# Patient Record
Sex: MALE | Race: WHITE | NOT HISPANIC OR LATINO | Employment: UNEMPLOYED | URBAN - METROPOLITAN AREA
[De-identification: names, ages, dates, MRNs, and addresses within clinical notes are randomized per-mention and may not be internally consistent; named-entity substitution may affect disease eponyms.]

---

## 2017-01-13 ENCOUNTER — GENERIC CONVERSION - ENCOUNTER (OUTPATIENT)
Dept: OTHER | Facility: OTHER | Age: 47
End: 2017-01-13

## 2017-01-18 ENCOUNTER — ALLSCRIPTS OFFICE VISIT (OUTPATIENT)
Dept: OTHER | Facility: CLINIC | Age: 47
End: 2017-01-18

## 2017-01-30 ENCOUNTER — GENERIC CONVERSION - ENCOUNTER (OUTPATIENT)
Dept: OTHER | Facility: OTHER | Age: 47
End: 2017-01-30

## 2017-01-30 ENCOUNTER — ALLSCRIPTS OFFICE VISIT (OUTPATIENT)
Dept: OTHER | Facility: CLINIC | Age: 47
End: 2017-01-30

## 2017-03-02 ENCOUNTER — GENERIC CONVERSION - ENCOUNTER (OUTPATIENT)
Dept: OTHER | Facility: OTHER | Age: 47
End: 2017-03-02

## 2017-03-07 ENCOUNTER — HOSPITAL ENCOUNTER (EMERGENCY)
Facility: HOSPITAL | Age: 47
Discharge: HOME | End: 2017-03-07
Attending: EMERGENCY MEDICINE | Admitting: EMERGENCY MEDICINE
Payer: COMMERCIAL

## 2017-03-07 ENCOUNTER — APPOINTMENT (EMERGENCY)
Dept: RADIOLOGY | Facility: HOSPITAL | Age: 47
End: 2017-03-07
Payer: COMMERCIAL

## 2017-03-07 VITALS
WEIGHT: 185 LBS | DIASTOLIC BLOOD PRESSURE: 63 MMHG | BODY MASS INDEX: 28.98 KG/M2 | TEMPERATURE: 98.1 F | HEART RATE: 94 BPM | SYSTOLIC BLOOD PRESSURE: 121 MMHG | RESPIRATION RATE: 20 BRPM | OXYGEN SATURATION: 95 %

## 2017-03-07 DIAGNOSIS — S93.401A RIGHT ANKLE SPRAIN: Primary | ICD-10-CM

## 2017-03-07 PROCEDURE — 73610 X-RAY EXAM OF ANKLE: CPT

## 2017-03-07 PROCEDURE — 73630 X-RAY EXAM OF FOOT: CPT

## 2017-03-07 PROCEDURE — 99283 EMERGENCY DEPT VISIT LOW MDM: CPT

## 2017-03-07 RX ORDER — DICYCLOMINE HCL 20 MG
20 TABLET ORAL EVERY 6 HOURS
COMMUNITY
End: 2018-05-14 | Stop reason: SDUPTHER

## 2017-03-07 RX ORDER — IBUPROFEN 400 MG/1
800 TABLET ORAL ONCE
Status: COMPLETED | OUTPATIENT
Start: 2017-03-07 | End: 2017-03-07

## 2017-03-07 RX ORDER — IBUPROFEN 800 MG/1
800 TABLET ORAL EVERY 6 HOURS PRN
Qty: 20 TABLET | Refills: 0 | Status: SHIPPED | OUTPATIENT
Start: 2017-03-07 | End: 2018-02-23

## 2017-03-07 RX ADMIN — IBUPROFEN 800 MG: 400 TABLET ORAL at 18:52

## 2017-03-08 ENCOUNTER — ANESTHESIA EVENT (OUTPATIENT)
Dept: GASTROENTEROLOGY | Facility: HOSPITAL | Age: 47
End: 2017-03-08
Payer: COMMERCIAL

## 2017-03-09 ENCOUNTER — GENERIC CONVERSION - ENCOUNTER (OUTPATIENT)
Dept: OTHER | Facility: OTHER | Age: 47
End: 2017-03-09

## 2017-03-09 ENCOUNTER — ANESTHESIA (OUTPATIENT)
Dept: GASTROENTEROLOGY | Facility: HOSPITAL | Age: 47
End: 2017-03-09
Payer: COMMERCIAL

## 2017-03-09 ENCOUNTER — HOSPITAL ENCOUNTER (OUTPATIENT)
Facility: HOSPITAL | Age: 47
Setting detail: OUTPATIENT SURGERY
Discharge: HOME | End: 2017-03-09
Attending: INTERNAL MEDICINE | Admitting: INTERNAL MEDICINE
Payer: COMMERCIAL

## 2017-03-09 VITALS
HEIGHT: 67 IN | BODY MASS INDEX: 29.03 KG/M2 | RESPIRATION RATE: 16 BRPM | TEMPERATURE: 97.4 F | OXYGEN SATURATION: 99 % | WEIGHT: 185 LBS | DIASTOLIC BLOOD PRESSURE: 65 MMHG | SYSTOLIC BLOOD PRESSURE: 104 MMHG | HEART RATE: 85 BPM

## 2017-03-09 DIAGNOSIS — R13.10 DYSPHAGIA: ICD-10-CM

## 2017-03-09 PROCEDURE — 88342 IMHCHEM/IMCYTCHM 1ST ANTB: CPT | Performed by: INTERNAL MEDICINE

## 2017-03-09 PROCEDURE — 88313 SPECIAL STAINS GROUP 2: CPT | Performed by: INTERNAL MEDICINE

## 2017-03-09 PROCEDURE — 88305 TISSUE EXAM BY PATHOLOGIST: CPT | Performed by: INTERNAL MEDICINE

## 2017-03-09 RX ORDER — SODIUM CHLORIDE 9 MG/ML
100 INJECTION, SOLUTION INTRAVENOUS CONTINUOUS
Status: DISCONTINUED | OUTPATIENT
Start: 2017-03-09 | End: 2017-03-09 | Stop reason: HOSPADM

## 2017-03-09 RX ORDER — PROPOFOL 10 MG/ML
INJECTION, EMULSION INTRAVENOUS AS NEEDED
Status: DISCONTINUED | OUTPATIENT
Start: 2017-03-09 | End: 2017-03-09 | Stop reason: SURG

## 2017-03-09 RX ADMIN — PROPOFOL 50 MG: 10 INJECTION, EMULSION INTRAVENOUS at 15:29

## 2017-03-09 RX ADMIN — SODIUM CHLORIDE: 0.9 INJECTION, SOLUTION INTRAVENOUS at 15:14

## 2017-03-09 RX ADMIN — PROPOFOL 50 MG: 10 INJECTION, EMULSION INTRAVENOUS at 15:28

## 2017-03-09 RX ADMIN — PROPOFOL 50 MG: 10 INJECTION, EMULSION INTRAVENOUS at 15:31

## 2017-03-09 RX ADMIN — SODIUM CHLORIDE 100 ML/HR: 0.9 INJECTION, SOLUTION INTRAVENOUS at 15:09

## 2017-03-09 RX ADMIN — PROPOFOL 100 MG: 10 INJECTION, EMULSION INTRAVENOUS at 15:27

## 2017-03-09 RX ADMIN — PROPOFOL 50 MG: 10 INJECTION, EMULSION INTRAVENOUS at 15:33

## 2017-03-09 RX ADMIN — LIDOCAINE HYDROCHLORIDE 100 MG: 20 INJECTION, SOLUTION INTRAVENOUS at 15:27

## 2017-03-21 ENCOUNTER — GENERIC CONVERSION - ENCOUNTER (OUTPATIENT)
Dept: OTHER | Facility: OTHER | Age: 47
End: 2017-03-21

## 2017-03-29 ENCOUNTER — GENERIC CONVERSION - ENCOUNTER (OUTPATIENT)
Dept: OTHER | Facility: OTHER | Age: 47
End: 2017-03-29

## 2017-03-29 DIAGNOSIS — K21.00 GASTRO-ESOPHAGEAL REFLUX DISEASE WITH ESOPHAGITIS: ICD-10-CM

## 2017-03-29 DIAGNOSIS — K58.9 IRRITABLE BOWEL SYNDROME WITHOUT DIARRHEA: ICD-10-CM

## 2017-04-03 ENCOUNTER — HOSPITAL ENCOUNTER (EMERGENCY)
Facility: HOSPITAL | Age: 47
Discharge: HOME | End: 2017-04-03
Attending: EMERGENCY MEDICINE | Admitting: EMERGENCY MEDICINE
Payer: COMMERCIAL

## 2017-04-03 ENCOUNTER — APPOINTMENT (EMERGENCY)
Dept: RADIOLOGY | Facility: HOSPITAL | Age: 47
End: 2017-04-03
Payer: COMMERCIAL

## 2017-04-03 VITALS
RESPIRATION RATE: 18 BRPM | OXYGEN SATURATION: 94 % | SYSTOLIC BLOOD PRESSURE: 129 MMHG | WEIGHT: 189.6 LBS | BODY MASS INDEX: 29.69 KG/M2 | DIASTOLIC BLOOD PRESSURE: 71 MMHG | HEART RATE: 109 BPM | TEMPERATURE: 100.5 F

## 2017-04-03 DIAGNOSIS — J18.9 PNEUMONIA: Primary | ICD-10-CM

## 2017-04-03 DIAGNOSIS — R50.9 FEVER: ICD-10-CM

## 2017-04-03 LAB
FLUAV AG SPEC QL IA: NEGATIVE
FLUAV AG SPEC QL: NORMAL
FLUBV AG SPEC QL IA: NEGATIVE
FLUBV AG SPEC QL: NORMAL
RSV B RNA SPEC QL NAA+PROBE: NORMAL

## 2017-04-03 PROCEDURE — 87400 INFLUENZA A/B EACH AG IA: CPT | Performed by: EMERGENCY MEDICINE

## 2017-04-03 PROCEDURE — 71020 HB CHEST X-RAY 2VW FRONTAL&LATL: CPT

## 2017-04-03 PROCEDURE — 99283 EMERGENCY DEPT VISIT LOW MDM: CPT

## 2017-04-03 PROCEDURE — 87798 DETECT AGENT NOS DNA AMP: CPT | Performed by: EMERGENCY MEDICINE

## 2017-04-03 RX ORDER — ACETAMINOPHEN 325 MG/1
650 TABLET ORAL ONCE
Status: COMPLETED | OUTPATIENT
Start: 2017-04-03 | End: 2017-04-03

## 2017-04-03 RX ORDER — AZITHROMYCIN 250 MG/1
500 TABLET, FILM COATED ORAL ONCE
Status: COMPLETED | OUTPATIENT
Start: 2017-04-03 | End: 2017-04-03

## 2017-04-03 RX ORDER — AZITHROMYCIN 250 MG/1
250 TABLET, FILM COATED ORAL DAILY
Qty: 4 TABLET | Refills: 0 | Status: SHIPPED | OUTPATIENT
Start: 2017-04-04 | End: 2017-04-08

## 2017-04-03 RX ADMIN — AZITHROMYCIN 500 MG: 250 TABLET, FILM COATED ORAL at 05:36

## 2017-04-03 RX ADMIN — ACETAMINOPHEN 650 MG: 325 TABLET ORAL at 04:54

## 2017-04-21 ENCOUNTER — GENERIC CONVERSION - ENCOUNTER (OUTPATIENT)
Dept: OTHER | Facility: OTHER | Age: 47
End: 2017-04-21

## 2017-04-24 ENCOUNTER — GENERIC CONVERSION - ENCOUNTER (OUTPATIENT)
Dept: OTHER | Facility: OTHER | Age: 47
End: 2017-04-24

## 2017-05-04 ENCOUNTER — ALLSCRIPTS OFFICE VISIT (OUTPATIENT)
Dept: OTHER | Facility: CLINIC | Age: 47
End: 2017-05-04

## 2017-05-12 ENCOUNTER — ALLSCRIPTS OFFICE VISIT (OUTPATIENT)
Dept: OTHER | Facility: OTHER | Age: 47
End: 2017-05-12

## 2017-05-15 ENCOUNTER — GENERIC CONVERSION - ENCOUNTER (OUTPATIENT)
Dept: OTHER | Facility: OTHER | Age: 47
End: 2017-05-15

## 2017-05-22 ENCOUNTER — ALLSCRIPTS OFFICE VISIT (OUTPATIENT)
Dept: OTHER | Facility: OTHER | Age: 47
End: 2017-05-22

## 2017-05-25 ENCOUNTER — ALLSCRIPTS OFFICE VISIT (OUTPATIENT)
Dept: OTHER | Facility: OTHER | Age: 47
End: 2017-05-25

## 2017-05-31 ENCOUNTER — ALLSCRIPTS OFFICE VISIT (OUTPATIENT)
Dept: OTHER | Facility: OTHER | Age: 47
End: 2017-05-31

## 2017-06-07 ENCOUNTER — TRANSCRIBE ORDERS (OUTPATIENT)
Dept: ADMINISTRATIVE | Facility: HOSPITAL | Age: 47
End: 2017-06-07

## 2017-06-07 DIAGNOSIS — R13.10 DYSPHAGIA, UNSPECIFIED TYPE: Primary | ICD-10-CM

## 2017-06-08 ENCOUNTER — APPOINTMENT (OUTPATIENT)
Dept: LAB | Facility: CLINIC | Age: 47
End: 2017-06-08
Payer: COMMERCIAL

## 2017-06-08 DIAGNOSIS — K21.00 GASTRO-ESOPHAGEAL REFLUX DISEASE WITH ESOPHAGITIS: ICD-10-CM

## 2017-06-08 DIAGNOSIS — B20 HUMAN IMMUNODEFICIENCY VIRUS (HIV) DISEASE (HCC): ICD-10-CM

## 2017-06-08 DIAGNOSIS — K58.9 IRRITABLE BOWEL SYNDROME WITHOUT DIARRHEA: ICD-10-CM

## 2017-06-08 LAB
ALBUMIN SERPL BCP-MCNC: 4 G/DL (ref 3.5–5)
ALP SERPL-CCNC: 117 U/L (ref 46–116)
ALT SERPL W P-5'-P-CCNC: 98 U/L (ref 12–78)
ANION GAP SERPL CALCULATED.3IONS-SCNC: 8 MMOL/L (ref 4–13)
AST SERPL W P-5'-P-CCNC: 50 U/L (ref 5–45)
BASOPHILS # BLD MANUAL: 0 THOUSAND/UL (ref 0–0.1)
BASOPHILS NFR MAR MANUAL: 0 % (ref 0–1)
BILIRUB SERPL-MCNC: 0.32 MG/DL (ref 0.2–1)
BUN SERPL-MCNC: 16 MG/DL (ref 5–25)
CALCIUM SERPL-MCNC: 9.8 MG/DL (ref 8.3–10.1)
CHLORIDE SERPL-SCNC: 102 MMOL/L (ref 100–108)
CO2 SERPL-SCNC: 27 MMOL/L (ref 21–32)
CREAT SERPL-MCNC: 0.89 MG/DL (ref 0.6–1.3)
EOSINOPHIL # BLD MANUAL: 0.07 THOUSAND/UL (ref 0–0.4)
EOSINOPHIL NFR BLD MANUAL: 1 % (ref 0–6)
ERYTHROCYTE [DISTWIDTH] IN BLOOD BY AUTOMATED COUNT: 13.4 % (ref 11.6–15.1)
GFR SERPL CREATININE-BSD FRML MDRD: >60 ML/MIN/1.73SQ M
GLUCOSE SERPL-MCNC: 86 MG/DL (ref 65–140)
HCT VFR BLD AUTO: 44 % (ref 36.5–49.3)
HGB BLD-MCNC: 15.3 G/DL (ref 12–17)
LYMPHOCYTES # BLD AUTO: 3.35 THOUSAND/UL (ref 0.6–4.47)
LYMPHOCYTES # BLD AUTO: 49 % (ref 14–44)
MCH RBC QN AUTO: 31.5 PG (ref 26.8–34.3)
MCHC RBC AUTO-ENTMCNC: 34.8 G/DL (ref 31.4–37.4)
MCV RBC AUTO: 91 FL (ref 82–98)
MONOCYTES # BLD AUTO: 0.68 THOUSAND/UL (ref 0–1.22)
MONOCYTES NFR BLD: 10 % (ref 4–12)
MYELOCYTES NFR BLD MANUAL: 1 % (ref 0–1)
NEUTROPHILS # BLD MANUAL: 2.53 THOUSAND/UL (ref 1.85–7.62)
NEUTS BAND NFR BLD MANUAL: 1 % (ref 0–8)
NEUTS SEG NFR BLD AUTO: 36 % (ref 43–75)
NRBC BLD AUTO-RTO: 0 /100 WBCS
PLATELET # BLD AUTO: 294 THOUSANDS/UL (ref 149–390)
PLATELET BLD QL SMEAR: ADEQUATE
PMV BLD AUTO: 8.7 FL (ref 8.9–12.7)
POTASSIUM SERPL-SCNC: 4.2 MMOL/L (ref 3.5–5.3)
PROT SERPL-MCNC: 8.4 G/DL (ref 6.4–8.2)
RBC # BLD AUTO: 4.86 MILLION/UL (ref 3.88–5.62)
RBC MORPH BLD: NORMAL
SMUDGE CELLS BLD QL SMEAR: PRESENT
SODIUM SERPL-SCNC: 137 MMOL/L (ref 136–145)
VARIANT LYMPHS # BLD AUTO: 2 %
WBC # BLD AUTO: 6.83 THOUSAND/UL (ref 4.31–10.16)

## 2017-06-08 PROCEDURE — 87536 HIV-1 QUANT&REVRSE TRNSCRPJ: CPT

## 2017-06-08 PROCEDURE — 83516 IMMUNOASSAY NONANTIBODY: CPT

## 2017-06-08 PROCEDURE — 85027 COMPLETE CBC AUTOMATED: CPT

## 2017-06-08 PROCEDURE — 86255 FLUORESCENT ANTIBODY SCREEN: CPT

## 2017-06-08 PROCEDURE — 80053 COMPREHEN METABOLIC PANEL: CPT

## 2017-06-08 PROCEDURE — 82784 ASSAY IGA/IGD/IGG/IGM EACH: CPT

## 2017-06-08 PROCEDURE — 85007 BL SMEAR W/DIFF WBC COUNT: CPT

## 2017-06-08 PROCEDURE — 86361 T CELL ABSOLUTE COUNT: CPT

## 2017-06-08 PROCEDURE — 36415 COLL VENOUS BLD VENIPUNCTURE: CPT

## 2017-06-09 ENCOUNTER — HOSPITAL ENCOUNTER (OUTPATIENT)
Dept: RADIOLOGY | Facility: HOSPITAL | Age: 47
Discharge: HOME | End: 2017-06-09
Attending: OTOLARYNGOLOGY
Payer: COMMERCIAL

## 2017-06-09 DIAGNOSIS — R13.10 DYSPHAGIA, UNSPECIFIED TYPE: ICD-10-CM

## 2017-06-09 LAB
BASOPHILS # BLD AUTO: 0 X10E3/UL (ref 0–0.2)
BASOPHILS NFR BLD AUTO: 1 %
CD3+CD4+ CELLS # BLD: 1251 /UL (ref 359–1519)
CD3+CD4+ CELLS NFR BLD: 41.7 % (ref 30.8–58.5)
ENDOMYSIUM IGA SER QL: NEGATIVE
EOSINOPHIL # BLD AUTO: 0.1 X10E3/UL (ref 0–0.4)
EOSINOPHIL NFR BLD AUTO: 2 %
ERYTHROCYTE [DISTWIDTH] IN BLOOD BY AUTOMATED COUNT: 14.5 % (ref 12.3–15.4)
GLIADIN PEPTIDE IGA SER-ACNC: 10 UNITS (ref 0–19)
GLIADIN PEPTIDE IGG SER-ACNC: 2 UNITS (ref 0–19)
HCT VFR BLD AUTO: 45 % (ref 37.5–51)
HGB BLD-MCNC: 15.4 G/DL (ref 12.6–17.7)
IGA SERPL-MCNC: 269 MG/DL (ref 90–386)
IMM GRANULOCYTES # BLD: 0.1 X10E3/UL (ref 0–0.1)
IMM GRANULOCYTES NFR BLD: 1 %
LYMPHOCYTES # BLD AUTO: 3 X10E3/UL (ref 0.7–3.1)
LYMPHOCYTES NFR BLD AUTO: 48 %
MCH RBC QN AUTO: 31.3 PG (ref 26.6–33)
MCHC RBC AUTO-ENTMCNC: 34.2 G/DL (ref 31.5–35.7)
MCV RBC AUTO: 92 FL (ref 79–97)
MONOCYTES # BLD AUTO: 0.8 X10E3/UL (ref 0.1–0.9)
MONOCYTES NFR BLD AUTO: 13 %
NEUTROPHILS # BLD AUTO: 2.2 X10E3/UL (ref 1.4–7)
NEUTROPHILS NFR BLD AUTO: 35 %
PLATELET # BLD AUTO: 274 X10E3/UL (ref 150–379)
RBC # BLD AUTO: 4.92 X10E6/UL (ref 4.14–5.8)
TTG IGA SER-ACNC: <2 U/ML (ref 0–3)
TTG IGG SER-ACNC: <2 U/ML (ref 0–5)
WBC # BLD AUTO: 6.3 X10E3/UL (ref 3.4–10.8)

## 2017-06-09 PROCEDURE — 74220 X-RAY XM ESOPHAGUS 1CNTRST: CPT

## 2017-06-13 ENCOUNTER — GENERIC CONVERSION - ENCOUNTER (OUTPATIENT)
Dept: OTHER | Facility: OTHER | Age: 47
End: 2017-06-13

## 2017-06-13 DIAGNOSIS — B20 HUMAN IMMUNODEFICIENCY VIRUS (HIV) DISEASE (HCC): ICD-10-CM

## 2017-06-14 ENCOUNTER — ALLSCRIPTS OFFICE VISIT (OUTPATIENT)
Dept: OTHER | Facility: OTHER | Age: 47
End: 2017-06-14

## 2017-06-14 LAB
HIV1 RNA # SERPL NAA+PROBE: <20 COPIES/ML
HIV1 RNA SERPL NAA+PROBE-LOG#: NORMAL LOG10COPY/ML

## 2017-06-15 ENCOUNTER — APPOINTMENT (OUTPATIENT)
Dept: AUDIOLOGY | Age: 47
End: 2017-06-15
Payer: COMMERCIAL

## 2017-06-15 PROCEDURE — 92557 COMPREHENSIVE HEARING TEST: CPT | Performed by: AUDIOLOGIST

## 2017-06-15 PROCEDURE — 92567 TYMPANOMETRY: CPT | Performed by: AUDIOLOGIST

## 2017-06-20 ENCOUNTER — ALLSCRIPTS OFFICE VISIT (OUTPATIENT)
Dept: OTHER | Facility: OTHER | Age: 47
End: 2017-06-20

## 2017-06-30 ENCOUNTER — ALLSCRIPTS OFFICE VISIT (OUTPATIENT)
Dept: OTHER | Facility: OTHER | Age: 47
End: 2017-06-30

## 2017-07-03 ENCOUNTER — GENERIC CONVERSION - ENCOUNTER (OUTPATIENT)
Dept: OTHER | Facility: OTHER | Age: 47
End: 2017-07-03

## 2017-07-06 ENCOUNTER — ALLSCRIPTS OFFICE VISIT (OUTPATIENT)
Dept: OTHER | Facility: OTHER | Age: 47
End: 2017-07-06

## 2017-07-13 ENCOUNTER — GENERIC CONVERSION - ENCOUNTER (OUTPATIENT)
Dept: OTHER | Facility: OTHER | Age: 47
End: 2017-07-13

## 2017-07-20 ENCOUNTER — ALLSCRIPTS OFFICE VISIT (OUTPATIENT)
Dept: OTHER | Facility: OTHER | Age: 47
End: 2017-07-20

## 2017-07-25 ENCOUNTER — ALLSCRIPTS OFFICE VISIT (OUTPATIENT)
Dept: OTHER | Facility: OTHER | Age: 47
End: 2017-07-25

## 2017-07-31 ENCOUNTER — ALLSCRIPTS OFFICE VISIT (OUTPATIENT)
Dept: OTHER | Facility: CLINIC | Age: 47
End: 2017-07-31

## 2017-07-31 ENCOUNTER — GENERIC CONVERSION - ENCOUNTER (OUTPATIENT)
Dept: OTHER | Facility: OTHER | Age: 47
End: 2017-07-31

## 2017-08-04 ENCOUNTER — ALLSCRIPTS OFFICE VISIT (OUTPATIENT)
Dept: OTHER | Facility: OTHER | Age: 47
End: 2017-08-04

## 2017-08-07 ENCOUNTER — GENERIC CONVERSION - ENCOUNTER (OUTPATIENT)
Dept: OTHER | Facility: OTHER | Age: 47
End: 2017-08-07

## 2017-08-07 ENCOUNTER — ALLSCRIPTS OFFICE VISIT (OUTPATIENT)
Dept: OTHER | Facility: OTHER | Age: 47
End: 2017-08-07

## 2017-08-31 ENCOUNTER — ALLSCRIPTS OFFICE VISIT (OUTPATIENT)
Dept: OTHER | Facility: OTHER | Age: 47
End: 2017-08-31

## 2017-08-31 DIAGNOSIS — R25.8: ICD-10-CM

## 2017-08-31 DIAGNOSIS — Z79.899 OTHER LONG TERM (CURRENT) DRUG THERAPY: ICD-10-CM

## 2017-08-31 DIAGNOSIS — R74.8 ABNORMAL LEVELS OF OTHER SERUM ENZYMES: ICD-10-CM

## 2017-08-31 DIAGNOSIS — R19.7 DIARRHEA: ICD-10-CM

## 2017-08-31 DIAGNOSIS — R45.1 RESTLESSNESS AND AGITATION: ICD-10-CM

## 2017-08-31 DIAGNOSIS — F41.1 GENERALIZED ANXIETY DISORDER: ICD-10-CM

## 2017-09-01 ENCOUNTER — ALLSCRIPTS OFFICE VISIT (OUTPATIENT)
Dept: OTHER | Facility: OTHER | Age: 47
End: 2017-09-01

## 2017-09-15 ENCOUNTER — ALLSCRIPTS OFFICE VISIT (OUTPATIENT)
Dept: OTHER | Facility: OTHER | Age: 47
End: 2017-09-15

## 2017-09-21 ENCOUNTER — ALLSCRIPTS OFFICE VISIT (OUTPATIENT)
Dept: OTHER | Facility: OTHER | Age: 47
End: 2017-09-21

## 2017-09-22 ENCOUNTER — ALLSCRIPTS OFFICE VISIT (OUTPATIENT)
Dept: OTHER | Facility: OTHER | Age: 47
End: 2017-09-22

## 2017-09-30 DIAGNOSIS — B20 HUMAN IMMUNODEFICIENCY VIRUS (HIV) DISEASE (HCC): ICD-10-CM

## 2017-10-25 ENCOUNTER — ALLSCRIPTS OFFICE VISIT (OUTPATIENT)
Dept: OTHER | Facility: OTHER | Age: 47
End: 2017-10-25

## 2017-11-03 ENCOUNTER — APPOINTMENT (OUTPATIENT)
Dept: LAB | Facility: CLINIC | Age: 47
End: 2017-11-03
Payer: COMMERCIAL

## 2017-11-03 DIAGNOSIS — B20 HUMAN IMMUNODEFICIENCY VIRUS (HIV) DISEASE (HCC): ICD-10-CM

## 2017-11-03 DIAGNOSIS — Z79.899 OTHER LONG TERM (CURRENT) DRUG THERAPY: ICD-10-CM

## 2017-11-03 DIAGNOSIS — R19.7 DIARRHEA: ICD-10-CM

## 2017-11-03 DIAGNOSIS — R25.8: ICD-10-CM

## 2017-11-03 DIAGNOSIS — R45.1 RESTLESSNESS AND AGITATION: ICD-10-CM

## 2017-11-03 DIAGNOSIS — F41.1 GENERALIZED ANXIETY DISORDER: ICD-10-CM

## 2017-11-03 DIAGNOSIS — R74.8 ABNORMAL LEVELS OF OTHER SERUM ENZYMES: ICD-10-CM

## 2017-11-03 LAB
ALBUMIN SERPL BCP-MCNC: 3.7 G/DL (ref 3.5–5)
ALP SERPL-CCNC: 88 U/L (ref 46–116)
ALT SERPL W P-5'-P-CCNC: 24 U/L (ref 12–78)
ANION GAP SERPL CALCULATED.3IONS-SCNC: 5 MMOL/L (ref 4–13)
AST SERPL W P-5'-P-CCNC: 14 U/L (ref 5–45)
BASOPHILS # BLD AUTO: 0.02 THOUSANDS/ΜL (ref 0–0.1)
BASOPHILS NFR BLD AUTO: 0 % (ref 0–1)
BILIRUB SERPL-MCNC: 0.35 MG/DL (ref 0.2–1)
BUN SERPL-MCNC: 14 MG/DL (ref 5–25)
CALCIUM SERPL-MCNC: 9.4 MG/DL (ref 8.3–10.1)
CHLORIDE SERPL-SCNC: 103 MMOL/L (ref 100–108)
CHOLEST SERPL-MCNC: 178 MG/DL (ref 50–200)
CO2 SERPL-SCNC: 29 MMOL/L (ref 21–32)
CREAT SERPL-MCNC: 0.93 MG/DL (ref 0.6–1.3)
EOSINOPHIL # BLD AUTO: 0.12 THOUSAND/ΜL (ref 0–0.61)
EOSINOPHIL NFR BLD AUTO: 2 % (ref 0–6)
ERYTHROCYTE [DISTWIDTH] IN BLOOD BY AUTOMATED COUNT: 13.2 % (ref 11.6–15.1)
GFR SERPL CREATININE-BSD FRML MDRD: 97 ML/MIN/1.73SQ M
GLUCOSE P FAST SERPL-MCNC: 79 MG/DL (ref 65–99)
HCT VFR BLD AUTO: 43.6 % (ref 36.5–49.3)
HDLC SERPL-MCNC: 46 MG/DL (ref 40–60)
HGB BLD-MCNC: 15.1 G/DL (ref 12–17)
LDLC SERPL CALC-MCNC: 90 MG/DL (ref 0–100)
LYMPHOCYTES # BLD AUTO: 2.87 THOUSANDS/ΜL (ref 0.6–4.47)
LYMPHOCYTES NFR BLD AUTO: 52 % (ref 14–44)
MCH RBC QN AUTO: 32 PG (ref 26.8–34.3)
MCHC RBC AUTO-ENTMCNC: 34.6 G/DL (ref 31.4–37.4)
MCV RBC AUTO: 92 FL (ref 82–98)
MONOCYTES # BLD AUTO: 0.54 THOUSAND/ΜL (ref 0.17–1.22)
MONOCYTES NFR BLD AUTO: 10 % (ref 4–12)
NEUTROPHILS # BLD AUTO: 2 THOUSANDS/ΜL (ref 1.85–7.62)
NEUTS SEG NFR BLD AUTO: 36 % (ref 43–75)
NRBC BLD AUTO-RTO: 0 /100 WBCS
PLATELET # BLD AUTO: 284 THOUSANDS/UL (ref 149–390)
PMV BLD AUTO: 8.7 FL (ref 8.9–12.7)
POTASSIUM SERPL-SCNC: 4.1 MMOL/L (ref 3.5–5.3)
PROT SERPL-MCNC: 7.8 G/DL (ref 6.4–8.2)
RBC # BLD AUTO: 4.72 MILLION/UL (ref 3.88–5.62)
SODIUM SERPL-SCNC: 137 MMOL/L (ref 136–145)
TRIGL SERPL-MCNC: 210 MG/DL
TSH SERPL DL<=0.05 MIU/L-ACNC: 2.61 UIU/ML (ref 0.36–3.74)
WBC # BLD AUTO: 5.6 THOUSAND/UL (ref 4.31–10.16)

## 2017-11-03 PROCEDURE — 85025 COMPLETE CBC W/AUTO DIFF WBC: CPT

## 2017-11-03 PROCEDURE — 80053 COMPREHEN METABOLIC PANEL: CPT

## 2017-11-03 PROCEDURE — 87536 HIV-1 QUANT&REVRSE TRNSCRPJ: CPT

## 2017-11-03 PROCEDURE — 36415 COLL VENOUS BLD VENIPUNCTURE: CPT

## 2017-11-03 PROCEDURE — 80061 LIPID PANEL: CPT

## 2017-11-03 PROCEDURE — 86361 T CELL ABSOLUTE COUNT: CPT

## 2017-11-03 PROCEDURE — 84443 ASSAY THYROID STIM HORMONE: CPT

## 2017-11-04 LAB
BASOPHILS # BLD AUTO: 0 X10E3/UL (ref 0–0.2)
BASOPHILS NFR BLD AUTO: 0 %
CD3+CD4+ CELLS # BLD: 967 /UL (ref 359–1519)
CD3+CD4+ CELLS NFR BLD: 35.8 % (ref 30.8–58.5)
EOSINOPHIL # BLD AUTO: 0.1 X10E3/UL (ref 0–0.4)
EOSINOPHIL NFR BLD AUTO: 3 %
ERYTHROCYTE [DISTWIDTH] IN BLOOD BY AUTOMATED COUNT: 14.4 % (ref 12.3–15.4)
HCT VFR BLD AUTO: 43.9 % (ref 37.5–51)
HGB BLD-MCNC: 14.9 G/DL (ref 12.6–17.7)
IMM GRANULOCYTES # BLD: 0 X10E3/UL (ref 0–0.1)
IMM GRANULOCYTES NFR BLD: 1 %
LYMPHOCYTES # BLD AUTO: 2.7 X10E3/UL (ref 0.7–3.1)
LYMPHOCYTES NFR BLD AUTO: 50 %
MCH RBC QN AUTO: 32 PG (ref 26.6–33)
MCHC RBC AUTO-ENTMCNC: 33.9 G/DL (ref 31.5–35.7)
MCV RBC AUTO: 94 FL (ref 79–97)
MONOCYTES # BLD AUTO: 0.5 X10E3/UL (ref 0.1–0.9)
MONOCYTES NFR BLD AUTO: 9 %
NEUTROPHILS # BLD AUTO: 2 X10E3/UL (ref 1.4–7)
NEUTROPHILS NFR BLD AUTO: 37 %
PLATELET # BLD AUTO: 259 X10E3/UL (ref 150–379)
RBC # BLD AUTO: 4.65 X10E6/UL (ref 4.14–5.8)
WBC # BLD AUTO: 5.4 X10E3/UL (ref 3.4–10.8)

## 2017-11-07 LAB
HIV1 RNA # SERPL NAA+PROBE: <20 COPIES/ML
HIV1 RNA SERPL NAA+PROBE-LOG#: NORMAL LOG10COPY/ML

## 2017-11-14 ENCOUNTER — ALLSCRIPTS OFFICE VISIT (OUTPATIENT)
Dept: OTHER | Facility: OTHER | Age: 47
End: 2017-11-14

## 2017-12-01 ENCOUNTER — ALLSCRIPTS OFFICE VISIT (OUTPATIENT)
Dept: OTHER | Facility: OTHER | Age: 47
End: 2017-12-01

## 2017-12-08 ENCOUNTER — ALLSCRIPTS OFFICE VISIT (OUTPATIENT)
Dept: OTHER | Facility: OTHER | Age: 47
End: 2017-12-08

## 2017-12-15 ENCOUNTER — GENERIC CONVERSION - ENCOUNTER (OUTPATIENT)
Dept: OTHER | Facility: OTHER | Age: 47
End: 2017-12-15

## 2017-12-31 DIAGNOSIS — Z13.1 ENCOUNTER FOR SCREENING FOR DIABETES MELLITUS: ICD-10-CM

## 2017-12-31 DIAGNOSIS — B20 HUMAN IMMUNODEFICIENCY VIRUS (HIV) DISEASE (HCC): ICD-10-CM

## 2017-12-31 DIAGNOSIS — Z20.2 CONTACT WITH AND (SUSPECTED) EXPOSURE TO INFECTIONS WITH A PREDOMINANTLY SEXUAL MODE OF TRANSMISSION: ICD-10-CM

## 2017-12-31 DIAGNOSIS — Z13.6 ENCOUNTER FOR SCREENING FOR CARDIOVASCULAR DISORDERS: ICD-10-CM

## 2018-01-08 ENCOUNTER — ALLSCRIPTS OFFICE VISIT (OUTPATIENT)
Dept: OTHER | Facility: CLINIC | Age: 48
End: 2018-01-08

## 2018-01-08 ENCOUNTER — GENERIC CONVERSION - ENCOUNTER (OUTPATIENT)
Dept: OTHER | Facility: OTHER | Age: 48
End: 2018-01-08

## 2018-01-08 ENCOUNTER — ALLSCRIPTS OFFICE VISIT (OUTPATIENT)
Dept: OTHER | Facility: OTHER | Age: 48
End: 2018-01-08

## 2018-01-09 NOTE — RESULT NOTES
Message   EGD did not show precancerous changes or h pylori but did show changes in small intestine which could suggest  celiac- hwoever will need celiac serologies to evaluate this  Will need repeat EGD in 3 years to assess for baker's ( irregular z line)  Follow up in office in 6 weeks  thanks     Verified Results  (1) TISSUE EXAM [id number] 03:29PM Catherine Alicea     Test Name Result Flag Reference   LAB AP CASE REPORT (Report)     Surgical Pathology Report             Case: [id number]                   Authorizing Provider: Renzo Alonso MD    Collected:      03/09/2017 1529        Ordering Location:   [address] [place] [address]   Received:      03/10/2017 [facility]                               Pathologist:      Brody Bacon MD                              Specimens:  A) - Duodenum, Duodenum Biopsy - rule out Celiac                            B) - Stomach, Gastric Body - rule out H Pylori                             C) - Stomach, Antrum Biopsy - gastric erosion                             D) - Esophagogastric junction, GE Junction Biopsy - rule out Baker's                 E) - Esophagus, Mid Esophagus Biopsy - rule out EOE                          F) - Esophagus, Proximal Esophagus Biopsy - rule out EOE   LAB AP FINAL DIAGNOSIS (Report)     A  Duodenum biopsy:  - Mild, nonspecific chronic duodenitis with intraepithelial lymphocytes   reaching    42 cells/100 enterocytes - see Note  - No villous atrophy or crypt hyperplasia seen  - No active duodenitis  - No glandular dysplasia and no evidence of malignancy  B  Gastric body:  - Minimal chronic inactive oxyntic gastritis, negative for curvilinear   Helicobacter pylori, confirmed by immunohistochemical stains, evaluated   with appropriate positive & negative controls  - No atrophy or intestinal metaplasia identified   - No epithelial dysplasia and no evidence of malignancy      C  Gastric antrum biopsy:  - Chronic inactive oxyntoantral gastritis, negative for curvilinear   Helicobacter pylori, confirmed by immunohistochemical stains, evaluated   with appropriate positive & negative controls  - Some features are present to suggestive reactive gastropathy/bile reflux   - correlate clinically  - No atrophy or intestinal metaplasia identified, confirmed by Alcian   blue/PAS cytochemical stains   - No epithelial dysplasia and no evidence of malignancy  D  Gastroesophageal junction biopsy:  - Benign mixed squamous mucosa & cardia-type columnar mucosa with   hyperplastic, acute & chronic inflammatory changes which suggest reflux   esophagitis  - Eosinophils number fewer than 2 cells per high power field in squamous   mucosa  - No goblet cell [intestinal] metaplasia to suggest Hall's esophagus   - No epithelial dysplasia and no evidence of malignancy  E  Mid esophagus:  - Benign squamous mucosa with mild, nonspecific hyperplastic change  - Eosinophils number fewer than 2 cells per high power field in squamous   mucosa  - No goblet cell metaplasia to suggest Hall's esophagus; no columnar   epithelium seen  - No epithelial dysplasia and no evidence of malignancy  F  Proximal esophagus:  - Benign squamous mucosa with mild, nonspecific hyperplastic change  - Eosinophils number fewer than 2 cells per high power field in squamous   mucosa  - No goblet cell metaplasia to suggest Hall's esophagus; no columnar   epithelium seen  - No epithelial dysplasia and no evidence of malignancy  Interpretation performed at [facility], [facility]  Electronically signed by Apple Finney MD on 3/15/2017 at 11:43 AM   LAB AP NOTE      Specimen A: Increased intraepithelial lymphocytes can be seen in   association with chronic NSAID use, H  pylori or viral infections, Celiac   or tropical sprue  Recommend clinical and serologic correlation, as   indicated     LAB AP SURGICAL ADDITIONAL INFORMATION (Report)     These tests were developed and their performance characteristics   determined by [facility] or [facility]  They may not be cleared or approved by the [place]  Food and   Drug Administration  The FDA has determined that such clearance or   approval is not necessary  These tests are used for clinical purposes  They should not be regarded as investigational or for research  This   laboratory has been approved by [place] [id number], designated as a high-complexity   laboratory and is qualified to perform these tests  LAB AP GROSS DESCRIPTION (Report)     A  The specimen is received in formalin, labeled with the patient's name   and hospital number, and is designated duodenum biopsy-rule out celiac,   are multiple irregularly shaped fragments of tan soft tissue measuring 0 6   x 0 5 x 0 1 cm in aggregate  Entirely submitted  One cassette  B  The specimen is received in formalin, labeled with the patient's name   and hospital number, and is designated gastric body-rule out H  pylori,   are two irregularly shaped fragments of tan soft tissue measuring 0 5 and   0 2 cm in greatest dimension  Entirely submitted  One cassette  C  The specimen is received in formalin, labeled with the patient's name   and hospital number, and is designated antrum biopsy-gastric erosion,   are two irregularly shaped fragments of tan soft tissue each measuring 0 3   cm in greatest dimension  Entirely submitted  One cassette  D  The specimen is received in formalin, labeled with the patient's name   and hospital number, and is designated GE junction biopsy-rule out   Hall's, are multiple irregularly shaped fragments of tan soft tissue   measuring 0 5 x 0 5 x 0 1 cm in aggregate  Entirely submitted  One   cassette      E  The specimen is received in formalin, labeled with the patient's name   and hospital number, and is designated mid esophagus biopsy-rule out   eosinophilic esophagitis, are three irregularly shaped fragments of tan   soft tissue each measuring 0 3 cm in greatest dimension  Entirely   submitted  One cassette  F  The specimen is received in formalin, labeled with the patient's name   and hospital number, and is designated proximal esophagus biopsy-rule   out eosinophilic esophagitis, are three irregularly shaped fragments of   tan soft tissue measuring 0 5 x 0 3 x 0 1 cm in aggregate  Entirely   submitted  One cassette  Note: The estimated total formalin fixation time based upon information   provided by the submitting clinician and the standard processing schedule   is 37 0 hours  RLR  Plan  Gastroesophageal reflux disease with esophagitis, Irritable bowel syndrome    · (1) CELIAC DISEASE AB PROFILE; Status:Active;  Requested for:29Mar2017;

## 2018-01-10 NOTE — PROGRESS NOTES
Assessment    1  Human immunodeficiency virus (HIV) disease (042) (B20)   2  Generalized anxiety disorder (300 02) (F41 1)   3  Dysphagia (787 20) (R13 10)    Plan    1  (1) CHLAMYDIA/GC AMPLIFIED DNA, PCR; Source:Urine, Unspecified Source;   Status:Active; Requested for:62Sue4363;     2  (1) LIPID PANEL, FASTING; Status:Active; Requested for:36Afe6962;     3  (1) CBC/PLT/DIFF; Status:Active; Requested for:33Kru3947;    4  (1) COMPREHENSIVE METABOLIC PANEL; Status:Active; Requested for:84Xla7762;    5  (1) HIV-1 RNA QUANTITATIVE; [Do Not Release]; Status:Active; Requested   for:02Swe5391;    6  (1) QUANTIFERON - TB GOLD; Status:Active; Requested for:97Tjt1667;    7  (1) RPR; Status:Active; Requested for:06Wiu1888;    8  (1) T LYMPH SUBSET (CD4); Status:Active; Requested for:72Tcv9024;    9  (1) URINALYSIS (will reflex a microscopy if leukocytes, occult blood, protein or nitrites are   not within normal limits); Status:Active; Requested for:92Uji1062;     10  (1) HEMOGLOBIN A1C; Status:Active; Requested for:07Imh0828;     Discussion/Summary    HIV-doing well on Genvoya with an undetectable viral load and a CD4 count of greater than 1000  Continue ART, recheck labs in 5 months and follow-up in 6 months  Dysphagia-unclear if related to hiatal hernia  Patient is to follow-up with ENT    Anxiety disorder-appears to have some elements of ra  Patient is being seen by behavioral health and is to start on new medications  Will need to follow the patient's mental health medication treatment regimen to make sure no significant drug interactions  Discussed in detail with the primary  Possible side effects of new medications were reviewed with the patient/guardian today  The treatment plan was reviewed with the patient/guardian   The patient/guardian understands and agrees with the treatment plan   The patient was counseled regarding diagnostic results, instructions for management, prognosis, risks and benefits of treatment options, importance of compliance with treatment  Education   general HIV education  adherence  prevention care  History of Present Illness  Routine follow-up for HIV  Patient claims 100% adherence with Genvoya  He denies any notable side effects  He continues to have occasional episodes of dysphagia  He was seen by ENT and underwent an esophagram that revealed only hiatal hernia  He is being followed by podiatry for plantar fasciitis  He is also being [facility] and will be starting treatment for his anxiety disorder and other mental health issues  Pain Assessment   the patient states they have pain  The pain is located in the B/L Feet/Ankle  (on a scale of 0 to 10, the patient rates the pain at 5 )   Abuse And Domestic Violence Screen    Yes, the patient is safe at home  The patient states no one is hurting them  Depression And Suicide Screen  No, the patient has not had thoughts of hurting themself  Yes, the patient has felt depressed in the past 7 days  The patient is being seen for a routine clinic follow-up of HIV infection  no fever no lethargy no depression no night sweats no weight loss no decreased appetite no cough no shortness of breath no thrush no nausea no vomiting no diarrhea      Active Problems    1  Chronic heel pain (729 5,338 29) (M79 673,G89 29)   2  Contact with and (suspected) exposure to infections with a predominantly sexual mode   of transmission (V01 6) (Z20 2)   3  Cough (786 2) (R05)   4  Diarrhea (787 91) (R19 7)   5  Dysphagia (787 20) (R13 10)   6  Encounter for screening examination for sexually transmitted disease (V74 5) (Z11 3)   7  Encounter for screening for cardiovascular disorders (V81 2) (Z13 6)   8  Gastroesophageal reflux disease with esophagitis (530 11) (K21 0)   9  Generalized anxiety disorder (300 02) (F41 1)   10  Human immunodeficiency virus (HIV) disease (042) (B20)   11   Irritable bowel syndrome (564 1) (K58 9)   12  Moderate episode of recurrent major depressive disorder (296 32) (F33 1)   13  Screening for diabetes mellitus (DM) (V77 1) (Z13 1)   14  Seasonal allergic rhinitis due to pollen (477 0) (J30 1)   15  Testicular calcification (608 89) (N50 89)   16  Tinnitus, bilateral (388 30) (H93 13)   17  Varicocele (456 4) (I86 1)   18  Wheezing (786 07) (R06 2)    Past Medical History    1  History of Anxiety (300 00) (F41 9)   2  History of Dysuria (788 1) (R30 0)   3  History of Elbow stiffness, left (719 52) (M25 622)   4  History of Fecal urgency (787 63) (R15 2)   5  History of Hemorrhage of anus and rectum (569 3) (K62 5)   6  History of abdominal pain (V13 89) (Z87 898)   7  History of backache (V13 59) (Z87 39)   8  History of backache (V13 59) (Z87 39)   9  History of constipation (V12 79) (Z87 19)   10  History of herpes zoster (V12 09) (Z86 19)   11  History of low back pain (V13 59) (Z87 39)   12  History of Human immunodeficiency virus (HIV) disease (042) (B20)   13  History of Laceration of finger (883 0) (S61 219A)   14  History of Proctitis, chlamydial (099 52) (A56 3)   15  History of Right elbow tendinitis (727 09) (M77 8)   16  History of SOB (shortness of breath) on exertion (786 05) (R06 02)    Surgical History    1  History of Surgery Spermatic Cord Excision Of Varicocele    Family History  Mother    1  Family history of Type 2 Diabetes Mellitus  Father    2  Family history of Acute Myocardial Infarction (V17 3)    Social History    · Cigarette smoker (305 1) (F17 210)   · Sexually Active Monogamous Relationship    Current Meds   1  Bentyl 10 MG Oral Capsule; Take 1 Tablet PO BID as needed; Therapy: 98Wps9980 to Recorded   2  Genvoya 906-302-754-10 MG Oral Tablet; take 1 tablet by mouth daily; Therapy: 44ZGU3228 to (Evaluate:87Sqw7080)  Requested for: 33OOX5654; Last   Rx:06Inj5776 Ordered   3  Loratadine 10 MG Oral Tablet; take 1 tablet by mouth daily;    Therapy: 35GYY9837 to (Evaluate:09Oft7442)  Requested for: 56DYZ5879; Last   Rx:16Tlk5019 Ordered   4  Mometasone Furoate 50 MCG/ACT Nasal Suspension; USE 1 SPRAY IN EACH   NOSTRIL TWICE DAILY; Therapy: 11RHJ8841 to (Last QJ:80GPR3524)  Requested for: 57YQF7973 Ordered   5  Omeprazole 40 MG Oral Capsule Delayed Release; TAKE  1  CAPSULE Daily; Therapy: 72KSI4664 to (Evaluate:19Ywk5822)  Requested for: 21Jun2017; Last   GF:15XDN5479 Ordered   6  Ventolin  (90 Base) MCG/ACT Inhalation Aerosol Solution; INHALE 1 TO 2 PUFFS   EVERY 4 TO 6 HOURS AS NEEDED; Therapy: 08PHQ2725 to (Last IH:14IMP1077)  Requested for: 92GTN6000 Ordered    Allergies    1  No Known Drug Allergies    2  TOMATO    Vitals  Signs   Recorded: 50Iii0083 10:46AM   Temperature: 98 F  Heart Rate: 91  Systolic: 363  Diastolic: 60  Weight: 132 lb 12 93 oz  BMI Calculated: 27 69  BSA Calculated: 1 92  O2 Saturation: 98  Pain Scale: 5    Physical Exam    Constitutional   General appearance: Abnormal   Appearing restless with significant movements of his extremities  Ears, Nose, Mouth, and Throat   Oropharynx: Normal with no erythema, edema, exudate or lesions  Pulmonary   Respiratory effort: No increased work of breathing or signs of respiratory distress  Auscultation of lungs: Clear to auscultation  Cardiovascular   Auscultation of heart: Normal rate and rhythm, normal S1 and S2, without murmurs  Examination of extremities for edema and/or varicosities: Normal     Abdomen   Abdomen: Non-tender, no masses  Liver and spleen: No hepatomegaly or splenomegaly  Lymphatic   Palpation of lymph nodes in neck: No lymphadenopathy  Additional Exam:  Continuous repetitive movements that suppress with distraction  Health Management  Dysphagia   EGD; every 3 years; Last 79GSX7128; Next Due: 41ERS1979;  Active    Future Appointments    Date/Time Provider Specialty Site   09/01/2017 01:00 PM Sukhwinder Hart LCSW Psychiatry [facility] 09/07/2017 02:00 PM Catracho Powell LCSW Psychiatry [facility]   09/15/2017 08:00 AM Catracho Powell, [facility] Psychiatry [facility]   09/22/2017 09:00 AM Catracho Powell, [facility] Psychiatry [facility]   08/07/2017 08:30 AM Kristi Rodriguez, [address] [facility]     Signatures   Electronically signed by : Jane Edwards MD; Jul 31 2017 11:09AM EST                       (Author)

## 2018-01-10 NOTE — PROGRESS NOTES
Assessment   1  Dysphagia (787 20) (R13 10)   2  Diarrhea (787 91) (R19 7)   3  Gastroesophageal reflux disease with esophagitis (530 11) (K21 0)   4  Irritable bowel syndrome (564 1) (K58 9)    Plan   Irritable bowel syndrome    · Dicyclomine HCl - 10 MG Oral Capsule (Bentyl); TAKE 1 CAPSULE Twice daily PRN   Rx By: Miguelina Dallas; Dispense: 30 Days ; #:60 Capsule; Refill: 3;For: Irritable bowel syndrome; [place] = N; Verified Transmission to [address] [place] [address]; Last Updated By: SystemJ. Hilburn; 1/8/2018 8:56:07 AM    Discussion/Summary   Discussion Summary:    1  Dysphagia: Resolved, there was endoscopic evidence of Hall's esophagus, biopsies were negative and only showed esophagitis, nonetheless will repeat EGD in 3 years  Continue PPI meanwhile  Patient was explained about the lifestyle and dietary modifications  Advised to avoid fatty foods, chocolates, caffeine, alcohol and any other triggering foods  Avoid eating for at least 3 hours before going to bed  Gastric erosion noted on EGD: Avoid NSAIDs and continue the above recommendations  H pylori was negative  Alternating diarrhea with constipation: Celiac serologies were negative despite duodenal biopsies showing increased intraepithelial cells- no villous blunting or crypt hyperplasia and blood work does not show any evidence of malabsorption, therefore Suspect IBS, symptoms controlled with dicyclomine and have been ongoing for many years, apparently patient has had a colonoscopy which showed chlamydia Trichomonas proctitis and was treated for this, he states that he had a repeat flexible sigmoidoscopy however I do not see these results  Will need to obtain records  If this is not that would recommend repeating colonoscopy at next visit  follow up the prescription for dicyclomine meanwhile  on a probiotic  sex counseling            Chief Complaint   Chief Complaint Free Text Note Form: Follow-up      History of Present Illness   HPI: 47-year-old male comes in for follow-up after having an EGD with me at [facility] last year for dysphagia  EGD at that time showed hiatal hernia and erosion in the antrum  Biopsies showed increased intraepithelial lymphocytes reaching 42 cells per 100 iron Codi sites however celiac serologies a ordered after were negative  Gastric body biopsies were negative for H pylori but did show chronic inactive gastritis, there was also evidence of reflux esophagitis but no evidence of Hall's esophagus histologically  Based on the endoscopic evidence of salmon-colored mucosa, repeat EGD was recommended at 3 years  Patient states that he has been taking omeprazole with symptomatic resolution  states that he has longstanding symptoms of alternating diarrhea and constipation, and states that they are well controlled with dicyclomine  He does recall having had a colonoscopy several years ago, was unsure when he is due for repeat  I do not have results from this colonoscopy  Upon reviewing his chart, it appears that he was seen by Dr veda buck in July 2014 and apparently had a nodular ulcerated lesion in the rectum which was consistent with chlamydia Trichomatis proctitis and was treated with stirbid  He was recommended repeat flexible sigmoidoscopy which patient thinks he may have had  He denies rectal bleeding, change in bowel habits or unintentional weight loss  Review of Systems   Complete-Male GI Adult:      Constitutional: No fever or chills, feels well, no tiredness, no recent weight gain or weight loss  Eyes: No complaints of eye pain, no red eyes, no discharge from eyes, no itchy eyes  ENT: no complaints of earache, no hearing loss, no nosebleeds, no nasal discharge, no sore throat, no hoarseness  Cardiovascular: No complaints of slow heart rate, no fast heart rate, no chest pain, no palpitations, no leg claudication, no lower extremity        Respiratory: No complaints of shortness of breath, no wheezing, no cough, no SOB on exertion, no orthopnea or PND  Gastrointestinal: as noted in HPI  Genitourinary: No complaints of dysuria, no incontinence, no hesitancy, no nocturia, no genital lesion, no testicular pain  Musculoskeletal: No complaints of arthralgia, no myalgias, no joint swelling or stiffness, no limb pain or swelling  Integumentary: No complaints of skin rash or skin lesions, no itching, no skin wound, no dry skin  Neurological: No compliants of headache, no confusion, no convulsions, no numbness or tingling, no dizziness or fainting, no limb weakness, no difficulty walking  Psychiatric: Is not suicidal, no sleep disturbances, no anxiety or depression, no change in personality, no emotional problems  Endocrine: No complaints of proptosis, no hot flashes, no muscle weakness, no erectile dysfunction, no deepening of the voice, no feelings of weakness  Hematologic/Lymphatic: No complaints of swollen glands, no swollen glands in the neck, does not bleed easily, no easy bruising  ROS Reviewed:    ROS reviewed  Active Problems   1  Chronic heel pain (729 5,338 29) (M79 673,G89 29)   2  Contact with and (suspected) exposure to infections with a predominantly sexual mode     of transmission (V01 6) (Z20 2)   3  Cough (786 2) (R05)   4  Depression (311) (F32 9)   5  Diarrhea (787 91) (R19 7)   6  Elevated liver enzymes (790 5) (R74 8)   7  Encounter for screening examination for sexually transmitted disease (V74 5) (Z11 3)   8  Encounter for screening for cardiovascular disorders (V81 2) (Z13 6)   9  Gastroesophageal reflux disease with esophagitis (530 11) (K21 0)   10  High risk medication use (V58 69) (Z79 899)   11  Irritable bowel syndrome (564 1) (K58 9)   12  Panic attacks (300 01) (F41 0)   13  Screening for diabetes mellitus (DM) (V77 1) (Z13 1)   14  Seasonal allergic rhinitis due to pollen (477 0) (J30 1)   15   Testicular calcification (608 89) (N50 89)   16  Tinnitus, bilateral (388 30) (H93 13)   17  Varicocele (456 4) (I86 1)   18  Wheezing (786 07) (R06 2)    Past Medical History   1  History of Anxiety (300 00) (F41 9)   2  History of Dysuria (788 1) (R30 0)   3  History of Elbow stiffness, left (719 52) (M25 622)   4  History of Fecal urgency (787 63) (R15 2)   5  History of Hemorrhage of anus and rectum (569 3) (K62 5)   6  History of abdominal pain (V13 89) (Z87 898)   7  History of backache (V13 59) (Z87 39)   8  History of backache (V13 59) (Z87 39)   9  History of constipation (V12 79) (Z87 19)   10  History of herpes zoster (V12 09) (Z86 19)   11  History of low back pain (V13 59) (Z87 39)   12  History of Human immunodeficiency virus (HIV) disease (042) (B20)   13  History of Laceration of finger (883 0) (S61 219A)   14  History of Moderate episode of recurrent major depressive disorder (296 32) (F33 1)   15  History of Proctitis, chlamydial (099 52) (A56 3)   16  History of Right elbow tendinitis (727 09) (M77 8)   17  History of SOB (shortness of breath) on exertion (786 05) (R06 02)  Active Problems And Past Medical History Reviewed: The active problems and past medical history were reviewed and updated today  Surgical History   1  History of Surgery Spermatic Cord Excision Of Varicocele  Surgical History Reviewed: The surgical history was reviewed and updated today  Family History   Mother    1  Family history of Type 2 Diabetes Mellitus  Father    2  Family history of Acute Myocardial Infarction (V17 3)  Cousin    3  Family history of substance abuse (V17 0) (Z81 4)   4  Denied: Family history of suicide  Family History    5  Denied: Family history of Anxiety   6  Denied: Family history of bipolar disorder   7  Denied: Family history of depression   8  Denied: Family history of schizophrenia  Family History Reviewed: The family history was reviewed and updated today         Social History    · Cigarette smoker (305  1) (F17 210)   · Sexually Active Monogamous Relationship  Social History Reviewed: The social history was reviewed and updated today  Current Meds    1  Bentyl 10 MG Oral Capsule; Take 1 Tablet PO BID as needed; Therapy: 21Apr2017 to Recorded   2  FLUoxetine HCl - 40 MG Oral Capsule; 1 CAPSULE DAILY; Therapy: 20Mns9022 to (Last Rx:83Pbo1806)  Requested for: 65BOC0987 Ordered   3  Genvoya [id number] MG Oral Tablet; take 1 tablet by mouth daily; Therapy: 50RMH4102 to (Evaluate:20Jan2018)  Requested for: 21Nov2017; Last     Rx:21Nov2017 Ordered   4  Loratadine 10 MG Oral Tablet; take 1 tablet by mouth daily; Therapy: 55PBH7880 to (Evaluate:13Nov2017)  Requested for: 96Kfd6849; Last     Rx:78Xhz2408; Status: ACTIVE - Renewal Denied Ordered   5  Mometasone Furoate 50 MCG/ACT Nasal Suspension; USE 1 SPRAY IN EACH     NOSTRIL TWICE DAILY; Therapy: 71NID6357 to (Last US:93HAA3520)  Requested for: 24Jjq2376 Ordered   6  Omeprazole 40 MG Oral Capsule Delayed Release; take 1 capsule by mouth daily; Therapy: 46SMT8377 to (Evaluate:13Nov2017)  Requested for: 11Gkc2381; Last     Rx:74Jqd3438; Status: ACTIVE - Renewal Denied Ordered   7  RisperiDONE 0 5 MG Oral Tablet; TAKE 1 TABLET NIGHTLY; Therapy: 96Hbx1565 to (Evaluate:31Mar2018)  Requested for: 26XJJ3286; Last     Rx:93Ofw1538 Ordered   8  Ventolin  (90 Base) MCG/ACT Inhalation Aerosol Solution; INHALE 1 TO 2 PUFFS     EVERY 4 TO 6 HOURS AS NEEDED; Therapy: 98XBB4466 to (Last GQ:46PQL3152)  Requested for: 75Knh8571 Ordered  Medication List Reviewed: The medication list was reviewed and updated today  Allergies   1  No Known Drug Allergies  2   TOMATO    Vitals   Vital Signs    Recorded: 61NKO5516 08:21AM   Temperature 97 6 F   Heart Rate 600   Systolic 651   Diastolic 64   Weight 013 lb    BMI Calculated 28 19   BSA Calculated 1 93   O2 Saturation 97     Physical Exam        Constitutional      General appearance: No acute distress, well appearing and well nourished  Eyes      Conjunctiva and lids: No swelling, erythema, or discharge  Pulmonary      Respiratory effort: No increased work of breathing or signs of respiratory distress  Auscultation of lungs: Clear to auscultation, equal breath sounds bilaterally, no wheezes, no rales, no rhonci  Cardiovascular      Palpation of heart: Normal PMI, no thrills  Auscultation of heart: Normal rate and rhythm, normal S1 and S2, without murmurs  Examination of extremities for edema and/or varicosities: Normal        Abdomen      Abdomen: Non-tender, no masses  Liver and spleen: No hepatomegaly or splenomegaly  Lymphatic      Palpation of lymph nodes in neck: No lymphadenopathy  Skin      Skin and subcutaneous tissue: Normal without rashes or lesions  Psychiatric      Orientation to person, place and time: Normal        Mood and affect: Normal           Health Management   Dysphagia   EGD; every 3 years; Last 53IVG8978; Next Due: 87QXD4576;  Active    Future Appointments      Date/Time Provider Specialty Site   07/16/2018 10:00 AM Delia Wayne MD Infectious Disease [facility]   01/24/2018 01:00 PM Estevan Palomo LCSW Psychiatry [facility]   02/20/2018 10:00 AM Estevan Palomo LCSW Psychiatry [facility]   03/20/2018 10:00 AM Estevan Palomo [facility] Psych[facility] [address]     Signatures    Electronically signed by : ZANE Arndt ; Jan 9 2018  2:29PM EST                       (Author)

## 2018-01-10 NOTE — PSYCH
Message  Message Free Text Note Form: Clinician called Ary Lakhani to check in as he missed his appointment  Stated doing OK - forgot  Active Problems    1  Chronic heel pain (729 5,338 29) (M79 673,G89 29)   2  Cough (786 2) (R05)   3  Diarrhea (787 91) (R19 7)   4  Dysphagia (787 20) (R13 10)   5  Encounter for screening examination for sexually transmitted disease (V74 5) (Z11 3)   6  Encounter for screening for cardiovascular disorders (V81 2) (Z13 6)   7  Gastroesophageal reflux disease with esophagitis (530 11) (K21 0)   8  Generalized anxiety disorder (300 02) (F41 1)   9  Human immunodeficiency virus (HIV) disease (042) (B20)   10  Irritable bowel syndrome (564 1) (K58 9)   11  Moderate episode of recurrent major depressive disorder (296 32) (F33 1)   12  Seasonal allergic rhinitis due to pollen (477 0) (J30 1)   13  Testicular calcification (608 89) (N50 89)   14  Tinnitus, bilateral (388 30) (H93 13)   15  Varicocele (456 4) (I86 1)   16  Wheezing (786 07) (R06 2)    Current Meds   1  Bentyl 10 MG Oral Capsule (Dicyclomine HCl); Take 1 Tablet PO BID as needed; Therapy: 21Apr2017 to Recorded   2  Genvoya [id number] MG Oral Tablet; TAKE 1 TABLET BY MOUTH DAILY (STOP   STRIBILD); Therapy: 44GEN4234 to (Evaluate:19Jun2017)  Requested for: 96DMP6429; Last   Rx:21Mar2017 Ordered   3  Loratadine 10 MG Oral Tablet; take 1 tablet by mouth daily; Therapy: 75CAJ3455 to (Evaluate:03Jun2017)  Requested for: 85MCY8002; Last   LS:32XJW2058 Ordered   4  Mometasone Furoate 50 MCG/ACT Nasal Suspension (Nasonex); USE 1 SPRAY IN   EACH NOSTRIL TWICE DAILY; Therapy: 31WHW4242 to (Last CO:10APB1955)  Requested for: 69JJZ6479 Ordered   5  Omeprazole 40 MG Oral Capsule Delayed Release; take 1 capsule by mouth daily; Therapy: 48JQB1175 to (26 844715)  Requested for: 14DPM0026; Last   Rx:72Ymc8062 Ordered   6   Ventolin  (90 Base) MCG/ACT Inhalation Aerosol Solution; INHALE 1 TO 2 PUFFS   EVERY 4 TO 6 HOURS AS NEEDED; Therapy: 36HTD8109 to (Last EJ:75XGA3758)  Requested for: 21JOF0512 Ordered    Allergies    1  No Known Drug Allergies    2   TOMATO    Signatures   Electronically signed by : Pawan De La Fuente LCSW; May 15 2017 11:26AM EST                       (Author)

## 2018-01-10 NOTE — PSYCH
Progress Note  Psychotherapy Provided [facility]: Individual Psychotherapy 50 minutes provided today  Goals addressed in session:   Addressed goals 1-3 of initial plan    D: Met with Portillo galvan  ROS; "Things are good"  Unruly Leslie expressed feeling more 'in control' and is noticing decrease in uncontrollable movements since Risperidone  Session focused upon a recent wedding where x partner attended with Unruly Nelson, communication via text a few days later and interpretation of the situation  Minimal Denied SI     A: Unruly Nelson presented with baseline behavior presentation today  His mood and affect were appropriate  He is pleased with care of Psychiatry and feels 'listen to' by a Dr Unruly Nelson can identify current psychosocial stressors however implementation to initiate change remains an obstacle  P: Ongoing psychotherapy  Continue discussion of treatment goals  Pain Scale and Suicide Risk [facility]: Current Pain Assessment: no pain   Current suicide risk is low   Behavioral Health Treatment Plan [facility]: Diagnosis and Treatment Plan explained to patient, patient relates understanding diagnosis and is agreeable to Treatment Plan  Assessment    1  Generalized anxiety disorder (300 02) (F41 1)   2   Depression (311) (F32 9)    Signatures   Electronically signed by : Isra Todd LCSW; Sep 22 2017 12:02PM EST                       (Author)

## 2018-01-10 NOTE — PSYCH
Progress Note  Psychotherapy Provided [facility]: Individual Psychotherapy minutes provided today  Goals addressed in session:   Addressed goals 1-3 of initial plan    D: Met with Portillo galvan  ROS; anxiety remains consistent, denied depressive symptoms  Session focused upon current psychosocial stressors regarding healthcare, symptoms of anxiety and challenges of relationship regarding x-partner  Boundaries, communication and being assertive yet respectful discussed  Denied SI     A: Angella Youngblood presented with minor twitches today  He is able to identify how his codependent traits are influencing stress of previous relationship but implementation to cut off all communication remains  Examples where assertive communication with partner observed demonstrating progress  Anxiety remain an obstacle in his daily living  P: Continue individual therapy with further discussion regarding relationships and stressors of father's health  Pain Scale and Suicide Risk [facility]: Current Pain Assessment: no pain   Current suicide risk is low   Behavioral Health Treatment Plan [facility]: Diagnosis and Treatment Plan explained to patient, patient relates understanding diagnosis and is agreeable to Treatment Plan  Results/Data  PHQ-9 Adult Depression Screening [id number] 10:52AM Huyen Castro     Test Name Result Flag Reference   PHQ-9 Adult Depression Score 10     Over the last two weeks, how often have you been bothered by any of the following problems?   Little interest or pleasure in doing things: More than half the days - 2  Feeling down, depressed, or hopeless: Nearly every day - 3  Trouble falling or staying asleep, or sleeping too much: Several days - 1  Feeling tired or having little energy: Several days - 1  Poor appetite or over eating: Not at all - 0  Feeling bad about yourself - or that you are a failure or have let yourself or your family down: Several days - 1  Trouble concentrating on things, such as reading the newspaper or watching television: Several days - 1  Moving or speaking so slowly that other people could have noticed  Or the opposite -  being so fidgety or restless that you have been moving around a lot more than usual: Several days - 1  Thoughts that you would be better off dead, or of hurting yourself in some way: Not at all - 0   PHQ-9 Adult Depression Screening Negative     PHQ-9 Difficulty Level Somewhat difficult     PHQ-9 Severity Moderate Depression         Assessment    1  Moderate episode of recurrent major depressive disorder (296 32) (F33 1)   2   Generalized anxiety disorder (300 02) (F41 1)    Signatures   Electronically signed by : Augustin Gill LCSW; Jul 21 2017  8:31AM EST                       (Author)

## 2018-01-10 NOTE — PROGRESS NOTES
Assessment    1  Human immunodeficiency virus (HIV) disease (042) (B20)   2  Irritable bowel syndrome (564 1) (K58 9)   3  Anxiety (300 00) (F41 9)   4  Varicocele (456 4) (I86 1)   5  History of Surgery Spermatic Cord Excision Of Varicocele   6  Cigarette smoker (305 1) (F17 210)   7  Gastroesophageal reflux disease with esophagitis (530 11) (K21 0)   8  SOB (shortness of breath) on exertion (786 05) (R06 02)    Plan   Gastroesophageal reflux disease with esophagitis    · Famotidine 20 MG Oral Tablet; take 1 tablet by mouth twice a day  Health Maintenance    · Ophthalmology Follow Up Evaluation and Treatment  Follow-up  Status: Hold For -  Scheduling  Requested for: 76XCB4610  Human immunodeficiency virus (HIV) disease    · Stribild [phone] MG Oral Tablet; take 1 tablet by mouth daily   · 1 Akila Thakur MD, Victorina Bernabe  (Infectious Disease) Physician Referral  Consult  Status: Hold For  - Scheduling  Requested for: 52SJW3383  Care Summary provided  : Yes  SOB (shortness of breath) on exertion    · Complete PFT; Status:Hold For - Scheduling; Requested for:05Gzm8647;   Varicocele    · *1 - [address] Physician Referral  Consult  Status: Hold For - Scheduling   Requested for: 49EZO4109  Care Summary provided  : Yes   · *4619 Lorena Greer Physician Referral  Consult  Status: Active  Requested for:  06IBJ8114  Care Summary provided  : Yes    *1 - [address] [facility] 82,[facility] 100 Physician Referral  Consult  Status: Hold For - Scheduling  Requested for: 26AJO2382  Ordered; For: Anxiety;  Ordered By: Kristi Rodriguez  Performed:   Due: 50SDZ5610     Discussion/Summary    1  HIV - Reviewed last lab results with the patient, CD4 is stable  Viral load is undetectable  Patient to continue Stribild, 1 tab PO daily  Discussed patient's concern about mental health med drug interaction with Stribild   Informed patient that if he goes back on Buspirone or tries other mental health medications that pose issues with DDIs he can call us and meet with Dr Richard Abernathy to discuss a HAART change  Patient could be eligible for other regimens that do not have DDIs which will allow him to be on the mental health medications that he needs  Stressed adherence  Patient to continue routine f/u with Dr Richard Abernathy  2  GERD - Reviewed GERD prevention education, patient has good understanding of which foods trigger his symptoms and he was encouraged to do a better job at avoiding them  Patient will stop Omeprazole and change to famotidine which will be dosed BID  Patient verbalized understanding  3  Varicocele - Patient will work with navigator to get re-engaged with interventional radiologist, Dr Suzanne Dimas  Patient navigator will reach out to the vascular center team which works with Dr Suzanne Dimas to find out about his plans for a future procedure for the patient  Referral placed for interventional radiology  Educated patient that Dr Suzanne Dimas may want patient to see his urologist first so CLAYTON placed urology referral just in case  Patient navigator will reach out to patient once a plan of care is determined  Patient verbalized understanding  4  Anxiety - Patient willing to go back to [facility] but is requesting assistance in setting this up  RUTHYNP will speak to [facility] about calling [facility] to see if they will re-accept him and get him a therapy appointment  Out clinic team will express the patient's interest in seeing a different psychiatrist and remind them of the risk of DDI with his HIV pills  Patient can switch HAART as needed to support his mental health medications and we can assist him with communication about DDIs as needed  Patient instructed to seek medical care immediately for SI/HI  Patient verbalized understanding  5  IBS - Patient still worried about celiacs, will complete blood work today  Patient continues with mixed diarrhea and constipation   Encouraged patient to speak further with RD     6  SOB/Smoking - Encouraged patient to consider full cessation for cigarettes, can work with [facility] [employer] [facility] as needed  Patient will complete PFTs before sending to pulmonology  Patient uninterested in rescue inhaler at this time but will continue to assess and monitor for need  -Patient given referral for eye doctor follow up, will work with patient navigator on setting up  Possible side effects of new medications were reviewed with the patient/guardian today  The treatment plan was reviewed with the patient/guardian  The patient/guardian understands and agrees with the treatment plan   The patient was counseled regarding diagnostic results, instructions for management, risk factor reductions, prognosis, patient and family education, impressions, risks and benefits of treatment options, importance of compliance with treatment  total time of encounter was 60 minutes and 45 minutes was spent counseling  Counseling   Patient reports there are no people in their life who should be tested for HIV  Education   general HIV education  adherence  prevention care  Chief Complaint  Patient here for a f/u visit  Pt c/o of right testicle pain   Patient is here today for follow up of chronic conditions described in HPI  History of Present Illness  The patient presents for routine primary care follow up  He reports that since his last visit he has been doing ok  States he saw Dr Rosmery Pryor over the winter and was told he is doing well with his HIV management  Reports that he likes being on the Stribild but is concerned that it has a lot of drug-drug interactions  States that since his last visit he had surgery to repair a blood vessel in his testicles  Reports that he had a complication during the procedure and ended up having to be admitted because they were afraid he was going to have a blood clot  Reports he had to take Heparin but then the lung doctors told him he was safe to go home   He is waiting to be scheduled for the procedure to have his second testicle taken care of  He has been having trouble getting in touch with his surgeon and is requesting help  The patient would like to be linked to an eye doctor for an annual eye exam      IBS - Patient reports that he has a lab slip from last fall for additional labs for his GI work up  Reports that he didn't realize he never completed it and plans to do them today after his visit  Mental health - reports that he was enrolled in care at [facility]  States he really like the staff and his therapist but didn't like the psychiatrist  Reports he tried three different psych meds and finally felt better on Buspirone  States he then found out that Buspirone interacts with his Stribild and he stopped it immediately  Reports he would like to go back to [facility] but wants to be assigned to a different psychiatrist  Is requesting help in setting this up  The patient also reports that for the past several months he's had increased SOB  He admits that he believes this is related to the fact that he smokes, but states he notices it is harder for him to take a deep breath than it used to be  Denies wheezing  Denies dizziness  Denies chest pain and palpitations  Is requesting an appointment with pulmonology  He also reports that on some days his GERD medication doesn't work  He admits that those are days where he eats his trigger foods or eats too late at night  Is hoping that his GERD medication can be ordered for BID so that he has extra pills for when he needs them  -Patient is requesting an eye doctor visit       Pain Assessment   the patient states they have pain  The pain is located in the R testicle  Abuse And Domestic Violence Screen    Yes, the patient is safe at home  The patient states no one is hurting them  Depression And Suicide Screen  No, the patient has not had thoughts of hurting themself  No, the patient has not felt depressed in the past 7 days     The patient is being seen for a routine clinic follow-up of HIV infection  The patient is currently asymptomatic  No associated symptoms are reported  Current treatment includes antiretroviral regimen  By report, there is good compliance with treatment, good tolerance of treatment and good symptom control  (Stribild - no missed or late doses)     CD4: 531  VL: <20  Time spent: 10 minutes  Strength: no side effects  Weakness: mental health and drug-drug interactions  Plan of Action: encouraged patient to re-enroll in care at [facility]  SUBSTANCE ABUSE: ETOH use  amount: 2-3 dirnks 2x a week  not using drugs  SMOKING: He is a current smoker, uses cigarettes, 1/2 pack packs per day and for 20+ years  SEXUALLY ACTIVE: He is sexually active  He sometimes uses condoms  He has had 2 partners in the last 90 days  HOUSING: He has stable housing  There are 3 people living in the household (including children)  The household income is $0    HEALTH MAINTENANCE: His last dental exam was 3/1/2016  His last eye exam was 3/1/2015  Review of Systems    Constitutional: No fever or chills, feels well, no tiredness, no recent weight gain or weight loss  Eyes: as noted in HPI, no eye pain, no dryness of the eyes, eyes not red, no purulent discharge from the eyes and no itching of the eyes    The patient presents with complaints of eyesight problems, described as impaired night vision  ENT: no complaints of earache, no hearing loss, no nosebleeds, no nasal discharge, no sore throat, no hoarseness  Cardiovascular: No complaints of slow heart rate, no fast heart rate, no chest pain, no palpitations, no leg claudication, no lower extremity  Respiratory: shortness of breath during exertion, but as noted in HPI, no shortness of breath, no cough, no orthopnea, no wheezing and no PND  Gastrointestinal: chronic IBS, but as noted in HPI  Genitourinary: varicoceles, but as noted in HPI     Musculoskeletal: No complaints of arthralgia, no myalgias, no joint swelling or stiffness, no limb pain or swelling  Integumentary: No complaints of skin rash or skin lesions, no itching, no skin wound, no dry skin  Neurological: No compliants of headache, no confusion, no convulsions, no numbness or tingling, no dizziness or fainting, no limb weakness, no difficulty walking  Psychiatric: anxiety, but as noted in HPI, not suicidal, no personality change, no sleep disturbances, no depression and no emotional problems  Endocrine: No complaints of proptosis, no hot flashes, no muscle weakness, no erectile dysfunction, no deepening of the voice, no feelings of weakness  Hematologic/Lymphatic: No complaints of swollen glands, no swollen glands in the neck, does not bleed easily, no easy bruising  ROS reviewed  Active Problems    1  Abdominal pain (789 00) (R10 9)   2  Anxiety (300 00) (F41 9)   3  Back pain (724 5) (M54 9)   4  Back pain (724 5) (M54 9)   5  Constipation (564 00) (K59 00)   6  Diarrhea (787 91) (R19 7)   7  Dysuria (788 1) (R30 0)   8  Fecal urgency (787 63) (R15 2)   9  Gastroesophageal reflux disease with esophagitis (530 11) (K21 0)   10  Hemorrhage of anus and rectum (569 3) (K62 5)   11  Human immunodeficiency virus (HIV) disease (042) (B20)   12  Irritable bowel syndrome (564 1) (K58 9)   13  Laceration of finger (883 0) (S61 219A)   14  Low back pain (724 2) (M54 5)   15  Proctitis, chlamydial (099 52) (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 3)   16  Testicular calcification (608 89) (N50 8)   17  Varicocele (456 4) (I86 1)    Past Medical History    1  History of Human immunodeficiency virus (HIV) disease (042) (B20)    The active problems and past medical history were reviewed and updated today  Surgical History    1  History of Surgery Spermatic Cord Excision Of Varicocele    The surgical history was reviewed and updated today  Family History    1  Family history of Type 2 Diabetes Mellitus    2   Family history of Acute Myocardial Infarction (V17 3)    The family history was reviewed and updated today  Social History    · Cigarette smoker (305 1) (F17 210)   · Sexually Active Monogamous Relationship  The social history was reviewed and updated today  The social history was reviewed and is unchanged  Current Meds   1  Stribild [phone] MG Oral Tablet; take 1 tablet by mouth daily; Therapy: 69DBK7428 to (Evaluate:26Jnd2676)  Requested for: 34KHC7460; Last   Rx:02Mar2016 Ordered    The medication list was reviewed and updated today  Allergies    1  No Known Drug Allergies    2  TOMATO    Vitals  Signs [Data Includes: Current Encounter]   Recorded: [id number] 10:53AM   Temperature: 98 F  Heart Rate: 90  Respiration: 18  Systolic: 181  Diastolic: 65  Height: 5 ft 7 in  Weight: 183 lb 13 79 oz  BMI Calculated: 28 8  BSA Calculated: 1 95  O2 Saturation: 98  Pain Scale: 7    Physical Exam    Constitutional   General appearance: No acute distress, well appearing and well nourished  Eyes   Conjunctiva and lids: No swelling, erythema or discharge  Pupils and irises: Equal, round and reactive to light  Ears, Nose, Mouth, and Throat   Oropharynx: Normal with no erythema, edema, exudate or lesions  Pulmonary   Respiratory effort: No increased work of breathing or signs of respiratory distress  Auscultation of lungs: Clear to auscultation  Cardiovascular   Auscultation of heart: Normal rate and rhythm, normal S1 and S2, without murmurs  Lymphatic   Palpation of lymph nodes in neck: No lymphadenopathy  Musculoskeletal   Gait and station: Normal     Psychiatric   Orientation to person, place, and time: Normal     Mood and affect: Abnormal   Mood and Affect: agitated, anxious and excessive laughing        Results/Data  Results   (1) CBC/PLT/DIFF [id number] 10:18AM Stephenie Sandhu     Test Name Result Flag Reference   DIFFERENTIAL METHOD Automated     WBC COUNT 8 29 Thousand/uL  4 31-10 16   RBC COUNT 4 97 Million/uL  3 88-5 62   HEMOGLOBIN 15 7 g/dL  12 0-17 0   HEMATOCRIT 46 2 %  36 5-49 3   MCV 93 fL  82-98   MCH 31 6 pg  26 8-34 3   MCHC 34 0 g/dL  31 4-37 4   RDW 13 5 %  11 6-15 1   MPV 9 2 fL  8 9-12 7   PLATELET COUNT 185 Thousand/uL  149-390   nRBC AUTOMATED 0 /100 WBC     NEUTROPHILS RELATIVE PERCENT 62 %  43-75   LYMPHOCYTES RELATIVE PERCENT 25 %  14-44   MONOCYTES RELATIVE PERCENT 11 %  4-12   EOSINOPHILS RELATIVE PERCENT 2 %  0-6   NEUTROPHILS ABSOLUTE COUNT 5 14 Thousand/uL  1 85-7 62   LYMPHOCYTES ABSOLUTE COUNT 2 07 Thousand/uL  0 60-4 47   MONOCYTES ABSOLUTE COUNT 0 91 Thousand/uL  0 17-1 22   EOSINOPHILS ABSOLUTE COUNT 0 17 Thousand/uL  0 00-0 61   BASOPHILS ABSOLUTE COUNT 0 02 Thousand/uL  0 00-0 10     (1) COMPREHENSIVE METABOLIC PANEL [id number] 13:68YM Dustin Gomez   Has the patient been fasting for 10-12 hours? -     Test Name Result Flag Reference   SODIUM 139 mmol/L  136-145   POTASSIUM 3 7 mmol/L  3 5-5 3   CHLORIDE 106 mmol/L  100-108   CARBON DIOXIDE 24 mmol/L  21-32   ANION GAP (CALC) 9 mmol/L  4-13   BILI, TOTAL 0 37 mg/dL  0 20-1 00   TOTAL PROTEIN 8 4 g/dL H 6 4-8 2   ALK PHOSPHATAS 121 U/L H    ALT (SGPT) 64 U/L  12-78   AST(SGOT) 28 U/L  5-45   GLUCOSE,RANDM 124 mg/dL     If patient is fasting, the ADA then defines impaired fasting glucose as  >100 mg/dl and diabetes as  >or equal to 126 mg/dl  ALBUMIN 3 7 g/dL  3 5-5 0   BLOOD UREA NITROGEN 10 mg/dL  5-25   CALCIUM 9 2 mg/dL  8 3-10 1   CREATININE 0 92 mg/dL  0 60-1 30   Standardized to IDMS reference method   eGFR African American >60 ml/min/1 73sq m     [facility] Disease Education Program recommendations are as  follows:  GFR calculation is accurate only with a steady state creatinine  Chronic Kidney disease less than 60 ml/min/1 73 sq  meters  Kidney failure less than 15 ml/min/1 73 sq  meters     eGFR Non-African American >60 ml/min/1 73sq m       (1) HIV-1 RNA QUANTITATIVE [id number] 10:18AM Elian Hui PERFORMED AT: [address], [place] [id number] DIRECTOR: Luisa Henry MD   PHONE: [phone]     Test Name Result Flag Reference   HIV-1 RNA QUANT <20 0 copies/mL     HIV-1 RNA not detected  The reportable range for this assay is 20 to 10,000,000  copies HIV-1 RNA/mL  HIV-1 RNA LOG10 UPTCAL kpv43rcpf/mL     Unable to calculate result since non-numeric result obtained for  component test      (1) T LYMPH SUBSET (CD4) [id number] 10:18AM Candy Burns   PERFORMED AT: [place], [place] [id number] DIRECTOR: Luisa Henry MD   PHONE: [phone]     Test Name Result Flag Reference   RBC COUNT 5 12 x10E6/uL  4 14-5 80   WBC COUNT 7 3 x10E3/uL  3 4-10 8   HEMOGLOBIN 16 5 g/dL  12 6-17 7   MCV 95 fL  79-97   MCH 32 2 pg  26 6-33 0   MCHC 34 0 g/dL  31 5-35 7   RDW 14 1 %  12 3-15 4   PLATELET COUNT 458 C22V8/MB  150-379   LYMPHOCYTES RELATIVE PERCENT 25 %     MONOCYTES RELATIVE PERCENT 10 %     EOSINOPHILS RELATIVE PERCENT 2 %     BASOPHILS RELATIVE PERCENT 0 %     NEUTROPHILS ABSOLUTE COUNT 4 6 x10E3/uL  1 4-7 0   LYMPHOCYTES ABSOLUTE COUNT 1 8 x10E3/uL  0 7-3 1   MONOCYTES ABSOLUTE COUNT 0 7 x10E3/uL  0 1-0 9   EOSINOPHILS ABSOLUTE COUNT 0 1 x10E3/uL  0 0-0 4   BASOPHILS ABSOLUTE COUNT 0 0 x10E3/uL  0 0-0 2   ABSOLUTE CD4 HELPER 531 /uL  359-1519   % CD4 POSITIVE LYMPHOCYTES 29 5 % L 30 8-58 5   IMMATURE GRANULOCYTES 0 %     ABSOLUTE IMMATURE GRANULOCYTES 0 x10E3/uL  0 0-0 1     Attending Note  Collaborating Physician: I agree with the Advanced Practitioner note        Future Appointments    Date/Time Provider Specialty Site   07/18/2016 10:00 AM Brandt Thakur MD Internal Medicine [facility]   04/18/2016 09:30 AM Candy Burns [address] [place] [address] Medicine [facility]   04/05/2016 08:20 AM Specialty Clinic, Urology  [place] [address]     Signatures   Electronically signed by : Skye López Jonel Gonzalez; Mar 17 2016 11:15AM EST                       (Author)    Electronically signed by : Won Marcus DO; Mar 17 2016  2:35PM EST                       (Review)

## 2018-01-10 NOTE — PSYCH
Date of Initial Treatment Plan: 5/22/17  Date of Current Treatment Plan: 9/15/17  Treatment Plan 2  Strengths/Personal Resources for Self Care: helpful, generous  Diagnosis:   Axis I: SOPHIA; MDD recurrent moderate     Area of Needs: anxiety, depression, communication in relationships  Long Term Goals:   My anxiety and depression has improved   Target Date: 1/10/18      I have obtained proper medical care   Target Date: 1/10/18      I have learned to deal with my x-partner better   Target Date: 1/10/18    Short Term Objectives:   Goal 1:   1  I'm not sleeping to much anymore  Target Date: 1/10/18      Goal 2:   1  Take care of Physiological issues   2  Obtain an appointment with neuro psych  Target Date: 1/10/18      Goal 3:   1  Process specific situations  2  Communication skills with Deon Martinez when needed  Target Date: 1/10/18      GOAL 1: Modality: Individual 2 x per month Target Date: 1/10/18       The person(s) responsible for carrying out the plan is Bacilio Wilder  GOAL 2: Modality: Individual 2 x per month Target Date: 1/10/18       The person(s) responsible for carrying out the plan is Bacilio Wilder  GOAL 3: Modality: Individual 2 x per month Target Date: 1/10/18         The person(s) responsible for carrying out the plan is [employer]  The first scheduled review date is 1/10/18  The expected length of service is 120 Days  Level of functioning at initial assessment: 75  The highest level of functioning in the past year was 75  The current level of functioning is 75           CLIENT COMMENTS / Please share your thoughts, feelings, need and/or experiences regarding your treatment plan: _____________________________________________________________________________________________________________________________________________________________________________________________________________________________________________________________________________________________________________________ Date/Time: ______________     Patient Signature: _________________________________ Date/Time: ______________       Electronically signed by : Faby Vo LCSW; Sep 15 2017  8:47AM EST                       (Author)    Electronically signed by : Faby Vo LCSW; Sep 15 2017  8:47AM EST                       (Author)

## 2018-01-10 NOTE — MISCELLANEOUS
[facility] Communication Note  [facility] Communication:   Description:   PT was in the office yesterday and spoke to [address] about reconnecting to his [address] provider at [facility]  PT reported that he stopped going sometime in December because his psychiatrist put him on medication (BuSpar) that he felt was helpful but discovered interacted with his HIV medication, despite the psychiatrist assuring PT that he had checked for interactions  PT became upset and left the agency  At the request of [address], [facility] called [facility] to see if he was still active  [facility] spoke to PT's therapist, Sushila Prieto, who said PT has not been active with them since July  She went on to say that she remembered PT having a strong disagreement with the psychiatrist over medication and that the psychiatrist had said that PT was refusing to take recommended medication and was demanding other medications  When psychiatrist refused, PT left  [facility] explained that PT was refusing to take the medication because he discovered that there were drug interactions with his HIV medication  However, [facility] is willing and able to change his HIV medication so that he can continue taking BuSpar as directed by [facility] acknowledged that PT most likely did not explain his reasoning for "demanding" a med change to psychiatrist  However, [facility] agreed to have [facility] communicate to PT that he needs to be compliant with MH meds and we will adjust his HIV meds as needed  Therapist agreed to take PT back and said she has openings next week  [facility] agreed to contact PT to let him know that he needs to call [facility] on his own to set up an appointment  [facility] asked therapist if PT could see other psychiatrist when he returns  Therapist said they can try but can't guarantee who he will see or when it will be  [facility] reported this info to CLAYTON ARNOLD then called PT to tell him to call [facility] and explain what will be done about his medications (see CLAYTON's note)  Assessment:   N/A  Plan:   1  CRNP will call PT today to communicate all of this information      Patient Care Team    Care Team Member Role Specialty Office Number   Worthy Judy GARDUNO    Urology [phone]     Signatures   Electronically signed by : Matthew Boss Morris 87; Mar 17 2016 10:42AM EST                       (Author)

## 2018-01-11 NOTE — PSYCH
Behavioral Health Outpatient Intake    Referred By: [facility]  Intake Questions: there are no developmental disabilities  the patient does not have a hearing impairment  the patient does not have an ICM or CTT  patient is not taking injectable psychiatric medications  Employment: The patient is not employed  Emergency Contact Information:   Emergency Contact: William Kumari   Relationship to Patient: MOTHER   Phone Number: [phone]   Previous Psychiatric Treatment: He has previously been seen by a psychiatrist  Lety Braswell  He has previously been seen by a therapist  LONG AGO   History: no  service  He has not had combat service  Insurance Subscriber: ALBARO   Primary Insurance: [email or web address]   ID number: [id number]         Presenting Problem (in patient's words): HIV  Previous Treatment: The patient has not been seen here in the past      Accepted as Patient   ELVIA LORELEI 5/12/17 11AM          Signatures   Electronically signed by : Sushil Hayward, ; Mar 21 2017  1:13PM EST                       (Author)

## 2018-01-11 NOTE — PROGRESS NOTES
History of Present Illness    Assessment:  Met with pt for f/u  Pt states he receives SNAP and forgot about the doublesnap available  Provided pt with [employer] handout, seemed enthusiastic  Pt states he has improved with drinking soda but is still drinking about 3 or 4 cans/day  Recommended drinking seltzer water as an alternative  Pt states his mother is still making his meals  Wt 176#, BMI 27 (overwt)  Nutrition Diagnosis Continue Previous Nutrition Diagnosis   Intervention Continue Current Regimen and Nutrition Education   Monitor And Evaluation Goal #1 - , Goal #1 is extended  Active Problems    1  Chronic heel pain (729 5,338 29) (M79 673,G89 29)   2  Contact with and (suspected) exposure to infections with a predominantly sexual mode of   transmission (V01 6) (Z20 2)   3  Cough (786 2) (R05)   4  Diarrhea (787 91) (R19 7)   5  Dysphagia (787 20) (R13 10)   6  Encounter for screening examination for sexually transmitted disease (V74 5) (Z11 3)   7  Encounter for screening for cardiovascular disorders (V81 2) (Z13 6)   8  Gastroesophageal reflux disease with esophagitis (530 11) (K21 0)   9  Generalized anxiety disorder (300 02) (F41 1)   10  Human immunodeficiency virus (HIV) disease (042) (B20)   11  Irritable bowel syndrome (564 1) (K58 9)   12  Moderate episode of recurrent major depressive disorder (296 32) (F33 1)   13  Screening for diabetes mellitus (DM) (V77 1) (Z13 1)   14  Seasonal allergic rhinitis due to pollen (477 0) (J30 1)   15  Testicular calcification (608 89) (N50 89)   16  Tinnitus, bilateral (388 30) (H93 13)   17  Varicocele (456 4) (I86 1)   18  Wheezing (786 07) (R06 2)    Past Medical History    1  History of Anxiety (300 00) (F41 9)   2  History of Dysuria (788 1) (R30 0)   3  History of Elbow stiffness, left (719 52) (M25 622)   4  History of Fecal urgency (787 63) (R15 2)   5  History of Hemorrhage of anus and rectum (569 3) (K62 5)   6   History of abdominal pain (V13 89) (Z87 898)   7  History of backache (V13 59) (Z87 39)   8  History of backache (V13 59) (Z87 39)   9  History of constipation (V12 79) (Z87 19)   10  History of herpes zoster (V12 09) (Z86 19)   11  History of low back pain (V13 59) (Z87 39)   12  History of Human immunodeficiency virus (HIV) disease (042) (B20)   13  History of Laceration of finger (883 0) (S61 219A)   14  History of Proctitis, chlamydial (099 52) (A56 3)   15  History of Right elbow tendinitis (727 09) (M77 8)   16  History of SOB (shortness of breath) on exertion (786 05) (R06 02)    Surgical History    1  History of Surgery Spermatic Cord Excision Of Varicocele    Family History  Mother    1  Family history of Type 2 Diabetes Mellitus  Father    2  Family history of Acute Myocardial Infarction (V17 3)    Social History    · Cigarette smoker (305 1) (F17 210)   · Sexually Active Monogamous Relationship    Current Meds   1  Bentyl 10 MG Oral Capsule (Dicyclomine HCl); Take 1 Tablet PO BID as needed; Therapy: 93Tup6706 to Recorded   2  Genvoya 149-757-943-10 MG Oral Tablet; take 1 tablet by mouth daily; Therapy: 06YAE3868 to (Evaluate:13Pwc5888)  Requested for: 70OLG2138; Last Rx:84Whe6318   Ordered   3  Loratadine 10 MG Oral Tablet; take 1 tablet by mouth daily; Therapy: 44OAX3340 to (Evaluate:89Amm1498)  Requested for: 72LUY0409; Last Rx:96Uar4145   Ordered   4  Mometasone Furoate 50 MCG/ACT Nasal Suspension (Nasonex); USE 1 SPRAY IN EACH NOSTRIL   TWICE DAILY; Therapy: 86QQU1796 to (Last RU:24AYO7447)  Requested for: 41MWQ8277 Ordered   5  Omeprazole 40 MG Oral Capsule Delayed Release; TAKE  1  CAPSULE Daily; Therapy: 81WZV9992 to (Evaluate:42Rpp7984)  Requested for: 21Jun2017; Last CJ:83SZD6539   Ordered   6  Ventolin  (90 Base) MCG/ACT Inhalation Aerosol Solution; INHALE 1 TO 2 PUFFS EVERY 4   TO 6 HOURS AS NEEDED;    Therapy: 74DSY4754 to (Last DE:00HTV4817)  Requested for: 94EPO7761 Ordered    Allergies    1  No Known Drug Allergies    2   TOMATO    Vitals  Vitals   Recorded: 34GFV1076 84:00BQ   Systolic: 365  Diastolic: 60  Temperature: 98 F  Heart Rate: 91  Weight: 176 lb 12 93 oz  BMI Calculated: 27 69  BSA Calculated: 1 92  Pain Scale: 5  O2 Saturation: 98  Recorded: 66AOU9493 45:36OF   Systolic: 483  Diastolic: 79  Temperature: 97 9 F  Heart Rate: 87  Height: 5 ft 7 in  Weight: 184 lb 11 90 oz  BMI Calculated: 28 94  BSA Calculated: 1 96  O2 Saturation: 97  Recorded: 20SYM5505 29:54TX   Systolic: 657  Diastolic: 67  Temperature: 98 F  Heart Rate: 96  Height: 5 ft 7 in  Weight: 179 lb 14 30 oz  BMI Calculated: 28 18  BSA Calculated: 1 93  O2 Saturation: 93  Recorded: 71LIT9568 28:76NE   Systolic: 360  Diastolic: 64  Temperature: 97 6 F  Heart Rate: 81  Respiration: 18  Height: 5 ft 7 in  Weight: 182 lb 1 58 oz  BMI Calculated: 28 52  BSA Calculated: 1 94  Recorded: 42XSH7950 54:61RZ   Systolic: 639  Diastolic: 74  Temperature: 97 7 F  Heart Rate: 69  Respiration: 18  Height: 5 ft 7 in  Weight: 183 lb   BMI Calculated: 28 66  BSA Calculated: 1 95  O2 Saturation: 96  Recorded: 41YTT7511 17:89QQ   Systolic: 703  Diastolic: 74  Temperature: 97 3 F  Heart Rate: 75  Respiration: 18  Height: 5 ft 7 in  Weight: 184 lb 11 90 oz  BMI Calculated: 28 94  BSA Calculated: 1 96  O2 Saturation: 98  Recorded: 37Fwy8253 10:10AM   Temperature: 98 6 F  Recorded: 33UYV6705 40:41ZM   Systolic: 378  Diastolic: 90  Heart Rate: 104  Weight: 180 lb 5 36 oz  BMI Calculated: 28 24  BSA Calculated: 1 94  O2 Saturation: 98  Recorded: 75KCZ4347 80:89NW   Systolic: 130  Diastolic: 90  Heart Rate: 104  Weight: 180 lb 5 36 oz  BMI Calculated: 28 24  BSA Calculated: 1 94  Pain Scale: 0  O2 Saturation: 98  Recorded: 93QQV6421 06:49ZJ   Systolic: 580  Diastolic: 88  Temperature: 97 7 F  Heart Rate: 66  Height: 5 ft 7 in  Weight: 178 lb 9 14 oz  BMI Calculated: 27 97  BSA Calculated: 1 93  Recorded: 34DUL4383 50:64OA   Systolic: 361  Diastolic: 69  Temperature: 98 2 F  Heart Rate: 82  Respiration: 18  Height: 5 ft 7 in  Weight: 180 lb 5 36 oz  BMI Calculated: 28 24  BSA Calculated: 1 94  Pain Scale: 0  O2 Saturation: 96  Recorded: 77RAY1449 64:40RD   Systolic: 330  Diastolic: 60  Temperature: 98 1 F  Heart Rate: 88  Height: 5 ft 7 in  Weight: 182 lb 15 70 oz  BMI Calculated: 28 66  BSA Calculated: 1 95  Recorded: 57JPI5274 22:80AL   Systolic: 412  Diastolic: 65  Temperature: 98 F  Heart Rate: 90  Respiration: 18  Height: 5 ft 7 in  Weight: 183 lb 13 79 oz  BMI Calculated: 28 8  BSA Calculated: 1 95  Pain Scale: 7  O2 Saturation: 98  Recorded: 23XLF6559 89:81JI   Systolic: 650  Diastolic: 78  Temperature: 97 8 F  Heart Rate: 97  Respiration: 18  Height: 5 ft 7 in  Weight: 214 lb 11 63 oz  BMI Calculated: 33 63  BSA Calculated: 2 08  Pain Scale: 6  O2 Saturation: 98  Recorded: 93VDO1366 38:48EI   Systolic: 319  Diastolic: 76  Temperature: 97 7 F  Heart Rate: 100  Height: 5 ft 7 in  Weight: 176 lb 5 87 oz  BMI Calculated: 27 62  BSA Calculated: 1 92  Recorded: 33DGU4824 30:39AF   Systolic: 009  Diastolic: 78  Temperature: 97 9 F  Heart Rate: 89  Respiration: 18  Height: 5 ft 7 in  Weight: 189 lb 9 50 oz  BMI Calculated: 29 69  BSA Calculated: 1 98  Pain Scale: 5  Recorded: 92QJX4610 45:69CM   Systolic: 668  Diastolic: 83  Temperature: 97 8 F  Heart Rate: 102  Respiration: 20  Height: 5 ft 7 in  Weight: 189 lb   BMI Calculated: 29 6  BSA Calculated: 1 97  Pain Scale: 7  O2 Saturation: 99  Recorded: 45UZQ0969 03:49AP   Systolic: 148  Diastolic: 81  Temperature: 97 9 F  Heart Rate: 88  Height: 5 ft 7 in  Weight: 192 lb 3 84 oz  BMI Calculated: 30 11  BSA Calculated: 1 99  Pain Scale: left index fi  Recorded: 71DXG1993 15:97VN   Systolic: 96  Diastolic: 66  Temperature: 97 4 F  Heart Rate: 78  Height: 5 ft 7 in  Weight: 165 lb 1 97 oz  BMI Calculated: 25 86  BSA Calculated: 1 86  Pain Scale: 0  Recorded: 94VDW5033 36:38NJ   Systolic: 758  Diastolic: 78  Temperature: 98 F  Heart Rate: 96  Height: 5 ft 7 in  Weight: 160 lb 7 90 oz  BMI Calculated: 25 13  BSA Calculated: 1 84    Future Appointments    Date/Time Provider Specialty Site   09/01/2017 01:00 PM Deb Steven, LCSW Psychiatry [facility]   09/07/2017 02:00 PM Deb Steven, LCSW [facility]   09/15/2017 08:00 AM Deb Steven, LCSW [facility]iatry [facility]   09/22/2017 09:00 AM Deb Steven, LCSW Psychiatry [facility]   08/07/2017 08:30 AM Radha Reyes[address] AT [address] [facility]   Electronically signed by : JHON Rivera; Jul 31 2017 11:16AM EST                       (Author)

## 2018-01-11 NOTE — PROGRESS NOTES
Assessment    1  Encounter for preventive health examination (V70 0) (Z00 00)   2  Human immunodeficiency virus (HIV) disease (042) (B20)    Discussion/Summary    Intervention Diet Prescription   Energy 1845 kcal   26kcal /71kg   Protein 75 - 80g   1 1g /71kg   Fluid 1845ml   26ml /71kg   Snack/Supplement Recommendations: cut back on junk food Estimated Intake: 2000kcal 80g Protein   His current intake is meeting estimated nutrition needs  Goal #1 - Improve/Maintain  comprehend education  Goal initiated  Time Frame For Accomplishment: By next follow-up  Intervention Nutrition Education Provided  Person Educated: patient   Topics Discussed: healthy eating, more fruits& veggies   Barriers To Learning: none  Readiness to Learn: Receptive  Teaching Method: verbal   Evaluation Of Learning: verbalized/demonstrated understanding   Gave pt  voucher & reviewed "Double SNAP" benefits  History of Present Illness  Assessment: Clinical Data/Client History HIV - Yes  His socio-economic status includes  [place], cooks, eats out and & his mother , gave pt [employer] voucher"cooks  He lives in [facility] house and with his parents  Living Environment - He has access to refrigerator, stove and microwave  His functional status is  ambulatory, able to food shop, prepares own meals and & his mother cooks  His activity level is  normal    He eats breakfast at  [employer]  He eats lunch at  "[employer]" meal, with soda (pt states he is trying to cut back on soda)  He eats dinner at  [facility] broil, gnocchi, green beans, water or iced tea  He snacks "junk food"!     His appetite is  good   He does not take supplements  Nutrition Diagnosis   Problem related to excess soda & junk food  As evidenced by  excess calorie intake  Signs/Symptoms:  Patient Interview  Bmi 28, pt admits he likes his soda& junk food  Active Problems    1  Abdominal pain (789 00) (R10 9)   2   Constipation (564 00) (K59 00)   3  Diarrhea (787 91) (R19 7)   4  Encounter for screening examination for sexually transmitted disease (V74 5) (Z11 3)   5  Encounter for screening for cardiovascular disorders (V81 2) (Z13 6)   6  Fecal urgency (787 63) (R15 2)   7  Gastroesophageal reflux disease with esophagitis (530 11) (K21 0)   8  Hemorrhage of anus and rectum (569 3) (K62 5)   9  Human immunodeficiency virus (HIV) disease (042) (B20)   10  Irritable bowel syndrome (564 1) (K58 9)   11  Testicular calcification (608 89) (N50 8)   12  Varicocele (456 4) (I86 1)    Past Medical History    1  History of Anxiety (300 00) (F41 9)   2  History of Dysuria (788 1) (R30 0)   3  History of Elbow stiffness, left (719 52) (M25 622)   4  History of backache (V13 59) (Z87 39)   5  History of backache (V13 59) (Z87 39)   6  History of low back pain (V13 59) (Z87 39)   7  History of Human immunodeficiency virus (HIV) disease (042) (B20)   8  History of Laceration of finger (883 0) (S61 219A)   9  History of Proctitis, chlamydial (099 52) (A56 3)   10  History of SOB (shortness of breath) on exertion (786 05) (R06 02)    Surgical History    1  History of Surgery Spermatic Cord Excision Of Varicocele    Family History  Mother    1  Family history of Type 2 Diabetes Mellitus  Father    2  Family history of Acute Myocardial Infarction (V17 3)    Social History    · Cigarette smoker (305 1) (F17 210)   · Sexually Active Monogamous Relationship    Current Meds   1  Bentyl 20 MG Oral Tablet (Dicyclomine HCl); TAKE 1 TABLET TWICE DAILY; Therapy: 24XMT1173 to Recorded   2  Omeprazole 40 MG Oral Capsule Delayed Release; TAKE 1 CAPSULE Daily; Therapy: 00RAE3579 to (Last Rx:69Jnw7282)  Requested for: 53HFG8356; Status: ACTIVE - Transmit to   Pharmacy - Awaiting Verification Ordered   3  Stribild [id number] MG Oral Tablet; take 1 tablet by mouth daily;    Therapy: 48PZB9090 to (Vidhya Whiteside)  Requested for: 61Xxf7672; Last Justynmarti Robles Ordered    Allergies    1  No Known Drug Allergies    2   TOMATO    Vitals  Vitals   Recorded: 90IXM7388 10:10AM   Temperature: 98 6 F  Recorded: 75WKF1969 97:79YZ   Systolic: 640  Diastolic: 90  Heart Rate: 104  Weight: 180 lb 5 36 oz  BMI Calculated: 28 24  BSA Calculated: 1 94  O2 Saturation: 98  Recorded: 45VKM2329 26:51FG   Systolic: 560  Diastolic: 90  Heart Rate: 104  Weight: 180 lb 5 36 oz  BMI Calculated: 28 24  BSA Calculated: 1 94  Pain Scale: 0  O2 Saturation: 98  Recorded: 66ITG3321 82:07AJ   Systolic: 645  Diastolic: 88  Temperature: 97 7 F  Heart Rate: 66  Height: 5 ft 7 in  Weight: 178 lb 9 14 oz  BMI Calculated: 27 97  BSA Calculated: 1 93  Recorded: 50MRY9677 13:20KD   Systolic: 953  Diastolic: 69  Temperature: 98 2 F  Heart Rate: 82  Respiration: 18  Height: 5 ft 7 in  Weight: 180 lb 5 36 oz  BMI Calculated: 28 24  BSA Calculated: 1 94  Pain Scale: 0  O2 Saturation: 96  Recorded: 53MVU0540 51:88GF   Systolic: 512  Diastolic: 60  Temperature: 98 1 F  Heart Rate: 88  Height: 5 ft 7 in  Weight: 182 lb 15 70 oz  BMI Calculated: 28 66  BSA Calculated: 1 95  Recorded: 34VAT1219 19:60OO   Systolic: 977  Diastolic: 65  Temperature: 98 F  Heart Rate: 90  Respiration: 18  Height: 5 ft 7 in  Weight: 183 lb 13 79 oz  BMI Calculated: 28 8  BSA Calculated: 1 95  Pain Scale: 7  O2 Saturation: 98  Recorded: 51FDT8589 90:84LM   Systolic: 812  Diastolic: 78  Temperature: 97 8 F  Heart Rate: 97  Respiration: 18  Height: 5 ft 7 in  Weight: 214 lb 11 63 oz  BMI Calculated: 33 63  BSA Calculated: 2 08  Pain Scale: 6  O2 Saturation: 98  Recorded: 47XBG6836 93:29EN   Systolic: 014  Diastolic: 76  Temperature: 97 7 F  Heart Rate: 100  Height: 5 ft 7 in  Weight: 176 lb 5 87 oz  BMI Calculated: 27 62  BSA Calculated: 1 92  Recorded: 46TQZ2625 12:70ME   Systolic: 661  Diastolic: 78  Temperature: 97 9 F  Heart Rate: 89  Respiration: 18  Height: 5 ft 7 in  Weight: 189 lb 9 50 oz  BMI Calculated: 29 69  BSA Calculated: 1 98  Pain Scale: 5  Recorded: 73FUN1282 77:47JH   Systolic: 560  Diastolic: 83  Temperature: 97 8 F  Heart Rate: 102  Respiration: 20  Height: 5 ft 7 in  Weight: 189 lb   BMI Calculated: 29 6  BSA Calculated: 1 97  Pain Scale: 7  O2 Saturation: 99  Recorded: 55HEU2043 91:56PR   Systolic: 938  Diastolic: 81  Temperature: 97 9 F  Heart Rate: 88  Height: 5 ft 7 in  Weight: 192 lb 3 84 oz  BMI Calculated: 30 11  BSA Calculated: 1 99  Pain Scale: left index fi  Recorded: 65BCO6927 59:15EU   Systolic: 96  Diastolic: 66  Temperature: 97 4 F  Heart Rate: 78  Height: 5 ft 7 in  Weight: 165 lb 1 97 oz  BMI Calculated: 25 86  BSA Calculated: 1 86  Pain Scale: 0  Recorded: 68RIU0949 10:81PV   Systolic: 414  Diastolic: 78  Temperature: 98 F  Heart Rate: 96  Height: 5 ft 7 in  Weight: 160 lb 7 90 oz  BMI Calculated: 25 13  BSA Calculated: 1 84    Future Appointments    Date/Time Provider Specialty Site   01/16/2017 10:00 AM Zoila Cat MD Internal Medicine [facility] AT [place]   01/18/2017 11:30 AM Mireya Hawkins, [address] [facility] AT [address] [facility]   Electronically signed by : Abby Shoulders, KSAUTREFWV,APN,YQE; Jul 18 2016 12:13PM EST                       (Author)

## 2018-01-11 NOTE — PSYCH
Progress Note  Psychotherapy Provided [facility]: Individual Psychotherapy 40 minutes provided today  Goals addressed in session:   Addressed goals 1-3 of initial plan ([place] 10 min late for appointment)    D: Met with Rebecca Frye individually  ROS; 'happy'  Discussion focused upon boundaries with an x-partner who is attempting to regain a relationship  Exploration of triggers being experienced and need for assertiveness  Frustration with health issues discussed  Denied SI    A: Rebecca Frye presented with appropriate mood and affect  He speech remains rapid -thought process intact  P: Continue individual therapy  Further discussion regarding x-partner and assertiveness  Pain Scale and Suicide Risk [facility]: Current Pain Assessment: no pain   Current suicide risk is low   Behavioral Health Treatment Plan [facility]: Diagnosis and Treatment Plan explained to patient, patient relates understanding diagnosis and is agreeable to Treatment Plan  Assessment    1  Moderate episode of recurrent major depressive disorder (296 32) (F33 1)   2   Generalized anxiety disorder (300 02) (F41 1)    Signatures   Electronically signed by : Shawna Vazquez LCSW; Jul 6 2017  2:39PM EST                       (Author)

## 2018-01-11 NOTE — PSYCH
Progress Note  Psychotherapy Provided [facility]:   Goals addressed in session:   Addressed goals 1-3 of initial plan    D: Met with Portillo galvan  ROS; anxiety remains 'intense'  Physical pain and insurance complications are acerbating anxiety  Discussion focused upon current psychosocial stressors primarily previous relationship  Specific examples and identified triggers discussed  Clinician challenged views Heath Haro is seeing as triggers however he counties to engage with specific individuals  Fleeting SI with periodic plan last week  Currently denied SI for last 3 days  A: Heath Haro presented with rapid and tangential speech  Thought of being a manic episode  Clinician attempted to redirect conversation several times  SOme attempts were successful  Insight and judgement into current circumstances and stressors are impaired  P: Continue individual therapy sessions  Discuss psychosocial stressors, challenge illogical thinking, discuss recommendation of neuro psych       Pain Scale and Suicide Risk [facility]: Current Pain Assessment: moderate to severe   On a scale of 0 to 10, the patient rates current pain at 7   Current suicide risk is low   Behavioral Health Treatment Plan [facility]: Diagnosis and Treatment Plan explained to patient, patient relates understanding diagnosis and is agreeable to Treatment Plan  Assessment    1  Generalized anxiety disorder (300 02) (F41 1)   2   Moderate episode of recurrent major depressive disorder (296 32) (F33 1)    Signatures   Electronically signed by : Faby Vo LCSW; Jun 14 2017  1:10PM EST                       (Author)

## 2018-01-11 NOTE — PROGRESS NOTES
Assessment    1  Human immunodeficiency virus (HIV) disease (042) (B20)   2  Dysphagia (787 20) (R13 10)   3  Chronic heel pain (729 5,338 29) (M79 673,G89 29)   4  Tinnitus, bilateral (388 30) (H93 13)   5  Irritable bowel syndrome (564 1) (K58 9)   6  Gastroesophageal reflux disease with esophagitis (530 11) (K21 0)   7  Generalized anxiety disorder (300 02) (F41 1)   8  Cigarette smoker (305 1) (F17 210)    Plan  Chronic heel pain    · *1 - [facility] Physician Referral  Consult Only: the expectation is that the  referring provider will communicate back to the patient on treatment options  Evaluation  and Treatment: the expectation is that the referred to provider will communicate back  to the patient on treatment options  Status: Hold For - Scheduling  Requested  for: 20CGC9454  Care Summary provided  : Yes  Dysphagia    · EGD; Status:Hold For - Scheduling; Requested for:49Gdm5729;    · Esophageal Manometry; Status:Active; Requested [facility]:41IFQ6657;   Gastroesophageal reflux disease with esophagitis    · Omeprazole 40 MG Oral Capsule Delayed Release; TAKE 1 CAPSULE BY  MOUTH DAILY (PLEASE STOP FAMOTIDINE)  Human immunodeficiency virus (HIV) disease    · Genvoya [phone] MG Oral Tablet; Take 1 tablet daily  Tinnitus, bilateral    · *1 - [address] Physician Referral  Consult Only: the expectation is  that the referring provider will communicate back to the patient on treatment options  Evaluation and Treatment: the expectation is that the referred to provider will  communicate back to the patient on treatment options  Status: Hold For - Scheduling   Requested for: 80ECZ5678  Care Summary provided  : Yes  Unlinked    · Bentyl 20 MG TABS (Dicyclomine HCl); TAKE 1 TABLET TWICE DAILY    Discussion/Summary    1  HIV - Reviewed December 2016 blood work with the patient  CD4 is stable and viral load is undetectable  Patient will continue Genvoya, 1 tab PO daily  Stressed adherence  Patient's next follow up with Dr Omid Mak is 01/30/2017     2  GERD - stable, continue Omeprazole  Will have patient complete an EGD and swallow evaluation as he feels that even though the acid pain has improved his swallowing has started to change  He will work with the patient navigator on getting this scheduled  3  IBS (mixed diarrhea/constipation) - stable, continue Bentyl as needed  4  Anxiety/Depressed mood - Patient declines offer to meet with [facility] to discuss his break up as he feels his friends are better support for him right now, as they've known him for a very long time  CLAYTON reminded patient that the [facility] services are available to him at any time in the future as he changes his mind  He continues to decline connection to mental health services because he doesn't want to go to a "clinic" and doesn't feel it is fair that he'd have to go to therapy before the psychiatrist will see him  The patient continues to present highly anxious, CLAYTON also noted patient's reports of being distracted by certain sounds but patient denies hearing stimuli  5  Varicocele - Resolved scrotal pain  Patient will follow up with urology as needed  6  HZV - Resolved  Patient will contact the clinic if any new rash, itching, or pain begins  7  Smoking - patient uninterested in quitting at this time  We will continue to offer smoking cessation assistance at every visit  Encouraged patient to continue contemplating a quit and the use of Chantix  While it is not covered by his insurance we can apply for a formulary exception and attempt to have it covered that way  8  Hearing Loss - Patient to start Nasonex nasal spray, will purchase OTC as it is not on his insurance formulary  Patient will try to find out where he had formal audiology testing in the past so that we can request the records  Will refer patient for additional assessment to ENT      Possible side effects of new medications were reviewed with the patient/guardian today  The treatment plan was reviewed with the patient/guardian  The patient/guardian understands and agrees with the treatment plan   The patient was counseled regarding diagnostic results, instructions for management, risk factor reductions, prognosis, patient and family education, impressions, risks and benefits of treatment options, importance of compliance with treatment  total time of encounter was 45 minutes and 35 minutes was spent counseling  Counseling   Patient reports there are no people in their life who should be tested for HIV  Education   general HIV education  adherence  prevention care  Chief Complaint  Patient here for a f/u visit  Patient is here today for follow up of chronic conditions described in HPI  History of Present Illness  The patient presents for routine PCP follow up with CLAYTON  The patient reports that heÃ¢â¬â¢s been fine since his last visit  He continues on HAART reporting 100% adherence with no missed or late doses  He denies side effects  The patient was assessed twice in fall 2016 for HZV  After completing a second round of Valtrex the patient had symptom relief and has not had any rash, pain, or itching since  He would like to consider a Shingles vaccine and would like to speak to Dr Rosmery Pryor about this more  The patient also reports that his GERD has been better since going back on Omeprazole  He tried a switch to Famotidine but had worsening of his symptoms  He tries to avoid trigger foods and late night eating as well  However, lately he's noticed it feels different when he is swallowing, almost as if things are getting stuck in his esophagus on the way down  He continues to smoke and is not interested in quitting at this time  However, he feels that his smoking is starting to "get old" and he does see himself quitting sometime this year  He's thought about Chantix as a possible option       He is requesting an appointment with a podiatrist  He states his feet have had "issues" since childhood  He describes his issue as "pain" starting from the heels, extending up to the achilles region, as well across the base of the toes  He feels that he would benefit from orthotics so that his feet fit into shoes better  He would like to do this soon because he's start mechanics/technology classes and will be on his feet a lot  He is also requesting an appointment with an ENT  He states he has intermittent tinnitus in both ears and feels his hearing is not as good as it used to be  He reports that he's had formal hearing tests a few times and has never mina diagnosed with anything  He feels the issue is more in his sinuses and tubes because his hearing is more like he's underwater  He also reports that his mind is easily distracted by sounds in the area and often tune out conversations that he is trying to have, or the TV shows if he is trying to watch  He gives the example if there was a fan in the room he'd only hear the fan, everything else would be muffled  He denies hearing voices  He also reports that he's recently broken up with his partner  He had been in that relationship for 16 years  He admits that he's had depressed mood lately but right away states "I am not going to a psychiatrist"  Patient denies SI/HI  He reports that he has many good friends who are supporting him right now  Pain Assessment   the patient states they do not have pain  Abuse And Domestic Violence Screen    Yes, the patient is safe at home  The patient states no one is hurting them  Depression And Suicide Screen  No, the patient has not had thoughts of hurting themself  No, the patient has not felt depressed in the past 7 days  The patient is being seen for a routine clinic follow-up of HIV infection  The patient is currently asymptomatic  No associated symptoms are reported  Current treatment includes antiretroviral regimen   By report, there is good compliance with treatment, good tolerance of treatment and good symptom control  (Genvoya - no missed or late doses)     CD4: 915  VL: <20  Time spent: 5 minutes  Strength: no side effects  Weakness: anxiety/depressed mood  Plan of Action: reconsider options for therapy and/or psychiatric assessment  SUBSTANCE ABUSE: ETOH use  amount: 3-4 drinks 2x a week  not using drugs  SMOKING: He is a current smoker, uses cigarettes, 1 pack daily packs per day and has not thought about quitting  SEXUALLY ACTIVE: He is not sexually active  HOUSING: He has stable housing  There are 3 people living in the household (including children)  The household income is $0    HEALTH MAINTENANCE: His last dental exam was 1/15/2017  His last eye exam was 6/1/2016  Review of Systems    Constitutional: No fever or chills, feels well, no tiredness, no recent weight gain or weight loss  Eyes: No complaints of eye pain, no red eyes, no discharge from eyes, no itchy eyes  ENT: hearing loss and post nasal drip, but as noted in HPI  Cardiovascular: No complaints of slow heart rate, no fast heart rate, no chest pain, no palpitations, no leg claudication, no lower extremity  Respiratory: No complaints of shortness of breath, no wheezing, no cough, no SOB on exertion, no orthopnea or PND  Gastrointestinal: mixed IBS  Genitourinary: No complaints of dysuria, no incontinence, no hesitancy, no nocturia, no genital lesion, no testicular pain  Musculoskeletal: B/L foot pain, but as noted in HPI  Integumentary: No complaints of skin rash or skin lesions, no itching, no skin wound, no dry skin  Neurological: No compliants of headache, no confusion, no convulsions, no numbness or tingling, no dizziness or fainting, no limb weakness, no difficulty walking     Psychiatric: anxiety and depressed mood due to break up with partner, but not suicidal    Endocrine: No complaints of proptosis, no hot flashes, no muscle weakness, no erectile dysfunction, no deepening of the voice, no feelings of weakness  Hematologic/Lymphatic: No complaints of swollen glands, no swollen glands in the neck, does not bleed easily, no easy bruising  ROS reviewed  Active Problems    1  Diarrhea (787 91) (R19 7)   2  Encounter for screening examination for sexually transmitted disease (V74 5) (Z11 3)   3  Encounter for screening for cardiovascular disorders (V81 2) (Z13 6)   4  Gastroesophageal reflux disease with esophagitis (530 11) (K21 0)   5  Human immunodeficiency virus (HIV) disease (042) (B20)   6  Irritable bowel syndrome (564 1) (K58 9)   7  Testicular calcification (608 89) (N50 89)   8  Varicocele (456 4) (I86 1)    Past Medical History    1  History of Anxiety (300 00) (F41 9)   2  History of Dysuria (788 1) (R30 0)   3  History of Elbow stiffness, left (719 52) (M25 622)   4  History of Fecal urgency (787 63) (R15 2)   5  History of Hemorrhage of anus and rectum (569 3) (K62 5)   6  History of abdominal pain (V13 89) (Z87 898)   7  History of backache (V13 59) (Z87 39)   8  History of backache (V13 59) (Z87 39)   9  History of constipation (V12 79) (Z87 19)   10  History of herpes zoster (V12 09) (Z86 19)   11  History of low back pain (V13 59) (Z87 39)   12  History of Human immunodeficiency virus (HIV) disease (042) (B20)   13  History of Laceration of finger (883 0) (S61 219A)   14  History of Proctitis, chlamydial (099 52) (A56 3)   15  History of Right elbow tendinitis (727 09) (M77 8)   16  History of SOB (shortness of breath) on exertion (786 05) (R06 02)    The active problems and past medical history were reviewed and updated today  Surgical History    1  History of Surgery Spermatic Cord Excision Of Varicocele    The surgical history was reviewed and updated today  Family History  Mother    1  Family history of Type 2 Diabetes Mellitus  Father    2   Family history of Acute Myocardial Infarction (V17 3)    The family history was reviewed and updated today  Social History    · Cigarette smoker (305 1) (F17 210)   · Sexually Active Monogamous Relationship  The social history was reviewed and updated today  The social history was reviewed and is unchanged  Current Meds   1  Bentyl 20 MG TABS; TAKE 1 TABLET TWICE DAILY; Therapy: 42KGQ5923 to Recorded   2  Genvoya [phone] MG Oral Tablet; Take 1 tablet daily; Therapy: 45WAB6990 to (Evaluate:30Mar2017)  Requested for: 32Vsz3970; Last   Rx:19Abh8823 Ordered   3  Omeprazole 40 MG Oral Capsule Delayed Release; TAKE 1 CAPSULE BY MOUTH DAILY   (PLEASE STOP FAMOTIDINE); Therapy: 85XFV0096 to (Evaluate:20Mar2017)  Requested for: 11Azv4636; Last   Rx:75Jir8279 Ordered    The medication list was reviewed and updated today  Allergies    1  No Known Drug Allergies    2  TOMATO    Vitals  Signs   Recorded: 91KQY6159 11:48AM   Temperature: 97 6 F  Heart Rate: 81  Respiration: 18  Systolic: 649  Diastolic: 64  Height: 5 ft 7 in  Weight: 182 lb 1 58 oz  BMI Calculated: 28 52  BSA Calculated: 1 94    Physical Exam    Constitutional   General appearance: No acute distress, well appearing and well nourished  Eyes   Conjunctiva and lids: No swelling, erythema or discharge  Pupils and irises: Equal, round and reactive to light  Ears, Nose, Mouth, and Throat   External inspection of ears and nose: Normal     Otoscopic examination: Tympanic membranes translucent with normal light reflex  Canals patent without erythema  Nasal mucosa, septum, and turbinates: Normal without edema or erythema  Oropharynx: Normal with no erythema, edema, exudate or lesions  Pulmonary   Respiratory effort: No increased work of breathing or signs of respiratory distress  Auscultation of lungs: Clear to auscultation  Cardiovascular   Auscultation of heart: Normal rate and rhythm, normal S1 and S2, without murmurs      Examination of extremities for edema and/or varicosities: Normal     Abdomen   Abdomen: Non-tender, no masses  Liver and spleen: No hepatomegaly or splenomegaly  Lymphatic   Palpation of lymph nodes in neck: No lymphadenopathy  Musculoskeletal   Gait and station: Normal     Psychiatric   Orientation to person, place, and time: Normal     Mood and affect: Abnormal   (hyper) Mood and Affect: anxious, expansive and silly  Results/Data  (1) CBC/PLT/DIFF 50VTH8035 09:31AM [place] Order Number: [id number]   Order Number: [id number]   Order Number: [id number]     Test Name Result Flag Reference   WBC COUNT 5 08 Thousand/uL  4 31-10 16   RBC COUNT 4 47 Million/uL  3 88-5 62   HEMOGLOBIN 14 3 g/dL  12 0-17 0   HEMATOCRIT 41 5 %  36 5-49 3   MCV 93 fL  82-98   MCH 32 0 pg  26 8-34 3   MCHC 34 5 g/dL  31 4-37 4   RDW 12 6 %  11 6-15 1   MPV 9 0 fL  8 9-12 7   PLATELET COUNT 735 Thousands/uL  149-390   nRBC AUTOMATED 0 /100 WBCs     NEUTROPHILS RELATIVE PERCENT 36 % L 43-75   LYMPHOCYTES RELATIVE PERCENT 50 % H 14-44   MONOCYTES RELATIVE PERCENT 11 %  4-12   EOSINOPHILS RELATIVE PERCENT 3 %  0-6   BASOPHILS RELATIVE PERCENT 0 %  0-1   NEUTROPHILS ABSOLUTE COUNT 1 80 Thousands/?L L 1 85-7 62   LYMPHOCYTES ABSOLUTE COUNT 2 51 Thousands/?L  0 60-4 47   MONOCYTES ABSOLUTE COUNT 0 58 Thousand/?L  0 17-1 22   EOSINOPHILS ABSOLUTE COUNT 0 16 Thousand/?L  0 00-0 61   BASOPHILS ABSOLUTE COUNT 0 01 Thousands/?L  0 00-0 10   - Patient Instructions: This bloodwork is non-fasting  Please drink two glasses of water morning of bloodwork  - Patient Instructions: This bloodwork is non-fasting  Please drink two glasses of water morning of bloodwork  - Patient Instructions: This bloodwork is non-fasting  Please drink two glasses of water morning of bloodwork  - Patient Instructions: This bloodwork is non-fasting  Please drink two glasses of water morning of bloodwork       (1) COMPREHENSIVE METABOLIC PANEL 17HVH0092 09: 31AM [address] Order Number: [id number]  TW Order Number: [id number]  TW Order Number: [id number]     Test Name Result Flag Reference   GLUCOSE,RANDM 90 mg/dL     If the patient is fasting, the ADA then defines impaired fasting glucose as > 100 mg/dL and diabetes as > or equal to 123 mg/dL  SODIUM 141 mmol/L  136-145   POTASSIUM 3 8 mmol/L  3 5-5 3   CHLORIDE 107 mmol/L  100-108   CARBON DIOXIDE 26 mmol/L  21-32   ANION GAP (CALC) 8 mmol/L  4-13   BLOOD UREA NITROGEN 15 mg/dL  5-25   CREATININE 0 90 mg/dL  0 60-1 30   Standardized to IDMS reference method   CALCIUM 8 4 mg/dL  8 3-10 1   BILI, TOTAL 0 51 mg/dL  0 20-1 00   ALK PHOSPHATAS 93 U/L     ALT (SGPT) 30 U/L  12-78   AST(SGOT) 23 U/L  5-45   ALBUMIN 3 6 g/dL  3 5-5 0   TOTAL PROTEIN 7 1 g/dL  6 4-8 2   eGFR Non-African American      >60 0 ml/min/1 73sq m   - Patient Instructions: This is a fasting blood test  Water,black tea or black  coffee only after 9:00pm the night before test Drink 2 glasses of water the morning of test - Patient Instructions: This is a fasting blood test  Water,black tea or black    coffee only after 9:00pm the night before test Drink 2 glasses of water the morning of test   National Kidney Disease Education Program recommendations are as follows:  GFR calculation is accurate only with a steady state creatinine  Chronic Kidney disease less than 60 ml/min/1 73 sq  meters  Kidney failure less than 15 ml/min/1 73 sq  meters  (1) HIV-1 RNA QUANTITATIVE 29EIH6922 09:31AM [address] Order Number: [id number]  TW Order Number: [id number]  TW Order Number: [id number]     Test Name Result Flag Reference   HIV-1 RNA QUANT <20 copies/mL     HIV-1 RNA not detected  The reportable range for this assay is 20 to 10,000,000  copies HIV-1 RNA/mL     HIV-1 RNA VIRAL LOAD LOG COMMENT dki94ciyb/mL     Unable to calculate result since non-numeric result obtained for  component test     Performed at:  [address] [facility] [address]  [id number]  : Joceline Menjivar MD, Phone:  [phone]     (1) T LYMPH SUBSET (CD4) 68KZV7215 09:31AM [place] Order Number: [id number]  TW Order Number: [id number]  TW Order Number: [id number]     Test Name Result Flag Reference   CD4 T CELL ABSOLUTE 915 /uL  359 - 1519   CD4 % HELPER T CELL 39 8 %  30 8 - 58 5   WBC (CD4/8) 4 7 x10E3/uL  3 4 - 10 8   RBC 4 48 x10E6/uL  4 14 - 5 80   HGB (CD4/8) 14 4 g/dL  12 6 - 17 7   HCT (CD4/8) 42 7 %  37 5 - 51 0   MCV (CD4/8) 95 fL  79 - 97   MCH (CD4/8) 32 1 pg  26 6 - 33 0   MCHC (CD4/8) 33 7 g/dL  31 5 - 35 7   RDW (CD4/8) 13 5 %  12 3 - 15 4   PLT (CD4/8) 255 x10E3/uL  150 - 379   NEUTS (CD4/8) 39 %     LYMPHS (CD4/8) 48 %     MONOS (CD4/8) 9 %     EOS (CD4/8) 4 %     BASOS (CD4/8) 0 %     NEUTS,ABS  (CD4/8) 1 8 x10E3/uL  1 4 - 7 0   LYMP,ABS (CD4/8) 2 3 x10E3/uL  0 7 - 3 1   MONOS,ABS (CD4/8) 0 4 x10E3/uL  0 1 - 0 9   EOS, ABS (CD4/8) 0 2 x10E3/uL  0 0 - 0 4   BASO, ABS (CD4/8) 0 0 x10E3/uL  0 0 - 0 2   IIMM  GRANS,ABS (CD4/8) 0 0 x10E3/uL  0 0 - 0 1   IMM  GRANULOCYTES (CD4/8) 0 %     Performed at:  [facility] [address]  [id number]  : Joceline Menjivar MD, Phone:  [phone]     (1) RPR 64XQF5502 09:31AM [place] Order Number: [id number]  TW Order Number: [id number]  TW Order Number: [id number]     Test Name Result Flag Reference   RPR Non-Reactive  Non-Reactive     Attending Note  Collaborating Physician: I agree with the Advanced Practitioner note        Future Appointments    Date/Time Provider Specialty Site   01/30/2017 10:00 AM Constantino Thakur MD Internal Medicine [facility] AT [address] [place] [address]   Electronically signed by : Hattie Gupta; Jan 19 2017  1:10PM EST                       (Author)    Electronically signed by : Pawan Koch, DO; Jan 19 2017  1:43PM EST                       (Author)

## 2018-01-11 NOTE — PROGRESS NOTES
Assessment    1  Human immunodeficiency virus (HIV) disease (042) (B20)   2  Dysphagia (787 20) (R13 10)    Plan    1  Follow-up visit in 6 months Evaluation and Treatment  Follow-up  Status: Hold For -   Scheduling  Requested for: 81VAC0209    2  (1) CBC/PLT/DIFF; Status:Active; Requested QEA:50QNG6686;    3  (1) COMPREHENSIVE METABOLIC PANEL; Status:Active; Requested XIF:56CWU0464;    4  (1) HIV-1 RNA QUANTITATIVE; [Do Not Release]; Status:Active; Requested   FUW:07FZZ3285;    5  (1) T LYMPH SUBSET (CD4); Status:Active; Requested UAE:15ZWH7431;    6  Pneumo (Pneumovax); INJECT 0 5  ML Intramuscular; To Be Done:   53GUY1717    Discussion/Summary    HIV-doing well on ART with an undetectable viral load in the CD4 count over 900  Continue ART, recheck labs in 5 months, follow-up in 6 months  Transition to [place] as soon as done with current stribild  Dysphagia-unclear etiology  Explained to the patient at the [facility] should not be causing this problem  He is currently being worked up by the primary and is being referred to ENT  Will monitor symptoms for now  Possible side effects of new medications were reviewed with the patient/guardian today  The treatment plan was reviewed with the patient/guardian  The patient/guardian understands and agrees with the treatment plan     Education   general HIV education  adherence  prevention care  Chief Complaint  Patient here for HIV f/u  History of Present Illness  Routine follow-up for HIV  Patient claims 100% adherence with [facility]  He is not a process of transitioning over to [place] but he is not changed to taking the new meds until he runs out of the [address]  He denies any notable side effects  He has been worked up by his primary for dysphagia which has persisted  Pain Assessment   the patient states they have pain   The pain is located in the left heal  (on a scale of 0 to 10, the patient rates the pain at 6 )   Abuse And Domestic Violence Screen    Yes, the patient is safe at home  The patient states no one is hurting them  Depression And Suicide Screen  No, the patient has not had thoughts of hurting themself  No, the patient has not felt depressed in the past 7 days  no fever no depression no night sweats no weight loss no decreased appetite no cough no shortness of breath no thrush no nausea no vomiting no diarrhea      Active Problems    1  Chronic heel pain (729 5,338 29) (M79 673,G89 29)   2  Diarrhea (787 91) (R19 7)   3  Dysphagia (787 20) (R13 10)   4  Encounter for screening examination for sexually transmitted disease (V74 5) (Z11 3)   5  Encounter for screening for cardiovascular disorders (V81 2) (Z13 6)   6  Gastroesophageal reflux disease with esophagitis (530 11) (K21 0)   7  Generalized anxiety disorder (300 02) (F41 1)   8  Human immunodeficiency virus (HIV) disease (042) (B20)   9  Irritable bowel syndrome (564 1) (K58 9)   10  Testicular calcification (608 89) (N50 89)   11  Tinnitus, bilateral (388 30) (H93 13)   12  Varicocele (456 4) (I86 1)    Past Medical History    1  History of Anxiety (300 00) (F41 9)   2  History of Dysuria (788 1) (R30 0)   3  History of Elbow stiffness, left (719 52) (M25 622)   4  History of Fecal urgency (787 63) (R15 2)   5  History of Hemorrhage of anus and rectum (569 3) (K62 5)   6  History of abdominal pain (V13 89) (Z87 898)   7  History of backache (V13 59) (Z87 39)   8  History of backache (V13 59) (Z87 39)   9  History of constipation (V12 79) (Z87 19)   10  History of herpes zoster (V12 09) (Z86 19)   11  History of low back pain (V13 59) (Z87 39)   12  History of Human immunodeficiency virus (HIV) disease (042) (B20)   13  History of Laceration of finger (883 0) (S61 219A)   14  History of Proctitis, chlamydial (099 52) (A56 3)   15  History of Right elbow tendinitis (727 09) (M77 8)   16  History of SOB (shortness of breath) on exertion (786 05) (R06 02)    Surgical History    1  History of Surgery Spermatic Cord Excision Of Varicocele    Family History  Mother    1  Family history of Type 2 Diabetes Mellitus  Father    2  Family history of Acute Myocardial Infarction (V17 3)    Social History    · Cigarette smoker (305 1) (F17 210)   · Sexually Active Monogamous Relationship    Current Meds   1  Bentyl 20 MG TABS; TAKE 1 TABLET TWICE DAILY; Therapy: 78KGH2514 to Recorded   2  Genvoya 041-272-104-10 MG Oral Tablet; Take 1 tablet daily; Therapy: 06MCV6138 to (Evaluate:30Mar2017)  Requested for: 98WGU1727; Last   Rx:11Kcw5909 Ordered   3  Omeprazole 40 MG Oral Capsule Delayed Release; TAKE 1 CAPSULE BY MOUTH DAILY   (PLEASE STOP FAMOTIDINE); Therapy: 40VQG2472 to (Evaluate:20Mar2017)  Requested for: 68CGB0045; Last   Rx:74Cnr7706 Ordered    Allergies    1  No Known Drug Allergies    2  TOMATO    Vitals  Signs   Recorded: 72QHG0910 10:57AM   Temperature: 98 F  Heart Rate: 96  Systolic: 676  Diastolic: 67  Height: 5 ft 7 in  Weight: 179 lb 14 30 oz  BMI Calculated: 28 18  BSA Calculated: 1 93  O2 Saturation: 93    Physical Exam    Constitutional   General appearance: No acute distress, well appearing and well nourished  Ears, Nose, Mouth, and Throat   Oropharynx: Normal with no erythema, edema, exudate or lesions  Pulmonary   Respiratory effort: No increased work of breathing or signs of respiratory distress  Auscultation of lungs: Clear to auscultation  Cardiovascular   Auscultation of heart: Normal rate and rhythm, normal S1 and S2, without murmurs  Examination of extremities for edema and/or varicosities: Normal     Abdomen   Abdomen: Non-tender, no masses  Liver and spleen: No hepatomegaly or splenomegaly  Lymphatic   Palpation of lymph nodes in neck: No lymphadenopathy         Future Appointments    Date/Time Provider Specialty Site   05/25/2017 11:40 AM Specialty Clinic, ENT  [facility]    03/27/2017 12:50 PM Specialty Clinic, Ortho Room 1 [address] [address]     Signatures   Electronically signed by : Jesenia Rodriguez MD; Jan 30 2017 11:20AM EST                       (Author)
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
## 2018-01-12 VITALS
DIASTOLIC BLOOD PRESSURE: 64 MMHG | BODY MASS INDEX: 28.58 KG/M2 | WEIGHT: 182.1 LBS | HEIGHT: 67 IN | TEMPERATURE: 97.6 F | HEART RATE: 81 BPM | RESPIRATION RATE: 18 BRPM | SYSTOLIC BLOOD PRESSURE: 103 MMHG

## 2018-01-12 NOTE — PROGRESS NOTES
Assessment    1  Human immunodeficiency virus (HIV) disease (042) (B20)    Plan    1  (1) CHLAMYDIA/GC AMPLIFIED DNA, PCR; Source:Urine, Unspecified Source;   Status:Active; Requested for:98Bkm5057;     2  (1) LIPID PANEL, FASTING; Status:Active; Requested for:00Khw3556;     3  (1) CBC/PLT/DIFF; Status:Active; Requested for:13Bjx2862;    4  (1) COMPREHENSIVE METABOLIC PANEL; Status:Active; Requested for:02Rtj8622;    5  (1) HEP C ANTIBODY; Status:Active; Requested for:62Mjw2453;    6  (1) HIV-1 RNA QUANTITATIVE; [Do Not Release]; Status:Active; Requested   for:93Tcy1619;    7  (1) QUANTIFERON - TB GOLD; Status:Active; Requested for:51Bqi5393;    8  (1) RPR; Status:Active; Requested for:33Bhg6388;    9  (1) T LYMPH SUBSET (CD4); Status:Active; Requested for:55Zsf2624;    10  (1) URINALYSIS (will reflex a microscopy if leukocytes, occult blood, protein or nitrites are    not within normal limits); Status:Active; Requested for:86Hjf1096;    11  Fluzone Quadrivalent Intramuscular Suspension; INJECT 0 5  ML    Intramuscular; To Be Done: 43DUZ6393   81  Pneumo (Pneumovax); INJECT 0 5  ML Intramuscular; To Be Done:    15EIQ5075    Discussion/Summary    1  HIV - Last CD4 stable at 838, viral load is undetectable  Patient will continue on Stribild, 1 tab PO daily for now and will switch to TAF based Genv[place] once available  Stressed adherence  Patient to continue routine follow up with Dr Fifi Bates  Recheck labs in 5 months and follow-up in 6 months  Possible side effects of new medications were reviewed with the patient/guardian today  The treatment plan was reviewed with the patient/guardian  The patient/guardian understands and agrees with the treatment plan   The patient was counseled regarding diagnostic results, instructions for management, risk factor reductions, prognosis, patient and family education, impressions, risks and benefits of treatment options, importance of compliance with treatment   total time of encounter was 30 minutes and 15 minutes was spent counseling  Counseling   Patient reports there are no people in their life who should be tested for HIV  Education   general HIV education  adherence  prevention care  History of Present Illness  The patient presents for routine HIV follow up with Dr Richard Abernathy  He has been stable on Stribild and denies missed or late doses  He denies side effecst  He has spoken to CLAYTON during primary care visits about switching from TDF based Stribild to TAF based [place] but it is not available on his insurance formulary yet  The patient is being seen for a routine clinic follow-up of HIV infection  The patient is currently asymptomatic  No associated symptoms are reported  Current treatment includes antiretroviral regimen  By report, there is good compliance with treatment, good tolerance of treatment and good symptom control  (No missed or late doses)      Review of Systems    Constitutional: No fever or chills, feels well, no tiredness, no recent weight gain or weight loss  ENT: no complaints of earache, no hearing loss, no nosebleeds, no nasal discharge, no sore throat, no hoarseness  Cardiovascular: No complaints of slow heart rate, no fast heart rate, no chest pain, no palpitations, no leg claudication, no lower extremity  Respiratory: No complaints of shortness of breath, no wheezing, no cough, no SOB on exertion, no orthopnea or PND  Hematologic/Lymphatic: No complaints of swollen glands, no swollen glands in the neck, does not bleed easily, no easy bruising  ROS reviewed  Active Problems    1  Abdominal pain (789 00) (R10 9)   2  Constipation (564 00) (K59 00)   3  Diarrhea (787 91) (R19 7)   4  Encounter for screening examination for sexually transmitted disease (V74 5) (Z11 3)   5  Encounter for screening for cardiovascular disorders (V81 2) (Z13 6)   6  Fecal urgency (787 63) (R15 2)   7   Gastroesophageal reflux disease with esophagitis (530 11) (K21 0)   8  Hemorrhage of anus and rectum (569 3) (K62 5)   9  Human immunodeficiency virus (HIV) disease (042) (B20)   10  Irritable bowel syndrome (564 1) (K58 9)   11  Testicular calcification (608 89) (N50 8)   12  Varicocele (456 4) (I86 1)    Past Medical History    1  History of Anxiety (300 00) (F41 9)   2  History of Dysuria (788 1) (R30 0)   3  History of Elbow stiffness, left (719 52) (M25 622)   4  History of backache (V13 59) (Z87 39)   5  History of backache (V13 59) (Z87 39)   6  History of low back pain (V13 59) (Z87 39)   7  History of Human immunodeficiency virus (HIV) disease (042) (B20)   8  History of Laceration of finger (883 0) (S61 219A)   9  History of Proctitis, chlamydial (099 52) (A56 3)   10  History of SOB (shortness of breath) on exertion (786 05) (R06 02)    The active problems and past medical history were reviewed and updated today  Surgical History    1  History of Surgery Spermatic Cord Excision Of Varicocele    The surgical history was reviewed and updated today  Family History  Mother    1  Family history of Type 2 Diabetes Mellitus  Father    2  Family history of Acute Myocardial Infarction (V17 3)    The family history was reviewed and updated today  Social History    · Cigarette smoker (305 1) (F17 210)   · Sexually Active Monogamous Relationship  The social history was reviewed and updated today  The social history was reviewed and is unchanged  Current Meds   1  Bentyl 20 MG Oral Tablet; TAKE 1 TABLET TWICE DAILY; Therapy: 49ZCE7556 to Recorded   2  Omeprazole 40 MG Oral Capsule Delayed Release; TAKE 1 CAPSULE Daily; Therapy: 02KCA7704 to (Last Rx:64Gbj1116)  Requested for: 86CVB9122; Status: ACTIVE -   Transmit to Pharmacy - Awaiting Verification Ordered   3  Stribild [phone] MG Oral Tablet; take 1 tablet by mouth daily;    Therapy: 39NXK0489 to (Ashleigh Scruggs)  Requested for: 28Jun2016; Last   Rx:28Jun2016 Ordered    The medication list was reviewed and updated today  Allergies    1  No Known Drug Allergies    2  TOMATO    Vitals  Signs   Recorded: 13ULF1510 10:10AM   Temperature: 98 6 F  Recorded: 36WGD1270 78:49WN   Systolic: 807  Diastolic: 90  Heart Rate: 104  O2 Saturation: 98  Weight: 180 lb 5 36 oz  BMI Calculated: 28 24  BSA Calculated: 1 94    Physical Exam    Constitutional   General appearance: No acute distress, well appearing and well nourished  Ears, Nose, Mouth, and Throat   Oropharynx: Normal with no erythema, edema, exudate or lesions  Pulmonary   Respiratory effort: No increased work of breathing or signs of respiratory distress  Auscultation of lungs: Clear to auscultation  Cardiovascular   Auscultation of heart: Normal rate and rhythm, normal S1 and S2, without murmurs  Abdomen   Abdomen: Non-tender, no masses  Liver and spleen: No hepatomegaly or splenomegaly      Psychiatric   Orientation to person, place, and time: Normal     Mood and affect: Normal        Results/Data  (1) CBC/PLT/DIFF 93XDL6481 09:23AM [facility] Order Number: [id number]   Order Number: [id number]     Test Name Result Flag Reference   WBC COUNT 4 47 Thousand/uL  4 31-10 16   RBC COUNT 4 79 Million/uL  3 88-5 62   HEMOGLOBIN 15 0 g/dL  12 0-17 0   HEMATOCRIT 44 4 %  36 5-49 3   MCV 93 fL  82-98   MCH 31 3 pg  26 8-34 3   MCHC 33 8 g/dL  31 4-37 4   RDW 13 1 %  11 6-15 1   MPV 8 9 fL  8 9-12 7   PLATELET COUNT 453 Thousands/uL  149-390   nRBC AUTOMATED 0 /100 WBCs     NEUTROPHILS RELATIVE PERCENT 36 % L 43-75   LYMPHOCYTES RELATIVE PERCENT 51 % H 14-44   MONOCYTES RELATIVE PERCENT 10 %  4-12   EOSINOPHILS RELATIVE PERCENT 3 %  0-6   BASOPHILS RELATIVE PERCENT 0 %  0-1   NEUTROPHILS ABSOLUTE COUNT 1 59 Thousands/?L L 1 85-7 62   LYMPHOCYTES ABSOLUTE COUNT 2 28 Thousands/?L  0 60-4 47   MONOCYTES ABSOLUTE COUNT 0 44 Thousand/?L  0 17-1 22   EOSINOPHILS ABSOLUTE COUNT 0 12 Thousand/?L  0 00-0 61   BASOPHILS ABSOLUTE COUNT 0 02 Thousands/?L  0 00-0 10     (1) COMPREHENSIVE METABOLIC PANEL 87ZYG0980 12:46YN Leslee Michael    Order Number: [id number]  TW Order Number: [id number]     Test Name Result Flag Reference   GLUCOSE,RANDM 79 mg/dL     If the patient is fasting, the ADA then defines impaired fasting glucose as > 100 mg/dL and diabetes as > or equal to 123 mg/dL  SODIUM 138 mmol/L  136-145   POTASSIUM 4 5 mmol/L  3 5-5 3   CHLORIDE 106 mmol/L  100-108   CARBON DIOXIDE 28 mmol/L  21-32   ANION GAP (CALC) 4 mmol/L  4-13   BLOOD UREA NITROGEN 14 mg/dL  5-25   CREATININE 0 94 mg/dL  0 60-1 30   Standardized to IDMS reference method   CALCIUM 9 0 mg/dL  8 3-10 1   BILI, TOTAL 0 37 mg/dL  0 20-1 00   ALK PHOSPHATAS 113 U/L     ALT (SGPT) 29 U/L  12-78   AST(SGOT) 16 U/L  5-45   ALBUMIN 3 6 g/dL  3 5-5 0   TOTAL PROTEIN 7 7 g/dL  6 4-8 2   eGFR Non-African American      >60 0 ml/min/1 73sq [facility] Disease Education Program recommendations are as follows:  GFR calculation is accurate only with a steady state creatinine  Chronic Kidney disease less than 60 ml/min/1 73 sq  meters  Kidney failure less than 15 ml/min/1 73 sq  meters       (1) T LYMPH SUBSET (CD4) [id number] 09:23AM Leslee Michael    Order Number: [id number]     Test Name Result Flag Reference   CD4 T CELL ABSOLUTE 838 /uL  359 - 1519   CD4 % HELPER T CELL 38 1 %  30 8 - 58 5   WBC (CD4/8) 4 4 x10E3/uL  3 4 - 10 8   RBC 4 91 x10E6/uL  4 14 - 5 80   HGB (CD4/8) 15 4 g/dL  12 6 - 17 7   HCT (CD4/8) 47 0 %  37 5 - 51 0   MCV (CD4/8) 96 fL  79 - 97   MCH (CD4/8) 31 4 pg  26 6 - 33 0   MCHC (CD4/8) 32 8 g/dL  31 5 - 35 7   RDW (CD4/8) 13 9 %  12 3 - 15 4   PLT (CD4/8) 257 x10E3/uL  150 - 379   NEUTS (CD4/8) 35 %     LYMPHS (CD4/8) 50 %     MONOS (CD4/8) 11 %     EOS (CD4/8) 3 %     BASOS (CD4/8) 1 %     NEUTS,ABS  (CD4/8) 1 6 x10E3/uL  1 4 - 7 0   LYMP,ABS (CD4/8) 2 2 x10E3/uL  0 7 - 3 1   MONOS,ABS (CD4/8) 0 5 x10E3/uL  0 1 - 0 9 EOS, ABS (CD4/8) 0 1 x10E3/uL  0 0 - 0 4   BASO, ABS (CD4/8) 0 0 x10E3/uL  0 0 - 0 2   IIMM  GRANS,ABS (CD4/8) 0 0 x10E3/uL  0 0 - 0 1   IMM  GRANULOCYTES (CD4/8) 0 %     Performed at:  [address] [place] [address]  [id number]  : Madeleine Castro MD, Phone:  [phone]     (1) HIV-1 RNA QUANTITATIVE 81HTJ4228 09:23AM Alex Galicia    Order Number: [id number]     Test Name Result Flag Reference   HIV-1 RNA QUANT <20 copies/mL     HIV-1 RNA not detectedThe reportable range for this assay is 20 to 10,000,000copies HIV-1 RNA/mL  HIV-1 RNA VIRAL LOAD LOG COMMENT [id number]/mL     Unable to calculate result since non-numeric result obtained forcomponent test     Performed at:  [address] [place] [address]  [id number]  : Madeleine Castro MD, Phone:  [phone]     Attending Note  Collaborating Physician: I interviewed and examined the patient, I discussed the case with the Advanced Practitioner and reviewed the note, I supervised the Advanced Practitioner and I agree with the Advanced Practitioner note        Signatures   Electronically signed by : Reina Burks MD; Jul 18 2016 10:15AM EST                       (Author)

## 2018-01-12 NOTE — PROGRESS NOTES
History of Present Illness  [facility]:   He is being seen in follow-up  The patient is being seen regarding picking up [address] resource list         Physical Exam    Objective: Orientation: oriented to person, oriented to place and oriented to time  Appearance: well developed, well nourished and appearance reflects stated age  Observed mood and affect: appropriate  Harm to self or others: None reported or observed  Substance abuse: None reported or observed  Discussion/Summary  [facility]: Today, patient presents with depression  Patient will likely benefit from using provided resources to link to ongoing [address] services  The stage of change is preparation  Behavioral recommendations: 1  F/U with [facility] as needed  2  Use resources provided by [facility] to link to [address] services   Discussion Summary [facility]:   PT was seen for a brief behavioral health consultation with [facility] had called PT earlier in the day to let him know that his new insurance is showing as active and to provide him with a list of [address] resources for him to contact  PT happened to be in the building so he came in person to  the list  [facility] told PT to call if he has any trouble linking to care  Future Appointments    Date/Time Provider Specialty Site   07/31/2017 10:00 AM Jemma Hook MD Infectious Disease [facility] AT [facility]   03/09/2017 03:00 PM ZANE Restrepo   Gastroenterology Adult [facility] OUTPATIENT   05/25/2017 11:40 AM [facility]   03/27/2017 12:50 PM [facility]     Signatures   Electronically signed by : Matthew Tate 85; Mar  2 2017  2:16PM EST                       (Author)

## 2018-01-12 NOTE — PROGRESS NOTES
Assessment    1  Human immunodeficiency virus (HIV) disease (042) (B20)   2  Shingles (053 9) (B02 9)    Plan     1  Stribild [phone] MG Oral Tablet; take 1 tablet by mouth daily   2  Flulaval Quadrivalent 0 5 ML Intramuscular Suspension    3  ValACYclovir HCl - 1 GM Oral Tablet (Valtrex); TAKE 1 TABLET 3 TIMES DAILY    4  Bentyl 20 MG Oral Tablet (Dicyclomine HCl); TAKE 1 TABLET TWICE DAILY    Omeprazole 40 MG Oral Capsule Delayed Release; TAKE 1 CAPSULE Daily; Therapy: 81MXV5668 to (Last Rx:33Sxn2248)  Requested for: 96YQZ2901; Status: ACTIVE Ordered  Rx By: Stephenie Sandhu; Dispense: 0 Days ; #:30 Capsule Delayed Release; Refill: 2;   For: Gastroesophageal reflux disease with esophagitis; CLIFTON = N; Verified Transmission to [address] [place] [address]; Msg to Pharmacy: please D/C the Famotidine     Discussion/Summary  Discussion Summary:   1  HIV - Last CD4 stable, viral load undetectable  Patient to continue Stribild, 1 tab PO daily  Stressed adherence  Patient to continue routine f/u with Dr Ericka Jorgensen  2  Herpes Zoster Shingles - Patient will start a second round of treatment  1gram Valtrex TID x7 days sent to [facility] for patient to   Patient will seek medical care if the rash becomes worse or if he finds rash on other areas of his body  Instructed patient to try to increase his rest and reduce his stress to allow the virus to calm down  -Right elbow xray results were negative  Counseling Documentation With Imm: The patient was counseled regarding diagnostic results, instructions for management, prognosis, impressions  total time of encounter was 10 minutes and 10 minutes was spent counseling  Medication SE Review and Pt Understands Tx: Possible side effects of new medications were reviewed with the patient/guardian today  The treatment plan was reviewed with the patient/guardian   The patient/guardian understands and agrees with the treatment plan      Chief Complaint  Chief Complaint Free Text Note Form: Patient here for rash on chest f/u  History of Present Illness  HPI: The patient presents for a follow up on a rash on his left anterior chest  He was seen on 9/16/16 for the rash and was diagnosed with shingles  He called on 9/19/16 to report that after 3 days of Valtrex he felt the rash was much better  The patient then called again on 9/23/16 stating the rash was still bothering him and he thinks it needs different treatment    The patient presents today stating the rash is actually much better today than it was on Friday when he called  He reports that the front has dried up and the red streak is gone  He also states that he feels an itchy patch on his back but there is no new rash  Hospital Based Practices Required Assessment:   Pain Assessment   the patient states they have pain  The pain is located in the right eye  (on a scale of 0 to 10, the patient rates the pain at 2 )   Abuse And Domestic Violence Screen    Yes, the patient is safe at home  The patient states no one is hurting them  Depression And Suicide Screen  No, the patient has not had thoughts of hurting themself  No, the patient has not felt depressed in the past 7 days  Review of Systems  Focused-Male:   Constitutional: no fever or chills, feels well, no tiredness, no recent weight loss or weight gain  Integumentary: a rash and itching, but as noted in HPI  ROS Reviewed:   ROS reviewed  Active Problems    1  Abdominal pain (789 00) (R10 9)   2  Constipation (564 00) (K59 00)   3  Diarrhea (787 91) (R19 7)   4  Encounter for screening examination for sexually transmitted disease (V74 5) (Z11 3)   5  Encounter for screening for cardiovascular disorders (V81 2) (Z13 6)   6  Fecal urgency (787 63) (R15 2)   7  Gastroesophageal reflux disease with esophagitis (530 11) (K21 0)   8  Hemorrhage of anus and rectum (569 3) (K62 5)   9  Human immunodeficiency virus (HIV) disease (042) (B20)   10   Irritable bowel syndrome (564 1) (K58 9)   11  Right elbow tendinitis (727 09) (M77 8)   12  Shingles (053 9) (B02 9)   13  Testicular calcification (608 89) (N50 8)   14  Varicocele (456 4) (I86 1)    Past Medical History    1  History of Anxiety (300 00) (F41 9)   2  History of Dysuria (788 1) (R30 0)   3  History of Elbow stiffness, left (719 52) (M25 622)   4  History of backache (V13 59) (Z87 39)   5  History of backache (V13 59) (Z87 39)   6  History of low back pain (V13 59) (Z87 39)   7  History of Human immunodeficiency virus (HIV) disease (042) (B20)   8  History of Laceration of finger (883 0) (S61 219A)   9  History of Proctitis, chlamydial (099 52) (A56 3)   10  History of SOB (shortness of breath) on exertion (786 05) (R06 02)  Active Problems And Past Medical History Reviewed: The active problems and past medical history were reviewed and updated today  Family History  Mother    1  Family history of Type 2 Diabetes Mellitus  Father    2  Family history of Acute Myocardial Infarction (V17 3)  Family History Reviewed: The family history was reviewed and updated today  Social History    · Cigarette smoker (305 1) (F17 210)   · Sexually Active Monogamous Relationship  Social History Reviewed: The social history was reviewed and updated today  The social history was reviewed and is unchanged  Surgical History    1  History of Surgery Spermatic Cord Excision Of Varicocele  Surgical History Reviewed: The surgical history was reviewed and updated today  Current Meds   1  Bentyl 20 MG Oral Tablet; TAKE 1 TABLET TWICE DAILY; Therapy: 14PHI3704 to Recorded   2  Omeprazole 40 MG Oral Capsule Delayed Release; TAKE 1 CAPSULE Daily; Therapy: 56IBI3232 to (Last Rx:99Hwk5073)  Requested for: 74Yny5517 Ordered   3  Stribild [phone] MG Oral Tablet; take 1 tablet by mouth daily; Therapy: 13FJQ9381 to (Evaluate:83Nmi0911)  Requested for: 04Dlf7209; Last   Rx:66Djf4558 Ordered   4   ValACYclovir HCl - 1 GM Oral Tablet; TAKE 1 TABLET 3 TIMES DAILY; Therapy: 54XEF6776 to (Evaluate:32Ecp4605)  Requested for: 02Qib1294; Last   Rx:73Hxf0212 Ordered  Medication List Reviewed: The medication list was reviewed and updated today  Allergies    1  No Known Drug Allergies    2  TOMATO    Vitals  Signs   Recorded: 85QLE2263 99:18XH   Systolic: 435  Diastolic: 74  Heart Rate: 69  Respiration: 18  Temperature: 97 7 F  O2 Saturation: 96  Height: 5 ft 7 in  Weight: 183 lb   BMI Calculated: 28 66  BSA Calculated: 1 95    Physical Exam    Constitutional   General appearance: No acute distress, well appearing and well nourished  Skin   Skin and subcutaneous tissue: Abnormal   (Left anterior chest rash is greatly improved since previous assessment  Rash is almost flat with only a few dried pustles remaining   No erythematous streak remains on the left anterior chest  Patient does have a new erythematous streak on the left back around dermatome T4-T5 with no actual rash on top)      Results/Data  (1) CBC/PLT/DIFF 50JAZ6030 09:23AM Lorena Montanez    Order Number: [id number]   Order Number: [id number]     Test Name Result Flag Reference   WBC COUNT 4 47 Thousand/uL  4 31-10 16   RBC COUNT 4 79 Million/uL  3 88-5 62   HEMOGLOBIN 15 0 g/dL  12 0-17 0   HEMATOCRIT 44 4 %  36 5-49 3   MCV 93 fL  82-98   MCH 31 3 pg  26 8-34 3   MCHC 33 8 g/dL  31 4-37 4   RDW 13 1 %  11 6-15 1   MPV 8 9 fL  8 9-12 7   PLATELET COUNT 362 Thousands/uL  149-390   nRBC AUTOMATED 0 /100 WBCs     NEUTROPHILS RELATIVE PERCENT 36 % L 43-75   LYMPHOCYTES RELATIVE PERCENT 51 % H 14-44   MONOCYTES RELATIVE PERCENT 10 %  4-12   EOSINOPHILS RELATIVE PERCENT 3 %  0-6   BASOPHILS RELATIVE PERCENT 0 %  0-1   NEUTROPHILS ABSOLUTE COUNT 1 59 Thousands/?L L 1 85-7 62   LYMPHOCYTES ABSOLUTE COUNT 2 28 Thousands/?L  0 60-4 47   MONOCYTES ABSOLUTE COUNT 0 44 Thousand/?L  0 17-1 22   EOSINOPHILS ABSOLUTE COUNT 0 12 Thousand/?L  0 00-0 61   BASOPHILS ABSOLUTE COUNT 0 02 Thousands/?L  0 00-0 10     (1) COMPREHENSIVE METABOLIC PANEL 21TCI8580 78:85SI Sherie Corolla   TW Order Number: [id number]  TW Order Number: [id number]     Test Name Result Flag Reference   GLUCOSE,RANDM 79 mg/dL     If the patient is fasting, the ADA then defines impaired fasting glucose as > 100 mg/dL and diabetes as > or equal to 123 mg/dL  SODIUM 138 mmol/L  136-145   POTASSIUM 4 5 mmol/L  3 5-5 3   CHLORIDE 106 mmol/L  100-108   CARBON DIOXIDE 28 mmol/L  21-32   ANION GAP (CALC) 4 mmol/L  4-13   BLOOD UREA NITROGEN 14 mg/dL  5-25   CREATININE 0 94 mg/dL  0 60-1 30   Standardized to IDMS reference method   CALCIUM 9 0 mg/dL  8 3-10 1   BILI, TOTAL 0 37 mg/dL  0 20-1 00   ALK PHOSPHATAS 113 U/L     ALT (SGPT) 29 U/L  12-78   AST(SGOT) 16 U/L  5-45   ALBUMIN 3 6 g/dL  3 5-5 0   TOTAL PROTEIN 7 7 g/dL  6 4-8 2   eGFR Non-African American      >60 0 ml/min/1 73sq [facility] Disease Education Program recommendations are as follows:  GFR calculation is accurate only with a steady state creatinine  Chronic Kidney disease less than 60 ml/min/1 73 sq  meters  Kidney failure less than 15 ml/min/1 73 sq  meters       (1) T LYMPH SUBSET (CD4) [id number] 09:23AM Sherie Corolla   TW Order Number: [id number]     Test Name Result Flag Reference   CD4 T CELL ABSOLUTE 838 /uL  359 - 1519   CD4 % HELPER T CELL 38 1 %  30 8 - 58 5   WBC (CD4/8) 4 4 x10E3/uL  3 4 - 10 8   RBC 4 91 x10E6/uL  4 14 - 5 80   HGB (CD4/8) 15 4 g/dL  12 6 - 17 7   HCT (CD4/8) 47 0 %  37 5 - 51 0   MCV (CD4/8) 96 fL  79 - 97   MCH (CD4/8) 31 4 pg  26 6 - 33 0   MCHC (CD4/8) 32 8 g/dL  31 5 - 35 7   RDW (CD4/8) 13 9 %  12 3 - 15 4   PLT (CD4/8) 257 x10E3/uL  150 - 379   NEUTS (CD4/8) 35 %     LYMPHS (CD4/8) 50 %     MONOS (CD4/8) 11 %     EOS (CD4/8) 3 %     BASOS (CD4/8) 1 %     NEUTS,ABS  (CD4/8) 1 6 x10E3/uL  1 4 - 7 0   LYMP,ABS (CD4/8) 2 2 x10E3/uL  0 7 - 3 1   MONOS,ABS (CD4/8) 0 5 x10E3/uL  0 1 - 0 9 EOS, ABS (CD4/8) 0 1 x10E3/uL  0 0 - 0 4   BASO, ABS (CD4/8) 0 0 x10E3/uL  0 0 - 0 2   IIMM  GRANS,ABS (CD4/8) 0 0 x10E3/uL  0 0 - 0 1   IMM  GRANULOCYTES (CD4/8) 0 %     Performed at:  [address] [place] [address]  [id number]  : Quirino Hess MD, Phone:  [phone]     (1) HIV-1 RNA QUANTITATIVE 10SNF0515 09:23AM Tonye Lav   TW Order Number: [id number]     Test Name Result Flag Reference   HIV-1 RNA QUANT <20 copies/mL     HIV-1 RNA not detectedThe reportable range for this assay is 20 to 10,000,000copies HIV-1 RNA/mL  HIV-1 RNA VIRAL LOAD LOG COMMENT tql58xnqx/mL     Unable to calculate result since non-numeric result obtained forcomponent test     Performed at:  [facility] [employer]  [id number]  : Quirino Hess MD, Phone:  [phone]     Attending Note  Collaborating Physician Note: Collaborating Physician: I discussed the case with the Advanced Practitioner and reviewed the note  Agree with Advanced Practitioner Note Except: discussed second "cycle" of valacyclovir  Has normal enal function but is on Stribild so potentially has increased risk of renal dysfunction  Had new red streaking, see note  Watch closely for any S/S or renal dysfunction and have low threshold for checking BMP  Avoid nephrotoxic agents while on Stribild and valacyclovir        Future Appointments    Date/Time Provider Specialty Site   01/16/2017 10:00 AM Tobias Thakur MD Internal Medicine [facility] AT [place]   01/18/2017 11:30 AM Tonye Lav, [address] [facility] AT [address] [facility]   Electronically signed by : Catracho Saleh; Sep 26 2016  9:56AM EST                       (Author)    Electronically signed by : Huang Aguilera DO; Sep 27 2016  2:42PM EST                       (Author)

## 2018-01-13 ENCOUNTER — HOSPITAL ENCOUNTER (EMERGENCY)
Facility: HOSPITAL | Age: 48
Discharge: HOME | End: 2018-01-13
Attending: EMERGENCY MEDICINE | Admitting: EMERGENCY MEDICINE
Payer: COMMERCIAL

## 2018-01-13 VITALS
HEART RATE: 96 BPM | WEIGHT: 179.9 LBS | TEMPERATURE: 98 F | SYSTOLIC BLOOD PRESSURE: 149 MMHG | OXYGEN SATURATION: 93 % | DIASTOLIC BLOOD PRESSURE: 67 MMHG | HEIGHT: 67 IN | BODY MASS INDEX: 28.24 KG/M2

## 2018-01-13 VITALS
DIASTOLIC BLOOD PRESSURE: 71 MMHG | RESPIRATION RATE: 18 BRPM | WEIGHT: 180 LBS | SYSTOLIC BLOOD PRESSURE: 118 MMHG | HEART RATE: 88 BPM | TEMPERATURE: 97.7 F | HEIGHT: 67 IN | OXYGEN SATURATION: 98 % | BODY MASS INDEX: 28.25 KG/M2

## 2018-01-13 DIAGNOSIS — M54.30 SCIATICA: Primary | ICD-10-CM

## 2018-01-13 PROCEDURE — 99283 EMERGENCY DEPT VISIT LOW MDM: CPT

## 2018-01-13 RX ORDER — PREDNISONE 20 MG/1
60 TABLET ORAL DAILY
Qty: 12 TABLET | Refills: 0 | Status: SHIPPED | OUTPATIENT
Start: 2018-01-13 | End: 2018-01-17

## 2018-01-13 RX ORDER — METHOCARBAMOL 500 MG/1
500 TABLET, FILM COATED ORAL 3 TIMES DAILY
Qty: 20 TABLET | Refills: 0 | Status: SHIPPED | OUTPATIENT
Start: 2018-01-13 | End: 2018-02-23

## 2018-01-13 RX ORDER — METHOCARBAMOL 500 MG/1
500 TABLET, FILM COATED ORAL ONCE
Status: COMPLETED | OUTPATIENT
Start: 2018-01-13 | End: 2018-01-13

## 2018-01-13 RX ORDER — PREDNISONE 20 MG/1
60 TABLET ORAL ONCE
Status: COMPLETED | OUTPATIENT
Start: 2018-01-13 | End: 2018-01-13

## 2018-01-13 RX ADMIN — PREDNISONE 60 MG: 20 TABLET ORAL at 16:30

## 2018-01-13 RX ADMIN — METHOCARBAMOL 500 MG: 500 TABLET ORAL at 16:30

## 2018-01-13 NOTE — PSYCH
Progress Note  Psychotherapy Provided [facility]:   Goals addressed in session:   Addressed goals 1-3 of initial plan    D: Met with Portillo individually  ROS; anxiety continue to increase, denies depressive symptoms  Discussion focused upon Portillo's recent relationship and obstacles to cut ties  Reason from breakup discussed  Further discussion regarding employment and struggle with neurological symptoms  Denied SI    A: Trevin Manriquez presented with appropriate affect  Speech rapid and tangential  Easily distracted by a word that would trigger another conversation  Responded to redirection; this was required multiple times  PHQ 9; decrease from previous 10  GAD7 - 21  P: Continue weekly individual therapy  Further discussion of process of removing possessions from x-partner  Pain Scale and Suicide Risk [facility]: Current Pain Assessment: no pain   Current suicide risk is low   Behavioral Health Treatment Plan [facility]: Diagnosis and Treatment Plan explained to patient, patient relates understanding diagnosis and is agreeable to Treatment Plan  Assessment    1  Moderate episode of recurrent major depressive disorder (296 32) (F33 1)   2   Generalized anxiety disorder (300 02) (F41 1)    Signatures   Electronically signed by : Natalie Johnston LCSW; May 31 2017  3:48PM EST                       (Author)

## 2018-01-13 NOTE — PSYCH
Message  Message Free Text Note Form: Spoke with Inalexsandra Hopsonridge as he did not arrive at his scheduled appointment  Currently in ER with dad  Active Problems    1  Chronic heel pain (729 5,338 29) (M79 673,G89 29)   2  Cough (786 2) (R05)   3  Diarrhea (787 91) (R19 7)   4  Dysphagia (787 20) (R13 10)   5  Encounter for screening examination for sexually transmitted disease (V74 5) (Z11 3)   6  Encounter for screening for cardiovascular disorders (V81 2) (Z13 6)   7  Gastroesophageal reflux disease with esophagitis (530 11) (K21 0)   8  Generalized anxiety disorder (300 02) (F41 1)   9  Human immunodeficiency virus (HIV) disease (042) (B20)   10  Irritable bowel syndrome (564 1) (K58 9)   11  Moderate episode of recurrent major depressive disorder (296 32) (F33 1)   12  Seasonal allergic rhinitis due to pollen (477 0) (J30 1)   13  Testicular calcification (608 89) (N50 89)   14  Tinnitus, bilateral (388 30) (H93 13)   15  Varicocele (456 4) (I86 1)   16  Wheezing (786 07) (R06 2)    Current Meds   1  Bentyl 10 MG Oral Capsule (Dicyclomine HCl); Take 1 Tablet PO BID as needed; Therapy: 21Apr2017 to Recorded   2  Genvoya [phone] MG Oral Tablet; take 1 tablet by mouth daily; Therapy: 72WPB8091 to (Evaluate:08Vhv2835)  Requested for: 21Jun2017; Last   ST:01EYC4290 Ordered   3  Loratadine 10 MG Oral Tablet; take 1 tablet by mouth daily; Therapy: 77VJF9273 to (Evaluate:45Htz4245)  Requested for: 98NWB6491; Last   Rx:47Gsg6755 Ordered   4  Mometasone Furoate 50 MCG/ACT Nasal Suspension (Nasonex); USE 1 SPRAY IN   EACH NOSTRIL TWICE DAILY; Therapy: 46AKS2532 to (Last TE:68CVZ4655)  Requested for: 97LOO0248 Ordered   5  Omeprazole 40 MG Oral Capsule Delayed Release; TAKE  1  CAPSULE Daily; Therapy: 37KKU2632 to (Evaluate:17Cyu3727)  Requested for: 09Jkp5985; Last   FP:52JRB1402 Ordered   6   Ventolin  (90 Base) MCG/ACT Inhalation Aerosol Solution; INHALE 1 TO 2 PUFFS   EVERY 4 TO 6 HOURS AS NEEDED; Therapy: 70MRX5594 to (Last KH:40ZFK5226)  Requested for: 83EDI9382 Ordered    Allergies    1  No Known Drug Allergies    2   TOMATO    Signatures   Electronically signed by : Camilla Matson LCSW; Jul 13 2017 10:21AM EST                       (Author)

## 2018-01-13 NOTE — PSYCH
Treatment Plan Tracking    #1 Treatment Plan not completed within required time limits due to: Client cancelled/ no-showed scheduled appointment            Signatures   Electronically signed by : Sekou Cole LCSW; May 15 2017 11:41AM EST                       (Author)

## 2018-01-13 NOTE — PROGRESS NOTES
Assessment    1  Gastroesophageal reflux disease with esophagitis (530 11) (K21 0)   2  Dysphagia (787 20) (R13 10)   3  Seasonal allergic rhinitis due to pollen (477 0) (J30 1)   4  Cough (786 2) (R05)   5  Wheezing (786 07) (R06 2)   6  Irritable bowel syndrome (564 1) (K58 9)    Plan    1  Bentyl 10 MG Oral Capsule (Dicyclomine HCl); Take 1 Tablet PO BID as needed   2  Omeprazole 40 MG Oral Capsule Delayed Release; take 1 capsule by mouth daily    3  Genvoya [phone] MG Oral Tablet; TAKE 1 TABLET BY MOUTH DAILY (STOP   STRIBILD)    4  Loratadine 10 MG Oral Tablet; take 1 tablet by mouth daily   5  Mometasone Furoate 50 MCG/ACT Nasal Suspension (Nasonex); USE 1 SPRAY IN EACH   NOSTRIL TWICE DAILY   6  Ventolin  (90 Base) MCG/ACT Inhalation Aerosol Solution; INHALE 1 TO 2 PUFFS   EVERY 4 TO 6 HOURS AS NEEDED    Discussion/Summary    Intervention Diet Prescription   Energy 1775 kcal   25kcal /71kg   Protein 75g   1 1g /71kg   Fluid 1775ml   25ml /71kg  Estimated Intake: 1900kcal 75g Protein   His current intake is meeting estimated nutrition needs  Goal #1 - Improve/Maintain  comprehend education  Goal initiated  Time Frame For Accomplishment: By next follow-up  Intervention Nutrition Education Provided  Person Educated: patient   Topics Discussed: cut back on soda intake   Barriers To Learning: none  Readiness to Learn: Denied Need  Teaching Method: verbal   Evaluation Of Learning: verbalized/demonstrated understanding       History of Present Illness  Assessment: Clinical Data/Client History HIV - Yes  His socio-economic status includes  cooks, eats out and & his mother cooks  He lives in [facility] house  Living Environment - He has access to refrigerator, stove and microwave  Psycho-Social factors are  Anxiety  His functional status is  ambulatory, able to food shop, prepares own meals and pt  states his mother does most of the cooiking     His activity level is  normal    He eats breakfast at  Cereal, cup of coffee  He eats lunch at  S/w, chips, soda  He eats dinner at  Full hot meal (Mom cooks), soda  He snacks Pt  states "I am not giving up my soda "  His appetite is  good   He does not take supplements  He complains of  h/o IBS  He has problems with  Reviewed Oral Health ?s w/pt  Pt  states he went to t private dentist w/in the past 12 months, he needs to schedule another dental appt  His CM is Santos Pierre  Nutrition Diagnosis   Problem related to comprehend education  As evidenced by  excess soda intake  Signs/Symptoms:  Patient Interview  Active Problems    1  Chronic heel pain (729 5,338 29) (M79 673,G89 29)   2  Cough (786 2) (R05)   3  Diarrhea (787 91) (R19 7)   4  Dysphagia (787 20) (R13 10)   5  Encounter for screening examination for sexually transmitted disease (V74 5) (Z11 3)   6  Encounter for screening for cardiovascular disorders (V81 2) (Z13 6)   7  Gastroesophageal reflux disease with esophagitis (530 11) (K21 0)   8  Generalized anxiety disorder (300 02) (F41 1)   9  Human immunodeficiency virus (HIV) disease (042) (B20)   10  Irritable bowel syndrome (564 1) (K58 9)   11  Seasonal allergic rhinitis due to pollen (477 0) (J30 1)   12  Testicular calcification (608 89) (N50 89)   13  Tinnitus, bilateral (388 30) (H93 13)   14  Varicocele (456 4) (I86 1)   15  Wheezing (786 07) (R06 2)    Past Medical History    1  History of Anxiety (300 00) (F41 9)   2  History of Dysuria (788 1) (R30 0)   3  History of Elbow stiffness, left (719 52) (M25 622)   4  History of Fecal urgency (787 63) (R15 2)   5  History of Hemorrhage of anus and rectum (569 3) (K62 5)   6  History of abdominal pain (V13 89) (Z87 898)   7  History of backache (V13 59) (Z87 39)   8  History of backache (V13 59) (Z87 39)   9  History of constipation (V12 79) (Z87 19)   10  History of herpes zoster (V12 09) (Z86 19)   11  History of low back pain (V13 59) (Z87 39)   12   History of Human immunodeficiency virus (HIV) disease (042) (B20)   13  History of Laceration of finger (883 0) (S61 219A)   14  History of Proctitis, chlamydial (099 52) (A56 3)   15  History of Right elbow tendinitis (727 09) (M77 8)   16  History of SOB (shortness of breath) on exertion (786 05) (R06 02)    Surgical History    1  History of Surgery Spermatic Cord Excision Of Varicocele    Family History  Mother    1  Family history of Type 2 Diabetes Mellitus  Father    2  Family history of Acute Myocardial Infarction (V17 3)    Social History    · Cigarette smoker (305 1) (F17 210)   · Sexually Active Monogamous Relationship    Current Meds   1  Bentyl 10 MG Oral Capsule (Dicyclomine HCl); Take 1 Tablet PO BID as needed; Therapy: 21Apr2017 to Recorded   2  Genvoya [phone] MG Oral Tablet; TAKE 1 TABLET BY MOUTH DAILY (STOP STRIBILD); Therapy: 40VAI0729 to (Evaluate:19Jun2017)  Requested for: 21Mar2017; Last Rx:21Mar2017   Ordered   3  Omeprazole 40 MG Oral Capsule Delayed Release; take 1 capsule by mouth daily; Therapy: 27JSJ6107 to (Ousmane Brian)  Requested for: 82QTD1615; Last Rx:06Apr2017   Ordered    Allergies    1  No Known Drug Allergies    2   TOMATO    Vitals  Signs   Recorded: 42ECT8583 10:57AM   Temperature: 97 9 F  Heart Rate: 87  Systolic: 443  Diastolic: 79  Height: 5 ft 7 in  Weight: 184 lb 11 90 oz  BMI Calculated: 28 94  BSA Calculated: 1 96  O2 Saturation: 97  Vitals   Recorded: 24DTI5494 65:56ZH   Systolic: 865  Diastolic: 79  Temperature: 97 9 F  Heart Rate: 87  Height: 5 ft 7 in  Weight: 184 lb 11 90 oz  BMI Calculated: 28 94  BSA Calculated: 1 96  O2 Saturation: 97  Recorded: 36FFK4702 53:66IG   Systolic: 550  Diastolic: 67  Temperature: 98 F  Heart Rate: 96  Height: 5 ft 7 in  Weight: 179 lb 14 30 oz  BMI Calculated: 28 18  BSA Calculated: 1 93  O2 Saturation: 93  Recorded: 64KAC9721 25:52WO   Systolic: 044  Diastolic: 64  Temperature: 97 6 F  Heart Rate: 81  Respiration: 18  Height: 5 ft 7 in  Weight: 182 lb 1 58 oz  BMI Calculated: 28 52  BSA Calculated: 1 94  Recorded: 05MDF6755 33:07XX   Systolic: 655  Diastolic: 74  Temperature: 97 7 F  Heart Rate: 69  Respiration: 18  Height: 5 ft 7 in  Weight: 183 lb   BMI Calculated: 28 66  BSA Calculated: 1 95  O2 Saturation: 96  Recorded: 54XXN3160 79:72IL   Systolic: 032  Diastolic: 74  Temperature: 97 3 F  Heart Rate: 75  Respiration: 18  Height: 5 ft 7 in  Weight: 184 lb 11 90 oz  BMI Calculated: 28 94  BSA Calculated: 1 96  O2 Saturation: 98  Recorded: 23Leg5077 10:10AM   Temperature: 98 6 F  Recorded: 97HOJ8320 10:22QT   Systolic: 791  Diastolic: 90  Heart Rate: 104  Weight: 180 lb 5 36 oz  BMI Calculated: 28 24  BSA Calculated: 1 94  O2 Saturation: 98  Recorded: 59CRP5250 94:68JO   Systolic: 822  Diastolic: 90  Heart Rate: 104  Weight: 180 lb 5 36 oz  BMI Calculated: 28 24  BSA Calculated: 1 94  Pain Scale: 0  O2 Saturation: 98  Recorded: 01ZEJ6206 59:98QH   Systolic: 871  Diastolic: 88  Temperature: 97 7 F  Heart Rate: 66  Height: 5 ft 7 in  Weight: 178 lb 9 14 oz  BMI Calculated: 27 97  BSA Calculated: 1 93  Recorded: 04VMJ3105 36:31JF   Systolic: 823  Diastolic: 69  Temperature: 98 2 F  Heart Rate: 82  Respiration: 18  Height: 5 ft 7 in  Weight: 180 lb 5 36 oz  BMI Calculated: 28 24  BSA Calculated: 1 94  Pain Scale: 0  O2 Saturation: 96  Recorded: 96PXE9983 50:83ZH   Systolic: 043  Diastolic: 60  Temperature: 98 1 F  Heart Rate: 88  Height: 5 ft 7 in  Weight: 182 lb 15 70 oz  BMI Calculated: 28 66  BSA Calculated: 1 95  Recorded: 23SDK2693 72:02LJ   Systolic: 095  Diastolic: 65  Temperature: 98 F  Heart Rate: 90  Respiration: 18  Height: 5 ft 7 in  Weight: 183 lb 13 79 oz  BMI Calculated: 28 8  BSA Calculated: 1 95  Pain Scale: 7  O2 Saturation: 98  Recorded: 82RCK3830 57:20SC   Systolic: 465  Diastolic: 78  Temperature: 97 8 F  Heart Rate: 97  Respiration: 18  Height: 5 ft 7 in  Weight: 214 lb 11 63 oz  BMI Calculated: 33 63  BSA Calculated: 2 08  Pain Scale: 6  O2 Saturation: 98  Recorded: 67CRP4336 46:14GU   Systolic: 898  Diastolic: 76  Temperature: 97 7 F  Heart Rate: 100  Height: 5 ft 7 in  Weight: 176 lb 5 87 oz  BMI Calculated: 27 62  BSA Calculated: 1 92  Recorded: 42QRA1379 61:99EG   Systolic: 711  Diastolic: 78  Temperature: 97 9 F  Heart Rate: 89  Respiration: 18  Height: 5 ft 7 in  Weight: 189 lb 9 50 oz  BMI Calculated: 29 69  BSA Calculated: 1 98  Pain Scale: 5  Recorded: 36RUB5258 65:92AH   Systolic: 044  Diastolic: 83  Temperature: 97 8 F  Heart Rate: 102  Respiration: 20  Height: 5 ft 7 in  Weight: 189 lb   BMI Calculated: 29 6  BSA Calculated: 1 97  Pain Scale: 7  O2 Saturation: 99  Recorded: 46OGG8826 91:67IS   Systolic: 857  Diastolic: 81  Temperature: 97 9 F  Heart Rate: 88  Height: 5 ft 7 in  Weight: 192 lb 3 84 oz  BMI Calculated: 30 11  BSA Calculated: 1 99  Pain Scale: left index fi  Recorded: 69MMY6485 84:16CP   Systolic: 96  Diastolic: 66  Temperature: 97 4 F  Heart Rate: 78  Height: 5 ft 7 in  Weight: 165 lb 1 97 oz  BMI Calculated: 25 86  BSA Calculated: 1 86  Pain Scale: 0  Recorded: 41VRZ9033 14:11XA   Systolic: 522  Diastolic: 78  Temperature: 98 F  Heart Rate: 96  Height: 5 ft 7 in  Weight: 160 lb 7 90 oz  BMI Calculated: 25 13  BSA Calculated: 1 84    Future Appointments    Date/Time Provider Specialty Site   07/31/2017 10:00 AM Ravin Owusu MD Infectious Disease [facility] AT [facility]   05/12/2017 11:00 AM Alia Sarah LCSW Psychiatry [facility]   08/07/2017 08:30 AM Naren Raman [facility]   05/24/2017 08:00 AM ZANE Castro   Gastroenterology Adult [facility]   05/25/2017 11:40 AM Specialty Clinic, ENT  [facility]     Signatures   Electronically signed by : SUMMER Isaacs GVU,RNT; May  5 2017 12:58PM EST                       (Author)

## 2018-01-13 NOTE — MISCELLANEOUS
Message  Patient called clinic stating concern related to medication (Bentyl) dosage  Stated was previously taking 10mg rather than prescribed dose of 20mg  Office contacted, as a means to clarify dosage  Per Dr Justyn De León medication dosage is 10mg PO BID PRN  Pharmacy [facility]) contacted with verbal order  Active Problems    1  Chronic heel pain (729 5,338 29) (M79 673,G89 29)   2  Diarrhea (787 91) (R19 7)   3  Dysphagia (787 20) (R13 10)   4  Encounter for screening examination for sexually transmitted disease (V74 5) (Z11 3)   5  Encounter for screening for cardiovascular disorders (V81 2) (Z13 6)   6  Gastroesophageal reflux disease with esophagitis (530 11) (K21 0)   7  Generalized anxiety disorder (300 02) (F41 1)   8  Human immunodeficiency virus (HIV) disease (042) (B20)   9  Irritable bowel syndrome (564 1) (K58 9)   10  Testicular calcification (608 89) (N50 89)   11  Tinnitus, bilateral (388 30) (H93 13)   12  Varicocele (456 4) (I86 1)    Current Meds   1  Bentyl 10 MG Oral Capsule; Take 1 Tablet PO BID as needed; Therapy: 21Apr2017 to Recorded   2  Genvoya [phone] MG Oral Tablet; TAKE 1 TABLET BY MOUTH DAILY (STOP   STRIBILD); Therapy: 56NFL8715 to (Evaluate:19Jun2017)  Requested for: 21Mar2017; Last   Rx:21Mar2017 Ordered   3  Omeprazole 40 MG Oral Capsule Delayed Release; take 1 capsule by mouth daily; Therapy: 14OSP4196 to (Ashlie Galvin)  Requested for: 25HFK0000; Last   Rx:06Apr2017 Ordered    Allergies    1  No Known Drug Allergies    2   TOMATO    Signatures   Electronically signed by : Ana Chung RN; Apr 21 2017  4:31PM EST                       (Author)

## 2018-01-13 NOTE — PROGRESS NOTES
Assessment    1  Human immunodeficiency virus (HIV) disease (042) (B20)   2  Gastroesophageal reflux disease with esophagitis (530 11) (K21 0)   3  Dysphagia (787 20) (R13 10)   4  Seasonal allergic rhinitis due to pollen (477 0) (J30 1)   5  Cough (786 2) (R05)   6  Wheezing (786 07) (R06 2)    Plan    1  Bentyl 10 MG Oral Capsule (Dicyclomine HCl); Take 1 Tablet PO BID as   needed   2  Omeprazole 40 MG Oral Capsule Delayed Release; take 1 capsule by mouth   daily    3  Genvoya [phone] MG Oral Tablet; TAKE 1 TABLET BY MOUTH DAILY   (STOP STRIBILD)    4  Loratadine 10 MG Oral Tablet; take 1 tablet by mouth daily   5  Mometasone Furoate 50 MCG/ACT Nasal Suspension (Nasonex); USE 1 SPRAY   IN EACH NOSTRIL TWICE DAILY   6  Ventolin  (90 Base) MCG/ACT Inhalation Aerosol Solution; INHALE 1 TO 2   PUFFS EVERY 4 TO 6 HOURS AS NEEDED    Discussion/Summary  Discussion Summary:   1  HIV - Last lab work from 12/2016 showed stable CD4 and undetectable viral load  The patient will continue Genvoya, 1 tab PO daily  Stressed continued adherence  The patient is due for follow up lab work in early July 2017 so that the results are received in time for his 7/31/17 specialty care clinic visit  The patient will continue routine f/u with Dr Cody Kwon  2  Seasonal Allergies - CRNP recommended that patient start Loratadine, 1 tab PO daily as well as use a Ventolin HFA MDI as needed  Also recommended that patient start Nasonex nasal spray, 1 spray in each nostril BID  Warned patient that he cannot use Flonase due to [place] drug interaction  Warned patient that Nasonex is now available OTC and he may have a fee for it when picking it up at the pharmacy  Discussed how spray and pill together will work better to combat his symptoms  Reviewed allergy health including keeping windows closed at home, immediately laundering clothing after garden/outdoor work, and staying well hydrated      3  Possible indigestion symptoms - Patient could also be having acid involvement in his throat pain as he is also describing symptoms related to feeling food stuck in his throat  He recently had an EGD and was diagnosed with a hiatal hernia  Discussed this diagnosis with the patient  He is scheduled for a GI appointment later this month  Encouraged him to write down all of his symptoms and his questions so that he can have a productive conversation with the GI MD  He will continue Omeprazole 40mg daily for now  Counseling Documentation With Imm: The patient was counseled regarding instructions for management, risk factor reductions, prognosis, risks and benefits of treatment options  total time of encounter was 40 minutes and 20 minutes was spent counseling  Medication SE Review and Pt Understands Tx: Possible side effects of new medications were reviewed with the patient/guardian today  The treatment plan was reviewed with the patient/guardian  The patient/guardian understands and agrees with the treatment plan      Chief Complaint  Chief Complaint Free Text Note Form: Patient c/o of feeling his throat closing up on him at times  Patient states this has been happening for the past month or two      History of Present Illness  Hospital Based Practices Required Assessment:   Pain Assessment   the patient states they do not have pain  Abuse And Domestic Violence Screen    Yes, the patient is safe at home  The patient states no one is hurting them  Depression And Suicide Screen  No, the patient has not had thoughts of hurting themself  No, the patient has not felt depressed in the past 7 days  Review of Systems  Focused-Male:   Constitutional: no fever or chills, feels well, no tiredness, no recent weight loss or weight gain  ENT: sore throat, but no earache, no hearing loss and no hoarseness    The patient presents with complaints of gradual onset of intermittent episodes of mild bilateral nasal discharge (thin, clear, runny)  Cardiovascular: no complaints of slow or fast heart rate, no chest pain, no palpitations, no leg claudication or lower extremity edema  Respiratory: no shortness of breath    The patient presents with complaints of sudden onset of severe cough, described as productive (feels like sandpaper is tickeling back of throat, irritation, starts coughing and cannot stop, goes into "fits"  phelgm is thin and white)  ROS Reviewed:   ROS reviewed  Active Problems    1  Chronic heel pain (729 5,338 29) (M79 673,G89 29)   2  Diarrhea (787 91) (R19 7)   3  Dysphagia (787 20) (R13 10)   4  Encounter for screening examination for sexually transmitted disease (V74 5) (Z11 3)   5  Encounter for screening for cardiovascular disorders (V81 2) (Z13 6)   6  Gastroesophageal reflux disease with esophagitis (530 11) (K21 0)   7  Generalized anxiety disorder (300 02) (F41 1)   8  Human immunodeficiency virus (HIV) disease (042) (B20)   9  Irritable bowel syndrome (564 1) (K58 9)   10  Testicular calcification (608 89) (N50 89)   11  Tinnitus, bilateral (388 30) (H93 13)   12  Varicocele (456 4) (I86 1)    Past Medical History    1  History of Anxiety (300 00) (F41 9)   2  History of Dysuria (788 1) (R30 0)   3  History of Elbow stiffness, left (719 52) (M25 622)   4  History of Fecal urgency (787 63) (R15 2)   5  History of Hemorrhage of anus and rectum (569 3) (K62 5)   6  History of abdominal pain (V13 89) (Z87 898)   7  History of backache (V13 59) (Z87 39)   8  History of backache (V13 59) (Z87 39)   9  History of constipation (V12 79) (Z87 19)   10  History of herpes zoster (V12 09) (Z86 19)   11  History of low back pain (V13 59) (Z87 39)   12  History of Human immunodeficiency virus (HIV) disease (042) (B20)   13  History of Laceration of finger (883 0) (S61 219A)   14  History of Proctitis, chlamydial (099 52) (A56 3)   15  History of Right elbow tendinitis (727 09) (M77 8)   16   History of SOB (shortness of breath) on exertion (786 05) (R06 02)  Active Problems And Past Medical History Reviewed: The active problems and past medical history were reviewed and updated today  Family History  Mother    1  Family history of Type 2 Diabetes Mellitus  Father    2  Family history of Acute Myocardial Infarction (V17 3)  Family History Reviewed: The family history was reviewed and updated today  Social History    · Cigarette smoker (305 1) (F17 210)   · Sexually Active Monogamous Relationship  Social History Reviewed: The social history was reviewed and updated today  The social history was reviewed and is unchanged  Surgical History    1  History of Surgery Spermatic Cord Excision Of Varicocele  Surgical History Reviewed: The surgical history was reviewed and updated today  Current Meds   1  Bentyl 10 MG Oral Capsule; Take 1 Tablet PO BID as needed; Therapy: 21Apr2017 to Recorded   2  Genvoya [phone] MG Oral Tablet; TAKE 1 TABLET BY MOUTH DAILY (STOP   STRIBILD); Therapy: 11REM8940 to (Evaluate:19Jun2017)  Requested for: 21Mar2017; Last   Rx:21Mar2017 Ordered   3  Omeprazole 40 MG Oral Capsule Delayed Release; take 1 capsule by mouth daily; Therapy: [id number] to (Fernando Rollins)  Requested for: 06Apr2017; Last   Rx:06Apr2017 Ordered  Medication List Reviewed: The medication list was reviewed and updated today  Allergies    1  No Known Drug Allergies    2  TOMATO    Vitals  Signs   Recorded: 64QVY3203 10:57AM   Temperature: 97 9 F  Heart Rate: 87  Systolic: 440  Diastolic: 79  Height: 5 ft 7 in  Weight: 184 lb 11 90 oz  BMI Calculated: 28 94  BSA Calculated: 1 96  O2 Saturation: 97    Physical Exam    Constitutional   General appearance: No acute distress, well appearing and well nourished  Eyes   Conjunctiva and lids: No swelling, erythema or discharge  Pupils and irises: Equal, round and reactive to light      Ears, Nose, Mouth, and Throat   External inspection of ears and nose: Normal     Otoscopic examination: Tympanic membranes translucent with normal light reflex  Canals patent without erythema  Nasal mucosa, septum, and turbinates: Normal without edema or erythema  Oropharynx: Normal with no erythema, edema, exudate or lesions  Pulmonary   Respiratory effort: No increased work of breathing or signs of respiratory distress  Auscultation of lungs: Abnormal   Auscultation of the lungs revealed expiratory wheezing  rhonchi over both apices  Cardiovascular   Auscultation of heart: Normal rate and rhythm, normal S1 and S2, without murmurs  Examination of extremities for edema and/or varicosities: Normal     Lymphatic   Palpation of lymph nodes in neck: No lymphadenopathy  Psychiatric   Orientation to person, place, and time: Normal     Mood and affect: Normal        Results/Data  EGD [id number] 11:47AM Chrystie Samples     Test Name Result Flag Reference   EGD 3/9/17       (1) CBC/PLT/DIFF 44BNO2717 09:31AM [employer] Order Number: [id number]   Order Number: [id number]   Order Number: [id number]     Test Name Result Flag Reference   WBC COUNT 5 08 Thousand/uL  4 31-10 16   RBC COUNT 4 47 Million/uL  3 88-5 62   HEMOGLOBIN 14 3 g/dL  12 0-17 0   HEMATOCRIT 41 5 %  36 5-49 3   MCV 93 fL  82-98   MCH 32 0 pg  26 8-34 3   MCHC 34 5 g/dL  31 4-37 4   RDW 12 6 %  11 6-15 1   MPV 9 0 fL  8 9-12 7   PLATELET COUNT 040 Thousands/uL  149-390   nRBC AUTOMATED 0 /100 WBCs     NEUTROPHILS RELATIVE PERCENT 36 % L 43-75   LYMPHOCYTES RELATIVE PERCENT 50 % H 14-44   MONOCYTES RELATIVE PERCENT 11 %  4-12   EOSINOPHILS RELATIVE PERCENT 3 %  0-6   BASOPHILS RELATIVE PERCENT 0 %  0-1   NEUTROPHILS ABSOLUTE COUNT 1 80 Thousands/?L L 1 85-7 62   LYMPHOCYTES ABSOLUTE COUNT 2 51 Thousands/?L  0 60-4 47   MONOCYTES ABSOLUTE COUNT 0 58 Thousand/?L  0 17-1 22   EOSINOPHILS ABSOLUTE COUNT 0 16 Thousand/?L  0 00-0 61   BASOPHILS ABSOLUTE COUNT 0 01 Thousands/? L 0  00-0 10   - Patient Instructions: This bloodwork is non-fasting  Please drink two glasses of water morning of bloodwork  - Patient Instructions: This bloodwork is non-fasting  Please drink two glasses of water morning of bloodwork  - Patient Instructions: This bloodwork is non-fasting  Please drink two glasses of water morning of bloodwork  - Patient Instructions: This bloodwork is non-fasting  Please drink two glasses of water morning of bloodwork  (1) COMPREHENSIVE METABOLIC PANEL 74MDE7058 41:98VM Livia Posadas Order Number: [id number]   Order Number: [id number]  TW Order Number: [id number]     Test Name Result Flag Reference   GLUCOSE,RANDM 90 mg/dL     If the patient is fasting, the ADA then defines impaired fasting glucose as > 100 mg/dL and diabetes as > or equal to 123 mg/dL  SODIUM 141 mmol/L  136-145   POTASSIUM 3 8 mmol/L  3 5-5 3   CHLORIDE 107 mmol/L  100-108   CARBON DIOXIDE 26 mmol/L  21-32   ANION GAP (CALC) 8 mmol/L  4-13   BLOOD UREA NITROGEN 15 mg/dL  5-25   CREATININE 0 90 mg/dL  0 60-1 30   Standardized to IDMS reference method   CALCIUM 8 4 mg/dL  8 3-10 1   BILI, TOTAL 0 51 mg/dL  0 20-1 00   ALK PHOSPHATAS 93 U/L     ALT (SGPT) 30 U/L  12-78   AST(SGOT) 23 U/L  5-45   ALBUMIN 3 6 g/dL  3 5-5 0   TOTAL PROTEIN 7 1 g/dL  6 4-8 2   eGFR Non-African American      >60 0 ml/min/1 73sq m   - Patient Instructions: This is a fasting blood test  Water,black tea or black  coffee only after 9:00pm the night before test Drink 2 glasses of water the morning of test - Patient Instructions:  This is a fasting blood test  Water,black tea or black    coffee only after 9:00pm the night before test Drink 2 glasses of water the morning of test   National Kidney Disease Education Program recommendations are as follows:  GFR calculation is accurate only with a steady state creatinine  Chronic Kidney disease less than 60 ml/min/1 73 sq  meters  Kidney failure less than 15 ml/min/1 73 sq  meters  (1) HIV-1 RNA QUANTITATIVE 45AUU4603 09:31AM [facility] [place] [place] [employer] Order Number: [id number]   Order Number: [id number]   Order Number: [id number]     Test Name Result Flag Reference   HIV-1 RNA QUANT <20 copies/mL     HIV-1 RNA not detected  The reportable range for this assay is 20 to 10,000,000  copies HIV-1 RNA/mL  HIV-1 RNA VIRAL LOAD LOG COMMENT dkd29clam/mL     Unable to calculate result since non-numeric result obtained for  component test     Performed at:  [address] [place] [address]  [id number]  : Luisa Henry MD, Phone:  [phone]     (1) T LYMPH SUBSET (CD4) 48RBL3852 09:31AM [facility] [place] [place] [employer] Order Number: [id number]   Order Number: [id number]  TW Order Number: [id number]     Test Name Result Flag Reference   CD4 T CELL ABSOLUTE 915 /uL  359 - 1519   CD4 % HELPER T CELL 39 8 %  30 8 - 58 5   WBC (CD4/8) 4 7 x10E3/uL  3 4 - 10 8   RBC 4 48 x10E6/uL  4 14 - 5 80   HGB (CD4/8) 14 4 g/dL  12 6 - 17 7   HCT (CD4/8) 42 7 %  37 5 - 51 0   MCV (CD4/8) 95 fL  79 - 97   MCH (CD4/8) 32 1 pg  26 6 - 33 0   MCHC (CD4/8) 33 7 g/dL  31 5 - 35 7   RDW (CD4/8) 13 5 %  12 3 - 15 4   PLT (CD4/8) 255 x10E3/uL  150 - 379   NEUTS (CD4/8) 39 %     LYMPHS (CD4/8) 48 %     MONOS (CD4/8) 9 %     EOS (CD4/8) 4 %     BASOS (CD4/8) 0 %     NEUTS,ABS  (CD4/8) 1 8 x10E3/uL  1 4 - 7 0   LYMP,ABS (CD4/8) 2 3 x10E3/uL  0 7 - 3 1   MONOS,ABS (CD4/8) 0 4 x10E3/uL  0 1 - 0 9   EOS, ABS (CD4/8) 0 2 x10E3/uL  0 0 - 0 4   BASO, ABS (CD4/8) 0 0 x10E3/uL  0 0 - 0 2   IIMM  GRANS,ABS (CD4/8) 0 0 x10E3/uL  0 0 - 0 1   IMM  GRANULOCYTES (CD4/8) 0 %     Performed at:  [address]  [id number]  : Luisa Henry MD, Phone:  [phone]     Attending Note  Collaborating Physician Note: Agree with Advanced Practitioner Note Except: Not described as anaphylaxis or laryngeal edema  Flonase to be avoided, keep other steroid at minimum as clinically able  Future Appointments    Date/Time Provider Specialty Site   07/31/2017 10:00 AM Dariela Potts MD Infectious Disease [facility] AT [facility]   05/12/2017 11:00 AM Gaurang Morales [facility] Psychiatry [facility]   08/07/2017 08:30 AM Lauren Angeles[address] Medicine [facility]   05/24/2017 08:00 AM ZANE Sandoval   Gastroenterology Adult [facility]   05/25/2017 11:40 AM Specialty Clinic, ENT  [address]     Signatures   Electronically signed by : Haseeb Sesay; May  4 2017 11:47AM EST                       (Author)    Electronically signed by : Gomez Sin DO; May  4 2017  2:36PM EST                       (Author)

## 2018-01-13 NOTE — ED PROVIDER NOTES
History  Chief Complaint   Patient presents with    Back Pain     Pt presents to ED due to c/o "sciatica back (right side)" x2 days  Neg / incontinence, full sensation  Patient restless, manic with speech, rocking body, pleasant  History provided by:  Patient  Back Pain   Location:  Lumbar spine (tenderness on the right SI joint, pain with walking and ambulation)  Quality:  Aching  Radiates to:  Does not radiate  Pain severity:  Moderate  Timing:  Constant  Progression:  Worsening  Chronicity:  New  Relieved by:  Nothing  Worsened by:  Nothing  Ineffective treatments:  None tried  Associated symptoms: no abdominal pain, no abdominal swelling, no bladder incontinence, no bowel incontinence, no chest pain, no dysuria, no fever, no headaches, no numbness, no paresthesias, no perianal numbness, no tingling and no weakness        Prior to Admission Medications   Prescriptions Last Dose Informant Patient Reported? Taking?   dicyclomine (BENTYL) 20 mg tablet   Yes No   Sig: Take 20 mg by mouth every 6 (six) hours   elvitegravir-cobicistat-emtricitabine-tenofovir (STRIBILD) [phone] mg   Yes No   Sig: Take 1 tablet by mouth daily with breakfast    ibuprofen (MOTRIN) 800 mg tablet   No No   Sig: Take 1 tablet by mouth every 6 (six) hours as needed for moderate pain for up to 30 days   omeprazole (PriLOSEC) 20 mg delayed release capsule   Yes No   Sig: Take 20 mg by mouth daily  Facility-Administered Medications: None       Past Medical History:   Diagnosis Date    GERD (gastroesophageal reflux disease)     HIV (human immunodeficiency virus infection) ([facility] [address] )     IBS (irritable bowel syndrome)        Past Surgical History:   Procedure Laterality Date    CYSTOSCOPY  2014    [place] ESOPHAGOGASTRODUODENOSCOPY TRANSORAL DIAGNOSTIC N/A 3/9/2017    Procedure: ESOPHAGOGASTRODUODENOSCOPY (EGD); Surgeon: Sanchez Joel MD;  Location: [facility]; Service: Gastroenterology       History reviewed   No pertinent family history  I have reviewed and agree with the history as documented  Social History   Substance Use Topics    Smoking status: Current Every Day Smoker     Packs/day: 1 00    Smokeless tobacco: Never Used    Alcohol use Yes        Review of Systems   Constitutional: Negative for chills and fever  HENT: Negative for rhinorrhea, sore throat and trouble swallowing  Eyes: Negative for pain  Respiratory: Negative for cough, shortness of breath, wheezing and stridor  Cardiovascular: Negative for chest pain and leg swelling  Gastrointestinal: Negative for abdominal pain, bowel incontinence, diarrhea and nausea  Endocrine: Negative for polyuria  Genitourinary: Negative for bladder incontinence, dysuria, flank pain and urgency  Musculoskeletal: Positive for back pain  Negative for joint swelling, myalgias and neck stiffness  Skin: Negative for rash  Allergic/Immunologic: Negative for immunocompromised state  Neurological: Negative for dizziness, tingling, syncope, weakness, numbness, headaches and paresthesias  Psychiatric/Behavioral: Negative for confusion and suicidal ideas  All other systems reviewed and are negative  Physical Exam  ED Triage Vitals [01/13/18 1605]   Temperature Pulse Respirations Blood Pressure SpO2   97 7 °F (36 5 °C) 88 18 118/71 98 %      Temp Source Heart Rate Source Patient Position - Orthostatic VS BP Location FiO2 (%)   Oral Monitor Sitting Left arm --      Pain Score       Worst Possible Pain           Orthostatic Vital Signs  Vitals:    01/13/18 1605   BP: 118/71   Pulse: 88   Patient Position - Orthostatic VS: Sitting       Physical Exam   Constitutional: He is oriented to person, place, and time  He appears well-developed and well-nourished  HENT:   Head: Normocephalic and atraumatic  Eyes: EOM are normal  Pupils are equal, round, and reactive to light  Neck: Normal range of motion  Neck supple  Cardiovascular: Normal rate and regular rhythm  Exam reveals no friction rub  No murmur heard  Pulmonary/Chest: Breath sounds normal  No respiratory distress  He has no wheezes  He has no rales  Abdominal: Soft  Bowel sounds are normal  He exhibits no distension  There is no tenderness  Musculoskeletal: Normal range of motion  He exhibits tenderness  He exhibits no edema  Tenderness overlying the SI joint pain with range of motion of the right hip  Able to ambulate in the emergency department no difficulty  Pain worse with movement  Neurovascularly intact  Neurological: He is alert and oriented to person, place, and time  Skin: Skin is warm  No rash noted  Psychiatric: He has a normal mood and affect  Nursing note and vitals reviewed  ED Medications  Medications   predniSONE tablet 60 mg (60 mg Oral Given 1/13/18 1630)   methocarbamol (ROBAXIN) tablet 500 mg (500 mg Oral Given 1/13/18 1630)       Diagnostic Studies  Results Reviewed     None                 No orders to display              Procedures  Procedures       Phone Contacts  ED Phone Contact    ED Course  ED Course                                MDM  Number of Diagnoses or Management Options  Sciatica: new and requires workup  Diagnosis management comments: No spinous process tenderness, hypertonicity paraspinal musculature consistent with acute muscle spasm , +2 patella and Achilles reflex bilaterally  Normal sensation normal muscle strength bilateral lower extremities    Denies history of severe or worsening lower extremity weakness/numbness  Denies history of saddle anesthesia/perineal anesthesia  Denies bowel or bladder incontinence/retention  History does not suggest diagnosis of cauda equina syndrome  Patient denies history of IVDA, denies history of fevers, no recent surgeries or any procedures to suggest a transient bacteremia leading to a diagnosis of subdural abscess   Denies history of blood thinner use with recent history of lumbar puncture or any violation of the epidural space to suggest history of epidural hematoma  Therefore these above diagnoses (cauda equina syndrome, epidural abscess, epidural hematoma) were not pursued with diagnostic imaging  Pt re-examined and evaluated after testing and treatment  Spoke with the patient and feeling improved and sxs have resolved  Will discharge home with close f/u with pcp and instructed to return to the ED if sxs worsen or continue  Pt agrees with the plan for discharge and feels comfortable to go home with proper f/u  Advised to return for worsening or additional problems  Diagnostic tests were reviewed and questions answered  Diagnosis, care plan and treatment options were discussed  The patient understand instructions and will follow up as directed  CritCare Time    Disposition  Final diagnoses:   Sciatica     Time reflects when diagnosis was documented in both MDM as applicable and the Disposition within this note     Time User Action Codes Description Comment    1/13/2018  4:25 PM Rufino Santamaria Add [M54 30] Sciatica       ED Disposition     ED Disposition Condition Comment    Discharge  Luna Aniyah discharge to home/self care      Condition at discharge: Stable        Follow-up Information     Follow up With Specialties Details Why [address] [facility], [place] Multidisciplinary Call in 2 days If symptoms worsen [address]  [phone]          Discharge Medication List as of 1/13/2018  4:26 PM      START taking these medications    Details   methocarbamol (ROBAXIN) 500 mg tablet Take 1 tablet by mouth 3 (three) times a day, Starting Sat 1/13/2018, Print      predniSONE 20 mg tablet Take 3 tablets by mouth daily for 4 days, Starting Sat 1/13/2018, Until Wed 1/17/2018, Print         CONTINUE these medications which have NOT CHANGED    Details   dicyclomine (BENTYL) 20 mg tablet Take 20 mg by mouth every 6 (six) hours, Until Discontinued, Historical Med elvitegravir-cobicistat-emtricitabine-tenofovir (STRIBILD) [phone] mg Take 1 tablet by mouth daily with breakfast , Until Discontinued, Historical Med      ibuprofen (MOTRIN) 800 mg tablet Take 1 tablet by mouth every 6 (six) hours as needed for moderate pain for up to 30 days, Starting 3/7/2017, Until u 4/6/17, Print      omeprazole (PriLOSEC) 20 mg delayed release capsule Take 20 mg by mouth daily  , Until Discontinued, Historical Med           No discharge procedures on file      ED Provider  Electronically Signed by           Robin Marcano DO  01/13/18 0307

## 2018-01-13 NOTE — PSYCH
Progress Note  Psychotherapy Provided [facility]: Individual Psychotherapy 50 minutes provided today  Goals addressed in session:   Addressed goals 1-3 of initial plan    D: Met with Portillo galvan  ROS; "I'm tired, but ok"  Keysha Parisi stated he is feeling more 'clear' since beginning Prozac but will speak with   to see if going up may be an option as decreased motivation remains present 2/7 days  Received positive praise from parents for the care he gives and this made him feel much better about living with them and not feelings like 'a failure' for not having a job  Additional discussion regarding communication with Shanique Navas, possible unconscious behaviors of not removing all of this things in [facility] as this does provide a connection to him at times  Keysha 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 as experiencing regrets of specific choices he made due to 'principles'  Denies participation in 'combat'  Denied SI     A: Keysha Parisi presented with baseline behavior presentation today  Movements were present today however this was observed more during topic of Shanique Navas  His mood and affect were appropriate  P: Ongoing psychotherapy  Continue discussion of treatment goals  Pain Scale and Suicide Risk [facility]: Current Pain Assessment: no pain   Current suicide risk is low   Behavioral Health Treatment Plan [address] [place] [address] [address]: Diagnosis and Treatment Plan explained to patient, patient relates understanding diagnosis and is agreeable to Treatment Plan  Assessment    1  Panic attacks (300 01) (F41 0)   2  Generalized anxiety disorder (300 02) (F41 1)   3   Depression (311) (F32 9)    Signatures   Electronically signed by : Sunnie Cogan, LCSW; Oct 26 2017  8:54AM EST                       (Author)

## 2018-01-13 NOTE — RESULT NOTES
Discussion/Summary   blood work negative for celiac  thanks     Verified Results  (1) CELIAC DISEASE AB PROFILE [id number] 11:55AM [facility] Valladares Order Number: [id number]     Test Name Result Flag Reference   tTG IGG <2 U/mL  0 - 5   Negative        0 - 5                                Weak Positive   6 - 9                                Positive           >9   tTG IGA <2 U/mL  0 - 3   Negative        0 -  3                                Weak Positive   4 - 10                                Positive           >10   Tissue Transglutaminase (tTG) has been identified   as the endomysial antigen  Studies have demonstr-   ated that endomysial IgA antibodies have over 99%   specificity for gluten sensitive enteropathy     GLIADA 10 units  0 - 19   Negative                   0 - 19                     Weak Positive             20 - 30                     Moderate to Strong Positive   >30   GLIADG 2 units  0 - 19   Negative                   0 - 19                     Weak Positive             20 - 30                     Moderate to Strong Positive   >30   ENDOMYSIAL AB IGA Negative  Negative   Performed at:  [facility] [address]  [id number]  : Annalee Rosas MD, Phone:  [phone]    mg/dL  90 - 757

## 2018-01-13 NOTE — DISCHARGE INSTRUCTIONS
Sciatica   WHAT YOU NEED TO KNOW:   Sciatica is a condition that causes pain along your sciatic nerve  The sciatic nerve runs from your spine through both sides of your buttocks  It then runs down the back of your thigh, into your lower leg and foot  Your sciatic nerve may be compressed, inflamed, irritated, or stretched  DISCHARGE INSTRUCTIONS:   Medicines:   · NSAIDs:  These medicines decrease swelling and pain  NSAIDs are available without a doctor's order  Ask your healthcare provider which medicine is right for you  Ask how much to take and when to take it  Take as directed  NSAIDs can cause stomach bleeding or kidney problems if not taken correctly  · Acetaminophen: This medicine decreases pain  Acetaminophen is available without a doctor's order  Ask how much to take and when to take it  Follow directions  Acetaminophen can cause liver damage if not taken correctly  · Muscle relaxers  help decrease pain and muscle spasms  · Take your medicine as directed  Contact your healthcare provider if you think your medicine is not helping or if you have side effects  Tell him of her if you are allergic to any medicine  Keep a list of the medicines, vitamins, and herbs you take  Include the amounts, and when and why you take them  Bring the list or the pill bottles to follow-up visits  Carry your medicine list with you in case of an emergency  Follow up with your healthcare provider as directed:  Write down your questions so you remember to ask them during your visits  Manage your symptoms:   · Activity:  Decrease your activity  Do not lift heavy objects or twist your back for at least 6 weeks  Slowly return to your usual activity  · Ice:  Ice helps decrease swelling and pain  Ice may also help prevent tissue damage  Use an ice pack, or put crushed ice in a plastic bag  Cover it with a towel and place it on your low back or leg for 15 to 20 minutes every hour or as directed      · Heat:  Heat helps decrease pain and muscle spasms  Apply heat on the area for 20 to 30 minutes every 2 hours for as many days as directed  · Physical therapy:  You may need to see physical therapist to teach you exercises to help improve movement and strength, and to decrease pain  An occupational therapist teaches you skills to help with your daily activities  · Use assistive devices if directed: You may need to wear back support, such as a back brace  You may need crutches, a cane, or a walker to decrease stress on your lower back and leg muscles  Ask your healthcare provider for more information about assistive devices and how to use them correctly  Self-care:   · Avoid pressure on your back and legs:  Do not  lift heavy objects, or stand or sit for long periods of time  · Lift objects safely:  Keep your back straight and bend your knees when you  an object  Do not bend or twist your back when you lift  · Maintain a healthy weight:  Ask your healthcare provider how much you should weigh  Ask him to help you create a weight loss plan if you are overweight  · Exercise:  Ask your healthcare provider about the best stretching, warmup, and exercise plan for you  Contact your healthcare provider if:   · You have pain in your lower back at night or when resting  · You have pain in your lower back with numbness below the knee  · You have weakness in one leg only  · You have questions or concerns about your condition or care  Return to the emergency department if:   · You have trouble holding back your urine or bowel movements  · You have weakness in both legs  · You have numbness in your groin or buttocks  © 2017 [employer] Information is for End User's use only and may not be sold, redistributed or otherwise used for commercial purposes  All illustrations and images included in CareNotes® are the copyrighted property of [employer]  or [employer]    The above information is an  only  It is not intended as medical advice for individual conditions or treatments  Talk to your doctor, nurse or pharmacist before following any medical regimen to see if it is safe and effective for you  Sciatica   WHAT YOU NEED TO KNOW:   What is sciatica? Sciatica is a condition that causes pain along your sciatic nerve  The sciatic nerve runs from your spine through both sides of your buttocks  It then runs down the back of your thigh, into your lower leg and foot  Any place along your sciatic nerve may be compressed, inflamed, irritated, or stretched and cause symptoms  What causes sciatica? Sciatica may be related to certain activities, poor posture, and physical or psychological stress  Any of the following may cause or increase your risk of sciatica:  · Disc problems:  A slipped disc (soft cushion in between the bones of the spine) is the most common cause of sciatica  The disc may press on the sciatic nerve  One bone in your spine may slip over another, or you may have narrowing of the spinal column  · Muscle injury: This may happen after you twist or lift a heavy object  Swelling from sprained or irritated muscles in the buttocks, thighs, or legs press on the sciatic nerve  · Obesity or pregnancy:  Extra weight increases pressure on your back and legs  · Trauma:  Direct blows on the buttocks, thighs, or legs, car accidents, or falls may injure the sciatic nerve  · Diseases of the spine:  Arthritis, osteoporosis, cancer, or infection of the spine may also affect the sciatic nerve  What are the signs and symptoms of sciatica?   The symptoms of sciatic may be short-term or long-term:  · Pain that goes from the lower back into your buttocks and down the back of your thigh    · Numbness or tingling in your buttocks and legs    · Muscle weakness, difficulty moving or controlling your leg or foot    · Leg pain that increases with standing, sitting, or squatting  How is sciatica diagnosed? Your healthcare provider will ask about other health conditions you may have  He may ask you about your job, history of back pain, diseases, or surgeries you have had  He will examine you and move your legs to see what increases pain  You may also need any of the following:  · X-rays: This is a picture of the bones and tissues in your back, hip, thigh, or leg  This test may show other problems, such as fractures (broken bones)  · CT scan: This test is also called a CAT scan  An x-ray machine uses a computer to take pictures of your hips, thighs, and legs  The pictures may show your sciatic nerve, muscles, and blood vessels  You may be given a dye before the pictures are taken to help healthcare providers see the pictures better  Tell the healthcare provider if you have ever had an allergic reaction to contrast dye  · MRI:  This scan uses powerful magnets and a computer to take pictures of your hips, thighs, and legs  An MRI may show damaged nerves, muscles, bones, and blood vessels  You may be given dye to help the pictures show up better  Tell the healthcare provider if you have ever had an allergic reaction to contrast dye  Do not enter the MRI room with anything metal  Metal can cause serious injury  Tell the healthcare provider if you have any metal in or on your body  · An electromyography (EMG)  test measures the electrical activity of your muscles at rest and with movement  · Nerve conduction tests: These tests check how surface nerves and related muscles respond to stimulation  Electrodes with wires or tiny needles are placed on certain areas, such as the buttocks and legs  How is sciatica treated? · NSAIDs:  These medicines decrease swelling and pain  NSAIDs are available without a doctor's order  Ask your healthcare provider which medicine is right for you  Ask how much to take and when to take it  Take as directed   NSAIDs can cause stomach bleeding or kidney problems if not taken correctly  · Acetaminophen: This medicine decreases pain  Acetaminophen is available without a doctor's order  Ask how much to take and how often to take it  Follow directions  Acetaminophen can cause liver damage if not taken correctly  · Muscle relaxers  help decrease pain and muscle spasms  · Epidural steroid medicine: This may include both an anesthetic (numbing medicine) and a steroid, which may decrease swelling and relieve pain  It is given as a shot close to the spine in the area where you have pain  · Chemonucleolysis: This is an injection given into the damaged disc to soften or shrink the disc  · Surgery: This may be done to correct problems such as a damaged disc, or a tumor in your spine  It may be done to decrease the pressure on the sciatic nerve  Healthcare providers may also release the muscle that may be pressing into your sciatic nerve  How can I help manage sciatica? · Ultrasound therapy: This is a machine that uses sound waves to decrease pain  Topical medicines may be added to help decrease pain and inflammation  · Physical therapy:  A physical therapist teaches you exercises to help improve movement and strength, and to decrease pain  An occupational therapist teaches you skills to help with your daily activities  · Assistive devices: You may need to wear back support, such as a back brace  You may need crutches, a cane, or a walker to decrease stress on your lower back and leg muscles  Ask your healthcare provider for more information about assistive devices and how to use them correctly  How can sciatica be prevented? · Avoid pressure on your back and legs:  Do not  lift heavy objects, or stand or sit for long periods of time  · Lift objects safely:  Keep your back straight and bend your knees when you  an object  Do not bend or twist your back when you lift      · Maintain a healthy weight: Ask your healthcare provider how much you should weigh  Ask him to help you create a weight loss plan if you are overweight  · Exercise:  Ask your healthcare provider about the best stretching, warmup, and exercise plan for you  What are the risks of sciatica? An epidural steroid injection can lead to pain disorders or paralysis if it is placed incorrectly  It may also cause headaches, leg pain, and blockage of blood flow to the spinal cord  Surgery may cause you to bleed or get an infection  If not treated, your muscles and nerves may become damaged permanently  You may have decreased strength  You may not be able to move your leg or control when you urinate or have bowel movements  When should I contact my healthcare provider? · You have pain in your lower back at night or when resting  · You have pain in your lower back with numbness below the knee  · You have weakness in one leg only  · You have questions or concerns about your condition or care  When should I seek immediate care or call 911? · You have trouble holding back your urine or bowel movements  · You have weakness in both legs  · You have numbness in your groin or buttocks  CARE AGREEMENT:   You have the right to help plan your care  Learn about your health condition and how it may be treated  Discuss treatment options with your caregivers to decide what care you want to receive  You always have the right to refuse treatment  The above information is an  only  It is not intended as medical advice for individual conditions or treatments  Talk to your doctor, nurse or pharmacist before following any medical regimen to see if it is safe and effective for you  © 2017 [employer]  Information is for End User's use only and may not be sold, redistributed or otherwise used for commercial purposes   All illustrations and images included in CareNotes® are the copyrighted property of [employer]  or [address]
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
## 2018-01-13 NOTE — PROGRESS NOTES
History of Present Illness  [facility]:   He is being seen in follow-up  The patient is being seen regarding wellness screener, depression   Support/Coping: friends  The patient states the problem is moderate  [facility]:         1  I like who I am  Yes, describes me exactly (12)   2  I am not an easy person to get along with    No, doesn't describe me at all (22)   3  I am basically a healthy person    Yes, describes me exactly (32)   4  I give up to easily    Somewhat describes me (41)   5  I have difficulty concentrating    Yes, describes me exactly (50)   6  I am happy with my family relationships    No, doesn't describe me at all (60)   7  I am comfortable being around people    Yes, describes me exactly (72)     8  Walking up a flight of stairs    Some (81)   9  Running the length of a football field    Some (91)     10  Sleeping    Some (101)   11  Hurting or aching in any part of your body    Nicki Shabnam A Lot (110)   12  Getting tired easily    None (122)   13  Feeling depressed or sad    Nicki Shabnam A Lot (130)   14  Nervousness    Some (141)     15  Socialize with other people (talk or visit with friends or relatives A Lot ()   12  Take part in social, [employer]itan, or recreation activities (meetings, [place], movies, sports, parties)    Some (161)   17   Stay in your home, nursing home, or hospital because of sickness, injury, or other health problem None (172)   [address]     8 = 1   9 = 1   10 = 1   11 = 0   12 = 2   Sum = 5   PHYSICAL HEALTH SCORE 50      1 = 2   4 = 1   5 = 0   13 = 0   14 = 1   Sum = 4   MENTAL HEALTH SCORE 40      2 = 2   6 = 0   7 = 2 15 = 2   16 = 1   Sum = 7   SOCIAL HEALTH SCORE 70  Physical Health score = 50   Mental Health score = 40   Social Health score = 70   Sum = 160   GENERAL HEALTH SCORE 53 333      3 = 2   PERCEIVED HEALTH SCORE 100      1 = 2   2 = 2   4 = 1   6 = 0   7 = 2   Sum = 7   SELF-ESTEEM SCORE 70            2 = 2 and 0   5 = 0 and 2   7 = 2 and 0   10 = 1 and 1   12 = 2 and 0   14 = 1 and 1   Sum = 4   ANXIETY SCORE 33 332      4 = 1 and 1   5 = 0 and 2   10 = 1 and 1   12 = 2 and 0   13 = 0 and 2   Sum = 6   DEPRESSION SCORE 60      4 = 1, 1, 0 and 2   7 = 2 and 0   10 = 1 and 1   12 = 2 and 0   13 = 0 and 2   14 = 1 and 1   Sum = 7   ANXIETY-DEPRESSION ([facility]) SCORE 50 001      11 = 0 and 2   PAIN SCORE 100      17 = 2 and 0   DISABILITY SCORE 0  Physical Exam    Objective: Orientation: oriented to person, oriented to place and oriented to time  Appearance: well developed, well nourished and appearance reflects stated age  Observed mood and affect:  Rapid speech and body movement  Harm to self or others: None reported or observed  Substance abuse: Smokes marihuana "occasionally"  Assessment    1  Human immunodeficiency virus (HIV) disease (042) (B20)   2  Dysphagia (787 20) (R13 10)    Plan    1  Follow-up visit in 6 months Evaluation and Treatment  Follow-up  Status: Hold For -   Scheduling  Requested for: 68ASA4936    2  (1) CBC/PLT/DIFF; Status:Active; Requested MHR:[id number];    3  (1) COMPREHENSIVE METABOLIC PANEL; Status:Active; Requested QXK:91KGG8210;    4  (1) HIV-1 RNA QUANTITATIVE; [Do Not Release]; Status:Active; Requested   ZFF:95WCT5648;    5  (1) T LYMPH SUBSET (CD4); Status:Active; Requested NMW:18BJQ9850;    6  Pneumo (Pneumovax); INJECT 0 5  ML Intramuscular; To Be Done:   [address] [facility]: Today, patient presents with depression  Patient will likely benefit from linking to ongoing [facility] services once new insurance is active  The stage of change is preparation  Behavioral recommendations: 1  F/U with [facility] as needed  2  [facility] will call PT with [address] provider list once notified by CM that new insurance is active  3  PT will go to ER or call 911 if having SI/HI   Discussion Summary [facility]:   PT was seen for his behavioral health consultation  [facility] services were reviewed  PT completed his [facility] screener  PT reports that he has been depressed for the past year but that it has increased since he broke up with his partner of 16 years recently  PT was previously going to [facility] but left after it took too long to be seen by the psychiatrist  PT feels that he should be able to see a doctor right away and that the therapists make him wait several months to get more money  [facility] explained how most agency's operate and that the wait list is due to too many patients and not enough appointment times  PT made conflicting statements about his readiness to receive help for depression but eventually told [facility] that he would go back to therapy and would be willing to wait to see a psychiatrist if he felt like he was really being helped and listened to by the therapist  PT is changing his insurance in March and decided that he would rather wait to be connected to [address] services until he has new insurance so he doesn't have to change therapists  [facility] spoke to PT's CM and asked to be notified when insurance has changed so [facility] can contact PT with list of [address] providers  PT said he could wait until March to be seen and agreed to go to the ER or call 911 if having SI/HI        Future Appointments    Date/Time Provider Specialty Site   07/31/2017 10:00 AM Alessandro Thakur MD Internal Medicine [facility] AT [facility]   05/25/2017 11:40 AM Specialty Clinic, ENT  [facility]   03/27/2017 12:50 PM Specialty Clinic, Ortho Room [facility]     Signatures   Electronically signed by : Matthew Liu [address] 87; Jan 30 2017 12:46PM EST (Author)

## 2018-01-14 VITALS
WEIGHT: 184.75 LBS | TEMPERATURE: 97.9 F | OXYGEN SATURATION: 97 % | HEART RATE: 87 BPM | BODY MASS INDEX: 29 KG/M2 | DIASTOLIC BLOOD PRESSURE: 79 MMHG | HEIGHT: 67 IN | SYSTOLIC BLOOD PRESSURE: 134 MMHG

## 2018-01-14 VITALS
TEMPERATURE: 98 F | OXYGEN SATURATION: 98 % | BODY MASS INDEX: 27.69 KG/M2 | DIASTOLIC BLOOD PRESSURE: 60 MMHG | HEART RATE: 91 BPM | SYSTOLIC BLOOD PRESSURE: 120 MMHG | WEIGHT: 176.81 LBS
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
## 2018-01-14 NOTE — PROGRESS NOTES
History of Present Illness    Assessment:  Pt  reports good po intake @ appetite  His weight is stable, BMI 28  Asked about Oral Health  Pt  laughed, I asked if he is having any Oral Health problems, he did not clearly answer my question  He states he has a private dentist, has been going regularly and has a follow-up appt  tomorrow  His CM is Amadeo Gonzalez  Nutrition Diagnosis Continue Previous Nutrition Diagnosis   Intervention Continue Current Regimen   Monitor And Evaluation Goal #1 - , Goal #1 is extended  Oral Health questions reviewed  Active Problems    1  Chronic heel pain (729 5,338 29) (M79 673,G89 29)   2  Diarrhea (787 91) (R19 7)   3  Dysphagia (787 20) (R13 10)   4  Encounter for screening examination for sexually transmitted disease (V74 5) (Z11 3)   5  Encounter for screening for cardiovascular disorders (V81 2) (Z13 6)   6  Gastroesophageal reflux disease with esophagitis (530 11) (K21 0)   7  Generalized anxiety disorder (300 02) (F41 1)   8  Human immunodeficiency virus (HIV) disease (042) (B20)   9  Irritable bowel syndrome (564 1) (K58 9)   10  Testicular calcification (608 89) (N50 89)   11  Tinnitus, bilateral (388 30) (H93 13)   12  Varicocele (456 4) (I86 1)    Past Medical History    1  History of Anxiety (300 00) (F41 9)   2  History of Dysuria (788 1) (R30 0)   3  History of Elbow stiffness, left (719 52) (M25 622)   4  History of Fecal urgency (787 63) (R15 2)   5  History of Hemorrhage of anus and rectum (569 3) (K62 5)   6  History of abdominal pain (V13 89) (Z87 898)   7  History of backache (V13 59) (Z87 39)   8  History of backache (V13 59) (Z87 39)   9  History of constipation (V12 79) (Z87 19)   10  History of herpes zoster (V12 09) (Z86 19)   11  History of low back pain (V13 59) (Z87 39)   12  History of Human immunodeficiency virus (HIV) disease (042) (B20)   13  History of Laceration of finger (883 0) (S61 219A)   14   History of Proctitis, chlamydial (099 52) (A56 3)   15  History of Right elbow tendinitis (727 09) (M77 8)   16  History of SOB (shortness of breath) on exertion (786 05) (R06 02)    Surgical History    1  History of Surgery Spermatic Cord Excision Of Varicocele    Family History  Mother    1  Family history of Type 2 Diabetes Mellitus  Father    2  Family history of Acute Myocardial Infarction (V17 3)    Social History    · Cigarette smoker (305 1) (F17 210)   · Sexually Active Monogamous Relationship    Current Meds   1  Bentyl 20 MG TABS (Dicyclomine HCl); TAKE 1 TABLET TWICE DAILY; Therapy: 24SFJ8644 to Recorded   2  Genvoya [id number] MG Oral Tablet; Take 1 tablet daily; Therapy: 09HCS3020 to (Evaluate:30Mar2017)  Requested for: 26STJ2977; Last Rx:02Goz6892   Ordered   3  Omeprazole 40 MG Oral Capsule Delayed Release; TAKE 1 CAPSULE BY MOUTH DAILY (PLEASE   STOP FAMOTIDINE); Therapy: 53XZA8141 to (Evaluate:20Mar2017)  Requested for: 01BGW4475; Last Rx:66Bfa2323   Ordered    Allergies    1  No Known Drug Allergies    2   TOMATO    Future Appointments    Date/Time Provider Specialty Site   07/31/2017 10:00 AM Kevan Thakur MD Internal Medicine [facility] AT [facility]   05/25/2017 11:40 AM Specialty Clinic, ENT  [place] [address]   03/27/2017 12:50 PM Specialty Clinic, Ortho Room [place] [address]     Signatures   Electronically signed by : Kade Garg, [facility]; Jan 30 2017  2:49PM EST                       (Author)
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
## 2018-01-15 NOTE — PSYCH
1  Moderate episode of recurrent major depressive disorder (296 32) (F33 1)   2  Generalized anxiety disorder (300 02) (F41 1)      Date of Initial Treatment Plan: 5/22/17  Date of Current Treatment Plan: 5/22/17  Treatment Plan 1  Strengths/Personal Resources for Self Care: helpful,  Diagnosis:   Axis I: SOPHIA; MDD recurrent moderate     Area of Needs: self esteem  anxiety, depression,  Long Term Goals:   My anxiety and depression has improved   Target Date: 9/18/17      I have obtained proper medical care   Target Date: 9/18/17      I have learned to deal with my x-partner better   Target Date: 9/18/17    Short Term Objectives:   Goal 1:   1  Improve my coping skills  2  Process triggers  Target Date: 9/18/17      Goal 2:   1  Identify specific obstacles  2  Insurance difficulties  Target Date: 9/18/17      Goal 3:   1  Process specific situations  2  Communication skills with Anne Rim when needed  Target Date: 9/18/17      GOAL 1: Modality: Individual 4 x per month Target Date: 9/18/17       The person(s) responsible for carrying out the plan is Odette Foreman  GOAL 2: Modality: Individual 4 x per month Target Date: 9/18/17       The person(s) responsible for carrying out the plan is Odette Foreman  GOAL 3: Modality: Individual 4 x per month Target Date: 9/18/17         The person(s) responsible for carrying out the plan is Odette Foreman  The first scheduled review date is 9/18/17  The expected length of service is 120 Days  Level of functioning at initial assessment: 75  The highest level of functioning in the past year was Unknown  The current level of functioning is 75           CLIENT COMMENTS / Please share your thoughts, feelings, need and/or experiences regarding your treatment plan: Reason for Call:  INR    Who is calling?  Home [facility]: [facility]    Phone number:  [phone]    Fax number:      Name of caller: [facility]    INR Value:  2.0    Are there any other concerns:  No    Can we leave a detailed message on this number? YES    Phone number patient can be reached at: Other phone number:  [facility]  # [phone]      Call taken on 4/26/2017 at 11:46 AM by Marli Belle       _____________________________________________________________________________________________________________________________________________________________________________________________________________________________________________________________________________________________________________________ Date/Time: ______________     Patient Signature: _________________________________ Date/Time: ______________       Electronically signed by : Kamila Linton LCSW; May 22 2017  4:50PM EST                       (Author)    Electronically signed by : Kamila Linton LCSW; May 22 2017  4:51PM EST                       (Author)

## 2018-01-15 NOTE — PSYCH
Progress Note  Psychotherapy Provided [facility]: Individual Psychotherapy 50 minutes provided today  Goals addressed in session:   Addressed goals 1-3 of initial plan    D: Met with Portillo LAZARO; periods of anxiety remain and he continues to take his Prozac  Unruly Nelson inquired about referral for neuro psych both Nicki from [facility] [address] and I discussed a ways back  Provided information for Unruly Nelson to schedule his appointment so he got a convenient time for his schedule  Reassessment of treatment plan  Parents are requiring more care and this has inhibited Unruly Nelson from finding employment  Minimal change in goals  Denied SI     A: Unruly Nelson presented with baseline behavior presentation today  His mood and affect were appropriate  Ongoing frustration regarding his medical care  Demonstrates insight into current psychosocial stressors  P: Ongoing psychotherapy  Continue discussion of treatment goals  P:       Pain Scale and Suicide Risk [facility]: Current Pain Assessment: no pain   Current suicide risk is low   Behavioral Health Treatment Plan [facility]: Diagnosis and Treatment Plan explained to patient, patient relates understanding diagnosis and is agreeable to Treatment Plan  Assessment    1  Generalized anxiety disorder (300 02) (F41 1)   2   Moderate episode of recurrent major depressive disorder (296 32) (F33 1)    Signatures   Electronically signed by : Isra Todd LCSW; Sep 15 2017 11:04AM EST                       (Author)

## 2018-01-16 ENCOUNTER — HOSPITAL ENCOUNTER (EMERGENCY)
Facility: HOSPITAL | Age: 48
Discharge: HOME | End: 2018-01-16
Attending: EMERGENCY MEDICINE
Payer: COMMERCIAL

## 2018-01-16 VITALS
BODY MASS INDEX: 28.51 KG/M2 | DIASTOLIC BLOOD PRESSURE: 78 MMHG | TEMPERATURE: 97.3 F | RESPIRATION RATE: 20 BRPM | OXYGEN SATURATION: 98 % | SYSTOLIC BLOOD PRESSURE: 138 MMHG | WEIGHT: 182 LBS | HEART RATE: 58 BPM

## 2018-01-16 DIAGNOSIS — M54.50 ACUTE RIGHT-SIDED LOW BACK PAIN WITHOUT SCIATICA: Primary | ICD-10-CM

## 2018-01-16 PROCEDURE — 99283 EMERGENCY DEPT VISIT LOW MDM: CPT

## 2018-01-16 PROCEDURE — 96372 THER/PROPH/DIAG INJ SC/IM: CPT

## 2018-01-16 RX ORDER — LIDOCAINE 50 MG/G
2 PATCH TOPICAL ONCE
Status: DISCONTINUED | OUTPATIENT
Start: 2018-01-16 | End: 2018-01-16 | Stop reason: HOSPADM

## 2018-01-16 RX ORDER — MORPHINE SULFATE 30 MG/1
30 TABLET ORAL EVERY 4 HOURS PRN
Qty: 10 TABLET | Refills: 0 | Status: SHIPPED | OUTPATIENT
Start: 2018-01-16 | End: 2018-02-23

## 2018-01-16 RX ORDER — MORPHINE SULFATE 10 MG/ML
10 INJECTION, SOLUTION INTRAMUSCULAR; INTRAVENOUS ONCE
Status: COMPLETED | OUTPATIENT
Start: 2018-01-16 | End: 2018-01-16

## 2018-01-16 RX ORDER — CYCLOBENZAPRINE HCL 10 MG
5 TABLET ORAL
Qty: 7 TABLET | Refills: 0 | Status: SHIPPED | OUTPATIENT
Start: 2018-01-16 | End: 2018-02-23

## 2018-01-16 RX ADMIN — LIDOCAINE 2 PATCH: 50 PATCH TOPICAL at 02:30

## 2018-01-16 RX ADMIN — MORPHINE SULFATE 10 MG: 10 INJECTION, SOLUTION INTRAMUSCULAR; INTRAVENOUS at 02:27

## 2018-01-16 NOTE — ED PROVIDER NOTES
History  Chief Complaint   Patient presents with    Back Pain     pt states he was here saturday and told he had an inflammed SI joint  pt's mother states "you will have to knock him out" when asking her why the pt states "I am getting to it"  pt hyperventilating in triage  reports pain shoots down his right leg     47 YR MALE STATES STARTED OVER LAST WEEKDN WITH R LOW BACK/BUTOCK PAIN WITH PAIN SHOTTING DOWN FRONT OF R LEG- WORSE WITH MOVEMENTS- NO INJURY -- NO FEVERS- NO HX OF CA- NO B/B INCONT/ NO SADDLE ANESTEHSIA- NO BLE WEAKN ESS- CAME TO ER DX WITH SI JT -- GIVEN ORAL MEDS- RETURNS WITH PAIN -- NO OTHER COMPS        History provided by:  Patient   used: No    Back Pain       Prior to Admission Medications   Prescriptions Last Dose Informant Patient Reported? Taking?   dicyclomine (BENTYL) 20 mg tablet   Yes No   Sig: Take 20 mg by mouth every 6 (six) hours   elvitegravir-cobicistat-emtricitabine-tenofovir (STRIBILD) 047-789-507-300 mg   Yes No   Sig: Take 1 tablet by mouth daily with breakfast    ibuprofen (MOTRIN) 800 mg tablet   No No   Sig: Take 1 tablet by mouth every 6 (six) hours as needed for moderate pain for up to 30 days   methocarbamol (ROBAXIN) 500 mg tablet   No No   Sig: Take 1 tablet by mouth 3 (three) times a day   omeprazole (PriLOSEC) 20 mg delayed release capsule   Yes No   Sig: Take 20 mg by mouth daily  predniSONE 20 mg tablet   No No   Sig: Take 3 tablets by mouth daily for 4 days      Facility-Administered Medications: None       Past Medical History:   Diagnosis Date    GERD (gastroesophageal reflux disease)     HIV (human immunodeficiency virus infection) ([facility] [address] )     IBS (irritable bowel syndrome)        Past Surgical History:   Procedure Laterality Date    CYSTOSCOPY  2014    MA ESOPHAGOGASTRODUODENOSCOPY TRANSORAL DIAGNOSTIC N/A 3/9/2017    Procedure: ESOPHAGOGASTRODUODENOSCOPY (EGD); Surgeon: Melquiades Antoine MD;  Location: [facility];   Service: Gastroenterology       No family history on file  I have reviewed and agree with the history as documented  Social History   Substance Use Topics    Smoking status: Current Every Day Smoker     Packs/day: 1 00    Smokeless tobacco: Never Used    Alcohol use Yes        Review of Systems   Constitutional: Negative  HENT: Negative  Eyes: Negative  Respiratory: Negative  Cardiovascular: Negative  Gastrointestinal: Negative  Endocrine: Negative  Genitourinary: Negative  Musculoskeletal: Positive for back pain  Negative for arthralgias, gait problem, joint swelling, myalgias, neck pain and neck stiffness  R BUTTOCK PAIN - R ANT LEG PAIN    Skin: Negative  Allergic/Immunologic: Negative  Neurological: Negative  Hematological: Negative  Psychiatric/Behavioral: Negative  Physical Exam  ED Triage Vitals [01/15/18 2218]   Temp Pulse Respirations Blood Pressure SpO2   -- 68 (!) 48 (!) 172/83 99 %      Temp Source Heart Rate Source Patient Position - Orthostatic VS BP Location FiO2 (%)   Oral Monitor Sitting Left arm --      Pain Score       Worst Possible Pain           Orthostatic Vital Signs  Vitals:    01/15/18 2218   BP: (!) 172/83   Pulse: 68   Patient Position - Orthostatic VS: Sitting       Physical Exam   Constitutional: He is oriented to person, place, and time  No distress  SLEEPING- BUT PAIN UPON WAKING PT UP -- MILD BRADYCARDIC- PULSE OX 98 % ON RA- INTEPRETATION IS NORMAL- NO INTERVENTION    HENT:   Head: Normocephalic and atraumatic  Eyes: Conjunctivae and EOM are normal  Pupils are equal, round, and reactive to light  Right eye exhibits no discharge  Left eye exhibits no discharge  No scleral icterus  Neck: Normal range of motion  Neck supple  No JVD present  No tracheal deviation present  No thyromegaly present  Cardiovascular: Regular rhythm, normal heart sounds and intact distal pulses  Exam reveals no gallop and no friction rub      No murmur heard   Pulmonary/Chest: Effort normal and breath sounds normal  No stridor  No respiratory distress  He has no wheezes  He has no rales  He exhibits no tenderness  Abdominal: Soft  Bowel sounds are normal  He exhibits no distension and no mass  There is no tenderness  There is no rebound and no guarding  No hernia  NO CVA TENDERNESS- NO PERITONEAL SIGNS-- NO BILATERAL FEMORAL/INGUINAL HERNIA'S   Genitourinary: Penis normal    Genitourinary Comments: NORMAL TESTICLE/SCROTAL/PERINEAL EXAM   Musculoskeletal: Normal range of motion  He exhibits tenderness  He exhibits no edema or deformity  POS PMT LOWER L SPINE TENDENRESS- POS R MDI BUTTOCK TENDNERESS- NEG R SLRT AND XED SLRT- NORMAL DISTAL PULSES-SENSATION/STRENGHT/REVFLXES IN BLE- EQUAL BIALTERAL RADIAL/DP PULSES- NO BLE EDEMA/ CALF TENDENRESS/ASSYM/ ERYTHEMA   Lymphadenopathy:     He has no cervical adenopathy  Neurological: He is alert and oriented to person, place, and time  No cranial nerve deficit or sensory deficit  He exhibits normal muscle tone  Coordination normal    Skin: Skin is warm  Capillary refill takes less than 2 seconds  No rash noted  He is not diaphoretic  No erythema  No pallor  Psychiatric: He has a normal mood and affect  His behavior is normal  Judgment and thought content normal    Nursing note and vitals reviewed        ED Medications  Medications - No data to display    Diagnostic Studies  Results Reviewed     None                 No orders to display              Procedures  Procedures       Phone Contacts  ED Phone Contact    ED Course  ED Course as of Jan 16 0347   Tue Jan 16, 2018   0346 - ER MD NOTE- PT - RE-EVALUATED FEESL IMPROVED- WILL D/C--  PT GIVEN REAONS WHEN TO RETURN  WITH MOTHER PRESENT-                                 [facility]  CritCare Time    Disposition  Final diagnoses:   None     ED Disposition     None      Follow-up Information    None       Patient's Medications   Discharge Prescriptions    No medications on file     No discharge procedures on file      ED Provider  Electronically Signed by           Figueroa Montalvo MD  01/16/18 8806

## 2018-01-16 NOTE — PROGRESS NOTES
Assessment    1  Elbow stiffness, left (719 52) (M25 622)   2  Human immunodeficiency virus (HIV) disease (042) (B20)   3  Anxiety (300 00) (F41 9)   4  Varicocele (456 4) (I86 1)   5  Abdominal pain (789 00) (R10 9)   6  Gastroesophageal reflux disease with esophagitis (530 11) (K21 0)   7  Irritable bowel syndrome (564 1) (K58 9)    Plan  Elbow stiffness, left    · XR ELBOW 2 VIEW LEFT; Status:Active; Requested for:17Yni8825;    · *1 - [address] Physician Referral  Consult  Status: Hold For - Scheduling   Requested for: 89LOI4611  Care Summary provided  : Yes  Gastroesophageal reflux disease with esophagitis    · Famotidine 20 MG Oral Tablet; take 1 tablet by mouth twice a day  Human immunodeficiency virus (HIV) disease    · Stribild [phone] MG Oral Tablet; take 1 tablet by mouth daily   · 1 - Blaze JAMES, Christine Scales  (Infectious Disease) Physician Referral  Consult  Status: Hold For  - Scheduling  Requested for: [id number]  Care Summary provided  : Yes    Discussion/Summary    1  HIV - Last CD4 stable, viral load undetectable  Patient to continue Stribild, 1 tab PO daily  Stressed adherence  Patient to continue routine f/u with Dr Supriya Vergara  2  GERD - stable, patient to continue Famotidine    3  IBS - Patient having overall good GI health at this time but still experiences times of abdominal cramping and bloating  He previously has had mixed constipation/diarrhea  Patient still needs final work up for celiacs  Patient should begin working with RD and try to avoid all dairy products  4  Varicocele - Patient requesting to speak to Dr Eliza Ferrera as he still feels he is having post-op issues with the right sided surgery  He can work with patient navigator on getting in touch with the vascular office to speak to someone on Dr Saleem To team      5  Colorectal Referral - Instructed patient not to delay connecting to services and to reach out to navigator for help as needed       6  Left elbow pain and stiffness - discussed likelihood of epicondylitis - Patient will have xray so that he can see ortho later as needed  Recommended rest and use of ice on and off for any swelling noted  Encouraged patient to try to switch positions frequently and avoid prolonged use of cell phone and electronics in the left arm  Patient verbalized understanding  Patient is insisting on a cortisone injection, injury likely not that severe at this time, but will give referral to ortho so patient can discuss further  Possible side effects of new medications were reviewed with the patient/guardian today  The treatment plan was reviewed with the patient/guardian  The patient/guardian understands and agrees with the treatment plan   The patient was counseled regarding diagnostic results, instructions for management, risk factor reductions, prognosis, patient and family education, impressions, risks and benefits of treatment options, importance of compliance with treatment  total time of encounter was 40 minutes and 30 minutes was spent counseling  Counseling   The patient was counseled on risk of mother to child HIV transmission  Patient reports there are no people in their life who should be tested for HIV  Education   general HIV education  adherence  prevention care  Chief Complaint  Patient here for a f/u  Patient is here today for follow up of chronic conditions described in HPI  History of Present Illness  The patient arrives for routine follow up with [facility]  He reports that since his last visit he has been doing fine  He reports that he is adherent to his HIV regimen and denies missed or late doses  He reports that since his last visit he completed the other surgical repair of his varicocele  He states that he is still having some pressure and pain in the right testicle but it is slowly getting better   He went for follow up with the urology clinic for his post op check and reported to them that he was having some pain with bowel movements  They referred him back to colorectal clinic  He reports that he did not schedule this because he was unhappy with having to come to clinic and wants to find a private office to go to  He states that he is going to call his insurance and get a list     He also reports pain in his left elbow  He reports that it has been going on for a while  He states the pain is mild but now is happening daily  He notices it most when he is on his cell phone for prolonged periods of time, is wondering if it has to do with positioning  He did not do the blood work ordered at his last visit because of his recent surgery, but he reports that he is going to do it soon so he can continue to work up his stomach issues  Pain Assessment   the patient states they do not have pain  Abuse And Domestic Violence Screen    Yes, the patient is safe at home  The patient states no one is hurting them  Depression And Suicide Screen  No, the patient has not had thoughts of hurting themself  No, the patient has not felt depressed in the past 7 days  The patient is being seen for a routine clinic follow-up of HIV infection  The patient is currently asymptomatic  No associated symptoms are reported  Current treatment includes antiretroviral regimen  By report, there is good compliance with treatment, good tolerance of treatment and good symptom control  (Stribild - no missed or late doses)     CD4: 531  VL: <20  Time spent: 10 minutes  Strength: no side effects  Weakness: mental health  Plan of Action: consider seeking routine mental health services  SUBSTANCE ABUSE: ETOH use  amount: 2-3 x a week  not using drugs  SMOKING: uses cigarettes, 1 pk daily packs per day and has not thought about quitting  [facility]  SEXUALLY ACTIVE: He is sexually active  He sometimes uses condoms  He has had 1 partners in the last 90 days     HOUSING: He has stable housing  There are 3 people living in the household (including children)  The household income is $$200 00 month  HEALTH MAINTENANCE: His last dental exam was 2/1/2015  His last eye exam was 2/1/2007  Review of Systems    Constitutional: No fever or chills, feels well, no tiredness, no recent weight gain or weight loss  Eyes: No complaints of eye pain, no red eyes, no discharge from eyes, no itchy eyes  ENT: no complaints of earache, no hearing loss, no nosebleeds, no nasal discharge, no sore throat, no hoarseness  Cardiovascular: No complaints of slow heart rate, no fast heart rate, no chest pain, no palpitations, no leg claudication, no lower extremity  Respiratory: No complaints of shortness of breath, no wheezing, no cough, no SOB on exertion, no orthopnea or PND  Gastrointestinal: intermittent cramping in lower abdomen which spreads around to patient's back, but as noted in HPI, no abdominal pain, no nausea, no vomiting, no constipation, no diarrhea and no blood in stools  Genitourinary: testicular pain and s/p B/L varicocele repairs, but as noted in HPI  Integumentary: No complaints of skin rash or skin lesions, no itching, no skin wound, no dry skin  Neurological: No compliants of headache, no confusion, no convulsions, no numbness or tingling, no dizziness or fainting, no limb weakness, no difficulty walking  Psychiatric: anxiety, but as noted in HPI, not suicidal, no sleep disturbances and no depression  Endocrine: No complaints of proptosis, no hot flashes, no muscle weakness, no erectile dysfunction, no deepening of the voice, no feelings of weakness  Hematologic/Lymphatic: No complaints of swollen glands, no swollen glands in the neck, does not bleed easily, no easy bruising  ROS reviewed  Active Problems    1  Abdominal pain (789 00) (R10 9)   2  Anxiety (300 00) (F41 9)   3  Constipation (564 00) (K59 00)   4  Diarrhea (787 91) (R19 7)   5   Fecal urgency (787 63) (R15 2)   6  Gastroesophageal reflux disease with esophagitis (530 11) (K21 0)   7  Hemorrhage of anus and rectum (569 3) (K62 5)   8  Human immunodeficiency virus (HIV) disease (042) (B20)   9  Irritable bowel syndrome (564 1) (K58 9)   10  Laceration of finger (883 0) (S61 219A)   11  Low back pain (724 2) (M54 5)   12  Proctitis, chlamydial (099 52) (A56 3)   13  Testicular calcification (608 89) (N50 8)   14  Varicocele (456 4) (I86 1)    Past Medical History    1  History of Dysuria (788 1) (R30 0)   2  History of backache (V13 59) (Z87 39)   3  History of backache (V13 59) (Z87 39)   4  History of Human immunodeficiency virus (HIV) disease (042) (B20)   5  History of SOB (shortness of breath) on exertion (786 05) (R06 02)    The active problems and past medical history were reviewed and updated today  Surgical History    1  History of Surgery Spermatic Cord Excision Of Varicocele    The surgical history was reviewed and updated today  Family History  Mother    1  Family history of Type 2 Diabetes Mellitus  Father    2  Family history of Acute Myocardial Infarction (V17 3)    The family history was reviewed and updated today  Social History    · Cigarette smoker (305 1) (F17 210)   · Sexually Active Monogamous Relationship  The social history was reviewed and updated today  The social history was reviewed and is unchanged  Current Meds   1  Famotidine 20 MG Oral Tablet; take 1 tablet by mouth twice a day; Therapy: 04RBW0917 to (Evaluate:14Jun2016)  Requested for: 03BMA2089; Last   Rx:16Mar2016 Ordered   2  Stribild [id number] MG Oral Tablet; take 1 tablet by mouth daily; Therapy: 56VBR3376 to (Evaluate:07Cpo7933)  Requested for: 60YCR6456; Last   Rx:02Mar2016 Ordered    The medication list was reviewed and updated today  Allergies    1  No Known Drug Allergies    2   TOMATO    Vitals  Signs [Data Includes: Current Encounter]   Recorded: 18Apr2016 09:32AM Temperature: 98 2 F  Heart Rate: 82  Respiration: 18  Systolic: 783  Diastolic: 69  Height: 5 ft 7 in  Weight: 180 lb 5 36 oz  BMI Calculated: 28 24  BSA Calculated: 1 94  O2 Saturation: 96  Pain Scale: 0    Physical Exam    Constitutional   General appearance: No acute distress, well appearing and well nourished  Eyes   Conjunctiva and lids: No swelling, erythema or discharge  Pupils and irises: Equal, round and reactive to light  Ears, Nose, Mouth, and Throat   Nasal mucosa, septum, and turbinates: Normal without edema or erythema  Oropharynx: Normal with no erythema, edema, exudate or lesions  Pulmonary   Respiratory effort: No increased work of breathing or signs of respiratory distress  Auscultation of lungs: Clear to auscultation  Cardiovascular   Auscultation of heart: Normal rate and rhythm, normal S1 and S2, without murmurs  Abdomen   Abdomen: Non-tender, no masses  Liver and spleen: No hepatomegaly or splenomegaly  Lymphatic   Palpation of lymph nodes in neck: No lymphadenopathy  Musculoskeletal   Gait and station: Normal     Psychiatric   Orientation to person, place, and time: Normal     Mood and affect: Abnormal   (talkative ) Mood and Affect: agitated, anxious and frustrated        Results/Data  Results   (1) CBC/PLT/DIFF [id number] 10:18AM Sherie Corolla     Test Name Result Flag Reference   DIFFERENTIAL METHOD Automated     WBC COUNT 8 29 Thousand/uL  4 31-10 16   RBC COUNT 4 97 Million/uL  3 88-5 62   HEMOGLOBIN 15 7 g/dL  12 0-17 0   HEMATOCRIT 46 2 %  36 5-49 3   MCV 93 fL  82-98   MCH 31 6 pg  26 8-34 3   MCHC 34 0 g/dL  31 4-37 4   RDW 13 5 %  11 6-15 1   MPV 9 2 fL  8 9-12 7   PLATELET COUNT 122 Thousand/uL  149-390   nRBC AUTOMATED 0 /100 WBC     NEUTROPHILS RELATIVE PERCENT 62 %  43-75   LYMPHOCYTES RELATIVE PERCENT 25 %  14-44   MONOCYTES RELATIVE PERCENT 11 %  4-12   EOSINOPHILS RELATIVE PERCENT 2 %  0-6   NEUTROPHILS ABSOLUTE COUNT 5 14 Thousand/uL 1  85-7 62   LYMPHOCYTES ABSOLUTE COUNT 2 07 Thousand/uL  0 60-4 47   MONOCYTES ABSOLUTE COUNT 0 91 Thousand/uL  0 17-1 22   EOSINOPHILS ABSOLUTE COUNT 0 17 Thousand/uL  0 00-0 61   BASOPHILS ABSOLUTE COUNT 0 02 Thousand/uL  0 00-0 10     (1) COMPREHENSIVE METABOLIC PANEL [id number] 07:84KG Roycetray Yang   Has the patient been fasting for 10-12 hours? -     Test Name Result Flag Reference   SODIUM 139 mmol/L  136-145   POTASSIUM 3 7 mmol/L  3 5-5 3   CHLORIDE 106 mmol/L  100-108   CARBON DIOXIDE 24 mmol/L  21-32   ANION GAP (CALC) 9 mmol/L  4-13   BILI, TOTAL 0 37 mg/dL  0 20-1 00   TOTAL PROTEIN 8 4 g/dL H 6 4-8 2   ALK PHOSPHATAS 121 U/L H    ALT (SGPT) 64 U/L  12-78   AST(SGOT) 28 U/L  5-45   GLUCOSE,RANDM 124 mg/dL     If patient is fasting, the ADA then defines impaired fasting glucose as  >100 mg/dl and diabetes as  >or equal to 126 mg/dl  ALBUMIN 3 7 g/dL  3 5-5 0   BLOOD UREA NITROGEN 10 mg/dL  5-25   CALCIUM 9 2 mg/dL  8 3-10 1   CREATININE 0 92 mg/dL  0 60-1 30   Standardized to IDMS reference method   eGFR African American >60 ml/min/1 73sq [facility] Disease Education Program recommendations are as  follows:  GFR calculation is accurate only with a steady state creatinine  Chronic Kidney disease less than 60 ml/min/1 73 sq  meters  Kidney failure less than 15 ml/min/1 73 sq  meters  eGFR Non-African American >60 ml/min/1 73sq m       (1) HIV-1 RNA QUANTITATIVE [id number] 10:18AM Royce aYng   PERFORMED AT: [address] [facility] [place] [place] [id number] DIRECTOR: Unruly Greer MD   PHONE: [phone]     Test Name Result Flag Reference   HIV-1 RNA QUANT <20 0 copies/mL     HIV-1 RNA not detected  The reportable range for this assay is 20 to 10,000,000  copies HIV-1 RNA/mL     HIV-1 RNA LOG10 UPTCAL aty20kxfz/mL     Unable to calculate result since non-numeric result obtained for  component test      (1) T LYMPH SUBSET (CD4) 10YOR0981 10:18AM Royce Celia   PERFORMED AT: [place], [place] [id number] DIRECTOR: Unruly Greer MD   PHONE: [phone]     Test Name Result Flag Reference   RBC COUNT 5 12 x10E6/uL  4 14-5 80   WBC COUNT 7 3 x10E3/uL  3 4-10 8   HEMOGLOBIN 16 5 g/dL  12 6-17 7   MCV 95 fL  79-97   MCH 32 2 pg  26 6-33 0   MCHC 34 0 g/dL  31 5-35 7   RDW 14 1 %  12 3-15 4   PLATELET COUNT 829 G74O0/JF  150-379   LYMPHOCYTES RELATIVE PERCENT 25 %     MONOCYTES RELATIVE PERCENT 10 %     EOSINOPHILS RELATIVE PERCENT 2 %     BASOPHILS RELATIVE PERCENT 0 %     NEUTROPHILS ABSOLUTE COUNT 4 6 x10E3/uL  1 4-7 0   LYMPHOCYTES ABSOLUTE COUNT 1 8 x10E3/uL  0 7-3 1   MONOCYTES ABSOLUTE COUNT 0 7 x10E3/uL  0 1-0 9   EOSINOPHILS ABSOLUTE COUNT 0 1 x10E3/uL  0 0-0 4   BASOPHILS ABSOLUTE COUNT 0 0 x10E3/uL  0 0-0 2   ABSOLUTE CD4 HELPER 531 /uL  359-1519   % CD4 POSITIVE LYMPHOCYTES 29 5 % L 30 8-58 5   IMMATURE GRANULOCYTES 0 %     ABSOLUTE IMMATURE GRANULOCYTES 0 x10E3/uL  0 0-0 1     Attending Note  Collaborating Physician: I supervised the Advanced Practitioner and I agree with the Advanced Practitioner note  Future Appointments    Date/Time Provider Specialty Site   07/18/2016 10:00 AM Samir Thakur MD Internal Medicine [facility]   07/05/2016 07:35 AM Specialty Clinic, Urology  [address]     Signatures   Electronically signed by : Ade Mota; Apr 18 2016 11:01AM EST                       (Author)    Electronically signed by : Ray Herrera DO;  Apr 18 2016 11:21AM EST                       (Review)

## 2018-01-16 NOTE — PSYCH
Progress Note  Psychotherapy Provided [facility]: Individual Psychotherapy 50 minutes provided today  Goals addressed in session:   Addressed goals 1-3 of initial plan    D: Met with Portillo individually  ROS; received scripts for Prozac and Risperdal this morning from PCP  Scheduled to see Psychiatrist in August  Session focused upon Portillo's continuous frustration with clinic, yet is resistant towards following through with appointments himself rather than waiting for clinic to schedule for him  Further discussion regarding Portillo's feeling he does not require therapy  Both clinician and [facility] agreed upon reducing visits and returning every 3-4 weeks for a 'check in'  [facility] acknowledged moving forward with closure regarding partner's relationship  Currently moving items out of Alessandro's home  Sofi Esparza continues to call but [facility] remains consistent in telling him they will not get back  Denied SI     A: [facility] presented with appropriate mood and affect during session  He complains frequently about what others don't do 'for him' rather than taking the initiative to complete tasks he is frustrated with  As medication for anxiety has begun, and takes affect, [facility] feels more confident he can maintain employment moving forward  Demonstrating moderate progress  P: Continue individual therapy  Extend sessions to every 3-4 weeks as per [facility]  Discuss employment options and moving item's out of [address] [facility]  Pain Scale and Suicide Risk [facility]: Current Pain Assessment: no pain   Current suicide risk is low   Behavioral Health Treatment Plan [facility]: Diagnosis and Treatment Plan explained to patient, patient relates understanding diagnosis and is agreeable to Treatment Plan            Results/Data  GAD7 - Generalized Anxiety Disorder [id number] 11:41AM Cameron Vazquez     Test Name Result Flag Reference   GAD7 - Anxiety Severity Level Mild Anxiety     GAD7 - Difficulty Level Somewhat difficult     How difficult have those problems made it for you to do your work, take care of things at home, or get along with other people? GAD7 - Score 5     Over the last two weeks, how often have you been bothered by the following problems? Feeling nervous, anxious, or on edge Several days - 1  Not being able to stop or control worrying Not at all - 0  Worrying too much about different things Several days - 1  Trouble relaxing Not at all - 0  Being so restless that it's hard to sit still Not at all - 0  Becoming easily annoyed or irritable Nearly every day - 3  Feeling afraid as if something awful might happen Not at all - 0     PHQ-9 Adult Depression Screening [id number] 11:40AM Nisa Burdick     Test Name Result Flag Reference   PHQ-9 Adult Depression Score 9     Over the last two weeks, how often have you been bothered by any of the following problems? Little interest or pleasure in doing things: Several days - 1  Feeling down, depressed, or hopeless: Several days - 1  Trouble falling or staying asleep, or sleeping too much: Several days - 1  Feeling tired or having little energy: Several days - 1  Poor appetite or over eating: Not at all - 0  Feeling bad about yourself - or that you are a failure or have let yourself or your family down: Not at all - 0  Trouble concentrating on things, such as reading the newspaper or watching television: More than half the days - 2  Moving or speaking so slowly that other people could have noticed  Or the opposite -  being so fidgety or restless that you have been moving around a lot more than usual: Nearly every day - 3  Thoughts that you would be better off dead, or of hurting yourself in some way: Not at all - 0   PHQ-9 Adult Depression Screening Negative     PHQ-9 Difficulty Level Somewhat difficult     PHQ-9 Severity Mild Depression         Assessment    1  Moderate episode of recurrent major depressive disorder (296 32) (F33 1)   2   Generalized anxiety disorder (300 02) (F41 1)    Signatures Electronically signed by : Bonita Mattson LCSW; Aug  7 2017  2:39PM EST                       (Author)
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
## 2018-01-16 NOTE — PROGRESS NOTES
History of Present Illness  [facility]:   He is being seen in follow-up  The patient is being seen regarding wellness screener, anxiety, depression, smoking cessation   Support/Coping: family, going to [facility] for [address] services  [facility]'s:         1  I like who I am  Yes, describes me exactly (12)   2  I am not an easy person to get along with    No, doesn't describe me at all (22)   3  I am basically a healthy person    Somewhat describes me (31)   4  I give up to easily    Somewhat describes me (41)   5  I have difficulty concentrating    Somewhat describes me (51)   6  I am happy with my family relationships    Somewhat describes me (61)   7  I am comfortable being around people    Yes, describes me exactly (72)     8  Walking up a flight of stairs    Some (81)   9  Running the length of a football field    Monia Le A Lot (90)     10  Sleeping    Some (101)   12  Getting tired easily    None (122)   13  Feeling depressed or sad    Some (131)   14  Nervousness    None (142)     15  Socialize with other people (talk or visit with friends or relatives A Lot ()   12  Take part in social, Restorationist, or recreation activities (meetings, Orthodoxy, movies, sports, parties)    Some (161)   17  Stay in your home, nursing home, or hospital because of sickness, injury, or other health problem None (172)   [address]     8 = 1   9 = 0   10 = 1   11 = 0   12 = 2   Sum = 4   PHYSICAL HEALTH SCORE 40      1 = 2   4 = 1   5 = 1   13 = 1   14 = 2   Sum = 7   MENTAL HEALTH SCORE 70      2 = 2   6 = 1   7 = 2   15 = 2   16 = 1   Sum = 8   SOCIAL HEALTH SCORE 80     Physical Health score = 40   Mental Health score = 70   Social Health score = 80   Sum = 190   GENERAL HEALTH SCORE 63 333      3 = 1   PERCEIVED HEALTH SCORE 50      1 = 2   2 = 2   4 = 1   6 = 1   7 = 2   Sum = 8   SELF-ESTEEM SCORE 80            2 = 2 and 0   5 = 1 and 1   7 = 2 and 0   10 = 1 and 1   12 = 2 and 0   14 = 2 and 0   Sum = 2   ANXIETY SCORE 16 666      4 = 1 and 1   5 = 1 and 1   10 = 1 and 1   12 = 2 and 0   13 = 1 and 1   Sum = 4   DEPRESSION SCORE 40      4 = 1, 1, 1 and 1   7 = 2 and 0   10 = 1 and 1   12 = 2 and 0   13 = 1 and 1   14 = 2 and 0   Sum = 4   ANXIETY-DEPRESSION ([facility]-AD) SCORE 28 572      11 = 0 and 2   PAIN SCORE 100      17 = 2 and 0   DISABILITY SCORE 0  Physical Exam    Objective: Orientation: oriented to person, oriented to place and oriented to time  Appearance: well developed, well nourished and appearance reflects stated age  Observed mood and affect: bizarre and expansive  Harm to self or others: None reported or observed  Substance abuse: None reported or observed  Assessment    1  Human immunodeficiency virus (HIV) disease (042) (B20)   2  Generalized anxiety disorder (300 02) (F41 1)   3  Dysphagia (787 20) (R13 10)    Plan    1  (1) CHLAMYDIA/GC AMPLIFIED DNA, PCR; Source:Urine, Unspecified Source;   Status:Active; Requested for:22Jwm5808;     2  (1) LIPID PANEL, FASTING; Status:Active; Requested for:98Vgw6733;     3  (1) CBC/PLT/DIFF; Status:Active; Requested for:33Qwd8756;    4  (1) COMPREHENSIVE METABOLIC PANEL; Status:Active; Requested for:44Dto6349;    5  (1) HIV-1 RNA QUANTITATIVE; [Do Not Release]; Status:Active; Requested   for:79Ohw9888;    6  (1) QUANTIFERON - TB GOLD; Status:Active; Requested for:35Kog2690;    7  (1) RPR; Status:Active; Requested for:41Smn8548;    8  (1) T LYMPH SUBSET (CD4); Status:Active;  Requested for:53Dfh4740;    9  (1) URINALYSIS (will reflex a microscopy if leukocytes, occult blood, protein or nitrites are   not within normal limits); Status:Active; Requested for:38Wjz6740;     10  (1) HEMOGLOBIN A1C; Status:Active; Requested for:99Grj9866;     [address] [facility]: Today, patient presents with anxiety, depression  [facility]  Consider/focus/continue consider working with [facility] on smoking cessation  The stage of change is action  Behavioral recommendations: 1  F/U with [facility] at next visit  2  Continue with MH services at [facility]  3  Attend PC appointment next week with CRNP to discuss medication options for anxiety  4  Consider working with [facility] on smoking cessation   Discussion Summary [facility]:   PT was seen for his behavioral health consultation with [facility] services were reviewed  PT completed his [facility] screener  PT was once again in a manic state as evidenced by pacing the room prior to [facility] entering, constant movement during [facility] visit, and rapid/disorganized speech  PT is active with [facility] services but he insisted that he will be transferred to [facility] in the near future  PT questioned [facility] about starting medication but [facility] reminded him that he was offered an appointment with CRNP to discuss/start psychotropic medications this week that he refused  PT instead elected to postpone that appointment until next week, during which time he will be able to discuss medications with CRNP  [facility] strongly recommended that he attend this appointment and continue to stay active with his [address] services at [facility]  Smoking cessation was discussed  He reports smoking less than 1 pack per day  While he thinks about quitting, he is not yet prepared to do so  However, he said that when he is ready to quit he would like to use Chantix  [facility] explained that Chantix is not used as a first round treatment option  PT then asked about Wellbutrin   [facility] acknowledged that Wellbutrin is used for both depression and and smoking cessation so it might be an option for him, but he was told to discuss this with CRNP at his upcoming visit  When asked about his readiness to quit, PT stated that he knows he should quit but that he doesn't want to right now because he's going through a lot and is already getting ready to make medication changes for anxiety and depression  [facility] agreed to hold off on pursuing smoking cessation for now but told PT that he can call at any time once he feels he is ready        Future Appointments    Date/Time Provider Specialty Site   01/08/2018 10:00 AM Terrence Stokes MD Infectious Disease ACS AT [place]   08/07/2017 11:00 AM Fredy Wells LCSW Psychiatry [facility]   09/01/2017 01:00 PM Fredy Wells [facility] Psychiatry [facility]   09/07/2017 02:00 PM Fredy Wells [facility] Psychiatry [facility]   09/15/2017 08:00 AM Fredy Wells [facility] Psychiatry [facility]   09/22/2017 09:00 AM Fredy Wells [facility] Psychiatry [facility]   08/07/2017 08:30 AM Royce Yang[address] ACS AT [address] [facility]   Electronically signed by : Matthew Duenas [address] 87; Aug  1 2017  3:03PM EST                       (Author)

## 2018-01-17 NOTE — PSYCH
Progress Note  Psychotherapy Provided [facility]: Individual Psychotherapy 50 minutes provided today  Goals addressed in session:   Addressed goals 1-3 of initial plan    D: Met with Portillo galvan  ROS; "My motivation is down"  Session focused upon Carmen feeling he may need an increase in Prozac as he continues to experience days of depression and lack of motivation  Frustrated as he is unable to identify triggers  Feels things are going well in his life  Discussed birthday, plans with family and friends  Further discussion regarding Dem[facility] idea of 'taking 2 forward and 1 back; as he continues to meet with [employer] but says is just for [employer], but it may be for Carmen as well  Denied SI     A: Carmen presented with baseline behavior presentation today  His mood and affect were appropriate  Acknowledged obstacles with [employer] and accepted more responsibility in the obstacles within their friendship demonstrating progress  P: Ongoing psychotherapy  Continue discussion of treatment goals  Pain Scale and Suicide Risk [facility]: Current Pain Assessment: no pain   Current suicide risk is low   Behavioral Health Treatment Plan [facility]: Diagnosis and Treatment Plan explained to patient, patient relates understanding diagnosis and is agreeable to Treatment Plan  Assessment    1  Generalized anxiety disorder (300 02) (F41 1)   2  Depression (311) (F32 9)   3   Panic attacks (300 01) (F41 0)    Signatures   Electronically signed by : Jacqueline Navarrete LCSW; Nov 14 2017  1:01PM EST                       (Author)

## 2018-01-17 NOTE — PROGRESS NOTES
Assessment    1  Human immunodeficiency virus (HIV) disease (042) (B20)   2  Shingles (053 9) (B02 9)   3  Right elbow tendinitis (727 09) (M77 8)    Plan    1  Omeprazole 40 MG Oral Capsule Delayed Release; TAKE 1 CAPSULE Daily    2  Stribild [phone] MG Oral Tablet; take 1 tablet by mouth daily    3  XR ELBOW 2 VIEW RIGHT; Status:Active; Requested for:91Tna9086;     4  ValACYclovir HCl - 1 GM Oral Tablet (Valtrex); TAKE 1 TABLET 3 TIMES DAILY    5  Bentyl 20 MG Oral Tablet (Dicyclomine HCl); TAKE 1 TABLET TWICE DAILY    Discussion/Summary  Discussion Summary:   1  HIV - Last CD4 stable, viral load undetectable  Patient to continue Stribild, 1 tab PO daily  Stressed adherence  Patient to continue routine f/u with Dr Guajardo Delay  2  Herpes Zoster Shingles - Discussed diagnosis with the patient  Patient to start 1gram Valtrex TID x7 days  Patient will seek medical care if the rash becomes worse or if he finds rash on other areas of his body  Instructed patient to try to increase his rest and reduce his stress to allow the virus to calm down  3  Right elbow pain - patient will complete xray  CRNP will follow up with any abnormal results  Patient ok to use low dose NSAIDS PRN  Recommended patient rest the arm and avoid lifting more than 10lbs for the next week  Seek medical care immediately if he loses motion or sensation in the right hand or forearm  Counseling Documentation With Imm: The patient was counseled regarding instructions for management, risk factor reductions, prognosis, patient and family education, impressions, risks and benefits of treatment options, importance of compliance with treatment  total time of encounter was 15 minutes and 10 minutes was spent counseling  Medication SE Review and Pt Understands Tx: Possible side effects of new medications were reviewed with the patient/guardian today  The treatment plan was reviewed with the patient/guardian   The patient/guardian understands and agrees with the treatment plan      Chief Complaint  Chief Complaint Free Text Note Form: Patient here c/o of a rash on chest area that started a couple of days  History of Present Illness  HPI: The patient presents with concerns about a rash on his left chest  He is very worried that it could be shingles because he showed it to his family and they got him very worried  He states the rash started last weekend as a few pimples and now he has a whole cluster of them  He reports they were very itchy at first, now the area is less itchy but tender  He reports they look like they might pop but he hasn't noticed any drainage yet  He reports that he has had chicken pox in the past      He also reports that he's starting to have elbow pain again, but this time it is on the right side  States it is much more bothersome than when it was on the left because he is right handed  States if he uses is right arm for a long time to carry things or lift things or write he starts to feel very sore and has to stop because the elbow becomes weak  He denies numbness and tingling in the right hand and hasn't lost any ROM or  strength  Hospital Based Practices Required Assessment:   Pain Assessment   the patient states they have pain  The pain is located in the right elbow  (on a scale of 0 to 10, the patient rates the pain at 6 )   Abuse And Domestic Violence Screen    Yes, the patient is safe at home  The patient states no one is hurting them  Depression And Suicide Screen  No, the patient has not had thoughts of hurting themself  No, the patient has not felt depressed in the past 7 days  Review of Systems  Focused-Male:   Constitutional: no fever or chills, feels well, no tiredness, no recent weight loss or weight gain  Musculoskeletal: joint stiffness and right elbow, but as noted in HPI     Integumentary: as noted in HPI    The patient presents with complaints of gradual onset of constant episodes of moderate a rash (left chest)  ROS Reviewed:   ROS reviewed  Active Problems    1  Abdominal pain (789 00) (R10 9)   2  Constipation (564 00) (K59 00)   3  Diarrhea (787 91) (R19 7)   4  Encounter for screening examination for sexually transmitted disease (V74 5) (Z11 3)   5  Encounter for screening for cardiovascular disorders (V81 2) (Z13 6)   6  Fecal urgency (787 63) (R15 2)   7  Gastroesophageal reflux disease with esophagitis (530 11) (K21 0)   8  Hemorrhage of anus and rectum (569 3) (K62 5)   9  Human immunodeficiency virus (HIV) disease (042) (B20)   10  Irritable bowel syndrome (564 1) (K58 9)   11  Testicular calcification (608 89) (N50 8)   12  Varicocele (456 4) (I86 1)    Past Medical History    1  History of Anxiety (300 00) (F41 9)   2  History of Dysuria (788 1) (R30 0)   3  History of Elbow stiffness, left (719 52) (M25 622)   4  History of backache (V13 59) (Z87 39)   5  History of backache (V13 59) (Z87 39)   6  History of low back pain (V13 59) (Z87 39)   7  History of Human immunodeficiency virus (HIV) disease (042) (B20)   8  History of Laceration of finger (883 0) (S61 219A)   9  History of Proctitis, chlamydial (099 52) (A56 3)   10  History of SOB (shortness of breath) on exertion (786 05) (R06 02)  Active Problems And Past Medical History Reviewed: The active problems and past medical history were reviewed and updated today  Family History  Mother    1  Family history of Type 2 Diabetes Mellitus  Father    2  Family history of Acute Myocardial Infarction (V17 3)  Family History Reviewed: The family history was reviewed and updated today  Social History    · Cigarette smoker (305 1) (F17 210)   · Sexually Active Monogamous Relationship  Social History Reviewed: The social history was reviewed and updated today  The social history was reviewed and is unchanged  Surgical History    1   History of Surgery Spermatic Cord Excision Of Varicocele  Surgical History Reviewed: The surgical history was reviewed and updated today  Current Meds   1  Bentyl 20 MG Oral Tablet; TAKE 1 TABLET TWICE DAILY; Therapy: 64QOJ7965 to Recorded   2  Omeprazole 40 MG Oral Capsule Delayed Release; TAKE 1 CAPSULE Daily; Therapy: 22DND7791 to (Last Rx:92Aga0635)  Requested for: 11CYL8917 Ordered   3  Stribild [phone] MG Oral Tablet; take 1 tablet by mouth daily; Therapy: 71BXQ9034 to 72 470 15 18)  Requested for: 63Dtu9748; Last   Rx:55Evo4056 Ordered  Medication List Reviewed: The medication list was reviewed and updated today  Allergies    1  No Known Drug Allergies    2  TOMATO    Vitals  Signs   Recorded: 06JKR3755 50:34UT   Systolic: 912  Diastolic: 74  Heart Rate: 75  Respiration: 18  Temperature: 97 3 F  O2 Saturation: 98  Height: 5 ft 7 in  Weight: 184 lb 11 90 oz  BMI Calculated: 28 94  BSA Calculated: 1 96    Physical Exam    Constitutional   General appearance: No acute distress, well appearing and well nourished  Musculoskeletal   Digits and nails: Normal without clubbing or cyanosis  Inspection/palpation of joints, bones, and muscles: Normal     Muscle strength: Normal strength throughout  Skin   Skin and subcutaneous tissue: Abnormal   (erythematous linear streak on the left chest) see drawing for location  Examination of the skin for lesions: Abnormal   Multiple, red papule(s) in a linear pattern  The papules range in size from 2 mm to 3 mm  Described as tender and soft, but not warm  Multiple pustule(s) in a linear pattern  The pustules range in size from 2 mm to 3 mm  Described as tender  see drawing for location  Additional Exam:  Right wrist has +2 palpable radial pulse, RIght hand is warm to the touch with no discoloration and capillary refill of <2 seconds  Patient has equal strong  strength in both hands  Right elbow has no visible trauma, no dislocation or palpable mass/swelling         Results/Data  (1) CBC/PLT/DIFF 52PNG5300 09:23AM Jon Rosario    Order Number: [id number]  TW Order Number: [id number]     Test Name Result Flag Reference   WBC COUNT 4 47 Thousand/uL  4 31-10 16   RBC COUNT 4 79 Million/uL  3 88-5 62   HEMOGLOBIN 15 0 g/dL  12 0-17 0   HEMATOCRIT 44 4 %  36 5-49 3   MCV 93 fL  82-98   MCH 31 3 pg  26 8-34 3   MCHC 33 8 g/dL  31 4-37 4   RDW 13 1 %  11 6-15 1   MPV 8 9 fL  8 9-12 7   PLATELET COUNT 800 Thousands/uL  149-390   nRBC AUTOMATED 0 /100 WBCs     NEUTROPHILS RELATIVE PERCENT 36 % L 43-75   LYMPHOCYTES RELATIVE PERCENT 51 % H 14-44   MONOCYTES RELATIVE PERCENT 10 %  4-12   EOSINOPHILS RELATIVE PERCENT 3 %  0-6   BASOPHILS RELATIVE PERCENT 0 %  0-1   NEUTROPHILS ABSOLUTE COUNT 1 59 Thousands/?L L 1 85-7 62   LYMPHOCYTES ABSOLUTE COUNT 2 28 Thousands/?L  0 60-4 47   MONOCYTES ABSOLUTE COUNT 0 44 Thousand/?L  0 17-1 22   EOSINOPHILS ABSOLUTE COUNT 0 12 Thousand/?L  0 00-0 61   BASOPHILS ABSOLUTE COUNT 0 02 Thousands/?L  0 00-0 10     (1) COMPREHENSIVE METABOLIC PANEL 86IIF9420 02:69SJ Jon Rosario    Order Number: [id number]   Order Number: [id number]     Test Name Result Flag Reference   GLUCOSE,RANDM 79 mg/dL     If the patient is fasting, the ADA then defines impaired fasting glucose as > 100 mg/dL and diabetes as > or equal to 123 mg/dL     SODIUM 138 mmol/L  136-145   POTASSIUM 4 5 mmol/L  3 5-5 3   CHLORIDE 106 mmol/L  100-108   CARBON DIOXIDE 28 mmol/L  21-32   ANION GAP (CALC) 4 mmol/L  4-13   BLOOD UREA NITROGEN 14 mg/dL  5-25   CREATININE 0 94 mg/dL  0 60-1 30   Standardized to IDMS reference method   CALCIUM 9 0 mg/dL  8 3-10 1   BILI, TOTAL 0 37 mg/dL  0 20-1 00   ALK PHOSPHATAS 113 U/L     ALT (SGPT) 29 U/L  12-78   AST(SGOT) 16 U/L  5-45   ALBUMIN 3 6 g/dL  3 5-5 0   TOTAL PROTEIN 7 7 g/dL  6 4-8 2   eGFR Non-African American      >60 0 ml/min/1 73sq m   [employer] Education Program recommendations are as follows:  GFR calculation is accurate only with a steady state creatinine  Chronic Kidney disease less than 60 ml/min/1 73 sq  meters  Kidney failure less than 15 ml/min/1 73 sq  meters  (1) T LYMPH SUBSET (CD4) [id number] 09:23AM Lorena Montanez   TW Order Number: [id number]     Test Name Result Flag Reference   CD4 T CELL ABSOLUTE 838 /uL  359 - 1519   CD4 % HELPER T CELL 38 1 %  30 8 - 58 5   WBC (CD4/8) 4 4 x10E3/uL  3 4 - 10 8   RBC 4 91 x10E6/uL  4 14 - 5 80   HGB (CD4/8) 15 4 g/dL  12 6 - 17 7   HCT (CD4/8) 47 0 %  37 5 - 51 0   MCV (CD4/8) 96 fL  79 - 97   MCH (CD4/8) 31 4 pg  26 6 - 33 0   MCHC (CD4/8) 32 8 g/dL  31 5 - 35 7   RDW (CD4/8) 13 9 %  12 3 - 15 4   PLT (CD4/8) 257 x10E3/uL  150 - 379   NEUTS (CD4/8) 35 %     LYMPHS (CD4/8) 50 %     MONOS (CD4/8) 11 %     EOS (CD4/8) 3 %     BASOS (CD4/8) 1 %     NEUTS,ABS  (CD4/8) 1 6 x10E3/uL  1 4 - 7 0   LYMP,ABS (CD4/8) 2 2 x10E3/uL  0 7 - 3 1   MONOS,ABS (CD4/8) 0 5 x10E3/uL  0 1 - 0 9   EOS, ABS (CD4/8) 0 1 x10E3/uL  0 0 - 0 4   BASO, ABS (CD4/8) 0 0 x10E3/uL  0 0 - 0 2   IIMM  GRANS,ABS (CD4/8) 0 0 x10E3/uL  0 0 - 0 1   IMM  GRANULOCYTES (CD4/8) 0 %     Performed at:  [address]  [id number]  : Sherron Singh MD, Phone:  [phone]     (1) HIV-1 RNA QUANTITATIVE [id number] 09:23AM Lorena Arreolai   TW Order Number: [id number]     Test Name Result Flag Reference   HIV-1 RNA QUANT <20 copies/mL     HIV-1 RNA not detectedThe reportable range for this assay is 20 to 10,000,000copies HIV-1 RNA/mL  HIV-1 RNA VIRAL LOAD LOG COMMENT cmt05olfe/mL     Unable to calculate result since non-numeric result obtained forcomponent test     Performed at:  [employer] [address]  [id number]  : Sherron Singh MD, Phone:  [phone]     Attending Note  Collaborating Physician Note: Collaborating Physician: I agree with the Advanced Practitioner note        Future Appointments    Date/Time Provider Specialty Site 01/16/2017 10:00 AM Peter Henry MD Internal Medicine ACS AT [place]   01/18/2017 11:30 AM Neal Castellanos, [address] [facility] ACS AT [address] [facility]   Electronically signed by : Mary Costa; Sep 16 2016  9:41AM EST                       (Author)    Electronically signed by : Jr Traore DO; Sep 19 2016  1:28PM EST                       (Author)

## 2018-01-18 NOTE — PSYCH
Progress Note  Psychotherapy Provided [facility]: Individual Psychotherapy 50 minutes provided today  Goals addressed in session:   Addressed goals 1-3 of initial plan    D: Met with Portillo galvan  ROS; experiencing ongoing anxiety and feels this is affecting his daily routine, ability to feel confident in a job and/or stop his shaking  More in depth discussion regarding communication and expressing needs to PCP in a clear and respectful manner as he is very frustrated  Brian Chavez stated he feels 'ignored and disregarded'  Further discussion focused upon former relationship with Colletta Able and recent communication between the two  Denied SI    A: Brian Chavez presented as highly agitated and tearful today  He is very frustrated with his current medical team however his rigidity prevents any level of compromise or Brian Chavez taking on some of the responsibility in scheduling services he is requesting  P: Continue individual therapy  Continue discussion regarding communication with Colletta Able  Pain Scale and Suicide Risk [facility]: Current Pain Assessment: no pain   Current suicide risk is low   Behavioral Health Treatment Plan [address] [place] [address] [address]: Diagnosis and Treatment Plan explained to patient, patient relates understanding diagnosis and is agreeable to Treatment Plan  Assessment    1  Generalized anxiety disorder (300 02) (F41 1)   2   Moderate episode of recurrent major depressive disorder (296 32) (F33 1)    Signatures   Electronically signed by : Bonita Mattson LCSW; Jul 25 2017 12:59PM EST                       (Author)

## 2018-01-18 NOTE — PROGRESS NOTES
Assessment    1  Human immunodeficiency virus (HIV) disease (042) (B20)   2  Gastroesophageal reflux disease with esophagitis (530 11) (K21 0)   3  Irritable bowel syndrome (564 1) (K58 9)   4  Diarrhea (787 91) (R19 7)   5  Constipation (564 00) (K59 00)   6  Abdominal pain (789 00) (R10 9)   7  History of Elbow stiffness, left (719 52) (M25 622)   8  History of Anxiety (300 00) (F41 9)    Plan   Gastroesophageal reflux disease with esophagitis    · Omeprazole 40 MG Oral Capsule Delayed Release; TAKE 1 CAPSULE Daily  Health Maintenance    · Follow-up visit in 6 months Evaluation and Treatment  Follow-up  Status: Hold For -  Scheduling  Requested for: 02IYZ4274  Human immunodeficiency virus (HIV) disease    · 1 - Blaze JAMES, Doc Alize  (Infectious Disease) Physician Referral  Consult  Status: Hold For  - Scheduling  Requested for: [address] Summary provided  : Yes    Human immunodeficiency virus (HIV) disease (042) (B20)          Discussion/Summary    1  HIV - Reviewed recent CD4 and VL with the patient, CD4 is stable and viral load is undetectable  Patient will continue with Stribild and will make switch to [place] once available on his insurance formulary  Stressed adherence and safe sex  Condoms and lube distributed today  Patient to continue routine f/u with Dr Chepe Valera, next appointment at [place] [address] today  2  IBS with mixed diarrhea and constipation - Patient seeing colo-rectal on a routine basis, next follow up is next week  Patient continues to use Bentyl which he notes has helped a bit  Reminded patient about fiber in diet and avoiding dairy  3  GERD - stop Famotidine, will switch back to Omeprazole  DIscussed trigger foods and encouraged patient to continue to be aware of them and work on avoiding them or limiting them as much as possible  Patient continues to stay upright after eating and sleeps propped up with pillows at night       4  Left Elbow Pain - resolved, patient to continue to monitor and will complete xray if symptoms return  5  Smoking - patient down to 3-4 cigarettes a day but admits to days when he still binges on cigarettes  Encouraged patient to continue to be mindful about smoking and to work on cutting back as much as possible since he is not interested in quitting at this time  6  Anxiety - Patient mood much more appropriate today and his is calmer and able to having good conversation with the providers  Patient should seek medical care immediately for SI/HI  Patient would still greatly benefit from routine therapy as he deals with large amounts of family and relationship stressors which cause significant anxiety and agitation  Possible side effects of new medications were reviewed with the patient/guardian today  The treatment plan was reviewed with the patient/guardian  The patient/guardian understands and agrees with the treatment plan   The patient was counseled regarding diagnostic results, instructions for management, risk factor reductions, prognosis, patient and family education, impressions, risks and benefits of treatment options, importance of compliance with treatment  total time of encounter was 30 minutes and 20 minutes was spent counseling  Counseling   The patient was not counseled on risk of mother to child HIV transmission  Patient reports there are no people in their life who should be tested for HIV  Education   general HIV education  adherence  prevention care  Chief Complaint  Patient is here for routine follow up  History of Present Illness  The patient presents for routine follow up with CLAYTON  He reports that he is also here to see Dr Veronica Penn for HIV follow up today  He states that he feels he is doing well on HAART, no problems with the Stribild and is wondering when he will be [facility]  He reports that the Famotidine is not working as well as the Omeprazole did  He is hoping to switch back      He also reports that he has completed all of his specialist follow ups since he was last seen  He saw the urologist for final follow up s/p B/L varicoceles and was told he was doing well  He will follow up with them PRN  He saw colo-rectal for ongoing rectal fullness issues and mixed IBS and started Bentyl  He reports that his first week on Bentayl was rough because it gave him a lot of abdominal pain and cramping  States now he feels it is helping but he is not yet having daily bowel movements  He sees colo-rectal again next week to discuss this with them further  He also reports that his left elbow got better so he felt he didn't need the elbow  He reports that he stretched it out and did exercises and the pain went away  The patient is being seen for a routine clinic follow-up of HIV infection  The patient is currently asymptomatic  No associated symptoms are reported  Current treatment includes antiretroviral regimen  By report, there is good compliance with treatment, good tolerance of treatment and good symptom control  (Stribild - no missed or late doses)     CD4: 838  VL: <20  Time spent: 10 minutes  Strength: no side effects  Weakness: possible issues in the future with TDF related kidney and bone issues  Plan of Action: make switch to [place] once its on patient's formulary  Review of Systems    Constitutional: No fever or chills, feels well, no tiredness, no recent weight gain or weight loss  Eyes: No complaints of eye pain, no red eyes, no discharge from eyes, no itchy eyes  ENT: no complaints of earache, no hearing loss, no nosebleeds, no nasal discharge, no sore throat, no hoarseness  Cardiovascular: No complaints of slow heart rate, no fast heart rate, no chest pain, no palpitations, no leg claudication, no lower extremity  Respiratory: No complaints of shortness of breath, no wheezing, no cough, no SOB on exertion, no orthopnea or PND     Gastrointestinal: IBS with mixed diarrhea and constipation, but as noted in HPI  Genitourinary: s/p B/L varicocele, but as noted in HPI and no testicular pain  Musculoskeletal: resolved left elbow pain, but No complaints of arthralgia, no myalgias, no joint swelling or stiffness, no limb pain or swelling  Integumentary: No complaints of skin rash or skin lesions, no itching, no skin wound, no dry skin  Neurological: No compliants of headache, no confusion, no convulsions, no numbness or tingling, no dizziness or fainting, no limb weakness, no difficulty walking  Psychiatric: anxiety, but not suicidal, no personality change, no sleep disturbances, no depression and no emotional problems  Endocrine: No complaints of proptosis, no hot flashes, no muscle weakness, no erectile dysfunction, no deepening of the voice, no feelings of weakness  Hematologic/Lymphatic: No complaints of swollen glands, no swollen glands in the neck, does not bleed easily, no easy bruising  ROS reviewed  Active Problems     1  Abdominal pain (789 00) (R10 9)   2  Constipation (564 00) (K59 00)   3  Diarrhea (787 91) (R19 7)   4  Encounter for screening examination for sexually transmitted disease (V74 5) (Z11 3)   5  Encounter for screening for cardiovascular disorders (V81 2) (Z13 6)   6  Fecal urgency (787 63) (R15 2)   7  Gastroesophageal reflux disease with esophagitis (530 11) (K21 0)   8  Hemorrhage of anus and rectum (569 3) (K62 5)   9  Irritable bowel syndrome (564 1) (K58 9)   10  Testicular calcification (608 89) (N50 8)   11  Varicocele (456 4) (I86 1)    Human immunodeficiency virus (HIV) disease (042) (B20)          Past Medical History    1  History of Anxiety (300 00) (F41 9)   2  History of Dysuria (788 1) (R30 0)   3  History of Elbow stiffness, left (719 52) (M25 622)   4  History of backache (V13 59) (Z87 39)   5  History of backache (V13 59) (Z87 39)   6  History of low back pain (V13 59) (Z87 39)   7   History of Human immunodeficiency virus (HIV) disease (042) (B20)   8  History of Laceration of finger (883 0) (S61 219A)   9  History of Proctitis, chlamydial (099 52) (A56 3)   10  History of SOB (shortness of breath) on exertion (786 05) (R06 02)    The active problems and past medical history were reviewed and updated today  Surgical History    1  History of Surgery Spermatic Cord Excision Of Varicocele    The surgical history was reviewed and updated today  Family History  Mother    1  Family history of Type 2 Diabetes Mellitus  Father    2  Family history of Acute Myocardial Infarction (V17 3)    The family history was reviewed and updated today  Social History    · Cigarette smoker (305 1) (F17 210)   · Sexually Active Monogamous Relationship  The social history was reviewed and updated today  The social history was reviewed and is unchanged  Current Meds   1  Bentyl 20 MG Oral Tablet; TAKE 1 TABLET TWICE DAILY; Therapy: 27CQD9957 to Recorded   2  Stribild [id number] MG Oral Tablet; take 1 tablet by mouth daily; Therapy: [id number] to ([facility])  Requested for: 28Jun2016; Last   Rx:28Jun2016 Ordered    The medication list was reviewed and updated today  Allergies    1  No Known Drug Allergies    2  TOMATO    Vitals  Signs   Recorded: 95KSP9595 96:50ZH   Systolic: 472  Diastolic: 90  Heart Rate: 104  Pain Scale: 0  O2 Saturation: 98  Weight: 180 lb 5 36 oz  BMI Calculated: 28 24  BSA Calculated: 1 94    Physical Exam    Constitutional   General appearance: No acute distress, well appearing and well nourished  Eyes   Conjunctiva and lids: No swelling, erythema or discharge  Pupils and irises: Equal, round and reactive to light  Ears, Nose, Mouth, and Throat   Oropharynx: Normal with no erythema, edema, exudate or lesions  Pulmonary   Respiratory effort: No increased work of breathing or signs of respiratory distress  Auscultation of lungs: Abnormal   rhonchi over the right apex  wheezing over the right apex  Cardiovascular   Auscultation of heart: Normal rate and rhythm, normal S1 and S2, without murmurs  Examination of extremities for edema and/or varicosities: Normal     Abdomen   Abdomen: Non-tender, no masses  Liver and spleen: No hepatomegaly or splenomegaly  Psychiatric   Orientation to person, place, and time: Normal     Mood and affect: Abnormal   Mood and Affect: anxious  Results/Data  (1) CBC/PLT/DIFF [id number] 09:23AM [facility] Order Number: [id number]   Order Number: [id number]     Test Name Result Flag Reference   WBC COUNT 4 47 Thousand/uL  4 31-10 16   RBC COUNT 4 79 Million/uL  3 88-5 62   HEMOGLOBIN 15 0 g/dL  12 0-17 0   HEMATOCRIT 44 4 %  36 5-49 3   MCV 93 fL  82-98   MCH 31 3 pg  26 8-34 3   MCHC 33 8 g/dL  31 4-37 4   RDW 13 1 %  11 6-15 1   MPV 8 9 fL  8 9-12 7   PLATELET COUNT 326 Thousands/uL  149-390   nRBC AUTOMATED 0 /100 WBCs     NEUTROPHILS RELATIVE PERCENT 36 % L 43-75   LYMPHOCYTES RELATIVE PERCENT 51 % H 14-44   MONOCYTES RELATIVE PERCENT 10 %  4-12   EOSINOPHILS RELATIVE PERCENT 3 %  0-6   BASOPHILS RELATIVE PERCENT 0 %  0-1   NEUTROPHILS ABSOLUTE COUNT 1 59 Thousands/?L L 1 85-7 62   LYMPHOCYTES ABSOLUTE COUNT 2 28 Thousands/?L  0 60-4 47   MONOCYTES ABSOLUTE COUNT 0 44 Thousand/?L  0 17-1 22   EOSINOPHILS ABSOLUTE COUNT 0 12 Thousand/?L  0 00-0 61   BASOPHILS ABSOLUTE COUNT 0 02 Thousands/?L  0 00-0 10     (1) COMPREHENSIVE METABOLIC PANEL [id number] 28:45WT [facility] Order Number: [id number]  TW Order Number: [id number]     Test Name Result Flag Reference   GLUCOSE,RANDM 79 mg/dL     If the patient is fasting, the ADA then defines impaired fasting glucose as > 100 mg/dL and diabetes as > or equal to 123 mg/dL     SODIUM 138 mmol/L  136-145   POTASSIUM 4 5 mmol/L  3 5-5 3   CHLORIDE 106 mmol/L  100-108   CARBON DIOXIDE 28 mmol/L  21-32   ANION GAP (CALC) 4 mmol/L  4-13   BLOOD UREA NITROGEN 14 mg/dL  5-25   CREATININE 0 94 mg/dL 0  60-1 30   Standardized to IDMS reference method   CALCIUM 9 0 mg/dL  8 3-10 1   BILI, TOTAL 0 37 mg/dL  0 20-1 00   ALK PHOSPHATAS 113 U/L     ALT (SGPT) 29 U/L  12-78   AST(SGOT) 16 U/L  5-45   ALBUMIN 3 6 g/dL  3 5-5 0   TOTAL PROTEIN 7 7 g/dL  6 4-8 2   eGFR Non-African American      >60 0 ml/min/1 73sq m   [facility] Disease Education Program recommendations are as follows:  GFR calculation is accurate only with a steady state creatinine  Chronic Kidney disease less than 60 ml/min/1 73 sq  meters  Kidney failure less than 15 ml/min/1 73 sq  meters  (1) T LYMPH SUBSET (CD4) [id number] 09:23AM [facility]    Order Number: [id number]     Test Name Result Flag Reference   CD4 T CELL ABSOLUTE 838 /uL  359 - 1519   CD4 % HELPER T CELL 38 1 %  30 8 - 58 5   WBC (CD4/8) 4 4 x10E3/uL  3 4 - 10 8   RBC 4 91 x10E6/uL  4 14 - 5 80   HGB (CD4/8) 15 4 g/dL  12 6 - 17 7   HCT (CD4/8) 47 0 %  37 5 - 51 0   MCV (CD4/8) 96 fL  79 - 97   MCH (CD4/8) 31 4 pg  26 6 - 33 0   MCHC (CD4/8) 32 8 g/dL  31 5 - 35 7   RDW (CD4/8) 13 9 %  12 3 - 15 4   PLT (CD4/8) 257 x10E3/uL  150 - 379   NEUTS (CD4/8) 35 %     LYMPHS (CD4/8) 50 %     MONOS (CD4/8) 11 %     EOS (CD4/8) 3 %     BASOS (CD4/8) 1 %     NEUTS,ABS  (CD4/8) 1 6 x10E3/uL  1 4 - 7 0   LYMP,ABS (CD4/8) 2 2 x10E3/uL  0 7 - 3 1   MONOS,ABS (CD4/8) 0 5 x10E3/uL  0 1 - 0 9   EOS, ABS (CD4/8) 0 1 x10E3/uL  0 0 - 0 4   BASO, ABS (CD4/8) 0 0 x10E3/uL  0 0 - 0 2   IIMM  GRANS,ABS (CD4/8) 0 0 x10E3/uL  0 0 - 0 1   IMM  GRANULOCYTES (CD4/8) 0 %     Performed at:  [employer] [employer] [address]  [id number]  : Anastasia Rosenberg MD, Phone:  [phone]     (1) HIV-1 RNA QUANTITATIVE [id number] 09:23AM [facility]    Order Number: [id number]     Test Name Result Flag Reference   HIV-1 RNA QUANT <20 copies/mL     HIV-1 RNA not detectedThe reportable range for this assay is 20 to 10,000,000copies HIV-1 RNA/mL     HIV-1 RNA VIRAL LOAD LOG COMMENT yqy75ehib/mL     Unable to calculate result since non-numeric result obtained forcomponent test     Performed at:  [facility] [address]  [id number]  : Paola Mann MD, Phone:  [phone]     Attending Note  Collaborating Physician: I agree with the Advanced Practitioner note        Future Appointments    Date/Time Provider Specialty Site   01/16/2017 10:00 AM Pham Thakur MD Internal Medicine [facility] AT [facility]   01/18/2017 11:30 AM Annelise Ibarra, [address] [facility] AT [address] [id number]   Electronically signed by : Lisa Gorman; Jul 18 2016  9:54AM EST                       (Author)    Electronically signed by : Lisa Gorman; Jul 18 2016  9:55AM EST                       (Author)    Electronically signed by : Anival Graves DO; Jul 18 2016 11:50AM EST                       (Author)

## 2018-01-22 VITALS
RESPIRATION RATE: 16 BRPM | HEART RATE: 109 BPM | SYSTOLIC BLOOD PRESSURE: 142 MMHG | DIASTOLIC BLOOD PRESSURE: 92 MMHG | BODY MASS INDEX: 28.29 KG/M2 | WEIGHT: 180.6 LBS

## 2018-01-22 VITALS
HEIGHT: 67 IN | SYSTOLIC BLOOD PRESSURE: 108 MMHG | DIASTOLIC BLOOD PRESSURE: 69 MMHG | HEART RATE: 73 BPM | OXYGEN SATURATION: 99 % | BODY MASS INDEX: 28.24 KG/M2 | WEIGHT: 179.9 LBS | TEMPERATURE: 97.7 F

## 2018-01-23 VITALS
BODY MASS INDEX: 28.19 KG/M2 | WEIGHT: 180 LBS | DIASTOLIC BLOOD PRESSURE: 64 MMHG | TEMPERATURE: 97.6 F | HEART RATE: 104 BPM | OXYGEN SATURATION: 97 % | SYSTOLIC BLOOD PRESSURE: 124 MMHG

## 2018-01-23 VITALS
OXYGEN SATURATION: 98 % | DIASTOLIC BLOOD PRESSURE: 64 MMHG | TEMPERATURE: 97.3 F | HEIGHT: 67 IN | WEIGHT: 179.01 LBS | HEART RATE: 106 BPM | SYSTOLIC BLOOD PRESSURE: 108 MMHG | BODY MASS INDEX: 28.1 KG/M2

## 2018-01-23 VITALS — BODY MASS INDEX: 29.76 KG/M2 | WEIGHT: 190 LBS
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
## 2018-01-23 NOTE — PSYCH
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  Discussion regarding calling when approximately 1-2 weeks is left so there is no lapse  Still taking Risperdal as ordered  Denied SI     A: Gayla Ortiz presented with baseline behavior presentation today  His mood and affect were appropriate  More fidgetiness  Continues to help parents, utilize social supports  P: Ongoing psychotherapy  Continue discussion of treatment goals  Pain Scale and Suicide Risk [facility]: Current Pain Assessment: no pain   Current suicide risk is low   Behavioral Health Treatment Plan [facility]: Diagnosis and Treatment Plan explained to patient, patient relates understanding diagnosis and is agreeable to Treatment Plan  Results/Data  PHQ-9 Adult Depression Screening 36ETQ5905 12:36PM Jennifer Yanes     Test Name Result Flag Reference   PHQ-9 Adult Depression Score 5     Over the last two weeks, how often have you been bothered by any of the following problems?   Little interest or pleasure in doing things: Not at all - 0  Feeling down, depressed, or hopeless: Not at all - 0  Trouble falling or staying asleep, or sleeping too much: More than half the days - 2  Feeling tired or having little energy: Not at all - 0  Poor appetite or over eating: Not at all - 0  Feeling bad about yourself - or that you are a failure or have let yourself or your family down: Not at all - 0  Trouble concentrating on things, such as reading the newspaper or watching television: Several days - 1  Moving or speaking so slowly that other people could have noticed  Or the opposite -  being so fidgety or restless that you have been moving around a lot more than usual: More than half the days - 2  Thoughts that you would be better off dead, or of hurting yourself in some way: Not at all - 0   PHQ-9 Adult Depression Screening Negative     PHQ-9 Difficulty Level Somewhat difficult     PHQ-9 Severity Mild Depression         Assessment    1  Generalized anxiety disorder (300 02) (F41 1)   2  Depression (311) (F32 9)   3   Panic attacks (300 01) (F41 0)    Signatures   Electronically signed by : Augustin Gill LCSW; Dec  8 2017  1:55PM EST                       (Author)

## 2018-01-23 NOTE — PROGRESS NOTES
Assessment    1  Human immunodeficiency virus (HIV) disease (042) (B20)   2  Agitation (307 9) (R45 1)   3  Dysphagia (787 20) (R13 10)   4  Jerky body movements (781 0) (R25 8)   5  Generalized anxiety disorder (300 02) (F41 1)   6  Nicotine dependence (305 1) (F17 200)    Plan    1  (1) CHLAMYDIA/GC AMPLIFIED DNA, PCR; Source:Urine, Unspecified Source;   Status:Active; Requested DOL:29VHE7590;     2  Follow-up visit in 6 months Evaluation and Treatment  Follow-up  Status: Hold For -   Scheduling  Requested for: 17HUJ8963    3  (1) HEP C ANTIBODY; Status:Active; Requested [id number];    4  (1) QUANTIFERON - TB GOLD; Status:Active; Requested QRY:83URX8955;    5  (1) RPR; Status:Active; Requested HOE:07QRY3921;    6  (1) URINALYSIS (will reflex a microscopy if leukocytes, occult blood, protein or nitrites are   not within normal limits); Status:Active; Requested RSV:51XUM6825;    7  Pneumo (Pneumovax); INJECT 0 5  ML Intramuscular; To Be Done:   83JCW1132    Discussion/Summary    HIV-doing well on Genvoya with an undetectable viral load and a CD4 count in the 900s  Continue a RT, recheck labs in 5 months, follow up in 6 months    Generalized anxiety disorder-with the patient's significant purposes jerky body movements, consideration for the possibility of Tourettes syndrome  Patient seems quite agitated and is making commentary about wanting to change primary  Discussed in detail with the primary  Also discussed in detail with the behavioral health who was evaluating the patient  Nicotine dependence-patient states he is interested in stopping smoking  Encouraged him to pursue complete tobacco cessation  Will have her tobacco cessation program engage the patient    Dysphagia-being followed by GI  He apparently was seen earlier this morning by GI  Await their input  Possible side effects of new medications were reviewed with the patient/guardian today     The treatment plan was reviewed with the patient/guardian  The patient/guardian understands and agrees with the treatment plan   The patient was counseled regarding diagnostic results, instructions for management, prognosis, risks and benefits of treatment options, importance of compliance with treatment  Education   general HIV education  adherence  prevention care  Chief Complaint  The patient is here for HIV follow up      History of Present Illness  Routine follow-up for HIV  Patient claims 100% adherence with Genvoya  He denies any notable side effects  He seems quite agitated today and talking by changing his primary care provider  The patient is being seen for a routine clinic follow-up of HIV infection  The patient is currently asymptomatic  Active Problems    1  Agitation (307 9) (R45 1)   2  Chronic heel pain (729 5,338 29) (M79 673,G89 29)   3  Contact with and (suspected) exposure to infections with a predominantly sexual mode   of transmission (V01 6) (Z20 2)   4  Cough (786 2) (R05)   5  Depression (311) (F32 9)   6  Diarrhea (787 91) (R19 7)   7  Dysphagia (787 20) (R13 10)   8  Elevated liver enzymes (790 5) (R74 8)   9  Encounter for screening examination for sexually transmitted disease (V74 5) (Z11 3)   10  Encounter for screening for cardiovascular disorders (V81 2) (Z13 6)   11  Gastroesophageal reflux disease with esophagitis (530 11) (K21 0)   12  Generalized anxiety disorder (300 02) (F41 1)   13  High risk medication use (V58 69) (Z79 899)   14  Human immunodeficiency virus (HIV) disease (042) (B20)   13  Irritable bowel syndrome (564 1) (K58 9)   16  Jerky body movements (781 0) (R25 8)   17  Panic attacks (300 01) (F41 0)   18  Screening for diabetes mellitus (DM) (V77 1) (Z13 1)   19  Seasonal allergic rhinitis due to pollen (477 0) (J30 1)   20  Testicular calcification (608 89) (N50 89)   21  Tinnitus, bilateral (388 30) (H93 13)   22  Varicocele (456 4) (I86 1)   23   Wheezing (786 07) (R06 2)    Past Medical History    1  History of Anxiety (300 00) (F41 9)   2  History of Dysuria (788 1) (R30 0)   3  History of Elbow stiffness, left (719 52) (M25 622)   4  History of Fecal urgency (787 63) (R15 2)   5  History of Hemorrhage of anus and rectum (569 3) (K62 5)   6  History of abdominal pain (V13 89) (Z87 898)   7  History of backache (V13 59) (Z87 39)   8  History of backache (V13 59) (Z87 39)   9  History of constipation (V12 79) (Z87 19)   10  History of herpes zoster (V12 09) (Z86 19)   11  History of low back pain (V13 59) (Z87 39)   12  History of Human immunodeficiency virus (HIV) disease (042) (B20)   13  History of Laceration of finger (883 0) (S61 219A)   14  History of Moderate episode of recurrent major depressive disorder (296 32) (F33 1)   15  History of Proctitis, chlamydial (099 52) (A56 3)   16  History of Right elbow tendinitis (727 09) (M77 8)   17  History of SOB (shortness of breath) on exertion (786 05) (R06 02)    Surgical History    1  History of Surgery Spermatic Cord Excision Of Varicocele    Family History  Mother    1  Family history of Type 2 Diabetes Mellitus  Father    2  Family history of Acute Myocardial Infarction (V17 3)  Cousin    3  Family history of substance abuse (V17 0) (Z81 4)   4  Denied: Family history of suicide  Family History    5  Denied: Family history of Anxiety   6  Denied: Family history of bipolar disorder   7  Denied: Family history of depression   8  Denied: Family history of schizophrenia    Social History    · Cigarette smoker (305 1) (F17 210)   · Sexually Active Monogamous Relationship    Current Meds   1  Bentyl 10 MG Oral Capsule; Take 1 Tablet PO BID as needed; Therapy: 21Apr2017 to Recorded   2  Dicyclomine HCl - 10 MG Oral Capsule; TAKE 1 CAPSULE Twice daily PRN; Therapy: 67HBE0209 to (Era Webber)  Requested for: 72LJC7562; Last   Rx:08Jan2018 Ordered   3  FLUoxetine HCl - 40 MG Oral Capsule; 1 CAPSULE DAILY;    Therapy: 72Nff5117 to (Last Rx:07Zrd8308)  Requested for: 30UST5796 Ordered   4  Genvoya 128-826-203-10 MG Oral Tablet; take 1 tablet by mouth daily; Therapy: 31FRJ7479 to (Evaluate:20Jan2018)  Requested for: 21Nov2017; Last   Rx:21Nov2017 Ordered   5  Loratadine 10 MG Oral Tablet; take 1 tablet by mouth daily; Therapy: 67JXW7795 to (Evaluate:13Nov2017)  Requested for: 74Sri4843; Last   Rx:79Ivh7739; Status: ACTIVE - Renewal Denied Ordered   6  Mometasone Furoate 50 MCG/ACT Nasal Suspension; USE 1 SPRAY IN EACH   NOSTRIL TWICE DAILY; Therapy: 77KEA8195 to (Last EN:04ETG9047)  Requested for: 95Hoa4831 Ordered   7  Omeprazole 40 MG Oral Capsule Delayed Release; take 1 capsule by mouth daily; Therapy: 35UME9212 to (Evaluate:13Nov2017)  Requested for: 81Sjz2846; Last   Rx:38Qvn8163; Status: ACTIVE - Renewal Denied Ordered   8  RisperiDONE 0 5 MG Oral Tablet; TAKE 1 TABLET NIGHTLY; Therapy: 49Wnj1271 to (Evaluate:31Mar2018)  Requested for: 11KNE5673; Last   Rx:19Oif0810 Ordered   9  Ventolin  (90 Base) MCG/ACT Inhalation Aerosol Solution; INHALE 1 TO 2 PUFFS   EVERY 4 TO 6 HOURS AS NEEDED; Therapy: 64GTL3595 to (Last KB:12QJE1764)  Requested for: 70Vpx7793 Ordered    Allergies    1  No Known Drug Allergies    2  TOMATO    Vitals  Signs   Recorded: 61NTX1580 10:51AM   Temperature: 97 3 F  Heart Rate: 547  Systolic: 670  Diastolic: 64  Height: 5 ft 7 in  Weight: 179 lb 0 19 oz  BMI Calculated: 28 04  BSA Calculated: 1 93  O2 Saturation: 98  Recorded: 07CWJ4578 10:14AM   Temperature: 97 3 F  Heart Rate: 576  Systolic: 637  Diastolic: 64  Height: 5 ft 7 in  Weight: 179 lb 0 19 oz  BMI Calculated: 28 04  BSA Calculated: 1 93  O2 Saturation: 98    Physical Exam    Constitutional   General appearance: Abnormal   Agitated, fidgeting, moving extremities without purpose  Ears, Nose, Mouth, and Throat   Oropharynx: Normal with no erythema, edema, exudate or lesions      Pulmonary   Respiratory effort: No increased work of breathing or signs of respiratory distress  Auscultation of lungs: Clear to auscultation  Cardiovascular   Auscultation of heart: Normal rate and rhythm, normal S1 and S2, without murmurs  Examination of extremities for edema and/or varicosities: Normal     Abdomen   Abdomen: Non-tender, no masses  Liver and spleen: No hepatomegaly or splenomegaly  Lymphatic   Palpation of lymph nodes in neck: No lymphadenopathy  Health Management  Dysphagia   EGD; every 3 years; Last 65ODR9287; Next Due: 99NTS5830;  Active    Future Appointments    Date/Time Provider Specialty Site   01/24/2018 01:00 PM Estevan Palomo [facility] Psychiatry [facility]   02/20/2018 10:00 AM Estevan Palomo [facility] Psychiatry [facility]   03/20/2018 10:00 AM Clenile Palomo [facility]     Signatures   Electronically signed by : Marii Colvin MD; Jan 8 2018 10:58AM EST                       (Author)

## 2018-01-23 NOTE — MISCELLANEOUS
Message  Was going to run out of prozac, so sent to pharmacy locally  He also told me he needs dicyclomine  I reiterated that he needs to f/u with prescribing provider and supported him when discussing frustrations        Plan  PMH: Moderate episode of recurrent major depressive disorder    · FLUoxetine HCl - 40 MG Oral Capsule; 1 CAPSULE DAILY    Signatures   Electronically signed by : Myrna Barragan DO; Dec 15 2017  2:30PM EST                       (Author)

## 2018-01-23 NOTE — PROGRESS NOTES
History of Present Illness  [facility]:   He is being seen in follow-up  The patient is being seen regarding anxiety   Support/Coping: going to [facility] for therapy and psychiatry  Physical Exam    Objective: Orientation: oriented to person, oriented to place and oriented to time  Appearance: uncomfortable, but well developed, well nourished and appearance reflects stated age  Observed mood and affect:  manic  Harm to self or others: none reported or observed  Substance abuse: none reported or observed  Assessment    1  Human immunodeficiency virus (HIV) disease (042) (B20)   2  Agitation (307 9) (R45 1)   3  Dysphagia (787 20) (R13 10)   4  Jerky body movements (781 0) (R25 8)   5  Generalized anxiety disorder (300 02) (F41 1)   6  Nicotine dependence (305 1) (F17 200)    Plan    1  (1) CHLAMYDIA/GC AMPLIFIED DNA, PCR; Source:Urine, Unspecified Source;   Status:Active; Requested ERIC:[id number];     2  Follow-up visit in 6 months Evaluation and Treatment  Follow-up  Status: Complete    Done: 31SEP5010    3  (1) HEP C ANTIBODY; Status:Active; Requested CPK:70THR4362;    4  (1) QUANTIFERON - TB GOLD; Status:Active; Requested TPF:34MLS1909;    5  (1) RPR; Status:Active; Requested [id number];    6  (1) URINALYSIS (will reflex a microscopy if leukocytes, occult blood, protein or nitrites are   not within normal limits); Status:Active; Requested GJI:61QUX9613;    7  Pneumo (Pneumovax); INJECT 0 5  ML Intramuscular; To Be Done:   11FIB8619   8  Pneumo (Pneumovax)    Discussion/Summary  [facility]: Today, patient presents with anxiety  Patient will likely benefit from continuing services with [facility]  The stage of change is action  Behavioral recommendations: 1  F/U with [facility] at next visit   2  Keep all [address] appointments with [facility]  3  Take all medication as prescribed   Discussion Summary [facility]:   PT was see for his behavioral health consultation with [facility]  EUSEBIA ZAVALETA [facility] services were reviewed  PT was observed to be very manic throughout today's visit  He was moving around the room and rocking in his chair with very rapid speech and loose thought content  He was very focused on his anger with CRNP and stated that he was anxious and "jumpy" because of this  [address] needed to repeatedly redirect PT as he was unable to stay focused for longer than 30 seconds  It did not appear that PT is actively taking his psychotropic medications, although he said that he takes them daily  He is still attending therapy sessions 1x per month and seeing his psychiatrist 1x every three months  Erick Patricio strongly recommended that he continue to see his [place] [id number] providers and to discuss his reported anxiety with them  Erick Patricio suspects that PT's altered mood and affect seen today are related to anxiety, but rather from missing doses of his psychotropic medications, which should be addressed with his [place] [address] providers        Future Appointments    Date/Time Provider Specialty Site   07/16/2018 10:00 AM Abril Moon MD Infectious Disease [facility]   01/24/2018 01:00 PM Pooja Copeland LCSW Psychiatry [facility]   02/20/2018 10:00 AM Pooja Copeland LCSW Psychiatry [facility]   03/20/2018 10:00 AM Pooja Copeland LCSW Psychiatry [facility]     Signatures   Electronically signed by : Matthew Nascimento [address]; Jan 8 2018  1:28PM EST                       (Author)

## 2018-01-23 NOTE — PROGRESS NOTES
History of Present Illness    Assessment:    High triglycerides/GI upset  Nutrition Diagnosis Continue Previous Nutrition Diagnosis   Intervention Nutrition Education and Med/Diet Compliance   Monitor And Evaluation Goal #1 - Comprehend education, Goal #1 is extended  Met w/ pt for f/u  Pt states he has cut back on soda intake  States he still has 2 - 3 sodas every other day  Pt states drinks water instead  Pt states he has been struggling with diarrhea  Provided pt with Diarrhea Nutrition Therapy handout from [facility]  Educated pt on what he can eat/avoid to help manage diarrhea symptoms  Pt was curious if he had a milk/dairy allergy that may be affecting his diarrhea and was inquiring milk alternatives  Educated pt on almond, soy, rice milks for example  Told pt if he decided to use a milk alternative to get an unsweetened and unflavored variety to limit sugar intake for triglycerides  Pt understood  Will continue to monitor wt and labs  Active Problems    1  Agitation (307 9) (R45 1)   2  Chronic heel pain (729 5,338 29) (M79 673,G89 29)   3  Contact with and (suspected) exposure to infections with a predominantly sexual mode of   transmission (V01 6) (Z20 2)   4  Cough (786 2) (R05)   5  Depression (311) (F32 9)   6  Diarrhea (787 91) (R19 7)   7  Dysphagia (787 20) (R13 10)   8  Elevated liver enzymes (790 5) (R74 8)   9  Encounter for screening examination for sexually transmitted disease (V74 5) (Z11 3)   10  Encounter for screening for cardiovascular disorders (V81 2) (Z13 6)   11  Gastroesophageal reflux disease with esophagitis (530 11) (K21 0)   12  Generalized anxiety disorder (300 02) (F41 1)   13  High risk medication use (V58 69) (Z79 899)   14  Human immunodeficiency virus (HIV) disease (042) (B20)   13  Irritable bowel syndrome (564 1) (K58 9)   16  Jerky body movements (781 0) (R25 8)   17  Nicotine dependence (305 1) (F17 200)   18  Panic attacks (300 01) (F41 0)   19   Screening for diabetes mellitus (DM) (V77 1) (Z13 1)   20  Seasonal allergic rhinitis due to pollen (477 0) (J30 1)   21  Testicular calcification (608 89) (N50 89)   22  Tinnitus, bilateral (388 30) (H93 13)   23  Varicocele (456 4) (I86 1)   24  Wheezing (786 07) (R06 2)    Past Medical History    1  History of Anxiety (300 00) (F41 9)   2  History of Dysuria (788 1) (R30 0)   3  History of Elbow stiffness, left (719 52) (M25 622)   4  History of Fecal urgency (787 63) (R15 2)   5  History of Hemorrhage of anus and rectum (569 3) (K62 5)   6  History of abdominal pain (V13 89) (Z87 898)   7  History of backache (V13 59) (Z87 39)   8  History of backache (V13 59) (Z87 39)   9  History of constipation (V12 79) (Z87 19)   10  History of herpes zoster (V12 09) (Z86 19)   11  History of low back pain (V13 59) (Z87 39)   12  History of Human immunodeficiency virus (HIV) disease (042) (B20)   13  History of Laceration of finger (883 0) (S61 219A)   14  History of Moderate episode of recurrent major depressive disorder (296 32) (F33 1)   15  History of Proctitis, chlamydial (099 52) (A56 3)   16  History of Right elbow tendinitis (727 09) (M77 8)   17  History of SOB (shortness of breath) on exertion (786 05) (R06 02)    Surgical History    1  History of Surgery Spermatic Cord Excision Of Varicocele    Family History  Mother    1  Family history of Type 2 Diabetes Mellitus  Father    2  Family history of Acute Myocardial Infarction (V17 3)  Cousin    3  Family history of substance abuse (V17 0) (Z81 4)   4  Denied: Family history of suicide  Family History    5  Denied: Family history of Anxiety   6  Denied: Family history of bipolar disorder   7  Denied: Family history of depression   8  Denied: Family history of schizophrenia    Social History    · Cigarette smoker (305 1) (F17 210)   · Sexually Active Monogamous Relationship    Current Meds   1  Bentyl 10 MG Oral Capsule (Dicyclomine HCl); Take 1 Tablet PO BID as needed;    Therapy: 85Vsl7277 to Recorded   2  Dicyclomine HCl - 10 MG Oral Capsule (Bentyl); TAKE 1 CAPSULE Twice daily PRN; Therapy: 49QUV8993 to ([facility])  Requested for: 23FIP3748; Last Rx:08Jan2018   Ordered   3  FLUoxetine HCl - 40 MG Oral Capsule; 1 CAPSULE DAILY; Therapy: 25Aia1402 to (Last Rx:80Hue7966)  Requested for: 07TEF0695 Ordered   4  Genvoya [phone] MG Oral Tablet; take 1 tablet by mouth daily; Therapy: 52KAM0693 to (Evaluate:20Jan2018)  Requested for: 21Nov2017; Last Rx:21Nov2017   Ordered   5  Loratadine 10 MG Oral Tablet; take 1 tablet by mouth daily; Therapy: 07CVU8904 to (Evaluate:13Nov2017)  Requested for: 75Ily9547; Last Rx:27Pnu2205;   Status: ACTIVE - Renewal Denied Ordered   6  Mometasone Furoate 50 MCG/ACT Nasal Suspension (Nasonex); USE 1 SPRAY IN EACH NOSTRIL   TWICE DAILY; Therapy: 44EUT7188 to (Last XD:69GZL0372)  Requested for: 52Pst3326 Ordered   7  Omeprazole 40 MG Oral Capsule Delayed Release; take 1 capsule by mouth daily; Therapy: 82VOM2593 to (Evaluate:13Nov2017)  Requested for: 13Dec2017; Last Rx:70Brw2637;   Status: ACTIVE - Renewal Denied Ordered   8  RisperiDONE 0 5 MG Oral Tablet (RisperDAL); TAKE 1 TABLET NIGHTLY; Therapy: 48Wvh2247 to (Evaluate:31Mar2018)  Requested for: 47EEZ4726; Last Rx:86Vtd5084   Ordered   9  Ventolin  (90 Base) MCG/ACT Inhalation Aerosol Solution; INHALE 1 TO 2 PUFFS EVERY 4   TO 6 HOURS AS NEEDED; Therapy: 29ARG2483 to (Last VL:05TYW0136)  Requested for: 18Jny6992 Ordered    Allergies    1  No Known Drug Allergies    2   TOMATO    Vitals  Vitals   Recorded: 30QDD2471 59:70NT   Systolic: 950  Diastolic: 64  Temperature: 97 3 F  Heart Rate: 106  Height: 5 ft 7 in  Weight: 179 lb 0 19 oz  BMI Calculated: 28 04  BSA Calculated: 1 93  O2 Saturation: 98  Recorded: 60BDP5016 65:61GO   Systolic: 680  Diastolic: 64  Temperature: 97 3 F  Heart Rate: 106  Height: 5 ft 7 in  Weight: 179 lb 0 19 oz  BMI Calculated: 28 04  BSA Calculated: 1 93  O2 Saturation: 98  Recorded: 58RRP1522 51:97AU   Systolic: 138  Diastolic: 64  Temperature: 97 6 F  Heart Rate: 104  Weight: 180 lb   BMI Calculated: 28 19  BSA Calculated: 1 93  O2 Saturation: 97  Recorded: 06Bex0509 03:29PM   Weight: 190 lb   BMI Calculated: 29 76  BSA Calculated: 1 98  Recorded: 25ONW5945 01:73NZ   Systolic: 616  Diastolic: 92  Heart Rate: 109  Respiration: 16  Weight: 180 lb 9 6 oz  BMI Calculated: 28 29  BSA Calculated: 1 94  Recorded: 03EGJ0504 15:47YV   Systolic: 613  Diastolic: 69  Temperature: 97 7 F  Heart Rate: 73  Height: 5 ft 7 in  Weight: 179 lb 14 30 oz  BMI Calculated: 28 18  BSA Calculated: 1 93  O2 Saturation: 99  Recorded: 75VIC7983 14:13ZM   Systolic: 160  Diastolic: 60  Temperature: 98 F  Heart Rate: 91  Weight: 176 lb 12 93 oz  BMI Calculated: 27 69  BSA Calculated: 1 92  Pain Scale: 5  O2 Saturation: 98  Recorded: 44HQN0663 85:59AB   Systolic: 989  Diastolic: 79  Temperature: 97 9 F  Heart Rate: 87  Height: 5 ft 7 in  Weight: 184 lb 11 90 oz  BMI Calculated: 28 94  BSA Calculated: 1 96  O2 Saturation: 97  Recorded: 29GWS0500 71:80WQ   Systolic: 565  Diastolic: 67  Temperature: 98 F  Heart Rate: 96  Height: 5 ft 7 in  Weight: 179 lb 14 30 oz  BMI Calculated: 28 18  BSA Calculated: 1 93  O2 Saturation: 93  Recorded: 30VWM0346 23:16RL   Systolic: 643  Diastolic: 64  Temperature: 97 6 F  Heart Rate: 81  Respiration: 18  Height: 5 ft 7 in  Weight: 182 lb 1 58 oz  BMI Calculated: 28 52  BSA Calculated: 1 94  Recorded: 16KFN0037 44:25EL   Systolic: 598  Diastolic: 74  Temperature: 97 7 F  Heart Rate: 69  Respiration: 18  Height: 5 ft 7 in  Weight: 183 lb   BMI Calculated: 28 66  BSA Calculated: 1 95  O2 Saturation: 96  Recorded: 51AUP7986 95:51EP   Systolic: 441  Diastolic: 74  Temperature: 97 3 F  Heart Rate: 75  Respiration: 18  Height: 5 ft 7 in  Weight: 184 lb 11 90 oz  BMI Calculated: 28 94  BSA Calculated: 1 96  O2 Saturation: 98  Recorded: 99Bgd9931 10:10AM   Temperature: 98 6 F  Recorded: 40HVM6528 13:10LJ   Systolic: 008  Diastolic: 90  Heart Rate: 104  Weight: 180 lb 5 36 oz  BMI Calculated: 28 24  BSA Calculated: 1 94  O2 Saturation: 98  Recorded: 20TWZ0785 51:66EQ   Systolic: 785  Diastolic: 90  Heart Rate: 104  Weight: 180 lb 5 36 oz  BMI Calculated: 28 24  BSA Calculated: 1 94  Pain Scale: 0  O2 Saturation: 98  Recorded: 87LKI6466 78:19CD   Systolic: 629  Diastolic: 88  Temperature: 97 7 F  Heart Rate: 66  Height: 5 ft 7 in  Weight: 178 lb 9 14 oz  BMI Calculated: 27 97  BSA Calculated: 1 93  Recorded: 44PPP7326 10:49KU   Systolic: 365  Diastolic: 69  Temperature: 98 2 F  Heart Rate: 82  Respiration: 18  Height: 5 ft 7 in  Weight: 180 lb 5 36 oz  BMI Calculated: 28 24  BSA Calculated: 1 94  Pain Scale: 0  O2 Saturation: 96  Recorded: 08SDY9156 22:15QP   Systolic: 000  Diastolic: 60  Temperature: 98 1 F  Heart Rate: 88  Height: 5 ft 7 in  Weight: 182 lb 15 70 oz  BMI Calculated: 28 66  BSA Calculated: 1 95  Recorded: 67VPQ8513 52:49QK   Systolic: 213  Diastolic: 65  Temperature: 98 F  Heart Rate: 90  Respiration: 18  Height: 5 ft 7 in  Weight: 183 lb 13 79 oz  BMI Calculated: 28 8  BSA Calculated: 1 95  Pain Scale: 7  O2 Saturation: 98  Recorded: 01SYU7353 67:70WG   Systolic: 756  Diastolic: 78  Temperature: 97 8 F  Heart Rate: 97  Respiration: 18  Height: 5 ft 7 in  Weight: 214 lb 11 63 oz  BMI Calculated: 33 63  BSA Calculated: 2 08  Pain Scale: 6  O2 Saturation: 98  Recorded: 64HIQ6250 47:38DY   Systolic: 322  Diastolic: 76  Temperature: 97 7 F  Heart Rate: 100  Height: 5 ft 7 in  Weight: 176 lb 5 87 oz  BMI Calculated: 27 62  BSA Calculated: 1 92  Recorded: 85YFA3385 97:12SQ   Systolic: 309  Diastolic: 78  Temperature: 97 9 F  Heart Rate: 89  Respiration: 18  Height: 5 ft 7 in  Weight: 189 lb 9 50 oz  BMI Calculated: 29 69  BSA Calculated: 1 98  Pain Scale: 5  Recorded: 87SIH1241 49:70ZE   Systolic: 376  Diastolic: 83  Temperature: 97 8 F  Heart Rate: 102  Respiration: 20  Height: 5 ft 7 in  Weight: 189 lb   BMI Calculated: 29 6  BSA Calculated: 1 97  Pain Scale: 7  O2 Saturation: 99  Recorded: 55VZT4183 49:91JF   Systolic: 275  Diastolic: 81  Temperature: 97 9 F  Heart Rate: 88  Height: 5 ft 7 in  Weight: 192 lb 3 84 oz  BMI Calculated: 30 11  BSA Calculated: 1 99  Pain Scale: left index fi  Recorded: 94TSB2977 90:00AH   Systolic: 96  Diastolic: 66  Temperature: 97 4 F  Heart Rate: 78  Height: 5 ft 7 in  Weight: 165 lb 1 97 oz  BMI Calculated: 25 86  BSA Calculated: 1 86  Pain Scale: 0  Recorded: 63MIO1817 88:57NG   Systolic: 623  Diastolic: 78  Temperature: 98 F  Heart Rate: 96  Height: 5 ft 7 in  Weight: 160 lb 7 90 oz  BMI Calculated: 25 13  BSA Calculated: 1 84    Future Appointments    Date/Time Provider Specialty Site   07/16/2018 10:00 AM Dany Owusu MD Infectious Disease [facility]   01/24/2018 01:00 PM Eve Dial LCSW Psychiatry [facility]   02/20/2018 10:00 AM Eve Dial LCSW Psychiatry [facility]   03/20/2018 10:00 AM Darryl Hernández Augustauzair Psychiatry [facility]     Signatures   Electronically signed by : JHON Barbour; Jan 8 2018  2:28PM EST                       (Author)

## 2018-02-05 DIAGNOSIS — K21.9 GASTROESOPHAGEAL REFLUX DISEASE WITHOUT ESOPHAGITIS: Primary | ICD-10-CM

## 2018-02-05 RX ORDER — OMEPRAZOLE 40 MG/1
CAPSULE, DELAYED RELEASE ORAL
Qty: 30 CAPSULE | Refills: 0 | Status: SHIPPED | OUTPATIENT
Start: 2018-02-05 | End: 2018-03-07 | Stop reason: SDUPTHER

## 2018-02-06 DIAGNOSIS — K21.9 GASTROESOPHAGEAL REFLUX DISEASE WITHOUT ESOPHAGITIS: ICD-10-CM

## 2018-02-06 RX ORDER — OMEPRAZOLE 40 MG/1
CAPSULE, DELAYED RELEASE ORAL
Qty: 30 CAPSULE | OUTPATIENT
Start: 2018-02-06

## 2018-02-20 ENCOUNTER — OFFICE VISIT (OUTPATIENT)
Dept: BEHAVIORAL/MENTAL HEALTH CLINIC | Facility: CLINIC | Age: 48
End: 2018-02-20
Payer: COMMERCIAL

## 2018-02-20 DIAGNOSIS — F41.1 GAD (GENERALIZED ANXIETY DISORDER): Primary | ICD-10-CM

## 2018-02-20 DIAGNOSIS — F33.1 MDD (MAJOR DEPRESSIVE DISORDER), RECURRENT EPISODE, MODERATE (HCC): ICD-10-CM

## 2018-02-20 DIAGNOSIS — K21.9 GASTROESOPHAGEAL REFLUX DISEASE WITHOUT ESOPHAGITIS: ICD-10-CM

## 2018-02-20 PROCEDURE — 90834 PSYTX W PT 45 MINUTES: CPT | Performed by: SOCIAL WORKER

## 2018-02-20 NOTE — PSYCH
Date of Initial Treatment Plan: 5/22/17   Date of Current Treatment Plan: 02/20/18      Treatment Plan Number 3    Strengths/Personal Resources for Self Care:  helpful, generous    Diagnosis:   1  SOPHIA (generalized anxiety disorder)     2  MDD (major depressive disorder), recurrent episode, moderate (HCC)       Area of Needs: anxiety, depression, communication in relationships       Long Term Goal 1:   My anxiety and depression has improved   Target Date: 6/15/18     Short Term Objectives for Goal 1:       1  I'm not sleeping to much anymore  Long Term Goal 2:   I have obtained proper medical care      Target Date: 6/15/18    Short Term Objectives for Goal 2:  1  Pain management appointment  2  GI appointment      Long Term Goal # 3:       I have learned to deal with my x-partner better   Target Date: 6/15/18    Short Term Objectives for Goal 3:  1  Process specific situations  2  Communication skills with Wing Blinks when needed  Target Date: 6/15/18    GOAL 1: Modality: Individual 1x per month   Completion Date                                  Medication Management 1x per month  GOAL 2: Modality:  Individual 1x per month   Completion Date                                   Medication Management 1x per month  GOAL 3: Modality: Individual 1x per month   Completion Date                                  Medication Management 1x per month    [address]: Diagnosis and Treatment Plan explained to Reyes Seitz relates understanding diagnosis and is agreeable to Treatment Plan         Client Comments : Please share your thoughts, feelings, need and/or experiences regarding your treatment plan: __________________________________________________________________    __________________________________________________________________    __________________________________________________________________    __________________________________________________________________    _______________________________________                Patient signature, Date Time: __________________________________________             Physician cosigner signature, Date, Time: ________________________________

## 2018-02-20 NOTE — PSYCH
Treatment Plan Tracking     3Treatment Plan not completed within required time limits due to: Appointment gap

## 2018-02-20 NOTE — PSYCH
Psychotherapy Provided: Individual Psychotherapy 50 minutes     Length of time in session: 50 minutes, follow up in 1 month    Goals addressed in session: Goal 1, Goal 2 and Goal 3      Pain:      none and moderate to severe    6    Current suicide risk : Low     D: Met with Portillo LAZARO; "I'm anxious"  Session focused upon medical appointments; GI and pain management obtained  Went to ER due to severe pain in hip  Provided muscle relaxer  Went back again to received Morphone (10 pills) which he only takes in emergency situations  Gayla Ortiz is seeing himself set more boundaries with Geeta Gabriel however anxiety attacks present while with him; suggested avoid these triggers however relationship still remains, states has compromised more  Other triggers include not getting personal time when he needs it due to parents  Feels needs a medication for more episodic situations  Further discussion regarding decline of father regarding cognitive status; required all day care between mom and Gayla Ortiz  Reassessment of treatment plan completed  Denied SI      A: Gayla Ortiz presented with baseline behavior presentation today  More fidgetiness, rapid speech  Continues to help parents, utilize social supports      P: Ongoing psychotherapy  Continue discussion of treatment goals         Behavioral Health Treatment Plan [facility]: Diagnosis and Treatment Plan explained to Conception Holstein relates understanding diagnosis and is agreeable to Treatment Plan   Yes

## 2018-02-21 RX ORDER — OMEPRAZOLE 40 MG/1
CAPSULE, DELAYED RELEASE ORAL
Qty: 30 CAPSULE | OUTPATIENT
Start: 2018-02-21

## 2018-02-23 ENCOUNTER — CONSULT (OUTPATIENT)
Dept: PAIN MEDICINE | Facility: CLINIC | Age: 48
End: 2018-02-23
Payer: COMMERCIAL

## 2018-02-23 VITALS
BODY MASS INDEX: 28.09 KG/M2 | DIASTOLIC BLOOD PRESSURE: 84 MMHG | HEART RATE: 90 BPM | RESPIRATION RATE: 22 BRPM | SYSTOLIC BLOOD PRESSURE: 130 MMHG | WEIGHT: 179 LBS | HEIGHT: 67 IN | TEMPERATURE: 98.4 F

## 2018-02-23 DIAGNOSIS — M54.41 ACUTE RIGHT-SIDED LOW BACK PAIN WITH RIGHT-SIDED SCIATICA: Primary | ICD-10-CM

## 2018-02-23 PROBLEM — R25.8 JERKY BODY MOVEMENTS: Status: ACTIVE | Noted: 2017-08-07

## 2018-02-23 PROCEDURE — 99244 OFF/OP CNSLTJ NEW/EST MOD 40: CPT | Performed by: ANESTHESIOLOGY

## 2018-02-23 RX ORDER — FLUOXETINE HYDROCHLORIDE 40 MG/1
40 CAPSULE ORAL DAILY
COMMUNITY
End: 2018-03-29 | Stop reason: SDUPTHER

## 2018-02-23 RX ORDER — ALBUTEROL SULFATE 90 UG/1
2 AEROSOL, METERED RESPIRATORY (INHALATION)
COMMUNITY
Start: 2017-05-04 | End: 2018-07-25 | Stop reason: SDUPTHER

## 2018-02-23 RX ORDER — GABAPENTIN 300 MG/1
300 CAPSULE ORAL
Qty: 30 CAPSULE | Refills: 0 | Status: SHIPPED | OUTPATIENT
Start: 2018-02-23 | End: 2018-02-23 | Stop reason: SDUPTHER

## 2018-02-23 RX ORDER — GABAPENTIN 300 MG/1
300 CAPSULE ORAL
Qty: 30 CAPSULE | Refills: 0 | Status: SHIPPED | OUTPATIENT
Start: 2018-02-23 | End: 2018-12-26

## 2018-02-23 RX ORDER — RISPERIDONE 0.5 MG/1
0.5 TABLET, FILM COATED ORAL 2 TIMES DAILY
COMMUNITY
End: 2018-04-09

## 2018-02-23 NOTE — PROGRESS NOTES
Assessment:  1  Acute right-sided low back pain with right-sided sciatica        Plan:  The patient's symptoms, history / physical are consistent with acute right-sided low back pain leading to right-sided sciatic symptoms  At this time, I will order x-rays of the lumbar spine to evaluate  For now, I will start him on gabapentin 300 mg at bedtime to help with the radicular symptoms  He was apprised of the most common side effects including sleepiness and dizziness  He will call with an update in 1 week on how the medications are working  My impressions and treatment recommendations were discussed in detail with the patient who verbalized understanding and had no further questions  Discharge instructions were provided  I personally saw and examined the patient and I agree with the above discussed plan of care  Orders Placed This Encounter   Procedures    XR spine lumbar minimum 4 views non injury     Standing Status:   Future     Standing Expiration Date:   2/23/2022     Scheduling Instructions:      Bring along any outside films relating to this procedure  Order Specific Question:   Reason for Exam:     Answer:   lbp     New Medications Ordered This Visit   Medications    albuterol (VENTOLIN HFA) 90 mcg/act inhaler     Sig: Inhale 2 puffs    FLUoxetine (PROzac) 40 MG capsule     Sig: Take 40 mg by mouth daily    risperiDONE (RisperDAL) 0 5 mg tablet     Sig: Take 0 5 mg by mouth 2 (two) times a day    gabapentin (NEURONTIN) 300 mg capsule     Sig: Take 1 capsule (300 mg total) by mouth daily at bedtime for 30 days     Dispense:  30 capsule     Refill:  0       History of Present Illness:    Alfreda Roman is a 50 y o  male with a history of HIV who presents for consultation in regards to debilitating pain in his right leg  Symptoms have been present for 1 5 months without any precipitating injury or trauma    Symptoms are moderate to severe rated 6 to 10/10 on a numeric rating scale and felt constantly  Pain is described to be dull, aching, throbbing, pressure-like, shooting with numbness and paresthesias that radiates down the right leg  Symptoms are aggravated standing, bending, walking, coughing, sneezing  There is no change with relaxation or bowel movements  Treatment history has included heat/ice which provides moderate relief  Chiropractic manipulation provides moderate relief  He takes Tylenol  He has had 2 trips to the emergency room and in the last visit he was given morphine with helped significantly  I have personally reviewed and/or updated the patient's past medical history, past surgical history, family history, social history, current medications, allergies, and vital signs today  Review of Systems:    Review of Systems   Constitutional: Negative for fever and unexpected weight change  HENT: Negative for trouble swallowing  Eyes: Negative for visual disturbance  Respiratory: Positive for shortness of breath  Negative for wheezing  Cardiovascular: Negative for chest pain and palpitations  Gastrointestinal: Negative for constipation, diarrhea, nausea and vomiting  Endocrine: Negative for cold intolerance, heat intolerance and polydipsia  Genitourinary: Negative for difficulty urinating and frequency  Musculoskeletal: Positive for gait problem (difficulty walking/ decreased range of motion) and joint swelling (joint stiffness)  Negative for arthralgias and myalgias  Skin: Negative for rash  Neurological: Positive for weakness (muscle)  Negative for dizziness, seizures, syncope and headaches  Hematological: Does not bruise/bleed easily  Psychiatric/Behavioral: Negative for dysphoric mood  All other systems reviewed and are negative        Patient Active Problem List   Diagnosis    Bilateral varicoceles    Embolism involving vascular device (HCC)    HIV disease (HCC)    GERD (gastroesophageal reflux disease)    IBS (irritable bowel syndrome)    Nicotine abuse    SOPHIA (generalized anxiety disorder)    MDD (major depressive disorder), recurrent episode, moderate (HCC)    Acute right-sided low back pain with right-sided sciatica    Jerky body movements       Past Medical History:   Diagnosis Date    GERD (gastroesophageal reflux disease)     HIV (human immunodeficiency virus infection) ([address] )     IBS (irritable bowel syndrome)        Past Surgical History:   Procedure Laterality Date    CYSTOSCOPY  2014    OK ESOPHAGOGASTRODUODENOSCOPY TRANSORAL DIAGNOSTIC N/A 3/9/2017    Procedure: ESOPHAGOGASTRODUODENOSCOPY (EGD); Surgeon: Larisa Arnold MD;  Location: [facility]; Service: Gastroenterology       History reviewed  No pertinent family history  Social History     Occupational History    Not on file       Social History Main Topics    Smoking status: Current Every Day Smoker     Packs/day: 1 00    Smokeless tobacco: Never Used    Alcohol use Yes    Drug use: No    Sexual activity: Yes       Current Outpatient Prescriptions on File Prior to Visit   Medication Sig    elvitegravir-cobicistat-emtricitabine-tenofovir (STRIBILD) [phone] mg Take 1 tablet by mouth daily with breakfast     omeprazole (PriLOSEC) 40 MG capsule TAKE 1 CAPSULE BY MOUTH DAILY    dicyclomine (BENTYL) 20 mg tablet Take 20 mg by mouth every 6 (six) hours    [DISCONTINUED] cyclobenzaprine (FLEXERIL) 10 mg tablet Take 0 5 tablets by mouth daily at bedtime for 7 doses    [DISCONTINUED] ibuprofen (MOTRIN) 800 mg tablet Take 1 tablet by mouth every 6 (six) hours as needed for moderate pain for up to 30 days    [DISCONTINUED] methocarbamol (ROBAXIN) 500 mg tablet Take 1 tablet by mouth 3 (three) times a day    [DISCONTINUED] morphine (MSIR) 30 MG tablet Take 1 tablet by mouth every 4 (four) hours as needed for severe pain for up to 10 doses Max Daily Amount: 180 mg    [DISCONTINUED] omeprazole (PriLOSEC) 20 mg delayed release capsule Take 20 mg by mouth daily  No current facility-administered medications on file prior to visit  Allergies   Allergen Reactions    Tomato Vomiting       Physical Exam:    /84 (BP Location: Left arm, Patient Position: Sitting, Cuff Size: Standard)   Pulse 90   Temp 98 4 °F (36 9 °C) (Oral)   Resp 22   Ht 5' 7 25" (1 708 m)   Wt 81 2 kg (179 lb)   BMI 27 83 kg/m²     Constitutional: normal, well developed, well nourished, alert, in no distress and non-toxic and no overt pain behavior  Eyes: anicteric  HEENT: grossly intact  Neck: supple, symmetric, trachea midline and no masses   Pulmonary:even and unlabored  Cardiovascular:No edema or pitting edema present  Skin:Normal without rashes or lesions and well hydrated  Psychiatric:Mood and affect appropriate  Neurologic:Cranial Nerves II-XII grossly intact and  jerking movements of both arms and legs  Musculoskeletal:antalgic     Lumbar Spine Exam    Appearance:  Normal lordosis  Palpation/Tenderness:  right lumbar paraspinal tenderness  right piriformis tenderness  Sensory:  no sensory deficits noted  Range of Motion:  Flexion:   Moderately limited  with pain  Extension:  Moderately limited  with pain  Lateral Flexion - Left:  No limitation  without pain  Lateral Flexion - Right:  Minimally limited  with pain  Rotation - Left:  No limitation  without pain  Rotation - Right:  Minimally limited  with pain  Motor Strength:  Left hip flexion:  5/5  Left hip extension:  5/5  Right hip flexion:  5/5  Right hip extension:  5/5  Left knee flexion:  5/5  Left knee extension:  5/5  Right knee flexion:  5/5  Right knee extension:  5/5  Left foot dorsiflexion:  5/5  Left foot plantar flexion:  5/5  Right foot dorsiflexion:  5/5  Right foot plantar flexion:  5/5  Reflexes:  Left Patellar:  2+   Right Patellar:  2+   Left Achilles:  2+   Right Achilles:  2+   Special Tests:  Left Straight Leg Test:  negative  Right Straight Leg Test:  positive  Left Lester's Maneuver: negative  Right Lester's Maneuver:  negative

## 2018-02-23 NOTE — PATIENT INSTRUCTIONS

## 2018-02-28 ENCOUNTER — HOSPITAL ENCOUNTER (OUTPATIENT)
Dept: RADIOLOGY | Facility: HOSPITAL | Age: 48
Discharge: HOME | End: 2018-02-28
Attending: ANESTHESIOLOGY
Payer: COMMERCIAL

## 2018-02-28 DIAGNOSIS — M54.41 ACUTE RIGHT-SIDED LOW BACK PAIN WITH RIGHT-SIDED SCIATICA: ICD-10-CM

## 2018-02-28 PROCEDURE — 72110 X-RAY EXAM L-2 SPINE 4/>VWS: CPT

## 2018-03-05 ENCOUNTER — OFFICE VISIT (OUTPATIENT)
Dept: BEHAVIORAL/MENTAL HEALTH CLINIC | Facility: CLINIC | Age: 48
End: 2018-03-05
Payer: COMMERCIAL

## 2018-03-05 DIAGNOSIS — F33.1 MDD (MAJOR DEPRESSIVE DISORDER), RECURRENT EPISODE, MODERATE (HCC): ICD-10-CM

## 2018-03-05 DIAGNOSIS — F41.1 GAD (GENERALIZED ANXIETY DISORDER): Primary | ICD-10-CM

## 2018-03-05 PROCEDURE — 90834 PSYTX W PT 45 MINUTES: CPT | Performed by: SOCIAL WORKER

## 2018-03-05 NOTE — PSYCH
Psychotherapy Provided: Individual Psychotherapy 50 minutes     Length of time in session: 50 minutes, follow up in 2 week    Goals addressed in session: Goal 1, Goal 2 and Goal 3      Pain:      none    0    Current suicide risk : Low     D: Met with Portillo LAZARO; "I'm anxious"  Session focused upon specific circumstances occurring with Daniel Freeze, feelings of frustration regarding medical appointments and family dynamic's primarily with mom  Denied SI      A: Kristen Stein presented with baseline behavior presentation today  Rapid speech and increased anxiety noticed  Appropriate for topics of discussion      P: Ongoing psychotherapy  Continue discussion of treatment goals  Behavioral Health Treatment Plan [facility]: Diagnosis and Treatment Plan explained to Dot Miller relates understanding diagnosis and is agreeable to Treatment Plan   Yes

## 2018-03-07 DIAGNOSIS — K21.9 GASTROESOPHAGEAL REFLUX DISEASE WITHOUT ESOPHAGITIS: ICD-10-CM

## 2018-03-14 RX ORDER — OMEPRAZOLE 40 MG/1
CAPSULE, DELAYED RELEASE ORAL
Qty: 30 CAPSULE | Refills: 3 | Status: SHIPPED | OUTPATIENT
Start: 2018-03-14 | End: 2018-06-20 | Stop reason: SDUPTHER

## 2018-03-20 ENCOUNTER — OFFICE VISIT (OUTPATIENT)
Dept: BEHAVIORAL/MENTAL HEALTH CLINIC | Facility: CLINIC | Age: 48
End: 2018-03-20
Payer: COMMERCIAL

## 2018-03-20 DIAGNOSIS — F33.1 MDD (MAJOR DEPRESSIVE DISORDER), RECURRENT EPISODE, MODERATE (HCC): ICD-10-CM

## 2018-03-20 DIAGNOSIS — F41.1 GAD (GENERALIZED ANXIETY DISORDER): Primary | ICD-10-CM

## 2018-03-20 PROCEDURE — 90834 PSYTX W PT 45 MINUTES: CPT | Performed by: SOCIAL WORKER

## 2018-03-20 NOTE — PSYCH
Psychotherapy Provided: Individual Psychotherapy 50 minutes     Length of time in session: 50 minutes, follow up in 2 week    Goals addressed in session: Goal 1 and Goal 2     Pain:      none    0    Current suicide risk : Low     D: Met with Portillo LAZARO; "i'm pretty good actually"  Session focused upon pain he is experiencing in his feet causing him to move his body frequently; kept tieing and un tieing shoes throughout session  Acknowledged taking Prozac and Risperdal   Further discussion regarding Braden Quinteros; he is asking Trevin Manriquez for money and keeps coming over to his home  Parents doing well though mom can become overwhelming with her presentation  Denied SI      A: Trevin Manriquez presented with anxious mood constricted affect duing session  Rapid speech and consistent body movements throughout session  Trevin Manriquez seems aware he is contributing towards his ongoing obstacles with Braden Quinteros, though he continues to 'feed' the situation then complains when Braden Quinteros remains in his life       P: Ongoing psychotherapy  Continue discussion of treatment goals  Behavioral Health Treatment Plan [facility]: Diagnosis and Treatment Plan explained to Ramonita Latif relates understanding diagnosis and is agreeable to Treatment Plan   Yes

## 2018-03-28 ENCOUNTER — TELEPHONE (OUTPATIENT)
Dept: BEHAVIORAL/MENTAL HEALTH CLINIC | Facility: CLINIC | Age: 48
End: 2018-03-28

## 2018-03-29 DIAGNOSIS — F41.1 GAD (GENERALIZED ANXIETY DISORDER): ICD-10-CM

## 2018-03-29 DIAGNOSIS — F33.1 MDD (MAJOR DEPRESSIVE DISORDER), RECURRENT EPISODE, MODERATE (HCC): Primary | ICD-10-CM

## 2018-03-29 PROBLEM — F41.0 PANIC ATTACKS: Status: ACTIVE | Noted: 2017-08-31

## 2018-03-29 RX ORDER — FLUOXETINE HYDROCHLORIDE 40 MG/1
40 CAPSULE ORAL DAILY
Qty: 10 CAPSULE | Refills: 0 | Status: SHIPPED | OUTPATIENT
Start: 2018-03-29 | End: 2018-04-09

## 2018-03-29 RX ORDER — FLUOXETINE HYDROCHLORIDE 40 MG/1
40 CAPSULE ORAL DAILY
Qty: 90 CAPSULE | Refills: 0 | Status: SHIPPED | OUTPATIENT
Start: 2018-03-29 | End: 2018-03-29 | Stop reason: SDUPTHER

## 2018-03-29 NOTE — TELEPHONE ENCOUNTER
I spoke with Joe Woodrow  He said he had come for an apt that had been cancelled and then forgot to come the following week when he was rescheduled  He has an apt for May, but is requesting a refill of Prozac, 90 day supply sent to mail order, [facility]  He only has about 6 pills left  Could a small amount be sent to retail (not a full 30 days, as insurance will not cover cost)? For Dr Yris Garcia' review

## 2018-03-29 NOTE — TELEPHONE ENCOUNTER
Sent 90d to mail order, #10 to retail on file  He can always ask for less pills if he does not want all 10      Thanks,  Bartolo Kate

## 2018-04-04 DIAGNOSIS — F33.1 MDD (MAJOR DEPRESSIVE DISORDER), RECURRENT EPISODE, MODERATE (HCC): ICD-10-CM

## 2018-04-04 DIAGNOSIS — F41.1 GAD (GENERALIZED ANXIETY DISORDER): ICD-10-CM

## 2018-04-09 RX ORDER — RISPERIDONE 0.5 MG/1
TABLET, FILM COATED ORAL
Qty: 90 TABLET | Refills: 0 | Status: SHIPPED | OUTPATIENT
Start: 2018-04-09 | End: 2018-05-16 | Stop reason: SDUPTHER

## 2018-04-09 RX ORDER — FLUOXETINE HYDROCHLORIDE 40 MG/1
CAPSULE ORAL
Qty: 90 CAPSULE | Refills: 0 | Status: SHIPPED | OUTPATIENT
Start: 2018-04-09 | End: 2018-05-16 | Stop reason: SDUPTHER

## 2018-05-04 ENCOUNTER — OFFICE VISIT (OUTPATIENT)
Dept: BEHAVIORAL/MENTAL HEALTH CLINIC | Facility: CLINIC | Age: 48
End: 2018-05-04
Payer: COMMERCIAL

## 2018-05-04 DIAGNOSIS — F33.1 MDD (MAJOR DEPRESSIVE DISORDER), RECURRENT EPISODE, MODERATE (HCC): ICD-10-CM

## 2018-05-04 DIAGNOSIS — F41.0 PANIC ATTACKS: ICD-10-CM

## 2018-05-04 DIAGNOSIS — F41.1 GAD (GENERALIZED ANXIETY DISORDER): Primary | ICD-10-CM

## 2018-05-04 PROCEDURE — 90834 PSYTX W PT 45 MINUTES: CPT | Performed by: SOCIAL WORKER

## 2018-05-04 NOTE — PSYCH
Psychotherapy Provided: Individual Psychotherapy 50 minutes     Length of time in session: 50 minutes, follow up in 2 month    Goals addressed in session: Goal 1, Goal 2 and Goal 3      Pain:      moderate to severe    3    Current suicide risk : Low     D: Met with Portillo LAZARO; "i'm pretty good actually"  Session focused upon pain; this has improved - going to Chiropractor  GI symptoms have gotten worse  Feels he wants to speak with Dr James Maldonado regarding increased anxiety  'Pacing a lot'  Feels he may benefit from a PRN especially with situations I e / father's illness, mom can become overwhelming with her presentation  Further discussion regarding Isa Face; he cut off his phone yet continues to try to contact through government provided phone  Denied SI      A: Raiza Jones presented with anxious mood with  rapid speech and consistent body movements throughout session  Raiza Jones demonstrated greater insight into how he is 'feeding' Isa Face and is closer to ending friendship though not there yet  Situational anxiety primarily father's illness is main obstacle at this time  P: Ongoing psychotherapy  Continue discussion of treatment goals  Behavioral Health Treatment Plan [facility] Re[facility] by [facility]: Diagnosis and Treatment Plan explained to Yuni Cheatham relates understanding diagnosis and is agreeable to Treatment Plan   Yes

## 2018-05-14 ENCOUNTER — TELEPHONE (OUTPATIENT)
Dept: GASTROENTEROLOGY | Facility: MEDICAL CENTER | Age: 48
End: 2018-05-14

## 2018-05-14 DIAGNOSIS — K58.9 SPASM OF BOWEL: Primary | ICD-10-CM

## 2018-05-14 RX ORDER — DICYCLOMINE HCL 20 MG
20 TABLET ORAL EVERY 6 HOURS
Qty: 120 TABLET | Refills: 1 | Status: SHIPPED | OUTPATIENT
Start: 2018-05-14 | End: 2018-05-17 | Stop reason: DRUGHIGH

## 2018-05-14 NOTE — TELEPHONE ENCOUNTER
Dr Alyce Cota pt  [facility] called stating pt needs a refill on his dicyclomine 20mg tab

## 2018-05-16 ENCOUNTER — OFFICE VISIT (OUTPATIENT)
Dept: PSYCHIATRY | Facility: CLINIC | Age: 48
End: 2018-05-16
Payer: COMMERCIAL

## 2018-05-16 VITALS
SYSTOLIC BLOOD PRESSURE: 126 MMHG | BODY MASS INDEX: 28.6 KG/M2 | RESPIRATION RATE: 16 BRPM | HEART RATE: 87 BPM | WEIGHT: 184 LBS | DIASTOLIC BLOOD PRESSURE: 81 MMHG

## 2018-05-16 DIAGNOSIS — G25.9 MOVEMENT DISORDER: ICD-10-CM

## 2018-05-16 DIAGNOSIS — F41.1 GAD (GENERALIZED ANXIETY DISORDER): ICD-10-CM

## 2018-05-16 DIAGNOSIS — F33.1 MDD (MAJOR DEPRESSIVE DISORDER), RECURRENT EPISODE, MODERATE (HCC): ICD-10-CM

## 2018-05-16 DIAGNOSIS — F41.8 PERFORMANCE ANXIETY: Primary | ICD-10-CM

## 2018-05-16 DIAGNOSIS — Z79.899 HIGH RISK MEDICATION USE: ICD-10-CM

## 2018-05-16 PROCEDURE — 90833 PSYTX W PT W E/M 30 MIN: CPT | Performed by: PSYCHIATRY & NEUROLOGY

## 2018-05-16 PROCEDURE — 99214 OFFICE O/P EST MOD 30 MIN: CPT | Performed by: PSYCHIATRY & NEUROLOGY

## 2018-05-16 RX ORDER — PROPRANOLOL HYDROCHLORIDE 10 MG/1
TABLET ORAL
Qty: 120 TABLET | Refills: 3 | Status: SHIPPED | OUTPATIENT
Start: 2018-05-16 | End: 2018-08-22 | Stop reason: SDUPTHER

## 2018-05-16 RX ORDER — ELVITEGRAVIR, COBICISTAT, EMTRICITABINE, AND TENOFOVIR ALAFENAMIDE 150; 150; 200; 10 MG/1; MG/1; MG/1; MG/1
TABLET ORAL
COMMUNITY
Start: 2018-04-17 | End: 2018-08-06 | Stop reason: SDUPTHER

## 2018-05-16 RX ORDER — RISPERIDONE 0.5 MG/1
0.5 TABLET, FILM COATED ORAL EVERY EVENING
Qty: 90 TABLET | Refills: 1 | Status: SHIPPED | OUTPATIENT
Start: 2018-05-16 | End: 2018-08-16 | Stop reason: SDUPTHER

## 2018-05-16 RX ORDER — FLUOXETINE HYDROCHLORIDE 20 MG/1
60 CAPSULE ORAL DAILY
Qty: 90 CAPSULE | Refills: 3 | Status: SHIPPED | OUTPATIENT
Start: 2018-05-16 | End: 2018-08-16 | Stop reason: SDUPTHER
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
## 2018-05-16 NOTE — TELEPHONE ENCOUNTER
Dr Gayle Reynolds pt  [facility] called stating they have received the order for the Bentyl 20mg tab but pt is now stating it should be 10mg   Pharmacist can be reached at [phone]

## 2018-05-17 DIAGNOSIS — K58.9 SPASM OF BOWEL: Primary | ICD-10-CM

## 2018-05-17 RX ORDER — DICYCLOMINE HYDROCHLORIDE 10 MG/1
10 CAPSULE ORAL
Qty: 120 CAPSULE | Refills: 2 | Status: SHIPPED | OUTPATIENT
Start: 2018-05-17 | End: 2018-08-22 | Stop reason: SDUPTHER

## 2018-05-22 ENCOUNTER — TELEPHONE (OUTPATIENT)
Dept: BEHAVIORAL/MENTAL HEALTH CLINIC | Facility: CLINIC | Age: 48
End: 2018-05-22

## 2018-05-23 DIAGNOSIS — G24.9 DYSTONIA: Primary | ICD-10-CM

## 2018-06-11 DIAGNOSIS — B20 HUMAN IMMUNODEFICIENCY VIRUS (HIV) DISEASE (HCC): ICD-10-CM

## 2018-06-11 DIAGNOSIS — Z20.2 CONTACT WITH AND (SUSPECTED) EXPOSURE TO INFECTIONS WITH A PREDOMINANTLY SEXUAL MODE OF TRANSMISSION: ICD-10-CM

## 2018-06-11 DIAGNOSIS — Z11.3 ENCOUNTER FOR SCREENING FOR INFECTIONS WITH PREDOMINANTLY SEXUAL MODE OF TRANSMISSION: ICD-10-CM

## 2018-06-13 ENCOUNTER — OFFICE VISIT (OUTPATIENT)
Dept: BEHAVIORAL/MENTAL HEALTH CLINIC | Facility: CLINIC | Age: 48
End: 2018-06-13
Payer: COMMERCIAL

## 2018-06-13 DIAGNOSIS — F41.1 GAD (GENERALIZED ANXIETY DISORDER): Primary | ICD-10-CM

## 2018-06-13 DIAGNOSIS — F41.0 PANIC ATTACKS: ICD-10-CM

## 2018-06-13 DIAGNOSIS — F33.1 MDD (MAJOR DEPRESSIVE DISORDER), RECURRENT EPISODE, MODERATE (HCC): ICD-10-CM

## 2018-06-13 PROCEDURE — 90834 PSYTX W PT 45 MINUTES: CPT | Performed by: SOCIAL WORKER

## 2018-06-13 NOTE — PSYCH
Roberth Singh  1970     Date of Initial Treatment Plan: 5/22/18  Date of Current Treatment Plan: 06/13/18      Treatment Plan Number 4    Strengths/Personal Resources for Self Care: helpful, generous    Diagnosis:   1  SOPHIA (generalized anxiety disorder)     2  MDD (major depressive disorder), recurrent episode, moderate ([facility] )     3  Panic attacks       Area of Needs: anxiety, depression, communication in relationships        Long Term Goal 1:   My anxiety and depression has improved   Target Date: 10/9/18      Short Term Objectives for Goal 1:       1  I'm remaining productive       2  Managing parents health     Long Term Goal 2:   I have obtained proper medical care      Target Date: 10/9/18  Completion Date:  6/13/18    Short Term Objectives for Goal 2:   1  Pain management appointment  2  GI appointment               Long Term Goal # 3:        I have learned to deal with my x-partner better   Target Date: 10/9/18  Completion Date:     Short Term Objectives for Goal 3:   1  Process specific situations  2  Communication skills with Susan Phelps when needed  GOAL 1: Modality: Individual 1x per month  Target Date: 10/9/18                                  Medication Management 1x per month   Persons responsible for goal:  Ajay López    GOAL 2: Modality:  Individual 1x per month  Target Date: 10/9/18                                   Medication Management 1x per month       Persons responsible for goal:  Ajay López    GOAL 3: Modality: Individual 1x per month   Target Date: 10/9/18                                  Medication Management 1x per month  Persons responsible for goal:  Ajay Preciado [address] [place]: Diagnosis and Treatment Plan explained to [place] relates understanding diagnosis and is agreeable to Treatment Plan         Client Comments : Please share your thoughts, feelings, need and/or experiences regarding your treatment plan: __________________________________________________________________    __________________________________________________________________    __________________________________________________________________    __________________________________________________________________    _______________________________________                Patient signature, Date Time: __________________________________________             Physician cosigner signature, Date, Time: ________________________________

## 2018-06-13 NOTE — PSYCH
Psychotherapy Provided: Individual Psychotherapy 50 minutes     Length of time in session: 50 minutes, follow up in 2 week    Goals addressed in session: Goal 1, Goal 2 and Goal 3      Pain:      none    0    Current suicide risk : Low     D: Met with Portillo LAZARO; "I'm anxious"  Session focused upon medical appointments; GI and pain management obtained  Provided Gabapentin for pain but it's also causing excessive sedation  Chiropractor very effective  Continues to struggle with Alessandro's relationship; denied anxiety attacks after interactions  Further discussion regarding decline of father regarding cognitive status; required all day care between mom and Katlin Davis  Reassessment of treatment plan completed  Denied SI      A: Katlin Davis presented with baseline behavior presentation today  More fidgetiness, rapid speech  Continues to help parents, utilize social supports      P: Ongoing psychotherapy  Continue discussion of treatment goals  Behavioral Health Treatment Plan [facility]: Diagnosis and Treatment Plan explained to Robbin Shearer relates understanding diagnosis and is agreeable to Treatment Plan   Yes

## 2018-06-14 ENCOUNTER — APPOINTMENT (OUTPATIENT)
Dept: LAB | Facility: CLINIC | Age: 48
End: 2018-06-14
Payer: COMMERCIAL

## 2018-06-14 DIAGNOSIS — Z13.1 ENCOUNTER FOR SCREENING FOR DIABETES MELLITUS: ICD-10-CM

## 2018-06-14 DIAGNOSIS — Z13.6 ENCOUNTER FOR SCREENING FOR CARDIOVASCULAR DISORDERS: ICD-10-CM

## 2018-06-14 DIAGNOSIS — Z20.2 CONTACT WITH AND (SUSPECTED) EXPOSURE TO INFECTIONS WITH A PREDOMINANTLY SEXUAL MODE OF TRANSMISSION: ICD-10-CM

## 2018-06-14 DIAGNOSIS — Z11.3 ENCOUNTER FOR SCREENING FOR INFECTIONS WITH PREDOMINANTLY SEXUAL MODE OF TRANSMISSION: ICD-10-CM

## 2018-06-14 DIAGNOSIS — B20 HUMAN IMMUNODEFICIENCY VIRUS (HIV) DISEASE (HCC): ICD-10-CM

## 2018-06-14 DIAGNOSIS — Z79.899 HIGH RISK MEDICATION USE: ICD-10-CM

## 2018-06-14 LAB
ALBUMIN SERPL BCP-MCNC: 4 G/DL (ref 3.5–5)
ALP SERPL-CCNC: 93 U/L (ref 46–116)
ALT SERPL W P-5'-P-CCNC: 35 U/L (ref 12–78)
ANION GAP SERPL CALCULATED.3IONS-SCNC: 8 MMOL/L (ref 4–13)
AST SERPL W P-5'-P-CCNC: 21 U/L (ref 5–45)
BACTERIA UR QL AUTO: NORMAL /HPF
BASOPHILS # BLD AUTO: 0.03 THOUSANDS/ΜL (ref 0–0.1)
BASOPHILS NFR BLD AUTO: 0 % (ref 0–1)
BILIRUB SERPL-MCNC: 0.26 MG/DL (ref 0.2–1)
BILIRUB UR QL STRIP: NEGATIVE
BUN SERPL-MCNC: 15 MG/DL (ref 5–25)
CALCIUM SERPL-MCNC: 9.3 MG/DL (ref 8.3–10.1)
CHLORIDE SERPL-SCNC: 101 MMOL/L (ref 100–108)
CHOLEST SERPL-MCNC: 186 MG/DL (ref 50–200)
CLARITY UR: CLEAR
CO2 SERPL-SCNC: 27 MMOL/L (ref 21–32)
COLOR UR: ABNORMAL
CREAT SERPL-MCNC: 1.02 MG/DL (ref 0.6–1.3)
EOSINOPHIL # BLD AUTO: 0.13 THOUSAND/ΜL (ref 0–0.61)
EOSINOPHIL NFR BLD AUTO: 2 % (ref 0–6)
ERYTHROCYTE [DISTWIDTH] IN BLOOD BY AUTOMATED COUNT: 12.9 % (ref 11.6–15.1)
EST. AVERAGE GLUCOSE BLD GHB EST-MCNC: 117 MG/DL
GFR SERPL CREATININE-BSD FRML MDRD: 86 ML/MIN/1.73SQ M
GLUCOSE P FAST SERPL-MCNC: 87 MG/DL (ref 65–99)
GLUCOSE UR STRIP-MCNC: NEGATIVE MG/DL
HBA1C MFR BLD: 5.7 % (ref 4.2–6.3)
HCT VFR BLD AUTO: 46.2 % (ref 36.5–49.3)
HDLC SERPL-MCNC: 48 MG/DL (ref 40–60)
HGB BLD-MCNC: 15.1 G/DL (ref 12–17)
HGB UR QL STRIP.AUTO: NEGATIVE
HYALINE CASTS #/AREA URNS LPF: NORMAL /LPF
IMM GRANULOCYTES # BLD AUTO: 0.04 THOUSAND/UL (ref 0–0.2)
IMM GRANULOCYTES NFR BLD AUTO: 1 % (ref 0–2)
KETONES UR STRIP-MCNC: NEGATIVE MG/DL
LDLC SERPL CALC-MCNC: 92 MG/DL (ref 0–100)
LEUKOCYTE ESTERASE UR QL STRIP: NEGATIVE
LYMPHOCYTES # BLD AUTO: 2.63 THOUSANDS/ΜL (ref 0.6–4.47)
LYMPHOCYTES NFR BLD AUTO: 34 % (ref 14–44)
MCH RBC QN AUTO: 31.4 PG (ref 26.8–34.3)
MCHC RBC AUTO-ENTMCNC: 32.7 G/DL (ref 31.4–37.4)
MCV RBC AUTO: 96 FL (ref 82–98)
MONOCYTES # BLD AUTO: 0.68 THOUSAND/ΜL (ref 0.17–1.22)
MONOCYTES NFR BLD AUTO: 9 % (ref 4–12)
NEUTROPHILS # BLD AUTO: 4.18 THOUSANDS/ΜL (ref 1.85–7.62)
NEUTS SEG NFR BLD AUTO: 54 % (ref 43–75)
NITRITE UR QL STRIP: NEGATIVE
NON-SQ EPI CELLS URNS QL MICRO: NORMAL /HPF
NRBC BLD AUTO-RTO: 0 /100 WBCS
PH UR STRIP.AUTO: 6 [PH] (ref 4.5–8)
PLATELET # BLD AUTO: 339 THOUSANDS/UL (ref 149–390)
PMV BLD AUTO: 8.6 FL (ref 8.9–12.7)
POTASSIUM SERPL-SCNC: 3.9 MMOL/L (ref 3.5–5.3)
PROT SERPL-MCNC: 8.4 G/DL (ref 6.4–8.2)
PROT UR STRIP-MCNC: ABNORMAL MG/DL
RBC # BLD AUTO: 4.81 MILLION/UL (ref 3.88–5.62)
RBC #/AREA URNS AUTO: NORMAL /HPF
SODIUM SERPL-SCNC: 136 MMOL/L (ref 136–145)
SP GR UR STRIP.AUTO: 1.03 (ref 1–1.03)
TRIGL SERPL-MCNC: 228 MG/DL
UROBILINOGEN UR QL STRIP.AUTO: 0.2 E.U./DL
WBC # BLD AUTO: 7.69 THOUSAND/UL (ref 4.31–10.16)
WBC #/AREA URNS AUTO: NORMAL /HPF

## 2018-06-14 PROCEDURE — 87591 N.GONORRHOEAE DNA AMP PROB: CPT

## 2018-06-14 PROCEDURE — 87536 HIV-1 QUANT&REVRSE TRNSCRPJ: CPT

## 2018-06-14 PROCEDURE — 86361 T CELL ABSOLUTE COUNT: CPT

## 2018-06-14 PROCEDURE — 86592 SYPHILIS TEST NON-TREP QUAL: CPT

## 2018-06-14 PROCEDURE — 80053 COMPREHEN METABOLIC PANEL: CPT

## 2018-06-14 PROCEDURE — 80061 LIPID PANEL: CPT

## 2018-06-14 PROCEDURE — 36415 COLL VENOUS BLD VENIPUNCTURE: CPT

## 2018-06-14 PROCEDURE — 81001 URINALYSIS AUTO W/SCOPE: CPT

## 2018-06-14 PROCEDURE — 86803 HEPATITIS C AB TEST: CPT

## 2018-06-14 PROCEDURE — 85025 COMPLETE CBC W/AUTO DIFF WBC: CPT

## 2018-06-14 PROCEDURE — 83036 HEMOGLOBIN GLYCOSYLATED A1C: CPT

## 2018-06-14 PROCEDURE — 87491 CHLMYD TRACH DNA AMP PROBE: CPT

## 2018-06-14 PROCEDURE — 86480 TB TEST CELL IMMUN MEASURE: CPT

## 2018-06-15 LAB
BASOPHILS # BLD AUTO: 0 X10E3/UL (ref 0–0.2)
BASOPHILS NFR BLD AUTO: 0 %
CD3+CD4+ CELLS # BLD: 902 /UL (ref 359–1519)
CD3+CD4+ CELLS NFR BLD: 37.6 % (ref 30.8–58.5)
EOSINOPHIL # BLD AUTO: 0.2 X10E3/UL (ref 0–0.4)
EOSINOPHIL NFR BLD AUTO: 2 %
ERYTHROCYTE [DISTWIDTH] IN BLOOD BY AUTOMATED COUNT: 13.6 % (ref 12.3–15.4)
HCT VFR BLD AUTO: 46.3 % (ref 37.5–51)
HCV AB SER QL: NORMAL
HGB BLD-MCNC: 15.6 G/DL (ref 13–17.7)
IMM GRANULOCYTES # BLD: 0 X10E3/UL (ref 0–0.1)
IMM GRANULOCYTES NFR BLD: 0 %
LYMPHOCYTES # BLD AUTO: 2.4 X10E3/UL (ref 0.7–3.1)
LYMPHOCYTES NFR BLD AUTO: 35 %
MCH RBC QN AUTO: 32.4 PG (ref 26.6–33)
MCHC RBC AUTO-ENTMCNC: 33.7 G/DL (ref 31.5–35.7)
MCV RBC AUTO: 96 FL (ref 79–97)
MONOCYTES # BLD AUTO: 0.6 X10E3/UL (ref 0.1–0.9)
MONOCYTES NFR BLD AUTO: 8 %
NEUTROPHILS # BLD AUTO: 3.7 X10E3/UL (ref 1.4–7)
NEUTROPHILS NFR BLD AUTO: 55 %
PLATELET # BLD AUTO: 301 X10E3/UL (ref 150–379)
RBC # BLD AUTO: 4.82 X10E6/UL (ref 4.14–5.8)
RPR SER QL: NORMAL
WBC # BLD AUTO: 6.9 X10E3/UL (ref 3.4–10.8)

## 2018-06-16 LAB
ANNOTATION COMMENT IMP: NORMAL
GAMMA INTERFERON BACKGROUND BLD IA-ACNC: 0.1 IU/ML
M TB IFN-G BLD-IMP: NEGATIVE
M TB IFN-G CD4+ BCKGRND COR BLD-ACNC: <0.01 IU/ML
M TB IFN-G CD4+ T-CELLS BLD-ACNC: 0.09 IU/ML
MITOGEN IGNF BLD-ACNC: 7.23 IU/ML
QUANTIFERON-TB GOLD IN TUBE: NORMAL
SERVICE CMNT-IMP: NORMAL

## 2018-06-18 LAB
CHLAMYDIA DNA CVX QL NAA+PROBE: NORMAL
HIV1 RNA # SERPL NAA+PROBE: <20 COPIES/ML
HIV1 RNA SERPL NAA+PROBE-LOG#: NORMAL LOG10COPY/ML
N GONORRHOEA DNA GENITAL QL NAA+PROBE: NORMAL

## 2018-06-20 DIAGNOSIS — K21.9 GASTROESOPHAGEAL REFLUX DISEASE WITHOUT ESOPHAGITIS: ICD-10-CM

## 2018-06-22 RX ORDER — OMEPRAZOLE 40 MG/1
CAPSULE, DELAYED RELEASE ORAL
Qty: 30 CAPSULE | Refills: 3 | Status: SHIPPED | OUTPATIENT
Start: 2018-06-22 | End: 2018-07-25 | Stop reason: SDUPTHER

## 2018-06-26 ENCOUNTER — OFFICE VISIT (OUTPATIENT)
Dept: BEHAVIORAL/MENTAL HEALTH CLINIC | Facility: CLINIC | Age: 48
End: 2018-06-26
Payer: COMMERCIAL

## 2018-06-26 DIAGNOSIS — F41.0 PANIC ATTACKS: ICD-10-CM

## 2018-06-26 DIAGNOSIS — F41.1 GAD (GENERALIZED ANXIETY DISORDER): ICD-10-CM

## 2018-06-26 DIAGNOSIS — F33.1 MDD (MAJOR DEPRESSIVE DISORDER), RECURRENT EPISODE, MODERATE (HCC): Primary | ICD-10-CM

## 2018-06-26 PROCEDURE — 90834 PSYTX W PT 45 MINUTES: CPT | Performed by: SOCIAL WORKER
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
## 2018-06-28 ENCOUNTER — DOCUMENTATION (OUTPATIENT)
Dept: BEHAVIORAL/MENTAL HEALTH CLINIC | Facility: CLINIC | Age: 48
End: 2018-06-28

## 2018-07-18 ENCOUNTER — OFFICE VISIT (OUTPATIENT)
Dept: SURGERY | Facility: CLINIC | Age: 48
End: 2018-07-18
Payer: COMMERCIAL

## 2018-07-18 VITALS
SYSTOLIC BLOOD PRESSURE: 109 MMHG | TEMPERATURE: 97.6 F | HEART RATE: 73 BPM | BODY MASS INDEX: 28 KG/M2 | HEIGHT: 67 IN | OXYGEN SATURATION: 98 % | DIASTOLIC BLOOD PRESSURE: 62 MMHG | WEIGHT: 178.4 LBS

## 2018-07-18 DIAGNOSIS — Z72.0 NICOTINE ABUSE: ICD-10-CM

## 2018-07-18 DIAGNOSIS — F41.1 GAD (GENERALIZED ANXIETY DISORDER): ICD-10-CM

## 2018-07-18 DIAGNOSIS — B20 HIV (HUMAN IMMUNODEFICIENCY VIRUS INFECTION) (HCC): Primary | ICD-10-CM

## 2018-07-18 DIAGNOSIS — R25.8 JERKY BODY MOVEMENTS: ICD-10-CM

## 2018-07-18 DIAGNOSIS — B20 HIV DISEASE (HCC): ICD-10-CM

## 2018-07-18 DIAGNOSIS — R13.10 DYSPHAGIA: ICD-10-CM

## 2018-07-18 PROCEDURE — 99214 OFFICE O/P EST MOD 30 MIN: CPT | Performed by: INTERNAL MEDICINE

## 2018-07-18 RX ORDER — ELVITEGRAVIR, COBICISTAT, EMTRICITABINE, AND TENOFOVIR ALAFENAMIDE 150; 150; 200; 10 MG/1; MG/1; MG/1; MG/1
TABLET ORAL
Qty: 30 TABLET | OUTPATIENT
Start: 2018-07-18

## 2018-07-18 NOTE — PROGRESS NOTES
Visit     met with Pt during his regular visit to the clinic  Pt was accompanied by his mother  Pt requested a 1-on-1 appointment for next week to discuss his spiritual distress over trying to discern what "God" means to him and his life   scheduled that appointment      Chaplain Ghanshyam

## 2018-07-18 NOTE — PROGRESS NOTES
Progress Note - Infectious Disease   Geoff Black 50 y o  male MRN: [id number]  Unit/Bed#:  Encounter: [id number]      Impression/Plan:     1  HIV-doing well on Genvoya with an undetectable viral load and a CD4 count stable at 902  Continue a RT, recheck labs in 5 months, follow up in 6 months     2  Generalized anxiety disorder-with the patient's significant purposes jerky body movements, consideration for the possibility of Tourettes syndrome  Patient seems quite agitated and is making commentary about wanting to change primary  Discussed in detail with the primary  Also discussed in detail with the behavioral health who was evaluating the patient  Patient currently receiving Risperdal and fluoxetine and has f/u visit in the near future per patient  3  Nicotine dependence-patient states he is interested in stopping smoking  Encouraged him to pursue complete tobacco cessation  4  Dysphagia-being followed by GI  Was seen 1/8/18  Dysphagia had resolved  But noted 1 episode of solid food dysphagia 1 month ago  Continue to monitor clinically for now  Pt with noted Hall's esophagus with biopsies negative  GI recommending repeat EGD in 3 years and to c/w PPI therapy  Patient was provided medication, adherence and prevention education    Subjective:  Routine follow-up for HIV  Patient claims 100% adherence with Genvoya    Patient denies any notable side effects  Overall the feeling well  The patient denies any fever chills or sweats, denies any nausea vomiting or diarrhea, denies any cough or shortness of breath      ROS: A complete 12 point ROS is negative other than that noted in the HPI    Objective:  Vitals:  Vitals:    07/18/18 1708   BP: 109/62   BP Location: Left arm   Patient Position: Sitting   Cuff Size: Standard   Pulse: 73   Temp: 97 6 °F (36 4 °C)   SpO2: 98%   Weight: 80 9 kg (178 lb 6 4 oz)   Height: 5' 7" (1 702 m)       Physical Exam:   General Appearance:  Alert, interactive, appearing well,  nontoxic, no acute distress  Neck:   Supple without lymphadenopathy, no thyromegaly or masses   Throat: Oropharynx moist without lesions  Lungs:   Clear to auscultation bilaterally; no wheezes, rhonchi or rales; respirations unlabored   Heart:  RRR; no murmur, rub or gallop   Abdomen:   Soft, non-tender, non-distended, positive bowel sounds  Extremities: No clubbing, cyanosis or edema   Skin: No new rashes or lesions  No draining wounds noted         Lab Results   Component Value Date     06/14/2018    K 3 9 06/14/2018     06/14/2018    CO2 27 06/14/2018    ANIONGAP 8 06/14/2018    BUN 15 06/14/2018    CREATININE 1 02 06/14/2018    GLUCOSE 86 06/08/2017    GLUF 87 06/14/2018    CALCIUM 9 3 06/14/2018    AST 21 06/14/2018    ALT 35 06/14/2018    ALKPHOS 93 06/14/2018    PROT 8 4 (H) 06/14/2018    BILITOT 0 26 06/14/2018    EGFR 86 06/14/2018     Lab Results   Component Value Date    WBC 7 69 06/14/2018    HGB 15 1 06/14/2018    HGB 15 6 06/14/2018    HCT 46 2 06/14/2018    HCT 46 3 06/14/2018    MCV 96 06/14/2018    MCV 96 06/14/2018     06/14/2018     06/14/2018     Lab Results   Component Value Date    HEPCAB Non-reactive 06/14/2018     Lab Results   Component Value Date    HEPCAB Non-reactive 06/14/2018     Lab Results   Component Value Date    RPR Non-Reactive 06/14/2018     CD4 T CELL ABSOLUTE   Date/Time Value Ref Range Status   12/30/2015 10:18  359 - 1,519 /uL Final     CD4 T Cell Abs   Date/Time Value Ref Range Status   06/14/2018 08:12  359 - 1519 /uL Final     HIV-1 RNA Viral Load Log   Date/Time Value Ref Range Status   06/14/2018 08:12 AM COMMENT [id number]/mL Final     Comment:     Unable to calculate result since non-numeric result obtained for  component test      No results found for: [address], Imaging, & Other studies:   All pertinent labs and imaging studies were personally reviewed      Current Outpatient Prescriptions:     albuterol (VENTOLIN HFA) 90 mcg/act inhaler, Inhale 2 puffs, Disp: , Rfl:     dicyclomine (BENTYL) 10 mg capsule, Take 1 capsule (10 mg total) by mouth 4 (four) times a day (before meals and at bedtime), Disp: 120 capsule, Rfl: 2    FLUoxetine (PROzac) 20 mg capsule, Take 3 capsules (60 mg total) by mouth daily, Disp: 90 capsule, Rfl: 3    GENVOYA tablet, , Disp: , Rfl:     omeprazole (PriLOSEC) 40 MG capsule, TAKE 1 CAPSULE BY MOUTH DAILY, Disp: 30 capsule, Rfl: 3    propranolol (INDERAL) 10 mg tablet, Take 10-20mg twice daily as needed for anxiety, restlessness  , Disp: 120 tablet, Rfl: 3    risperiDONE (RisperDAL) 0 5 mg tablet, Take 1 tablet (0 5 mg total) by mouth every evening, Disp: 90 tablet, Rfl: 1    gabapentin (NEURONTIN) 300 mg capsule, Take 1 capsule (300 mg total) by mouth daily at bedtime for 30 days, Disp: 30 capsule, Rfl: 0

## 2018-07-19 NOTE — PROGRESS NOTES
[facility] - BEHAVIORAL HEALTH SPECIALIST     Subjective:     Patient ID: Mraibel Raza is a 50 y o  male  HPI    Patient is being seen for his routine wellness check and depression screener  Today patient present with manic behavior and uncooperativeness   Chief Complaint   Patient presents with    Follow-up    HIV Positive       Supports/Coping: mother, going [address] at [facility]      Objective:     Physical Exam     Orientation:  Person: Yes  Place: Yes  Time: Yes      Appearance:  Well Developed: Yes  Well Nourished: Yes  Appearance Reflects Stated Age: Yes  Grooming Unkempt: No  Poor Eye Contact: Yes  Looks Tired: No      Mood and Affect:   Appropriate: No  Patient's mood was observed to be manic    Harm to Self or Others: None reported or observed  Substance Abuse: None reported or observed     Discussion/Summary:     Assessment:  Today, patient presents with manic behavior and uncooperativeness   Patient would likely benefit from continuing with [place] [address] services at [facility]  Stage of change: Action          Discussion:  Patient was seen for his behavioral health consultation with the [facility] Specialist  [facility] Specialist services were reviewed  PT completed his depression screener; score = 7  PT had a very difficult time answering [facility]HS's questions due to his manic behavior  He was observed to have constant movement and very rapid speech  Frequently, PT spoke so fast they his words were unable to be determined  PT did state that he does attend therapy regularly at [facility]  It was unclear if he is being treated for possible Tourettes  PT had told Dr Garth Sung that he would consider smoking cessation but was not cooperative with [facility] on the matter  BHS will reassess at a later appointment  PT was reminded that he can call [facility] with questions or to receive additional support, as needed  He should also continue services at [facility]  PT acknowledged an understanding of this plan          Plan/ Behavioral Recommendations:   1  F/U with BHS at upcoming visit  2  Continue services at [facility]  3  Consider smoking cessation  4  Call [phone] with questions or for additional support, as needed       Diagnoses and all orders for this visit:    HIV (human immunodeficiency virus infection) ([facility] )  -     CBC and differential; Future  -     Comprehensive metabolic panel; Future  -     HIV-1 RNA, quantitative, PCR; Future  -     T-helper cells (CD4) count;  Future    HIV disease ([place] [address] )    Nicotine abuse    SOPHIA (generalized anxiety disorder)    Jerky body movements    Dysphagia          Starr Mustafa MS, [address] Specialist

## 2018-07-25 ENCOUNTER — OFFICE VISIT (OUTPATIENT)
Dept: SURGERY | Facility: CLINIC | Age: 48
End: 2018-07-25
Payer: COMMERCIAL

## 2018-07-25 VITALS
BODY MASS INDEX: 27.94 KG/M2 | OXYGEN SATURATION: 97 % | WEIGHT: 178 LBS | TEMPERATURE: 97.9 F | HEART RATE: 89 BPM | HEIGHT: 67 IN | SYSTOLIC BLOOD PRESSURE: 104 MMHG | DIASTOLIC BLOOD PRESSURE: 74 MMHG

## 2018-07-25 DIAGNOSIS — N52.9 UNABLE TO SUSTAIN ERECTION: ICD-10-CM

## 2018-07-25 DIAGNOSIS — F33.1 MDD (MAJOR DEPRESSIVE DISORDER), RECURRENT EPISODE, MODERATE (HCC): ICD-10-CM

## 2018-07-25 DIAGNOSIS — F41.1 GAD (GENERALIZED ANXIETY DISORDER): ICD-10-CM

## 2018-07-25 DIAGNOSIS — M54.41 CHRONIC BILATERAL LOW BACK PAIN WITH RIGHT-SIDED SCIATICA: Primary | ICD-10-CM

## 2018-07-25 DIAGNOSIS — B20 HIV (HUMAN IMMUNODEFICIENCY VIRUS INFECTION) (HCC): Primary | ICD-10-CM

## 2018-07-25 DIAGNOSIS — K21.9 GASTROESOPHAGEAL REFLUX DISEASE WITHOUT ESOPHAGITIS: ICD-10-CM

## 2018-07-25 DIAGNOSIS — G89.29 CHRONIC BILATERAL LOW BACK PAIN WITH RIGHT-SIDED SCIATICA: Primary | ICD-10-CM

## 2018-07-25 DIAGNOSIS — J45.20 MILD INTERMITTENT ASTHMA, UNSPECIFIED WHETHER COMPLICATED: Primary | ICD-10-CM

## 2018-07-25 PROCEDURE — 99214 OFFICE O/P EST MOD 30 MIN: CPT | Performed by: NURSE PRACTITIONER

## 2018-07-25 NOTE — ASSESSMENT & PLAN NOTE
Stable  Pt is happy with Prozac  Pt does follow with behavioral therapist    Pt does follow with psychiatrist Dr Linda Romero every 3 months  Continue Fluoxetine 60 mg daily  Risperidone 0 5 mg every evening  Pt takes Propranolol 10 mg to treat anxiety

## 2018-07-25 NOTE — ASSESSMENT & PLAN NOTE
Stable  Pt does follow with behavioral therapist   Pt does follow with psychiatrist Dr Sonya Reagan every 3 months  Continue Fluoxetine 60 mg daily  Risperidone 0 5 mg every evening      Abbe Shearing

## 2018-07-25 NOTE — ASSESSMENT & PLAN NOTE
Pt requesting Viagra  Pt not satisfied during sexual relations  Pt was encouraged to follow up with urology during next visit  Pt was not interested in a rectal exam     [address] will review medication drug interactions  Pt counseled with use of propranolol and Type 5 Phosphodiesterase Inhibitors  Sildenafil and Vardenafil are contraindicated with Genvoya

## 2018-07-25 NOTE — PATIENT INSTRUCTIONS
Nicotine Dependency - Primary    Counseled for greater than 15 minutes on the importance of smoking cessation  Education was given regarding the cardiovascular effects of how nicotine affects the heart, lungs, kidneys,and peripheral vascular system  Referred to [facility] for enrollment in smoking cessation program       Cigarette Smoking and Your Health   AMBULATORY CARE:   Risks to your health if you smoke:  Nicotine and other chemicals found in tobacco damage every cell in your body  Even if you are a light smoker, you have an increased risk for cancer, heart disease, and lung disease  If you are pregnant or have diabetes, smoking increases your risk for complications  Benefits to your health if you stop smoking:   · You decrease respiratory symptoms such as coughing, wheezing, and shortness of breath  · You reduce your risk for cancers of the lung, mouth, throat, kidney, bladder, pancreas, stomach, and cervix  If you already have cancer, you increase the benefits of chemotherapy  You also reduce your risk for cancer returning or a second cancer from developing  · You reduce your risk for heart disease, blood clots, heart attack, and stroke  · You reduce your risk for lung infections, and diseases such as pneumonia, asthma, chronic bronchitis, and emphysema  · Your circulation improves  More oxygen can be delivered to your body  If you have diabetes, you lower your risk for complications, such as kidney, artery, and eye diseases  You also lower your risk for nerve damage  Nerve damage can lead to amputations, poor vision, and blindness  · You improve your body's ability to heal and to fight infections  Benefits to the health of others if you stop smoking:  Tobacco is harmful to nonsmokers who breathe in your secondhand smoke   The following are ways the health of others around you may improve when you stop smoking:  · You lower the risks for lung cancer and heart disease in nonsmoking adults  · If you are pregnant, you lower the risk for miscarriage, early delivery, low birth weight, and stillbirth  You also lower your baby's risk for SIDS, obesity, developmental delay, and neurobehavioral problems, such as ADHD  · If you have children, you lower their risk for ear infections, colds, pneumonia, bronchitis, and asthma  For more information and support to stop smoking:   · [employer]  Phone: 1- 542 - [phone]  Web Address: www [employer]  Follow up with your healthcare provider as directed:  Write down your questions so you remember to ask them during your visits  © 2017 [address] [employer] Information is for End User's use only and may not be sold, redistributed or otherwise used for commercial purposes  All illustrations and images included in CareNotes® are the copyrighted property of A D A M , Inc  or Tomer Bonilla  The above information is an  only  It is not intended as medical advice for individual conditions or treatments  Talk to your doctor, nurse or pharmacist before following any medical regimen to see if it is safe and effective for you  Erectile dysfunction (ED):     Erectile dysfunction (ED) is defined as persistent difficulty achieving and maintaining an erection sufficient to have sex      Cause can be medical ,psychological, or organic        Organic is usually caused by underlining medical conditions affecting the blood supply or nerves supplying the penis        Number prescription medications, recreational drugs, alcohol, and smoking can all cause ED      Atherosclerosis is common cause of blood flow problems    Arthrosclerosis causes a narrowing or clogging of arteries in the penis, preventing the necessary blood flow to the penis to produce and erection           Education given regarding some of the health problems that can cause ED: HTN, obesity, DM, high cholesterol, or psychological   Pt's who take a lot of medications can have problems with ED  Review of pt medication that could cause ED was done along with treatment plan  Kandy Payton will look into what ED medication he can take based on current medications

## 2018-07-25 NOTE — ASSESSMENT & PLAN NOTE
Pt reports taking 100% adherence  Pt denies side effect  HIV Counseling:    Viral Load: <20    CD4 Count: 902    ART: Zulma Waddell    Denies side effects  Stressed the importance of adherence  Continue follow up in the ID clinic with Dr Agus Samson  Reviewed the most recent labs, including the Cd4 and viral load  Discussed the risks and benefits of treatment options, instructions for management, importance of treatment adherence, and reduction of risk factors  Educated on possible medication side effects  Counseled on routes of HIV transmission, including the risk of  infection  Emphasized that viral suppression is the best method to prevent HIV transmission  At this time the pt denies the need for HIV testing of anyone in their life  Total encounter time was greater than 35 minutes  Greater than 20 minutes were spent on counseling and patient education  Pt voices understanding and agreement with treatment plan

## 2018-07-25 NOTE — PROGRESS NOTES
07/25/18 2000 [facility] Involvement Patient not active with Anglican;Baptist not active in support   Spiritual Beliefs/Perceptions   Concept of God Other (Comment)  (Pt is unsure of his definition of God at this time)   God's Role in Disease God's will; Test of iván; Other (Comment)  (Pt unsure how he sees God's involvement w/his dx)   Relationship with God Other (Comment)  (Unsure, unsteady)   Support Systems Parent; Family members   Stress Factors   Patient Stress Factors Financial concerns; Health changes;Strained family relationships   Coping Responses   Patient Coping Anxiety;Depression;Open/discussion; Sadness   Patient Spiritual Assessment   Fear of Dying Process Accepting   Fear of Death and Unknown No fear of both   Plan of Care   Care Plan Initiated Yes   Provide Spiritual Support (Visits) Other (Comment)  (Pt will schedule visits as he can around other life things)      07/25/18 1100   Clinical Encounter Type   Visited With Patient   Routine Visit Follow-up   Continue Visiting Yes   Referral From Patient   Baptist Encounters   Baptist Needs (Other; conversation)   Patient Spiritual Encounters   Spiritual Assessment 3   Fear Level 4   Coping 3   Social Interaction Participates in daily activites      Visit     met with Pt in a 1-on-1 appointment at Pt's request     Pt shared life review and talked about his family system and its history and how that affects his life  He also shared about his past with the 2 significant SO relationships in his life  Pt shared how he is troubled by the intersection of the theology he grew up with as a Hinduism and part of the [employer] and how he lives his life and sees God now  Pt said he would like to hold conversations with  to help him sort out and define God in his present context in life       provided reflective listening and clarifying questions as Pt shared the above  Pt said he will schedule visits to continue the conversation but has to arrange transportation and time away from taking care of his mother's home   provided card with contact information and invitation for Pt to call and schedule      Chaplain Ramez

## 2018-07-25 NOTE — ASSESSMENT & PLAN NOTE
Stable  Pt is taking twice a day  Pt does follow with GI  Continue dicyclomine 10 mg 4 times prior to meals and at bedtime  Discuss pt triggers and dietary intake and changes

## 2018-07-25 NOTE — ASSESSMENT & PLAN NOTE
Stable  Continue using Albuterol MDI as needed for chest tightness and difficulty breathing  Pt counseled in smoking cessation  Pt is not interested in quitting

## 2018-07-25 NOTE — ASSESSMENT & PLAN NOTE
Pt reports smoking 1 PPD  CRNP will refer pt to [address]  Pt is not interested in quitting at this time  Nicotine Dependency - Primary    Counseled for greater than 15 minutes on the importance of smoking cessation  Education was given regarding the cardiovascular effects of how nicotine affects the heart, lungs, kidneys,and peripheral vascular system    Referred to [facility] for enrollment in smoking cessation program

## 2018-07-25 NOTE — ASSESSMENT & PLAN NOTE
Stable  Continue omeprazole 40 mg daily  Pt is not aware of what triggers GERD symptoms  Gastroesophageal Reflux Disease:    Patient was educated on lifestyle modifications to improve GERD  Guidance was given to encouraged the patient to avoid acidic foods including citrus, onions, coffee, tomatoes and tomato based sauces, carbonated beverages, mint, chocolate, and fried foods  Educated to avoid eating late at night, to avoid laying down directly after eating a large meal or late at night, to consume smaller and more frequent meals throughout the day  Encouraged to maintain a healthy body weight, to exercise for 30 minutes 5 times a week and to avoid alcoholic beverages, and smoking to decrease GERD symptoms    Encouraged to keep a food journal if symptoms persist

## 2018-07-25 NOTE — ASSESSMENT & PLAN NOTE
Pt reports swelling in testicular area  Pt does follow with urologist   Embolization to scrotal area    Pt will follow up with urologist

## 2018-07-25 NOTE — PROGRESS NOTES
Assessment/Plan:    GERD (gastroesophageal reflux disease)  Stable  Continue omeprazole 40 mg daily  Pt is not aware of what triggers GERD symptoms  Gastroesophageal Reflux Disease:    Patient was educated on lifestyle modifications to improve GERD  Guidance was given to encouraged the patient to avoid acidic foods including citrus, onions, coffee, tomatoes and tomato based sauces, carbonated beverages, mint, chocolate, and fried foods  Educated to avoid eating late at night, to avoid laying down directly after eating a large meal or late at night, to consume smaller and more frequent meals throughout the day  Encouraged to maintain a healthy body weight, to exercise for 30 minutes 5 times a week and to avoid alcoholic beverages, and smoking to decrease GERD symptoms  Encouraged to keep a food journal if symptoms persist     IBS (irritable bowel syndrome)  Stable  Pt is taking twice a day  Pt does follow with GI  Continue dicyclomine 10 mg 4 times prior to meals and at bedtime  Discuss pt triggers and dietary intake and changes  HIV disease  Pt reports taking 100% adherence  Pt denies side effect  HIV Counseling:    Viral Load: <20    CD4 Count: 902    ART: Doreen Pump    Denies side effects  Stressed the importance of adherence  Continue follow up in the ID clinic with Dr Lima Fuller  Reviewed the most recent labs, including the Cd4 and viral load  Discussed the risks and benefits of treatment options, instructions for management, importance of treatment adherence, and reduction of risk factors  Educated on possible medication side effects  Counseled on routes of HIV transmission, including the risk of  infection  Emphasized that viral suppression is the best method to prevent HIV transmission  At this time the pt denies the need for HIV testing of anyone in their life  Total encounter time was greater than 35 minutes    Greater than 20 minutes were spent on counseling and patient education  Pt voices understanding and agreement with treatment plan  Nicotine abuse  Pt reports smoking 1 PPD  CRNP will refer pt to Breana Lored[address] Dr  Pt is not interested in quitting at this time  Nicotine Dependency - Primary    Counseled for greater than 15 minutes on the importance of smoking cessation  Education was given regarding the cardiovascular effects of how nicotine affects the heart, lungs, kidneys,and peripheral vascular system  Referred to [facility] for enrollment in smoking cessation program       Panic attacks      Discuss known triggers and patient management of panic attacks  SOPHIA (generalized anxiety disorder)  Stable  Pt is happy with Prozac  Pt does follow with behavioral therapist    Pt does follow with psychiatrist Dr Cj Waggoner every 3 months  Continue Fluoxetine 60 mg daily  Risperidone 0 5 mg every evening  Pt takes Propranolol 10 mg to treat anxiety  MDD (major depressive disorder), recurrent episode, moderate (HCC)  Stable  Pt does follow with behavioral therapist   Pt does follow with psychiatrist Dr Cj Waggoner every 3 months  Continue Fluoxetine 60 mg daily  Risperidone 0 5 mg every evening   jh    Bilateral varicoceles  Pt reports swelling in testicular area  Pt does follow with urologist   Embolization to scrotal area  Pt will follow up with urologist       Embolism involving vascular device ([address] )    Stable  Coil from testicular region has migrated to right pulmonary  Dysphagia  Stable  Had Barium swallowing evaluation and WNL  Pt is aware of some food triggers  Acute right-sided low back pain with right-sided sciatica  Stable  Continue taking Gabapentin 300 mg TID  Jerky body movements  Unchanged  Continue to gabapentin 300 mg TID  Mild intermittent asthma without complication  Stable  Continue using Albuterol MDI as needed for chest tightness and difficulty breathing  Pt counseled in smoking cessation    Pt is not interested in quitting  Unable to sustain erection  Pt requesting Viagra  Pt not satisfied during sexual relations  Pt was encouraged to follow up with urology during next visit  CRNP will review medication drug interactions  Pt counseled with use of propranolol and Type 5 Phosphodiesterase Inhibitors  Sildenafil and Vardenafil are contraindicated with Genvoya  Patient Active Problem List   Diagnosis    Bilateral varicoceles    Embolism involving vascular device (HCC)    HIV disease (HCC)    GERD (gastroesophageal reflux disease)    IBS (irritable bowel syndrome)    Nicotine abuse    SOPHIA (generalized anxiety disorder)    MDD (major depressive disorder), recurrent episode, moderate ([facility])    Acute right-sided low back pain with right-sided sciatica    Jerky body movements    Panic attacks    Dysphagia    Mild intermittent asthma without complication    Unable to sustain erection       Lab Results   Component Value Date     06/14/2018    K 3 9 06/14/2018     06/14/2018    CO2 27 06/14/2018    ANIONGAP 8 06/14/2018    BUN 15 06/14/2018    CREATININE 1 02 06/14/2018    GLUCOSE 86 06/08/2017    GLUF 87 06/14/2018    CALCIUM 9 3 06/14/2018    AST 21 06/14/2018    ALT 35 06/14/2018    ALKPHOS 93 06/14/2018    PROT 8 4 (H) 06/14/2018    BILITOT 0 26 06/14/2018    EGFR 86 06/14/2018     Lab Results   Component Value Date    WBC 7 69 06/14/2018    HGB 15 1 06/14/2018    HGB 15 6 06/14/2018    HCT 46 2 06/14/2018    HCT 46 3 06/14/2018    MCV 96 06/14/2018    MCV 96 06/14/2018     06/14/2018     06/14/2018        Diagnoses and all orders for this visit:    HIV (human immunodeficiency virus infection) ([facility] [address] )    Unable to sustain erection          Subjective:      Patient ID: Dorina Newman is a 50 y o  male  Pt is being seen in the office for follow up visit with PCP  Pt is feeling "not great" which is normal for me due to my right hip and knee with left foot    HIV wise I am doing well  My nerve is pinched  I did try PT for awhile and did not go well with therapist   Pt does see chiropractor and recommended starting PT again  Pt is willing to participate in PT  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  Pt is not able start and maintain and erection during sexual relations  Pt was strongly encouraged to follow up with a urologist   Pt is requesting Viagra because he has taken this drug in the past   Pt was told that this drug may interact with Devyn Cast  Pt was cautioned in use of Viagra or similar drug and propranolol taking together as they can both lower blood pressure and can cause flushing in the face  Pt verbalized understanding  Pt was going to make an appointment with urology and opthalmology on his own  Pt was requesting a referral to PT  Pt expressed concern over not being given medications he has asked or being told the truth why medications were not being ordered  in the past by previous provider  Pt was focused on this line of conversation  Pt was redirected on current visit and was made aware that not all medication can be prescribed by one provider or a provider who is not comfortable in prescribing a certain medication  Pt verbalized understanding and asked for honesty during visit  Pt's concern was acknowledged  The following portions of the patient's history were reviewed and updated as appropriate: allergies, current medications, past medical history, past social history, past surgical history and problem list     Review of Systems   Constitutional: Negative  HENT: Negative  Respiratory: Positive for chest tightness and shortness of breath  During activity  Gastrointestinal: Negative  Genitourinary: Negative  Musculoskeletal: Positive for back pain and myalgias  Skin: Negative  Neurological: Negative  Psychiatric/Behavioral: Negative            Objective:      /74 (BP Location: Right arm, Patient Position: Sitting, Cuff Size: Standard)   Pulse 89   Temp 97 9 °F (36 6 °C)   Ht 5' 7" (1 702 m)   Wt 80 7 kg (178 lb)   SpO2 97%   BMI 27 88 kg/m²          Physical Exam   Constitutional: He is oriented to person, place, and time  He appears well-developed and well-nourished  HENT:   Head: Normocephalic  Right Ear: External ear normal    Left Ear: External ear normal    Eyes: Pupils are equal, round, and reactive to light  Neck: Normal range of motion  Neck supple  Cardiovascular: Normal rate, regular rhythm, normal heart sounds and intact distal pulses  Pulmonary/Chest: Effort normal and breath sounds normal    Abdominal: Soft  Bowel sounds are normal    Lymphadenopathy:     He has no cervical adenopathy  Neurological: He is oriented to person, place, and time  Skin: Skin is warm and dry  Psychiatric: He has a normal mood and affect   His behavior is normal

## 2018-07-26 DIAGNOSIS — N52.9 ERECTILE DYSFUNCTION, UNSPECIFIED ERECTILE DYSFUNCTION TYPE: Primary | ICD-10-CM

## 2018-07-27 RX ORDER — OMEPRAZOLE 40 MG/1
CAPSULE, DELAYED RELEASE ORAL
Qty: 90 CAPSULE | Refills: 3 | Status: SHIPPED | OUTPATIENT
Start: 2018-07-27 | End: 2019-09-30 | Stop reason: SDUPTHER

## 2018-07-27 RX ORDER — TADALAFIL 5 MG/1
TABLET ORAL
Qty: 3 TABLET | Refills: 0 | Status: SHIPPED | OUTPATIENT
Start: 2018-07-27 | End: 2018-10-23 | Stop reason: ALTCHOICE

## 2018-07-27 RX ORDER — ALBUTEROL SULFATE 90 UG/1
2 AEROSOL, METERED RESPIRATORY (INHALATION) EVERY 4 HOURS PRN
Qty: 1 INHALER | Refills: 3 | Status: SHIPPED | OUTPATIENT
Start: 2018-07-27 | End: 2018-11-07 | Stop reason: SDUPTHER

## 2018-08-02 ENCOUNTER — TELEPHONE (OUTPATIENT)
Dept: SURGERY | Facility: CLINIC | Age: 48
End: 2018-08-02

## 2018-08-03 NOTE — TELEPHONE ENCOUNTER
Please call pt and let him the order for Cialis tadalafil was filled on 7/27  Pt could not receive Viagra due to drug interaction  Same drug class but I only gave him 3 refills to make sure he tolerates it ok  Pt should follow up with urology

## 2018-08-06 DIAGNOSIS — B20 HIV (HUMAN IMMUNODEFICIENCY VIRUS INFECTION) (HCC): Primary | ICD-10-CM

## 2018-08-07 RX ORDER — ELVITEGRAVIR, COBICISTAT, EMTRICITABINE, AND TENOFOVIR ALAFENAMIDE 150; 150; 200; 10 MG/1; MG/1; MG/1; MG/1
TABLET ORAL
Qty: 30 TABLET | Refills: 3 | Status: SHIPPED | OUTPATIENT
Start: 2018-08-07 | End: 2018-11-07 | Stop reason: SDUPTHER

## 2018-08-09 ENCOUNTER — OFFICE VISIT (OUTPATIENT)
Dept: BEHAVIORAL/MENTAL HEALTH CLINIC | Facility: CLINIC | Age: 48
End: 2018-08-09
Payer: COMMERCIAL

## 2018-08-09 DIAGNOSIS — F33.1 MDD (MAJOR DEPRESSIVE DISORDER), RECURRENT EPISODE, MODERATE (HCC): ICD-10-CM

## 2018-08-09 DIAGNOSIS — F41.1 GAD (GENERALIZED ANXIETY DISORDER): Primary | ICD-10-CM

## 2018-08-09 DIAGNOSIS — F41.0 PANIC ATTACKS: ICD-10-CM

## 2018-08-09 PROCEDURE — 90834 PSYTX W PT 45 MINUTES: CPT | Performed by: SOCIAL WORKER

## 2018-08-09 NOTE — PSYCH
Psychotherapy Provided: Individual Psychotherapy 50 minutes     Length of time in session: 50 minutes, follow up in 2 week    Goals addressed in session: Goal 1, Goal 2 and Goal 3      Pain:      moderate to severe    6    Current suicide risk : Low     D: Met with Portillo individually  ROS; "I'm in pain"  Session focused upon ongoing pain, visit to [facility], Chiropractor and agreement to resume PT though 'skeptical' he will get someone 'who is smart enough to actually listen to him'  Hyperfocused on Dr Jennifer Aragon and how most are 'dumb and incompetient' and follow a script rather than listening to him  Discused frequent movements and Portillo's rocking  Stated he used to do this as a child for comfort  Further discussion of decline in parent's health that it's 'noticable'    Denied SI      A: Piper Akins presented with increased dystonia throughout session and rapid speech with statements unable to understand  He stated this is due to his physical pain and inability to get comfortable however I feels this is not the case  Piper Akins feels he would like to get this looked at again as well r/o form of Tourette's       P: Ongoing psychotherapy  Continue discussion of treatment goals  Behavioral Health Treatment Plan [facility]: Diagnosis and Treatment Plan explained to Ade Alexis relates understanding diagnosis and is agreeable to Treatment Plan   Yes
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
## 2018-08-16 ENCOUNTER — OFFICE VISIT (OUTPATIENT)
Dept: PSYCHIATRY | Facility: CLINIC | Age: 48
End: 2018-08-16
Payer: COMMERCIAL

## 2018-08-16 DIAGNOSIS — F33.1 MDD (MAJOR DEPRESSIVE DISORDER), RECURRENT EPISODE, MODERATE (HCC): ICD-10-CM

## 2018-08-16 DIAGNOSIS — F41.1 GAD (GENERALIZED ANXIETY DISORDER): ICD-10-CM

## 2018-08-16 PROCEDURE — 90833 PSYTX W PT W E/M 30 MIN: CPT | Performed by: PSYCHIATRY & NEUROLOGY

## 2018-08-16 PROCEDURE — 99214 OFFICE O/P EST MOD 30 MIN: CPT | Performed by: PSYCHIATRY & NEUROLOGY

## 2018-08-16 RX ORDER — FLUOXETINE HYDROCHLORIDE 20 MG/1
60 CAPSULE ORAL DAILY
Qty: 90 CAPSULE | Refills: 1 | Status: SHIPPED | OUTPATIENT
Start: 2018-08-16 | End: 2018-10-18 | Stop reason: SDUPTHER

## 2018-08-16 RX ORDER — RISPERIDONE 0.5 MG/1
TABLET, FILM COATED ORAL
Qty: 90 TABLET | Refills: 0
Start: 2018-08-16 | End: 2018-10-23 | Stop reason: ALTCHOICE

## 2018-08-22 ENCOUNTER — TELEPHONE (OUTPATIENT)
Dept: SURGERY | Facility: CLINIC | Age: 48
End: 2018-08-22

## 2018-08-22 DIAGNOSIS — F41.8 PERFORMANCE ANXIETY: ICD-10-CM

## 2018-08-22 DIAGNOSIS — R25.8 JERKY BODY MOVEMENTS: Primary | ICD-10-CM

## 2018-08-22 DIAGNOSIS — K58.9 SPASM OF BOWEL: ICD-10-CM

## 2018-08-22 RX ORDER — DICYCLOMINE HYDROCHLORIDE 10 MG/1
CAPSULE ORAL
Qty: 120 CAPSULE | Refills: 0 | Status: SHIPPED | OUTPATIENT
Start: 2018-08-22 | End: 2018-09-17 | Stop reason: SDUPTHER

## 2018-08-24 RX ORDER — PROPRANOLOL HYDROCHLORIDE 10 MG/1
TABLET ORAL
Qty: 120 TABLET | Refills: 1 | Status: SHIPPED | OUTPATIENT
Start: 2018-08-24 | End: 2018-10-18 | Stop reason: SDUPTHER

## 2018-09-11 ENCOUNTER — OFFICE VISIT (OUTPATIENT)
Dept: BEHAVIORAL/MENTAL HEALTH CLINIC | Facility: CLINIC | Age: 48
End: 2018-09-11
Payer: COMMERCIAL

## 2018-09-11 DIAGNOSIS — F41.0 PANIC ATTACKS: ICD-10-CM

## 2018-09-11 DIAGNOSIS — F33.1 MDD (MAJOR DEPRESSIVE DISORDER), RECURRENT EPISODE, MODERATE (HCC): ICD-10-CM

## 2018-09-11 DIAGNOSIS — F41.1 GAD (GENERALIZED ANXIETY DISORDER): Primary | ICD-10-CM

## 2018-09-11 PROCEDURE — 90834 PSYTX W PT 45 MINUTES: CPT | Performed by: SOCIAL WORKER

## 2018-09-11 NOTE — PSYCH
Psychotherapy Provided: Individual Psychotherapy 50 minutes     Length of time in session: 50 minutes, follow up in 2 week    Goals addressed in session: Goal 1, Goal 2 and Goal 3      Pain:      none    0    Current suicide risk : Low     D: Met with Portillo LAZARO; "I'm sleeping a lot'  Unaware of trigger for hyper-sominia  PT has been frustrated for him  Pain has been minimal today which has been a benefit  Father continues to deteriorate  Mom has been playing cards at night which [place] with father; he doesn't mind but it can be overwhelming at times  Denied SI      A: Pedro Delgado presented with baseline mood and affect  His movements were minimal today - could be due to having a good day related to pain  P: Ongoing psychotherapy  Continue discussion of treatment goals  Behavioral Health Treatment Plan [facility]: Diagnosis and Treatment Plan explained to Ana Ng relates understanding diagnosis and is agreeable to Treatment Plan   Yes

## 2018-09-17 DIAGNOSIS — K58.9 SPASM OF BOWEL: ICD-10-CM

## 2018-09-18 RX ORDER — DICYCLOMINE HYDROCHLORIDE 10 MG/1
CAPSULE ORAL
Qty: 120 CAPSULE | Refills: 0 | Status: SHIPPED | OUTPATIENT
Start: 2018-09-18 | End: 2018-10-18 | Stop reason: SDUPTHER

## 2018-09-21 ENCOUNTER — DOCUMENTATION (OUTPATIENT)
Dept: PSYCHIATRY | Facility: CLINIC | Age: 48
End: 2018-09-21

## 2018-09-21 NOTE — PROGRESS NOTES
Appointment 10/17/2018 was cancelled due to Provider being out of the office, Will RS at a later time

## 2018-10-02 ENCOUNTER — PATIENT OUTREACH (OUTPATIENT)
Dept: SURGERY | Facility: CLINIC | Age: 48
End: 2018-10-02

## 2018-10-02 NOTE — PROGRESS NOTES
Letter sent reminder that re-certification is due in November 2018 for case management  [address]  [address]   At [facility]/[place]     [phone]

## 2018-10-18 DIAGNOSIS — F41.8 PERFORMANCE ANXIETY: ICD-10-CM

## 2018-10-18 DIAGNOSIS — K58.9 SPASM OF BOWEL: ICD-10-CM

## 2018-10-18 DIAGNOSIS — F33.1 MDD (MAJOR DEPRESSIVE DISORDER), RECURRENT EPISODE, MODERATE (HCC): ICD-10-CM

## 2018-10-18 DIAGNOSIS — F41.1 GAD (GENERALIZED ANXIETY DISORDER): ICD-10-CM

## 2018-10-18 RX ORDER — FLUOXETINE HYDROCHLORIDE 20 MG/1
CAPSULE ORAL
Qty: 90 CAPSULE | Refills: 2 | Status: SHIPPED | OUTPATIENT
Start: 2018-10-18 | End: 2019-01-10 | Stop reason: SDUPTHER

## 2018-10-18 RX ORDER — PROPRANOLOL HYDROCHLORIDE 10 MG/1
TABLET ORAL
Qty: 120 TABLET | Refills: 2 | Status: SHIPPED | OUTPATIENT
Start: 2018-10-18 | End: 2019-01-10 | Stop reason: SDUPTHER

## 2018-10-22 RX ORDER — DICYCLOMINE HYDROCHLORIDE 10 MG/1
CAPSULE ORAL
Qty: 120 CAPSULE | Refills: 2 | Status: SHIPPED | OUTPATIENT
Start: 2018-10-22 | End: 2019-04-23 | Stop reason: SDUPTHER

## 2018-10-23 ENCOUNTER — OFFICE VISIT (OUTPATIENT)
Dept: SURGERY | Facility: CLINIC | Age: 48
End: 2018-10-23
Payer: COMMERCIAL

## 2018-10-23 VITALS
HEIGHT: 68 IN | HEART RATE: 78 BPM | DIASTOLIC BLOOD PRESSURE: 62 MMHG | WEIGHT: 174 LBS | SYSTOLIC BLOOD PRESSURE: 109 MMHG | BODY MASS INDEX: 26.37 KG/M2 | OXYGEN SATURATION: 98 % | RESPIRATION RATE: 18 BRPM | TEMPERATURE: 98 F

## 2018-10-23 DIAGNOSIS — N52.9 ERECTILE DYSFUNCTION, UNSPECIFIED ERECTILE DYSFUNCTION TYPE: Primary | ICD-10-CM

## 2018-10-23 DIAGNOSIS — M54.41 ACUTE RIGHT-SIDED LOW BACK PAIN WITH RIGHT-SIDED SCIATICA: ICD-10-CM

## 2018-10-23 DIAGNOSIS — N52.9 UNABLE TO SUSTAIN ERECTION: ICD-10-CM

## 2018-10-23 DIAGNOSIS — K59.00 CONSTIPATION, UNSPECIFIED CONSTIPATION TYPE: ICD-10-CM

## 2018-10-23 DIAGNOSIS — F41.1 GAD (GENERALIZED ANXIETY DISORDER): ICD-10-CM

## 2018-10-23 DIAGNOSIS — K21.9 GASTROESOPHAGEAL REFLUX DISEASE, ESOPHAGITIS PRESENCE NOT SPECIFIED: ICD-10-CM

## 2018-10-23 DIAGNOSIS — R13.10 DYSPHAGIA, UNSPECIFIED TYPE: ICD-10-CM

## 2018-10-23 DIAGNOSIS — B20 HIV DISEASE (HCC): ICD-10-CM

## 2018-10-23 DIAGNOSIS — F17.219 CIGARETTE NICOTINE DEPENDENCE WITH NICOTINE-INDUCED DISORDER: ICD-10-CM

## 2018-10-23 DIAGNOSIS — Z23 NEED FOR INFLUENZA VACCINATION: Primary | ICD-10-CM

## 2018-10-23 DIAGNOSIS — R25.8 JERKY BODY MOVEMENTS: ICD-10-CM

## 2018-10-23 DIAGNOSIS — K58.2 IRRITABLE BOWEL SYNDROME WITH BOTH CONSTIPATION AND DIARRHEA: ICD-10-CM

## 2018-10-23 DIAGNOSIS — B20 HIV (HUMAN IMMUNODEFICIENCY VIRUS INFECTION) (HCC): Primary | ICD-10-CM

## 2018-10-23 DIAGNOSIS — F33.1 MDD (MAJOR DEPRESSIVE DISORDER), RECURRENT EPISODE, MODERATE (HCC): ICD-10-CM

## 2018-10-23 PROCEDURE — 99214 OFFICE O/P EST MOD 30 MIN: CPT | Performed by: NURSE PRACTITIONER

## 2018-10-23 PROCEDURE — 90471 IMMUNIZATION ADMIN: CPT

## 2018-10-23 PROCEDURE — 90682 RIV4 VACC RECOMBINANT DNA IM: CPT

## 2018-10-23 RX ORDER — VARDENAFIL HYDROCHLORIDE 2.5 MG/1
2.5 TABLET ORAL DAILY PRN
Qty: 3 TABLET | Refills: 0 | Status: SHIPPED | OUTPATIENT
Start: 2018-10-23 | End: 2019-01-23 | Stop reason: ALTCHOICE

## 2018-10-23 RX ORDER — IBUPROFEN 600 MG/1
TABLET ORAL
Refills: 0 | COMMUNITY
Start: 2018-09-11 | End: 2018-12-26 | Stop reason: ALTCHOICE

## 2018-10-23 RX ORDER — GABAPENTIN 300 MG/1
CAPSULE ORAL
COMMUNITY
Start: 2018-08-08 | End: 2018-12-26 | Stop reason: SDUPTHER

## 2018-10-23 NOTE — ASSESSMENT & PLAN NOTE
Worsening  Pt reports bm are changes and his medication for [facility] [place] medication  CRNP encouraged pt to call Dr Robles Letters to question and reports change in medication  Pt reports increase stress in life, drinking less water, and caring for parents

## 2018-10-23 NOTE — PATIENT INSTRUCTIONS

## 2018-10-23 NOTE — ASSESSMENT & PLAN NOTE
Stable  Continue taking omeprazole 40 mg daily  Gastroesophageal Reflux Disease:    Patient was educated on lifestyle modifications to improve GERD  Guidance was given to encouraged the patient to avoid acidic foods including citrus, onions, coffee, tomatoes and tomato based sauces, carbonated beverages, mint, chocolate, and fried foods  Educated to avoid eating late at night, to avoid laying down directly after eating a large meal or late at night, to consume smaller and more frequent meals throughout the day  Encouraged to maintain a healthy body weight, to exercise for 30 minutes 5 times a week and to avoid alcoholic beverages, and smoking to decrease GERD symptoms    Encouraged to keep a food journal if symptoms persist

## 2018-10-23 NOTE — ASSESSMENT & PLAN NOTE
Pt denies any missed doses  RUTHYNP stressed the importance 100% medication adherence  HIV Counseling:    Viral Load: < 20    CD4 Count: 902    ART: Edie Verdugo    Denies side effects  Stressed the importance of adherence  Continue follow up in the ID clinic with Dr Richard Abernathy  Reviewed the most recent labs, including the Cd4 and viral load  Discussed the risks and benefits of treatment options, instructions for management, importance of treatment adherence, and reduction of risk factors  Educated on possible medication side effects  Counseled on routes of HIV transmission, including the risk of  infection  Emphasized that viral suppression is the best method to prevent HIV transmission  At this time the pt denies the need for HIV testing of anyone in their life  Total encounter time was greater than 35 minutes  Greater than 20 minutes were spent on counseling and patient education  Pt voices understanding and agreement with treatment plan

## 2018-10-23 NOTE — ASSESSMENT & PLAN NOTE
Worse today  Pt had to cancel neurology appointment  Pt has not been sleeping well and little things are bothering him  Pt reports working this am and moving things around  Continue taking gabapentin 300 mg BID but pt should be taking TID  CRNP encourage pt to set alarm on phone for medication reminder

## 2018-10-23 NOTE — ASSESSMENT & PLAN NOTE
Ongoing  Pt has not taken due to pharmacy not shipping  CRNP will discontinue tadalafil due to cost of medication  CRNP will order Vardenafil 2 5 mg 1 hour before sexual activity  Risk and side effects were reviewed with pt  CRNP stressed the importance of being careful and move slowly when getting up as medication can cause bp to drop  Pt verbalized understanding

## 2018-10-23 NOTE — PROGRESS NOTES
Assessment/Plan:    Mild intermittent asthma without complication      Continue using albuterol 90 mcg/ act inhaler 2 puffs every 4 hours as needed for wheezing  HIV disease  Pt denies any missed doses  CRNP stressed the importance 100% medication adherence  HIV Counseling:    Viral Load: < 20    CD4 Count: 902    ART: Julio oRseliaaliza    Denies side effects  Stressed the importance of adherence  Continue follow up in the ID clinic with Dr Rosmery Pryor  Reviewed the most recent labs, including the Cd4 and viral load  Discussed the risks and benefits of treatment options, instructions for management, importance of treatment adherence, and reduction of risk factors  Educated on possible medication side effects  Counseled on routes of HIV transmission, including the risk of  infection  Emphasized that viral suppression is the best method to prevent HIV transmission  At this time the pt denies the need for HIV testing of anyone in their life  Total encounter time was greater than 35 minutes  Greater than 20 minutes were spent on counseling and patient education  Pt voices understanding and agreement with treatment plan  Cigarette nicotine dependence with nicotine-induced disorder  Ongoing  Pt reports smoking he "stopped smoking every day but did not quit "  Pt is still carrying around a packet of cigarettes in his pocket  Pt reports some days smoking 3 at once time and then does not smoke for several days  CRNP offered pt nicotine patch or gum and pt does not want the nicotine  Pt would like to try Chantix  but CRNP expressed the concern about SI and due to pt's history depression and anxiety history  Pt verbalized understanding  Nicotine Dependency - Primary    Counseled for greater than 15 minutes on the importance of smoking cessation  Education was given regarding the cardiovascular effects of how nicotine affects the heart, lungs, kidneys,and peripheral vascular system  Referred to [facility] for enrollment in smoking cessation program       SOPHIA (generalized anxiety disorder)      Continue taking fluoxetine 20 mg daily  Pt does follow with [facility]  Continue taking propranolol 10 mg daily for anxiety  GERD (gastroesophageal reflux disease)  Stable  Continue taking omeprazole 40 mg daily  Gastroesophageal Reflux Disease:    Patient was educated on lifestyle modifications to improve GERD  Guidance was given to encouraged the patient to avoid acidic foods including citrus, onions, coffee, tomatoes and tomato based sauces, carbonated beverages, mint, chocolate, and fried foods  Educated to avoid eating late at night, to avoid laying down directly after eating a large meal or late at night, to consume smaller and more frequent meals throughout the day  Encouraged to maintain a healthy body weight, to exercise for 30 minutes 5 times a week and to avoid alcoholic beverages, and smoking to decrease GERD symptoms  Encouraged to keep a food journal if symptoms persist     Dysphagia  Improved  Known Barrette's Esophagus disease  Pt does follow up with GI  Last visit was May 2018  MDD (major depressive disorder), recurrent episode, moderate (HCC)      Continue taking fluoxetine 20 mg daily  Pt does follow with   Jerky body movements  Worse today  Pt had to cancel neurology appointment  Pt has not been sleeping well and little things are bothering him  Pt reports working this am and moving things around  Continue taking gabapentin 300 mg BID but pt should be taking TID  CRNP encourage pt to set alarm on phone for medication reminder  Unable to sustain erection  Ongoing  Pt has not taken due to pharmacy not shipping  CRNP will discontinue tadalafil due to cost of medication  CRNP will order Vardenafil 2 5 mg 1 hour before sexual activity  Risk and side effects were reviewed with pt     CRNP stressed the importance of being careful and move slowly when getting up as medication can cause bp to drop  Pt verbalized understanding  Acute right-sided low back pain with right-sided sciatica  Comes and goes  Getting better but does last longer  Pt reports going to chiropractor and happy with results  Constipation  Worsening  Pt reports bm are changes and his medication for [facility] medication  CRNP encouraged pt to call Dr Christel Miguel to question and reports change in medication  Pt reports increase stress in life, drinking less water, and caring for parents  IBS (irritable bowel syndrome)  Stable         Diagnoses and all orders for this visit:    HIV (human immunodeficiency virus infection) ([facility] )    Constipation, unspecified constipation type    Unable to sustain erection    Jerky body movements    Acute right-sided low back pain with right-sided sciatica    MDD (major depressive disorder), recurrent episode, moderate ([facility])    SOPHIA (generalized anxiety disorder)    HIV disease ([facility] )    Cigarette nicotine dependence with nicotine-induced disorder    Dysphagia, unspecified type    Irritable bowel syndrome with both constipation and diarrhea    Gastroesophageal reflux disease, esophagitis presence not specified    Other orders  -     gabapentin (NEURONTIN) 300 mg capsule;   -     ibuprofen (MOTRIN) 600 mg tablet; TAKE 1 TABLET EVERY 6 HOURS AS NEEDED PAIN        Patient Active Problem List   Diagnosis    Bilateral varicoceles    Embolism involving vascular device (HCC)    HIV disease (HCC)    GERD (gastroesophageal reflux disease)    IBS (irritable bowel syndrome)    Cigarette nicotine dependence with nicotine-induced disorder    SOPHIA (generalized anxiety disorder)    MDD (major depressive disorder), recurrent episode, moderate (HCC)    Acute right-sided low back pain with right-sided sciatica    Jerky body movements    Panic attacks    Dysphagia    Mild intermittent asthma without complication    Unable to sustain erection    Constipation Lab Results   Component Value Date     2018    K 3 9 2018     2018    CO2 27 2018    ANIONGAP 9 2015    BUN 15 2018    CREATININE 1 02 2018    GLUCOSE 124 2015    GLUF 87 2018    CALCIUM 9 3 2018    AST 21 2018    ALT 35 2018    ALKPHOS 93 2018    PROT 8 4 (H) 2015    BILITOT 0 37 2015    EGFR 86 2018     Lab Results   Component Value Date    WBC 7 69 2018    WBC 6 9 2018    HGB 15 1 2018    HGB 15 6 2018    HCT 46 2 2018    HCT 46 3 2018    MCV 96 2018    MCV 96 2018     2018     2018          Subjective:      Patient ID: Destiny Petty is a 50 y o  male  Pt is being seen in the office today for 3 month follow up visit with PCP  Pt reports "everything is ok with health "  My hand still drops things and my right hip bothers me from time to time and is getting better  Pt reports cat  recently and was with pt for 9 years  Pt reports it being "sad but weird" know that his cat is gone  Pt reports getting divorce from his wife after being together for 17 years  Pt with male partner for 2 years who is passive aggressive in the behavior and the pt has not been visiting his partner due to the behavior  CRNP offered emotional support  Pt reports using a condom during sexual relations but reports "not having sex in a very long time "         The following portions of the patient's history were reviewed and updated as appropriate: allergies, current medications, past medical history, past social history and problem list     Review of Systems   Constitutional: Negative  HENT: Negative  Respiratory: Negative  Cardiovascular: Negative  Skin: Negative  Neurological: Negative  Psychiatric/Behavioral: Negative            Objective:      /62 (BP Location: Left arm, Patient Position: Sitting, Cuff Size: Standard) Pulse 78   Temp 98 °F (36 7 °C)   Resp 18   Ht 5' 8 4" (1 737 m)   Wt 78 9 kg (174 lb)   SpO2 98%   BMI 26 15 kg/m²          Physical Exam   Constitutional: He is oriented to person, place, and time  He appears well-developed and well-nourished  Cardiovascular: Normal rate, regular rhythm, normal heart sounds and intact distal pulses  Pulmonary/Chest: Effort normal and breath sounds normal    Musculoskeletal: Normal range of motion  Jerky movements  Neurological: He is oriented to person, place, and time  Skin: Skin is warm and dry  Psychiatric: He has a normal mood and affect  His behavior is normal    Nursing note and vitals reviewed

## 2018-10-23 NOTE — ASSESSMENT & PLAN NOTE
Continue taking fluoxetine 20 mg daily  Pt does follow with [address] regularly  Continue taking propranolol 10 mg daily for anxiety

## 2018-10-23 NOTE — ASSESSMENT & PLAN NOTE
Ongoing  Pt reports smoking he "stopped smoking every day but did not quit "  Pt is still carrying around a packet of cigarettes in his pocket  Pt reports some days smoking 3 at once time and then does not smoke for several days  CRNP offered pt nicotine patch or gum and pt does not want the nicotine  Pt would like to try Chantix  but CRNP expressed the concern about SI and due to pt's history depression and anxiety history  Pt verbalized understanding  Nicotine Dependency - Primary    Counseled for greater than 15 minutes on the importance of smoking cessation  Education was given regarding the cardiovascular effects of how nicotine affects the heart, lungs, kidneys,and peripheral vascular system    Referred to [facility] [address] [place] [address] for enrollment in smoking cessation program

## 2018-10-25 ENCOUNTER — PATIENT OUTREACH (OUTPATIENT)
Dept: SURGERY | Facility: CLINIC | Age: 48
End: 2018-10-25

## 2018-11-06 ENCOUNTER — PATIENT OUTREACH (OUTPATIENT)
Dept: SURGERY | Facility: CLINIC | Age: 48
End: 2018-11-06

## 2018-11-06 NOTE — PROGRESS NOTES
Mariel dunne mailed to ct  [address]  [address]   At [facility]/[place]     [phone]

## 2018-11-07 DIAGNOSIS — J45.20 MILD INTERMITTENT ASTHMA, UNSPECIFIED WHETHER COMPLICATED: ICD-10-CM

## 2018-11-07 DIAGNOSIS — B20 HIV (HUMAN IMMUNODEFICIENCY VIRUS INFECTION) (HCC): ICD-10-CM

## 2018-11-08 ENCOUNTER — DOCUMENTATION (OUTPATIENT)
Dept: PSYCHIATRY | Facility: CLINIC | Age: 48
End: 2018-11-08

## 2018-11-08 RX ORDER — ELVITEGRAVIR, COBICISTAT, EMTRICITABINE, AND TENOFOVIR ALAFENAMIDE 150; 150; 200; 10 MG/1; MG/1; MG/1; MG/1
TABLET ORAL
Qty: 30 TABLET | Refills: 2 | Status: SHIPPED | OUTPATIENT
Start: 2018-11-08 | End: 2019-03-11 | Stop reason: SDUPTHER

## 2018-11-13 ENCOUNTER — TELEPHONE (OUTPATIENT)
Dept: BEHAVIORAL/MENTAL HEALTH CLINIC | Facility: CLINIC | Age: 48
End: 2018-11-13

## 2018-11-13 ENCOUNTER — OFFICE VISIT (OUTPATIENT)
Dept: BEHAVIORAL/MENTAL HEALTH CLINIC | Facility: CLINIC | Age: 48
End: 2018-11-13
Payer: COMMERCIAL

## 2018-11-13 DIAGNOSIS — F41.1 GAD (GENERALIZED ANXIETY DISORDER): Primary | ICD-10-CM

## 2018-11-13 DIAGNOSIS — F33.1 MDD (MAJOR DEPRESSIVE DISORDER), RECURRENT EPISODE, MODERATE (HCC): ICD-10-CM

## 2018-11-13 DIAGNOSIS — F41.0 PANIC ATTACKS: ICD-10-CM

## 2018-11-13 DIAGNOSIS — R25.8 JERKY BODY MOVEMENTS: Primary | ICD-10-CM

## 2018-11-13 PROCEDURE — 90834 PSYTX W PT 45 MINUTES: CPT | Performed by: SOCIAL WORKER

## 2018-11-13 NOTE — PSYCH
Treatment Plan Tracking    # 5Treatment Plan not completed within required time limits due to: Client cancelled/no-showed scheduled appointment  Carl Hendrickson

## 2018-11-13 NOTE — PSYCH
Psychotherapy Provided: Individual Psychotherapy 50 minutes     Length of time in session: 50 minutes, follow up in 2 week    Goals addressed in session: Goal 1, Goal 2 and Goal 3      Pain:      none    0    Current suicide risk : Low     D: Met with Portillo individually  ROS; "I'm sleeping a lot'  Discussed relationship with mom that is 'ok most of the time'  Janessa Allan explained there are times when 'frustration builds up'  He goes to his room and punches his bed, yells and becomes 'very snappy'  Mom comes to try to 'help' but this acerbates the situation  Further discussion regarding [employer] and recent death of their cat  Discussed Portillo's request to return to every 3 months as a process for ending therapy in the future  Denied SI      A: Janessa Crockerloissimeon presented with baseline mood and affect  He rocked a bit and inhaled loudly with his notable sounds  P: Ongoing psychotherapy  Continue therapy every 3 months     Behavioral Health Treatment Plan [facility]: Diagnosis and Treatment Plan explained to Reyes Seitz relates understanding diagnosis and is agreeable to Treatment Plan   Yes

## 2018-11-13 NOTE — TELEPHONE ENCOUNTER
Marcello Gonzalez need's you to do another referal to nerology the last one  and they need another one

## 2018-11-13 NOTE — PSYCH
Satinder Antonio  1970     Date of Initial Treatment Plan: 18  Date of Current Treatment Plan: 18      Treatment Plan Number 5     Strengths/Personal Resources for Self Care: helpful, generous       Diagnosis:   1  SOPHIA (generalized anxiety disorder)     2  MDD (major depressive disorder), recurrent episode, moderate ([facility] )     3  Panic attacks         Area of Needs: anxiety, depression, communication in relationships        Long Term Goal 1:   My anxiety and depression has improved   Target Date: 3/8/19      Short Term Objectives for Goal 1:       7  I'm remaining productive       2  Managing parents health       3  Recent death of cat         Long Term Goal # 2:       Aleta Salazar learned to deal with my x-partner better   Target Date: 3/8/19  Completion Date:      Short Term Objectives for Goal 2:   1  Process specific situations  2  Communication skills with Ezra Rankin when needed       GOAL 1: Modality: Individual every 3 months  Target Date: 10/9/18                                  Medication Management  Every 3 months                                  Persons responsible for goal:  Dandy Ramírez and Dr Chambers [place] 2: Modality: Individual every 3 months  Target Date: 10/9/18                                  Medication Management  Every 3 months                                  Persons responsible for goal:  Dandy Ramírez and Dr Lorena Hernandez: Diagnosis and Treatment Plan explained to Stewart Villela relates understanding diagnosis and is agreeable to Treatment Plan         Client Comments : Please share your thoughts, feelings, need and/or experiences regarding your treatment plan: __________________________________________________________________    __________________________________________________________________    __________________________________________________________________    __________________________________________________________________    _______________________________________                Patient signature, Date Time: __________________________________________             Physician cosigner signature, Date, Time: ________________________________

## 2018-11-14 ENCOUNTER — TELEPHONE (OUTPATIENT)
Dept: NEUROLOGY | Facility: CLINIC | Age: 48
End: 2018-11-14

## 2018-11-15 ENCOUNTER — PATIENT OUTREACH (OUTPATIENT)
Dept: SURGERY | Facility: CLINIC | Age: 48
End: 2018-11-15

## 2018-11-15 NOTE — PROGRESS NOTES
recertification was scheduled for 11/16/18 at [address] [place] [address] O P E   At [facility]   T  [phone]

## 2018-11-19 ENCOUNTER — APPOINTMENT (OUTPATIENT)
Dept: LAB | Facility: CLINIC | Age: 48
End: 2018-11-19
Payer: COMMERCIAL

## 2018-11-19 DIAGNOSIS — B20 HIV (HUMAN IMMUNODEFICIENCY VIRUS INFECTION) (HCC): ICD-10-CM

## 2018-11-19 LAB
ALBUMIN SERPL BCP-MCNC: 4.1 G/DL (ref 3.5–5)
ALP SERPL-CCNC: 93 U/L (ref 46–116)
ALT SERPL W P-5'-P-CCNC: 30 U/L (ref 12–78)
ANION GAP SERPL CALCULATED.3IONS-SCNC: 6 MMOL/L (ref 4–13)
AST SERPL W P-5'-P-CCNC: 23 U/L (ref 5–45)
BASOPHILS # BLD AUTO: 0.01 THOUSANDS/ΜL (ref 0–0.1)
BASOPHILS NFR BLD AUTO: 0 % (ref 0–1)
BILIRUB SERPL-MCNC: 0.31 MG/DL (ref 0.2–1)
BUN SERPL-MCNC: 12 MG/DL (ref 5–25)
CALCIUM SERPL-MCNC: 9.1 MG/DL (ref 8.3–10.1)
CHLORIDE SERPL-SCNC: 104 MMOL/L (ref 100–108)
CO2 SERPL-SCNC: 28 MMOL/L (ref 21–32)
CREAT SERPL-MCNC: 0.9 MG/DL (ref 0.6–1.3)
EOSINOPHIL # BLD AUTO: 0.11 THOUSAND/ΜL (ref 0–0.61)
EOSINOPHIL NFR BLD AUTO: 1 % (ref 0–6)
ERYTHROCYTE [DISTWIDTH] IN BLOOD BY AUTOMATED COUNT: 13 % (ref 11.6–15.1)
GFR SERPL CREATININE-BSD FRML MDRD: 99 ML/MIN/1.73SQ M
GLUCOSE P FAST SERPL-MCNC: 84 MG/DL (ref 65–99)
HCT VFR BLD AUTO: 42.4 % (ref 36.5–49.3)
HGB BLD-MCNC: 13.9 G/DL (ref 12–17)
IMM GRANULOCYTES # BLD AUTO: 0.01 THOUSAND/UL (ref 0–0.2)
IMM GRANULOCYTES NFR BLD AUTO: 0 % (ref 0–2)
LYMPHOCYTES # BLD AUTO: 3.77 THOUSANDS/ΜL (ref 0.6–4.47)
LYMPHOCYTES NFR BLD AUTO: 48 % (ref 14–44)
MCH RBC QN AUTO: 31.3 PG (ref 26.8–34.3)
MCHC RBC AUTO-ENTMCNC: 32.8 G/DL (ref 31.4–37.4)
MCV RBC AUTO: 96 FL (ref 82–98)
MONOCYTES # BLD AUTO: 0.67 THOUSAND/ΜL (ref 0.17–1.22)
MONOCYTES NFR BLD AUTO: 9 % (ref 4–12)
NEUTROPHILS # BLD AUTO: 3.32 THOUSANDS/ΜL (ref 1.85–7.62)
NEUTS SEG NFR BLD AUTO: 42 % (ref 43–75)
NRBC BLD AUTO-RTO: 0 /100 WBCS
PLATELET # BLD AUTO: 333 THOUSANDS/UL (ref 149–390)
PMV BLD AUTO: 8.9 FL (ref 8.9–12.7)
POTASSIUM SERPL-SCNC: 3.9 MMOL/L (ref 3.5–5.3)
PROT SERPL-MCNC: 7.9 G/DL (ref 6.4–8.2)
RBC # BLD AUTO: 4.44 MILLION/UL (ref 3.88–5.62)
SODIUM SERPL-SCNC: 138 MMOL/L (ref 136–145)
WBC # BLD AUTO: 7.89 THOUSAND/UL (ref 4.31–10.16)

## 2018-11-19 PROCEDURE — 80053 COMPREHEN METABOLIC PANEL: CPT

## 2018-11-19 PROCEDURE — 86361 T CELL ABSOLUTE COUNT: CPT

## 2018-11-19 PROCEDURE — 36415 COLL VENOUS BLD VENIPUNCTURE: CPT

## 2018-11-19 PROCEDURE — 87536 HIV-1 QUANT&REVRSE TRNSCRPJ: CPT

## 2018-11-19 PROCEDURE — 85025 COMPLETE CBC W/AUTO DIFF WBC: CPT

## 2018-11-20 ENCOUNTER — PATIENT OUTREACH (OUTPATIENT)
Dept: SURGERY | Facility: CLINIC | Age: 48
End: 2018-11-20

## 2018-11-20 LAB
BASOPHILS # BLD AUTO: 0 X10E3/UL (ref 0–0.2)
BASOPHILS NFR BLD AUTO: 0 %
CD3+CD4+ CELLS # BLD: 1075 /UL (ref 359–1519)
CD3+CD4+ CELLS NFR BLD: 28.3 % (ref 30.8–58.5)
EOSINOPHIL # BLD AUTO: 0.1 X10E3/UL (ref 0–0.4)
EOSINOPHIL NFR BLD AUTO: 2 %
ERYTHROCYTE [DISTWIDTH] IN BLOOD BY AUTOMATED COUNT: 14.1 % (ref 12.3–15.4)
HCT VFR BLD AUTO: 38 % (ref 37.5–51)
HGB BLD-MCNC: 12.8 G/DL (ref 13–17.7)
IMM GRANULOCYTES # BLD: 0 X10E3/UL (ref 0–0.1)
IMM GRANULOCYTES NFR BLD: 0 %
LYMPHOCYTES # BLD AUTO: 3.8 X10E3/UL (ref 0.7–3.1)
LYMPHOCYTES NFR BLD AUTO: 48 %
MCH RBC QN AUTO: 32.4 PG (ref 26.6–33)
MCHC RBC AUTO-ENTMCNC: 33.7 G/DL (ref 31.5–35.7)
MCV RBC AUTO: 96 FL (ref 79–97)
MONOCYTES # BLD AUTO: 0.6 X10E3/UL (ref 0.1–0.9)
MONOCYTES NFR BLD AUTO: 8 %
NEUTROPHILS # BLD AUTO: 3.3 X10E3/UL (ref 1.4–7)
NEUTROPHILS NFR BLD AUTO: 42 %
PLATELET # BLD AUTO: 332 X10E3/UL (ref 150–379)
RBC # BLD AUTO: 3.95 X10E6/UL (ref 4.14–5.8)
WBC # BLD AUTO: 7.8 X10E3/UL (ref 3.4–10.8)

## 2018-11-20 NOTE — PROGRESS NOTES
Pt came in person to complete re-certification and reassessment  Pt is not sexually active, same sex, has a partner  Reports using condoms when sexually active, taking his medications as prescribed and attending to medical appointments  Cm educated pt about prevention and risk reduction  Pt lives with his mother,has no income, [facility]   Ct sees Dr Thakur for ID care and CLAYTON Hutchins for primary care  Active with  denies any SA  Denies any domestic violence  [address]  [address]   At [facility]  [phone]

## 2018-11-21 NOTE — PROGRESS NOTES
Bus pass given to ct #[id number]  Also provided lub and condoms as requested  [address]  [address]   At [facility]  [phone]

## 2018-11-24 LAB
HIV1 RNA # SERPL NAA+PROBE: <20 COPIES/ML
HIV1 RNA SERPL NAA+PROBE-LOG#: NORMAL LOG10COPY/ML

## 2018-11-27 ENCOUNTER — PATIENT OUTREACH (OUTPATIENT)
Dept: SURGERY | Facility: CLINIC | Age: 48
End: 2018-11-27

## 2018-11-27 NOTE — PROGRESS NOTES
Ct called rescheduled appt with cm for tomorrow 11/28/18 at [address] [facility]  [facility] [address] [facility]   At [facility]/[place]   T  [phone]

## 2018-11-28 ENCOUNTER — PATIENT OUTREACH (OUTPATIENT)
Dept: SURGERY | Facility: CLINIC | Age: 48
End: 2018-11-28

## 2018-11-28 NOTE — PROGRESS NOTES
Cm called ct today to assist with calling social security to schedule appt  Ct  Reported that will be calling back cm  To be rescheduled  [address]  [address]   At [facility]  [phone]

## 2018-11-29 ENCOUNTER — PATIENT OUTREACH (OUTPATIENT)
Dept: SURGERY | Facility: CLINIC | Age: 48
End: 2018-11-29

## 2018-11-29 NOTE — PROGRESS NOTES
Ct was unable to call cm due to being with his mother, ct wants cm to call him next week to schedule appt  [address]  [address]   At [facility]/[place]     [phone]

## 2018-12-10 ENCOUNTER — PATIENT OUTREACH (OUTPATIENT)
Dept: SURGERY | Facility: CLINIC | Age: 48
End: 2018-12-10

## 2018-12-18 PROBLEM — R73.03 PREDIABETES: Status: ACTIVE | Noted: 2018-12-18

## 2018-12-19 ENCOUNTER — OFFICE VISIT (OUTPATIENT)
Dept: SURGERY | Facility: CLINIC | Age: 48
End: 2018-12-19
Payer: COMMERCIAL

## 2018-12-19 VITALS
DIASTOLIC BLOOD PRESSURE: 56 MMHG | WEIGHT: 175 LBS | TEMPERATURE: 97.6 F | HEART RATE: 54 BPM | BODY MASS INDEX: 26.3 KG/M2 | OXYGEN SATURATION: 95 % | SYSTOLIC BLOOD PRESSURE: 120 MMHG

## 2018-12-19 DIAGNOSIS — Z23 NEED FOR MENACTRA VACCINATION: ICD-10-CM

## 2018-12-19 DIAGNOSIS — Z11.3 ENCOUNTER FOR SCREENING FOR INFECTIONS WITH A PREDOMINANTLY SEXUAL MODE OF TRANSMISSION: ICD-10-CM

## 2018-12-19 DIAGNOSIS — Z11.3 ROUTINE SCREENING FOR STI (SEXUALLY TRANSMITTED INFECTION): ICD-10-CM

## 2018-12-19 DIAGNOSIS — Z13.1 SCREENING FOR DIABETES MELLITUS: ICD-10-CM

## 2018-12-19 DIAGNOSIS — F41.1 GAD (GENERALIZED ANXIETY DISORDER): ICD-10-CM

## 2018-12-19 DIAGNOSIS — B20 HIV (HUMAN IMMUNODEFICIENCY VIRUS INFECTION) (HCC): Primary | ICD-10-CM

## 2018-12-19 DIAGNOSIS — Z78.9 HISTORY OF RECENT CHANGE IN LIFESTYLE: ICD-10-CM

## 2018-12-19 DIAGNOSIS — F17.219 CIGARETTE NICOTINE DEPENDENCE WITH NICOTINE-INDUCED DISORDER: ICD-10-CM

## 2018-12-19 PROCEDURE — 90471 IMMUNIZATION ADMIN: CPT

## 2018-12-19 PROCEDURE — 90734 MENACWYD/MENACWYCRM VACC IM: CPT

## 2018-12-19 PROCEDURE — 99214 OFFICE O/P EST MOD 30 MIN: CPT | Performed by: INTERNAL MEDICINE

## 2018-12-20 NOTE — PROGRESS NOTES
Initial Nutritional Assessment     Socio-Economic Status: patient receives SNAP, paticipated in [employer] program this past summer   Living situation:      House with his mother                           & Kitchen Facilities: stove, microwave and fridge all fully functioning   Psychosocial Factors:    anxiety                            Functional Status: both the patient and his mother do food shopping and meal prep                  Physical Activity Level: patient states "I do what I can, not a lot, actually "   GI Intolerance:     constipation                                        Appetite : very good   Oral Health  History/Status:      Patient has his own teeth,is going to the dental clinic for "ongoing work that needs to be done"                                                Last Dental visit: about a month ago                         ?:   [address] Allergies/Intolerances     None reported                                                                             Diet History: Breakfast: high sugar cereal and milk, or eggs & toast             Lunch: leftovers (usually a casserole) or s/w, koolaide or regular soda              Dinner:  Hot meal: tuna helper or hamburger casserole, cooked veggies, koolaide or regular soda                                                                            Snacks:  Weight Status: todays weight:  79 4 kg               IBW:   71kg           %IBW:  112%         UBW:    82 kg        Weight Stable for past 6 months?  yes                                    current BMI:26  Estimated Nutritional Needs: Kcal/k kcal/kg IBW                                                         = 1775 calories                                       1 2         Gm Protein/kg                             =     85 grams protein/ day                               Fluid:        25       ml/kg =    1775 ml Patient meeting nutritional needs:    yes            , Supplements: no  Nutrition Diagnosis: Problem Related to:     Altered nutrition related lab values                                                                                                                          As Evidenced by: Excess energy intake from sugar/ simple carbohydrates   Signs &  Symptoms: patient interview, Triglycerides : 228  Nutrition Education Intervention:   Person Educated:    Patient and his mother                      , Education Provided: ways to decrease Triglycerides, & high fiber (& high water intake) intake to help decrease constipation  Readiness to Learn:  Patient not paying attention, but patient's mother very receptive                                 Expected compliance: fair  Recommendations:  Continue to monitor patient's lipid panel, and weight status                                                                                                                                                                  Orlando Benz, KAMERONN,LDN,CDE

## 2018-12-26 ENCOUNTER — OFFICE VISIT (OUTPATIENT)
Dept: NEUROLOGY | Facility: CLINIC | Age: 48
End: 2018-12-26
Payer: COMMERCIAL

## 2018-12-26 VITALS
BODY MASS INDEX: 26.52 KG/M2 | SYSTOLIC BLOOD PRESSURE: 124 MMHG | WEIGHT: 175 LBS | DIASTOLIC BLOOD PRESSURE: 82 MMHG | HEIGHT: 68 IN

## 2018-12-26 DIAGNOSIS — R26.9 UNSPECIFIED ABNORMALITIES OF GAIT AND MOBILITY: Primary | ICD-10-CM

## 2018-12-26 DIAGNOSIS — R25.8 JERKY BODY MOVEMENTS: ICD-10-CM

## 2018-12-26 PROCEDURE — 99244 OFF/OP CNSLTJ NEW/EST MOD 40: CPT | Performed by: PSYCHIATRY & NEUROLOGY

## 2018-12-26 NOTE — PROGRESS NOTES
Assessment/Plan:    Jerky body movements  The patient is a 61-year-old man with HIV, depression and anxiety who presents for 20+ years of walking troubles and abnormal movements  The description of the patient's symptoms were fairly vague  His neurologic exam today was fairly unremarkable other than some sway on Romberg and decreased proprioception to small movements of the great toes  He did have slightly reduced arm swing on the right and some small nonspecific hyperkinetic movements of the arms while walking (athetoid)  His gait was otherwise fairly unremarkable  It is not entirely clear what accounts for the patient's symptoms  They are very longstanding and so are unlikely due to a particularly malignant etiology  Patient does not have any evidence of parkinsonism  Some of the patient's gait dysfunction may be due to proprioception loss in the lower extremities  This could be related to peripheral neuropathy or spine disease  Some of his trouble appears to be hypervigilance of his MSK issues  - EMG of the upper and lower extremities    The patient's abnormal movements sound most consistent with myoclonus, though I did not see any myoclonus today on exam   This could be related to the patient's HIV or a medication side effect (SSRIs commonly cause myoclonus)  The patient also sounds like he has akathisia which can result in nonspecific hyperkinetic or fidgeting movements, this is likely related to his psychiatric disease (psychomotor agitation)  Patients with HIV can also have various hyperkinetic movement disorders related to the HIV   - MRI brain to rule out structural pathology  - Asked the patient to acquire his old MRI from [facility]  - Consider PM&R evaluation for the patient's gait dysfunction      Diagnoses and all orders for this visit:    Unspecified abnormalities of gait and mobility  -     Cancel: EMG 2 limb lower extremity;  Future  -     EMG 1 Upper/1 Lower Neuropathy; Future    Jerky body movements  -     Ambulatory referral to Neurology  -     MRI brain wo contrast; Future  -     EMG 1 Upper/1 Lower Neuropathy; Future      HIV viral escape     Subjective:     Patient ID: Satinder Antonio is a 46 y o  male  I had the pleasure of seeing your patient, Satinder Antonio in the [facility] Clinic at the [address] [place] [address],[address] for Neuroscience  The patient is a 46y o  year old man with well controlled HIV, depression and anxiety who presents for evaluation of multiple neurologic complaints  He reports that his symptoms started at least 20 years ago  He was evaluated by a neurologist at [facility] 15 years ago (last MRI of his brain)  The patient recalls being told he had something like chorea, though not exactly, and he would need a subspecialty opinion for further workup  He follows here with Dr Justina Huffman in psychiatry who recommended a neurology evaluation  He had tried risperidone for less than a year which did not help, discontinued a few months ago  The patient's symptoms include:   Suddenly dropping things from his hands  Sometimes this is an issue and sometimes it is not  Difficulty sitting still  The patient is described as having small jerky movements when attempting to sit still such as watching TV or when in the car  These movements have gotten worse  The patient was previously not aware of the movements but he is more aware of them now  This can happen at any time of the day, never the patient is trying to sit still  Walking troubles  The patient describes a tendency for his feet either roll inwards or outwards  He becomes very fixated on the sensation and this disrupts his ability to walk  He also describes trouble walking in a straight line with increased abnormal movements  This is also an inconsistent issue sometimes being much worse than others      Other abnormal movements described by the patient's friend include rocking of his whole body back and forth  Rhythmic fidgeting movements of the arms and legs  Patient was seen by an orthopedist in the past who recommended physical therapy  I cant find those notes in our system  All of the symptoms seem worse the patient is worked upper anxious  Regarding motor symptoms:   Tremor: "my leg will do it" "it doesn't last long" several times a day  Come in bouts  Dystonia: has some habits regarding pointing his toes when seated  Falls: few times a week  Had been very inactive because of sciatica last year  Feels like his leg gives out  Trouble with swallowing: yes  Has had swallow evaluation which looked ok  "I have trouble with certain powders" "it doesn't happen a lot"     Regarding non-motor symptoms:   Anosmia: no issue   Constipation: IBS, urgency       The following portions of the patient's history were reviewed and updated as appropriate: allergies, current medications, past family history, past medical history, past social history, past surgical history and problem list     Don't really know his fathers side of the family  Once cousin on mothers side with PD  Objective:      /82 (BP Location: Right leg, Patient Position: Sitting, Cuff Size: Standard)   Ht 5' 8" (1 727 m)   Wt 79 4 kg (175 lb)   BMI 26 61 kg/m²         Physical Exam    Neurological Exam    GENERAL MEDICAL EXAMINATION:  General appearance: alert, in no apparent distress  Appropriately dressed and groomed  Conversing and interacting appropriately  Eyes: Sclera are non-injected  Ears, nose, Mouth, Throat: Mucous membranes are moist    Resp: Breathing comfortably on RA   Musculoskeletal: No evidence of deformities  No contractures  No Edema  Skin: No visible rashes  Warm and well perfused  Psych: normal and appropriate affect     NEUROLOGICAL EXAMINATION:  Mental Status: Alert and oriented to person place and time  Language is fluent and appropriate with normal prosody  There were no paraphasic errors  Speech was not dysarthric  There was no pallilalia noted  Able to follow both midline and appendicular commands  The patient is a somewhat challenging historian  Cranial Nerves:  II:  pupils equally round and briskly reactive to light, both directly and consensually  Visual fields full to finger counting  III-IV-VI: Full and conjugate, extra-ocular eye movements in all directions of gaze  No nystagmus or diplopia  Intact smooth pursuit  VII: Face is symmetric at rest and with activation; symmetric speed and excursion with smile  IX-X: Palate elevates symmetrically  XII: No tongue deviation or fasciculations    Motor: Overall  Normal spontaneous movements  Normal muscle bulk throughout  Normal axial and appendicular tone in the arms and legs  No hypomimia was noted  Speech was not hypophonic  There was no tremor noted at rest, with posture or with action  There was no vocal tremor or head tremor  Rapid alternating movements revealed no bradykinesia, decrement or hesitations  There were no dyskinesias or dystonia noted  No pronator drift or rebound  No asterixis or myoclonus noted  Strength:   Delt Bi Tri We FE FF    C5 C6 C7 C6 C7 C8/T1   R 5 5 5 5 5 5   L 5 5 5 5 5 5      IP Quad Hamst TA    L2 L3 L4-S1 L4   R 5 5 5 5   L 5 5 5 5     Reflexes:  A little brisk in the legs, normal otherwise  Sensory: normal and symmetric perception of light touch, vibration and temperature  Proprioception reduced for small movements at the great toe bilaterally  Coordination: Finger to nose without dysmetria bilaterally  No intention tremor  Gait: Able to rise from a chair without the use of arms  Posture was normal  Narrow-base and stable stance  Normal initiation  Normal stride length and step height  Slightly reduced arm swing on the right  Occasional nonspecific hyperkinetic movements of the arms while walking  Turn completed in 2 steps  Sway on Romberg  No truncal ataxia         Review of Systems Constitutional: Negative  Negative for appetite change and fever  HENT: Positive for hearing loss, sinus pain, sinus pressure, tinnitus and trouble swallowing  Negative for voice change  Eyes: Positive for visual disturbance (blurred vision)  Negative for photophobia and pain  Respiratory: Positive for shortness of breath  Cardiovascular: Negative  Negative for palpitations  Gastrointestinal: Negative  Negative for nausea and vomiting  Bowel habit changes  Loss of bladder control   Endocrine: Negative  Negative for cold intolerance and heat intolerance  Loss of sexual drive   Genitourinary: Positive for frequency (lack of)  Negative for dysuria and urgency  Musculoskeletal: Positive for back pain, gait problem (pain while walking, difficulty walking, and balance problems), joint swelling and neck pain  Negative for myalgias  Skin: Negative  Negative for rash  Neurological: Negative for dizziness, tremors, seizures, syncope, facial asymmetry, speech difficulty, weakness, light-headedness, numbness and headaches  Tingling  Twitching  Falls  Memory problems  Clumsiness     Hematological: Bruises/bleeds easily  Psychiatric/Behavioral: Negative for confusion and hallucinations  Sleep disturbance: trouble falling asleep  The patient is nervous/anxious           Depression  Mood swings       Current Outpatient Prescriptions on File Prior to Visit   Medication Sig Dispense Refill    dicyclomine (BENTYL) 10 mg capsule TAKE 1 CAPSULE BY MOUTH FOUR TIMES A DAY BEFORE MEALS AND AT BEDTIME 120 capsule 2    FLUoxetine (PROzac) 20 mg capsule TAKE 3 CAPSULES BY MOUTH DAILY 90 capsule 2    gabapentin (NEURONTIN) 300 mg capsule Take 1 capsule (300 mg total) by mouth daily at bedtime for 30 days 30 capsule 0    GENVOYA tablet TAKE 1 TABLET BY MOUTH DAILY 30 tablet 2    omeprazole (PriLOSEC) 40 MG capsule Take 1 capsule by mouth daily 90 capsule 3    propranolol (INDERAL) 10 mg tablet TAKE ONE TO TWO TABLETS BY MOUTH TWICE A DAY AS NEEDED FOR ANXIETY, RESTLESSNESS 120 tablet 2    vardenafil (LEVITRA) 2 5 MG tablet Take 1 tablet (2 5 mg total) by mouth daily as needed for erectile dysfunction for up to 3 days 3 tablet 0    VENTOLIN  (90 Base) MCG/ACT inhaler INHALE 2 PUFFS EVERY FOUR HOURS AS NEEDED FOR WHEEZING 18 g 2    [DISCONTINUED] gabapentin (NEURONTIN) 300 mg capsule       [DISCONTINUED] ibuprofen (MOTRIN) 600 mg tablet TAKE 1 TABLET EVERY 6 HOURS AS NEEDED PAIN  0     No current facility-administered medications on file prior to visit        Hafsa Davis MD  Medical Director   [facility]  Movement and Memory Specialist

## 2018-12-26 NOTE — ASSESSMENT & PLAN NOTE
The patient is a 47-year-old man with HIV, depression and anxiety who presents for 20+ years of walking troubles and abnormal movements  The description of the patient's symptoms were fairly vague  His neurologic exam today was fairly unremarkable other than some sway on Romberg and decreased proprioception to small movements of the great toes  He did have slightly reduced arm swing on the right and some small nonspecific hyperkinetic movements of the arms while walking (athetoid)  His gait was otherwise fairly unremarkable  It is not entirely clear what accounts for the patient's symptoms  They are very longstanding and so are unlikely due to a particularly malignant etiology  Patient does not have any evidence of parkinsonism  Some of the patient's gait dysfunction may be due to proprioception loss in the lower extremities  This could be related to peripheral neuropathy or spine disease  Some of his trouble appears to be hypervigilance of his MSK issues  - EMG of the upper and lower extremities    The patient's abnormal movements sound most consistent with myoclonus, though I did not see any myoclonus today on exam   This could be related to the patient's HIV or a medication side effect (SSRIs commonly cause myoclonus)  The patient also sounds like he has akathisia which can result in nonspecific hyperkinetic or fidgeting movements, this is likely related to his psychiatric disease (psychomotor agitation)    Patients with HIV can also have various hyperkinetic movement disorders related to the HIV   - MRI brain to rule out structural pathology  - Asked the patient to acquire his old MRI from [facility]  - Consider PM&R evaluation for the patient's gait dysfunction

## 2018-12-26 NOTE — LETTER
December 26, 2018     [address] [place] [address]    Patient: Alfreda Roman   YOB: 1970   Date of Visit: 12/26/2018       Dear Dr Jolie Francis: Thank you for referring Chel Mcdaniel to me for evaluation  Below are my notes for this consultation  If you have questions, please do not hesitate to call me  I look forward to following your patient along with you  Sincerely,        Chio Moses MD        CC: Terence Galindo, [address] [place] [address], MD  12/26/2018 12:45 PM  Sign at close encounter  Assessment/Plan:    Jerky body movements  The patient is a 49-year-old man with HIV, depression and anxiety who presents for 20+ years of walking troubles and abnormal movements  The description of the patient's symptoms were fairly vague  His neurologic exam today was fairly unremarkable other than some sway on Romberg and decreased proprioception to small movements of the great toes  He did have slightly reduced arm swing on the right and some small nonspecific hyperkinetic movements of the arms while walking (athetoid)  His gait was otherwise fairly unremarkable  It is not entirely clear what accounts for the patient's symptoms  They are very longstanding and so are unlikely due to a particularly malignant etiology  Patient does not have any evidence of parkinsonism  Some of the patient's gait dysfunction may be due to proprioception loss in the lower extremities  This could be related to peripheral neuropathy or spine disease  Some of his trouble appears to be hypervigilance of his MSK issues  - EMG of the upper and lower extremities    The patient's abnormal movements sound most consistent with myoclonus, though I did not see any myoclonus today on exam   This could be related to the patient's HIV or a medication side effect (SSRIs commonly cause myoclonus)    The patient also sounds like he has akathisia which can result in nonspecific hyperkinetic or fidgeting movements, this is likely related to his psychiatric disease (psychomotor agitation)  Patients with HIV can also have various hyperkinetic movement disorders related to the HIV   - MRI brain to rule out structural pathology  - Asked the patient to acquire his old MRI from [facility]  - Consider PM&R evaluation for the patient's gait dysfunction      Diagnoses and all orders for this visit:    Unspecified abnormalities of gait and mobility  -     Cancel: EMG 2 limb lower extremity; Future  -     EMG 1 Upper/1 Lower Neuropathy; Future    Jerky body movements  -     Ambulatory referral to Neurology  -     MRI brain wo contrast; Future  -     EMG 1 Upper/1 Lower Neuropathy; Future      HIV viral escape     Subjective:     Patient ID: Nicki Walter is a 46 y o  male  I had the pleasure of seeing your patient, Nicki Walter in the [facility] Disorders Clinic at the [address] [place] [address] for Neuroscience  The patient is a 46y o  year old man with well controlled HIV, depression and anxiety who presents for evaluation of multiple neurologic complaints  He reports that his symptoms started at least 20 years ago  He was evaluated by a neurologist at [facility] 15 years ago (last MRI of his brain)  The patient recalls being told he had something like chorea, though not exactly, and he would need a subspecialty opinion for further workup  He follows here with Dr Richard Bustamante in psychiatry who recommended a neurology evaluation  He had tried risperidone for less than a year which did not help, discontinued a few months ago  The patient's symptoms include:   Suddenly dropping things from his hands  Sometimes this is an issue and sometimes it is not  Difficulty sitting still  The patient is described as having small jerky movements when attempting to sit still such as watching TV or when in the car  These movements have gotten worse    The patient was previously not aware of the movements but he is more aware of them now  This can happen at any time of the day, never the patient is trying to sit still  Walking troubles  The patient describes a tendency for his feet either roll inwards or outwards  He becomes very fixated on the sensation and this disrupts his ability to walk  He also describes trouble walking in a straight line with increased abnormal movements  This is also an inconsistent issue sometimes being much worse than others  Other abnormal movements described by the patient's friend include rocking of his whole body back and forth  Rhythmic fidgeting movements of the arms and legs  Patient was seen by an orthopedist in the past who recommended physical therapy  I cant find those notes in our system  All of the symptoms seem worse the patient is worked upper anxious  Regarding motor symptoms:   Tremor: "my leg will do it" "it doesn't last long" several times a day  Come in bouts  Dystonia: has some habits regarding pointing his toes when seated  Falls: few times a week  Had been very inactive because of sciatica last year  Feels like his leg gives out  Trouble with swallowing: yes  Has had swallow evaluation which looked ok  "I have trouble with certain powders" "it doesn't happen a lot"     Regarding non-motor symptoms:   Anosmia: no issue   Constipation: IBS, urgency       The following portions of the patient's history were reviewed and updated as appropriate: allergies, current medications, past family history, past medical history, past social history, past surgical history and problem list     Don't really know his fathers side of the family  Once cousin on mothers side with PD       Objective:      /82 (BP Location: Right leg, Patient Position: Sitting, Cuff Size: Standard)   Ht 5' 8" (1 727 m)   Wt 79 4 kg (175 lb)   BMI 26 61 kg/m²          Physical Exam    Neurological Exam    GENERAL MEDICAL EXAMINATION:  General appearance: alert, in no apparent distress  Appropriately dressed and groomed  Conversing and interacting appropriately  Eyes: Sclera are non-injected  Ears, nose, Mouth, Throat: Mucous membranes are moist    Resp: Breathing comfortably on RA   Musculoskeletal: No evidence of deformities  No contractures  No Edema  Skin: No visible rashes  Warm and well perfused  Psych: normal and appropriate affect     NEUROLOGICAL EXAMINATION:  Mental Status: Alert and oriented to person place and time  Language is fluent and appropriate with normal prosody  There were no paraphasic errors  Speech was not dysarthric  There was no pallilalia noted  Able to follow both midline and appendicular commands  The patient is a somewhat challenging historian  Cranial Nerves:  II:  pupils equally round and briskly reactive to light, both directly and consensually  Visual fields full to finger counting  III-IV-VI: Full and conjugate, extra-ocular eye movements in all directions of gaze  No nystagmus or diplopia  Intact smooth pursuit  VII: Face is symmetric at rest and with activation; symmetric speed and excursion with smile  IX-X: Palate elevates symmetrically  XII: No tongue deviation or fasciculations    Motor: Overall  Normal spontaneous movements  Normal muscle bulk throughout  Normal axial and appendicular tone in the arms and legs  No hypomimia was noted  Speech was not hypophonic  There was no tremor noted at rest, with posture or with action  There was no vocal tremor or head tremor  Rapid alternating movements revealed no bradykinesia, decrement or hesitations  There were no dyskinesias or dystonia noted  No pronator drift or rebound  No asterixis or myoclonus noted  Strength:   Delt Bi Tri We FE FF    C5 C6 C7 C6 C7 C8/T1   R 5 5 5 5 5 5   L 5 5 5 5 5 5      IP Quad Hamst TA    L2 L3 L4-S1 L4   R 5 5 5 5   L 5 5 5 5     Reflexes:  A little brisk in the legs, normal otherwise       Sensory: normal and symmetric perception of light touch, vibration and temperature  Proprioception reduced for small movements at the great toe bilaterally  Coordination: Finger to nose without dysmetria bilaterally  No intention tremor  Gait: Able to rise from a chair without the use of arms  Posture was normal  Narrow-base and stable stance  Normal initiation  Normal stride length and step height  Slightly reduced arm swing on the right  Occasional nonspecific hyperkinetic movements of the arms while walking  Turn completed in 2 steps  Sway on Romberg  No truncal ataxia  Review of Systems   Constitutional: Negative  Negative for appetite change and fever  HENT: Positive for hearing loss, sinus pain, sinus pressure, tinnitus and trouble swallowing  Negative for voice change  Eyes: Positive for visual disturbance (blurred vision)  Negative for photophobia and pain  Respiratory: Positive for shortness of breath  Cardiovascular: Negative  Negative for palpitations  Gastrointestinal: Negative  Negative for nausea and vomiting  Bowel habit changes  Loss of bladder control   Endocrine: Negative  Negative for cold intolerance and heat intolerance  Loss of sexual drive   Genitourinary: Positive for frequency (lack of)  Negative for dysuria and urgency  Musculoskeletal: Positive for back pain, gait problem (pain while walking, difficulty walking, and balance problems), joint swelling and neck pain  Negative for myalgias  Skin: Negative  Negative for rash  Neurological: Negative for dizziness, tremors, seizures, syncope, facial asymmetry, speech difficulty, weakness, light-headedness, numbness and headaches  Tingling  Twitching  Falls  Memory problems  Clumsiness     Hematological: Bruises/bleeds easily  Psychiatric/Behavioral: Negative for confusion and hallucinations  Sleep disturbance: trouble falling asleep  The patient is nervous/anxious           Depression  Mood swings       Current Outpatient Prescriptions on File Prior to Visit   Medication Sig Dispense Refill    dicyclomine (BENTYL) 10 mg capsule TAKE 1 CAPSULE BY MOUTH FOUR TIMES A DAY BEFORE MEALS AND AT BEDTIME 120 capsule 2    FLUoxetine (PROzac) 20 mg capsule TAKE 3 CAPSULES BY MOUTH DAILY 90 capsule 2    gabapentin (NEURONTIN) 300 mg capsule Take 1 capsule (300 mg total) by mouth daily at bedtime for 30 days 30 capsule 0    GENVOYA tablet TAKE 1 TABLET BY MOUTH DAILY 30 tablet 2    omeprazole (PriLOSEC) 40 MG capsule Take 1 capsule by mouth daily 90 capsule 3    propranolol (INDERAL) 10 mg tablet TAKE ONE TO TWO TABLETS BY MOUTH TWICE A DAY AS NEEDED FOR ANXIETY, RESTLESSNESS 120 tablet 2    vardenafil (LEVITRA) 2 5 MG tablet Take 1 tablet (2 5 mg total) by mouth daily as needed for erectile dysfunction for up to 3 days 3 tablet 0    VENTOLIN  (90 Base) MCG/ACT inhaler INHALE 2 PUFFS EVERY FOUR HOURS AS NEEDED FOR WHEEZING 18 g 2    [DISCONTINUED] gabapentin (NEURONTIN) 300 mg capsule       [DISCONTINUED] ibuprofen (MOTRIN) 600 mg tablet TAKE 1 TABLET EVERY 6 HOURS AS NEEDED PAIN  0     No current facility-administered medications on file prior to visit        Edmundo Favre, MD  Medical Director   [facility]  Movement and Memory Specialist

## 2019-01-02 ENCOUNTER — PATIENT OUTREACH (OUTPATIENT)
Dept: SURGERY | Facility: CLINIC | Age: 49
End: 2019-01-02

## 2019-01-03 NOTE — PROGRESS NOTES
Cm mailed January Nutrition newsletter  [address]  [address] [facility]   At [facility]   T  [phone]

## 2019-01-09 ENCOUNTER — HOSPITAL ENCOUNTER (OUTPATIENT)
Dept: MRI IMAGING | Facility: HOSPITAL | Age: 49
Discharge: HOME | End: 2019-01-09
Attending: PSYCHIATRY & NEUROLOGY
Payer: COMMERCIAL

## 2019-01-09 DIAGNOSIS — R25.8 JERKY BODY MOVEMENTS: ICD-10-CM

## 2019-01-09 PROCEDURE — 70551 MRI BRAIN STEM W/O DYE: CPT

## 2019-01-09 NOTE — PROGRESS NOTES
Would someone be able to reach out to the patient and let him know that his MRI was read as completely normal  Thank you

## 2019-01-10 ENCOUNTER — PATIENT OUTREACH (OUTPATIENT)
Dept: SURGERY | Facility: CLINIC | Age: 49
End: 2019-01-10

## 2019-01-10 DIAGNOSIS — F33.1 MDD (MAJOR DEPRESSIVE DISORDER), RECURRENT EPISODE, MODERATE (HCC): ICD-10-CM

## 2019-01-10 DIAGNOSIS — F41.8 PERFORMANCE ANXIETY: ICD-10-CM

## 2019-01-10 DIAGNOSIS — K58.9 SPASM OF BOWEL: ICD-10-CM

## 2019-01-10 DIAGNOSIS — F41.1 GAD (GENERALIZED ANXIETY DISORDER): ICD-10-CM

## 2019-01-10 RX ORDER — FLUOXETINE HYDROCHLORIDE 20 MG/1
CAPSULE ORAL
Qty: 90 CAPSULE | Refills: 0 | Status: SHIPPED | OUTPATIENT
Start: 2019-01-10 | End: 2019-01-15

## 2019-01-10 RX ORDER — DICYCLOMINE HYDROCHLORIDE 10 MG/1
CAPSULE ORAL
Qty: 120 CAPSULE | OUTPATIENT
Start: 2019-01-10

## 2019-01-10 RX ORDER — PROPRANOLOL HYDROCHLORIDE 10 MG/1
TABLET ORAL
Qty: 120 TABLET | Refills: 0 | Status: SHIPPED | OUTPATIENT
Start: 2019-01-10 | End: 2019-01-15 | Stop reason: SDUPTHER

## 2019-01-10 NOTE — TELEPHONE ENCOUNTER
Patient has not been seen for over a year; please schedule for office visit and I can prescribe Bentyl to cover the meantime if needed, thank you

## 2019-01-11 ENCOUNTER — TELEPHONE (OUTPATIENT)
Dept: NEUROLOGY | Facility: CLINIC | Age: 49
End: 2019-01-11

## 2019-01-11 NOTE — PROGRESS NOTES
Cm mailed to ct angelia darnell letter  [address]  [facility]   At [facility]  [phone]

## 2019-01-11 NOTE — TELEPHONE ENCOUNTER
----- Message from Monica Harper MD sent at 1/9/2019  4:32 PM EST -----  Would someone be able to reach out to the patient and let him know that his MRI was read as completely normal  Thank you

## 2019-01-11 NOTE — TELEPHONE ENCOUNTER
Attempted to call mobile number but it says the number is restricted, called listed home number and left a message for pt to call the office back

## 2019-01-15 ENCOUNTER — OFFICE VISIT (OUTPATIENT)
Dept: PSYCHIATRY | Facility: CLINIC | Age: 49
End: 2019-01-15
Payer: COMMERCIAL

## 2019-01-15 DIAGNOSIS — F41.8 PERFORMANCE ANXIETY: ICD-10-CM

## 2019-01-15 DIAGNOSIS — F33.1 MDD (MAJOR DEPRESSIVE DISORDER), RECURRENT EPISODE, MODERATE (HCC): ICD-10-CM

## 2019-01-15 DIAGNOSIS — F41.1 GAD (GENERALIZED ANXIETY DISORDER): ICD-10-CM

## 2019-01-15 PROCEDURE — 99214 OFFICE O/P EST MOD 30 MIN: CPT | Performed by: PSYCHIATRY & NEUROLOGY

## 2019-01-15 RX ORDER — GABAPENTIN 300 MG/1
CAPSULE ORAL
COMMUNITY
Start: 2018-12-30 | End: 2019-01-15

## 2019-01-15 RX ORDER — PROPRANOLOL HYDROCHLORIDE 10 MG/1
10-20 TABLET ORAL 2 TIMES DAILY PRN
Qty: 120 TABLET | Refills: 1 | Status: SHIPPED | OUTPATIENT
Start: 2019-01-15 | End: 2019-04-10 | Stop reason: SDUPTHER

## 2019-01-15 RX ORDER — FLUOXETINE HYDROCHLORIDE 40 MG/1
80 CAPSULE ORAL DAILY
Qty: 60 CAPSULE | Refills: 2 | Status: SHIPPED | OUTPATIENT
Start: 2019-01-15 | End: 2019-04-10 | Stop reason: SDUPTHER

## 2019-01-23 ENCOUNTER — OFFICE VISIT (OUTPATIENT)
Dept: SURGERY | Facility: CLINIC | Age: 49
End: 2019-01-23
Payer: COMMERCIAL

## 2019-01-23 VITALS
HEART RATE: 103 BPM | RESPIRATION RATE: 18 BRPM | TEMPERATURE: 97.5 F | BODY MASS INDEX: 27.28 KG/M2 | WEIGHT: 180 LBS | OXYGEN SATURATION: 97 % | SYSTOLIC BLOOD PRESSURE: 143 MMHG | HEIGHT: 68 IN | DIASTOLIC BLOOD PRESSURE: 84 MMHG

## 2019-01-23 DIAGNOSIS — N52.8 OTHER MALE ERECTILE DYSFUNCTION: ICD-10-CM

## 2019-01-23 DIAGNOSIS — K21.9 GASTROESOPHAGEAL REFLUX DISEASE, ESOPHAGITIS PRESENCE NOT SPECIFIED: ICD-10-CM

## 2019-01-23 DIAGNOSIS — F17.219 CIGARETTE NICOTINE DEPENDENCE WITH NICOTINE-INDUCED DISORDER: ICD-10-CM

## 2019-01-23 DIAGNOSIS — H57.89 RED EYES: ICD-10-CM

## 2019-01-23 DIAGNOSIS — G89.4 CHRONIC PAIN SYNDROME: ICD-10-CM

## 2019-01-23 DIAGNOSIS — J30.2 SEASONAL ALLERGIES: ICD-10-CM

## 2019-01-23 DIAGNOSIS — B20 HIV DISEASE (HCC): ICD-10-CM

## 2019-01-23 DIAGNOSIS — B20 HIV (HUMAN IMMUNODEFICIENCY VIRUS INFECTION) (HCC): Primary | ICD-10-CM

## 2019-01-23 DIAGNOSIS — F41.1 GAD (GENERALIZED ANXIETY DISORDER): ICD-10-CM

## 2019-01-23 DIAGNOSIS — R73.03 PREDIABETES: ICD-10-CM

## 2019-01-23 DIAGNOSIS — R25.8 JERKY BODY MOVEMENTS: ICD-10-CM

## 2019-01-23 PROCEDURE — 99214 OFFICE O/P EST MOD 30 MIN: CPT | Performed by: NURSE PRACTITIONER

## 2019-01-23 RX ORDER — CROMOLYN SODIUM 5.2 MG
1 AEROSOL, SPRAY WITH PUMP (ML) NASAL 4 TIMES DAILY
Qty: 1 ACT | Refills: 2 | Status: SHIPPED | OUTPATIENT
Start: 2019-01-23 | End: 2019-07-09 | Stop reason: SDUPTHER

## 2019-01-23 RX ORDER — TADALAFIL 2.5 MG/1
2.5 TABLET ORAL DAILY PRN
Qty: 20 TABLET | Refills: 0 | Status: SHIPPED | OUTPATIENT
Start: 2019-01-23 | End: 2019-04-12 | Stop reason: SDUPTHER

## 2019-01-23 RX ORDER — MONTELUKAST SODIUM 10 MG/1
10 TABLET ORAL
Qty: 90 TABLET | Refills: 1 | Status: SHIPPED | OUTPATIENT
Start: 2019-01-23 | End: 2020-06-10

## 2019-01-23 NOTE — ASSESSMENT & PLAN NOTE
Ongoing  Pt reports taking propanol 10 mg daily; pt may take up to 2 doses daily on days where he seems agitated or spastic with movement

## 2019-01-23 NOTE — ASSESSMENT & PLAN NOTE
Ongoing  Pt has good and bad days  Pt is following up with Psychiatrists who has increased Prozac to 80 mg daily

## 2019-01-23 NOTE — ASSESSMENT & PLAN NOTE
Recent A1C: 5 7  CRNP stressed the importance of eating more fruits and vegetables, eating smaller portions, eating less carbohydrates, avoids sweets and sugary drinks, and exercising for 30 minutes 5 days per week  CRNP review what concentrated carbs and sweets     Pt reports "not giving up his Pepsi "

## 2019-01-23 NOTE — ASSESSMENT & PLAN NOTE
Pt reports working in [place] and pt now has red eye from allergic reaction  Pt reports using visine

## 2019-01-23 NOTE — PROGRESS NOTES
Assessment/Plan:    HIV disease  Pt denies any missed doses  CRNP stressed the importance of 100% medication adherence  HIV Counseling:    Viral Load: <20    CD4 Count: 7664  ART: Ruddy Rowley     Denies side effects  Stressed the importance of adherence  Continue follow up in the ID clinic with Dr Giorgi Almeida  Reviewed the most recent labs, including the Cd4 and viral load  Discussed the risks and benefits of treatment options, instructions for management, importance of treatment adherence, and reduction of risk factors  Educated on possible medication side effects  Counseled on routes of HIV transmission, including the risk of  infection  Emphasized that viral suppression is the best method to prevent HIV transmission  At this time the pt denies the need for HIV testing of anyone in their life  Total encounter time was greater than 35 minutes  Greater than 20 minutes were spent on counseling and patient education  Pt voices understanding and agreement with treatment plan  Cigarette nicotine dependence with nicotine-induced disorder    Pt reports smoking   Nicotine Dependency - Primary    Counseled for greater than 15 minutes on the importance of smoking cessation  Education was given regarding the cardiovascular effects of how nicotine affects the heart, lungs, kidneys,and peripheral vascular system  Referred to [address] for enrollment in smoking cessation program       Prediabetes    Recent A1C: 5 7  CRNP stressed the importance of eating more fruits and vegetables, eating smaller portions, eating less carbohydrates, avoids sweets and sugary drinks, and exercising for 30 minutes 5 days per week  CRNP review what concentrated carbs and sweets  Pt reports "not giving up his Pepsi "     SOPHIA (generalized anxiety disorder)  Ongoing  Pt reports taking propanol 10 mg daily; pt may take up to 2 doses daily on days where he seems agitated or spastic with movement       MDD (major depressive disorder), recurrent episode, moderate ([facility] [address] )  Ongoing  Pt has good and bad days  Pt is following up with Psychiatrists who has increased Prozac to 80 mg daily  GERD (gastroesophageal reflux disease)  Stable  Pt reports "a couple of times he has break through "    CRNP reviewed food that contribute to producing GERD like symptoms  Continue takes omeprazole 40 mg tablet  Gastroesophageal Reflux Disease:    Patient was educated on lifestyle modifications to improve GERD  Guidance was given to encouraged the patient to avoid acidic foods including citrus, onions, coffee, tomatoes and tomato based sauces, carbonated beverages, mint, chocolate, and fried foods  Educated to avoid eating late at night, to avoid laying down directly after eating a large meal or late at night, to consume smaller and more frequent meals throughout the day  Encouraged to maintain a healthy body weight, to exercise for 30 minutes 5 times a week and to avoid alcoholic beverages, and smoking to decrease GERD symptoms  Encouraged to keep a food journal if symptoms persist     Jerky body movements  Worse today  This is the first time the pt has not been able to control his movement and appear agitated with movements  Pt does follow with neurology  CRNP reviewed MRI with pt  Impression: normal      Red eyes      Pt reports working in [employer] and pt now has red eye from allergic reaction  Pt reports using visine  Chronic pain syndrome  Ongoing  Pt reports 'I'm always in pain some days are worse than other "  CRNP stressed the importance of proper body alignment, and PT  Pt reports "going to chiropractor for working on body "    Seasonal allergies      Start Montelukast 10 mg tablet daily  Start Nasal Chrom 1 spray into each nostril 4 times per day  Other male erectile dysfunction  Ongoing  Reordered Tadalafil 2 5 mg tablet to use 1 hour prior to sexual relations           Diagnoses and all orders for this visit:    HIV (human immunodeficiency virus infection) ([place] [address] )    Cigarette nicotine dependence with nicotine-induced disorder    HIV disease ([place] [address] )    Prediabetes    SOPHIA (generalized anxiety disorder)    Gastroesophageal reflux disease, esophagitis presence not specified    Jerky body movements    Red eyes    Chronic pain syndrome    Seasonal allergies  -     montelukast (SINGULAIR) 10 mg tablet; Take 1 tablet (10 mg total) by mouth daily at bedtime  -     cromolyn (NASALCHROM) 5 2 MG/ACT nasal spray; 1 spray into each nostril 4 (four) times a day    Other male erectile dysfunction  -     tadalafil (CIALIS) 2 5 MG tablet;  Take 1 tablet (2 5 mg total) by mouth daily as needed for erectile dysfunction        Patient Active Problem List   Diagnosis    Bilateral varicoceles    Embolism involving vascular device (HCC)    HIV disease ([place] [address] )    GERD (gastroesophageal reflux disease)    IBS (irritable bowel syndrome)    Cigarette nicotine dependence with nicotine-induced disorder    SOPHIA (generalized anxiety disorder)    MDD (major depressive disorder), recurrent episode, moderate ([place] [address] )    Acute right-sided low back pain with right-sided sciatica    Jerky body movements    Panic attacks    Dysphagia    Mild intermittent asthma without complication    Other male erectile dysfunction    Constipation    Prediabetes    Red eyes    Chronic pain syndrome    Seasonal allergies     Lab Results   Component Value Date     12/30/2015    K 3 9 11/19/2018     11/19/2018    CO2 28 11/19/2018    ANIONGAP 9 12/30/2015    BUN 12 11/19/2018    CREATININE 0 90 11/19/2018    GLUCOSE 124 12/30/2015    GLUF 84 11/19/2018    CALCIUM 9 1 11/19/2018    AST 23 11/19/2018    ALT 30 11/19/2018    ALKPHOS 93 11/19/2018    PROT 8 4 (H) 12/30/2015    BILITOT 0 37 12/30/2015    EGFR 99 11/19/2018     Lab Results   Component Value Date    WBC 7 8 11/19/2018    WBC 7 89 11/19/2018    HGB 12 8 (L) 11/19/2018    HGB 13 9 11/19/2018 HCT 38 0 11/19/2018    HCT 42 4 11/19/2018    MCV 96 11/19/2018    MCV 96 11/19/2018     11/19/2018     11/19/2018         Subjective:      Patient ID: Lucinda Castro is a 46 y o  male  Pt is being seen in the office today for 3 month follow up with PCP  Pt reports "I'm ok, pretty ok "  Pt reports x partner is pretty much out of his life  Pt's mother is present during the visit and she has expressed concern about pt wanting to sleep all the time  Pt's mom reports pt has excerebration of sciatica nerve and pt reports taking tylenol  Pt is more active today with his spastic muscle movement and is not able to stay in 1 position for any length of time  Pt reports being given Vicodin for pain when pt had visit the ER  The following portions of the patient's history were reviewed and updated as appropriate: allergies, current medications, past family history, past medical history, past social history and problem list     Review of Systems   HENT: Negative  Respiratory: Negative  Cardiovascular: Negative  Gastrointestinal: Negative  Genitourinary:        Lack of sensation or urge to urinate  Musculoskeletal: Positive for myalgias  Psychiatric/Behavioral: Positive for agitation  Objective:      /84 (BP Location: Right arm, Patient Position: Sitting, Cuff Size: Standard)   Pulse 103   Temp 97 5 °F (36 4 °C)   Resp 18   Ht 5' 8" (1 727 m)   Wt 81 6 kg (180 lb)   SpO2 97%   BMI 27 37 kg/m²          Physical Exam   Constitutional: He is oriented to person, place, and time  He appears well-developed and well-nourished  HENT:   Right Ear: External ear normal    Left Ear: External ear normal    Nose: Nose normal    Mouth/Throat: Oropharynx is clear and moist  No oropharyngeal exudate  Eyes: Right conjunctiva has a hemorrhage  Left conjunctiva has a hemorrhage  R and L pupils + 4  Cardiovascular: Normal rate, regular rhythm and normal heart sounds  No murmur heard  Pulmonary/Chest: Effort normal and breath sounds normal  No respiratory distress  He has no wheezes  Abdominal: Soft  Bowel sounds are normal  He exhibits no distension  There is no tenderness  Musculoskeletal: Normal range of motion  Lymphadenopathy:     He has cervical adenopathy  Neurological: He is oriented to person, place, and time  Skin: Skin is warm and dry  Psychiatric: He has a normal mood and affect  Nursing note and vitals reviewed

## 2019-01-23 NOTE — ASSESSMENT & PLAN NOTE
Ongoing  Pt reports 'I'm always in pain some days are worse than other "  CRNP stressed the importance of proper body alignment, and PT    Pt reports "going to chiropractor for working on body "

## 2019-01-23 NOTE — ASSESSMENT & PLAN NOTE
Stable  Pt reports "a couple of times he has break through "    CRNP reviewed food that contribute to producing GERD like symptoms  Continue takes omeprazole 40 mg tablet  Gastroesophageal Reflux Disease:    Patient was educated on lifestyle modifications to improve GERD  Guidance was given to encouraged the patient to avoid acidic foods including citrus, onions, coffee, tomatoes and tomato based sauces, carbonated beverages, mint, chocolate, and fried foods  Educated to avoid eating late at night, to avoid laying down directly after eating a large meal or late at night, to consume smaller and more frequent meals throughout the day  Encouraged to maintain a healthy body weight, to exercise for 30 minutes 5 times a week and to avoid alcoholic beverages, and smoking to decrease GERD symptoms    Encouraged to keep a food journal if symptoms persist

## 2019-01-23 NOTE — ASSESSMENT & PLAN NOTE
Worse today  This is the first time the pt has not been able to control his movement and appear agitated with movements  Pt does follow with neurology  CLAYTON reviewed MRI with pt   Impression: normal

## 2019-01-23 NOTE — ASSESSMENT & PLAN NOTE
Pt reports smoking   Nicotine Dependency - Primary    Counseled for greater than 15 minutes on the importance of smoking cessation  Education was given regarding the cardiovascular effects of how nicotine affects the heart, lungs, kidneys,and peripheral vascular system    Referred to [facility] for enrollment in smoking cessation program

## 2019-01-23 NOTE — ASSESSMENT & PLAN NOTE
Pt denies any missed doses  RUTHYNP stressed the importance of 100% medication adherence  HIV Counseling:    Viral Load: <20    CD4 Count: 1924  ART: Judith Greer     Denies side effects  Stressed the importance of adherence  Continue follow up in the ID clinic with Dr Suhas Courtney  Reviewed the most recent labs, including the Cd4 and viral load  Discussed the risks and benefits of treatment options, instructions for management, importance of treatment adherence, and reduction of risk factors  Educated on possible medication side effects  Counseled on routes of HIV transmission, including the risk of  infection  Emphasized that viral suppression is the best method to prevent HIV transmission  At this time the pt denies the need for HIV testing of anyone in their life  Total encounter time was greater than 35 minutes  Greater than 20 minutes were spent on counseling and patient education  Pt voices understanding and agreement with treatment plan

## 2019-01-28 ENCOUNTER — PATIENT OUTREACH (OUTPATIENT)
Dept: SURGERY | Facility: CLINIC | Age: 49
End: 2019-01-28

## 2019-01-28 NOTE — PROGRESS NOTES
Dental auth submitted to funding agency  [address]  [address]   At [facility]/[place]OR[place]NA SPENCER  [phone]

## 2019-01-29 ENCOUNTER — PATIENT OUTREACH (OUTPATIENT)
Dept: SURGERY | Facility: CLINIC | Age: 49
End: 2019-01-29

## 2019-01-29 NOTE — PROGRESS NOTES
Dental work approved  Chel Mcdaniel was approved for a total of [phone]  The auth was approved from 11/20/18- 5/20/19  He is coved for the following services:     Tooth #11   [employer] fused noble metal- 900 00  Tooth #11    Prefab post&core in add to crn-288 00  Tooth #11    Root canal therapy- anterior-[phone]      Auth# [id number]      [address]  [address]   At [facility]  [phone]
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
## 2019-02-12 ENCOUNTER — OFFICE VISIT (OUTPATIENT)
Dept: BEHAVIORAL/MENTAL HEALTH CLINIC | Facility: CLINIC | Age: 49
End: 2019-02-12
Payer: COMMERCIAL

## 2019-02-12 DIAGNOSIS — F41.1 GAD (GENERALIZED ANXIETY DISORDER): Primary | ICD-10-CM

## 2019-02-12 DIAGNOSIS — F33.1 MDD (MAJOR DEPRESSIVE DISORDER), RECURRENT EPISODE, MODERATE (HCC): ICD-10-CM

## 2019-02-12 DIAGNOSIS — F41.0 PANIC ATTACKS: ICD-10-CM

## 2019-02-12 PROCEDURE — 96127 BRIEF EMOTIONAL/BEHAV ASSMT: CPT | Performed by: SOCIAL WORKER

## 2019-02-12 PROCEDURE — 90834 PSYTX W PT 45 MINUTES: CPT | Performed by: SOCIAL WORKER

## 2019-02-15 DIAGNOSIS — J45.20 MILD INTERMITTENT ASTHMA, UNSPECIFIED WHETHER COMPLICATED: ICD-10-CM

## 2019-02-15 DIAGNOSIS — K58.9 SPASM OF BOWEL: ICD-10-CM

## 2019-02-15 RX ORDER — ALBUTEROL SULFATE 90 UG/1
2 AEROSOL, METERED RESPIRATORY (INHALATION) EVERY 6 HOURS PRN
Qty: 18 G | Refills: 2 | Status: SHIPPED | OUTPATIENT
Start: 2019-02-15 | End: 2022-01-12 | Stop reason: CLARIF

## 2019-02-18 ENCOUNTER — TELEPHONE (OUTPATIENT)
Dept: GASTROENTEROLOGY | Facility: AMBULARY SURGERY CENTER | Age: 49
End: 2019-02-18

## 2019-02-18 RX ORDER — DICYCLOMINE HYDROCHLORIDE 10 MG/1
CAPSULE ORAL
Qty: 120 CAPSULE | Refills: 2 | OUTPATIENT
Start: 2019-02-18

## 2019-02-18 NOTE — TELEPHONE ENCOUNTER
Left message with patients mother Samina Carkianna for an appointment and phone number to call back to schedule next available

## 2019-02-18 NOTE — TELEPHONE ENCOUNTER
Patient has not been seen for over a year; please schedule office visit and I can renew dicyclomine as needed to cover the meantime    Thank you

## 2019-02-21 NOTE — TELEPHONE ENCOUNTER
When you have time would you please follow up on this vague call? Thank you Ramon Munoz! No significant past surgical history

## 2019-03-06 ENCOUNTER — PATIENT OUTREACH (OUTPATIENT)
Dept: SURGERY | Facility: CLINIC | Age: 49
End: 2019-03-06

## 2019-03-06 NOTE — PROGRESS NOTES
CM attempted to call Ct to see if there was any issues he was having with [facility]  CM was not able to leave a  due to phone service being inactive

## 2019-03-11 DIAGNOSIS — B20 HIV (HUMAN IMMUNODEFICIENCY VIRUS INFECTION) (HCC): ICD-10-CM

## 2019-03-12 RX ORDER — ELVITEGRAVIR, COBICISTAT, EMTRICITABINE, AND TENOFOVIR ALAFENAMIDE 150; 150; 200; 10 MG/1; MG/1; MG/1; MG/1
TABLET ORAL
Qty: 30 TABLET | Refills: 2 | Status: SHIPPED | OUTPATIENT
Start: 2019-03-12 | End: 2019-06-11 | Stop reason: SDUPTHER

## 2019-03-15 ENCOUNTER — HOSPITAL ENCOUNTER (OUTPATIENT)
Dept: NEUROLOGY | Facility: CLINIC | Age: 49
Discharge: HOME | End: 2019-03-15
Payer: COMMERCIAL

## 2019-03-15 DIAGNOSIS — R26.9 UNSPECIFIED ABNORMALITIES OF GAIT AND MOBILITY: ICD-10-CM

## 2019-03-15 DIAGNOSIS — R25.8 JERKY BODY MOVEMENTS: ICD-10-CM

## 2019-03-15 PROCEDURE — 95886 MUSC TEST DONE W/N TEST COMP: CPT | Performed by: PHYSICAL MEDICINE & REHABILITATION

## 2019-03-15 PROCEDURE — 95912 NRV CNDJ TEST 11-12 STUDIES: CPT | Performed by: PHYSICAL MEDICINE & REHABILITATION

## 2019-03-15 NOTE — PROCEDURES
[facility] [address]in [address] [facility] , [address] [facility]  [phone]        Name: Kaity Oliver  Patient ID: [id number]   Age: 46 Years 4 Months  YOB: 1970   Account Number:    Gender: Male   Technologist:    Date of Exam: 3/15/2019 11:14   Referring Physician: Sandra Stanford MD  Temperature: 32 8   Examining Physician: Lane Foss  Height:                 Patient History:   46 y o male with numbness in hands/feet, intermittent, ongoing for >5 years  Denies neck pain radiating  Endorses back pain that radiates occasionally  Referred for peripheral neuropathy evaluation  [address]      Nerve / Sites Muscle Latency Ref  Amplitude Ref  Duration Rel Amp Distance Lat Diff Velocity Ref  ms ms mV mV ms % mm ms m/s m/s   R Median - APB      Wrist APB 3 18 ?4 20 10 5 ?4 0 6 20 100 70         Elbow APB 7 71  10 3  6 20 97 7 230 4 53 51 ?50   R Ulnar - ADM      Wrist ADM 2 71 ?3 30 9 5 ?5 0 6 09 100 70         B  Elbow ADM 6 41  9 5  6 67 99 8 210 3 70 57 ?50      A  Elbow ADM 8 54  8 6  6 77 91 3 100 2 14 47 ?49            5 83     R Peroneal - EDB      Ankle EDB 5 47 ?5 50 1 7 ?2 0 5 99 100 80         Fib head EDB 12 03  1 5  10 21 92 6 270 6 56 41 ?40      Pop fossa EDB 14 27  2 1  6 72 135 80 2 24 36 ?40            8 80     R Tibial - AH      Ankle AH 4 22 ?6 00 5 2 ?4 0 5 99 100 100         Pop fossa AH 11 41  6 7  8 28 128 300 7 19 42 ?40   R Peroneal - Tib Ant      Fib Head Tib Ant 2 03 ?6 70 1 7 ?5 0 4 74 100          Pop fossa Tib Ant 4 17  1 3  10 21 75 3 80 2 14 37 ?40   R Ulnar - FDI      Wrist FDI 3 02 ?4 50 8 4 ?7 0 6 41 100          B  Elbow FDI 6 88  8 2  6 51 97 2  3 85  ?50      A  Elbow FDI 9 17  8 7  6 30 106 100 2 29 44 ?50            6 15         SNC      Nerve / Sites Rec  Site Onset Lat Peak Lat Ref  Amp Ref  Distance Velocity Ref       ms ms ms µV µV mm m/s m/s   R Median - Digit II (Antidromic)      Wrist Dig II 2 55 3 23 ?3 50 18 1 ?10 0 130 51 ?50   R Ulnar - Digit V (Antidromic)      Wrist Dig V 2 08 2 76 ?3 10 15 3 ?10 0 110 53 ?50   R Radial - Anatomical snuff box (Forearm)      Forearm Wrist 1 88 2 40 ?2 90 19 9 ?10 0 100 53 ?50   R Sural - Ankle (Calf)      Calf Ankle 2 76 3 75 ?4 00 6 2 ?6 0 140 51 ?40   R Superficial peroneal - Ankle      Lat leg Ankle 1 98 2 97 ?3 50 6 2 ?6 0 100 51 ?40       EMG          Needle EMG Examination     Insertional Spontaneous MUAP   Muscle Nerve Roots Activity Fib PSW Fasc Dur  Amp Poly Config Recruitment   R  Deltoid Axillary C5-C6 Normal None None None Normal Normal None Normal Normal   R  Biceps brachii Musculocutaneous C5-C6 Normal None None None Normal Normal None Normal Normal   R  Triceps brachii Radial C6-C8 Normal None None None Normal Normal None Normal Normal   R  Pronator teres Median C6-C7 Normal None None None Normal Normal None Normal Normal   R  First dorsal interosseous Ulnar C8-T1 Normal None None None Normal Normal None Normal Normal   R  Abductor pollicis brevis Median J7-M6 Normal None None None Normal Normal None Normal Normal   R  Flexor carpi ulnaris Ulnar C7-T1 Normal None None None Normal Normal None Normal Normal   R  Vastus medialis Femoral L2-L4 Normal None None None Normal Normal None Normal Normal   R  Tibialis anterior Peroneal L4-L5 Increased  None 2+ None 1+ 1+ None Normal Decr  R  Gastrocnemius (Medial head) Tibial S1-S2 Normal None None None Normal Normal None Normal Normal   R  Peroneus longus Peroneal L5-S1 Normal None None None Normal Normal None Normal Normal   R  Gluteus medius Superior gluteal L4-S1 Normal None None None Normal Normal None Normal Normal   R  Biceps femoris (short head) Sciatic (peroneal division) L5-S2 Normal None None None Normal Normal None Normal Normal   R  Lumbar paraspinals Spinal L1-L5 Normal None None None Normal Normal None Normal Normal   R   Extensor digitorum brevis Tibial L5-S1 Normal * * * Normal Normal None Normal Normal     *unable to observe spontaneous activity due to inability to relax intrinsic foot muscles    Findings:   Motor:  Right median compound motor action potential (CMAP) demonstrated normal distal latency, normal amplitude, and normal conduction velocity across the forearm  Right ulnar compound motor action potential (CMAP) to ADM demonstrated normal distal latency, normal amplitude, and decreased conduction velocity across the elbow  Right ulnar compound motor action potential (CMAP) to FDI demonstrated normal distal latency, normal amplitude, and decreased conduction velocity across the elbow  Right peroneal compound motor action potential (CMAP) to the extensor digitorum brevis demonstrated normal distal latency, decreased amplitude, and decreased conduction velocity across the fibular head  Right peroneal compound motor action potential (CMAP) to the tibialis anterior demonstrated decreased amplitude and decreased conduction velocity across the fibular head  Right tibial compound motor action potential (CMAP) to the abductor hallucis demonstrated normal distal latency, normal amplitude, and normal conduction velocity across the lower leg  Sensory:  Right median sensory nerve action potential (SNAP) demonstrated normal amplitude and normal peak latency  Right ulnar sensory nerve action potential (SNAP) demonstrated normal amplitude and normal peak latency  Right radial sensory nerve action potential (SNAP) demonstrated normal amplitude and normal peak latency  Right sural sensory nerve action potential (SNAP) demonstrated normal peak latency and normal amplitude  Right superficial peroneal sensory nerve action potential (SNAP) demonstrated normal peak latency and normal amplitude  EMG:  A disposable monopolar needle was used to study selected muscles in the right upper extremity including the deltoid, biceps, triceps, pronator teres, first dorsal interosseous, and abductor pollicis brevis   All muscles tested demonstrated normal insertional activity, no abnormal spontaneous activity, and normal volitional motor unit action potentials  A disposable monopolar needle was used to study selected muscles in the right lower extremity including the tibialis anterior, medial gastrocnemius, peroneus longus, vastus medialis, biceps femoris, extensor digitorum brevis, and gluteus medius  The right lumbar paraspinals were also tested  There was abnormal spontaneous activity, abnormal MUAP morphology, and decreased recruitment of the right tibialis anterior  All other muscles tested demonstrated normal insertional activity, no abnormal spontaneous activity, and normal volitional motor unit action potentials  Impression: Abnormal study  1  There is electrodiagnostic evidence of a right ulnar entrapment mononeuropathy with conduction slowing across the elbow  This may be consistent with clinical diagnosis of right cubital tunnel syndrome  2  There is electrodiagnostic evidence of a right peroneal entrapment mononeuropathy with conduction slowing across the fibular head, with chronic and ongoing denervation findings to the right tibialis anterior  3  There is no electrodiagnostic evidence of a right median, radial, tibial, or sural mononeuropathy  4  There is no electrodiagnostic evidence of a right brachial plexopathy, cervical radiculopathy, lumbosacral plexopathy, or lumbar radiculopathy  5  There is no electrodiagnostic evidence of a large fiber peripheral polyneuropathy  6  Of note, EMG/NCS was not performed on the left side  Consider EMG/NCS of the left side based on clinical correlation               ___________________________  ory Sang

## 2019-04-10 DIAGNOSIS — F41.1 GAD (GENERALIZED ANXIETY DISORDER): ICD-10-CM

## 2019-04-10 DIAGNOSIS — F41.8 PERFORMANCE ANXIETY: ICD-10-CM

## 2019-04-10 DIAGNOSIS — F33.1 MDD (MAJOR DEPRESSIVE DISORDER), RECURRENT EPISODE, MODERATE (HCC): ICD-10-CM

## 2019-04-11 RX ORDER — PROPRANOLOL HYDROCHLORIDE 10 MG/1
TABLET ORAL
Qty: 120 TABLET | Refills: 1 | Status: SHIPPED | OUTPATIENT
Start: 2019-04-11 | End: 2019-06-04 | Stop reason: SDUPTHER

## 2019-04-11 RX ORDER — FLUOXETINE HYDROCHLORIDE 40 MG/1
CAPSULE ORAL
Qty: 60 CAPSULE | Refills: 2 | Status: SHIPPED | OUTPATIENT
Start: 2019-04-11 | End: 2019-07-01 | Stop reason: SDUPTHER

## 2019-04-12 DIAGNOSIS — N52.8 OTHER MALE ERECTILE DYSFUNCTION: ICD-10-CM

## 2019-04-12 RX ORDER — TADALAFIL 2.5 MG/1
TABLET, FILM COATED ORAL
Qty: 10 TABLET | Refills: 2 | Status: SHIPPED | OUTPATIENT
Start: 2019-04-12 | End: 2019-07-09 | Stop reason: SDUPTHER

## 2019-04-19 ENCOUNTER — OFFICE VISIT (OUTPATIENT)
Dept: BEHAVIORAL/MENTAL HEALTH CLINIC | Facility: CLINIC | Age: 49
End: 2019-04-19
Payer: COMMERCIAL

## 2019-04-19 DIAGNOSIS — F41.1 GAD (GENERALIZED ANXIETY DISORDER): Primary | ICD-10-CM

## 2019-04-19 DIAGNOSIS — F41.0 PANIC ATTACKS: ICD-10-CM

## 2019-04-19 DIAGNOSIS — F33.1 MDD (MAJOR DEPRESSIVE DISORDER), RECURRENT EPISODE, MODERATE (HCC): ICD-10-CM

## 2019-04-19 PROCEDURE — 90834 PSYTX W PT 45 MINUTES: CPT | Performed by: SOCIAL WORKER

## 2019-04-23 ENCOUNTER — OFFICE VISIT (OUTPATIENT)
Dept: SURGERY | Facility: CLINIC | Age: 49
End: 2019-04-23
Payer: COMMERCIAL

## 2019-04-23 VITALS
RESPIRATION RATE: 18 BRPM | HEIGHT: 68 IN | SYSTOLIC BLOOD PRESSURE: 103 MMHG | BODY MASS INDEX: 26.98 KG/M2 | OXYGEN SATURATION: 97 % | WEIGHT: 178 LBS | HEART RATE: 74 BPM | DIASTOLIC BLOOD PRESSURE: 80 MMHG | TEMPERATURE: 98.1 F

## 2019-04-23 DIAGNOSIS — B20 HIV DISEASE (HCC): Primary | ICD-10-CM

## 2019-04-23 DIAGNOSIS — K58.2 IRRITABLE BOWEL SYNDROME WITH BOTH CONSTIPATION AND DIARRHEA: ICD-10-CM

## 2019-04-23 DIAGNOSIS — Z78.9 DISCOMFORT: ICD-10-CM

## 2019-04-23 DIAGNOSIS — F17.219 CIGARETTE NICOTINE DEPENDENCE WITH NICOTINE-INDUCED DISORDER: ICD-10-CM

## 2019-04-23 DIAGNOSIS — N52.8 OTHER MALE ERECTILE DYSFUNCTION: ICD-10-CM

## 2019-04-23 DIAGNOSIS — R25.8 JERKY BODY MOVEMENTS: ICD-10-CM

## 2019-04-23 DIAGNOSIS — F33.1 MDD (MAJOR DEPRESSIVE DISORDER), RECURRENT EPISODE, MODERATE (HCC): ICD-10-CM

## 2019-04-23 DIAGNOSIS — F41.1 GAD (GENERALIZED ANXIETY DISORDER): ICD-10-CM

## 2019-04-23 DIAGNOSIS — R73.03 PREDIABETES: ICD-10-CM

## 2019-04-23 PROCEDURE — 99214 OFFICE O/P EST MOD 30 MIN: CPT | Performed by: NURSE PRACTITIONER

## 2019-04-23 RX ORDER — DICYCLOMINE HCL 20 MG
20 TABLET ORAL
Qty: 180 TABLET | Refills: 1 | Status: SHIPPED | OUTPATIENT
Start: 2019-04-23 | End: 2019-10-02 | Stop reason: SDUPTHER

## 2019-04-29 ENCOUNTER — DOCUMENTATION (OUTPATIENT)
Dept: PSYCHIATRY | Facility: CLINIC | Age: 49
End: 2019-04-29

## 2019-04-30 ENCOUNTER — OFFICE VISIT (OUTPATIENT)
Dept: NEUROLOGY | Facility: CLINIC | Age: 49
End: 2019-04-30
Payer: COMMERCIAL

## 2019-04-30 VITALS
DIASTOLIC BLOOD PRESSURE: 68 MMHG | BODY MASS INDEX: 26.98 KG/M2 | HEART RATE: 63 BPM | WEIGHT: 178 LBS | HEIGHT: 68 IN | SYSTOLIC BLOOD PRESSURE: 104 MMHG

## 2019-04-30 DIAGNOSIS — Z92.89 HISTORY OF SENSORY CHANGES: ICD-10-CM

## 2019-04-30 DIAGNOSIS — R25.8 JERKY BODY MOVEMENTS: Primary | ICD-10-CM

## 2019-04-30 DIAGNOSIS — R26.9 UNSPECIFIED ABNORMALITIES OF GAIT AND MOBILITY: ICD-10-CM

## 2019-04-30 PROCEDURE — 99214 OFFICE O/P EST MOD 30 MIN: CPT | Performed by: PSYCHIATRY & NEUROLOGY

## 2019-05-07 ENCOUNTER — DOCUMENTATION (OUTPATIENT)
Dept: BEHAVIORAL/MENTAL HEALTH CLINIC | Facility: CLINIC | Age: 49
End: 2019-05-07

## 2019-05-07 DIAGNOSIS — F41.0 PANIC ATTACKS: ICD-10-CM

## 2019-05-07 DIAGNOSIS — F33.1 MDD (MAJOR DEPRESSIVE DISORDER), RECURRENT EPISODE, MODERATE (HCC): ICD-10-CM

## 2019-05-07 DIAGNOSIS — F41.1 GAD (GENERALIZED ANXIETY DISORDER): Primary | ICD-10-CM

## 2019-05-10 ENCOUNTER — PATIENT OUTREACH (OUTPATIENT)
Dept: SURGERY | Facility: CLINIC | Age: 49
End: 2019-05-10

## 2019-05-14 ENCOUNTER — PATIENT OUTREACH (OUTPATIENT)
Dept: SURGERY | Facility: CLINIC | Age: 49
End: 2019-05-14

## 2019-05-15 ENCOUNTER — PATIENT OUTREACH (OUTPATIENT)
Dept: SURGERY | Facility: CLINIC | Age: 49
End: 2019-05-15

## 2019-05-16 ENCOUNTER — PATIENT OUTREACH (OUTPATIENT)
Dept: SURGERY | Facility: CLINIC | Age: 49
End: 2019-05-16

## 2019-05-17 DIAGNOSIS — R26.9 UNSPECIFIED ABNORMALITIES OF GAIT AND MOBILITY: Primary | ICD-10-CM

## 2019-05-17 DIAGNOSIS — Z92.89 HISTORY OF SENSORY CHANGES: ICD-10-CM

## 2019-05-17 DIAGNOSIS — R25.8 JERKY BODY MOVEMENTS: ICD-10-CM

## 2019-05-20 ENCOUNTER — TELEPHONE (OUTPATIENT)
Dept: NEUROLOGY | Facility: CLINIC | Age: 49
End: 2019-05-20

## 2019-05-21 ENCOUNTER — TELEPHONE (OUTPATIENT)
Dept: NEUROLOGY | Facility: CLINIC | Age: 49
End: 2019-05-21

## 2019-05-22 ENCOUNTER — PATIENT OUTREACH (OUTPATIENT)
Dept: SURGERY | Facility: CLINIC | Age: 49
End: 2019-05-22

## 2019-05-28 ENCOUNTER — DOCUMENTATION (OUTPATIENT)
Dept: PSYCHIATRY | Facility: CLINIC | Age: 49
End: 2019-05-28

## 2019-05-31 ENCOUNTER — HOSPITAL ENCOUNTER (OUTPATIENT)
Dept: NEUROLOGY | Facility: AMBULATORY SURGERY CENTER | Age: 49
Discharge: HOME | End: 2019-05-31
Payer: COMMERCIAL

## 2019-05-31 DIAGNOSIS — Z92.89 HISTORY OF SENSORY CHANGES: ICD-10-CM

## 2019-05-31 PROCEDURE — 95816 EEG AWAKE AND DROWSY: CPT

## 2019-05-31 PROCEDURE — 95812 EEG 41-60 MINUTES: CPT | Performed by: PSYCHIATRY & NEUROLOGY

## 2019-06-04 DIAGNOSIS — F41.8 PERFORMANCE ANXIETY: ICD-10-CM

## 2019-06-04 RX ORDER — PROPRANOLOL HYDROCHLORIDE 10 MG/1
TABLET ORAL
Qty: 120 TABLET | Refills: 1 | Status: SHIPPED | OUTPATIENT
Start: 2019-06-04 | End: 2019-07-23 | Stop reason: SDUPTHER

## 2019-06-07 DIAGNOSIS — B20 HIV DISEASE (HCC): Primary | ICD-10-CM

## 2019-06-07 DIAGNOSIS — Z13.220 ENCOUNTER FOR LIPID SCREENING FOR CARDIOVASCULAR DISEASE: ICD-10-CM

## 2019-06-07 DIAGNOSIS — Z72.89 OTHER PROBLEMS RELATED TO LIFESTYLE: ICD-10-CM

## 2019-06-07 DIAGNOSIS — R73.03 PREDIABETES: ICD-10-CM

## 2019-06-07 DIAGNOSIS — Z11.3 ENCOUNTER FOR SCREENING FOR INFECTIONS WITH A PREDOMINANTLY SEXUAL MODE OF TRANSMISSION: ICD-10-CM

## 2019-06-07 DIAGNOSIS — Z11.3 ROUTINE SCREENING FOR STI (SEXUALLY TRANSMITTED INFECTION): ICD-10-CM

## 2019-06-07 DIAGNOSIS — Z13.6 ENCOUNTER FOR LIPID SCREENING FOR CARDIOVASCULAR DISEASE: ICD-10-CM

## 2019-06-10 ENCOUNTER — PATIENT OUTREACH (OUTPATIENT)
Dept: SURGERY | Facility: CLINIC | Age: 49
End: 2019-06-10

## 2019-06-11 DIAGNOSIS — B20 HIV (HUMAN IMMUNODEFICIENCY VIRUS INFECTION) (HCC): ICD-10-CM

## 2019-06-11 RX ORDER — ELVITEGRAVIR, COBICISTAT, EMTRICITABINE, AND TENOFOVIR ALAFENAMIDE 150; 150; 200; 10 MG/1; MG/1; MG/1; MG/1
TABLET ORAL
Qty: 30 TABLET | Refills: 2 | Status: SHIPPED | OUTPATIENT
Start: 2019-06-11 | End: 2019-08-30 | Stop reason: SDUPTHER

## 2019-06-14 ENCOUNTER — APPOINTMENT (OUTPATIENT)
Dept: LAB | Facility: CLINIC | Age: 49
End: 2019-06-14
Payer: COMMERCIAL

## 2019-06-14 DIAGNOSIS — Z13.6 ENCOUNTER FOR LIPID SCREENING FOR CARDIOVASCULAR DISEASE: ICD-10-CM

## 2019-06-14 DIAGNOSIS — R73.03 PREDIABETES: ICD-10-CM

## 2019-06-14 DIAGNOSIS — B20 HIV DISEASE (HCC): ICD-10-CM

## 2019-06-14 DIAGNOSIS — Z13.220 ENCOUNTER FOR LIPID SCREENING FOR CARDIOVASCULAR DISEASE: ICD-10-CM

## 2019-06-14 LAB
ALBUMIN SERPL BCP-MCNC: 4.2 G/DL (ref 3.5–5)
ALP SERPL-CCNC: 105 U/L (ref 46–116)
ALT SERPL W P-5'-P-CCNC: 35 U/L (ref 12–78)
ANION GAP SERPL CALCULATED.3IONS-SCNC: 7 MMOL/L (ref 4–13)
AST SERPL W P-5'-P-CCNC: 20 U/L (ref 5–45)
BASOPHILS # BLD AUTO: 0.03 THOUSANDS/ΜL (ref 0–0.1)
BASOPHILS NFR BLD AUTO: 0 % (ref 0–1)
BILIRUB SERPL-MCNC: 0.36 MG/DL (ref 0.2–1)
BUN SERPL-MCNC: 15 MG/DL (ref 5–25)
CALCIUM SERPL-MCNC: 9.4 MG/DL (ref 8.3–10.1)
CHLORIDE SERPL-SCNC: 104 MMOL/L (ref 100–108)
CHOLEST SERPL-MCNC: 231 MG/DL (ref 50–200)
CO2 SERPL-SCNC: 28 MMOL/L (ref 21–32)
CREAT SERPL-MCNC: 0.92 MG/DL (ref 0.6–1.3)
EOSINOPHIL # BLD AUTO: 0.14 THOUSAND/ΜL (ref 0–0.61)
EOSINOPHIL NFR BLD AUTO: 2 % (ref 0–6)
ERYTHROCYTE [DISTWIDTH] IN BLOOD BY AUTOMATED COUNT: 13.2 % (ref 11.6–15.1)
EST. AVERAGE GLUCOSE BLD GHB EST-MCNC: 117 MG/DL
GFR SERPL CREATININE-BSD FRML MDRD: 97 ML/MIN/1.73SQ M
GLUCOSE P FAST SERPL-MCNC: 94 MG/DL (ref 65–99)
HBA1C MFR BLD: 5.7 % (ref 4.2–6.3)
HCT VFR BLD AUTO: 44.2 % (ref 36.5–49.3)
HCV AB SER QL: NORMAL
HDLC SERPL-MCNC: 55 MG/DL (ref 40–60)
HGB BLD-MCNC: 14.6 G/DL (ref 12–17)
IMM GRANULOCYTES # BLD AUTO: 0.03 THOUSAND/UL (ref 0–0.2)
IMM GRANULOCYTES NFR BLD AUTO: 0 % (ref 0–2)
LDLC SERPL CALC-MCNC: 142 MG/DL (ref 0–100)
LYMPHOCYTES # BLD AUTO: 2.99 THOUSANDS/ΜL (ref 0.6–4.47)
LYMPHOCYTES NFR BLD AUTO: 44 % (ref 14–44)
MCH RBC QN AUTO: 31.1 PG (ref 26.8–34.3)
MCHC RBC AUTO-ENTMCNC: 33 G/DL (ref 31.4–37.4)
MCV RBC AUTO: 94 FL (ref 82–98)
MONOCYTES # BLD AUTO: 0.5 THOUSAND/ΜL (ref 0.17–1.22)
MONOCYTES NFR BLD AUTO: 7 % (ref 4–12)
NEUTROPHILS # BLD AUTO: 3.08 THOUSANDS/ΜL (ref 1.85–7.62)
NEUTS SEG NFR BLD AUTO: 47 % (ref 43–75)
NRBC BLD AUTO-RTO: 0 /100 WBCS
PLATELET # BLD AUTO: 299 THOUSANDS/UL (ref 149–390)
PMV BLD AUTO: 8.5 FL (ref 8.9–12.7)
POTASSIUM SERPL-SCNC: 4 MMOL/L (ref 3.5–5.3)
PROT SERPL-MCNC: 8 G/DL (ref 6.4–8.2)
RBC # BLD AUTO: 4.69 MILLION/UL (ref 3.88–5.62)
SODIUM SERPL-SCNC: 139 MMOL/L (ref 136–145)
TRIGL SERPL-MCNC: 172 MG/DL
WBC # BLD AUTO: 6.77 THOUSAND/UL (ref 4.31–10.16)

## 2019-06-14 PROCEDURE — 83036 HEMOGLOBIN GLYCOSYLATED A1C: CPT

## 2019-06-14 PROCEDURE — 87536 HIV-1 QUANT&REVRSE TRNSCRPJ: CPT

## 2019-06-14 PROCEDURE — 86480 TB TEST CELL IMMUN MEASURE: CPT

## 2019-06-14 PROCEDURE — 85025 COMPLETE CBC W/AUTO DIFF WBC: CPT

## 2019-06-14 PROCEDURE — 86361 T CELL ABSOLUTE COUNT: CPT

## 2019-06-14 PROCEDURE — 86592 SYPHILIS TEST NON-TREP QUAL: CPT

## 2019-06-14 PROCEDURE — 86803 HEPATITIS C AB TEST: CPT

## 2019-06-14 PROCEDURE — 36415 COLL VENOUS BLD VENIPUNCTURE: CPT

## 2019-06-14 PROCEDURE — 80061 LIPID PANEL: CPT

## 2019-06-14 PROCEDURE — 80053 COMPREHEN METABOLIC PANEL: CPT

## 2019-06-15 LAB
BASOPHILS # BLD AUTO: 0 X10E3/UL (ref 0–0.2)
BASOPHILS NFR BLD AUTO: 0 %
CD3+CD4+ CELLS # BLD: 1134 /UL (ref 359–1519)
CD3+CD4+ CELLS NFR BLD: 37.8 % (ref 30.8–58.5)
EOSINOPHIL # BLD AUTO: 0.1 X10E3/UL (ref 0–0.4)
EOSINOPHIL NFR BLD AUTO: 2 %
ERYTHROCYTE [DISTWIDTH] IN BLOOD BY AUTOMATED COUNT: 14.5 % (ref 12.3–15.4)
HCT VFR BLD AUTO: 43.5 % (ref 37.5–51)
HGB BLD-MCNC: 14.8 G/DL (ref 13–17.7)
IMM GRANULOCYTES # BLD: 0 X10E3/UL (ref 0–0.1)
IMM GRANULOCYTES NFR BLD: 0 %
LYMPHOCYTES # BLD AUTO: 3 X10E3/UL (ref 0.7–3.1)
LYMPHOCYTES NFR BLD AUTO: 45 %
MCH RBC QN AUTO: 31.7 PG (ref 26.6–33)
MCHC RBC AUTO-ENTMCNC: 34 G/DL (ref 31.5–35.7)
MCV RBC AUTO: 93 FL (ref 79–97)
MONOCYTES # BLD AUTO: 0.4 X10E3/UL (ref 0.1–0.9)
MONOCYTES NFR BLD AUTO: 6 %
NEUTROPHILS # BLD AUTO: 3.1 X10E3/UL (ref 1.4–7)
NEUTROPHILS NFR BLD AUTO: 47 %
PLATELET # BLD AUTO: 297 X10E3/UL (ref 150–450)
RBC # BLD AUTO: 4.67 X10E6/UL (ref 4.14–5.8)
WBC # BLD AUTO: 6.7 X10E3/UL (ref 3.4–10.8)

## 2019-06-17 LAB
GAMMA INTERFERON BACKGROUND BLD IA-ACNC: 0.04 IU/ML
HIV1 RNA # SERPL NAA+PROBE: <20 COPIES/ML
HIV1 RNA SERPL NAA+PROBE-LOG#: NORMAL LOG10COPY/ML
M TB IFN-G BLD-IMP: NEGATIVE
M TB IFN-G CD4+ BCKGRND COR BLD-ACNC: 0 IU/ML
M TB IFN-G CD4+ BCKGRND COR BLD-ACNC: 0 IU/ML
MITOGEN IGNF BCKGRD COR BLD-ACNC: >10 IU/ML
RPR SER QL: NORMAL

## 2019-07-01 ENCOUNTER — PATIENT OUTREACH (OUTPATIENT)
Dept: SURGERY | Facility: CLINIC | Age: 49
End: 2019-07-01

## 2019-07-01 DIAGNOSIS — F41.1 GAD (GENERALIZED ANXIETY DISORDER): ICD-10-CM

## 2019-07-01 DIAGNOSIS — F33.1 MDD (MAJOR DEPRESSIVE DISORDER), RECURRENT EPISODE, MODERATE (HCC): ICD-10-CM

## 2019-07-01 RX ORDER — FLUOXETINE HYDROCHLORIDE 40 MG/1
CAPSULE ORAL
Qty: 60 CAPSULE | Refills: 2 | Status: SHIPPED | OUTPATIENT
Start: 2019-07-01 | End: 2019-10-02 | Stop reason: SDUPTHER

## 2019-07-05 DIAGNOSIS — Z92.89 HISTORY OF SENSORY CHANGES: ICD-10-CM

## 2019-07-05 DIAGNOSIS — R25.8 JERKY BODY MOVEMENTS: ICD-10-CM

## 2019-07-05 DIAGNOSIS — R26.9 ABNORMALITY OF GAIT: Primary | ICD-10-CM

## 2019-07-05 DIAGNOSIS — R29.6 FALLS FREQUENTLY: ICD-10-CM

## 2019-07-05 DIAGNOSIS — R25.8 JERKY BODY MOVEMENTS: Primary | ICD-10-CM

## 2019-07-05 NOTE — PROGRESS NOTES
Referral placed to neurology for follow up management for jerky body movements, history of sensory changes, unspecified abnormality of gait and mobility with falls

## 2019-07-09 ENCOUNTER — OFFICE VISIT (OUTPATIENT)
Dept: SURGERY | Facility: CLINIC | Age: 49
End: 2019-07-09
Payer: COMMERCIAL

## 2019-07-09 VITALS
HEIGHT: 68 IN | DIASTOLIC BLOOD PRESSURE: 70 MMHG | SYSTOLIC BLOOD PRESSURE: 122 MMHG | RESPIRATION RATE: 18 BRPM | BODY MASS INDEX: 26.8 KG/M2 | TEMPERATURE: 98.4 F | WEIGHT: 176.8 LBS | OXYGEN SATURATION: 98 % | HEART RATE: 104 BPM

## 2019-07-09 DIAGNOSIS — K05.219 GUM ABSCESS: Primary | ICD-10-CM

## 2019-07-09 DIAGNOSIS — K04.7 TOOTH ABSCESS: Primary | ICD-10-CM

## 2019-07-09 DIAGNOSIS — B20 HIV DISEASE (HCC): ICD-10-CM

## 2019-07-09 DIAGNOSIS — J30.2 SEASONAL ALLERGIES: ICD-10-CM

## 2019-07-09 DIAGNOSIS — N52.8 OTHER MALE ERECTILE DYSFUNCTION: ICD-10-CM

## 2019-07-09 PROCEDURE — 99213 OFFICE O/P EST LOW 20 MIN: CPT | Performed by: NURSE PRACTITIONER

## 2019-07-09 RX ORDER — CROMOLYN SODIUM 5.2 MG
1 AEROSOL, SPRAY WITH PUMP (ML) NASAL 4 TIMES DAILY
Qty: 1 ACT | Refills: 2 | Status: SHIPPED | OUTPATIENT
Start: 2019-07-09 | End: 2019-10-02 | Stop reason: SDUPTHER

## 2019-07-09 RX ORDER — NAPROXEN 500 MG/1
500 TABLET ORAL 2 TIMES DAILY WITH MEALS
Qty: 20 TABLET | Refills: 0 | Status: SHIPPED | OUTPATIENT
Start: 2019-07-09 | End: 2020-01-16

## 2019-07-09 RX ORDER — TADALAFIL 2.5 MG/1
TABLET, FILM COATED ORAL
Qty: 10 TABLET | Refills: 2 | Status: SHIPPED | OUTPATIENT
Start: 2019-07-09 | End: 2020-04-17 | Stop reason: SDUPTHER

## 2019-07-09 RX ORDER — AMOXICILLIN 500 MG/1
500 CAPSULE ORAL EVERY 8 HOURS SCHEDULED
Qty: 9 CAPSULE | Refills: 0 | Status: SHIPPED | OUTPATIENT
Start: 2019-07-09 | End: 2019-07-12

## 2019-07-09 NOTE — ASSESSMENT & PLAN NOTE
Pt reports taking medication every day and denies any missed doses  Stressed the importance of 100% medication adherence at all times  HIV Counseling:    Viral Load: < 20    CD4 Count: 6815    ART: Devyn Cast     Denies side effects  Stressed the importance of adherence  Continue follow up in the ID clinic with Dr Alejandro Baez  Reviewed the most recent labs, including the Cd4 and viral load  Discussed the risks and benefits of treatment options, instructions for management, importance of treatment adherence, and reduction of risk factors  Educated on possible medication side effects  Counseled on routes of HIV transmission, including the risk of  infection  Emphasized that viral suppression is the best method to prevent HIV transmission  At this time the pt denies the need for HIV testing of anyone in their life  Total encounter time was greater than 35 minutes  Greater than 20 minutes were spent on counseling and patient education  Pt voices understanding and agreement with treatment plan

## 2019-07-09 NOTE — ASSESSMENT & PLAN NOTE
New onset  Pt reports having right lower molar pulled about a week and half ago  Pt reports completing amoxicillin as ordered  Pt at first though he bit his tongue or gum area but noted "white stuff inside of his mouth this am "  Pt noticed right sided inner gum line along side of tongue painful to touch, during eating, burning, with irritation  Pt instructed to gargle with salt water and to keep area free of food particles  Ordered naprosyn 500 mg bid with meals x 10 days  Ordered amoxicillin 500 mg TID x 3 days  CRNP had CM come and speak with pt since he will need a new dentists and follow up care for abscess  [place] [address] and given phone number  Pt has [place] insurance

## 2019-07-09 NOTE — PROGRESS NOTES
Assessment/Plan:    Tooth abscess  New onset  Pt reports having right lower molar pulled about a week and half ago  Pt reports completing amoxicillin as ordered  Pt at first though he bit his tongue or gum area but noted "white stuff inside of his mouth this am "  Pt noticed right sided inner gum line along side of tongue painful to touch, during eating, burning, with irritation  Pt instructed to gargle with salt water and to keep area free of food particles  Ordered naprosyn 500 mg bid with meals x 10 days  Ordered amoxicillin 500 mg TID x 3 days  CRNP had CM come and speak with pt since he will need a new dentists and follow up care for abscess  [facility] [place] [address] and given phone number  Pt has [place] insurance  Diagnoses and all orders for this visit:    Tooth abscess    HIV disease ([facility] [address] )          Subjective:      Patient ID: Will Almaraz is a 46 y o  male  HPI  Pt is being seen in the office today for new compliant of "right sided lower back pain that started 3 days ago, at first I thought I bit my tongue or my gum line but I kept having pain, burning, and irration to the area " Pt reports having his right lower moral removed about a week and half ago with the dentist who accepts MA and I was given amoxicillin and motrin to take and I did take all of the medication  I went to the [facility] and they would not look at me because non of the other dentist except my [place] insurance  Pt came walk into the [facility] to be seen  Pt denies having fever, chills, or difficulty swallowing  Pt expressed how frustrated he is because they would not see him in the Dental office  Pt is very wound up and his jerky movements are spastic and constant  Pt is not able to sit still and is moving all over the exam room     The following portions of the patient's history were reviewed and updated as appropriate: allergies, current medications, past medical history, past surgical history and problem list     Review of Systems   Constitutional: Negative  HENT: Positive for dental problem  Respiratory: Negative  Cardiovascular: Negative  Psychiatric/Behavioral: Positive for agitation  Objective:      /70 (BP Location: Right arm, Patient Position: Sitting, Cuff Size: Standard)   Pulse 104   Temp 98 4 °F (36 9 °C)   Resp 18   Ht 5' 8" (1 727 m)   Wt 80 2 kg (176 lb 12 8 oz)   SpO2 98%   BMI 26 88 kg/m²          Physical Exam   Constitutional: He is oriented to person, place, and time  HENT:   Mouth/Throat: Oropharynx is clear and moist        Cardiovascular: Normal rate, regular rhythm, normal heart sounds and intact distal pulses  Pulmonary/Chest: Effort normal and breath sounds normal    Lymphadenopathy:     He has no cervical adenopathy  Neurological: He is alert and oriented to person, place, and time  Skin: Skin is warm and dry

## 2019-07-12 ENCOUNTER — TELEPHONE (OUTPATIENT)
Dept: SURGERY | Facility: CLINIC | Age: 49
End: 2019-07-12

## 2019-07-17 ENCOUNTER — OFFICE VISIT (OUTPATIENT)
Dept: SURGERY | Facility: CLINIC | Age: 49
End: 2019-07-17
Payer: COMMERCIAL

## 2019-07-17 VITALS
WEIGHT: 167.4 LBS | HEART RATE: 103 BPM | TEMPERATURE: 97.8 F | SYSTOLIC BLOOD PRESSURE: 132 MMHG | OXYGEN SATURATION: 98 % | BODY MASS INDEX: 25.37 KG/M2 | DIASTOLIC BLOOD PRESSURE: 89 MMHG | HEIGHT: 68 IN

## 2019-07-17 DIAGNOSIS — F41.1 GAD (GENERALIZED ANXIETY DISORDER): ICD-10-CM

## 2019-07-17 DIAGNOSIS — B20 HIV DISEASE (HCC): Primary | ICD-10-CM

## 2019-07-17 DIAGNOSIS — F17.219 CIGARETTE NICOTINE DEPENDENCE WITH NICOTINE-INDUCED DISORDER: ICD-10-CM

## 2019-07-17 DIAGNOSIS — K05.219 GUM ABSCESS: ICD-10-CM

## 2019-07-17 PROCEDURE — 99214 OFFICE O/P EST MOD 30 MIN: CPT | Performed by: INTERNAL MEDICINE

## 2019-07-17 NOTE — PROGRESS NOTES
Progress Note - Infectious Disease   Victoria Orlando 46 y o  male MRN: [id number]  Unit/Bed#:  Encounter: [id number]      Impression/Plan:  1  HIV-doing well on Genvoya with an undetectable viral load and a CD4 count of greater than 1000  Continue ART, recheck labs in 5 months, follow up in 6 months  Stressed adherence  2   Nicotine dependence-he has cut back to half pack a day  Stressed with the patient the need for complete tobacco cessation  3   Generalized anxiety disorder-seems much more com today  Seen by Neurology  Discussed in detail with the primary    4  Gingival abscess-the patient has a dental appointment pending  Currently on my exam he does not appear to have any purulent drainage, but just some gingival irritation  Patient was provided medication, adherence and prevention education    Subjective:  Routine follow-up for HIV  Patient claims 100% adherence with Genvoya  Patient denies any notable side effects  Overall the feeling well  The patient denies any fever chills or sweats, denies any nausea vomiting or diarrhea, denies any cough or shortness of breath  ROS: A complete 12 point ROS is negative other than that noted in the HPI    Followup portions patient history reviewed and updated as: Allergies, current medications, past medical history, past social history, past surgical history, and the problem list    Objective:  Vitals:  Vitals:    07/17/19 1715   BP: 132/89   BP Location: Right arm   Patient Position: Sitting   Cuff Size: Standard   Pulse: 103   Temp: 97 8 °F (36 6 °C)   SpO2: 98%   Weight: 75 9 kg (167 lb 6 4 oz)   Height: 5' 8" (1 727 m)       Physical Exam:   General Appearance:  Alert, interactive, appearing well,  nontoxic, no acute distress  Neck:   Supple without lymphadenopathy, no thyromegaly or masses   Throat: Oropharynx moist without lesions  Right mandibular molar region gum irritation but without purulence        Lungs:   Clear to auscultation bilaterally; no wheezes, rhonchi or rales; respirations unlabored   Heart:  RRR; no murmur, rub or gallop   Abdomen:   Soft, non-tender, non-distended, positive bowel sounds  Extremities: No clubbing, cyanosis or edema   Skin: No new rashes or lesions  No draining wounds noted  Labs, Imaging, & Other studies:   All pertinent labs and imaging studies were personally reviewed    Lab Results   Component Value Date     12/30/2015    K 4 0 06/14/2019     06/14/2019    CO2 28 06/14/2019    ANIONGAP 9 12/30/2015    BUN 15 06/14/2019    CREATININE 0 92 06/14/2019    GLUCOSE 124 12/30/2015    GLUF 94 06/14/2019    CALCIUM 9 4 06/14/2019    AST 20 06/14/2019    ALT 35 06/14/2019    ALKPHOS 105 06/14/2019    PROT 8 4 (H) 12/30/2015    BILITOT 0 37 12/30/2015    EGFR 97 06/14/2019     Lab Results   Component Value Date    WBC 6 77 06/14/2019    WBC 6 7 06/14/2019    HGB 14 6 06/14/2019    HGB 14 8 06/14/2019    HCT 44 2 06/14/2019    HCT 43 5 06/14/2019    MCV 94 06/14/2019    MCV 93 06/14/2019     06/14/2019     06/14/2019     Lab Results   Component Value Date    HEPCAB Non-reactive 06/14/2019     Lab Results   Component Value Date    HEPCAB Non-reactive 06/14/2019     Lab Results   Component Value Date    RPR Non-Reactive 06/14/2019     CD4 ABS   Date/Time Value Ref Range Status   06/14/2019 11:04 AM 1134 359 - 1519 /uL Final     HIV-1 RNA by PCR, Qn   Date/Time Value Ref Range Status   06/14/2019 11:04 AM <20 copies/mL Final     Comment:     HIV-1 RNA not detected  The reportable range for this assay is 20 to 10,000,000  copies HIV-1 RNA/mL             Current Outpatient Medications:     albuterol (VENTOLIN HFA) 90 mcg/act inhaler, Inhale 2 puffs every 6 (six) hours as needed for wheezing, Disp: 18 g, Rfl: 2    CIALIS 2 5 MG tablet, TAKE 1 TABLET BY MOUTH DAILY AS NEEDED FOR ERECTILE DYSFUNCTION, Disp: 10 tablet, Rfl: 2    cromolyn (NASALCHROM) 5 2 MG/ACT nasal spray, 1 spray into each nostril 4 (four) times a day, Disp: 1 Act, Rfl: 2    dicyclomine (BENTYL) 20 mg tablet, Take 1 tablet (20 mg total) by mouth 2 (two) times a day before meals, Disp: 180 tablet, Rfl: 1    FLUoxetine (PROzac) 40 MG capsule, TAKE 2 CAPSULES BY MOUTH (80MG TOTAL) DAILY, Disp: 60 capsule, Rfl: 2    gabapentin (NEURONTIN) 300 mg capsule, Take 1 capsule (300 mg total) by mouth daily at bedtime for 30 days (Patient taking differently: Take 300 mg by mouth 3 (three) times a day as needed  ), Disp: 30 capsule, Rfl: 0    GENVOYA tablet, TAKE 1 TABLET BY MOUTH DAILY, Disp: 30 tablet, Rfl: 2    montelukast (SINGULAIR) 10 mg tablet, Take 1 tablet (10 mg total) by mouth daily at bedtime, Disp: 90 tablet, Rfl: 1    naproxen (EC NAPROSYN) 500 MG EC tablet, Take 1 tablet (500 mg total) by mouth 2 (two) times a day with meals for 10 days, Disp: 20 tablet, Rfl: 0    omeprazole (PriLOSEC) 40 MG capsule, Take 1 capsule by mouth daily, Disp: 90 capsule, Rfl: 3    propranolol (INDERAL) 10 mg tablet, TAKE ONE TO TWO TABLETS (10-20MG TOTAL) BY MOUTH TWICE A DAY AS NEEDED FOR TREMOR, ANXIETY, Disp: 120 tablet, Rfl: 1

## 2019-07-18 NOTE — PROGRESS NOTES
Assessment    Annual nutrition evaluation    Clinical Data/Client History    HIV: yes  AIDS: no    : Luis Miguel Driscoll    Socio- Economic Status: Eats Out, Cooks and gave him info about [employer] voucher program    Living Situation: House    Food Prep/Access: Refrigerator, Stove and Microwave    Psycho Social Factors: anxiety    Functional Status: Ambulatory, Able to [employer] and prepare own meals  He states his mother also does meal prep, but with alot of convenience foods      Activity Level: walks, and in general is active    Ambulation Difficulty with no    Oral Problems: Chewing Difficulty no, Pain some pain,is going to the dentist within the next week or so, he discussed this with Dr Rock Meadows , Inability to [facility] no     Last Dental Exam: " a while ago"    Procedures Performed: not discussed    Typical Food/Beverage Intake:    · Breakfast high sugar cereal, or eggs  & toast (eats at least 6 eggs/week)  · Lunch turkey or rst  Beef s/w  · Dinner grilled or baked pork, chicken or burger, mashed potatoes, cooked veggies  · Snacks not reviewed  · Fluids water    Appetite: Good    Supplements: No no    Food Intolerance: no    Weight Change Percent: 6%    Time Period of Weight Change: 6 months    Significant % Weight Loss: 5 (desirable weight loss)    Severe % Weight Loss: >5    Usual Body Weight: 80 to 81 kg    Current Body Weight: 75 9 kg, Height: 68", IBW: 70 kg, current weight is 109% of IBW    Nutrition Diagnosis    Problem: Altered nutrition related laboratory values    Related to: Denial of need to change    As Evidenced By: Elevated lipid panel    Intervention Diet Prescription    Nutritional needs based on: current weight of 76 kg    · 1900  kcal  · 75 to 80 gmprotein  · 1900 ml fluid  · 2 gm Na    Current intake: adequate    Snack/Supplement Recommendations: not discussed today    Nutrition Recommendations: reviewed    Goals: Go to [employer] to use voucher for fresh produce, cut back on egg intake to goal of 3 eggs/week  Nutrition Education Intervention: Provided     Person Educated: patient    Topics Discussed: Cholesterol and saturated/ trans fat intake  Teaching Method: Verbal and Written    Readiness to Change: Pre-Contemplation    Visit Summary    Annual evaluation completed  Patient seemed concerned when I told him his Cholesterol level & LDL are elevated  Patient admits he and his mother eat out fairly often and use a lot of convenience foods  Reviewed ways to cut back on saturated and trans fats  Patient basically understood, but seemed a bit anxious, and not thoroughly paying attention  Will continue to provide nutritional education, monitor weight and lab values    Orlando Benz, KAMERONN, LDN, CDE

## 2019-07-19 NOTE — PROGRESS NOTES
[id number] [facility] SPECIALIST    Assessment: Pt seems to experience some depressive feelings at times related to his financial situation  Pt appears fidgety and hyperactive and could be associated with anxiety  Diagnoses and all orders for this visit:    HIV disease ([address] )  -     CBC and differential; Future  -     Comprehensive metabolic panel; Future  -     HIV-1 RNA, quantitative, PCR; Future  -     T-helper cells (CD4) count; Future    Gum abscess    Cigarette nicotine dependence with nicotine-induced disorder    SOPHIA (generalized anxiety disorder)          Plan/Behavorial Health Recommendations:  1  F/U with [facility] at future visits  2  Contact [facility] in the future if needed for additional support  3  Increase positivity, self-care, and use of positive coping skills daily    Discussion: [facility] met with pt during ID clinic visit  Today pt presents with anxiety and stress  [facility] completed the PHQ-9 Depression screener with the pt and pt scored 8 with no SI or HI reported or observed  Pt reported about experiencing some stress and down feelings associated with his financial situation  He reported that he is trying to get himself back to work  Processed his worry for the future  [facility] provided pt with support and will continue to f/u as needed  Pt will likely benefit from increasing self-care and use of positive coping skills  Subjective:     Patient ID: Sheela Yanes is a 46 y o  male  HPI: The patient is being seen at the [facility] today for a routine behavioral health follow up     Objective:    Orientation    Person: Yes   Place: Yes   Time: Yes     Appearance     Well Developed: Yes   Uncomfortable: Yes   Normal Body Odor: No  Smells of Feces: No  Disheveled: No  Grooming Unkempt: No  Poor Eye Contact: Yes   Hirsute: No  Looks Tired:  Yes   Appearance Reflects Stated Age: Yes     Mood and Affect    Appropriate: Yes   Euthymic: Yes   Irritable: No  Angry: No  Anxious: Yes   Depresses: No  Blunted: No  Labile: No  Restricted: No    Harm to Self or Others: None reported or observed    Substance Abuse: None reported or observed      ZANE Killian [facility] Specialist

## 2019-07-23 DIAGNOSIS — F41.8 PERFORMANCE ANXIETY: ICD-10-CM

## 2019-07-23 RX ORDER — PROPRANOLOL HYDROCHLORIDE 10 MG/1
TABLET ORAL
Qty: 120 TABLET | Refills: 1 | Status: SHIPPED | OUTPATIENT
Start: 2019-07-23 | End: 2019-10-02 | Stop reason: SDUPTHER

## 2019-07-30 ENCOUNTER — OFFICE VISIT (OUTPATIENT)
Dept: SURGERY | Facility: CLINIC | Age: 49
End: 2019-07-30
Payer: COMMERCIAL

## 2019-07-30 VITALS
SYSTOLIC BLOOD PRESSURE: 114 MMHG | HEART RATE: 84 BPM | DIASTOLIC BLOOD PRESSURE: 62 MMHG | BODY MASS INDEX: 25.91 KG/M2 | TEMPERATURE: 97.8 F | OXYGEN SATURATION: 96 % | WEIGHT: 170.4 LBS

## 2019-07-30 DIAGNOSIS — K05.219 GINGIVAL ABSCESS: ICD-10-CM

## 2019-07-30 DIAGNOSIS — R26.89 BALANCE PROBLEMS: ICD-10-CM

## 2019-07-30 DIAGNOSIS — Z91.81 AT HIGH RISK FOR FALLS: ICD-10-CM

## 2019-07-30 DIAGNOSIS — K59.00 CONSTIPATION, UNSPECIFIED CONSTIPATION TYPE: ICD-10-CM

## 2019-07-30 DIAGNOSIS — F41.1 GAD (GENERALIZED ANXIETY DISORDER): ICD-10-CM

## 2019-07-30 DIAGNOSIS — F33.1 MDD (MAJOR DEPRESSIVE DISORDER), RECURRENT EPISODE, MODERATE (HCC): ICD-10-CM

## 2019-07-30 DIAGNOSIS — J45.20 MILD INTERMITTENT ASTHMA WITHOUT COMPLICATION: ICD-10-CM

## 2019-07-30 DIAGNOSIS — Z23 NEED FOR MENACTRA VACCINATION: ICD-10-CM

## 2019-07-30 DIAGNOSIS — R25.8 JERKY BODY MOVEMENTS: ICD-10-CM

## 2019-07-30 DIAGNOSIS — K21.9 GASTROESOPHAGEAL REFLUX DISEASE, ESOPHAGITIS PRESENCE NOT SPECIFIED: ICD-10-CM

## 2019-07-30 DIAGNOSIS — B20 HIV DISEASE (HCC): Primary | ICD-10-CM

## 2019-07-30 DIAGNOSIS — M79.671 FOOT PAIN, BILATERAL: ICD-10-CM

## 2019-07-30 DIAGNOSIS — R73.03 PREDIABETES: ICD-10-CM

## 2019-07-30 DIAGNOSIS — M79.672 FOOT PAIN, BILATERAL: ICD-10-CM

## 2019-07-30 DIAGNOSIS — R39.198 DIFFICULTY IN VOIDING: ICD-10-CM

## 2019-07-30 DIAGNOSIS — F17.219 CIGARETTE NICOTINE DEPENDENCE WITH NICOTINE-INDUCED DISORDER: ICD-10-CM

## 2019-07-30 PROCEDURE — 90471 IMMUNIZATION ADMIN: CPT

## 2019-07-30 PROCEDURE — 99214 OFFICE O/P EST MOD 30 MIN: CPT | Performed by: NURSE PRACTITIONER

## 2019-07-30 PROCEDURE — 90734 MENACWYD/MENACWYCRM VACC IM: CPT

## 2019-07-30 NOTE — ASSESSMENT & PLAN NOTE
Patient reports taking his medication every day and takes his medication at the same time every day  Stressed the importance of 100% medication adherence  HIV Counseling:    Viral Load: < 20    CD4 Count: 2922    ART: Keegan Mojica     Denies side effects  Stressed the importance of adherence  Continue follow up in the ID clinic with Dr Sury Neil  Reviewed the most recent labs, including the Cd4 and viral load  Discussed the risks and benefits of treatment options, instructions for management, importance of treatment adherence, and reduction of risk factors  Educated on possible medication side effects  Counseled on routes of HIV transmission, including the risk of  infection  Emphasized that viral suppression is the best method to prevent HIV transmission  At this time the pt denies the need for HIV testing of anyone in their life  Total encounter time was greater than 35 minutes  Greater than 20 minutes were spent on counseling and patient education  Pt voices understanding and agreement with treatment plan

## 2019-07-30 NOTE — ASSESSMENT & PLAN NOTE
Pt continues to smoke and is not interested in quitting at this time  Educated the pt about the increase risk of developing lung cancer is 7 x's higher in those who smoke and have HIV  Pt is smoking  Stressed the importance of complete smoking cessation  Nicotine Dependency - Primary    Counseled for greater than 15 minutes on the importance of smoking cessation  Education was given regarding the cardiovascular effects of how nicotine affects the heart, lungs, kidneys,and peripheral vascular system    Referred to [facility] for enrollment in smoking cessation program

## 2019-07-30 NOTE — ASSESSMENT & PLAN NOTE
Ongoing  Pt reports not always knowing when he needs to void and does not always recognize when done voiding  Pt is able to feel sensation in rectal region but not near the bladder  Will order US scrotum and testicles to rule out bladder obstruction, enlarge prostate, or bladder mass  Pt has been treated for CT/GC in the past and has not been currently sexually active  Will refer to Dr Kei Garcia

## 2019-07-30 NOTE — ASSESSMENT & PLAN NOTE
Worsening  Pt reports "feeling more anxious and I don't have a reason to be "  Pt has been more anxious this visit and the past visit  Pt does take propranolol 1 tablet in the am but does neglect to take in times of stress but does seem to work when he remembers to take  Pt reports "being more out of sorts with the hot humid weather "  Pt reports having less crazy in his life and the many stressors he used to have are gone  Pt was reminded to not keep medication in his glove compartment during the extreme heat

## 2019-07-30 NOTE — ASSESSMENT & PLAN NOTE
Worse today  When the pt is agitated the pt's body movements incresase in jerky movements and pt is constantly moving all over the exam room     Pt is able to stop moving on command and during exam

## 2019-07-30 NOTE — ASSESSMENT & PLAN NOTE
Worsening  Pt reports having increase falls over the past few months, per pt is averaging about 3 to 4 falls, and other times pt is able to stop self from falling     Will refer to PT

## 2019-07-30 NOTE — ASSESSMENT & PLAN NOTE
Ongoing  Pt has problems maintaining balance and ability to keep self from falling at times  Will refer to PT for balance therapy

## 2019-07-30 NOTE — PROGRESS NOTES
Assessment/Plan:    HIV disease  Patient reports taking his medication every day and takes his medication at the same time every day  Stressed the importance of 100% medication adherence  HIV Counseling:    Viral Load: < 20    CD4 Count: 2761    ART: Kris Gregory     Denies side effects  Stressed the importance of adherence  Continue follow up in the ID clinic with Dr Rita Joshi  Reviewed the most recent labs, including the Cd4 and viral load  Discussed the risks and benefits of treatment options, instructions for management, importance of treatment adherence, and reduction of risk factors  Educated on possible medication side effects  Counseled on routes of HIV transmission, including the risk of  infection  Emphasized that viral suppression is the best method to prevent HIV transmission  At this time the pt denies the need for HIV testing of anyone in their life  Total encounter time was greater than 35 minutes  Greater than 20 minutes were spent on counseling and patient education  Pt voices understanding and agreement with treatment plan  Cigarette nicotine dependence with nicotine-induced disorder    Pt continues to smoke and is not interested in quitting at this time  Educated the pt about the increase risk of developing lung cancer is 7 x's higher in those who smoke and have HIV  Pt is smoking  Stressed the importance of complete smoking cessation  Nicotine Dependency - Primary    Counseled for greater than 15 minutes on the importance of smoking cessation  Education was given regarding the cardiovascular effects of how nicotine affects the heart, lungs, kidneys,and peripheral vascular system  Referred to [facility] for enrollment in smoking cessation program       Mild intermittent asthma without complication        Pt reports   Stressed the importance of smoking cessation and increase risk of COPD as pt gets older  SOPHIA (generalized anxiety disorder)  Worsening      Pt reports "feeling more anxious and I don't have a reason to be "  Pt has been more anxious this visit and the past visit  Pt does take propranolol 1 tablet in the am but does neglect to take in times of stress but does seem to work when he remembers to take  Pt reports "being more out of sorts with the hot humid weather "  Pt reports having less crazy in his life and the many stressors he used to have are gone  Pt was reminded to not keep medication in his glove compartment during the extreme heat  Gingival abscess  Ongoing  Pt has been having some pain and does use naprosyn as needed for pain  Pt is to schedule appointment with dental to manage gingival abscess  Pt reports no fever or chills or signs of infection at this time  Prediabetes  Ongoing  Pt reports eating  Stressed the importance of eating more fruits and vegetables, less sugary and carbohydrate type foods and beverages, and to exercise by walking  GERD (gastroesophageal reflux disease)  Doing ok  Pt reports the omeprazole is working well but when I forget to take it I get heartburn regardless of what I eat or don't eat  Pt reports epigastric pain that occurs quickly after drinking water or eating  Suspect gastritis, hiatal hernia, esophageal stricter, ulcer, and may benefit from EGD  Pt reports having an appointment for follow up next month  Constipation  Ongoing  Pt reports the bentyl is not working as well as it had when pt started taking the medication  Pt does realize he is eating differently, the weather effects him, and does not eat as much but pt is bothered by the change in bowel habit  Pt does fell the urge to defecate  Difficulty in voiding  Ongoing  Pt reports not always knowing when he needs to void and does not always recognize when done voiding  Pt is able to feel sensation in rectal region but not near the bladder     Will order US scrotum and testicles to rule out bladder obstruction, enlarge prostate, or bladder mass  Pt has been treated for CT/GC in the past and has not been currently sexually active  Will refer to Dr Hans Murillo  MDD (major depressive disorder), recurrent episode, moderate (HCC)  Ongoing  CRNP does not see the depression side of the patient during the office visit, just the anxiety  Pt report he is taking Prozac 80 mg daily  Pt does get anger and feels like he is getting screwed over  Pt reports "he feels like his mind is racing a mile a minute or stand to be in a quiet atmosphere because it make his agitated  Pt is very agitated during visit  CRNP concerned pt could use a mood stabilizer  Jerky body movements  Worse today  When the pt is agitated the pt's body movements incresase in jerky movements and pt is constantly moving all over the exam room  Pt is able to stop moving on command and during exam      Foot pain, bilateral  Worsening  Pt reports "the outside of his feet hurt especially when walking and the pain is getting worse  Pt has trouble maintaining balance and has increase in falls 3 to 4 over the past month with many times he does caught himself before he falls "  Will refer to podiatry for foot abnormality or injury  Will refer to PT for balance therapy  Balance problems  Ongoing  Pt has problems maintaining balance and ability to keep self from falling at times  Will refer to PT for balance therapy  At high risk for falls  Worsening  Pt reports having increase falls over the past few months, per pt is averaging about 3 to 4 falls, and other times pt is able to stop self from falling  Will refer to PT          Diagnoses and all orders for this visit:    HIV disease ([facility] [address] )    Cigarette nicotine dependence with nicotine-induced disorder    Mild intermittent asthma without complication    SOPHIA (generalized anxiety disorder)    Prediabetes    Need for Menactra vaccination  -     MENINGOCOCCAL CONJUGATE VACCINE MCV4P IM    Gingival abscess    Gastroesophageal reflux disease, esophagitis presence not specified    Constipation, unspecified constipation type    Difficulty in voiding  -     US scrotum and testicles; Future  -     Ambulatory referral to Urology; Future    MDD (major depressive disorder), recurrent episode, moderate (HCC)    Jerky body movements    Foot pain, bilateral  -     Ambulatory referral to Podiatry; Future    Balance problems  -     Ambulatory referral to Physical Therapy; Future    At high risk for falls  -     Ambulatory referral to Physical Therapy;  Future        Patient Active Problem List   Diagnosis    Bilateral varicoceles    Embolism involving vascular device (HCC)    HIV disease (HCC)    GERD (gastroesophageal reflux disease)    IBS (irritable bowel syndrome)    Cigarette nicotine dependence with nicotine-induced disorder    SOPHIA (generalized anxiety disorder)    MDD (major depressive disorder), recurrent episode, moderate (HCC)    Acute right-sided low back pain with right-sided sciatica    Jerky body movements    Panic attacks    Dysphagia    Mild intermittent asthma without complication    Other male erectile dysfunction    Constipation    Prediabetes    Red eyes    Chronic pain syndrome    Seasonal allergies    Discomfort    History of sensory changes    Gingival abscess    Difficulty in voiding    Foot pain, bilateral    At high risk for falls    Balance problems       Lab Results   Component Value Date     12/30/2015    K 4 0 06/14/2019     06/14/2019    CO2 28 06/14/2019    ANIONGAP 9 12/30/2015    BUN 15 06/14/2019    CREATININE 0 92 06/14/2019    GLUCOSE 124 12/30/2015    GLUF 94 06/14/2019    CALCIUM 9 4 06/14/2019    AST 20 06/14/2019    ALT 35 06/14/2019    ALKPHOS 105 06/14/2019    PROT 8 4 (H) 12/30/2015    BILITOT 0 37 12/30/2015    EGFR 97 06/14/2019     Lab Results   Component Value Date    WBC 6 77 06/14/2019    WBC 6 7 06/14/2019    HGB 14 6 06/14/2019    HGB 14 8 06/14/2019    HCT 44 2 06/14/2019    HCT 43 5 06/14/2019    MCV 94 06/14/2019    MCV 93 06/14/2019     06/14/2019     06/14/2019       Subjective:      Patient ID: Dorina Newman is a 46 y o  male  HPI  Patient is being seen in the office today for three-month follow-up visit with PCP regarding chronic condition management  Patient reports "ok "  Pt denies any concerns or issues  Pt would like to be referred podiatry  The following portions of the patient's history were reviewed and updated as appropriate: allergies, current medications, past medical history, past social history and problem list     Review of Systems   HENT: Positive for dental problem  Respiratory: Negative  Cardiovascular: Negative  Musculoskeletal: Positive for myalgias  Objective:      /62 (BP Location: Right arm, Patient Position: Sitting)   Pulse 84   Temp 97 8 °F (36 6 °C) (Oral)   Wt 77 3 kg (170 lb 6 4 oz)   SpO2 96%   BMI 25 91 kg/m²          Physical Exam   Constitutional: He is oriented to person, place, and time  HENT:   Mouth/Throat: Oropharynx is clear and moist  He does not have dentures  No dental abscesses  Cardiovascular: Normal rate, regular rhythm, normal heart sounds and intact distal pulses  Pulmonary/Chest: Effort normal and breath sounds normal    Abdominal: Soft  Bowel sounds are normal    Musculoskeletal:        Right foot: There is tenderness  Left foot: There is tenderness  There is no swelling  Feet:    Pt does ambulate using the outside of his feet and has difficulty maintaining balance at times  Neurological: He is alert and oriented to person, place, and time  Skin: Skin is warm and dry  Psychiatric: Judgment and thought content normal  His mood appears anxious  He is agitated

## 2019-07-30 NOTE — ASSESSMENT & PLAN NOTE
Worsening  Pt reports "the outside of his feet hurt especially when walking and the pain is getting worse  Pt has trouble maintaining balance and has increase in falls 3 to 4 over the past month with many times he does caught himself before he falls "  Will refer to podiatry for foot abnormality or injury  Will refer to PT for balance therapy

## 2019-07-30 NOTE — ASSESSMENT & PLAN NOTE
Doing ok  Pt reports the omeprazole is working well but when I forget to take it I get heartburn regardless of what I eat or don't eat  Pt reports epigastric pain that occurs quickly after drinking water or eating  Suspect gastritis, hiatal hernia, esophageal stricter, ulcer, and may benefit from EGD  Pt reports having an appointment for follow up next month

## 2019-07-30 NOTE — ASSESSMENT & PLAN NOTE
Ongoing  Pt has been having some pain and does use naprosyn as needed for pain  Pt is to schedule appointment with dental to manage gingival abscess  Pt reports no fever or chills or signs of infection at this time

## 2019-07-30 NOTE — ASSESSMENT & PLAN NOTE
Ongoing  Pt reports the bentyl is not working as well as it had when pt started taking the medication  Pt does realize he is eating differently, the weather effects him, and does not eat as much but pt is bothered by the change in bowel habit  Pt does fell the urge to defecate

## 2019-07-30 NOTE — ASSESSMENT & PLAN NOTE
Ongoing  Pt reports eating  Stressed the importance of eating more fruits and vegetables, less sugary and carbohydrate type foods and beverages, and to exercise by walking

## 2019-07-30 NOTE — ASSESSMENT & PLAN NOTE
Pt reports   Stressed the importance of smoking cessation and increase risk of COPD as pt gets older

## 2019-07-30 NOTE — ASSESSMENT & PLAN NOTE
Ongoing  CRNP does not see the depression side of the patient during the office visit, just the anxiety  Pt report he is taking Prozac 80 mg daily  Pt does get anger and feels like he is getting screwed over  Pt reports "he feels like his mind is racing a mile a minute or stand to be in a quiet atmosphere because it make his agitated  Pt is very agitated during visit  CLAYTON concerned pt could use a mood stabilizer

## 2019-08-08 ENCOUNTER — EVALUATION (OUTPATIENT)
Dept: PHYSICAL THERAPY | Facility: CLINIC | Age: 49
End: 2019-08-08
Payer: COMMERCIAL

## 2019-08-08 DIAGNOSIS — Z91.81 AT HIGH RISK FOR FALLS: ICD-10-CM

## 2019-08-08 DIAGNOSIS — R26.89 BALANCE PROBLEMS: ICD-10-CM

## 2019-08-08 PROCEDURE — 97161 PT EVAL LOW COMPLEX 20 MIN: CPT | Performed by: PHYSICAL THERAPIST

## 2019-08-08 NOTE — PROGRESS NOTES
PT Evaluation     Today's date: 2019  Patient name: Isabel Payer  : 1970  MRN: [id number]  Referring provider: HERMELINDA Groves  Dx:   Encounter Diagnosis     ICD-10-CM    1  Balance problems R26 89 Ambulatory referral to Physical Therapy   2  At high risk for falls Z91 81 Ambulatory referral to Physical Therapy                  Assessment  Assessment details: Pt is a 46year old male referred with balance problems and fall risk  Pt did demonstrate impairments in decreased standing balance specifically with eyes closed which limits him functionally with daily activities  Pt will benefit from PT services needed to improve balance and reduce falls at home  Impairments: impaired balance and lacks appropriate home exercise program  Understanding of Dx/Px/POC: good   Prognosis: good    Goals  ST  Pt will improve ABC to at least 80% within 2 weeks  2  Pt will demonstrate independence with HEP within 2 weeks  LTG  1  Pt will improve ABC to at least 90% within 4 weeks  2  Pt will improve SLS to 30s with EC on right and left legs within 4 weeks  3  Pt will demonstrate independence with HEP within 4 weeks      Plan  Patient would benefit from: skilled physical therapy  Planned therapy interventions: balance, patient education, neuromuscular re-education, therapeutic activities, therapeutic exercise, home exercise program and gait training  Frequency: 2x week  Duration in weeks: 4  Plan of Care beginning date: 2019  Plan of Care expiration date: 2019  Treatment plan discussed with: patient        Subjective Evaluation    History of Present Illness  Mechanism of injury: Having balance issues gradually getting worse, is having falls  Had hip injury 2 January's ago which he thinks exacerbated his balance issues  Is having falls due to balance and strength deficits, 2-3 times a month, denies significant injuries  Does not use a cane or a walker  No new pains, does have chronic foot pain on and off  Pain  No pain reported    Social Support  Steps to enter house: yes  Stairs in house: yes   Lives in: [facility]  Lives with: parents    Employment status: not working  Patient Goals  Patient goals for therapy: improved balance          Objective   Neuro Exam:     Functional outcomes   5x sit to stand: 9 5 (seconds)      Balance assessments   MCTSIB   Eyes open level surface: 30  Eyes open foam surface: 30  Eyes closed level surface: 30  Eyes closed foam surface: 30  Single leg stance   Left eyes open firm surface: 30  Left eyes closed firm surface: 7  Right eyes open firm surface: 15  Right eyes closed firm surface: 16    Tandem EO: firm right and left 30s  Tandem EC: firm right and left: 30s    ABC: 65%    FGA: 30/30    Flowsheet Rows      Most Recent Value   PT/OT G-Codes   Current Score  65   Projected Score  0             Short Term Goal Expiration Date:(8/23/19)  Long Term Goal Expiration Date: (9/6/19)  POC Expiration Date: (9/6/19)         Precautions HIV       Manual                                                     Exercise Diary         TM no UE        Tandem foam EC        Tandem gait EC        Foam beams EC        Step-ups onto foam with holds and opp hip flex        BWD walking with HTs, H/V        SLS R & L EC        SLS with med ball rotations                                                                                                            Modalities

## 2019-08-12 ENCOUNTER — PATIENT OUTREACH (OUTPATIENT)
Dept: SURGERY | Facility: CLINIC | Age: 49
End: 2019-08-12

## 2019-08-12 NOTE — PROGRESS NOTES
Ct 1519  [facility] [employer] sent over 2 treatment plans to be sent for approval  Yoni called garrett to see what treatment plan was best for the ct   Yoni sent auth for approval

## 2019-08-14 ENCOUNTER — PATIENT OUTREACH (OUTPATIENT)
Dept: SURGERY | Facility: CLINIC | Age: 49
End: 2019-08-14

## 2019-08-14 NOTE — PROGRESS NOTES
Ct 1519  Cm called [employer] to speak with benita in regards to ct  Cm checked to see if [place] was approved  When speaking to garrett mosley, they wanted it approved as fast as possible because ct has a scheduled appointment coming up  When speaking to [employer], she stated that [employer] paid for tooth 11 and tooth 6 to have crowns and wanted to know why it needed to be done again  Cm to follow up

## 2019-08-16 ENCOUNTER — PATIENT OUTREACH (OUTPATIENT)
Dept: SURGERY | Facility: CLINIC | Age: 49
End: 2019-08-16

## 2019-08-16 NOTE — PROGRESS NOTES
Ct 1519  Cm was ccd in email from [employer] and garrett Leo did not approve the dental work for the ct because [employer] paid for tooth 6 and tooth 11 which were crowns  Dentist recommended treatment because they were fractured  Cm to follow up with [facility] and the status of [facility] care

## 2019-08-19 ENCOUNTER — OFFICE VISIT (OUTPATIENT)
Dept: PHYSICAL THERAPY | Facility: CLINIC | Age: 49
End: 2019-08-19
Payer: COMMERCIAL

## 2019-08-19 DIAGNOSIS — R26.89 BALANCE PROBLEMS: Primary | ICD-10-CM

## 2019-08-19 DIAGNOSIS — Z91.81 AT HIGH RISK FOR FALLS: ICD-10-CM

## 2019-08-19 PROCEDURE — 97116 GAIT TRAINING THERAPY: CPT | Performed by: PHYSICAL THERAPIST

## 2019-08-19 PROCEDURE — 97112 NEUROMUSCULAR REEDUCATION: CPT | Performed by: PHYSICAL THERAPIST

## 2019-08-19 NOTE — PROGRESS NOTES
Daily Note     Today's date: 2019  Patient name: Baltazar Cuevas  : 1970  MRN: [id number]  Referring provider: HERMELINDA Rosario  Dx:   Encounter Diagnosis     ICD-10-CM    1  Balance problems R26 89    2  At high risk for falls Z91 81                   Subjective: No new complaints  D rank some bad milk this morning, however feeling ok  Objective: See treatment diary below      Assessment: Initiated POC address balance deficits identified on eval  Tolerated treatment well, as seen by no increasing pain and no rest breaks needed  Most difficulty with SLS and foam beams activities due to decreased balance, had frequent LOBs self correcting with stepping strategies  Patient would benefit from continued PT      Plan: Increase time on TM as pt able to 10 min       Short Term Goal Expiration Date:(19)  Long Term Goal Expiration Date: (19)  POC Expiration Date: (19)    Precautions HIV        Manual                                                                                          Exercise Diary              TM no UE  2 5 mph  x5min           Tandem foam EC semitandem  4x30s           Tandem gait EC  in gerber  EO x40'; EC x40'           Foam beams EC FWD and side  2 laps ea           Step-ups onto foam with holds and opp hip flex  6" & foam  x20 ea           BWD walking with HTs, H/V  in gerber  2x40' ea           SLS R & L EC EO x30s ea  EC x30s ea           SLS with med ball rotations  yellow ball  2x30s           Foam HTs, H/V EC FT 2x30s ea'

## 2019-08-21 ENCOUNTER — OFFICE VISIT (OUTPATIENT)
Dept: PHYSICAL THERAPY | Facility: CLINIC | Age: 49
End: 2019-08-21
Payer: COMMERCIAL

## 2019-08-21 DIAGNOSIS — Z91.81 AT HIGH RISK FOR FALLS: ICD-10-CM

## 2019-08-21 DIAGNOSIS — R26.89 BALANCE PROBLEMS: Primary | ICD-10-CM

## 2019-08-21 PROCEDURE — 97112 NEUROMUSCULAR REEDUCATION: CPT | Performed by: PHYSICAL THERAPIST

## 2019-08-21 PROCEDURE — 97116 GAIT TRAINING THERAPY: CPT | Performed by: PHYSICAL THERAPIST

## 2019-08-21 NOTE — PROGRESS NOTES
Daily Note     Today's date: 2019  Patient name: Quinton Bates  : 1970  MRN: [id number]  Referring provider: HERMELINDA Magaña  Dx:   Encounter Diagnosis     ICD-10-CM    1  Balance problems R26 89    2  At high risk for falls Z91 81                   Subjective: No new complaints  D rank some bad milk this morning, however feeling ok  Objective: See treatment diary below      Assessment: Able to increase TM time today per plan, limited mostly due to decreased smoothness of gait however able to complete  Progressed to eyes closed with step ups, required more UE support however able to complete  Issued and reviewed HEP, pt communicated understanding  Patient would benefit from continued PT      Plan: Review HEP as needed, add incline to TM as pt able       Short Term Goal Expiration Date:(19)  Long Term Goal Expiration Date: (19)  POC Expiration Date: (19)    Precautions HIV        Manual                                                                                          Exercise Diary            TM no UE  2 5 mph  x5min  2 5-3 2 mph  x10min         Tandem foam EC semitandem  4x30s  semitandem  4x30s         Tandem gait EC  in gerber  EO x40'; EC x40'  EC in gerber  2x40'         Foam beams EC FWD and side  2 laps ea  FWD and side  2 laps ea         Step-ups onto foam with holds and opp hip flex  6" & foam  x20 ea 6" & foam  2x10 ea  With EC         BWD walking with HTs, H/V  in gerber  2x40' ea   in gerber  2x40' ea         SLS R & L EC EO x30s ea  EC x30s ea  EC 2x30s ea         SLS with med ball rotations  yellow ball  2x30s           Foam HTs, H/V EC FT 2x30s ea'  FT 2x30s ea'         Hurdles

## 2019-08-27 ENCOUNTER — OFFICE VISIT (OUTPATIENT)
Dept: PHYSICAL THERAPY | Facility: CLINIC | Age: 49
End: 2019-08-27
Payer: COMMERCIAL

## 2019-08-27 DIAGNOSIS — Z91.81 AT HIGH RISK FOR FALLS: ICD-10-CM

## 2019-08-27 DIAGNOSIS — R26.89 BALANCE PROBLEMS: Primary | ICD-10-CM

## 2019-08-27 PROCEDURE — 97112 NEUROMUSCULAR REEDUCATION: CPT | Performed by: PHYSICAL THERAPIST

## 2019-08-27 NOTE — PROGRESS NOTES
Daily Note     Today's date: 2019  Patient name: Alexandre Goins  : 1970  MRN: [id number]  Referring provider: RUTHY Pina*  Dx:   Encounter Diagnosis     ICD-10-CM    1  Balance problems R26 89    2  At high risk for falls Z91 81                    Subjective: No new complaints  Objective: See treatment diary below      Assessment: Added incline on TM this session which pt did well with  Overall demo good balance  Patient would benefit from continued PT      Plan: Progress treatment as tolerated         Short Term Goal Expiration Date:(19)  Long Term Goal Expiration Date: (19)  POC Expiration Date: (19)    Precautions HIV        Manual                                                                                          Exercise Diary          TM no UE  2 5 mph  x5min  2 5-3 2 mph  x10min  2 5 mph, 3% incline 10 min        Tandem foam EC semitandem  4x30s  semitandem  4x30s Tandem 4 x 30s       Tandem gait EC  in gerber  EO x40'; EC x40'  EC in gerber  2x40' EC in gerber  2x40'       Foam beams EC FWD and side  2 laps ea  FWD and side  2 laps ea   FWD and side  2 laps ea       Step-ups onto foam with holds and opp hip flex  6" & foam  x20 ea 6" & foam  2x10 ea  With EC  6" & foam  2x10 ea  With EC       BWD walking with HTs, H/V  in gerber  2x40' ea   in gerber  2x40' ea  in gerber  2x40' ea       SLS R & L EC EO x30s ea  EC x30s ea  EC 2x30s ea  EC 2x30s ea       SLS with med ball rotations  yellow ball  2x30s           Foam HTs, H/V EC FT 2x30s ea'  FT 2x30s ea'  FT 2x30s ea'       Hurdles

## 2019-08-30 DIAGNOSIS — B20 HIV (HUMAN IMMUNODEFICIENCY VIRUS INFECTION) (HCC): ICD-10-CM

## 2019-08-30 RX ORDER — ELVITEGRAVIR, COBICISTAT, EMTRICITABINE, AND TENOFOVIR ALAFENAMIDE 150; 150; 200; 10 MG/1; MG/1; MG/1; MG/1
TABLET ORAL
Qty: 30 TABLET | Refills: 2 | Status: SHIPPED | OUTPATIENT
Start: 2019-08-30 | End: 2019-11-27 | Stop reason: SDUPTHER

## 2019-09-04 ENCOUNTER — OFFICE VISIT (OUTPATIENT)
Dept: PHYSICAL THERAPY | Facility: CLINIC | Age: 49
End: 2019-09-04
Payer: COMMERCIAL

## 2019-09-04 DIAGNOSIS — R26.89 BALANCE PROBLEMS: Primary | ICD-10-CM

## 2019-09-04 PROCEDURE — 97116 GAIT TRAINING THERAPY: CPT | Performed by: PHYSICAL THERAPIST

## 2019-09-04 PROCEDURE — 97112 NEUROMUSCULAR REEDUCATION: CPT | Performed by: PHYSICAL THERAPIST

## 2019-09-04 NOTE — PROGRESS NOTES
Daily Note     Today's date: 2019  Patient name: Horacio Johnson  : 1970  MRN: [id number]  Referring provider: HERMELINDA Zamudio  Dx:   Encounter Diagnosis     ICD-10-CM    1  Balance problems R26 89                    Subjective: Denies complaints  No changes since last visit  Arrived 15 min late due to having to help his mom with her diabetes  Objective: See treatment diary below      Assessment: Added more dual tasking activities today do challenge balance  Pt had more LOBs however able to complete without assist   Improved smoothness of gait noted on TM  Patient would benefit from continued PT    Plan: Continue adding dual tasking as pt able       Short Term Goal Expiration Date:(19)  Long Term Goal Expiration Date: (19)  POC Expiration Date: (19)    Precautions HIV        Manual                                                                                          Exercise Diary        TM no UE  2 5 mph  x5min  2 5-3 2 mph  x10min  2 5 mph, 3% incline 10 min   3 0 mph, 3% incline 10 min   2x30s ea HTs H/V     Tandem foam EC semitandem  4x30s  semitandem  4x30s Tandem 4 x 30s  Tandem 4 x 30s     Tandem gait EC  in gerber  EO x40'; EC x40'  EC in gerber  2x40' EC in gerber  2x40' [facility] in gerber  2x40'     Foam beams EC FWD and side  2 laps ea  FWD and side  2 laps ea   FWD and side  2 laps ea       Step-ups onto foam with holds and opp hip flex  6" & foam  x20 ea 6" & foam  2x10 ea  With EC  6" & foam  2x10 ea  With EC  8" & foam EO // bars  2x10 ea     BWD walking with HTs, H/V  in gerber  2x40' ea   in gerber  2x40' ea  in gerber  2x40' ea   in gerber  2x40' ea  With counting bwds by 3s     SLS R & L EC EO x30s ea  EC x30s ea  EC 2x30s ea  EC 2x30s ea  EC 2x30s ea     SLS with med ball rotations  yellow ball  2x30s           Foam HTs, H/V EC FT 2x30s ea'  FT 2x30s ea'  FT 2x30s ea'  FT 2x30s ea     Hurdles        giraffe  3 laps ea side and fwd

## 2019-09-09 ENCOUNTER — EVALUATION (OUTPATIENT)
Dept: PHYSICAL THERAPY | Facility: CLINIC | Age: 49
End: 2019-09-09
Payer: COMMERCIAL

## 2019-09-09 DIAGNOSIS — R26.89 BALANCE PROBLEMS: Primary | ICD-10-CM

## 2019-09-09 PROCEDURE — 97530 THERAPEUTIC ACTIVITIES: CPT | Performed by: PHYSICAL THERAPIST

## 2019-09-09 PROCEDURE — 97112 NEUROMUSCULAR REEDUCATION: CPT | Performed by: PHYSICAL THERAPIST

## 2019-09-09 NOTE — PROGRESS NOTES
Progress Update Note/Re-cert    Today's date: 2019  Patient name: Roddy Resendez  : 1970  MRN: [id number]  Referring provider: HERMELINDA Gray  Dx:   Encounter Diagnosis     ICD-10-CM    1  Balance problems R26 89                    Subjective:  Pt without complaints  Denies pain  Discussed progress update today and discharge  Pt reports some compliance with HEP, thinks he needs to do more  Reports same goals as on eval, wants to improve his balance  Has had one fall since starting PT, a few weeks ago, fell when coming off step ladder, caught himself  Would like to continue PT 1x/week for another couple weeks to establish better HEP at home  Objective: See treatment diary below  ABC: 93%  Single leg stance   Left eyes open firm surface: 30 on eval; 30 sec today  Left eyes closed firm surface: 7 on eval; 6 sec today  Right eyes open firm surface: 15 on eval; 25 sec today  Right eyes closed firm surface: 16 on eval; 10 sec today    Assessment: Progress update completed with pt showing improvement with ABC and SLS with eyes open  Continues with balance deficits as seen with his continued difficulty with SLS with EC  Pt able to meet some goals today  Will benefit from PT plan below to establish improved compliance and understanding of HEP  Plan: Will continue 1x/week for an additional 3 weeks  Plan  Patient would benefit from: skilled physical therapy  Planned therapy interventions: balance, patient education, neuromuscular re-education, therapeutic activities, therapeutic exercise, home exercise program and gait training  Frequency: 1x week  Duration in weeks: 3  Plan of Care beginning date: 19  Plan of Care expiration date: 10/11/19  Treatment plan discussed with: patient  Goals  ST  Pt will improve ABC to at least 80% within 2 weeks  - met  2  Pt will demonstrate independence with HEP within 2 weeks  - partially met  LTG  1   Pt will improve ABC to at least 90% within 4 weeks  - met  2  Pt will improve SLS to 30s with EC on right and left legs within 4 weeks  - not met  3  Pt will demonstrate independence with HEP within 4 weeks   - partially met     Short Term Goal Expiration Date:(8/23/19)  Long Term Goal Expiration Date: (9/6/19)  POC Expiration Date: (9/6/19)    Precautions HIV        Manual                                                                                          Exercise Diary   8/19 8/21 8/27 9/4 9/9   TM no UE  2 5 mph  x5min  2 5-3 2 mph  x10min  2 5 mph, 3% incline 10 min   3 0 mph, 3% incline 10 min   2x30s ea HTs H/V     Tandem foam EC semitandem  4x30s  semitandem  4x30s Tandem 4 x 30s  Tandem 4 x 30s  Tandem 4 x 30s   Tandem gait EC  in gerber  EO x40'; EC x40'  EC in gerber  2x40' EC in gerber  2x40' [facility] in gerber  2x40'     Foam beams EC FWD and side  2 laps ea  FWD and side  2 laps ea   FWD and side  2 laps ea     FWD and side  2 laps ea   Step-ups onto foam with holds and opp hip flex  6" & foam  x20 ea 6" & foam  2x10 ea  With EC  6" & foam  2x10 ea  With EC  8" & foam EO // bars  2x10 ea     BWD walking with HTs, H/V  in gerber  2x40' ea   in gerber  2x40' ea  in gerber  2x40' ea   in gerber  2x40' ea  With counting bwds by 3s     SLS R & L EC EO x30s ea  EC x30s ea  EC 2x30s ea  EC 2x30s ea  EC 2x30s ea     SLS with med ball rotations  yellow ball  2x30s           Foam HTs, H/V EC FT 2x30s ea'  FT 2x30s ea'  FT 2x30s ea'  FT 2x30s ea  FT 2x30s ea   Hurdles        giraffe  3 laps ea side and fwd

## 2019-09-11 ENCOUNTER — APPOINTMENT (OUTPATIENT)
Dept: PHYSICAL THERAPY | Facility: CLINIC | Age: 49
End: 2019-09-11
Payer: COMMERCIAL

## 2019-09-16 ENCOUNTER — OFFICE VISIT (OUTPATIENT)
Dept: GASTROENTEROLOGY | Facility: AMBULARY SURGERY CENTER | Age: 49
End: 2019-09-16
Payer: COMMERCIAL

## 2019-09-16 ENCOUNTER — PATIENT OUTREACH (OUTPATIENT)
Dept: SURGERY | Facility: CLINIC | Age: 49
End: 2019-09-16

## 2019-09-16 VITALS
WEIGHT: 176 LBS | SYSTOLIC BLOOD PRESSURE: 110 MMHG | DIASTOLIC BLOOD PRESSURE: 70 MMHG | BODY MASS INDEX: 27.62 KG/M2 | HEIGHT: 67 IN | HEART RATE: 80 BPM | TEMPERATURE: 97.9 F | RESPIRATION RATE: 18 BRPM

## 2019-09-16 DIAGNOSIS — K58.2 IRRITABLE BOWEL SYNDROME WITH BOTH CONSTIPATION AND DIARRHEA: Primary | ICD-10-CM

## 2019-09-16 DIAGNOSIS — K21.00 GASTROESOPHAGEAL REFLUX DISEASE WITH ESOPHAGITIS: ICD-10-CM

## 2019-09-16 PROCEDURE — 99214 OFFICE O/P EST MOD 30 MIN: CPT | Performed by: PHYSICIAN ASSISTANT

## 2019-09-16 RX ORDER — IBUPROFEN 600 MG/1
600 TABLET ORAL EVERY 6 HOURS PRN
Refills: 0 | COMMUNITY
Start: 2019-06-17 | End: 2020-01-16

## 2019-09-16 RX ORDER — AMOXICILLIN 500 MG/1
500 CAPSULE ORAL 3 TIMES DAILY
Refills: 0 | COMMUNITY
Start: 2019-08-06 | End: 2019-12-03 | Stop reason: ALTCHOICE

## 2019-09-16 NOTE — PROGRESS NOTES
Follow-up Note -  [facility]  Efrasabino Lowery 1970 46 y o  male         Reason:  Follow up; IBS with alternating bowel movement frequency, GERD    HPI:  59-year-old male with history of IBS, generalized anxiety, HIV on ART currently, tobacco use who presents for follow-up; he was last seen by our service in the office with Dr Rubin Chang in January 2018, At that time for follow-up of longstanding alternating diarrhea and constipation, relatively good symptomatic control reported at that time on dicyclomine and omeprazole  His last EGD was done in March 2017, showing what appeared to be Hall's esophagus with columnar tongues noted extending 1 cm above the Z line which was noted irregular, there is also a 2 cm hiatal hernia  Biopsies were negative for intestinal metaplasia but he was recommended for surveillance EGD in 3 years from that time  He had also had increased intraepithelial lymphocytes noted on duodenal biopsy, but a celiac disease antibody panel ordered to follow-up was negative  At this time, The patient reports that for the last couple of months his stools have become more irregular, he says that until that  Time he was having bowel movements fairly consistently but now the frequency has been ranging from one bowel movement every 2-3 days, 2-3 times per day  He says a consistency of the stool varies, is not persistently formed nor diarrhea  He gets cramps before having bowel movements  He takes Bentyl twice daily, though he takes 20 mg in the morning and 10 mg at night, "when I remember "  He says otherwise he is feeling well, he denies any significant acid reflux while on omeprazole once daily  Denies any difficulty swallowing  Denies any weight loss  He did recently cut down on smoking  He has been following closely with infectious disease and his CD4 count is over 1 thousand    He denies any rectal bleeding    REVIEW OF SYSTEMS:      CONSTITUTIONAL: Denies any fever, chills, or rigors  Good appetite, and no recent weight loss  HEENT: No earache or tinnitus  Denies hearing loss or visual disturbances  CARDIOVASCULAR: No chest pain or palpitations  RESPIRATORY: Denies any cough, hemoptysis, shortness of breath or dyspnea on exertion  GASTROINTESTINAL: As noted in the History of Present Illness  GENITOURINARY: No problems with urination  Denies any hematuria or dysuria  NEUROLOGIC: No dizziness or vertigo, denies headaches  MUSCULOSKELETAL: Denies any muscle or joint pain  SKIN: Denies skin rashes or itching  ENDOCRINE: Denies excessive thirst  Denies intolerance to heat or cold  PSYCHOSOCIAL: Denies depression or anxiety  Denies any recent memory loss  Past Medical History:   Diagnosis Date    Anxiety     Last Assessed: 7/18/2016     Dysuria     Last Assessed: 9/14/2015    Fecal urgency     Pt has had fecal incontinence in past, has weakened sphincter  would benefit from fiber, Needs f/u flex sig will refer for scheduling -        GERD (gastroesophageal reflux disease)     Hemorrhage of anus and rectum     Last Assessed: 3/5/2014     Herpes zoster     Last Assessed: 9/26/2016    HIV [facility]human immunodeficiency virus infection) ([facility] )     IBS (irritable bowel syndrome)     Proctitis, chlamydial     Last Assessed: 9/29/2014     Recurrent major depressive episodes, moderate ([facility] )     Last Assessed: 9/15/2017       Past Surgical History:   Procedure Laterality Date    CYSTOSCOPY  2014    AZ ESOPHAGOGASTRODUODENOSCOPY TRANSORAL DIAGNOSTIC N/A 3/9/2017    Procedure: ESOPHAGOGASTRODUODENOSCOPY (EGD); Surgeon: Joe Esposito MD;  Location: [facility];   Service: Gastroenterology    VARICOCELE EXCISION      Spermatic cord Excision - Last Assessed: 3/17/2016      Social History     Socioeconomic History    Marital status: Single     Spouse name: Not on file    Number of children: Not on file    Years of education: Not on file    Highest education level: Not on file   Occupational History    Not on file   Social Needs    Financial resource strain: Not on file    Food insecurity:     Worry: Not on file     Inability: Not on file    Transportation needs:     Medical: Not on file     Non-medical: Not on file   Tobacco Use    Smoking status: Current Every Day Smoker     Packs/day: 0 50     Types: Cigarettes    Smokeless tobacco: Former User    Tobacco comment: 1/2 pack daily   Substance and Sexual Activity    Alcohol use:  Yes     Alcohol/week: 1 0 standard drinks     Types: 1 Cans of beer per week     Frequency: 2-4 times a month     Drinks per session: 1 or 2     Comment: socially    Drug use: No    Sexual activity: Not Currently     Birth control/protection: Condom     Comment: active monogamous relationship    Lifestyle    Physical activity:     Days per week: Not on file     Minutes per session: Not on file    Stress: Not on file   Relationships    Social connections:     Talks on phone: Not on file     Gets together: Not on file     Attends Hinduism service: Not on file     Active member of club or organization: Not on file     Attends meetings of clubs or organizations: Not on file     Relationship status: Not on file    Intimate partner violence:     Fear of current or ex partner: Not on file     Emotionally abused: Not on file     Physically abused: Not on file     Forced sexual activity: Not on file   Other Topics Concern    Not on file   Social History Narrative    Not on file     Family History   Problem Relation Age of Onset    Diabetes type II Mother     Heart attack Father     Substance Abuse Cousin      Tomato  Current Outpatient Medications   Medication Sig Dispense Refill    albuterol (VENTOLIN HFA) 90 mcg/act inhaler Inhale 2 puffs every 6 (six) hours as needed for wheezing 18 g 2    CIALIS 2 5 MG tablet TAKE 1 TABLET BY MOUTH DAILY AS NEEDED FOR ERECTILE DYSFUNCTION 10 tablet 2    cromolyn (NASALCHROM) 5 2 MG/ACT nasal spray 1 spray into each nostril 4 (four) times a day 1 Act 2    dicyclomine (BENTYL) 20 mg tablet Take 1 tablet (20 mg total) by mouth 2 (two) times a day before meals 180 tablet 1    FLUoxetine (PROzac) 40 MG capsule TAKE 2 CAPSULES BY MOUTH (80MG TOTAL) DAILY 60 capsule 2    GENVOYA tablet TAKE 1 TABLET BY MOUTH DAILY 30 tablet 2    montelukast (SINGULAIR) 10 mg tablet Take 1 tablet (10 mg total) by mouth daily at bedtime 90 tablet 1    omeprazole (PriLOSEC) 40 MG capsule Take 1 capsule by mouth daily 90 capsule 3    propranolol (INDERAL) 10 mg tablet TAKE 1 TO 2 TABLETS TWICE A DAY AS NEEDED FOR TREMOR, ANXIETY 120 tablet 1    amoxicillin (AMOXIL) 500 mg capsule 500 mg 3 (three) times a day  0    gabapentin (NEURONTIN) 300 mg capsule Take 1 capsule (300 mg total) by mouth daily at bedtime for 30 days (Patient taking differently: Take 300 mg by mouth 3 (three) times a day as needed  ) 30 capsule 0    ibuprofen (MOTRIN) 600 mg tablet Take 600 mg by mouth every 6 (six) hours as needed  0    naproxen (EC NAPROSYN) 500 MG EC tablet Take 1 tablet (500 mg total) by mouth 2 (two) times a day with meals for 10 days 20 tablet 0     No current facility-administered medications for this visit  Blood pressure 110/70, pulse 80, temperature 97 9 °F (36 6 °C), temperature source Tympanic, resp  rate 18, height 5' 7 25" (1 708 m), weight 79 8 kg (176 lb)  PHYSICAL EXAM:      General Appearance:   Alert, cooperative, no distress, appears stated age    HEENT:   Normocephalic, atraumatic, anicteric      Neck:  Supple, symmetrical, trachea midline, no adenopathy;    thyroid: no enlargement/tenderness/nodules; no carotid  bruit or JVD    Lungs:   Clear to auscultation bilaterally; no rales, rhonchi or wheezing; respirations unlabored    Heart[de-identified]   S1 and S2 normal; regular rate and rhythm; no murmur, rub, or gallop     Abdomen:   Soft, non-tender, non-distended; normal bowel sounds; no masses, no organomegaly  Extremities: No edema, erythema, wounds, rashes   Rectal:   Deferred                      Lab Results   Component Value Date    WBC 6 77 06/14/2019    WBC 6 7 06/14/2019    HGB 14 6 06/14/2019    HGB 14 8 06/14/2019    HCT 44 2 06/14/2019    HCT 43 5 06/14/2019    MCV 94 06/14/2019    MCV 93 06/14/2019     06/14/2019     06/14/2019     Lab Results   Component Value Date    GLUCOSE 124 12/30/2015    CALCIUM 9 4 06/14/2019     12/30/2015    K 4 0 06/14/2019    CO2 28 06/14/2019     06/14/2019    BUN 15 06/14/2019    CREATININE 0 92 06/14/2019     Lab Results   Component Value Date    ALT 35 06/14/2019    AST 20 06/14/2019    ALKPHOS 105 06/14/2019    BILITOT 0 37 12/30/2015     Lab Results   Component Value Date    INR 0 99 02/24/2016    PROTIME 12 9 02/24/2016       No results found  ASSESSMENT & PLAN:    GERD (gastroesophageal reflux disease)  Appears well controlled at this time on omeprazole once daily  There were columnar mucosal tongue was noted on EGD from 2017 which were noted endoscopically suspicious for Hall's, but histologically negative for intestinal metaplasia  He was recommended for surveillance EGD in 3 years from that time    - Again, office visit in 3-4 months, we'll discuss at that time about scheduling for surveillance EGD    - In the meantime, continue omeprazole daily    -  Patient was explained about the lifestyle and dietary modifications  Advised to avoid fatty foods, chocolates, caffeine, alcohol and any other triggering foods  Avoid eating for at least 3 hours before going to bed  Irritable bowel syndrome with both constipation and diarrhea  Patient reports some cramping around the time of bowel movements, variable consistency and frequency of stools  He notes this is a change occurring over the last couple of months    Appears most likely to be functional, possibly related to dietary factors and/or stressors, expresses he thinks he might be lactose intolerance and also has recently cut down on smoking    His last colonoscopy was about 5 years ago and he denies any known family history of colon cancer    - I advised patient to try trial of lactose restriction for a couple of weeks to see if this alters his symptomatology; if this is unsuccessful, he can resume lactose in his diet and we can try other dietary interventions such as FODMAP diet    - I advised patient about avoiding gas producing foods in general    - I advised patient that he can take Bentyl 20 mg twice daily to slightly increase his daily dose, as this seems to be helping at least somewhat with his bowel spasms    - I recommended patient to try a daily fiber supplement to help bulk up his stools    - Office visit in 3-4 months, at that time can reassess patient's symptoms, we'll discuss about surveillance EGD to follow-up on possible Hall's, can also consider checking colonoscopy at that time

## 2019-09-16 NOTE — ASSESSMENT & PLAN NOTE
Patient reports some cramping around the time of bowel movements, variable consistency and frequency of stools  He notes this is a change occurring over the last couple of months  Appears most likely to be functional, possibly related to dietary factors and/or stressors, expresses he thinks he might be lactose intolerance and also has recently cut down on smoking    His last colonoscopy was about 5 years ago and he denies any known family history of colon cancer    - I advised patient to try trial of lactose restriction for a couple of weeks to see if this alters his symptomatology; if this is unsuccessful, he can resume lactose in his diet and we can try other dietary interventions such as FODMAP diet    - I advised patient about avoiding gas producing foods in general    - I advised patient that he can take Bentyl 20 mg twice daily to slightly increase his daily dose, as this seems to be helping at least somewhat with his bowel spasms    - I recommended patient to try a daily fiber supplement to help bulk up his stools    - Office visit in 3-4 months, at that time can reassess patient's symptoms, we'll discuss about surveillance EGD to follow-up on possible Hall's, can also consider checking colonoscopy at that time

## 2019-09-16 NOTE — ASSESSMENT & PLAN NOTE
Appears well controlled at this time on omeprazole once daily  There were columnar mucosal tongue was noted on EGD from 2017 which were noted endoscopically suspicious for Hall's, but histologically negative for intestinal metaplasia  He was recommended for surveillance EGD in 3 years from that time    - Again, office visit in 3-4 months, we'll discuss at that time about scheduling for surveillance EGD    - In the meantime, continue omeprazole daily    -  Patient was explained about the lifestyle and dietary modifications  Advised to avoid fatty foods, chocolates, caffeine, alcohol and any other triggering foods  Avoid eating for at least 3 hours before going to bed

## 2019-09-17 ENCOUNTER — APPOINTMENT (OUTPATIENT)
Dept: PHYSICAL THERAPY | Facility: CLINIC | Age: 49
End: 2019-09-17
Payer: COMMERCIAL

## 2019-09-18 ENCOUNTER — OFFICE VISIT (OUTPATIENT)
Dept: PHYSICAL THERAPY | Facility: CLINIC | Age: 49
End: 2019-09-18
Payer: COMMERCIAL

## 2019-09-18 DIAGNOSIS — R26.89 BALANCE PROBLEMS: Primary | ICD-10-CM

## 2019-09-18 PROCEDURE — 97112 NEUROMUSCULAR REEDUCATION: CPT | Performed by: PHYSICAL THERAPIST

## 2019-09-18 PROCEDURE — 97116 GAIT TRAINING THERAPY: CPT | Performed by: PHYSICAL THERAPIST

## 2019-09-18 NOTE — PROGRESS NOTES
Progress Update Note/Re-cert    Today's date: 2019  Patient name: Roberth Singh  : 1970  MRN: [id number]  Referring provider: HERMELINDA Wilson  Dx:   Encounter Diagnosis     ICD-10-CM    1  Balance problems R26 89                    Subjective:  No changes since last visit  Isn't doing his exercises at home as much as he should  Objective: See treatment diary below      Assessment: Improved balance noted today as seen by less med/lat sway during exercises  Discussed importance of compliance with HEP to continue to address balance deficits  Pt confirmed d/c next visit      Plan: Discharge    Short Term Goal Expiration Date:(19)  Long Term Goal Expiration Date: (19)  POC Expiration Date: (19)    Precautions HIV        Manual                                                                                          Exercise Diary     TM no UE 3 0 mph, 5% incline 10 min     3 0 mph, 3% incline 10 min   2x30s ea HTs H/V     Tandem foam EC Tandem 4 x 30s    Tandem 4 x 30s  Tandem 4 x 30s   Tandem gait EC EO with HTs H/V  2x30' ea    EC in gerber  2x40'     Foam beams EC  FWD and side  3 laps ea       FWD and side  2 laps ea   Step-ups onto foam with holds and opp hip flex     8" & foam EO // bars  2x10 ea     BWD walking with HTs, H/V EC  2x30' ea     in gerber  2x40' ea  With counting bwds by 3s     SLS R & L EC Foam EO  2x30s ea    EC 2x30s ea     SLS with med ball rotations          Foam HTs, H/V EC  FT 2x30s ea    FT 2x30s ea  FT 2x30s ea   Hurdles giraffe  3 laps ea side and fwd    giraffe  3 laps ea side and fwd

## 2019-09-27 ENCOUNTER — IMMUNIZATIONS (OUTPATIENT)
Dept: SURGERY | Facility: CLINIC | Age: 49
End: 2019-09-27
Payer: COMMERCIAL

## 2019-09-27 VITALS — TEMPERATURE: 97.7 F

## 2019-09-27 DIAGNOSIS — Z23 NEED FOR INFLUENZA VACCINATION: Primary | ICD-10-CM

## 2019-09-27 PROCEDURE — 90682 RIV4 VACC RECOMBINANT DNA IM: CPT

## 2019-09-27 PROCEDURE — 90471 IMMUNIZATION ADMIN: CPT

## 2019-09-30 DIAGNOSIS — K21.9 GASTROESOPHAGEAL REFLUX DISEASE WITHOUT ESOPHAGITIS: ICD-10-CM

## 2019-09-30 DIAGNOSIS — K21.00 GASTROESOPHAGEAL REFLUX DISEASE WITH ESOPHAGITIS: Primary | ICD-10-CM

## 2019-09-30 RX ORDER — OMEPRAZOLE 40 MG/1
40 CAPSULE, DELAYED RELEASE ORAL DAILY
Qty: 90 CAPSULE | Refills: 1 | Status: SHIPPED | OUTPATIENT
Start: 2019-09-30 | End: 2020-03-23

## 2019-09-30 RX ORDER — OMEPRAZOLE 40 MG/1
CAPSULE, DELAYED RELEASE ORAL
Qty: 90 CAPSULE | Refills: 3 | OUTPATIENT
Start: 2019-09-30

## 2019-10-02 DIAGNOSIS — F41.8 PERFORMANCE ANXIETY: ICD-10-CM

## 2019-10-02 DIAGNOSIS — F33.1 MDD (MAJOR DEPRESSIVE DISORDER), RECURRENT EPISODE, MODERATE (HCC): ICD-10-CM

## 2019-10-02 DIAGNOSIS — F41.1 GAD (GENERALIZED ANXIETY DISORDER): ICD-10-CM

## 2019-10-02 DIAGNOSIS — J30.2 SEASONAL ALLERGIES: ICD-10-CM

## 2019-10-02 DIAGNOSIS — K58.2 IRRITABLE BOWEL SYNDROME WITH BOTH CONSTIPATION AND DIARRHEA: ICD-10-CM

## 2019-10-02 RX ORDER — CROMOLYN SODIUM 5.2 MG
AEROSOL, SPRAY WITH PUMP (ML) NASAL
Qty: 26 ML | Refills: 3 | Status: SHIPPED | OUTPATIENT
Start: 2019-10-02 | End: 2020-01-20

## 2019-10-02 RX ORDER — DICYCLOMINE HCL 20 MG
TABLET ORAL
Qty: 60 TABLET | Refills: 2 | Status: SHIPPED | OUTPATIENT
Start: 2019-10-02 | End: 2019-12-28 | Stop reason: SDUPTHER

## 2019-10-02 NOTE — TELEPHONE ENCOUNTER
[place],  Could you please contact Bartolo Peterson and let him know that we received an auto request from his pharmacy to refill his Prozac and he does not have a F/U apptmnt with Dr Fernandez Del Real  I cannot send to a provider unless he has a F/U apptmnt    Thank you, Jatinder Mcneill

## 2019-10-02 NOTE — TELEPHONE ENCOUNTER
Samira Gonzalez,  Could you please contact Sonya Gifford and let him know that he will need to make a F/U appointment with Dr Serenity Lacey before request for med refill can be sent to covering provider     Thank you, Santiago Montgomery

## 2019-10-04 RX ORDER — PROPRANOLOL HYDROCHLORIDE 10 MG/1
TABLET ORAL
Qty: 120 TABLET | Refills: 1 | Status: SHIPPED | OUTPATIENT
Start: 2019-10-04 | End: 2019-11-29 | Stop reason: SDUPTHER

## 2019-10-04 RX ORDER — FLUOXETINE HYDROCHLORIDE 40 MG/1
CAPSULE ORAL
Qty: 60 CAPSULE | Refills: 2 | Status: SHIPPED | OUTPATIENT
Start: 2019-10-04 | End: 2019-12-24 | Stop reason: SDUPTHER

## 2019-10-15 ENCOUNTER — PATIENT OUTREACH (OUTPATIENT)
Dept: SURGERY | Facility: CLINIC | Age: 49
End: 2019-10-15

## 2019-10-15 NOTE — PROGRESS NOTES
Ct called cm because he wanted aidsnet number  Ct stated "they are not approving anything for me"   Cm provided ct phone number

## 2019-11-05 ENCOUNTER — PATIENT OUTREACH (OUTPATIENT)
Dept: SURGERY | Facility: CLINIC | Age: 49
End: 2019-11-05

## 2019-11-18 ENCOUNTER — PATIENT OUTREACH (OUTPATIENT)
Dept: SURGERY | Facility: CLINIC | Age: 49
End: 2019-11-18

## 2019-11-18 PROBLEM — E78.2 ELEVATED CHOLESTEROL WITH ELEVATED TRIGLYCERIDES: Status: ACTIVE | Noted: 2019-11-18

## 2019-11-19 DIAGNOSIS — J06.9 VIRAL UPPER RESPIRATORY TRACT INFECTION: Primary | ICD-10-CM

## 2019-11-19 RX ORDER — CETIRIZINE HYDROCHLORIDE 10 MG/1
10 TABLET ORAL DAILY
Qty: 30 TABLET | Refills: 2 | Status: SHIPPED | OUTPATIENT
Start: 2019-11-19 | End: 2020-11-03 | Stop reason: SDUPTHER

## 2019-11-19 NOTE — PROGRESS NOTES
Pt called office to reschedule his missed appt from yesterday  Pt reports cold like symptoms with cough, congestion, and runny nose  Pt denies any fever or chills  Illness appears to be viral   Order was placed for mucinex DM, and zyrtec  Pt already has Cromolyn ordered and was told to use it  Pt was instructed to call the office if fever develops  Will continue to monitor

## 2019-11-27 DIAGNOSIS — B20 HIV (HUMAN IMMUNODEFICIENCY VIRUS INFECTION) (HCC): ICD-10-CM

## 2019-11-27 RX ORDER — ELVITEGRAVIR, COBICISTAT, EMTRICITABINE, AND TENOFOVIR ALAFENAMIDE 150; 150; 200; 10 MG/1; MG/1; MG/1; MG/1
TABLET ORAL
Qty: 30 TABLET | Refills: 2 | Status: SHIPPED | OUTPATIENT
Start: 2019-11-27 | End: 2020-01-29 | Stop reason: ALTCHOICE

## 2019-11-29 DIAGNOSIS — F41.8 PERFORMANCE ANXIETY: ICD-10-CM

## 2019-11-29 RX ORDER — PROPRANOLOL HYDROCHLORIDE 10 MG/1
TABLET ORAL
Qty: 120 TABLET | Refills: 1 | Status: SHIPPED | OUTPATIENT
Start: 2019-11-29 | End: 2020-01-16 | Stop reason: SDUPTHER

## 2019-12-03 ENCOUNTER — OFFICE VISIT (OUTPATIENT)
Dept: SURGERY | Facility: CLINIC | Age: 49
End: 2019-12-03

## 2019-12-03 ENCOUNTER — PATIENT OUTREACH (OUTPATIENT)
Dept: SURGERY | Facility: CLINIC | Age: 49
End: 2019-12-03

## 2019-12-03 VITALS
HEIGHT: 67 IN | OXYGEN SATURATION: 99 % | SYSTOLIC BLOOD PRESSURE: 117 MMHG | BODY MASS INDEX: 26.59 KG/M2 | TEMPERATURE: 98.3 F | DIASTOLIC BLOOD PRESSURE: 89 MMHG | HEART RATE: 84 BPM | RESPIRATION RATE: 18 BRPM | WEIGHT: 169.4 LBS

## 2019-12-03 DIAGNOSIS — J06.9 UPPER RESPIRATORY TRACT INFECTION, UNSPECIFIED TYPE: ICD-10-CM

## 2019-12-03 DIAGNOSIS — R63.4 WEIGHT LOSS: ICD-10-CM

## 2019-12-03 DIAGNOSIS — Z12.5 SCREENING FOR PROSTATE CANCER: ICD-10-CM

## 2019-12-03 DIAGNOSIS — B20 HIV DISEASE (HCC): Primary | ICD-10-CM

## 2019-12-03 DIAGNOSIS — N50.819 TESTICULAR PAIN: ICD-10-CM

## 2019-12-03 DIAGNOSIS — R39.198 DIFFICULTY IN VOIDING: ICD-10-CM

## 2019-12-03 DIAGNOSIS — M67.471 GANGLION CYST OF RIGHT FOOT: ICD-10-CM

## 2019-12-03 DIAGNOSIS — R26.89 BALANCE PROBLEMS: ICD-10-CM

## 2019-12-03 DIAGNOSIS — E78.2 ELEVATED CHOLESTEROL WITH ELEVATED TRIGLYCERIDES: ICD-10-CM

## 2019-12-03 DIAGNOSIS — R39.198 DIFFICULTY VOIDING: ICD-10-CM

## 2019-12-03 DIAGNOSIS — R73.03 PREDIABETES: ICD-10-CM

## 2019-12-03 DIAGNOSIS — T65.224D TOXIC EFFECT OF TOBACCO CIGARETTE, UNDETERMINED INTENT, SUBSEQUENT ENCOUNTER: ICD-10-CM

## 2019-12-03 PROCEDURE — 99214 OFFICE O/P EST MOD 30 MIN: CPT | Performed by: NURSE PRACTITIONER

## 2019-12-03 RX ORDER — ATORVASTATIN CALCIUM 10 MG/1
10 TABLET, FILM COATED ORAL DAILY
Qty: 90 TABLET | Refills: 1 | Status: SHIPPED | OUTPATIENT
Start: 2019-12-03 | End: 2020-05-15

## 2019-12-03 NOTE — ASSESSMENT & PLAN NOTE
Ongoing  Cholesterol:  231  Triglycerides:  172  HDL:  55  LDL:  142  Patient's cholesterol, triglycerides and LDL not at goal   Will start patient on atorvastatin 10 mg g tablet daily by mouth  Reviewed side effects  Hope dietitian to follow with patient for nutritional guidelines and dietary recommendations  Stressed importance of patient id diet low cholesterol, low in fat, low in sugar intake and carbohydrates, smaller portion control, and exercising for 30 minutes 5 days per week

## 2019-12-03 NOTE — ASSESSMENT & PLAN NOTE
New dx  Pt report "hard lump on top of right foot on tendon between first and second  Pt wants to hold on doing anything with at this time

## 2019-12-03 NOTE — ASSESSMENT & PLAN NOTE
Ongoing  Pt is smoking about 1/2 ppd and down from 1 ppd  CRNP discussed the options available for smoking cessation  Pt is looking to cut down and is working on it  Discussed about starting Nicotine patches, gum and bupropion in the future  Nicotine Dependency - Primary    Counseled for greater than 15 minutes on the importance of smoking cessation  Education was given regarding the cardiovascular effects of how nicotine affects the heart, lungs, kidneys,and peripheral vascular system    Referred to [facility] for enrollment in smoking cessation program

## 2019-12-03 NOTE — ASSESSMENT & PLAN NOTE
Improving  Pt reports cold like symptoms are improving and pt was happy with the mucinex and how it helped him

## 2019-12-03 NOTE — ASSESSMENT & PLAN NOTE
Doing ok  Pt reports "miss 1 dose to being a dumb ass "   Pt does have routine and some days gets out of his routine  Patient was encouraged to set a m  walk arm on his phone and eating a day just to make sure sure he took his medication  Stressed the importance of 100% medication adherence at all times  HIV Counseling:    Viral Load: < 20    CD4 Count: 2474    ART: Zoë Jenkins    Denies side effects  Stressed the importance of adherence  Continue follow up in the ID clinic with Dr Tiffanie Galvin  Reviewed the most recent labs, including the Cd4 and viral load  Discussed the risks and benefits of treatment options, instructions for management, importance of treatment adherence, and reduction of risk factors  Educated on possible medication side effects  Counseled on routes of HIV transmission, including the risk of  infection  Emphasized that viral suppression is the best method to prevent HIV transmission  At this time the pt denies the need for HIV testing of anyone in their life  Total encounter time was greater than 35 minutes  Greater than 20 minutes were spent on counseling and patient education  Pt voices understanding and agreement with treatment plan

## 2019-12-03 NOTE — ASSESSMENT & PLAN NOTE
Ongoing  Patient was referred to Dr Isabel January in room he has cm past and has not yet followed up

## 2019-12-03 NOTE — ASSESSMENT & PLAN NOTE
New dx  Pt reports "he has been having pain in his testicle area for the past month that is worse with squatting, or lifting, and pain does comes and go  Pt has declined a rectal exam to check prostate  Pt did not have hernia noted on exam   US of scrotum and testicles scheduled for Friday at 2 pm    Referral to Dr Gen Luo  To go to the emergency room if he develops severe S, sudden, worsening testicular or scrotal pain associated with edema an inability to to void or move his bowels    Patient verbalized understanding

## 2019-12-03 NOTE — PROGRESS NOTES
Assessment/Plan:    HIV disease  Doing ok  Pt reports "miss 1 dose to being a dumb ass "   Pt does have routine and some days gets out of his routine  Patient was encouraged to set a m  walk arm on his phone and eating a day just to make sure sure he took his medication  Stressed the importance of 100% medication adherence at all times  HIV Counseling:    Viral Load: < 20    CD4 Count: 7531    ART: Olivia Dominique    Denies side effects  Stressed the importance of adherence  Continue follow up in the ID clinic with Dr Dubose Angry  Reviewed the most recent labs, including the Cd4 and viral load  Discussed the risks and benefits of treatment options, instructions for management, importance of treatment adherence, and reduction of risk factors  Educated on possible medication side effects  Counseled on routes of HIV transmission, including the risk of  infection  Emphasized that viral suppression is the best method to prevent HIV transmission  At this time the pt denies the need for HIV testing of anyone in their life  Total encounter time was greater than 35 minutes  Greater than 20 minutes were spent on counseling and patient education  Pt voices understanding and agreement with treatment plan  Toxic effect of tobacco cigarette, undetermined intent  Ongoing  Pt is smoking about 1/2 ppd and down from 1 ppd  CRNP discussed the options available for smoking cessation  Pt is looking to cut down and is working on it  Discussed about starting Nicotine patches, gum and bupropion in the future  Nicotine Dependency - Primary    Counseled for greater than 15 minutes on the importance of smoking cessation  Education was given regarding the cardiovascular effects of how nicotine affects the heart, lungs, kidneys,and peripheral vascular system  Referred to [facility] for enrollment in smoking cessation program     Elevated cholesterol with elevated triglycerides  Ongoing    Cholesterol: 231  Triglycerides:  172  HDL:  55  LDL:  142  Patient's cholesterol, triglycerides and LDL not at goal   Will start patient on atorvastatin 10 mg g tablet daily by mouth  Reviewed side effects  Hope dietitian to follow with patient for nutritional guidelines and dietary recommendations  Stressed importance of patient id diet low cholesterol, low in fat, low in sugar intake and carbohydrates, smaller portion control, and exercising for 30 minutes 5 days per week  Weight loss  Doing good  Pt has lost 7 lb since last visit 3 months ago  Pt has PT therapy at home and has been more active  Pt was encouraged to remain active  Balance problems  Doing better  Pt completed PT for balance and feels his feet are better  Pt is happy with progress  Ganglion cyst of right foot  New dx  Pt report "hard lump on top of right foot on tendon between first and second  Pt wants to hold on doing anything with at this time  Upper respiratory tract infection  Improving  Pt reports cold like symptoms are improving and pt was happy with the mucinex and how it helped him  Testicular pain  New dx  Pt reports "he has been having pain in his testicle area for the past month that is worse with squatting, or lifting, and pain does comes and go  Pt has declined a rectal exam to check prostate  Pt did not have hernia noted on exam   US of scrotum and testicles scheduled for Friday at 2 pm    Referral to Dr Nell Aaron  To go to the emergency room if he develops severe S, sudden, worsening testicular or scrotal pain associated with edema an inability to to void or move his bowels  Patient verbalized understanding    Prediabetes  Will check A1C with next labs  Difficulty in voiding  Ongoing  Patient was referred to Dr Nell Aaron in room he has cm past and has not yet followed up             Diagnoses and all orders for this visit:    HIV disease ([facility] )    Toxic effect of tobacco cigarette, undetermined intent, subsequent encounter    Elevated cholesterol with elevated triglycerides  -     atorvastatin (LIPITOR) 10 mg tablet; Take 1 tablet (10 mg total) by mouth daily    Weight loss    Balance problems    Ganglion cyst of right foot    Upper respiratory tract infection, unspecified type    Testicular pain  -     US scrotum and testicles; Future  -     Ambulatory referral to Urology; Future    Screening for prostate cancer  -     PSA Total (Reflex To Free); Future    Prediabetes  -     HEMOGLOBIN A1C W/ EAG ESTIMATION;  Future    Difficulty voiding    Difficulty in voiding        Patient Active Problem List   Diagnosis    Bilateral varicoceles    Embolism involving vascular device (HCC)    HIV disease (HCC)    GERD (gastroesophageal reflux disease)    Irritable bowel syndrome with both constipation and diarrhea    Toxic effect of tobacco cigarette, undetermined intent    SOPHIA (generalized anxiety disorder)    MDD (major depressive disorder), recurrent episode, moderate ([facility])    Acute right-sided low back pain with right-sided sciatica    Jerky body movements    Panic attacks    Dysphagia    Mild intermittent asthma without complication    Other male erectile dysfunction    Constipation    Prediabetes    Red eyes    Chronic pain syndrome    Seasonal allergies    Discomfort    History of sensory changes    Gingival abscess    Difficulty in voiding    Foot pain, bilateral    At high risk for falls    Balance problems    Elevated cholesterol with elevated triglycerides    Weight loss    Ganglion cyst of right foot    Upper respiratory tract infection    Testicular pain     Lab Results   Component Value Date     12/30/2015    K 4 0 06/14/2019     06/14/2019    CO2 28 06/14/2019    ANIONGAP 9 12/30/2015    BUN 15 06/14/2019    CREATININE 0 92 06/14/2019    GLUCOSE 124 12/30/2015    GLUF 94 06/14/2019    CALCIUM 9 4 06/14/2019    AST 20 06/14/2019    ALT 35 06/14/2019    ALKPHOS 105 06/14/2019    PROT 8 4 (H) 12/30/2015    BILITOT 0 37 12/30/2015    EGFR 97 06/14/2019     Lab Results   Component Value Date    WBC 6 77 06/14/2019    WBC 6 7 06/14/2019    HGB 14 6 06/14/2019    HGB 14 8 06/14/2019    HCT 44 2 06/14/2019    HCT 43 5 06/14/2019    MCV 94 06/14/2019    MCV 93 06/14/2019     06/14/2019     06/14/2019          Subjective:      Patient ID: Gualberto Buchanan is a 44 y o  male  HPI  Today patient is being seen in the office with PCP for 3 month follow up visit for management of chronic health care conditions  Pt called the office last week regarding cold like symptoms but reports he is feeling better and does residual cough  Pt tripped on something at home and fell into dresser and hit his left great toe  The following portions of the patient's history were reviewed and updated as appropriate: allergies, current medications, past medical history, past social history and problem list     Review of Systems   HENT: Positive for postnasal drip  Respiratory: Positive for cough  Cardiovascular: Negative  Genitourinary: Positive for decreased urine volume and testicular pain  Musculoskeletal: Positive for myalgias  Psychiatric/Behavioral: Negative  Objective:      /89 (BP Location: Right arm, Patient Position: Sitting, Cuff Size: Standard)   Pulse 84   Temp 98 3 °F (36 8 °C)   Resp 18   Ht 5' 7" (1 702 m)   Wt 76 8 kg (169 lb 6 4 oz)   SpO2 99%   BMI 26 53 kg/m²          Physical Exam   Constitutional: He is oriented to person, place, and time  He appears well-developed and well-nourished  HENT:   Right Ear: External ear normal    Left Ear: External ear normal    Nose: Nose normal    Mouth/Throat: Oropharynx is clear and moist    Cardiovascular: Normal rate, regular rhythm, normal heart sounds and intact distal pulses  PMI is not displaced     Pulmonary/Chest: Effort normal and breath sounds normal    Abdominal: Hernia confirmed negative in the right inguinal area and confirmed negative in the left inguinal area  Genitourinary: Right testis shows tenderness  Right testis shows no mass and no swelling  Right testis is descended  Cremasteric reflex is not absent on the right side  Left testis shows no mass, no swelling and no tenderness  Left testis is descended  Cremasteric reflex is not absent on the left side  Circumcised  Penile tenderness present  Genitourinary Comments: Declined prostate exam offer  Musculoskeletal: Normal range of motion  Lymphadenopathy:     He has no cervical adenopathy  No inguinal adenopathy noted on the right or left side  Neurological: He is oriented to person, place, and time  Skin: Skin is warm and dry  Psychiatric: He has a normal mood and affect  His behavior is normal  Judgment and thought content normal    Nursing note and vitals reviewed  BMI Counseling: Body mass index is 26 53 kg/m²  The BMI is above normal  Nutrition recommendations include reducing portion sizes, decreasing overall calorie intake, 3-5 servings of fruits/vegetables daily, consuming healthier snacks, decreasing soda and/or juice intake, moderation in carbohydrate intake, reducing intake of saturated fat and trans fat and reducing intake of cholesterol  Exercise recommendations include moderate aerobic physical activity for 150 minutes/week, exercising 3-5 times per week, joining a gym and strength training exercises

## 2019-12-03 NOTE — ASSESSMENT & PLAN NOTE
Doing good  Pt has lost 7 lb since last visit 3 months ago  Pt has PT therapy at home and has been more active  Pt was encouraged to remain active

## 2019-12-06 ENCOUNTER — HOSPITAL ENCOUNTER (OUTPATIENT)
Dept: RADIOLOGY | Facility: HOSPITAL | Age: 49
Discharge: HOME | End: 2019-12-06
Payer: COMMERCIAL

## 2019-12-06 ENCOUNTER — TRANSCRIBE ORDERS (OUTPATIENT)
Dept: RADIOLOGY | Facility: HOSPITAL | Age: 49
End: 2019-12-06

## 2019-12-06 DIAGNOSIS — N50.819 TESTICULAR PAIN: ICD-10-CM

## 2019-12-06 PROCEDURE — 76870 US EXAM SCROTUM: CPT

## 2019-12-17 ENCOUNTER — TELEPHONE (OUTPATIENT)
Dept: GASTROENTEROLOGY | Facility: AMBULARY SURGERY CENTER | Age: 49
End: 2019-12-17

## 2019-12-17 ENCOUNTER — OFFICE VISIT (OUTPATIENT)
Dept: GASTROENTEROLOGY | Facility: AMBULARY SURGERY CENTER | Age: 49
End: 2019-12-17
Payer: COMMERCIAL

## 2019-12-17 VITALS
WEIGHT: 172 LBS | BODY MASS INDEX: 27 KG/M2 | RESPIRATION RATE: 18 BRPM | HEIGHT: 67 IN | SYSTOLIC BLOOD PRESSURE: 122 MMHG | DIASTOLIC BLOOD PRESSURE: 82 MMHG | HEART RATE: 92 BPM | TEMPERATURE: 99.3 F

## 2019-12-17 DIAGNOSIS — K21.9 GASTROESOPHAGEAL REFLUX DISEASE, ESOPHAGITIS PRESENCE NOT SPECIFIED: ICD-10-CM

## 2019-12-17 DIAGNOSIS — R19.7 DIARRHEA, UNSPECIFIED TYPE: Primary | ICD-10-CM

## 2019-12-17 DIAGNOSIS — K58.2 IRRITABLE BOWEL SYNDROME WITH BOTH CONSTIPATION AND DIARRHEA: ICD-10-CM

## 2019-12-17 PROCEDURE — 99214 OFFICE O/P EST MOD 30 MIN: CPT | Performed by: INTERNAL MEDICINE

## 2019-12-17 NOTE — PROGRESS NOTES
Consultation - [address] [facility]  Jerzy Boland 44 y o  male MRN: [id number]  Unit/Bed#:  Encounter: [id number]        Consults    ASSESSMENT/PLAN:     1  Alternating diarrhea and constipation, likely IBS-given worsening of symptoms despite use of Bentyl, would recommend colonoscopy to assess for microscopic colitis  His previous colonoscopy was at least 5 years ago at which time he was noted to have chlamydia Trichomonas proctitis for which he was treated  -meanwhile discussed with patient that he should take Bentyl 20 mg twice or 3 times a day  - Add probiotics  -try to adhere to low FODMAP diet  -denies new medications  2  GERD-symptoms are well controlled with PPI  -will plan for repeat EGD in March for surveillance of Hall's   -avoid NSAIDs   -continue to follow anti-reflux measures  - Patient was explained about the lifestyle and dietary modifications  Advised to avoid fatty foods, chocolates, caffeine, alcohol and any other triggering foods  Avoid eating for at least 3 hours before going to bed               ______________________________________________________________________    Reason for Consult / Principal Problem: [unfilled]    HPI: Jerzy Boland is a 44y o  year old male with history of HIV on ART with undetectable viral load, CD4 greater than 1000, presents for follow-up of IBS symptoms  Patient has had longstanding symptoms of alternating diarrhea and constipation  He states that he had been doing well with dicyclomine in the past   He does report that most recently he has had more loose stools which are occurring 2 to 3 times a day  Patient reports that he has been taking dicyclomine but sometimes forgets a 2nd dose  He states that he is now taking 20 mg a once or twice a day  Denies any rectal bleeding  No tenesmus but does report urgency  He also endorses a 10 lb weight loss within the past few months    He is on omeprazole 40 mg a for history of acid reflux symptoms  He underwent EGD in March of 2017 which showed a irregular squamocolumnar junction concerning for Hall's esophagus however biopsies were negative for Hall's  He was recommended repeat EGD at 3 year interval   Biopsies from the small bowel showed increased intraepithelial emphasized however celiac serologies were negative  Review of Systems: The remainder of the review of systems was negative except for the pertinent positives noted in HPI  Historical Information   Past Medical History:   Diagnosis Date    Anxiety     Last Assessed: 7/18/2016     Dysuria     Last Assessed: 9/14/2015    Fecal urgency     Pt has had fecal incontinence in past, has weakened sphincter  would benefit from fiber, Needs f/u flex sig will refer for scheduling -        GERD (gastroesophageal reflux disease)     Hemorrhage of anus and rectum     Last Assessed: 3/5/2014     Herpes zoster     Last Assessed: 9/26/2016    HIV (human immunodeficiency virus infection) ([facility] )     IBS (irritable bowel syndrome)     Proctitis, chlamydial     Last Assessed: 9/29/2014     Recurrent major depressive episodes, moderate ([facility] )     Last Assessed: 9/15/2017      Past Surgical History:   Procedure Laterality Date    CYSTOSCOPY  2014    NH ESOPHAGOGASTRODUODENOSCOPY TRANSORAL DIAGNOSTIC N/A 3/9/2017    Procedure: ESOPHAGOGASTRODUODENOSCOPY (EGD); Surgeon: Claude Olea MD;  Location: [facility];   Service: Gastroenterology    VARICOCELE EXCISION      Spermatic cord Excision - Last Assessed: 3/17/2016      Social History   Social History     Substance and Sexual Activity   Alcohol Use Yes    Alcohol/week: 1 0 standard drinks    Types: 1 Cans of beer per week    Frequency: Monthly or less    Drinks per session: 1 or 2    Comment: socially     Social History     Substance and Sexual Activity   Drug Use No     Social History     Tobacco Use   Smoking Status Current Every Day Smoker    Packs/day: 0 50    Types: Cigarettes   Smokeless Tobacco Former User   Tobacco Comment    1/2 pack daily     Family History   Problem Relation Age of Onset    Diabetes type II Mother     Heart attack Father     Substance Abuse Cousin        Meds/Allergies       (Not in a hospital admission)  No current facility-administered medications for this visit  Allergies   Allergen Reactions    Tomato Vomiting       Objective     Blood pressure 122/82, pulse 92, temperature 99 3 °F (37 4 °C), temperature source Tympanic, resp  rate 18, height 5' 7" (1 702 m), weight 78 kg (172 lb)  [unfilled]    PHYSICAL EXAM     GEN: well nourished, well developed, no acute distress  HEENT: anicteric, MMM, no cervical or supraclavicular lymphadenopathy  CV: RRR, no m/r/g  CHEST: CTA b/l, no WRR  ABD: +BS, soft, NT/ND, no hepatosplenomegaly  EXT: no c/c/e  SKIN: no rashes,  NEURO: aaox3    Lab Results:   No visits with results within 1 Day(s) from this visit     Latest known visit with results is:   Appointment on 06/14/2019   Component Date Value    WBC 06/14/2019 6 77     RBC 06/14/2019 4 69     Hemoglobin 06/14/2019 14 6     Hematocrit 06/14/2019 44 2     MCV 06/14/2019 94     MCH 06/14/2019 31 1     MCHC 06/14/2019 33 0     RDW 06/14/2019 13 2     MPV 06/14/2019 8 5*    Platelets 88/97/3522 299     nRBC 06/14/2019 0     Neutrophils Relative 06/14/2019 47     Immat GRANS % 06/14/2019 0     Lymphocytes Relative 06/14/2019 44     Monocytes Relative 06/14/2019 7     Eosinophils Relative 06/14/2019 2     Basophils Relative 06/14/2019 0     Neutrophils Absolute 06/14/2019 3 08     Immature Grans Absolute 06/14/2019 0 03     Lymphocytes Absolute 06/14/2019 2 99     Monocytes Absolute 06/14/2019 0 50     Eosinophils Absolute 06/14/2019 0 14     Basophils Absolute 06/14/2019 0 03     Sodium 06/14/2019 139     Potassium 06/14/2019 4 0     Chloride 06/14/2019 104     CO2 06/14/2019 28     ANION GAP 06/14/2019 7     BUN 06/14/2019 15  Creatinine 06/14/2019 0 92     Glucose, Fasting 06/14/2019 94     Calcium 06/14/2019 9 4     AST 06/14/2019 20     ALT 06/14/2019 35     Alkaline Phosphatase 06/14/2019 105     Total Protein 06/14/2019 8 0     Albumin 06/14/2019 4 2     Total Bilirubin 06/14/2019 0 36     eGFR 06/14/2019 97     Hepatitis C Ab 06/14/2019 Non-reactive     Hemoglobin A1C 06/14/2019 5 7     EAG 06/14/2019 117     HIV-1 RNA by PCR, Qn 06/14/2019 <20     HIV-1 RNA Viral Load Log 06/14/2019 COMMENT     Cholesterol 06/14/2019 231*    Triglycerides 06/14/2019 172*    HDL, Direct 06/14/2019 55     LDL Calculated 06/14/2019 142*    QFT Nil 06/14/2019 0 04     QFT TB1-NIL 06/14/2019 0 00     QFT TB2-NIL 06/14/2019 0 00     QFT Mitogen-NIL 06/14/2019 >10 00     QFT Final Interpretation 06/14/2019 Negative     RPR 06/14/2019 Non-Reactive     CD4 ABS 06/14/2019 1134     CD4 % [place] T Cell 06/14/2019 37 8     White Blood Cell Count 06/14/2019 6 7     Red Blood Cell Count 06/14/2019 4 67     Hemoglobin 06/14/2019 14 8     HCT 06/14/2019 43 5     MCV 06/14/2019 93     MCH 06/14/2019 31 7     MCHC 06/14/2019 34 0     RDW 06/14/2019 14 5     Platelet Count 83/53/5902 297     Neutrophils 06/14/2019 47     Lymphocytes 06/14/2019 45     Monocytes 06/14/2019 6     Eosinophils 06/14/2019 2     Basophils PCT 06/14/2019 0     Neutrophils (Absolute) 06/14/2019 3 1     Lymphocytes (Absolute) 06/14/2019 3 0     Monocytes (Absolute) 06/14/2019 0 4     Eosinophils (Absolute) 06/14/2019 0 1     Basophils ABS 06/14/2019 0 0     Immature Granulocytes (A* 06/14/2019 0 0     Immature Granulocytes 06/14/2019 0      Imaging Studies: I have personally reviewed pertinent films in PACS

## 2019-12-17 NOTE — LETTER
December 17, 2019     Derek Harrison, [address] [address]    Patient: Darren Staton   YOB: 1970   Date of Visit: 12/17/2019       Dear Dr Ewa Weldon: Thank you for referring Louis Love to me for evaluation  Below are my notes for this consultation  If you have questions, please do not hesitate to call me  I look forward to following your patient along with you  Sincerely,        Lizandro David MD        CC: No Recipients  Lizandro David MD  12/17/2019 11:39 AM  Sign at close encounter  Consultation - [facility]  Darren Staton 44 y o  male MRN: [id number]  Unit/Bed#:  Encounter: [id number]        Consults    ASSESSMENT/PLAN:     1  Alternating diarrhea and constipation, likely IBS-given worsening of symptoms despite use of Bentyl, would recommend colonoscopy to assess for microscopic colitis  His previous colonoscopy was at least 5 years ago at which time he was noted to have chlamydia Trichomonas proctitis for which he was treated  -meanwhile discussed with patient that he should take Bentyl 20 mg twice or 3 times a day  - Add probiotics  -try to adhere to low FODMAP diet  -denies new medications  2  GERD-symptoms are well controlled with PPI  -will plan for repeat EGD in March for surveillance of Hall's   -avoid NSAIDs   -continue to follow anti-reflux measures  - Patient was explained about the lifestyle and dietary modifications  Advised to avoid fatty foods, chocolates, caffeine, alcohol and any other triggering foods  Avoid eating for at least 3 hours before going to bed               ______________________________________________________________________    Reason for Consult / Principal Problem: [unfilled]    HPI: Darren Staton is a 44y o  year old male with history of HIV on ART with undetectable viral load, CD4 greater than 1000, presents for follow-up of IBS symptoms    Patient has had longstanding symptoms of alternating diarrhea and constipation  He states that he had been doing well with dicyclomine in the past   He does report that most recently he has had more loose stools which are occurring 2 to 3 times a day  Patient reports that he has been taking dicyclomine but sometimes forgets a 2nd dose  He states that he is now taking 20 mg a once or twice a day  Denies any rectal bleeding  No tenesmus but does report urgency  He also endorses a 10 lb weight loss within the past few months  He is on omeprazole 40 mg a for history of acid reflux symptoms  He underwent EGD in March of 2017 which showed a irregular squamocolumnar junction concerning for Hall's esophagus however biopsies were negative for Hall's  He was recommended repeat EGD at 3 year interval   Biopsies from the small bowel showed increased intraepithelial emphasized however celiac serologies were negative  Review of Systems: The remainder of the review of systems was negative except for the pertinent positives noted in HPI  Historical Information   Past Medical History:   Diagnosis Date    Anxiety     Last Assessed: 7/18/2016     Dysuria     Last Assessed: 9/14/2015    Fecal urgency     Pt has had fecal incontinence in past, has weakened sphincter  would benefit from fiber, Needs f/u flex sig will refer for scheduling -        GERD (gastroesophageal reflux disease)     Hemorrhage of anus and rectum     Last Assessed: 3/5/2014     Herpes zoster     Last Assessed: 9/26/2016    [facility]     IBS (irritable bowel syndrome)     Proctitis, chlamydial     Last Assessed: 9/29/2014     Recurrent major depressive episodes, moderate ([facility] )     Last Assessed: 9/15/2017      Past Surgical History:   Procedure Laterality Date    CYSTOSCOPY  2014    [place] ESOPHAGOGASTRODUODENOSCOPY TRANSORAL DIAGNOSTIC N/A 3/9/2017    Procedure: ESOPHAGOGASTRODUODENOSCOPY (EGD);   Surgeon: Kourtney Vazquez MD;  [facility]: [facility]; Service: Gastroenterology    VARICOCELE EXCISION      Spermatic cord Excision - Last Assessed: 3/17/2016      Social History   Social History     Substance and Sexual Activity   Alcohol Use Yes    Alcohol/week: 1 0 standard drinks    Types: 1 Cans of beer per week    Frequency: Monthly or less    Drinks per session: 1 or 2    Comment: socially     Social History     Substance and Sexual Activity   Drug Use No     Social History     Tobacco Use   Smoking Status Current Every Day Smoker    Packs/day: 0 50    Types: Cigarettes   Smokeless Tobacco Former User   Tobacco Comment    1/2 pack daily     Family History   Problem Relation Age of Onset    Diabetes type II Mother     Heart attack Father     Substance Abuse Cousin        Meds/Allergies       (Not in a hospital admission)  No current facility-administered medications for this visit  Allergies   Allergen Reactions    Tomato Vomiting       Objective     Blood pressure 122/82, pulse 92, temperature 99 3 °F (37 4 °C), temperature source Tympanic, resp  rate 18, height 5' 7" (1 702 m), weight 78 kg (172 lb)  [unfilled]    PHYSICAL EXAM     GEN: well nourished, well developed, no acute distress  HEENT: anicteric, MMM, no cervical or supraclavicular lymphadenopathy  CV: RRR, no m/r/g  CHEST: CTA b/l, no WRR  ABD: +BS, soft, NT/ND, no hepatosplenomegaly  EXT: no c/c/e  SKIN: no rashes,  NEURO: aaox3    Lab Results:   No visits with results within 1 Day(s) from this visit     Latest known visit with results is:   Appointment on 06/14/2019   Component Date Value    WBC 06/14/2019 6 77     RBC 06/14/2019 4 69     Hemoglobin 06/14/2019 14 6     Hematocrit 06/14/2019 44 2     MCV 06/14/2019 94     MCH 06/14/2019 31 1     MCHC 06/14/2019 33 0     RDW 06/14/2019 13 2     MPV 06/14/2019 8 5*    Platelets 17/86/9498 299     nRBC 06/14/2019 0     Neutrophils Relative 06/14/2019 47     Immat GRANS % 06/14/2019 0     Lymphocytes Relative 06/14/2019 44     Monocytes Relative 06/14/2019 7     Eosinophils Relative 06/14/2019 2     Basophils Relative 06/14/2019 0     Neutrophils Absolute 06/14/2019 3 08     Immature Grans Absolute 06/14/2019 0 03     Lymphocytes Absolute 06/14/2019 2 99     Monocytes Absolute 06/14/2019 0 50     Eosinophils Absolute 06/14/2019 0 14     Basophils Absolute 06/14/2019 0 03     Sodium 06/14/2019 139     Potassium 06/14/2019 4 0     Chloride 06/14/2019 104     CO2 06/14/2019 28     ANION GAP 06/14/2019 7     BUN 06/14/2019 15     Creatinine 06/14/2019 0 92     Glucose, Fasting 06/14/2019 94     Calcium 06/14/2019 9 4     AST 06/14/2019 20     ALT 06/14/2019 35     Alkaline Phosphatase 06/14/2019 105     Total Protein 06/14/2019 8 0     Albumin 06/14/2019 4 2     Total Bilirubin 06/14/2019 0 36     eGFR 06/14/2019 97     Hepatitis C Ab 06/14/2019 Non-reactive     Hemoglobin A1C 06/14/2019 5 7     EAG 06/14/2019 117     HIV-1 RNA by PCR, Qn 06/14/2019 <20     HIV-1 RNA Viral Load Log 06/14/2019 COMMENT     Cholesterol 06/14/2019 231*    Triglycerides 06/14/2019 172*    HDL, Direct 06/14/2019 55     LDL Calculated 06/14/2019 142*    QFT Nil 06/14/2019 0 04     QFT TB1-NIL 06/14/2019 0 00     QFT TB2-NIL 06/14/2019 0 00     QFT Mitogen-NIL 06/14/2019 >10 00     QFT Final Interpretation 06/14/2019 Negative     RPR 06/14/2019 Non-Reactive     CD4 ABS 06/14/2019 1134     CD4 % [place] T Cell 06/14/2019 37 8     White Blood Cell Count 06/14/2019 6 7     Red Blood Cell Count 06/14/2019 4 67     Hemoglobin 06/14/2019 14 8     HCT 06/14/2019 43 5     MCV 06/14/2019 93     MCH 06/14/2019 31 7     MCHC 06/14/2019 34 0     RDW 06/14/2019 14 5     Platelet Count 26/51/5542 297     Neutrophils 06/14/2019 47     Lymphocytes 06/14/2019 45     Monocytes 06/14/2019 6     Eosinophils 06/14/2019 2     Basophils PCT 06/14/2019 0     Neutrophils (Absolute) 06/14/2019 3 1     Lymphocytes (Absolute) 06/14/2019 3 0     Monocytes (Absolute) 06/14/2019 0 4     Eosinophils (Absolute) 06/14/2019 0 1     Basophils ABS 06/14/2019 0 0     Immature Granulocytes (A* 06/14/2019 0 0     Immature Granulocytes 06/14/2019 0      Imaging Studies: I have personally reviewed pertinent films in PACS

## 2019-12-17 NOTE — TELEPHONE ENCOUNTER
Patients GI provider:  Dr JETT     Number to return call: [phone]    Reason for call: [facility] called stating insurance doesn't cover the prep     Scheduled procedure/appointment date if applicable: Apt/procedure

## 2019-12-24 DIAGNOSIS — F33.1 MDD (MAJOR DEPRESSIVE DISORDER), RECURRENT EPISODE, MODERATE (HCC): ICD-10-CM

## 2019-12-24 DIAGNOSIS — F41.1 GAD (GENERALIZED ANXIETY DISORDER): ICD-10-CM

## 2019-12-24 NOTE — TELEPHONE ENCOUNTER
Harsha Bran,  Could you please contact Luiz Cote to make a F/U apptmnt with Dr Sarahi Bedolla  Auto refill request recvd for his Fluoxetine but no F/U apptmnt   Thank you, Melanie Felipe

## 2019-12-27 RX ORDER — FLUOXETINE HYDROCHLORIDE 40 MG/1
CAPSULE ORAL
Qty: 60 CAPSULE | Refills: 2 | Status: SHIPPED | OUTPATIENT
Start: 2019-12-27 | End: 2020-01-16 | Stop reason: SDUPTHER

## 2019-12-28 DIAGNOSIS — K58.2 IRRITABLE BOWEL SYNDROME WITH BOTH CONSTIPATION AND DIARRHEA: ICD-10-CM

## 2020-01-02 RX ORDER — DICYCLOMINE HCL 20 MG
TABLET ORAL
Qty: 60 TABLET | Refills: 2 | Status: SHIPPED | OUTPATIENT
Start: 2020-01-02 | End: 2020-03-23

## 2020-01-08 ENCOUNTER — OFFICE VISIT (OUTPATIENT)
Dept: UROLOGY | Facility: AMBULATORY SURGERY CENTER | Age: 50
End: 2020-01-08
Payer: COMMERCIAL

## 2020-01-08 VITALS
HEIGHT: 67 IN | DIASTOLIC BLOOD PRESSURE: 72 MMHG | BODY MASS INDEX: 26.37 KG/M2 | SYSTOLIC BLOOD PRESSURE: 118 MMHG | WEIGHT: 168 LBS | HEART RATE: 88 BPM

## 2020-01-08 DIAGNOSIS — I86.1 VARICOCELE: Primary | ICD-10-CM

## 2020-01-08 PROCEDURE — 99243 OFF/OP CNSLTJ NEW/EST LOW 30: CPT | Performed by: UROLOGY

## 2020-01-08 NOTE — PROGRESS NOTES
1/8/2020    Rosalie Lopez  1970  [id number]        Assessment  Small bilateral varicoceles      Discussion  We discussed that there has been significant improvement in his varicocele since he underwent embolization in interventional Radiology approximately 4 years ago  We discussed routine prostate cancer screening starting at 52years of age as he has no family history of prostate cancer  I would hold on alpha blockade at this time  History of Present Illness  44 y o  male with a history of bilateral varicoceles  He underwent interventional radiology embolization in 2016  He had this performed in a staged fashion as with the initial embolization a coil was dislodged into the pulmonary artery  He returns in follow-up today  He states that there has been significant improvement in his bilateral varicoceles  He does complain of some urgency and frequency but this is intermittent and he believes that this is psychological in nature  He denies being diagnosed with a movement disorder  He is under the care of Infectious Disease for his longstanding HIV  AUA Symptom Score  AUA SYMPTOM SCORE      Most Recent Value   AUA SYMPTOM SCORE   How often have you had a sensation of not emptying your bladder completely after you finished urinating? 1   How often have you had to urinate again less than two hours after you finished urinating? 0   How often have you found you stopped and started again several times when you urinate?  0   How often have you found it difficult to postpone urination? 0   How often have you had a weak urinary stream?  0   How often have you had to push or strain to begin urination? 0   How many times did you most typically get up to urinate from the time you went to bed at night until the time you got up in the morning?   1   Quality of Life: If you were to spend the rest of your life with your urinary condition just the way it is now, how would you feel about that?  4   AUA SYMPTOM SCORE  2          Review of Systems  Review of Systems   Constitutional: Negative  HENT: Negative  Eyes: Negative  Respiratory: Negative  Cardiovascular: Negative  Gastrointestinal: Negative  Endocrine: Negative  Genitourinary:        Per HPI   Musculoskeletal: Negative  Skin: Negative  Allergic/Immunologic: Negative  Neurological: Negative  Hematological: Negative  Psychiatric/Behavioral: Negative  Past Medical History  Past Medical History:   Diagnosis Date    Anxiety     Last Assessed: 7/18/2016     Dysuria     Last Assessed: 9/14/2015    Fecal urgency     Pt has had fecal incontinence in past, has weakened sphincter  would benefit from fiber, Needs f/u flex sig will refer for scheduling -        GERD (gastroesophageal reflux disease)     Hemorrhage of anus and rectum     Last Assessed: 3/5/2014     Herpes zoster     Last Assessed: 9/26/2016    [facility] virus infection) ([facility] )     IBS (irritable bowel syndrome)     Proctitis, chlamydial     Last Assessed: 9/29/2014     Rec[facility] episodes[facility]     Last Assessed: 9/15/2017        Past Social History  Past Surgical History:   Procedure Laterality Date    CYSTOSCOPY  2014    [place] ESOPHAGOGASTRODUODENOSCOPY TRANSORAL DIAGNOSTIC N/A 3/9/2017    Procedure: ESOPHAGOGASTRODUODENOSCOPY (EGD); Surgeon: Mercedes Nicholson MD;  [facility];   Service: Gastroenterology    VARICOCELE EXCISION      Spermatic cord Excision - Last Assessed: 3/17/2016        Past Family History  Family History   Problem Relation Age of Onset    Diabetes type II Mother     Heart attack Father     Substance Abuse Cousin        Past Social history  Social History     Socioeconomic History    Marital status: Single     Spouse name: Not on file    Number of children: Not on file    Years of education: Not on file    Highest education level: Not on file   Occupational History    Not on file   Social Needs    Financial resource strain: Not on file    Food insecurity:     Worry: Not on file     Inability: Not on file    Transportation needs:     Medical: Not on file     Non-medical: Not on file   Tobacco Use    Smoking status: Current Every Day Smoker     Packs/day: 0 50     Types: Cigarettes    Smokeless tobacco: Former User    Tobacco comment: 1/2 pack daily   Substance and Sexual Activity    Alcohol use:  Yes     Alcohol/week: 1 0 standard drinks     Types: 1 Cans of beer per week     Frequency: Monthly or less     Drinks per session: 1 or 2     Comment: socially    Drug use: No    Sexual activity: Not Currently     Birth control/protection: Condom     Comment: active monogamous relationship    Lifestyle    Physical activity:     Days per week: Not on file     Minutes per session: Not on file    Stress: Not on file   Relationships    Social connections:     Talks on phone: Not on file     Gets together: Not on file     Attends Jain service: Not on file     Active member of club or organization: Not on file     Attends meetings of clubs or organizations: Not on file     Relationship status: Not on file    Intimate partner violence:     Fear of current or ex partner: Not on file     Emotionally abused: Not on file     Physically abused: Not on file     Forced sexual activity: Not on file   Other Topics Concern    Not on file   Social History Narrative    Not on file       Current Medications  Current Outpatient Medications   Medication Sig Dispense Refill    albuterol (VENTOLIN HFA) 90 mcg/act inhaler Inhale 2 puffs every 6 (six) hours as needed for wheezing (Patient not taking: Reported on 12/3/2019) 18 g 2    atorvastatin (LIPITOR) 10 mg tablet Take 1 tablet (10 mg total) by mouth daily 90 tablet 1    cetirizine (ZyrTEC) 10 mg tablet Take 1 tablet (10 mg total) by mouth daily 30 tablet 2    CIALIS 2 5 MG tablet TAKE 1 TABLET BY MOUTH DAILY AS NEEDED FOR ERECTILE DYSFUNCTION 10 tablet 2    cromolyn (NASALCHROM) 5 2 MG/ACT nasal spray USE 1 SPRAY IN EACH NOSTRIL FOUR TIMES A DAY 26 mL 3    dextromethorphan-guaifenesin (MUCINEX DM)  MG per 12 hr tablet Take 1 tablet by mouth every 12 (twelve) hours 14 tablet 0    dicyclomine (BENTYL) 20 mg tablet TAKE 1 TABLET BY MOUTH TWICE A DAY BEFORE MEALS 60 tablet 2    FLUoxetine (PROzac) 40 MG capsule TAKE 2 CAPSULES BY MOUTH (80MG TOTAL) DAILY 60 capsule 2    GENVOYA tablet TAKE 1 TABLET BY MOUTH DAILY 30 tablet 2    ibuprofen (MOTRIN) 600 mg tablet Take 600 mg by mouth every 6 (six) hours as needed  0    montelukast (SINGULAIR) 10 mg tablet Take 1 tablet (10 mg total) by mouth daily at bedtime 90 tablet 1    Na Sulfate-K Sulfate-Mg Sulf 17 5-3 13-1 6 AD/328KS SOLN Take 1 applicator by mouth once for 1 dose (Patient not taking: Reported on 1/8/2020) 1 Bottle 0    naproxen (EC NAPROSYN) 500 MG EC tablet Take 1 tablet (500 mg total) by mouth 2 (two) times a day with meals for 10 days (Patient not taking: Reported on 1/8/2020) 20 tablet 0    omeprazole (PriLOSEC) 40 MG capsule Take 1 capsule (40 mg total) by mouth daily 90 capsule 1    propranolol (INDERAL) 10 mg tablet TAKE ONE TO TWO TABLETS BY MOUTH TWICE A DAY AS NEEDED FOR TREMOR, ANXIETY 120 tablet 1     No current facility-administered medications for this visit  Allergies  Allergies   Allergen Reactions    Tomato Vomiting       Past Medical History, Social History, Family History, medications and allergies were reviewed  Vitals  Vitals:    01/08/20 0850   BP: 118/72   BP Location: Left arm   Patient Position: Sitting   Cuff Size: Standard   Pulse: 88   Weight: 76 2 kg (168 lb)   Height: 5' 7" (1 702 m)       Physical Exam  Physical Exam    On examination he is anxious  He has significant jerky body movements   examination reveals normal phallus, scrotum and scrotal contents  Minimal varicoceles are appreciated  Skin is warm    Extremities without edema  Neurologic is grossly intact and nonfocal   Gait somewhat spastic      Results  No results found for: PSA  Lab Results   Component Value Date    GLUCOSE 124 12/30/2015    CALCIUM 9 4 06/14/2019     12/30/2015    K 4 0 06/14/2019    CO2 28 06/14/2019     06/14/2019    BUN 15 06/14/2019    CREATININE 0 92 06/14/2019     Lab Results   Component Value Date    WBC 6 77 06/14/2019    WBC 6 7 06/14/2019    HGB 14 6 06/14/2019    HGB 14 8 06/14/2019    HCT 44 2 06/14/2019    HCT 43 5 06/14/2019    MCV 94 06/14/2019    MCV 93 06/14/2019     06/14/2019     06/14/2019         Office Urine Dip  No results found for this or any previous visit (from the past 1 hour(s)) ]

## 2020-01-10 ENCOUNTER — PATIENT OUTREACH (OUTPATIENT)
Dept: SURGERY | Facility: CLINIC | Age: 50
End: 2020-01-10

## 2020-01-16 ENCOUNTER — OFFICE VISIT (OUTPATIENT)
Dept: PSYCHIATRY | Facility: CLINIC | Age: 50
End: 2020-01-16
Payer: COMMERCIAL

## 2020-01-16 VITALS
DIASTOLIC BLOOD PRESSURE: 83 MMHG | BODY MASS INDEX: 27.57 KG/M2 | WEIGHT: 176 LBS | SYSTOLIC BLOOD PRESSURE: 127 MMHG | HEART RATE: 77 BPM

## 2020-01-16 DIAGNOSIS — F41.1 GAD (GENERALIZED ANXIETY DISORDER): ICD-10-CM

## 2020-01-16 DIAGNOSIS — F33.1 MDD (MAJOR DEPRESSIVE DISORDER), RECURRENT EPISODE, MODERATE (HCC): ICD-10-CM

## 2020-01-16 DIAGNOSIS — F41.8 PERFORMANCE ANXIETY: ICD-10-CM

## 2020-01-16 PROCEDURE — 99213 OFFICE O/P EST LOW 20 MIN: CPT | Performed by: PSYCHIATRY & NEUROLOGY

## 2020-01-16 RX ORDER — FLUOXETINE HYDROCHLORIDE 40 MG/1
80 CAPSULE ORAL DAILY
Qty: 60 CAPSULE | Refills: 3 | Status: SHIPPED | OUTPATIENT
Start: 2020-01-16 | End: 2020-05-20 | Stop reason: SDUPTHER

## 2020-01-16 RX ORDER — PROPRANOLOL HYDROCHLORIDE 20 MG/1
20-40 TABLET ORAL 2 TIMES DAILY PRN
Qty: 120 TABLET | Refills: 3 | Status: SHIPPED | OUTPATIENT
Start: 2020-01-16 | End: 2020-05-15

## 2020-01-16 NOTE — BH TREATMENT PLAN
TREATMENT PLAN (Medication Management Only)        [facility]    Name/Date of Birth/MRN:  Rocio Key 44 y o  1970 MRN: [id number]  Date of Treatment Plan: January 16, 2020  Diagnosis/Diagnoses:   1  MDD (major depressive disorder), recurrent episode, moderate ([facility] )    2  SOPHIA (generalized anxiety disorder)    3  Performance anxiety      Strengths/Personal Resources for Self-Care: "I accept things"  Area/Areas of need (in own words): ongoing medical issues, dental work  1  Long Term Goal: "keep me like you do"   Target Date: 180 days from treatment plan  Person/Persons responsible for completion of goal: Dr Sandi Leach and Self  2  Short Term Objective (s) - How will we reach this goal?:   A  Provider new recommended medication/dosage changes and/or continue medication(s): as noted  B   Take meds as prescribed, follow up as recommended  C  Exercise more, 3x/wk for 60min  Target Date: 6 months from treatment plan unless noted otherwise  Person/Persons Responsible for Completion of Goal: Dr Sandi Leach and Self   Progress Towards Goals: continuing treatment   Treatment Modality: Medication management and therapy PRN  Review due 180 days from date of this plan: Approximately 6 months from today ( 7/16/2020 )    Expected length of service: ongoing treatment unless revised  My Physician/PA/NP and I have developed this plan together and I agree to work on the goals and objectives  I understand the treatment goals that were developed for my treatment    Signature:       Date and time:  Signature of parent/guardian if under age of 15 years: Date and time:  Signature of provider:      Date and time:  Signature of Supervising Physician:    Date and time: 1/16/2020      Stephie Dalal III, DO
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
## 2020-01-17 ENCOUNTER — TELEPHONE (OUTPATIENT)
Dept: PSYCHIATRY | Facility: CLINIC | Age: 50
End: 2020-01-17

## 2020-01-17 NOTE — TELEPHONE ENCOUNTER
Question reguarding the dosage   Patient was on 10mg  You sent in a prescription for 20 mg        Is 20 mg the correct dosage?     propranolol (INDERAL) 20 mg tablet [[id number]]

## 2020-01-17 NOTE — TELEPHONE ENCOUNTER
Left message for Ryan Arteaga re: Propranolol  Dose was increased by Dr Joseph Cruz to 20-40mg twice daily as needed  Left number for return call if he has further questions

## 2020-01-20 DIAGNOSIS — F41.8 PERFORMANCE ANXIETY: ICD-10-CM

## 2020-01-20 DIAGNOSIS — J30.2 SEASONAL ALLERGIES: ICD-10-CM

## 2020-01-20 RX ORDER — CROMOLYN SODIUM 5.2 MG
AEROSOL, SPRAY WITH PUMP (ML) NASAL
Qty: 26 ML | Refills: 0 | Status: SHIPPED | OUTPATIENT
Start: 2020-01-20 | End: 2020-11-03 | Stop reason: CLARIF

## 2020-01-20 RX ORDER — PROPRANOLOL HYDROCHLORIDE 10 MG/1
TABLET ORAL
Qty: 120 TABLET | Refills: 0 | OUTPATIENT
Start: 2020-01-20

## 2020-01-23 ENCOUNTER — DOCUMENTATION (OUTPATIENT)
Dept: PSYCHIATRY | Facility: CLINIC | Age: 50
End: 2020-01-23

## 2020-01-23 NOTE — PROGRESS NOTES
Treatment Plan not completed within required time limits due to: Jl Roldan  no show appointment  on 11/14/2019

## 2020-01-24 ENCOUNTER — APPOINTMENT (OUTPATIENT)
Dept: LAB | Facility: CLINIC | Age: 50
End: 2020-01-24
Payer: COMMERCIAL

## 2020-01-24 DIAGNOSIS — Z11.3 ENCOUNTER FOR SCREENING FOR INFECTIONS WITH A PREDOMINANTLY SEXUAL MODE OF TRANSMISSION: ICD-10-CM

## 2020-01-24 DIAGNOSIS — Z12.5 SCREENING FOR PROSTATE CANCER: ICD-10-CM

## 2020-01-24 DIAGNOSIS — B20 HIV DISEASE (HCC): ICD-10-CM

## 2020-01-24 DIAGNOSIS — Z72.89 OTHER PROBLEMS RELATED TO LIFESTYLE: ICD-10-CM

## 2020-01-24 DIAGNOSIS — Z11.3 ROUTINE SCREENING FOR STI (SEXUALLY TRANSMITTED INFECTION): ICD-10-CM

## 2020-01-24 DIAGNOSIS — R73.03 PREDIABETES: ICD-10-CM

## 2020-01-24 LAB
ALBUMIN SERPL BCP-MCNC: 4 G/DL (ref 3.5–5)
ALP SERPL-CCNC: 107 U/L (ref 46–116)
ALT SERPL W P-5'-P-CCNC: 30 U/L (ref 12–78)
ANION GAP SERPL CALCULATED.3IONS-SCNC: 5 MMOL/L (ref 4–13)
AST SERPL W P-5'-P-CCNC: 18 U/L (ref 5–45)
BACTERIA UR QL AUTO: ABNORMAL /HPF
BASOPHILS # BLD AUTO: 0.04 THOUSANDS/ΜL (ref 0–0.1)
BASOPHILS NFR BLD AUTO: 0 % (ref 0–1)
BILIRUB SERPL-MCNC: 0.52 MG/DL (ref 0.2–1)
BILIRUB UR QL STRIP: ABNORMAL
BUN SERPL-MCNC: 14 MG/DL (ref 5–25)
CALCIUM SERPL-MCNC: 9.5 MG/DL (ref 8.3–10.1)
CHLORIDE SERPL-SCNC: 105 MMOL/L (ref 100–108)
CLARITY UR: ABNORMAL
CO2 SERPL-SCNC: 28 MMOL/L (ref 21–32)
COLOR UR: ABNORMAL
CREAT SERPL-MCNC: 1.02 MG/DL (ref 0.6–1.3)
EOSINOPHIL # BLD AUTO: 0.12 THOUSAND/ΜL (ref 0–0.61)
EOSINOPHIL NFR BLD AUTO: 1 % (ref 0–6)
ERYTHROCYTE [DISTWIDTH] IN BLOOD BY AUTOMATED COUNT: 13.1 % (ref 11.6–15.1)
EST. AVERAGE GLUCOSE BLD GHB EST-MCNC: 100 MG/DL
GFR SERPL CREATININE-BSD FRML MDRD: 85 ML/MIN/1.73SQ M
GLUCOSE P FAST SERPL-MCNC: 106 MG/DL (ref 65–99)
GLUCOSE UR STRIP-MCNC: NEGATIVE MG/DL
HBA1C MFR BLD: 5.1 % (ref 4.2–6.3)
HCT VFR BLD AUTO: 44.3 % (ref 36.5–49.3)
HGB BLD-MCNC: 14.4 G/DL (ref 12–17)
HGB UR QL STRIP.AUTO: NEGATIVE
IMM GRANULOCYTES # BLD AUTO: 0.04 THOUSAND/UL (ref 0–0.2)
IMM GRANULOCYTES NFR BLD AUTO: 0 % (ref 0–2)
KETONES UR STRIP-MCNC: NEGATIVE MG/DL
LEUKOCYTE ESTERASE UR QL STRIP: NEGATIVE
LYMPHOCYTES # BLD AUTO: 3.58 THOUSANDS/ΜL (ref 0.6–4.47)
LYMPHOCYTES NFR BLD AUTO: 40 % (ref 14–44)
MCH RBC QN AUTO: 30.5 PG (ref 26.8–34.3)
MCHC RBC AUTO-ENTMCNC: 32.5 G/DL (ref 31.4–37.4)
MCV RBC AUTO: 94 FL (ref 82–98)
MONOCYTES # BLD AUTO: 0.55 THOUSAND/ΜL (ref 0.17–1.22)
MONOCYTES NFR BLD AUTO: 6 % (ref 4–12)
MUCOUS THREADS UR QL AUTO: ABNORMAL
NEUTROPHILS # BLD AUTO: 4.61 THOUSANDS/ΜL (ref 1.85–7.62)
NEUTS SEG NFR BLD AUTO: 53 % (ref 43–75)
NITRITE UR QL STRIP: NEGATIVE
NON-SQ EPI CELLS URNS QL MICRO: ABNORMAL /HPF
NRBC BLD AUTO-RTO: 0 /100 WBCS
OTHER STN SPEC: ABNORMAL
PH UR STRIP.AUTO: 6 [PH]
PLATELET # BLD AUTO: 338 THOUSANDS/UL (ref 149–390)
PMV BLD AUTO: 8.7 FL (ref 8.9–12.7)
POTASSIUM SERPL-SCNC: 3.6 MMOL/L (ref 3.5–5.3)
PROT SERPL-MCNC: 8 G/DL (ref 6.4–8.2)
PROT UR STRIP-MCNC: ABNORMAL MG/DL
RBC # BLD AUTO: 4.72 MILLION/UL (ref 3.88–5.62)
RBC #/AREA URNS AUTO: ABNORMAL /HPF
SODIUM SERPL-SCNC: 138 MMOL/L (ref 136–145)
SP GR UR STRIP.AUTO: 1.04 (ref 1–1.03)
UROBILINOGEN UR QL STRIP.AUTO: 1 E.U./DL
WBC # BLD AUTO: 8.94 THOUSAND/UL (ref 4.31–10.16)
WBC #/AREA URNS AUTO: ABNORMAL /HPF

## 2020-01-24 PROCEDURE — 36415 COLL VENOUS BLD VENIPUNCTURE: CPT

## 2020-01-24 PROCEDURE — 87591 N.GONORRHOEAE DNA AMP PROB: CPT

## 2020-01-24 PROCEDURE — 81001 URINALYSIS AUTO W/SCOPE: CPT

## 2020-01-24 PROCEDURE — 84154 ASSAY OF PSA FREE: CPT

## 2020-01-24 PROCEDURE — 80053 COMPREHEN METABOLIC PANEL: CPT

## 2020-01-24 PROCEDURE — 87536 HIV-1 QUANT&REVRSE TRNSCRPJ: CPT

## 2020-01-24 PROCEDURE — 85025 COMPLETE CBC W/AUTO DIFF WBC: CPT

## 2020-01-24 PROCEDURE — 86361 T CELL ABSOLUTE COUNT: CPT

## 2020-01-24 PROCEDURE — 83036 HEMOGLOBIN GLYCOSYLATED A1C: CPT

## 2020-01-24 PROCEDURE — 84153 ASSAY OF PSA TOTAL: CPT

## 2020-01-24 PROCEDURE — 87491 CHLMYD TRACH DNA AMP PROBE: CPT

## 2020-01-25 LAB
BASOPHILS # BLD AUTO: 0 X10E3/UL (ref 0–0.2)
BASOPHILS NFR BLD AUTO: 0 %
C TRACH DNA SPEC QL NAA+PROBE: NEGATIVE
CD3+CD4+ CELLS # BLD: 1075 /UL (ref 359–1519)
CD3+CD4+ CELLS NFR BLD: 30.7 % (ref 30.8–58.5)
EOSINOPHIL # BLD AUTO: 0.1 X10E3/UL (ref 0–0.4)
EOSINOPHIL NFR BLD AUTO: 1 %
ERYTHROCYTE [DISTWIDTH] IN BLOOD BY AUTOMATED COUNT: 14.3 % (ref 11.6–15.4)
HCT VFR BLD AUTO: 44.5 % (ref 37.5–51)
HGB BLD-MCNC: 14.9 G/DL (ref 13–17.7)
IMM GRANULOCYTES # BLD: 0 X10E3/UL (ref 0–0.1)
IMM GRANULOCYTES NFR BLD: 0 %
LYMPHOCYTES # BLD AUTO: 3.5 X10E3/UL (ref 0.7–3.1)
LYMPHOCYTES NFR BLD AUTO: 40 %
MCH RBC QN AUTO: 31 PG (ref 26.6–33)
MCHC RBC AUTO-ENTMCNC: 33.5 G/DL (ref 31.5–35.7)
MCV RBC AUTO: 93 FL (ref 79–97)
MONOCYTES # BLD AUTO: 0.5 X10E3/UL (ref 0.1–0.9)
MONOCYTES NFR BLD AUTO: 6 %
N GONORRHOEA DNA SPEC QL NAA+PROBE: NEGATIVE
NEUTROPHILS # BLD AUTO: 4.5 X10E3/UL (ref 1.4–7)
NEUTROPHILS NFR BLD AUTO: 53 %
PLATELET # BLD AUTO: 333 X10E3/UL (ref 150–450)
PSA FREE MFR SERPL: 6.9 %
PSA FREE SERPL-MCNC: 0.11 NG/ML
PSA SERPL-MCNC: 1.6 NG/ML (ref 0–4)
RBC # BLD AUTO: 4.81 X10E6/UL (ref 4.14–5.8)
WBC # BLD AUTO: 8.6 X10E3/UL (ref 3.4–10.8)

## 2020-01-27 LAB
HIV1 RNA # SERPL NAA+PROBE: <20 COPIES/ML
HIV1 RNA SERPL NAA+PROBE-LOG#: NORMAL LOG10COPY/ML

## 2020-01-29 ENCOUNTER — TELEPHONE (OUTPATIENT)
Dept: SURGERY | Facility: CLINIC | Age: 50
End: 2020-01-29

## 2020-01-29 ENCOUNTER — OFFICE VISIT (OUTPATIENT)
Dept: SURGERY | Facility: CLINIC | Age: 50
End: 2020-01-29
Payer: COMMERCIAL

## 2020-01-29 VITALS
DIASTOLIC BLOOD PRESSURE: 84 MMHG | HEIGHT: 67 IN | BODY MASS INDEX: 26.53 KG/M2 | WEIGHT: 169 LBS | SYSTOLIC BLOOD PRESSURE: 119 MMHG | HEART RATE: 71 BPM | OXYGEN SATURATION: 94 % | TEMPERATURE: 97.6 F

## 2020-01-29 DIAGNOSIS — T65.224D TOXIC EFFECT OF TOBACCO CIGARETTE, UNDETERMINED INTENT, SUBSEQUENT ENCOUNTER: ICD-10-CM

## 2020-01-29 DIAGNOSIS — B20 HIV DISEASE (HCC): Primary | ICD-10-CM

## 2020-01-29 DIAGNOSIS — N52.9 ERECTILE DYSFUNCTION, UNSPECIFIED ERECTILE DYSFUNCTION TYPE: ICD-10-CM

## 2020-01-29 DIAGNOSIS — F41.1 GAD (GENERALIZED ANXIETY DISORDER): ICD-10-CM

## 2020-01-29 PROCEDURE — 99215 OFFICE O/P EST HI 40 MIN: CPT | Performed by: INTERNAL MEDICINE

## 2020-01-29 NOTE — PROGRESS NOTES
Progress Note - Infectious Disease   Bula Claude 44 y o  male MRN: [id number]  Unit/Bed#:  Encounter: [id number]      Impression/Plan:    1  HIV-doing well on Genvoya with an undetectable viral load and a CD4 count of greater than 1000  With patient taking other medications to limit drug interactions will change the Genvoya to Myesha  Recheck labs in 4 weeks, follow up in 6 weeks  Stressed adherence      2  Nicotine dependence-he has cut back to half pack a day  Stressed with the patient the need for complete tobacco cessation      3   Generalized anxiety disorder- doing well overall  Seen by Neurology and follows with psychiatry       Patient was provided medication, adherence and prevention education    Subjective:  Routine follow-up for HIV  Patient claims 100% adherence with  Genvoya  Patient denies any notable side effects  Overall he is feeling well  The patient denies any fever chills or sweats, denies any nausea vomiting or diarrhea, denies any cough or shortness of breath  ROS: A complete 12 point ROS is negative other than that noted in the HPI    Followup portions patient history reviewed and updated as: Allergies, current medications, past medical history, past social history, past surgical history, and the problem list    Objective:  Vitals:  Vitals:    01/29/20 1619   BP: 119/84   BP Location: Right arm   Patient Position: Sitting   Cuff Size: Standard   Pulse: 71   Temp: 97 6 °F (36 4 °C)   SpO2: 94%   Weight: 76 7 kg (169 lb)   Height: 5' 7" (1 702 m)       Physical Exam:   General Appearance:  Alert, interactive, appearing well,  nontoxic, no acute distress  Neck:   Supple without lymphadenopathy, no thyromegaly or masses   Throat: Oropharynx moist without lesions  Lungs:   Clear to auscultation bilaterally; no wheezes, rhonchi or rales; respirations unlabored   Heart:  RRR; no murmur, rub or gallop   Abdomen:   Soft, non-tender, non-distended, positive bowel sounds  Extremities: No clubbing, cyanosis or edema   Skin: No new rashes or lesions  No draining wounds noted  Labs, Imaging, & Other studies:   All pertinent labs and imaging studies were personally reviewed    Lab Results   Component Value Date     12/30/2015    K 3 6 01/24/2020     01/24/2020    CO2 28 01/24/2020    ANIONGAP 9 12/30/2015    BUN 14 01/24/2020    CREATININE 1 02 01/24/2020    GLUCOSE 124 12/30/2015    GLUF 106 (H) 01/24/2020    CALCIUM 9 5 01/24/2020    AST 18 01/24/2020    ALT 30 01/24/2020    ALKPHOS 107 01/24/2020    PROT 8 4 (H) 12/30/2015    BILITOT 0 37 12/30/2015    EGFR 85 01/24/2020     Lab Results   Component Value Date    WBC 8 94 01/24/2020    WBC 8 6 01/24/2020    HGB 14 4 01/24/2020    HGB 14 9 01/24/2020    HCT 44 3 01/24/2020    HCT 44 5 01/24/2020    MCV 94 01/24/2020    MCV 93 01/24/2020     01/24/2020     01/24/2020     Lab Results   Component Value Date    HEPCAB Non-reactive 06/14/2019     Lab Results   Component Value Date    HEPCAB Non-reactive 06/14/2019     Lab Results   Component Value Date    RPR Non-Reactive 06/14/2019     CD4 ABS   Date/Time Value Ref Range Status   01/24/2020 08:44 AM 1075 359 - 1519 /uL Final     HIV-1 RNA by PCR, Qn   Date/Time Value Ref Range Status   01/24/2020 08:44 AM <20 copies/mL Final     Comment:     HIV-1 RNA not detected  The reportable range for this assay is 20 to 10,000,000  copies HIV-1 RNA/mL             Current Outpatient Medications:     albuterol (VENTOLIN HFA) 90 mcg/act inhaler, Inhale 2 puffs every 6 (six) hours as needed for wheezing (Patient not taking: Reported on 12/3/2019), Disp: 18 g, Rfl: 2    atorvastatin (LIPITOR) 10 mg tablet, Take 1 tablet (10 mg total) by mouth daily, Disp: 90 tablet, Rfl: 1    cetirizine (ZyrTEC) 10 mg tablet, Take 1 tablet (10 mg total) by mouth daily, Disp: 30 tablet, Rfl: 2    CIALIS 2 5 MG tablet, TAKE 1 TABLET BY MOUTH DAILY AS NEEDED FOR ERECTILE DYSFUNCTION, Disp: 10 tablet, Rfl: 2    cromolyn (NASALCHROM) 5 2 MG/ACT nasal spray, USE 1 SPRAY IN EACH NOSTRIL FOUR TIMES A DAY, Disp: 26 mL, Rfl: 0    dicyclomine (BENTYL) 20 mg tablet, TAKE 1 TABLET BY MOUTH TWICE A DAY BEFORE MEALS, Disp: 60 tablet, Rfl: 2    FLUoxetine (PROzac) 40 MG capsule, Take 2 capsules (80 mg total) by mouth daily, Disp: 60 capsule, Rfl: 3    GENVOYA tablet, TAKE 1 TABLET BY MOUTH DAILY, Disp: 30 tablet, Rfl: 2    montelukast (SINGULAIR) 10 mg tablet, Take 1 tablet (10 mg total) by mouth daily at bedtime, Disp: 90 tablet, Rfl: 1    omeprazole (PriLOSEC) 40 MG capsule, Take 1 capsule (40 mg total) by mouth daily, Disp: 90 capsule, Rfl: 1    propranolol (INDERAL) 20 mg tablet, Take 1-2 tablets (20-40 mg total) by mouth 2 (two) times a day as needed (anxiety or physical agitation), Disp: 120 tablet, Rfl: 3

## 2020-01-30 ENCOUNTER — PATIENT OUTREACH (OUTPATIENT)
Dept: SURGERY | Facility: CLINIC | Age: 50
End: 2020-01-30

## 2020-01-30 NOTE — PROGRESS NOTES
Clinic staff reached out to [facility] regarding ct and legal referral  Cm explained that no services cant be provided because he is not active with [facility]  Cm spoke to supervisor and is aware

## 2020-01-31 DIAGNOSIS — B20 HIV DISEASE (HCC): Primary | ICD-10-CM

## 2020-01-31 NOTE — PROGRESS NOTES
01/29/20 1615   Clinical Encounter Type   Visited With Patient   Routine Visit Follow-up   Continue Visiting Yes   Referral To ; Other (Comment)  (Case Management & Legal)   Yazidism Encounters   Yazidism Needs Literature   Patient Spiritual Encounters   Spiritual Assessment 3   Fear Level 3   Feelings of Loneliness   (Feels misunderstood by siblings and family)   Feelings of Hopelessness   (Feeling anxious about his future )   Coping 3   Spiritual Encounter Notes   (See progress note )

## 2020-01-31 NOTE — PROGRESS NOTES
Nutrition follow up note     Last nutrition encounter 7/17/2019    RD met Isaiah Jacob during evening ID clinic appt to introduce self  Isaiah Jacob reported that he is "losing weight but still feels fat"  RD provided reassurance and encouragement in his health goals  Isaiah Jacob reported that he is working out, 30 minutes 2 days per week and it makes him feel energized; RD provided encouragement and increase as he is able  Isaiah Jacob reported that he is still drinking cola and when he goes without it he feels withdrawal,  Pt adamantly stated that he currently working on smoking cessation goals and so cannot quit drinking soda and smoking; RD reassured him to work on one thing at a time as he can and if his goal is to quit smoking then he should focus on that  RD praised him for his smoking cessation efforts  Pt appeared on edge and anxious during encounter; RD made PCP and behavioral health specialist aware, and behavioral health did follow up with him  Lipid panel 6/14/19  Cholesterol 231        A1C 5 1 (1/24/20)    Wt today: 169#, up 2# from last nutrition encounter   BMI 26 5    Cont to monitor lipid panel, A1C, wt trend  Offer nutrition education and support as pt is receptive        Katerine Nye, MS, RDN, LDN, [facility]
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
## 2020-02-03 ENCOUNTER — PATIENT OUTREACH (OUTPATIENT)
Dept: SURGERY | Facility: CLINIC | Age: 50
End: 2020-02-03

## 2020-02-03 NOTE — PROGRESS NOTES
Yoni spoke with Eleazar Batista, yoni can not provide services to ct because he is not recerted   Yoni and Eleazar Batista spoke, Eleazar Batista to send legal referral to prevention

## 2020-02-06 ENCOUNTER — PATIENT OUTREACH (OUTPATIENT)
Dept: SURGERY | Facility: CLINIC | Age: 50
End: 2020-02-06

## 2020-02-06 NOTE — PROGRESS NOTES
Cm received an email from [employer] and [employer] regarding cts dental  Ct can not receive services until his recert is complete  Cm called ct and left message to set up recert appt

## 2020-02-14 DIAGNOSIS — B20 HIV (HUMAN IMMUNODEFICIENCY VIRUS INFECTION) (HCC): ICD-10-CM

## 2020-02-17 ENCOUNTER — ANESTHESIA EVENT (OUTPATIENT)
Dept: GASTROENTEROLOGY | Facility: AMBULARY SURGERY CENTER | Age: 50
End: 2020-02-17

## 2020-02-17 ENCOUNTER — PATIENT OUTREACH (OUTPATIENT)
Dept: SURGERY | Facility: CLINIC | Age: 50
End: 2020-02-17

## 2020-02-17 NOTE — PROGRESS NOTES
Cm was notified by clinic staff that ct had a dental emergency over the weekend  Cm explained that ct has not been responding to calls and is currently not active, assistance can not be provided

## 2020-02-18 RX ORDER — ELVITEGRAVIR, COBICISTAT, EMTRICITABINE, AND TENOFOVIR ALAFENAMIDE 150; 150; 200; 10 MG/1; MG/1; MG/1; MG/1
TABLET ORAL
Qty: 30 TABLET | Refills: 2 | OUTPATIENT
Start: 2020-02-18

## 2020-02-19 ENCOUNTER — PATIENT OUTREACH (OUTPATIENT)
Dept: SURGERY | Facility: CLINIC | Age: 50
End: 2020-02-19

## 2020-02-19 NOTE — PROGRESS NOTES
Cm sent first notice inactive letter to ct    2020    First Notice    [address]        Dear Higinio Shepherd,     This letter is to inform you that your recertification is past due  I tried calling the number on file and have been leaving messages  Please, call me so we can schedule the appointment, failure to do so may result in closure of your case management file, however your medical care with the clinic will remain the same  For Recertification I will need the followin Proof of income -months worth of paystubs  2 Current utility bill that has your name and address  [employer] [place] [employer] card           Thank you,    [email or web address]  [phone]

## 2020-02-27 ENCOUNTER — PATIENT OUTREACH (OUTPATIENT)
Dept: SURGERY | Facility: CLINIC | Age: 50
End: 2020-02-27

## 2020-02-27 NOTE — PROGRESS NOTES
Cm received email from supervisor regarding cts dental  Ct had dental emergency and wa snot able to provide services do to not having an active auth  Cm called [facility] and stated that cm has been trying to reach out to ct to complete recert  Bereket Francisco stated will advise ct to contact cm in order to continue dental care

## 2020-02-27 NOTE — PROGRESS NOTES
Writer called and spoke with ct as he has not updated his file with Alize Tamez I  per ct to call her today

## 2020-02-27 NOTE — PROGRESS NOTES
Cm supervisor contacted cm regarding  Ct  Supervisor aware ct has not been in contact to set up recert appt  Ct called cm after speaking with supervisor  Ct set up appt for tomorrow to meet with cm

## 2020-02-28 ENCOUNTER — PATIENT OUTREACH (OUTPATIENT)
Dept: SURGERY | Facility: CLINIC | Age: 50
End: 2020-02-28
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
## 2020-03-02 ENCOUNTER — TELEPHONE (OUTPATIENT)
Dept: GASTROENTEROLOGY | Facility: CLINIC | Age: 50
End: 2020-03-02

## 2020-03-02 ENCOUNTER — TELEPHONE (OUTPATIENT)
Dept: GASTROENTEROLOGY | Facility: AMBULARY SURGERY CENTER | Age: 50
End: 2020-03-02

## 2020-03-02 DIAGNOSIS — K58.2 IRRITABLE BOWEL SYNDROME WITH BOTH CONSTIPATION AND DIARRHEA: Primary | ICD-10-CM

## 2020-03-02 NOTE — TELEPHONE ENCOUNTER
Returned pt's call re: bowel pre  Pt did not  his bowel prep that was ordered in Dec 2019  Pt had to reschedule his GI procedure-due to he ate cereal twice today   Pt is now rescheduled from 3/3/2020 to 5/6/2020 at [facility] w/ dr durham

## 2020-03-02 NOTE — TELEPHONE ENCOUNTER
Se telephone encounter  Pt did not  his prep that was ordered back in December 2019  Pt had to be rescheduled due to his eating twice today   Pt is rescheduled from 3/3/2020 to 5/6/2020 w/ dr durham at [facility]

## 2020-03-02 NOTE — TELEPHONE ENCOUNTER
Patients GI provider:  Dr Monique Holden    Number to return call: [phone]    Reason for call: GI Lab called stating when confirm procedure for pt, pt informed them that his prep was not sent to pharmacy   Please send prep    Scheduled procedure/appointment date if applicable: Procedure - 03/03/20

## 2020-03-03 ENCOUNTER — PATIENT OUTREACH (OUTPATIENT)
Dept: SURGERY | Facility: CLINIC | Age: 50
End: 2020-03-03

## 2020-03-03 ENCOUNTER — ANESTHESIA (OUTPATIENT)
Dept: GASTROENTEROLOGY | Facility: AMBULARY SURGERY CENTER | Age: 50
End: 2020-03-03

## 2020-03-03 NOTE — PROGRESS NOTES
Cm called [employer] to see if they received the auth  Cm spoke with Rosio Terrell and lambert did receive the auth  Ct has came in for treatment

## 2020-03-11 ENCOUNTER — PATIENT OUTREACH (OUTPATIENT)
Dept: SURGERY | Facility: CLINIC | Age: 50
End: 2020-03-11

## 2020-03-19 ENCOUNTER — PATIENT OUTREACH (OUTPATIENT)
Dept: SURGERY | Facility: CLINIC | Age: 50
End: 2020-03-19

## 2020-03-23 DIAGNOSIS — K58.2 IRRITABLE BOWEL SYNDROME WITH BOTH CONSTIPATION AND DIARRHEA: ICD-10-CM

## 2020-03-23 DIAGNOSIS — K21.00 GASTROESOPHAGEAL REFLUX DISEASE WITH ESOPHAGITIS: ICD-10-CM

## 2020-03-23 RX ORDER — OMEPRAZOLE 40 MG/1
CAPSULE, DELAYED RELEASE ORAL
Qty: 30 CAPSULE | Refills: 3 | Status: SHIPPED | OUTPATIENT
Start: 2020-03-23 | End: 2020-03-24

## 2020-03-23 RX ORDER — DICYCLOMINE HCL 20 MG
TABLET ORAL
Qty: 60 TABLET | Refills: 2 | Status: SHIPPED | OUTPATIENT
Start: 2020-03-23 | End: 2020-03-24

## 2020-03-24 DIAGNOSIS — K58.2 IRRITABLE BOWEL SYNDROME WITH BOTH CONSTIPATION AND DIARRHEA: ICD-10-CM

## 2020-03-24 DIAGNOSIS — K21.00 GASTROESOPHAGEAL REFLUX DISEASE WITH ESOPHAGITIS: ICD-10-CM

## 2020-03-24 RX ORDER — DICYCLOMINE HCL 20 MG
TABLET ORAL
Qty: 60 TABLET | Refills: 2 | Status: SHIPPED | OUTPATIENT
Start: 2020-03-24 | End: 2020-09-10

## 2020-03-24 RX ORDER — OMEPRAZOLE 40 MG/1
CAPSULE, DELAYED RELEASE ORAL
Qty: 30 CAPSULE | Refills: 3 | Status: SHIPPED | OUTPATIENT
Start: 2020-03-24 | End: 2020-11-09 | Stop reason: SDUPTHER

## 2020-04-02 ENCOUNTER — DOCUMENTATION (OUTPATIENT)
Dept: SURGERY | Facility: CLINIC | Age: 50
End: 2020-04-02

## 2020-04-02 ENCOUNTER — TELEPHONE (OUTPATIENT)
Dept: SURGERY | Facility: CLINIC | Age: 50
End: 2020-04-02

## 2020-04-06 ENCOUNTER — APPOINTMENT (OUTPATIENT)
Dept: LAB | Facility: CLINIC | Age: 50
End: 2020-04-06
Payer: COMMERCIAL

## 2020-04-06 DIAGNOSIS — B20 HIV DISEASE (HCC): ICD-10-CM

## 2020-04-06 LAB
ALBUMIN SERPL BCP-MCNC: 3.7 G/DL (ref 3.5–5)
ALP SERPL-CCNC: 98 U/L (ref 46–116)
ALT SERPL W P-5'-P-CCNC: 49 U/L (ref 12–78)
ANION GAP SERPL CALCULATED.3IONS-SCNC: 10 MMOL/L (ref 4–13)
AST SERPL W P-5'-P-CCNC: 25 U/L (ref 5–45)
BASOPHILS # BLD AUTO: 0.05 THOUSANDS/ΜL (ref 0–0.1)
BASOPHILS NFR BLD AUTO: 1 % (ref 0–1)
BILIRUB SERPL-MCNC: 0.35 MG/DL (ref 0.2–1)
BUN SERPL-MCNC: 16 MG/DL (ref 5–25)
CALCIUM SERPL-MCNC: 9 MG/DL (ref 8.3–10.1)
CHLORIDE SERPL-SCNC: 101 MMOL/L (ref 100–108)
CO2 SERPL-SCNC: 26 MMOL/L (ref 21–32)
CREAT SERPL-MCNC: 0.95 MG/DL (ref 0.6–1.3)
EOSINOPHIL # BLD AUTO: 0.24 THOUSAND/ΜL (ref 0–0.61)
EOSINOPHIL NFR BLD AUTO: 3 % (ref 0–6)
ERYTHROCYTE [DISTWIDTH] IN BLOOD BY AUTOMATED COUNT: 13.3 % (ref 11.6–15.1)
GFR SERPL CREATININE-BSD FRML MDRD: 92 ML/MIN/1.73SQ M
GLUCOSE SERPL-MCNC: 93 MG/DL (ref 65–140)
HCT VFR BLD AUTO: 47 % (ref 36.5–49.3)
HGB BLD-MCNC: 15.6 G/DL (ref 12–17)
IMM GRANULOCYTES # BLD AUTO: 0.08 THOUSAND/UL (ref 0–0.2)
IMM GRANULOCYTES NFR BLD AUTO: 1 % (ref 0–2)
LYMPHOCYTES # BLD AUTO: 3.16 THOUSANDS/ΜL (ref 0.6–4.47)
LYMPHOCYTES NFR BLD AUTO: 42 % (ref 14–44)
MCH RBC QN AUTO: 31 PG (ref 26.8–34.3)
MCHC RBC AUTO-ENTMCNC: 33.2 G/DL (ref 31.4–37.4)
MCV RBC AUTO: 93 FL (ref 82–98)
MONOCYTES # BLD AUTO: 0.55 THOUSAND/ΜL (ref 0.17–1.22)
MONOCYTES NFR BLD AUTO: 7 % (ref 4–12)
NEUTROPHILS # BLD AUTO: 3.43 THOUSANDS/ΜL (ref 1.85–7.62)
NEUTS SEG NFR BLD AUTO: 46 % (ref 43–75)
NRBC BLD AUTO-RTO: 0 /100 WBCS
PLATELET # BLD AUTO: 353 THOUSANDS/UL (ref 149–390)
PMV BLD AUTO: 8 FL (ref 8.9–12.7)
POTASSIUM SERPL-SCNC: 4.4 MMOL/L (ref 3.5–5.3)
PROT SERPL-MCNC: 7.9 G/DL (ref 6.4–8.2)
RBC # BLD AUTO: 5.04 MILLION/UL (ref 3.88–5.62)
SODIUM SERPL-SCNC: 137 MMOL/L (ref 136–145)
WBC # BLD AUTO: 7.51 THOUSAND/UL (ref 4.31–10.16)

## 2020-04-06 PROCEDURE — 80053 COMPREHEN METABOLIC PANEL: CPT

## 2020-04-06 PROCEDURE — 87536 HIV-1 QUANT&REVRSE TRNSCRPJ: CPT

## 2020-04-06 PROCEDURE — 36415 COLL VENOUS BLD VENIPUNCTURE: CPT

## 2020-04-06 PROCEDURE — 85025 COMPLETE CBC W/AUTO DIFF WBC: CPT

## 2020-04-08 LAB
HIV1 RNA # SERPL NAA+PROBE: <20 COPIES/ML
HIV1 RNA SERPL NAA+PROBE-LOG#: NORMAL LOG10COPY/ML

## 2020-04-17 ENCOUNTER — TELEMEDICINE (OUTPATIENT)
Dept: SURGERY | Facility: CLINIC | Age: 50
End: 2020-04-17
Payer: COMMERCIAL

## 2020-04-17 DIAGNOSIS — R26.89 BALANCE PROBLEMS: ICD-10-CM

## 2020-04-17 DIAGNOSIS — T65.224D TOXIC EFFECT OF TOBACCO CIGARETTE, UNDETERMINED INTENT, SUBSEQUENT ENCOUNTER: ICD-10-CM

## 2020-04-17 DIAGNOSIS — N52.8 OTHER MALE ERECTILE DYSFUNCTION: ICD-10-CM

## 2020-04-17 DIAGNOSIS — E78.2 ELEVATED CHOLESTEROL WITH ELEVATED TRIGLYCERIDES: ICD-10-CM

## 2020-04-17 DIAGNOSIS — J30.2 SEASONAL ALLERGIES: ICD-10-CM

## 2020-04-17 DIAGNOSIS — B20 HIV DISEASE (HCC): Primary | ICD-10-CM

## 2020-04-17 DIAGNOSIS — N50.819 TESTICULAR PAIN: ICD-10-CM

## 2020-04-17 DIAGNOSIS — K05.219 GINGIVAL ABSCESS: ICD-10-CM

## 2020-04-17 PROCEDURE — 99215 OFFICE O/P EST HI 40 MIN: CPT | Performed by: NURSE PRACTITIONER

## 2020-04-17 RX ORDER — TADALAFIL 2.5 MG/1
2.5 TABLET, FILM COATED ORAL DAILY PRN
Qty: 10 TABLET | Refills: 2 | Status: SHIPPED | OUTPATIENT
Start: 2020-04-17 | End: 2020-07-28

## 2020-04-21 ENCOUNTER — TELEPHONE (OUTPATIENT)
Dept: SURGERY | Facility: CLINIC | Age: 50
End: 2020-04-21

## 2020-04-29 ENCOUNTER — TELEPHONE (OUTPATIENT)
Dept: GASTROENTEROLOGY | Facility: CLINIC | Age: 50
End: 2020-04-29

## 2020-05-08 DIAGNOSIS — B20 HIV DISEASE (HCC): ICD-10-CM

## 2020-05-08 RX ORDER — BICTEGRAVIR SODIUM, EMTRICITABINE, AND TENOFOVIR ALAFENAMIDE FUMARATE 50; 200; 25 MG/1; MG/1; MG/1
TABLET ORAL
Qty: 30 TABLET | Refills: 3 | Status: SHIPPED | OUTPATIENT
Start: 2020-05-08 | End: 2020-09-10

## 2020-05-12 ENCOUNTER — PATIENT OUTREACH (OUTPATIENT)
Dept: SURGERY | Facility: CLINIC | Age: 50
End: 2020-05-12

## 2020-05-13 ENCOUNTER — TELEMEDICINE (OUTPATIENT)
Dept: SURGERY | Facility: CLINIC | Age: 50
End: 2020-05-13
Payer: COMMERCIAL

## 2020-05-13 DIAGNOSIS — F41.1 GAD (GENERALIZED ANXIETY DISORDER): ICD-10-CM

## 2020-05-13 DIAGNOSIS — T65.224D TOXIC EFFECT OF TOBACCO CIGARETTE, UNDETERMINED INTENT, SUBSEQUENT ENCOUNTER: ICD-10-CM

## 2020-05-13 DIAGNOSIS — B20 HIV DISEASE (HCC): Primary | ICD-10-CM

## 2020-05-13 PROCEDURE — 99214 OFFICE O/P EST MOD 30 MIN: CPT | Performed by: INTERNAL MEDICINE

## 2020-05-15 DIAGNOSIS — F41.8 PERFORMANCE ANXIETY: ICD-10-CM

## 2020-05-15 DIAGNOSIS — E78.2 ELEVATED CHOLESTEROL WITH ELEVATED TRIGLYCERIDES: ICD-10-CM

## 2020-05-15 RX ORDER — ATORVASTATIN CALCIUM 10 MG/1
TABLET, FILM COATED ORAL
Qty: 90 TABLET | Refills: 1 | Status: SHIPPED | OUTPATIENT
Start: 2020-05-15 | End: 2020-08-14

## 2020-05-15 RX ORDER — PROPRANOLOL HYDROCHLORIDE 20 MG/1
TABLET ORAL
Qty: 120 TABLET | Refills: 3 | Status: SHIPPED | OUTPATIENT
Start: 2020-05-15 | End: 2020-05-20 | Stop reason: SDUPTHER

## 2020-05-20 ENCOUNTER — TELEMEDICINE (OUTPATIENT)
Dept: PSYCHIATRY | Facility: CLINIC | Age: 50
End: 2020-05-20
Payer: COMMERCIAL

## 2020-05-20 DIAGNOSIS — F41.8 PERFORMANCE ANXIETY: ICD-10-CM

## 2020-05-20 DIAGNOSIS — F33.1 MDD (MAJOR DEPRESSIVE DISORDER), RECURRENT EPISODE, MODERATE (HCC): ICD-10-CM

## 2020-05-20 DIAGNOSIS — F41.1 GAD (GENERALIZED ANXIETY DISORDER): ICD-10-CM

## 2020-05-20 PROCEDURE — 99213 OFFICE O/P EST LOW 20 MIN: CPT | Performed by: PSYCHIATRY & NEUROLOGY

## 2020-05-20 PROCEDURE — 90833 PSYTX W PT W E/M 30 MIN: CPT | Performed by: PSYCHIATRY & NEUROLOGY

## 2020-05-20 RX ORDER — FLUOXETINE HYDROCHLORIDE 40 MG/1
80 CAPSULE ORAL DAILY
Qty: 60 CAPSULE | Refills: 3 | Status: SHIPPED | OUTPATIENT
Start: 2020-05-20 | End: 2020-09-04 | Stop reason: SDUPTHER

## 2020-05-20 RX ORDER — BUSPIRONE HYDROCHLORIDE 10 MG/1
TABLET ORAL
Qty: 90 TABLET | Refills: 1 | Status: SHIPPED | OUTPATIENT
Start: 2020-05-20 | End: 2020-07-09

## 2020-05-20 RX ORDER — PROPRANOLOL HYDROCHLORIDE 20 MG/1
20-40 TABLET ORAL 2 TIMES DAILY PRN
Qty: 120 TABLET | Refills: 3 | Status: SHIPPED | OUTPATIENT
Start: 2020-05-20 | End: 2020-09-04 | Stop reason: SDUPTHER

## 2020-06-09 ENCOUNTER — PATIENT OUTREACH (OUTPATIENT)
Dept: SURGERY | Facility: CLINIC | Age: 50
End: 2020-06-09

## 2020-06-10 DIAGNOSIS — J30.2 SEASONAL ALLERGIES: ICD-10-CM

## 2020-06-10 RX ORDER — MONTELUKAST SODIUM 10 MG/1
TABLET ORAL
Qty: 90 TABLET | Refills: 1 | Status: SHIPPED | OUTPATIENT
Start: 2020-06-10 | End: 2020-09-10

## 2020-06-23 ENCOUNTER — PATIENT OUTREACH (OUTPATIENT)
Dept: SURGERY | Facility: CLINIC | Age: 50
End: 2020-06-23

## 2020-06-26 ENCOUNTER — PREP FOR PROCEDURE (OUTPATIENT)
Dept: GASTROENTEROLOGY | Facility: AMBULARY SURGERY CENTER | Age: 50
End: 2020-06-26

## 2020-06-26 DIAGNOSIS — Z20.822 ENCOUNTER FOR LABORATORY TESTING FOR COVID-19 VIRUS: Primary | ICD-10-CM

## 2020-06-30 ENCOUNTER — TELEPHONE (OUTPATIENT)
Dept: SURGERY | Facility: CLINIC | Age: 50
End: 2020-06-30

## 2020-06-30 NOTE — TELEPHONE ENCOUNTER
Spoke with patient re: prep for colonoscopy  Pt states he has the box at home  Pt aware if he has any questions to call GI or our office for help

## 2020-07-01 DIAGNOSIS — Z20.822 ENCOUNTER FOR LABORATORY TESTING FOR COVID-19 VIRUS: ICD-10-CM

## 2020-07-01 PROCEDURE — U0003 INFECTIOUS AGENT DETECTION BY NUCLEIC ACID (DNA OR RNA); SEVERE ACUTE RESPIRATORY SYNDROME CORONAVIRUS 2 (SARS-COV-2) (CORONAVIRUS DISEASE [COVID-19]), AMPLIFIED PROBE TECHNIQUE, MAKING USE OF HIGH THROUGHPUT TECHNOLOGIES AS DESCRIBED BY CMS-2020-01-R: HCPCS | Performed by: OBSTETRICS & GYNECOLOGY

## 2020-07-06 ENCOUNTER — HOSPITAL ENCOUNTER (OUTPATIENT)
Dept: GASTROENTEROLOGY | Facility: HOSPITAL | Age: 50
Setting detail: OUTPATIENT SURGERY
Discharge: HOME | End: 2020-07-06
Attending: INTERNAL MEDICINE | Admitting: INTERNAL MEDICINE
Payer: COMMERCIAL

## 2020-07-06 VITALS
TEMPERATURE: 96.2 F | HEIGHT: 67 IN | OXYGEN SATURATION: 99 % | SYSTOLIC BLOOD PRESSURE: 131 MMHG | BODY MASS INDEX: 26.53 KG/M2 | WEIGHT: 169 LBS | DIASTOLIC BLOOD PRESSURE: 87 MMHG | HEART RATE: 58 BPM | RESPIRATION RATE: 12 BRPM

## 2020-07-06 DIAGNOSIS — K58.9 SPASM OF BOWEL: ICD-10-CM

## 2020-07-06 DIAGNOSIS — K21.9 GASTROESOPHAGEAL REFLUX DISEASE, ESOPHAGITIS PRESENCE NOT SPECIFIED: ICD-10-CM

## 2020-07-06 DIAGNOSIS — R19.7 DIARRHEA, UNSPECIFIED TYPE: ICD-10-CM

## 2020-07-06 DIAGNOSIS — K58.2 IRRITABLE BOWEL SYNDROME WITH BOTH CONSTIPATION AND DIARRHEA: ICD-10-CM

## 2020-07-06 PROCEDURE — 88305 TISSUE EXAM BY PATHOLOGIST: CPT | Performed by: PATHOLOGY

## 2020-07-06 PROCEDURE — NC001 PR NO CHARGE: Performed by: INTERNAL MEDICINE

## 2020-07-06 PROCEDURE — 45380 COLONOSCOPY AND BIOPSY: CPT | Performed by: INTERNAL MEDICINE

## 2020-07-06 PROCEDURE — 43239 EGD BIOPSY SINGLE/MULTIPLE: CPT | Performed by: INTERNAL MEDICINE

## 2020-07-06 RX ORDER — PROPOFOL 10 MG/ML
INJECTION, EMULSION INTRAVENOUS CONTINUOUS PRN
Status: DISCONTINUED | OUTPATIENT
Start: 2020-07-06 | End: 2020-07-06 | Stop reason: SURG

## 2020-07-06 RX ORDER — GLYCOPYRROLATE 0.2 MG/ML
INJECTION INTRAMUSCULAR; INTRAVENOUS AS NEEDED
Status: DISCONTINUED | OUTPATIENT
Start: 2020-07-06 | End: 2020-07-06 | Stop reason: SURG

## 2020-07-06 RX ORDER — FENTANYL CITRATE 50 UG/ML
INJECTION, SOLUTION INTRAMUSCULAR; INTRAVENOUS AS NEEDED
Status: DISCONTINUED | OUTPATIENT
Start: 2020-07-06 | End: 2020-07-06 | Stop reason: SURG

## 2020-07-06 RX ORDER — SODIUM CHLORIDE, SODIUM LACTATE, POTASSIUM CHLORIDE, CALCIUM CHLORIDE 600; 310; 30; 20 MG/100ML; MG/100ML; MG/100ML; MG/100ML
125 INJECTION, SOLUTION INTRAVENOUS CONTINUOUS
Status: DISCONTINUED | OUTPATIENT
Start: 2020-07-06 | End: 2020-07-10 | Stop reason: HOSPADM

## 2020-07-06 RX ORDER — LIDOCAINE HYDROCHLORIDE 10 MG/ML
INJECTION, SOLUTION EPIDURAL; INFILTRATION; INTRACAUDAL; PERINEURAL AS NEEDED
Status: DISCONTINUED | OUTPATIENT
Start: 2020-07-06 | End: 2020-07-06 | Stop reason: SURG

## 2020-07-06 RX ORDER — PROPOFOL 10 MG/ML
INJECTION, EMULSION INTRAVENOUS AS NEEDED
Status: DISCONTINUED | OUTPATIENT
Start: 2020-07-06 | End: 2020-07-06 | Stop reason: SURG

## 2020-07-06 RX ADMIN — LIDOCAINE HYDROCHLORIDE 100 MG: 10 INJECTION, SOLUTION EPIDURAL; INFILTRATION; INTRACAUDAL; PERINEURAL at 10:08

## 2020-07-06 RX ADMIN — PROPOFOL 50 MG: 10 INJECTION, EMULSION INTRAVENOUS at 10:29

## 2020-07-06 RX ADMIN — PROPOFOL 150 MCG/KG/MIN: 10 INJECTION, EMULSION INTRAVENOUS at 10:08

## 2020-07-06 RX ADMIN — SODIUM CHLORIDE, SODIUM LACTATE, POTASSIUM CHLORIDE, AND CALCIUM CHLORIDE: .6; .31; .03; .02 INJECTION, SOLUTION INTRAVENOUS at 10:05

## 2020-07-06 RX ADMIN — GLYCOPYRROLATE 0.2 MG: 0.2 INJECTION, SOLUTION INTRAMUSCULAR; INTRAVENOUS at 10:08

## 2020-07-06 RX ADMIN — PROPOFOL 50 MG: 10 INJECTION, EMULSION INTRAVENOUS at 10:31

## 2020-07-06 RX ADMIN — PROPOFOL 100 MG: 10 INJECTION, EMULSION INTRAVENOUS at 10:08

## 2020-07-06 RX ADMIN — FENTANYL CITRATE 25 MCG: 50 INJECTION INTRAMUSCULAR; INTRAVENOUS at 10:08

## 2020-07-06 NOTE — H&P
History and Physical - [facility]  Jeane Shepherd 44 y o  male MRN: [id number]    HPI: Jeane Shepherd is a 44y o  year old male who presents for evaluation of Hall's, has history of GERD  Also has had worsening diarrhea and constipation  Review of Systems    Historical Information   Past Medical History:   Diagnosis Date    Anxiety     Last Assessed: 7/18/2016     Dysuria     Last Assessed: 9/14/2015    Fecal urgency     Pt has had fecal incontinence in past, has weakened sphincter  would benefit from fiber, Needs f/u flex sig will refer for scheduling -        GERD (gastroesophageal reflux disease)     Hemorrhage of anus and rectum     Last Assessed: 3/5/2014     Herpes zoster     Last Assessed: 9/26/2016    HIV (human immunodeficiency virus infection) ([facility] )     IBS (irritable bowel syndrome)     Proctitis, chlamydial     Last Assessed: 9/29/2014     Recurrent major depressive episodes, moderate ([facility] )     Last Assessed: 9/15/2017      Past Surgical History:   Procedure Laterality Date    CYSTOSCOPY  2014    OR ESOPHAGOGASTRODUODENOSCOPY TRANSORAL DIAGNOSTIC N/A 3/9/2017    Procedure: ESOPHAGOGASTRODUODENOSCOPY (EGD); Surgeon: Benita Jernigan MD;  Location: [facility];   Service: Gastroenterology    VARICOCELE EXCISION      Spermatic cord Excision - Last Assessed: 3/17/2016      Social History   Social History     Substance and Sexual Activity   Alcohol Use Yes    Alcohol/week: 1 0 standard drinks    Types: 1 Cans of beer per week    Frequency: Monthly or less    Drinks per session: 1 or 2    Comment: socially     Social History     Substance and Sexual Activity   Drug Use No     Social History     Tobacco Use   Smoking Status Current Every Day Smoker    Packs/day: 0 50    Types: Cigarettes   Smokeless Tobacco Former User   Tobacco Comment    less than 1/2 pack     Family History   Problem Relation Age of Onset    Diabetes type II Mother     Heart attack Father    Matilda Igor Substance Abuse Cousin        Meds/Allergies       (Not in a hospital admission)    Allergies   Allergen Reactions    Tomato Vomiting       Objective     /96   Pulse 74   Temp (!) 97 3 °F (36 3 °C) (Temporal)   Resp 18   Ht 5' 7" (1 702 m)   Wt 76 7 kg (169 lb)   SpO2 99%   BMI 26 47 kg/m²       PHYSICAL EXAM    Gen: NAD  CV: RRR  CHEST: Clear  ABD: soft, NT/ND  EXT: no edema  Neuro: AAO      ASSESSMENT/PLAN:  This is a 44y o  year old male here for evaluation of Hall's and change in bowel habits  PLAN:   Procedure:  EGD and colonoscopy

## 2020-07-06 NOTE — ANESTHESIA POSTPROCEDURE EVALUATION
Post-Op Assessment Note    CV Status:  Stable  Pain Score: 0    Pain management: adequate     Mental Status:  Alert and awake   Hydration Status:  Euvolemic   PONV Controlled:  Controlled   Airway Patency:  Patent and adequate   Post Op Vitals Reviewed: Yes      Staff: CRNA           BP  143/99   Temp  96 2   Pulse  68   Resp   18   SpO2   100

## 2020-07-06 NOTE — ANESTHESIA PREPROCEDURE EVALUATION
Review of Systems/Medical History  Patient summary reviewed  Chart reviewed  No history of anesthetic complications     Cardiovascular  Exercise tolerance (METS): >4,  Hyperlipidemia,    Pulmonary  Smoker (1/2 PPD) , Asthma , well controlled/ stable ,        GI/Hepatic  Dysphagia,   GERD ,             Endo/Other     GYN       Hematology      Comment: HIV+ Musculoskeletal       Neurology   Psychology   Anxiety, Depression ,   Chronic pain,            Physical Exam    Airway    Mallampati score: II  TM Distance: >3 FB  Neck ROM: full     Dental   Comment: Numerous missing teeth, substantial decay,     Cardiovascular  Rhythm: regular, Rate: normal,     Pulmonary      Other Findings        Anesthesia Plan  ASA Score- 2     Anesthesia Type- IV sedation with anesthesia with ASA Monitors  Additional Monitors:   Airway Plan:         Plan Factors-    Induction- intravenous  Postoperative Plan-     Informed Consent- Anesthetic plan and risks discussed with patient  I personally reviewed this patient with the CRNA  Discussed and agreed on the Anesthesia Plan with the CRNA  Carlos Andrews

## 2020-07-08 LAB — SARS-COV-2 RNA SPEC QL NAA+PROBE: NOT DETECTED

## 2020-07-09 ENCOUNTER — PATIENT OUTREACH (OUTPATIENT)
Dept: SURGERY | Facility: CLINIC | Age: 50
End: 2020-07-09

## 2020-07-09 DIAGNOSIS — F41.1 GAD (GENERALIZED ANXIETY DISORDER): ICD-10-CM

## 2020-07-09 DIAGNOSIS — F33.1 MDD (MAJOR DEPRESSIVE DISORDER), RECURRENT EPISODE, MODERATE (HCC): ICD-10-CM

## 2020-07-09 RX ORDER — BUSPIRONE HYDROCHLORIDE 10 MG/1
TABLET ORAL
Qty: 90 TABLET | Refills: 1 | Status: SHIPPED | OUTPATIENT
Start: 2020-07-09 | End: 2020-09-04

## 2020-07-21 ENCOUNTER — TELEPHONE (OUTPATIENT)
Dept: GASTROENTEROLOGY | Facility: MEDICAL CENTER | Age: 50
End: 2020-07-21

## 2020-07-21 ENCOUNTER — OFFICE VISIT (OUTPATIENT)
Dept: SURGERY | Facility: CLINIC | Age: 50
End: 2020-07-21
Payer: COMMERCIAL

## 2020-07-21 VITALS
OXYGEN SATURATION: 96 % | RESPIRATION RATE: 18 BRPM | SYSTOLIC BLOOD PRESSURE: 103 MMHG | DIASTOLIC BLOOD PRESSURE: 68 MMHG | BODY MASS INDEX: 27.75 KG/M2 | HEART RATE: 66 BPM | TEMPERATURE: 98.7 F | WEIGHT: 176.8 LBS | HEIGHT: 67 IN

## 2020-07-21 DIAGNOSIS — R73.03 PREDIABETES: ICD-10-CM

## 2020-07-21 DIAGNOSIS — B20 HIV DISEASE (HCC): Primary | ICD-10-CM

## 2020-07-21 DIAGNOSIS — E78.2 ELEVATED CHOLESTEROL WITH ELEVATED TRIGLYCERIDES: ICD-10-CM

## 2020-07-21 DIAGNOSIS — U07.1 COVID-19: ICD-10-CM

## 2020-07-21 DIAGNOSIS — T65.222D TOXIC EFFECT OF TOBACCO CIGARETTE, INTENTIONAL SELF-HARM, SUBSEQUENT ENCOUNTER: ICD-10-CM

## 2020-07-21 DIAGNOSIS — K58.2 IRRITABLE BOWEL SYNDROME WITH BOTH CONSTIPATION AND DIARRHEA: ICD-10-CM

## 2020-07-21 PROCEDURE — 99214 OFFICE O/P EST MOD 30 MIN: CPT | Performed by: NURSE PRACTITIONER

## 2020-07-21 NOTE — ASSESSMENT & PLAN NOTE
Doing good  Pt reports 100% medication compliance  Stressed the importance of 100% medication adherence  HIV Counseling:    Viral Load: < 20    CD4 Count: 8693 recent CD4 was not collected     ART: Bharat Glover     Denies side effects  Stressed the importance of adherence  Continue follow up in the ID clinic with Dr Eliot Granados  Reviewed the most recent labs, including the Cd4 and viral load  Discussed the risks and benefits of treatment options, instructions for management, importance of treatment adherence, and reduction of risk factors  Educated on possible medication side effects  Counseled on routes of HIV transmission, including the risk of  infection  Emphasized that viral suppression is the best method to prevent HIV transmission  At this time the pt denies the need for HIV testing of anyone in their life  Total encounter time was greater than 35 minutes  Greater than 20 minutes were spent on counseling and patient education  Pt voices understanding and agreement with treatment plan

## 2020-07-21 NOTE — PROGRESS NOTES
Assessment/Plan:    No problem-specific Assessment & Plan notes found for this encounter  Diagnoses and all orders for this visit:    HIV disease ([facility] )    Toxic effect of tobacco cigarette, intentional self-harm, subsequent encounter    Prediabetes  -     HEMOGLOBIN A1C W/ EAG ESTIMATION;  Future    COVID-19  -     SARS-CoV2 Antibody, Total (IgG, IgA, IgM) [facility]; Future    Irritable bowel syndrome with both constipation and diarrhea    Elevated cholesterol with elevated triglycerides        Patient Active Problem List   Diagnosis    Bilateral varicoceles    Embolism involving vascular device (HCC)    HIV disease (HCC)    GERD (gastroesophageal reflux disease)    Irritable bowel syndrome with both constipation and diarrhea    Toxic effect of tobacco cigarette, intentional self-harm ([facility] )    SOPHIA (generalized anxiety disorder)    MDD (major depressive disorder), recurrent episode, moderate (HCC)    Acute right-sided low back pain with right-sided sciatica    Jerky body movements    Panic attacks    Dysphagia    Mild intermittent asthma without complication    ED (erectile dysfunction)    Constipation    Prediabetes    Red eyes    Chronic pain syndrome    Seasonal allergies    Discomfort    History of sensory changes    Gingival abscess    Difficulty in voiding    Foot pain, bilateral    At high risk for falls    Balance problems    Elevated cholesterol with elevated triglycerides    Weight loss    Ganglion cyst of right foot    Upper respiratory tract infection    Testicular pain     Lab Results   Component Value Date     12/30/2015    K 4 4 04/06/2020     04/06/2020    CO2 26 04/06/2020    ANIONGAP 9 12/30/2015    BUN 16 04/06/2020    CREATININE 0 95 04/06/2020    GLUCOSE 124 12/30/2015    GLUF 106 (H) 01/24/2020    CALCIUM 9 0 04/06/2020    AST 25 04/06/2020    ALT 49 04/06/2020    ALKPHOS 98 04/06/2020    PROT 8 4 (H) 12/30/2015    BILITOT 0 37 12/30/2015    EGFR 92 04/06/2020     Lab Results   Component Value Date    WBC 7 51 04/06/2020    HGB 15 6 04/06/2020    HCT 47 0 04/06/2020    MCV 93 04/06/2020     04/06/2020          Subjective:      Patient ID: Nallely Simmons is a 44 y o  male  HPI  Patient is being seen in the office today with PCP for three-month follow-up appointment for management of chronic healthcare conditions  Patient reports is going good  Pt has been staying at home  Pt is awaiting dental care that was put on hold due to covid-19  The following portions of the patient's history were reviewed and updated as appropriate: allergies, current medications, past family history, past medical history, past social history and problem list     Review of Systems   Constitutional: Negative  HENT: Negative  Respiratory: Negative  Cardiovascular: Negative  Gastrointestinal: Negative  Genitourinary: Negative  Neurological: Negative  Psychiatric/Behavioral: Negative  Objective: There were no vitals taken for this visit  Physical Exam   Constitutional: He is oriented to person, place, and time  He appears well-developed and well-nourished  Cardiovascular: Normal rate, regular rhythm, normal heart sounds and intact distal pulses  PMI is not displaced  Pulmonary/Chest: Effort normal and breath sounds normal    Abdominal: Soft  Bowel sounds are normal    Musculoskeletal: Normal range of motion  Neurological: He is alert and oriented to person, place, and time  Skin: Skin is warm and dry  Capillary refill takes less than 2 seconds  Psychiatric: He has a normal mood and affect  His behavior is normal  Judgment and thought content normal    Nursing note and vitals reviewed  BMI Counseling: Body mass index is 27 69 kg/m²   The BMI is above normal  Nutrition recommendations include reducing portion sizes, decreasing overall calorie intake, 3-5 servings of fruits/vegetables daily, decreasing soda and/or juice intake, moderation in carbohydrate intake and reducing intake of saturated fat and trans fat  Exercise recommendations include exercising 3-5 times per week and strength training exercises

## 2020-07-21 NOTE — ASSESSMENT & PLAN NOTE
Doing ok  Pt is not always aware of the urge to defecate or that he has not gone for a few days  Pt has been taking Bentyl and does help for the most part    · F/u with GI

## 2020-07-21 NOTE — ASSESSMENT & PLAN NOTE
Ongoing  Pt continues to smoking less about 1/2 ppd  Pt is thinking about how he can down to 1/4 or completely smoke free  Educated pt on the importance of 100% smoking cessation  Nicotine Dependency - Primary    Counseled for greater than 15 minutes on the importance of smoking cessation  Education was given regarding the cardiovascular effects of how nicotine affects the heart, lungs, kidneys,and peripheral vascular system    Referred to [facility] for enrollment in smoking cessation program

## 2020-07-21 NOTE — TELEPHONE ENCOUNTER
----- Message from Gabriel Isaacs MD sent at 7/14/2020  3:57 PM EDT -----  Please inform the patient that there is no evidence of celiac disease, no evidence of H pylori organism, biopsies of the esophagus show inflammation which is chronic but no evidence of eosinophilic esophagitis  Continue omeprazole daily  No need for repeat EGD  Colonoscopy did not show any evidence of microscopic colitis  Would recommend repeat colonoscopy in 5 years

## 2020-07-22 ENCOUNTER — TELEPHONE (OUTPATIENT)
Dept: SURGERY | Facility: CLINIC | Age: 50
End: 2020-07-22

## 2020-07-22 NOTE — TELEPHONE ENCOUNTER
RD called and left voicemail message for Patricia Corona to return call  Attempt to check in on nutritional status following appointment with PCP Tuesday        Nikita Zepeda, MS, RD, LDN

## 2020-07-28 DIAGNOSIS — N52.8 OTHER MALE ERECTILE DYSFUNCTION: ICD-10-CM

## 2020-07-28 RX ORDER — TADALAFIL 2.5 MG/1
TABLET, FILM COATED ORAL
Qty: 10 TABLET | Refills: 2 | Status: SHIPPED | OUTPATIENT
Start: 2020-07-28 | End: 2020-10-16

## 2020-08-14 DIAGNOSIS — E78.2 ELEVATED CHOLESTEROL WITH ELEVATED TRIGLYCERIDES: ICD-10-CM

## 2020-08-14 RX ORDER — ATORVASTATIN CALCIUM 10 MG/1
TABLET, FILM COATED ORAL
Qty: 90 TABLET | Refills: 1 | Status: SHIPPED | OUTPATIENT
Start: 2020-08-14 | End: 2021-02-26

## 2020-09-04 ENCOUNTER — OFFICE VISIT (OUTPATIENT)
Dept: PSYCHIATRY | Facility: CLINIC | Age: 50
End: 2020-09-04
Payer: COMMERCIAL

## 2020-09-04 VITALS — BODY MASS INDEX: 27.57 KG/M2 | WEIGHT: 176 LBS

## 2020-09-04 DIAGNOSIS — F41.8 PERFORMANCE ANXIETY: ICD-10-CM

## 2020-09-04 DIAGNOSIS — F41.1 GAD (GENERALIZED ANXIETY DISORDER): ICD-10-CM

## 2020-09-04 DIAGNOSIS — F31.81 BIPOLAR 2 DISORDER, MAJOR DEPRESSIVE EPISODE (HCC): Primary | ICD-10-CM

## 2020-09-04 DIAGNOSIS — F41.0 PANIC ATTACKS: ICD-10-CM

## 2020-09-04 DIAGNOSIS — Z79.899 HIGH RISK MEDICATION USE: ICD-10-CM

## 2020-09-04 PROCEDURE — 99213 OFFICE O/P EST LOW 20 MIN: CPT | Performed by: PSYCHIATRY & NEUROLOGY

## 2020-09-04 RX ORDER — FLUOXETINE HYDROCHLORIDE 40 MG/1
80 CAPSULE ORAL DAILY
Qty: 60 CAPSULE | Refills: 3 | Status: SHIPPED | OUTPATIENT
Start: 2020-09-04 | End: 2020-11-02 | Stop reason: SDUPTHER

## 2020-09-04 RX ORDER — OLANZAPINE 5 MG/1
5 TABLET ORAL
Qty: 30 TABLET | Refills: 1 | Status: SHIPPED | OUTPATIENT
Start: 2020-09-04 | End: 2020-10-09

## 2020-09-04 RX ORDER — PROPRANOLOL HYDROCHLORIDE 20 MG/1
20-40 TABLET ORAL 2 TIMES DAILY PRN
Qty: 120 TABLET | Refills: 3 | Status: SHIPPED | OUTPATIENT
Start: 2020-09-04 | End: 2020-11-02 | Stop reason: SDUPTHER
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
## 2020-09-10 DIAGNOSIS — J30.2 SEASONAL ALLERGIES: ICD-10-CM

## 2020-09-10 DIAGNOSIS — K58.2 IRRITABLE BOWEL SYNDROME WITH BOTH CONSTIPATION AND DIARRHEA: ICD-10-CM

## 2020-09-10 DIAGNOSIS — B20 HIV DISEASE (HCC): ICD-10-CM

## 2020-09-10 RX ORDER — DICYCLOMINE HCL 20 MG
TABLET ORAL
Qty: 60 TABLET | Refills: 2 | Status: SHIPPED | OUTPATIENT
Start: 2020-09-10 | End: 2020-12-04

## 2020-09-10 RX ORDER — BICTEGRAVIR SODIUM, EMTRICITABINE, AND TENOFOVIR ALAFENAMIDE FUMARATE 50; 200; 25 MG/1; MG/1; MG/1
TABLET ORAL
Qty: 30 TABLET | Refills: 3 | Status: SHIPPED | OUTPATIENT
Start: 2020-09-10 | End: 2021-01-05 | Stop reason: SDUPTHER

## 2020-09-10 RX ORDER — MONTELUKAST SODIUM 10 MG/1
TABLET ORAL
Qty: 90 TABLET | Refills: 1 | Status: SHIPPED | OUTPATIENT
Start: 2020-09-10 | End: 2021-04-05

## 2020-09-11 RX ORDER — BUSPIRONE HYDROCHLORIDE 10 MG/1
TABLET ORAL
Qty: 90 TABLET | OUTPATIENT
Start: 2020-09-11

## 2020-09-23 RX ORDER — BUSPIRONE HYDROCHLORIDE 10 MG/1
TABLET ORAL
Qty: 90 TABLET | OUTPATIENT
Start: 2020-09-23

## 2020-09-25 ENCOUNTER — PATIENT OUTREACH (OUTPATIENT)
Dept: SURGERY | Facility: CLINIC | Age: 50
End: 2020-09-25

## 2020-09-28 ENCOUNTER — APPOINTMENT (OUTPATIENT)
Dept: LAB | Age: 50
End: 2020-09-28
Payer: COMMERCIAL

## 2020-09-28 DIAGNOSIS — R73.03 PREDIABETES: ICD-10-CM

## 2020-09-28 DIAGNOSIS — E78.2 ELEVATED CHOLESTEROL WITH ELEVATED TRIGLYCERIDES: ICD-10-CM

## 2020-09-28 DIAGNOSIS — B20 HIV DISEASE (HCC): ICD-10-CM

## 2020-09-28 DIAGNOSIS — Z79.899 HIGH RISK MEDICATION USE: ICD-10-CM

## 2020-09-28 DIAGNOSIS — U07.1 COVID-19: ICD-10-CM

## 2020-09-28 LAB
ALBUMIN SERPL BCP-MCNC: 3.7 G/DL (ref 3.5–5)
ALP SERPL-CCNC: 112 U/L (ref 46–116)
ALT SERPL W P-5'-P-CCNC: 35 U/L (ref 12–78)
ANION GAP SERPL CALCULATED.3IONS-SCNC: 4 MMOL/L (ref 4–13)
AST SERPL W P-5'-P-CCNC: 21 U/L (ref 5–45)
BASOPHILS # BLD AUTO: 0.04 THOUSANDS/ΜL (ref 0–0.1)
BASOPHILS NFR BLD AUTO: 1 % (ref 0–1)
BILIRUB SERPL-MCNC: 0.43 MG/DL (ref 0.2–1)
BUN SERPL-MCNC: 16 MG/DL (ref 5–25)
CALCIUM SERPL-MCNC: 9 MG/DL (ref 8.3–10.1)
CHLORIDE SERPL-SCNC: 109 MMOL/L (ref 100–108)
CHOLEST SERPL-MCNC: 183 MG/DL (ref 50–200)
CO2 SERPL-SCNC: 28 MMOL/L (ref 21–32)
CREAT SERPL-MCNC: 0.95 MG/DL (ref 0.6–1.3)
EOSINOPHIL # BLD AUTO: 0.18 THOUSAND/ΜL (ref 0–0.61)
EOSINOPHIL NFR BLD AUTO: 3 % (ref 0–6)
ERYTHROCYTE [DISTWIDTH] IN BLOOD BY AUTOMATED COUNT: 13.3 % (ref 11.6–15.1)
EST. AVERAGE GLUCOSE BLD GHB EST-MCNC: 114 MG/DL
GFR SERPL CREATININE-BSD FRML MDRD: 92 ML/MIN/1.73SQ M
GLUCOSE P FAST SERPL-MCNC: 95 MG/DL (ref 65–99)
HBA1C MFR BLD: 5.6 %
HCT VFR BLD AUTO: 42 % (ref 36.5–49.3)
HDLC SERPL-MCNC: 46 MG/DL
HGB BLD-MCNC: 14.3 G/DL (ref 12–17)
IMM GRANULOCYTES # BLD AUTO: 0.04 THOUSAND/UL (ref 0–0.2)
IMM GRANULOCYTES NFR BLD AUTO: 1 % (ref 0–2)
LDLC SERPL CALC-MCNC: 93 MG/DL (ref 0–100)
LYMPHOCYTES # BLD AUTO: 2.83 THOUSANDS/ΜL (ref 0.6–4.47)
LYMPHOCYTES NFR BLD AUTO: 40 % (ref 14–44)
MCH RBC QN AUTO: 32.5 PG (ref 26.8–34.3)
MCHC RBC AUTO-ENTMCNC: 34 G/DL (ref 31.4–37.4)
MCV RBC AUTO: 96 FL (ref 82–98)
MONOCYTES # BLD AUTO: 0.65 THOUSAND/ΜL (ref 0.17–1.22)
MONOCYTES NFR BLD AUTO: 9 % (ref 4–12)
NEUTROPHILS # BLD AUTO: 3.41 THOUSANDS/ΜL (ref 1.85–7.62)
NEUTS SEG NFR BLD AUTO: 46 % (ref 43–75)
NRBC BLD AUTO-RTO: 0 /100 WBCS
PLATELET # BLD AUTO: 333 THOUSANDS/UL (ref 149–390)
PMV BLD AUTO: 8.4 FL (ref 8.9–12.7)
POTASSIUM SERPL-SCNC: 4.2 MMOL/L (ref 3.5–5.3)
PROT SERPL-MCNC: 7.5 G/DL (ref 6.4–8.2)
RBC # BLD AUTO: 4.4 MILLION/UL (ref 3.88–5.62)
RPR SER QL: NORMAL
SARS-COV-2 IGG+IGM SERPL QL IA: NORMAL
SODIUM SERPL-SCNC: 141 MMOL/L (ref 136–145)
TRIGL SERPL-MCNC: 222 MG/DL
WBC # BLD AUTO: 7.15 THOUSAND/UL (ref 4.31–10.16)

## 2020-09-28 PROCEDURE — 86769 SARS-COV-2 COVID-19 ANTIBODY: CPT

## 2020-09-28 PROCEDURE — 86361 T CELL ABSOLUTE COUNT: CPT

## 2020-09-28 PROCEDURE — 83036 HEMOGLOBIN GLYCOSYLATED A1C: CPT

## 2020-09-28 PROCEDURE — 86592 SYPHILIS TEST NON-TREP QUAL: CPT

## 2020-09-28 PROCEDURE — 36415 COLL VENOUS BLD VENIPUNCTURE: CPT

## 2020-09-28 PROCEDURE — 80061 LIPID PANEL: CPT

## 2020-09-28 PROCEDURE — 86480 TB TEST CELL IMMUN MEASURE: CPT

## 2020-09-28 PROCEDURE — 84153 ASSAY OF PSA TOTAL: CPT

## 2020-09-28 PROCEDURE — 80053 COMPREHEN METABOLIC PANEL: CPT

## 2020-09-28 PROCEDURE — 87536 HIV-1 QUANT&REVRSE TRNSCRPJ: CPT

## 2020-09-28 PROCEDURE — 85025 COMPLETE CBC W/AUTO DIFF WBC: CPT

## 2020-09-29 LAB
BASOPHILS # BLD AUTO: 0 X10E3/UL (ref 0–0.2)
BASOPHILS NFR BLD AUTO: 0 %
CD3+CD4+ CELLS # BLD: 991 /UL (ref 359–1519)
CD3+CD4+ CELLS NFR BLD: 38.1 % (ref 30.8–58.5)
EOSINOPHIL # BLD AUTO: 0.2 X10E3/UL (ref 0–0.4)
EOSINOPHIL NFR BLD AUTO: 2 %
ERYTHROCYTE [DISTWIDTH] IN BLOOD BY AUTOMATED COUNT: 13.7 % (ref 11.6–15.4)
HCT VFR BLD AUTO: 41.6 % (ref 37.5–51)
HGB BLD-MCNC: 13.9 G/DL (ref 13–17.7)
IMM GRANULOCYTES # BLD: 0 X10E3/UL (ref 0–0.1)
IMM GRANULOCYTES NFR BLD: 1 %
LYMPHOCYTES # BLD AUTO: 2.6 X10E3/UL (ref 0.7–3.1)
LYMPHOCYTES NFR BLD AUTO: 38 %
MCH RBC QN AUTO: 32.4 PG (ref 26.6–33)
MCHC RBC AUTO-ENTMCNC: 33.4 G/DL (ref 31.5–35.7)
MCV RBC AUTO: 97 FL (ref 79–97)
MONOCYTES # BLD AUTO: 0.7 X10E3/UL (ref 0.1–0.9)
MONOCYTES NFR BLD AUTO: 10 %
NEUTROPHILS # BLD AUTO: 3.3 X10E3/UL (ref 1.4–7)
NEUTROPHILS NFR BLD AUTO: 49 %
PLATELET # BLD AUTO: 353 X10E3/UL (ref 150–450)
RBC # BLD AUTO: 4.29 X10E6/UL (ref 4.14–5.8)
WBC # BLD AUTO: 6.8 X10E3/UL (ref 3.4–10.8)

## 2020-09-30 ENCOUNTER — OFFICE VISIT (OUTPATIENT)
Dept: SURGERY | Facility: CLINIC | Age: 50
End: 2020-09-30
Payer: COMMERCIAL

## 2020-09-30 VITALS
DIASTOLIC BLOOD PRESSURE: 86 MMHG | SYSTOLIC BLOOD PRESSURE: 139 MMHG | WEIGHT: 176.8 LBS | TEMPERATURE: 96.8 F | HEART RATE: 94 BPM | BODY MASS INDEX: 27.75 KG/M2 | HEIGHT: 67 IN | OXYGEN SATURATION: 97 %

## 2020-09-30 DIAGNOSIS — Z23 NEED FOR INFLUENZA VACCINATION: Primary | ICD-10-CM

## 2020-09-30 DIAGNOSIS — B20 HIV DISEASE (HCC): ICD-10-CM

## 2020-09-30 DIAGNOSIS — F41.1 GAD (GENERALIZED ANXIETY DISORDER): ICD-10-CM

## 2020-09-30 DIAGNOSIS — T65.222D TOXIC EFFECT OF TOBACCO CIGARETTE, INTENTIONAL SELF-HARM, SUBSEQUENT ENCOUNTER: ICD-10-CM

## 2020-09-30 DIAGNOSIS — K08.9 POOR DENTITION: ICD-10-CM

## 2020-09-30 LAB
GAMMA INTERFERON BACKGROUND BLD IA-ACNC: 0.05 IU/ML
HIV1 RNA # SERPL NAA+PROBE: <20 COPIES/ML
HIV1 RNA SERPL NAA+PROBE-LOG#: NORMAL LOG10COPY/ML
M TB IFN-G BLD-IMP: NEGATIVE
M TB IFN-G CD4+ BCKGRND COR BLD-ACNC: -0.01 IU/ML
M TB IFN-G CD4+ BCKGRND COR BLD-ACNC: -0.01 IU/ML
MISCELLANEOUS LAB TEST RESULT: NORMAL
MITOGEN IGNF BCKGRD COR BLD-ACNC: >10 IU/ML

## 2020-09-30 PROCEDURE — 90682 RIV4 VACC RECOMBINANT DNA IM: CPT

## 2020-09-30 PROCEDURE — 99214 OFFICE O/P EST MOD 30 MIN: CPT | Performed by: INTERNAL MEDICINE

## 2020-09-30 PROCEDURE — 90471 IMMUNIZATION ADMIN: CPT

## 2020-09-30 NOTE — PROGRESS NOTES
Progress Note - Infectious Disease   Marylou Garvin 44 y o  male MRN: [id number]  Unit/Bed#:  Encounter: [id number]      Impression/Plan:  1  HIV-doing well on Biktarvy with an undetectable viral load and a CD4 count of 991  Continue ART, recheck labs in 5 months, follow-up in 6 months  Stressed adherence      2  Nicotine dependence-will continue stressed complete tobacco cessation     3  Generalized anxiety disorder-continue mental health follow-up    4  Poor [id number] with dental      Patient was provided medication, adherence and prevention education    Subjective:  Routine follow-up for HIV  Patient claims 100% adherence with Biktarvy     Patient denies any notable side effects  Overall the feeling well  The patient denies any fever chills or sweats, denies any nausea vomiting or diarrhea, denies any cough or shortness of breath  ROS:  A complete review of systems is negative other than that noted above in the subjective    Followup portions patient history reviewed and updated as: Allergies, current medications, past medical history, past social history, past surgical history, and the problem list    Objective:  Vitals:  Vitals:    09/30/20 1644   BP: 139/86   BP Location: Right arm   Patient Position: Sitting   Cuff Size: Standard   Pulse: 94   Temp: (!) 96 8 °F (36 °C)   SpO2: 97%   Weight: 80 2 kg (176 lb 12 8 oz)   Height: 5' 7" (1 702 m)       Physical Exam:   General Appearance:  Alert, interactive, appearing well,  nontoxic, no acute distress  Neck:   Supple without lymphadenopathy, no thyromegaly or masses   Throat: Oropharynx moist without lesions  Lungs:   Clear to auscultation bilaterally; no wheezes, rhonchi or rales; respirations unlabored   Heart:  RRR; no murmur, rub or gallop   Abdomen:   Soft, non-tender, non-distended, positive bowel sounds  Extremities: No clubbing, cyanosis or edema   Skin: No new rashes or lesions  No draining wounds noted         Labs, Imaging, & Other studies:   All pertinent labs and imaging studies were personally reviewed    Lab Results   Component Value Date     12/30/2015    K 4 2 09/28/2020     (H) 09/28/2020    CO2 28 09/28/2020    ANIONGAP 9 12/30/2015    BUN 16 09/28/2020    CREATININE 0 95 09/28/2020    GLUCOSE 124 12/30/2015    GLUF 95 09/28/2020    CALCIUM 9 0 09/28/2020    AST 21 09/28/2020    ALT 35 09/28/2020    ALKPHOS 112 09/28/2020    PROT 8 4 (H) 12/30/2015    BILITOT 0 37 12/30/2015    EGFR 92 09/28/2020     Lab Results   Component Value Date    WBC 6 8 09/28/2020    WBC 7 15 09/28/2020    HGB 13 9 09/28/2020    HGB 14 3 09/28/2020    HCT 41 6 09/28/2020    HCT 42 0 09/28/2020    MCV 97 09/28/2020    MCV 96 09/28/2020     09/28/2020     09/28/2020     Lab Results   Component Value Date    HEPCAB Non-reactive 06/14/2019     Lab Results   Component Value Date    HEPCAB Non-reactive 06/14/2019     Lab Results   Component Value Date    RPR Non-Reactive 09/28/2020     CD4 ABS   Date/Time Value Ref Range Status   09/28/2020 08:56  359 - 1519 /uL Final     HIV-1 RNA by PCR, Qn   Date/Time Value Ref Range Status   09/28/2020 08:56 AM <20 copies/mL Final     Comment:     HIV-1 RNA detected  The reportable range for this assay is 20 to 10,000,000  copies HIV-1 RNA/mL             Current Outpatient Medications:     albuterol (VENTOLIN HFA) 90 mcg/act inhaler, Inhale 2 puffs every 6 (six) hours as needed for wheezing, Disp: 18 g, Rfl: 2    atorvastatin (LIPITOR) 10 mg tablet, TAKE 1 TABLET BY MOUTH DAILY, Disp: 90 tablet, Rfl: 1    Biktarvy -25 MG tablet, TAKE 1 TABLET BY MOUTH DAILY WITH BREAKFAST, Disp: 30 tablet, Rfl: 3    cetirizine (ZyrTEC) 10 mg tablet, Take 1 tablet (10 mg total) by mouth daily, Disp: 30 tablet, Rfl: 2    CIALIS 2 5 MG tablet, TAKE 1 TABLET BY MOUTH DAILY AS NEEDED FOR ERECTILE DYSFUNCTION, Disp: 10 tablet, Rfl: 2    cromolyn (NASALCHROM) 5 2 MG/ACT nasal spray, USE 1 SPRAY IN [facility] NOSTRIL FOUR TIMES A DAY, Disp: 26 mL, Rfl: 0    dicyclomine (BENTYL) 20 mg tablet, TAKE 1 TABLET BY MOUTH TWICE A DAY BEFORE MEALS, Disp: 60 tablet, Rfl: 2    FLUoxetine (PROzac) 40 MG capsule, Take 2 capsules (80 mg total) by mouth daily, Disp: 60 capsule, Rfl: 3    montelukast (SINGULAIR) 10 mg tablet, TAKE 1 TABLET BY MOUTH DAILY AT BEDTIME, Disp: 90 tablet, Rfl: 1    OLANZapine (ZyPREXA) 5 mg tablet, Take 1 tablet (5 mg total) by mouth daily at bedtime, Disp: 30 tablet, Rfl: 1    omeprazole (PriLOSEC) 40 MG capsule, TAKE 1 CAPSULE BY MOUTH DAILY, Disp: 30 capsule, Rfl: 3    propranolol (INDERAL) 20 mg tablet, Take 1-2 tablets (20-40 mg total) by mouth 2 (two) times a day as needed (anxiety or physical agitation), Disp: 120 tablet, Rfl: 3

## 2020-10-01 DIAGNOSIS — B20 HIV DISEASE (HCC): Primary | ICD-10-CM

## 2020-10-01 NOTE — PROGRESS NOTES
RD met with Giuliana Kellogg in conjunction with ID clinic appointment  Giuliana Kellogg reported that his omeprazole has been helping significantly with GERD symptoms  He described recent dental work and that his poor dentition is in the process of being addressed  K[place]halacacia Quickava reported being able to eat adequately, RD recommended softer foods, pt does enjoy applesauce  RD informed pt that TG were elevated, pt laughed  RD advised limiting intake of cookies and koolaid in his diet; pt verbalized understanding of high sugar items affecting TG  Pt was very talkative and challenging to focus, hx of SOPHIA  Attempt to offer nutrition education at future encounters as pt is receptive  RD explained [employer] $15 incentive, provided flyer   (up from 172)  LDL 93 (down from 142)  Cholesterol 183 (down from 231)  HgbA1C 5 6 (up from 5 1)    176#  BMI 27 7    Monitor dentition, weight trend, lipid panel, HgbA1C, diet/exercise recall, pertinent labs        Guido Zhang, MS, RD, LDN

## 2020-10-09 ENCOUNTER — PATIENT OUTREACH (OUTPATIENT)
Dept: SURGERY | Facility: CLINIC | Age: 50
End: 2020-10-09

## 2020-10-09 DIAGNOSIS — F31.81 BIPOLAR 2 DISORDER, MAJOR DEPRESSIVE EPISODE (HCC): ICD-10-CM

## 2020-10-09 RX ORDER — OLANZAPINE 5 MG/1
TABLET ORAL
Qty: 30 TABLET | Refills: 1 | Status: SHIPPED | OUTPATIENT
Start: 2020-10-09 | End: 2020-11-02 | Stop reason: SDUPTHER

## 2020-10-16 DIAGNOSIS — N52.8 OTHER MALE ERECTILE DYSFUNCTION: ICD-10-CM

## 2020-10-16 RX ORDER — TADALAFIL 2.5 MG/1
TABLET ORAL
Qty: 10 TABLET | Refills: 2 | Status: SHIPPED | OUTPATIENT
Start: 2020-10-16 | End: 2021-02-08

## 2020-11-02 ENCOUNTER — TELEMEDICINE (OUTPATIENT)
Dept: PSYCHIATRY | Facility: CLINIC | Age: 50
End: 2020-11-02
Payer: COMMERCIAL

## 2020-11-02 DIAGNOSIS — F41.1 GAD (GENERALIZED ANXIETY DISORDER): ICD-10-CM

## 2020-11-02 DIAGNOSIS — F41.8 PERFORMANCE ANXIETY: ICD-10-CM

## 2020-11-02 DIAGNOSIS — F31.81 BIPOLAR 2 DISORDER, MAJOR DEPRESSIVE EPISODE (HCC): ICD-10-CM

## 2020-11-02 DIAGNOSIS — Z79.899 HIGH RISK MEDICATION USE: Primary | ICD-10-CM

## 2020-11-02 PROCEDURE — 99213 OFFICE O/P EST LOW 20 MIN: CPT | Performed by: PSYCHIATRY & NEUROLOGY

## 2020-11-02 RX ORDER — PROPRANOLOL HYDROCHLORIDE 20 MG/1
20-40 TABLET ORAL 2 TIMES DAILY PRN
Qty: 120 TABLET | Refills: 3 | Status: SHIPPED | OUTPATIENT
Start: 2020-11-02 | End: 2021-01-11 | Stop reason: SDUPTHER

## 2020-11-02 RX ORDER — OLANZAPINE 5 MG/1
5 TABLET ORAL
Qty: 30 TABLET | Refills: 2 | Status: SHIPPED | OUTPATIENT
Start: 2020-11-02 | End: 2021-01-11 | Stop reason: SDUPTHER

## 2020-11-02 RX ORDER — FLUOXETINE HYDROCHLORIDE 40 MG/1
80 CAPSULE ORAL DAILY
Qty: 60 CAPSULE | Refills: 3 | Status: SHIPPED | OUTPATIENT
Start: 2020-11-02 | End: 2021-01-11 | Stop reason: SDUPTHER

## 2020-11-02 RX ORDER — FLUOXETINE HYDROCHLORIDE 40 MG/1
80 CAPSULE ORAL DAILY
Qty: 60 CAPSULE | Refills: 3 | Status: SHIPPED | OUTPATIENT
Start: 2020-11-02 | End: 2020-11-02 | Stop reason: SDUPTHER

## 2020-11-03 ENCOUNTER — OFFICE VISIT (OUTPATIENT)
Dept: SURGERY | Facility: CLINIC | Age: 50
End: 2020-11-03
Payer: COMMERCIAL

## 2020-11-03 VITALS
OXYGEN SATURATION: 98 % | SYSTOLIC BLOOD PRESSURE: 109 MMHG | HEART RATE: 86 BPM | DIASTOLIC BLOOD PRESSURE: 69 MMHG | RESPIRATION RATE: 18 BRPM | HEIGHT: 67 IN | BODY MASS INDEX: 29.73 KG/M2 | WEIGHT: 189.4 LBS | TEMPERATURE: 96.8 F

## 2020-11-03 DIAGNOSIS — J06.9 VIRAL UPPER RESPIRATORY TRACT INFECTION: ICD-10-CM

## 2020-11-03 DIAGNOSIS — J30.2 SEASONAL ALLERGIES: ICD-10-CM

## 2020-11-03 DIAGNOSIS — T78.40XD ALLERGY, SUBSEQUENT ENCOUNTER: ICD-10-CM

## 2020-11-03 DIAGNOSIS — F31.81 BIPOLAR 2 DISORDER, MAJOR DEPRESSIVE EPISODE (HCC): ICD-10-CM

## 2020-11-03 DIAGNOSIS — T65.222D TOXIC EFFECT OF TOBACCO CIGARETTE, INTENTIONAL SELF-HARM, SUBSEQUENT ENCOUNTER: ICD-10-CM

## 2020-11-03 DIAGNOSIS — R14.0 ABDOMINAL BLOATING: ICD-10-CM

## 2020-11-03 DIAGNOSIS — B20 HIV DISEASE (HCC): Primary | ICD-10-CM

## 2020-11-03 DIAGNOSIS — Z00.00 ANNUAL PHYSICAL EXAM: ICD-10-CM

## 2020-11-03 DIAGNOSIS — J45.40 MODERATE PERSISTENT ASTHMA WITHOUT COMPLICATION: ICD-10-CM

## 2020-11-03 PROCEDURE — 99386 PREV VISIT NEW AGE 40-64: CPT | Performed by: NURSE PRACTITIONER

## 2020-11-03 RX ORDER — CETIRIZINE HYDROCHLORIDE 10 MG/1
10 TABLET ORAL DAILY
Qty: 90 TABLET | Refills: 1 | Status: SHIPPED | OUTPATIENT
Start: 2020-11-03 | End: 2021-02-26

## 2020-11-03 RX ORDER — FLUTICASONE PROPIONATE 50 MCG
1 SPRAY, SUSPENSION (ML) NASAL DAILY
Qty: 1 BOTTLE | Refills: 3 | Status: SHIPPED | OUTPATIENT
Start: 2020-11-03 | End: 2021-04-05

## 2020-11-05 ENCOUNTER — TELEPHONE (OUTPATIENT)
Dept: SURGERY | Facility: CLINIC | Age: 50
End: 2020-11-05

## 2020-11-06 DIAGNOSIS — K21.00 GASTROESOPHAGEAL REFLUX DISEASE WITH ESOPHAGITIS: ICD-10-CM

## 2020-11-09 DIAGNOSIS — K21.00 GASTROESOPHAGEAL REFLUX DISEASE WITH ESOPHAGITIS: ICD-10-CM

## 2020-11-09 RX ORDER — OMEPRAZOLE 40 MG/1
40 CAPSULE, DELAYED RELEASE ORAL DAILY
Qty: 30 CAPSULE | Refills: 3 | Status: SHIPPED | OUTPATIENT
Start: 2020-11-09 | End: 2021-02-26

## 2020-11-09 RX ORDER — OMEPRAZOLE 40 MG/1
CAPSULE, DELAYED RELEASE ORAL
Qty: 30 CAPSULE | Refills: 3 | OUTPATIENT
Start: 2020-11-09

## 2020-11-11 ENCOUNTER — PATIENT OUTREACH (OUTPATIENT)
Dept: SURGERY | Facility: CLINIC | Age: 50
End: 2020-11-11

## 2020-11-12 ENCOUNTER — PATIENT OUTREACH (OUTPATIENT)
Dept: SURGERY | Facility: CLINIC | Age: 50
End: 2020-11-12

## 2020-11-30 ENCOUNTER — TELEPHONE (OUTPATIENT)
Dept: SURGERY | Facility: CLINIC | Age: 50
End: 2020-11-30

## 2020-12-04 DIAGNOSIS — K58.2 IRRITABLE BOWEL SYNDROME WITH BOTH CONSTIPATION AND DIARRHEA: ICD-10-CM

## 2020-12-04 RX ORDER — DICYCLOMINE HCL 20 MG
TABLET ORAL
Qty: 60 TABLET | Refills: 2 | Status: SHIPPED | OUTPATIENT
Start: 2020-12-04 | End: 2021-02-26

## 2020-12-30 ENCOUNTER — PATIENT OUTREACH (OUTPATIENT)
Dept: SURGERY | Facility: CLINIC | Age: 50
End: 2020-12-30

## 2020-12-31 DIAGNOSIS — B20 HIV DISEASE (HCC): ICD-10-CM

## 2021-01-05 DIAGNOSIS — B20 HIV DISEASE (HCC): ICD-10-CM

## 2021-01-05 RX ORDER — BICTEGRAVIR SODIUM, EMTRICITABINE, AND TENOFOVIR ALAFENAMIDE FUMARATE 50; 200; 25 MG/1; MG/1; MG/1
TABLET ORAL
Qty: 30 TABLET | Refills: 3 | OUTPATIENT
Start: 2021-01-05

## 2021-01-05 RX ORDER — BICTEGRAVIR SODIUM, EMTRICITABINE, AND TENOFOVIR ALAFENAMIDE FUMARATE 50; 200; 25 MG/1; MG/1; MG/1
1 TABLET ORAL
Qty: 30 TABLET | Refills: 3 | Status: SHIPPED | OUTPATIENT
Start: 2021-01-05 | End: 2021-04-26

## 2021-01-11 ENCOUNTER — TELEMEDICINE (OUTPATIENT)
Dept: PSYCHIATRY | Facility: CLINIC | Age: 51
End: 2021-01-11
Payer: COMMERCIAL

## 2021-01-11 DIAGNOSIS — Z79.899 HIGH RISK MEDICATION USE: ICD-10-CM

## 2021-01-11 DIAGNOSIS — F41.1 GAD (GENERALIZED ANXIETY DISORDER): ICD-10-CM

## 2021-01-11 DIAGNOSIS — Z03.818 ENCOUNTER FOR OBSERVATION FOR SUSPECTED EXPOSURE TO OTHER BIOLOGICAL AGENTS RULED OUT: Primary | ICD-10-CM

## 2021-01-11 DIAGNOSIS — F31.81 BIPOLAR 2 DISORDER, MAJOR DEPRESSIVE EPISODE (HCC): Primary | ICD-10-CM

## 2021-01-11 DIAGNOSIS — F41.8 PERFORMANCE ANXIETY: ICD-10-CM

## 2021-01-11 PROCEDURE — G2012 BRIEF CHECK IN BY MD/QHP: HCPCS | Performed by: NURSE PRACTITIONER

## 2021-01-11 PROCEDURE — 99213 OFFICE O/P EST LOW 20 MIN: CPT | Performed by: PSYCHIATRY & NEUROLOGY

## 2021-01-11 RX ORDER — FLUOXETINE HYDROCHLORIDE 40 MG/1
80 CAPSULE ORAL DAILY
Qty: 60 CAPSULE | Refills: 3 | Status: SHIPPED | OUTPATIENT
Start: 2021-01-11 | End: 2021-03-22 | Stop reason: SDUPTHER

## 2021-01-11 RX ORDER — PROPRANOLOL HYDROCHLORIDE 20 MG/1
20-40 TABLET ORAL 2 TIMES DAILY PRN
Qty: 120 TABLET | Refills: 3 | Status: SHIPPED | OUTPATIENT
Start: 2021-01-11 | End: 2021-03-22 | Stop reason: SDUPTHER

## 2021-01-11 RX ORDER — OLANZAPINE 5 MG/1
5 TABLET ORAL
Qty: 30 TABLET | Refills: 2 | Status: SHIPPED | OUTPATIENT
Start: 2021-01-11 | End: 2021-03-22 | Stop reason: SDUPTHER

## 2021-01-11 NOTE — PROGRESS NOTES
COVID-19 Virtual Visit     Assessment/Plan:    Problem List Items Addressed This Visit     None      Visit Diagnoses     Encounter for observation for suspected exposure to other biological agents ruled out    -  Primary         Disposition:     I recommended self-quarantine for 14 days and to call back for worsening symptoms or development of shortness of breath  After clarifying the patient's history, my suspicion for COVID-19 infection is very low  Pt is a contact of a contact with low risk of exposure to covid-19  Pt was near another person who was in contact with someone who tested positive for covid-19  Pt was wearing mask and they were not hanging out they would walk in an out of the room and contact was for less than 15 minutes  Pt to all office if he develops any symptoms  Pt who is was near does not have any symptoms at this time  Questions asked and answered  I have spent 15 minutes directly with the patient  Greater than 50% of this time was spent in counseling/coordination of care regarding: risks and benefits of treatment options, instructions for management, patient and family education, importance of treatment compliance, risk factor reductions and impressions  Encounter provider CLAYTON English    Provider located at [address] [address]    Recent Visits  No visits were found meeting these conditions  Showing recent visits within past 7 days and meeting all other requirements     Future Appointments  No visits were found meeting these conditions  Showing future appointments within next 150 days and meeting all other requirements        Patient agrees to participate in a virtual check in via telephone or video visit instead of presenting to the office to address urgent/immediate medical needs  Patient is aware this is a billable service      After connecting through Telephone, the patient was identified by name and date of birth  Lanie Galan was informed that this was a telemedicine visit and that the exam was being conducted confidentially over secure lines  My office door was closed  No one else was in the room  Lanie Galan acknowledged consent and understanding of privacy and security of the telemedicine visit  I informed the patient that I have reviewed his record in Epic and presented the opportunity for him to ask any questions regarding the visit today  The patient agreed to participate  It was my intent to perform this visit via video technology but the patient was not able to do a video connection so the visit was completed via audio telephone only  Subjective:   Lanie Galan is a 44 y o  male who is concerned about COVID-19  Patient is currently asymptomatic  Patient denies fever, chills, fatigue, malaise, congestion, rhinorrhea, sore throat, anosmia, loss of taste, cough, shortness of breath, chest tightness, abdominal pain, nausea, vomiting, diarrhea, myalgias and headaches       Date of symptom onset: 1/9/2021    Exposure:   Contact with a person who is under investigation (PUI) for or who is positive for COVID-19 within the last 14 days?: No    Hospitalized recently for fever and/or lower respiratory symptoms?: No      Currently a healthcare worker that is involved in direct patient care?: No      Works in a special setting where the risk of COVID-19 transmission may be high? (this may include long-term care, correctional and shelter facilities; homeless shelters; assisted-living facilities and group homes ): No      Resident in a special setting where the risk of COVID-19 transmission may be high? (this may include long-term care, correctional and shelter facilities; homeless shelters; assisted-living facilities and group homes ): No      Lab Results   Component Value Date    [place] Not Detected 07/01/2020     Past Medical History:   Diagnosis Date    Anxiety     Last Assessed: 7/18/2016     Dysuria     Last Assessed: 9/14/2015    Fecal urgency     Pt has had fecal incontinence in past, has weakened sphincter  would benefit from fiber, Needs f/u flex sig will refer for scheduling -        GERD (gastroesophageal reflux disease)     Hemorrhage of anus and rectum     Last Assessed: 3/5/2014     Herpes zoster     Last Assessed: 9/26/2016    HIV (human immunodeficiency virus infection) ([facility] [address] )     IBS (irritable bowel syndrome)     Proctitis, chlamydial     Last Assessed: 9/29/2014     Recurrent major depressive episodes, moderate [facility] )     Last Assessed: 9/15/2017      Past Surgical History:   Procedure Laterality Date    CYSTOSCOPY  2014    MT ESOPHAGOGASTRODUODENOSCOPY TRANSORAL DIAGNOSTIC N/A 3/9/2017    Procedure: ESOPHAGOGASTRODUODENOSCOPY (EGD); Surgeon: Melody Carolina MD;  Location: [facility];   Service: Gastroenterology    VARICOCELE EXCISION      Spermatic cord Excision - Last Assessed: 3/17/2016      Current Outpatient Medications   Medication Sig Dispense Refill    albuterol (VENTOLIN HFA) 90 mcg/act inhaler Inhale 2 puffs every 6 (six) hours as needed for wheezing 18 g 2    atorvastatin (LIPITOR) 10 mg tablet TAKE 1 TABLET BY MOUTH DAILY 90 tablet 1    bictegravir-emtricitab-tenofovir alafenamide (Biktarvy) -25 MG tablet Take 1 tablet by mouth daily with breakfast 30 tablet 3    cetirizine (ZyrTEC) 10 mg tablet Take 1 tablet (10 mg total) by mouth daily 90 tablet 1    dicyclomine (BENTYL) 20 mg tablet TAKE 1 TABLET BY MOUTH TWICE A DAY BEFORE MEALS 60 tablet 2    FLUoxetine (PROzac) 40 MG capsule Take 2 capsules (80 mg total) by mouth daily 60 capsule 3    fluticasone (FLONASE) 50 mcg/act nasal spray 1 spray into each nostril daily 1 Bottle 3    montelukast (SINGULAIR) 10 mg tablet TAKE 1 TABLET BY MOUTH DAILY AT BEDTIME 90 tablet 1    OLANZapine (ZyPREXA) 5 mg tablet Take 1 tablet (5 mg total) by mouth daily at bedtime 30 tablet 2    omeprazole (PriLOSEC) 40 MG capsule Take 1 capsule (40 mg total) by mouth daily 30 capsule 3    propranolol (INDERAL) 20 mg tablet Take 1-2 tablets (20-40 mg total) by mouth 2 (two) times a day as needed (anxiety or physical agitation) 120 tablet 3    tadalafil (CIALIS) 2 5 MG tablet TAKE 1 TABLET BY MOUTH DAILY AS NEEDED FOR ERECTILE DYSFUNCTION 10 tablet 2     No current facility-administered medications for this visit  Allergies   Allergen Reactions    Tomato Vomiting       Review of Systems   Constitutional: Negative for chills, fatigue and fever  HENT: Negative for congestion, rhinorrhea and sore throat  Respiratory: Negative for cough, chest tightness and shortness of breath  Gastrointestinal: Negative for abdominal pain, diarrhea, nausea and vomiting  Musculoskeletal: Negative for myalgias  Neurological: Negative for headaches  Objective: There were no vitals filed for this visit  Physical Exam  VIRTUAL VISIT DISCLAIMER    Luciana Ambriz acknowledges that he has consented to an online visit or consultation  He understands that the online visit is based solely on information provided by him, and that, in the absence of a face-to-face physical evaluation by the physician, the diagnosis he receives is both limited and provisional in terms of accuracy and completeness  This is not intended to replace a full medical face-to-face evaluation by the physician  Luciana Ambriz understands and accepts these terms

## 2021-01-11 NOTE — ASSESSMENT & PLAN NOTE
Doing good  Pt reports missing 1 dose of Biktarvy recently due to just forgetting  Stressed the importance of 100% medication adherence  HIV Counseling:    Viral Load: < 20    CD4 Count: 020    ART: Biktarvy     Denies side effects  Stressed the importance of adherence  Continue follow up in the ID clinic with Dr Marta Angel  Reviewed the most recent labs, including the Cd4 and viral load  Discussed the risks and benefits of treatment options, instructions for management, importance of treatment adherence, and reduction of risk factors  Educated on possible medication side effects  Counseled on routes of HIV transmission, including the risk of  infection  Emphasized that viral suppression is the best method to prevent HIV transmission  At this time the pt denies the need for HIV testing of anyone in their life  Total encounter time was greater than 35 minutes  Greater than 20 minutes were spent on counseling and patient education  Pt voices understanding and agreement with treatment plan

## 2021-01-20 ENCOUNTER — PATIENT OUTREACH (OUTPATIENT)
Dept: SURGERY | Facility: CLINIC | Age: 51
End: 2021-01-20

## 2021-01-21 ENCOUNTER — PATIENT OUTREACH (OUTPATIENT)
Dept: SURGERY | Facility: CLINIC | Age: 51
End: 2021-01-21

## 2021-01-21 NOTE — PROGRESS NOTES
Cm met with ct to complete recert  No signatures due to covid  Per ct not sexually active  Ct knows to use condoms when active  Ct states he is adherent to all of his medications and medical appointments  Cm went over risk reduction and medication assessment with ct  Ct sees [facility] for ID and pcp care  Ct has [employer] for insurance  Ct states he goes to [facility] for dental care and states he is active with his dental care  Ct states that he is financially taken care of by his mother  Ct is active with  and has a history of sa  Ct denies domestic abuse and legal issues   Cm assisted ct in filling out ssd application and snap application    Brian pass [id number]

## 2021-01-22 ENCOUNTER — PATIENT OUTREACH (OUTPATIENT)
Dept: SURGERY | Facility: CLINIC | Age: 51
End: 2021-01-22

## 2021-01-22 NOTE — PROGRESS NOTES
Cm called [employer] to see where the ssd application should be sent to  Cm sent application and faxed snap application

## 2021-01-25 ENCOUNTER — PATIENT OUTREACH (OUTPATIENT)
Dept: SURGERY | Facility: CLINIC | Age: 51
End: 2021-01-25

## 2021-02-08 DIAGNOSIS — N52.8 OTHER MALE ERECTILE DYSFUNCTION: ICD-10-CM

## 2021-02-08 RX ORDER — TADALAFIL 2.5 MG/1
TABLET, FILM COATED ORAL
Qty: 10 TABLET | Refills: 2 | Status: SHIPPED | OUTPATIENT
Start: 2021-02-08 | End: 2021-06-11 | Stop reason: SDUPTHER

## 2021-02-11 ENCOUNTER — OFFICE VISIT (OUTPATIENT)
Dept: SURGERY | Facility: CLINIC | Age: 51
End: 2021-02-11
Payer: COMMERCIAL

## 2021-02-11 VITALS
OXYGEN SATURATION: 98 % | DIASTOLIC BLOOD PRESSURE: 68 MMHG | BODY MASS INDEX: 28.72 KG/M2 | WEIGHT: 183 LBS | SYSTOLIC BLOOD PRESSURE: 123 MMHG | HEIGHT: 67 IN | HEART RATE: 95 BPM | TEMPERATURE: 97.6 F | RESPIRATION RATE: 18 BRPM

## 2021-02-11 DIAGNOSIS — Z91.89 ENCOUNTER FOR HEPATITIS C VIRUS SCREENING TEST FOR HIGH RISK PATIENT: ICD-10-CM

## 2021-02-11 DIAGNOSIS — B20 HIV DISEASE (HCC): Primary | ICD-10-CM

## 2021-02-11 DIAGNOSIS — Z11.59 ENCOUNTER FOR HEPATITIS C VIRUS SCREENING TEST FOR HIGH RISK PATIENT: ICD-10-CM

## 2021-02-11 DIAGNOSIS — Z12.5 SCREENING FOR MALIGNANT NEOPLASM OF PROSTATE: ICD-10-CM

## 2021-02-11 DIAGNOSIS — R73.03 PREDIABETES: ICD-10-CM

## 2021-02-11 DIAGNOSIS — T65.222D TOXIC EFFECT OF TOBACCO CIGARETTE, INTENTIONAL SELF-HARM, SUBSEQUENT ENCOUNTER: ICD-10-CM

## 2021-02-11 DIAGNOSIS — E78.2 ELEVATED CHOLESTEROL WITH ELEVATED TRIGLYCERIDES: ICD-10-CM

## 2021-02-11 PROCEDURE — 99214 OFFICE O/P EST MOD 30 MIN: CPT | Performed by: NURSE PRACTITIONER

## 2021-02-11 NOTE — ASSESSMENT & PLAN NOTE
Ongoing  Pt is not ready to quit at this time  Pt smokes 1/2 ppd  Pt is not interested in NRT  · Continue to work with pt towards complete 100% smoking cessation    Nicotine Dependency - Primary    · Counseled for greater than 15 minutes on the importance of smoking cessation  Education was given regarding the cardiovascular effects of how nicotine affects the heart, lungs, kidneys,and peripheral vascular system    Referred to [facility] for enrollment in smoking cessation program

## 2021-02-11 NOTE — ASSESSMENT & PLAN NOTE
Doing good  Pt reports 100% medication compliance  · Stressed the importance of 100% medication adherence  HIV Counseling:    Viral Load: < 20    CD4 Count: 570    ART: Biktarvy     Denies side effects  Stressed the importance of adherence  Continue follow up in the ID clinic with Dr Santos Sanders  Reviewed the most recent labs, including the Cd4 and viral load  Discussed the risks and benefits of treatment options, instructions for management, importance of treatment adherence, and reduction of risk factors  Educated on possible medication side effects  Counseled on routes of HIV transmission, including the risk of  infection  Emphasized that viral suppression is the best method to prevent HIV transmission  At this time the pt denies the need for HIV testing of anyone in their life  Total encounter time was greater than 35 minutes  Greater than 20 minutes were spent on counseling and patient education  Pt voices understanding and agreement with treatment plan

## 2021-02-11 NOTE — PROGRESS NOTES
Assessment/Plan:    HIV disease  Doing good  Pt reports 100% medication compliance  · Stressed the importance of 100% medication adherence  HIV Counseling:    Viral Load: < 20    CD4 Count: 673    ART: Biktarvy     Denies side effects  Stressed the importance of adherence  Continue follow up in the ID clinic with Dr Brianna Pinon  Reviewed the most recent labs, including the Cd4 and viral load  Discussed the risks and benefits of treatment options, instructions for management, importance of treatment adherence, and reduction of risk factors  Educated on possible medication side effects  Counseled on routes of HIV transmission, including the risk of  infection  Emphasized that viral suppression is the best method to prevent HIV transmission  At this time the pt denies the need for HIV testing of anyone in their life  Total encounter time was greater than 35 minutes  Greater than 20 minutes were spent on counseling and patient education  Pt voices understanding and agreement with treatment plan  Toxic effect of tobacco cigarette, intentional self-harm ([facility] [address] )  Ongoing  Pt is not ready to quit at this time  Pt smokes 1/2 ppd  Pt is not interested in NRT  · Continue to work with pt towards complete 100% smoking cessation    Nicotine Dependency - Primary    · Counseled for greater than 15 minutes on the importance of smoking cessation  Education was given regarding the cardiovascular effects of how nicotine affects the heart, lungs, kidneys,and peripheral vascular system  Referred to [facility] [address] for enrollment in smoking cessation program       Prediabetes  Feeling good  Pt is drinking a lot of cola and notices when he goes without cola for a few days he realized he is very bitchy  · Pt aware this can contribute to DM  · Recheck A1C    Elevated cholesterol with elevated triglycerides  Tolerating  Pt reports taking atorvastatin 10 mg daily    · Recheck lipid panel        Diagnoses and all orders for this visit:    HIV disease ([place] [address] )    Toxic effect of tobacco cigarette, intentional self-harm, subsequent encounter    Prediabetes    Elevated cholesterol with elevated triglycerides    Encounter for hepatitis C virus screening test for high risk patient    Screening for malignant neoplasm of prostate        Patient Active Problem List   Diagnosis    Bilateral varicoceles    Embolism involving vascular device ([place] [address] )    HIV disease ([place] [address] )    GERD (gastroesophageal reflux disease)    Irritable bowel syndrome with both constipation and diarrhea    Toxic effect of tobacco cigarette, intentional self-harm ([place] [address] )    SOPIHA (generalized anxiety disorder)    Acute right-sided low back pain with right-sided sciatica    Jerky body movements    Panic attacks    Dysphagia    Moderate persistent asthma without complication    ED (erectile dysfunction)    Constipation    Prediabetes    Red eyes    Chronic pain syndrome    Seasonal allergies    Discomfort    History of sensory changes    Gingival abscess    Difficulty in voiding    Foot pain, bilateral    At high risk for falls    Balance problems    Elevated cholesterol with elevated triglycerides    Weight loss    Ganglion cyst of right foot    Upper respiratory tract infection    Testicular pain    Bipolar 2 disorder, major depressive episode (HCC)    Abdominal bloating     Lab Results   Component Value Date     12/30/2015    K 4 2 09/28/2020     (H) 09/28/2020    CO2 28 09/28/2020    ANIONGAP 9 12/30/2015    BUN 16 09/28/2020    CREATININE 0 95 09/28/2020    GLUCOSE 124 12/30/2015    GLUF 95 09/28/2020    CALCIUM 9 0 09/28/2020    AST 21 09/28/2020    ALT 35 09/28/2020    ALKPHOS 112 09/28/2020    PROT 8 4 (H) 12/30/2015    BILITOT 0 37 12/30/2015    EGFR 92 09/28/2020     Lab Results   Component Value Date    WBC 6 8 09/28/2020    WBC 7 15 09/28/2020    HGB 13 9 09/28/2020    HGB 14 3 09/28/2020    HCT 41 6 09/28/2020    HCT 42 0 09/28/2020    MCV 97 09/28/2020    MCV 96 09/28/2020     09/28/2020     09/28/2020       Subjective:      Patient ID: Wyatt Copeland is a 44 y o  male  HPI    Pt is being seen for 3 month follow up visit and management of chronic health care conditions  Pt report he is doing well  Pt has lost af few pounds  Pt has been sexually active and does use condoms  The following portions of the patient's history were reviewed and updated as appropriate: allergies, current medications, past medical history, past social history and problem list     Review of Systems   Constitutional: Negative  HENT: Negative  Respiratory: Negative  Cardiovascular: Negative  Gastrointestinal: Negative  Genitourinary: Negative  Neurological: Negative  Psychiatric/Behavioral: Negative  Objective:      /68 (BP Location: Right arm, Patient Position: Sitting, Cuff Size: Standard)   Pulse 95   Temp 97 6 °F (36 4 °C)   Resp 18   Ht 5' 7" (1 702 m)   Wt 83 kg (183 lb)   SpO2 98%   BMI 28 66 kg/m²          Physical Exam  Vitals signs and nursing note reviewed  Constitutional:       Appearance: Normal appearance  HENT:      Head: Normocephalic  Neck:      Vascular: No carotid bruit  Cardiovascular:      Rate and Rhythm: Normal rate and regular rhythm  Chest Wall: PMI is not displaced  Pulses: Normal pulses  Heart sounds: Normal heart sounds  Pulmonary:      Effort: Pulmonary effort is normal       Breath sounds: Normal breath sounds  Abdominal:      General: Bowel sounds are normal       Palpations: Abdomen is soft  Musculoskeletal: Normal range of motion  Skin:     General: Skin is warm and dry  Capillary Refill: Capillary refill takes less than 2 seconds  Neurological:      Mental Status: He is alert and oriented to person, place, and time     Psychiatric:         Mood and Affect: Mood normal          Behavior: Behavior normal          Thought Content:  Thought content normal          Judgment: Judgment normal

## 2021-02-11 NOTE — ASSESSMENT & PLAN NOTE
Feeling good  Pt is drinking a lot of cola and notices when he goes without cola for a few days he realized he is very bitchy      · Pt aware this can contribute to DM  · Recheck A1C

## 2021-02-22 ENCOUNTER — PATIENT OUTREACH (OUTPATIENT)
Dept: SURGERY | Facility: CLINIC | Age: 51
End: 2021-02-22

## 2021-02-23 ENCOUNTER — TELEPHONE (OUTPATIENT)
Dept: SURGERY | Facility: CLINIC | Age: 51
End: 2021-02-23

## 2021-02-23 NOTE — TELEPHONE ENCOUNTER
RD called to f/u on nutritional status following recent appt with PCP  [facility] the [facility]        Gabriela Cox, MS, RD, LDN

## 2021-02-26 DIAGNOSIS — K58.2 IRRITABLE BOWEL SYNDROME WITH BOTH CONSTIPATION AND DIARRHEA: ICD-10-CM

## 2021-02-26 DIAGNOSIS — K21.00 GASTROESOPHAGEAL REFLUX DISEASE WITH ESOPHAGITIS: ICD-10-CM

## 2021-02-26 DIAGNOSIS — E78.2 ELEVATED CHOLESTEROL WITH ELEVATED TRIGLYCERIDES: ICD-10-CM

## 2021-02-26 DIAGNOSIS — J30.2 SEASONAL ALLERGIES: ICD-10-CM

## 2021-02-26 RX ORDER — CETIRIZINE HYDROCHLORIDE 10 MG/1
TABLET ORAL
Qty: 90 TABLET | Refills: 1 | Status: SHIPPED | OUTPATIENT
Start: 2021-02-26 | End: 2021-08-16

## 2021-02-26 RX ORDER — ATORVASTATIN CALCIUM 10 MG/1
TABLET, FILM COATED ORAL
Qty: 90 TABLET | Refills: 1 | Status: SHIPPED | OUTPATIENT
Start: 2021-02-26

## 2021-02-26 RX ORDER — OMEPRAZOLE 40 MG/1
CAPSULE, DELAYED RELEASE ORAL
Qty: 30 CAPSULE | Refills: 3 | Status: SHIPPED | OUTPATIENT
Start: 2021-02-26 | End: 2021-06-22

## 2021-02-26 RX ORDER — DICYCLOMINE HCL 20 MG
TABLET ORAL
Qty: 60 TABLET | Refills: 2 | Status: SHIPPED | OUTPATIENT
Start: 2021-02-26 | End: 2021-05-24

## 2021-03-08 DIAGNOSIS — Z11.3 ENCOUNTER FOR SCREENING FOR INFECTIONS WITH A PREDOMINANTLY SEXUAL MODE OF TRANSMISSION: ICD-10-CM

## 2021-03-08 DIAGNOSIS — Z13.6 SCREENING FOR CARDIOVASCULAR CONDITION: ICD-10-CM

## 2021-03-08 DIAGNOSIS — Z11.3 ROUTINE SCREENING FOR STI (SEXUALLY TRANSMITTED INFECTION): ICD-10-CM

## 2021-03-08 DIAGNOSIS — B20 HIV DISEASE (HCC): Primary | ICD-10-CM

## 2021-03-16 ENCOUNTER — APPOINTMENT (OUTPATIENT)
Dept: LAB | Age: 51
End: 2021-03-16
Payer: COMMERCIAL

## 2021-03-16 DIAGNOSIS — Z11.3 ENCOUNTER FOR SCREENING FOR INFECTIONS WITH A PREDOMINANTLY SEXUAL MODE OF TRANSMISSION: ICD-10-CM

## 2021-03-16 DIAGNOSIS — B20 HIV DISEASE (HCC): ICD-10-CM

## 2021-03-16 DIAGNOSIS — Z13.6 SCREENING FOR CARDIOVASCULAR CONDITION: ICD-10-CM

## 2021-03-16 DIAGNOSIS — Z11.3 ROUTINE SCREENING FOR STI (SEXUALLY TRANSMITTED INFECTION): ICD-10-CM

## 2021-03-16 DIAGNOSIS — Z91.89 ENCOUNTER FOR HEPATITIS C VIRUS SCREENING TEST FOR HIGH RISK PATIENT: ICD-10-CM

## 2021-03-16 DIAGNOSIS — Z11.59 ENCOUNTER FOR HEPATITIS C VIRUS SCREENING TEST FOR HIGH RISK PATIENT: ICD-10-CM

## 2021-03-16 DIAGNOSIS — Z79.899 HIGH RISK MEDICATION USE: Primary | ICD-10-CM

## 2021-03-16 LAB
ALBUMIN SERPL BCP-MCNC: 3.7 G/DL (ref 3.5–5)
ALP SERPL-CCNC: 115 U/L (ref 46–116)
ALT SERPL W P-5'-P-CCNC: 39 U/L (ref 12–78)
ANION GAP SERPL CALCULATED.3IONS-SCNC: 4 MMOL/L (ref 4–13)
AST SERPL W P-5'-P-CCNC: 24 U/L (ref 5–45)
BASOPHILS # BLD AUTO: 0.02 THOUSANDS/ΜL (ref 0–0.1)
BASOPHILS NFR BLD AUTO: 0 % (ref 0–1)
BILIRUB SERPL-MCNC: 0.37 MG/DL (ref 0.2–1)
BILIRUB UR QL STRIP: NEGATIVE
BUN SERPL-MCNC: 13 MG/DL (ref 5–25)
CALCIUM SERPL-MCNC: 8.7 MG/DL (ref 8.3–10.1)
CHLORIDE SERPL-SCNC: 106 MMOL/L (ref 100–108)
CHOLEST SERPL-MCNC: 145 MG/DL (ref 50–200)
CLARITY UR: CLEAR
CO2 SERPL-SCNC: 28 MMOL/L (ref 21–32)
COLOR UR: YELLOW
CREAT SERPL-MCNC: 0.8 MG/DL (ref 0.6–1.3)
EOSINOPHIL # BLD AUTO: 0.12 THOUSAND/ΜL (ref 0–0.61)
EOSINOPHIL NFR BLD AUTO: 2 % (ref 0–6)
ERYTHROCYTE [DISTWIDTH] IN BLOOD BY AUTOMATED COUNT: 13.9 % (ref 11.6–15.1)
EST. AVERAGE GLUCOSE BLD GHB EST-MCNC: 114 MG/DL
GFR SERPL CREATININE-BSD FRML MDRD: 103 ML/MIN/1.73SQ M
GLUCOSE P FAST SERPL-MCNC: 95 MG/DL (ref 65–99)
GLUCOSE UR STRIP-MCNC: NEGATIVE MG/DL
HBA1C MFR BLD: 5.6 %
HCT VFR BLD AUTO: 43.9 % (ref 36.5–49.3)
HCV AB SER QL: NORMAL
HDLC SERPL-MCNC: 49 MG/DL
HGB BLD-MCNC: 14.5 G/DL (ref 12–17)
HGB UR QL STRIP.AUTO: NEGATIVE
IMM GRANULOCYTES # BLD AUTO: 0.03 THOUSAND/UL (ref 0–0.2)
IMM GRANULOCYTES NFR BLD AUTO: 1 % (ref 0–2)
KETONES UR STRIP-MCNC: NEGATIVE MG/DL
LDLC SERPL CALC-MCNC: 61 MG/DL (ref 0–100)
LEUKOCYTE ESTERASE UR QL STRIP: NEGATIVE
LYMPHOCYTES # BLD AUTO: 2.08 THOUSANDS/ΜL (ref 0.6–4.47)
LYMPHOCYTES NFR BLD AUTO: 34 % (ref 14–44)
MCH RBC QN AUTO: 30.6 PG (ref 26.8–34.3)
MCHC RBC AUTO-ENTMCNC: 33 G/DL (ref 31.4–37.4)
MCV RBC AUTO: 93 FL (ref 82–98)
MONOCYTES # BLD AUTO: 0.4 THOUSAND/ΜL (ref 0.17–1.22)
MONOCYTES NFR BLD AUTO: 7 % (ref 4–12)
NEUTROPHILS # BLD AUTO: 3.47 THOUSANDS/ΜL (ref 1.85–7.62)
NEUTS SEG NFR BLD AUTO: 56 % (ref 43–75)
NITRITE UR QL STRIP: NEGATIVE
NRBC BLD AUTO-RTO: 0 /100 WBCS
PH UR STRIP.AUTO: 7 [PH]
PLATELET # BLD AUTO: 306 THOUSANDS/UL (ref 149–390)
PMV BLD AUTO: 8.3 FL (ref 8.9–12.7)
POTASSIUM SERPL-SCNC: 4.1 MMOL/L (ref 3.5–5.3)
PROT SERPL-MCNC: 7.4 G/DL (ref 6.4–8.2)
PROT UR STRIP-MCNC: NEGATIVE MG/DL
RBC # BLD AUTO: 4.74 MILLION/UL (ref 3.88–5.62)
RPR SER QL: NORMAL
SODIUM SERPL-SCNC: 138 MMOL/L (ref 136–145)
SP GR UR STRIP.AUTO: 1.02 (ref 1–1.03)
TRIGL SERPL-MCNC: 174 MG/DL
UROBILINOGEN UR QL STRIP.AUTO: 0.2 E.U./DL
WBC # BLD AUTO: 6.12 THOUSAND/UL (ref 4.31–10.16)

## 2021-03-16 PROCEDURE — 85025 COMPLETE CBC W/AUTO DIFF WBC: CPT

## 2021-03-16 PROCEDURE — 87491 CHLMYD TRACH DNA AMP PROBE: CPT

## 2021-03-16 PROCEDURE — 86592 SYPHILIS TEST NON-TREP QUAL: CPT

## 2021-03-16 PROCEDURE — 87591 N.GONORRHOEAE DNA AMP PROB: CPT

## 2021-03-16 PROCEDURE — 80053 COMPREHEN METABOLIC PANEL: CPT

## 2021-03-16 PROCEDURE — 81003 URINALYSIS AUTO W/O SCOPE: CPT

## 2021-03-16 PROCEDURE — 84153 ASSAY OF PSA TOTAL: CPT

## 2021-03-16 PROCEDURE — 83036 HEMOGLOBIN GLYCOSYLATED A1C: CPT

## 2021-03-16 PROCEDURE — 86803 HEPATITIS C AB TEST: CPT

## 2021-03-16 PROCEDURE — 36415 COLL VENOUS BLD VENIPUNCTURE: CPT

## 2021-03-16 PROCEDURE — 87536 HIV-1 QUANT&REVRSE TRNSCRPJ: CPT

## 2021-03-16 PROCEDURE — 86480 TB TEST CELL IMMUN MEASURE: CPT

## 2021-03-16 PROCEDURE — 86361 T CELL ABSOLUTE COUNT: CPT

## 2021-03-16 PROCEDURE — 80061 LIPID PANEL: CPT

## 2021-03-17 LAB
BASOPHILS # BLD AUTO: 0 X10E3/UL (ref 0–0.2)
BASOPHILS NFR BLD AUTO: 0 %
C TRACH DNA SPEC QL NAA+PROBE: NEGATIVE
CD3+CD4+ CELLS # BLD: 766 /UL (ref 359–1519)
CD3+CD4+ CELLS NFR BLD: 38.3 % (ref 30.8–58.5)
EOSINOPHIL # BLD AUTO: 0.1 X10E3/UL (ref 0–0.4)
EOSINOPHIL NFR BLD AUTO: 2 %
ERYTHROCYTE [DISTWIDTH] IN BLOOD BY AUTOMATED COUNT: 14 % (ref 11.6–15.4)
GAMMA INTERFERON BACKGROUND BLD IA-ACNC: 0.04 IU/ML
HCT VFR BLD AUTO: 40.6 % (ref 37.5–51)
HGB BLD-MCNC: 14.1 G/DL (ref 13–17.7)
IMM GRANULOCYTES # BLD: 0 X10E3/UL (ref 0–0.1)
IMM GRANULOCYTES NFR BLD: 1 %
LYMPHOCYTES # BLD AUTO: 2 X10E3/UL (ref 0.7–3.1)
LYMPHOCYTES NFR BLD AUTO: 35 %
M TB IFN-G BLD-IMP: NEGATIVE
M TB IFN-G CD4+ BCKGRND COR BLD-ACNC: 0 IU/ML
M TB IFN-G CD4+ BCKGRND COR BLD-ACNC: 0.04 IU/ML
MCH RBC QN AUTO: 31.5 PG (ref 26.6–33)
MCHC RBC AUTO-ENTMCNC: 34.7 G/DL (ref 31.5–35.7)
MCV RBC AUTO: 91 FL (ref 79–97)
MITOGEN IGNF BCKGRD COR BLD-ACNC: >10 IU/ML
MONOCYTES # BLD AUTO: 0.4 X10E3/UL (ref 0.1–0.9)
MONOCYTES NFR BLD AUTO: 6 %
N GONORRHOEA DNA SPEC QL NAA+PROBE: NEGATIVE
NEUTROPHILS # BLD AUTO: 3.3 X10E3/UL (ref 1.4–7)
NEUTROPHILS NFR BLD AUTO: 56 %
PLATELET # BLD AUTO: 334 X10E3/UL (ref 150–450)
RBC # BLD AUTO: 4.47 X10E6/UL (ref 4.14–5.8)
WBC # BLD AUTO: 5.8 X10E3/UL (ref 3.4–10.8)

## 2021-03-18 LAB
HIV1 RNA # SERPL NAA+PROBE: <20 COPIES/ML
HIV1 RNA SERPL NAA+PROBE-LOG#: NORMAL LOG10COPY/ML
MISCELLANEOUS LAB TEST RESULT: NORMAL

## 2021-03-22 ENCOUNTER — TELEMEDICINE (OUTPATIENT)
Dept: PSYCHIATRY | Facility: CLINIC | Age: 51
End: 2021-03-22
Payer: COMMERCIAL

## 2021-03-22 DIAGNOSIS — F31.81 BIPOLAR 2 DISORDER, MAJOR DEPRESSIVE EPISODE (HCC): ICD-10-CM

## 2021-03-22 DIAGNOSIS — F41.1 GAD (GENERALIZED ANXIETY DISORDER): ICD-10-CM

## 2021-03-22 DIAGNOSIS — F41.8 PERFORMANCE ANXIETY: ICD-10-CM

## 2021-03-22 PROCEDURE — 90833 PSYTX W PT W E/M 30 MIN: CPT | Performed by: PSYCHIATRY & NEUROLOGY

## 2021-03-22 PROCEDURE — 99213 OFFICE O/P EST LOW 20 MIN: CPT | Performed by: PSYCHIATRY & NEUROLOGY

## 2021-03-22 RX ORDER — PROPRANOLOL HYDROCHLORIDE 20 MG/1
20-40 TABLET ORAL 2 TIMES DAILY PRN
Qty: 120 TABLET | Refills: 3 | Status: SHIPPED | OUTPATIENT
Start: 2021-03-22 | End: 2021-04-29 | Stop reason: SDUPTHER

## 2021-03-22 RX ORDER — FLUOXETINE HYDROCHLORIDE 40 MG/1
80 CAPSULE ORAL DAILY
Qty: 60 CAPSULE | Refills: 3 | Status: SHIPPED | OUTPATIENT
Start: 2021-03-22 | End: 2021-04-29 | Stop reason: SDUPTHER

## 2021-03-22 RX ORDER — OLANZAPINE 5 MG/1
5 TABLET ORAL
Qty: 30 TABLET | Refills: 2 | Status: SHIPPED | OUTPATIENT
Start: 2021-03-22 | End: 2021-04-29 | Stop reason: SDUPTHER
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
## 2021-03-24 ENCOUNTER — PATIENT OUTREACH (OUTPATIENT)
Dept: SURGERY | Facility: CLINIC | Age: 51
End: 2021-03-24

## 2021-03-24 ENCOUNTER — OFFICE VISIT (OUTPATIENT)
Dept: SURGERY | Facility: CLINIC | Age: 51
End: 2021-03-24
Payer: COMMERCIAL

## 2021-03-24 VITALS
WEIGHT: 187.2 LBS | TEMPERATURE: 98.1 F | BODY MASS INDEX: 29.32 KG/M2 | OXYGEN SATURATION: 99 % | DIASTOLIC BLOOD PRESSURE: 64 MMHG | HEART RATE: 98 BPM | SYSTOLIC BLOOD PRESSURE: 128 MMHG

## 2021-03-24 DIAGNOSIS — B20 HIV DISEASE (HCC): Primary | ICD-10-CM

## 2021-03-24 DIAGNOSIS — T65.222D TOXIC EFFECT OF TOBACCO CIGARETTE, INTENTIONAL SELF-HARM, SUBSEQUENT ENCOUNTER: ICD-10-CM

## 2021-03-24 DIAGNOSIS — L74.9 SWEATING ABNORMALITY: Primary | ICD-10-CM

## 2021-03-24 DIAGNOSIS — F41.1 GAD (GENERALIZED ANXIETY DISORDER): ICD-10-CM

## 2021-03-24 DIAGNOSIS — L74.9 SWEATING ABNORMALITY: ICD-10-CM

## 2021-03-24 DIAGNOSIS — B20 HIV DISEASE (HCC): ICD-10-CM

## 2021-03-24 DIAGNOSIS — K08.9 POOR DENTITION: ICD-10-CM

## 2021-03-24 PROCEDURE — 99215 OFFICE O/P EST HI 40 MIN: CPT | Performed by: INTERNAL MEDICINE

## 2021-03-24 NOTE — PROGRESS NOTES
Progress Note - Infectious Disease   Rocio Key 44 y o  male MRN: [id number]  Unit/Bed#:  Encounter: [id number]      Impression/Plan:  1   HIV - doing well on [address] with an undetectable viral load and a CD4 count of 766 (was 991 on 9/30/20)   STD screening negative  Continue ART, recheck labs in 5 months, follow-up in 6 months   Stressed adherence      2   Nicotine dependence - patient not ready to quit smoking yet; stressed the importance of tobacco cessation     3  Generalized anxiety disorder - continue mental health follow-up     4  Poor dentition - patient followed up with dentist; had multiple tooth extractions; should continue to follow up with dental as recommended     5  Increased sweating - not night sweats  Occurs during the day  Will check TSH but suspect this is not pathologic and more related to the patient's anxiety and hyperactivity  Discussed in detail with the primary        Patient was provided medication, adherence and prevention education    Subjective:  Routine follow-up for HIV  Patient reports missing one dose of medication two days ago but is otherwise completely adherent with his Biktarvy  Patient denies any notable side effects  Overall the feeling well  He does report "sweating a lot" but denies symptoms of palpitations, headaches, or appetite/weight changes  The patient denies any fever chills or sweats, denies any nausea vomiting or diarrhea, denies any cough or shortness of breath  ROS:  A complete review of systems is negative other than that noted above in the subjective    Followup portions patient history reviewed and updated as:   Allergies, current medications, past medical history, past social history, past surgical history, and the problem list    Objective:  Vitals:  Vitals:    03/24/21 1630   Pulse: 98   Temp: 98 1 °F (36 7 °C)   TempSrc: Tympanic   SpO2: 99%   Weight: 84 9 kg (187 lb 3 2 oz)       Physical Exam:   General Appearance:  Alert, interactive, appearing well,  nontoxic, no acute distress  Very excitable  HEENT:   EOM normal bilaterally  Neck supple without lymphadenopathy, no thyromegaly or masses    Oropharynx moist without lesions  Lungs:   Clear to auscultation bilaterally; no wheezes, rhonchi or rales; respirations unlabored   Heart:  RRR; no murmur, rub or gallop   Abdomen:   Soft, non-tender, non-distended, positive bowel sounds  Extremities: No clubbing, cyanosis or edema   Skin: No new rashes or lesions  No draining wounds noted  Labs, Imaging, & Other studies:   All pertinent labs and imaging studies were personally reviewed    Lab Results   Component Value Date     12/30/2015    K 4 1 03/16/2021     03/16/2021    CO2 28 03/16/2021    ANIONGAP 9 12/30/2015    BUN 13 03/16/2021    CREATININE 0 80 03/16/2021    GLUCOSE 124 12/30/2015    GLUF 95 03/16/2021    CALCIUM 8 7 03/16/2021    AST 24 03/16/2021    ALT 39 03/16/2021    ALKPHOS 115 03/16/2021    PROT 8 4 (H) 12/30/2015    BILITOT 0 37 12/30/2015    EGFR 103 03/16/2021     Lab Results   Component Value Date    WBC 6 12 03/16/2021    WBC 5 8 03/16/2021    HGB 14 5 03/16/2021    HGB 14 1 03/16/2021    HCT 43 9 03/16/2021    HCT 40 6 03/16/2021    MCV 93 03/16/2021    MCV 91 03/16/2021     03/16/2021     03/16/2021     Lab Results   Component Value Date    HEPCAB Non-reactive 03/16/2021     Lab Results   Component Value Date    HEPCAB Non-reactive 03/16/2021     Lab Results   Component Value Date    RPR Non-Reactive 03/16/2021     CD4 ABS   Date/Time Value Ref Range Status   03/16/2021 10:52  359 - 1519 /uL Final     HIV-1 RNA by PCR, Qn   Date/Time Value Ref Range Status   03/16/2021 10:52 AM <20 copies/mL Final     Comment:     HIV-1 RNA detected  The reportable range for this assay is 20 to 10,000,000  copies HIV-1 RNA/mL             Current Outpatient Medications:     albuterol (VENTOLIN HFA) 90 mcg/act inhaler, Inhale 2 puffs every 6 (six) hours as needed for wheezing (Patient not taking: Reported on 2/11/2021), Disp: 18 g, Rfl: 2    atorvastatin (LIPITOR) 10 mg tablet, TAKE 1 TABLET BY MOUTH DAILY, Disp: 90 tablet, Rfl: 1    bictegravir-emtricitab-tenofovir alafenamide (Biktarvy) -25 MG tablet, Take 1 tablet by mouth daily with breakfast, Disp: 30 tablet, Rfl: 3    cetirizine (ZyrTEC) 10 mg tablet, TAKE 1 TABLET BY MOUTH DAILY, Disp: 90 tablet, Rfl: 1    Cialis 2 5 MG tablet, TAKE 1 TABLET BY MOUTH DAILY AS NEEDED FOR ERECTILE DYSFUNCTION, Disp: 10 tablet, Rfl: 2    dicyclomine (BENTYL) 20 mg tablet, TAKE 1 TABLET BY MOUTH TWICE A DAY BEFORE MEALS, Disp: 60 tablet, Rfl: 2    FLUoxetine (PROzac) 40 MG capsule, Take 2 capsules (80 mg total) by mouth daily, Disp: 60 capsule, Rfl: 3    fluticasone (FLONASE) 50 mcg/act nasal spray, 1 spray into each nostril daily, Disp: 1 Bottle, Rfl: 3    montelukast (SINGULAIR) 10 mg tablet, TAKE 1 TABLET BY MOUTH DAILY AT BEDTIME, Disp: 90 tablet, Rfl: 1    OLANZapine (ZyPREXA) 5 mg tablet, Take 1 tablet (5 mg total) by mouth daily at bedtime, Disp: 30 tablet, Rfl: 2    omeprazole (PriLOSEC) 40 MG capsule, TAKE 1 CAPSULE BY MOUTH DAILY, Disp: 30 capsule, Rfl: 3    propranolol (INDERAL) 20 mg tablet, Take 1-2 tablets (20-40 mg total) by mouth 2 (two) times a day as needed (anxiety or physical agitation), Disp: 120 tablet, Rfl: 3

## 2021-03-25 NOTE — PROGRESS NOTES
RD met briefly with Bartolo Peterson to check in on nutritional status, offer support/education as warranted and receptive  Bartolo Peterson posed question regarding a protein advertisement he saw on youtube  RD advised seeking plant based proteins, fish, and limiting high fat animal based proteins, RD discouraged protein shakes at this time  RD advised Bartolo Peterson of improved triglyceride level, gave positive feedback  Pt reported not making any conscious changes to dietary habits, however he has been limiting consumption of icecream and snacking on cereal (honey nut cheerios or cocoa crispies) instead  Encounter was limited due to timing of ID clinic; pt had no further questions for RD at this time, RD encouraged him to reach out at any time for support  187# (4# weight gain from Feb-20)  BMI 29 3   (improved from 222)  HgbA1C 5 6    Monitor weight trend, diet/exercise recall, lipid panel, HgbA1C        William Spann, MS, RD, LDN

## 2021-03-31 NOTE — PROGRESS NOTES
Assessment/Plan: [facility] approached pt to explore behavioral, cognitive, ad emotional aspects in need to be addressed  During today's contact, pt's body movements, and speech seemed accelerated, and pressured, eye pupils were delated, along with disorganized thought processing engaged in random conversations pertaining COVID's conspiracy theories, the impact of isolation; [facility] tried to encourage pt to share about specific topics, but pt continued to express unclear verbiage at the time  It was noticed a burnt-like spot between the right thumb, and index fingers, along with scratches on the top of his nose  After contact's completion, pt's PCP was approached to disclose the observations, along with discussing possible inference of possible methamphetamine abuse  PCP disclosed that pt' shared not taking his psychotropic medications during the day, and his behavioral displays seem to be associated to possible medication withdrawal      [address]     Today patient present with   Chief Complaint   Patient presents with    Follow-up    HIV Positive     Patient would likely benefit from: Exploring MM difficulties  Consider/focus/continue; Monitoring pt's behavioral, cognitive, and emotional displays  Stage of change: Pre-contemplation  Plan/ Behavioral Recommendations: Exploring possible substance abuse  Diagnoses and all orders for this visit:    HIV disease ([facility] [address] )  -     CBC and differential; Future  -     Comprehensive metabolic panel; Future  -     HIV-1 RNA, quantitative, PCR; Future  -     T-helper cells (CD4) count; Future    Toxic effect of tobacco cigarette, intentional self-harm, subsequent encounter    SOPHIA (generalized anxiety disorder)    Poor dentition    Sweating abnormality          Discussion:     Patient can reach out to [facility] if they experiences changes in their behavioral health  Subjective:     Patient ID: Marioal Morton is a 44 y o  male      HPI    History of Present Illness: Patient displayed disruptive, and uncommon behavioral displays     Review of Systems      Objective:     Physical Exam      [facility]    Orientation     Person: yes    Place: yes    Time: yes    Appearance    Well Developed: yes acutely III    Uncomfortable: yes    Normal Body Odor: yes    Smells of Feces: no    Smells of Urine: no    Disheveled: yes    Well Nourished: yes weight WNL of ideal    Grooming Unkempt: yes    Poor Eye Contact: yes    Hirsute: no    Looks Tired: no    Acutely Exhausted: noappearance reflects stated age    Mood and Affect:     Appropriate: no    Euthymicno    Irritable: no    Angry: no    Anxious: yes    Depressed:no    Blunted:no    Labile: yes    Restricted: no    Harm to Self or Others: No SI or HI were disclosed, nor displayed throughout session  Substance Abuse: Possible methamphetamine abuse; possible misuse of psychotropic medications

## 2021-04-02 DIAGNOSIS — J30.2 SEASONAL ALLERGIES: ICD-10-CM

## 2021-04-05 RX ORDER — MONTELUKAST SODIUM 10 MG/1
TABLET ORAL
Qty: 90 TABLET | Refills: 1 | Status: SHIPPED | OUTPATIENT
Start: 2021-04-05

## 2021-04-05 RX ORDER — FLUTICASONE PROPIONATE 50 MCG
SPRAY, SUSPENSION (ML) NASAL
Qty: 16 G | Refills: 5 | Status: SHIPPED | OUTPATIENT
Start: 2021-04-05 | End: 2022-04-06

## 2021-04-06 ENCOUNTER — PATIENT OUTREACH (OUTPATIENT)
Dept: SURGERY | Facility: CLINIC | Age: 51
End: 2021-04-06

## 2021-04-07 ENCOUNTER — PATIENT OUTREACH (OUTPATIENT)
Dept: SURGERY | Facility: CLINIC | Age: 51
End: 2021-04-07

## 2021-04-07 NOTE — PROGRESS NOTES
Spoke with [employer]  Per funding agency [place] went to get dental services without active auth   sent dental [facility]o

## 2021-04-13 ENCOUNTER — PATIENT OUTREACH (OUTPATIENT)
Dept: SURGERY | Facility: CLINIC | Age: 51
End: 2021-04-13

## 2021-04-23 ENCOUNTER — PATIENT OUTREACH (OUTPATIENT)
Dept: SURGERY | Facility: CLINIC | Age: 51
End: 2021-04-23

## 2021-04-26 ENCOUNTER — PATIENT OUTREACH (OUTPATIENT)
Dept: SURGERY | Facility: CLINIC | Age: 51
End: 2021-04-26

## 2021-04-26 DIAGNOSIS — B20 HIV DISEASE (HCC): ICD-10-CM

## 2021-04-26 RX ORDER — BICTEGRAVIR SODIUM, EMTRICITABINE, AND TENOFOVIR ALAFENAMIDE FUMARATE 50; 200; 25 MG/1; MG/1; MG/1
1 TABLET ORAL
Qty: 30 TABLET | Refills: 3 | Status: SHIPPED | OUTPATIENT
Start: 2021-04-26 | End: 2021-08-16

## 2021-04-29 ENCOUNTER — PATIENT OUTREACH (OUTPATIENT)
Dept: SURGERY | Facility: CLINIC | Age: 51
End: 2021-04-29

## 2021-04-29 ENCOUNTER — TELEMEDICINE (OUTPATIENT)
Dept: PSYCHIATRY | Facility: CLINIC | Age: 51
End: 2021-04-29
Payer: COMMERCIAL

## 2021-04-29 DIAGNOSIS — F31.81 BIPOLAR 2 DISORDER, MAJOR DEPRESSIVE EPISODE (HCC): ICD-10-CM

## 2021-04-29 DIAGNOSIS — F41.1 GAD (GENERALIZED ANXIETY DISORDER): ICD-10-CM

## 2021-04-29 DIAGNOSIS — F41.8 PERFORMANCE ANXIETY: ICD-10-CM

## 2021-04-29 PROCEDURE — 99213 OFFICE O/P EST LOW 20 MIN: CPT | Performed by: PSYCHIATRY & NEUROLOGY

## 2021-04-29 RX ORDER — OLANZAPINE 5 MG/1
5 TABLET ORAL
Qty: 30 TABLET | Refills: 2 | Status: SHIPPED | OUTPATIENT
Start: 2021-04-29 | End: 2021-06-04 | Stop reason: SDUPTHER

## 2021-04-29 RX ORDER — PROPRANOLOL HYDROCHLORIDE 20 MG/1
20-40 TABLET ORAL 2 TIMES DAILY PRN
Qty: 120 TABLET | Refills: 3 | Status: SHIPPED | OUTPATIENT
Start: 2021-04-29 | End: 2021-06-04 | Stop reason: SDUPTHER

## 2021-04-29 RX ORDER — FLUOXETINE HYDROCHLORIDE 40 MG/1
80 CAPSULE ORAL DAILY
Qty: 60 CAPSULE | Refills: 3 | Status: SHIPPED | OUTPATIENT
Start: 2021-04-29 | End: 2021-06-04 | Stop reason: SDUPTHER
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
## 2021-04-29 NOTE — BH TREATMENT PLAN
Treatment Plan not completed within required time limits due to:    [facility] presenting  with emotional/behavorial issues that required clinical intervention and more of time today  Discussed  developing a treatment plan at their  next scheduled appointment and patient is agreeable

## 2021-05-05 ENCOUNTER — PATIENT OUTREACH (OUTPATIENT)
Dept: SURGERY | Facility: CLINIC | Age: 51
End: 2021-05-05

## 2021-05-05 NOTE — PROGRESS NOTES
Ct called cm stating that he needs an oral surgeon  Cm called ct and left vm for ct to call number on the back of his insurance

## 2021-05-17 ENCOUNTER — TELEPHONE (OUTPATIENT)
Dept: SURGERY | Facility: CLINIC | Age: 51
End: 2021-05-17

## 2021-05-24 DIAGNOSIS — K58.2 IRRITABLE BOWEL SYNDROME WITH BOTH CONSTIPATION AND DIARRHEA: ICD-10-CM

## 2021-05-24 RX ORDER — DICYCLOMINE HCL 20 MG
TABLET ORAL
Qty: 60 TABLET | Refills: 2 | Status: SHIPPED | OUTPATIENT
Start: 2021-05-24 | End: 2021-08-16

## 2021-05-28 ENCOUNTER — PATIENT OUTREACH (OUTPATIENT)
Dept: SURGERY | Facility: CLINIC | Age: 51
End: 2021-05-28

## 2021-06-02 ENCOUNTER — PATIENT OUTREACH (OUTPATIENT)
Dept: SURGERY | Facility: CLINIC | Age: 51
End: 2021-06-02

## 2021-06-04 ENCOUNTER — TELEMEDICINE (OUTPATIENT)
Dept: PSYCHIATRY | Facility: CLINIC | Age: 51
End: 2021-06-04
Payer: COMMERCIAL

## 2021-06-04 DIAGNOSIS — F41.8 PERFORMANCE ANXIETY: ICD-10-CM

## 2021-06-04 DIAGNOSIS — F31.81 BIPOLAR 2 DISORDER, MAJOR DEPRESSIVE EPISODE (HCC): ICD-10-CM

## 2021-06-04 DIAGNOSIS — F41.1 GAD (GENERALIZED ANXIETY DISORDER): ICD-10-CM

## 2021-06-04 PROCEDURE — 99213 OFFICE O/P EST LOW 20 MIN: CPT | Performed by: PSYCHIATRY & NEUROLOGY

## 2021-06-04 RX ORDER — OLANZAPINE 5 MG/1
5 TABLET ORAL
Qty: 30 TABLET | Refills: 3 | Status: SHIPPED | OUTPATIENT
Start: 2021-06-04 | End: 2021-07-21 | Stop reason: SDUPTHER

## 2021-06-04 RX ORDER — PROPRANOLOL HYDROCHLORIDE 20 MG/1
20-40 TABLET ORAL 2 TIMES DAILY PRN
Qty: 120 TABLET | Refills: 3 | Status: SHIPPED | OUTPATIENT
Start: 2021-06-04 | End: 2021-07-21 | Stop reason: SDUPTHER

## 2021-06-04 RX ORDER — FLUOXETINE HYDROCHLORIDE 40 MG/1
80 CAPSULE ORAL DAILY
Qty: 60 CAPSULE | Refills: 3 | Status: SHIPPED | OUTPATIENT
Start: 2021-06-04 | End: 2021-07-21 | Stop reason: SDUPTHER
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
## 2021-06-04 NOTE — BH TREATMENT PLAN
TREATMENT PLAN (Medication Management Only)        [facility]    Name/Date of Birth/MRN:  Tisha Rodriguez 44 y o  1970 MRN: [id number]  Date of Treatment Plan: June 4, 2021  Diagnosis/Diagnoses:   1  SOPHIA (generalized anxiety disorder)    2  Bipolar 2 disorder, major depressive episode ([facility] )    3  Performance anxiety      Strengths/Personal Resources for Self-Care: "I accept things"  Area/Areas of need (in own words): ongoing medical issues, dental work  1  Long Term Goal: "improve anxiety and depresion"   Target Date: 180 days from treatment plan  Person/Persons responsible for completion of goal: Dr Juanita Boone and Self  2  Short Term Objective (s) - How will we reach this goal?:   A  Provider new recommended medication/dosage changes and/or continue medication(s): as noted  B   Take meds as prescribed, follow up as recommended  C  Continue to work toward exercise more, 3x/wk for 60min  Target Date: 6 months from treatment plan unless noted otherwise  Person/Persons Responsible for Completion of Goal: Dr Juanita Boone and Self   Progress Towards Goals: continuing treatment   Treatment Modality: Medication management and therapy PRN  Review due 180 days from date of this plan: Approximately 6 months from today ( 12/4/2021 )    Expected length of service: ongoing treatment unless revised  My Physician/PA/NP and I have developed this plan together and I agree to work on the goals and objectives  I understand the treatment goals that were developed for my treatment    Signature:       Date and time:  Signature of parent/guardian if under age of 15 years: Date and time:  Signature of provider:      Date and time:  Signature of Supervising Physician:    Date and time: 6/4/2021      Dragan Evans III, DO

## 2021-06-09 ENCOUNTER — PATIENT OUTREACH (OUTPATIENT)
Dept: SURGERY | Facility: CLINIC | Age: 51
End: 2021-06-09

## 2021-06-11 ENCOUNTER — TELEPHONE (OUTPATIENT)
Dept: SURGERY | Facility: CLINIC | Age: 51
End: 2021-06-11

## 2021-06-11 DIAGNOSIS — N52.8 OTHER MALE ERECTILE DYSFUNCTION: ICD-10-CM

## 2021-06-11 RX ORDER — TADALAFIL 2.5 MG/1
2.5 TABLET, FILM COATED ORAL DAILY PRN
Qty: 10 TABLET | Refills: 2 | Status: SHIPPED | OUTPATIENT
Start: 2021-06-11 | End: 2021-12-06

## 2021-06-11 NOTE — TELEPHONE ENCOUNTER
Patient requested copy of ID and medication list faxed to [employer] for social security  Filled out consent/JOCY form on behalf of patient and mailed copy to home

## 2021-06-14 ENCOUNTER — PATIENT OUTREACH (OUTPATIENT)
Dept: SURGERY | Facility: CLINIC | Age: 51
End: 2021-06-14

## 2021-06-15 ENCOUNTER — PATIENT OUTREACH (OUTPATIENT)
Dept: SURGERY | Facility: CLINIC | Age: 51
End: 2021-06-15

## 2021-06-15 NOTE — PROGRESS NOTES
Yoni spoke with clinic staff regarding ct needing an oral surgeon  Cm notified staff the ct has insurance and that he can try [facility] oral surgery

## 2021-06-18 ENCOUNTER — PATIENT OUTREACH (OUTPATIENT)
Dept: SURGERY | Facility: CLINIC | Age: 51
End: 2021-06-18

## 2021-06-21 DIAGNOSIS — K21.00 GASTROESOPHAGEAL REFLUX DISEASE WITH ESOPHAGITIS: ICD-10-CM

## 2021-06-22 RX ORDER — OMEPRAZOLE 40 MG/1
CAPSULE, DELAYED RELEASE ORAL
Qty: 30 CAPSULE | Refills: 3 | Status: SHIPPED | OUTPATIENT
Start: 2021-06-22 | End: 2021-11-08

## 2021-07-08 ENCOUNTER — PATIENT OUTREACH (OUTPATIENT)
Dept: SURGERY | Facility: CLINIC | Age: 51
End: 2021-07-08

## 2021-07-09 ENCOUNTER — PATIENT OUTREACH (OUTPATIENT)
Dept: SURGERY | Facility: CLINIC | Age: 51
End: 2021-07-09

## 2021-07-21 ENCOUNTER — OFFICE VISIT (OUTPATIENT)
Dept: PSYCHIATRY | Facility: CLINIC | Age: 51
End: 2021-07-21
Payer: COMMERCIAL

## 2021-07-21 VITALS — BODY MASS INDEX: 28.19 KG/M2 | WEIGHT: 180 LBS

## 2021-07-21 DIAGNOSIS — R25.8 JERKY BODY MOVEMENTS: Primary | ICD-10-CM

## 2021-07-21 DIAGNOSIS — F31.81 BIPOLAR 2 DISORDER, MAJOR DEPRESSIVE EPISODE (HCC): ICD-10-CM

## 2021-07-21 DIAGNOSIS — F41.8 PERFORMANCE ANXIETY: ICD-10-CM

## 2021-07-21 DIAGNOSIS — F41.1 GAD (GENERALIZED ANXIETY DISORDER): ICD-10-CM

## 2021-07-21 PROCEDURE — 99213 OFFICE O/P EST LOW 20 MIN: CPT | Performed by: PSYCHIATRY & NEUROLOGY

## 2021-07-21 PROCEDURE — 90833 PSYTX W PT W E/M 30 MIN: CPT | Performed by: PSYCHIATRY & NEUROLOGY

## 2021-07-21 RX ORDER — OLANZAPINE 5 MG/1
5 TABLET ORAL
Qty: 30 TABLET | Refills: 3 | Status: SHIPPED | OUTPATIENT
Start: 2021-07-21 | End: 2021-11-01 | Stop reason: SDUPTHER

## 2021-07-21 RX ORDER — PROPRANOLOL HYDROCHLORIDE 20 MG/1
20-40 TABLET ORAL 2 TIMES DAILY PRN
Qty: 120 TABLET | Refills: 3 | Status: SHIPPED | OUTPATIENT
Start: 2021-07-21 | End: 2021-12-02 | Stop reason: SDUPTHER

## 2021-07-21 RX ORDER — FLUOXETINE HYDROCHLORIDE 40 MG/1
80 CAPSULE ORAL DAILY
Qty: 60 CAPSULE | Refills: 3 | Status: SHIPPED | OUTPATIENT
Start: 2021-07-21 | End: 2021-11-01 | Stop reason: SDUPTHER
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
## 2021-07-29 ENCOUNTER — PATIENT OUTREACH (OUTPATIENT)
Dept: SURGERY | Facility: CLINIC | Age: 51
End: 2021-07-29

## 2021-08-10 ENCOUNTER — OFFICE VISIT (OUTPATIENT)
Dept: SURGERY | Facility: CLINIC | Age: 51
End: 2021-08-10
Payer: COMMERCIAL

## 2021-08-10 VITALS
SYSTOLIC BLOOD PRESSURE: 117 MMHG | HEART RATE: 96 BPM | DIASTOLIC BLOOD PRESSURE: 87 MMHG | OXYGEN SATURATION: 96 % | BODY MASS INDEX: 28.44 KG/M2 | TEMPERATURE: 97.4 F | WEIGHT: 181.6 LBS

## 2021-08-10 DIAGNOSIS — R20.0 NUMBNESS AND TINGLING OF BOTH UPPER EXTREMITIES: ICD-10-CM

## 2021-08-10 DIAGNOSIS — R20.2 NUMBNESS AND TINGLING OF BOTH UPPER EXTREMITIES: ICD-10-CM

## 2021-08-10 DIAGNOSIS — G25.9 MOVEMENT DISORDER: ICD-10-CM

## 2021-08-10 DIAGNOSIS — T65.222D TOXIC EFFECT OF TOBACCO CIGARETTE, INTENTIONAL SELF-HARM, SUBSEQUENT ENCOUNTER: ICD-10-CM

## 2021-08-10 DIAGNOSIS — G89.29 CHRONIC GENERALIZED PAIN DISORDER: ICD-10-CM

## 2021-08-10 DIAGNOSIS — M54.2 CERVICAL PAIN: ICD-10-CM

## 2021-08-10 DIAGNOSIS — R52 CHRONIC GENERALIZED PAIN DISORDER: ICD-10-CM

## 2021-08-10 DIAGNOSIS — B20 HIV DISEASE (HCC): Primary | ICD-10-CM

## 2021-08-10 DIAGNOSIS — R25.8 JERKY BODY MOVEMENTS: ICD-10-CM

## 2021-08-10 DIAGNOSIS — R26.9 ABNORMALITY OF GAIT AND MOBILITY: ICD-10-CM

## 2021-08-10 PROCEDURE — 99214 OFFICE O/P EST MOD 30 MIN: CPT | Performed by: NURSE PRACTITIONER

## 2021-08-10 NOTE — PROGRESS NOTES
Assessment/Plan:    HIV disease  Doing good  Pt reports 100% medication compliance  · Stressed the importance of 100% medication adherence  · Reviewed labs    HIV Counseling:    Viral Load: < 20    CD4 Count: 766    ART: Cara Ripa     Denies side effects  Stressed the importance of adherence  Continue follow up in the ID clinic with Dr Ludwin Cohen  Reviewed the most recent labs, including the CD4 and viral load  Discussed the risks and benefits of treatment options, instructions for management, importance of treatment adherence, and reduction of risk factors  Educated on possible medication side effects  Counseled on routes of HIV transmission, including the risk of  infection  Emphasized that viral suppression is the best method to prevent HIV transmission  At this time the pt denies the need for HIV testing of anyone in their life  Total encounter time was greater than 35 minutes  Greater than 20 minutes were spent on counseling and patient education  Pt voices understanding and agreement with treatment plan  Toxic effect of tobacco cigarette, intentional self-harm ([facility] [address] )  Pt is smoking less due to become apathetic  · Stressed the importance smoking cessation  Nicotine Dependency - Primary    Counseled for greater than 15 minutes on the importance of smoking cessation  Education was given regarding the cardiovascular effects of how nicotine affects the heart, lungs, kidneys, and peripheral vascular system  Referred to [facility] for enrollment in smoking cessation program       Jerky body movements  Worse today  Pt is very active with his body being constantly in motion and not being able to stop moving  TD was ruled out in the past     Pt in the past has been able to stop moving when he thought about it but today it has been much harder to stop  Some movements are rhythmical and at times all 4 extremities are moving in different directions      Pt is exhausted with his constant body being in motion  Pt appears to be more bothered by constant movements although he does not always appear to aware of how much he is moving  Pt is experiencing dystonia of feet especially left foot with great toe and shooting pain on anterior lateral foot  Generalized body aches and pain  Shooting pain in right wrist and left foot  Pain is relieved with Tylenol  DDx: Myclonous, Akathasia, Tardive dyskinesia, Chorea, autoimmune disorder, and psychiatric in nature  Unclear etiology except body movements are worse without change or stress in patient's life  This is worst his body movements have been during visits  MRI Brain 1/2019:IMPRESSION:   Normal       · Referral to neurology  · Consider repeating MRI of brain since has been greater than 2 years and symptoms are worsening and MRI of cervical area  · Depending on MRI results, may require autoimmune work up    Chronic generalized pain disorder  Worsening pain  Pt is experiecing generallized pain in joint issues  Tylenol does help to provide pain relief  Pt appears to be sensitivty to sitting in certain position with increase in experience of pain  Did not get relief from gabapentin  DDx: FIbromyalgia, arthritis, autoimmune disorders, or psoriatic arthritis     · Continue Tylenol  · May require autoimmune workup  · May benefit from PT referral       Cervical pain  Ongoing numbness and tingling in neck and bilateral shoulders  Pt reports this problem is not new but is getting worse  Pt feels pressure/pain in cervical region with radiating numbness, tingling, and pain to bilateral arms  Pt did not find relief with gabapentin    · MRI cervical spine wo contrasts was ordered but never completed  · EMG since pt is dropping items  · Tylenol for pain  · May trial muscle relaxant but concerned about frequent body movements         Diagnoses and all orders for this visit:    HIV disease ([facility] [address] )    Toxic effect of tobacco cigarette, intentional self-harm, subsequent encounter    Jerky body movements  -     Ambulatory referral to Neurology; Future    Chronic generalized pain disorder    Cervical pain    Movement disorder  -     Ambulatory referral to Neurology; Future    Numbness and tingling of both upper extremities    Abnormality of gait and mobility  -     Ambulatory referral to Neurology; Future    Other orders  -     Cancel: MRI brain wo contrast; Future          Subjective:      Patient ID: Roberth Singh is a 44 y o  male  HPI  Pt is being seen for 6 month follow up for management of HIV and chronic health care conditions  Pt reports everything is perfectly fine today  Pt has lost 6 lb without trying  Bilateral arms is asleep numbness and tingling, drops things all the time, and decreased strength  Gabapentin did not work  Pt has been going to chiropracter but feels like if he could get a 5 minute massage  Pt is experiencing generalized pain in wrist, back, foot, neck, arms  Pt has noticed an increase in his jerky body movement over the past 2 months  Pt has followed up with neurology is the past but no one can determine what is causing his increase motor activity  Pt is of the mindset, he doesn't care anymore since no one can was able to identify what is happening to his body  The following portions of the patient's history were reviewed and updated as appropriate: allergies, current medications, past family history, past medical history, past social history, past surgical history and problem list     Review of Systems      Objective:      /87 (BP Location: Right arm, Patient Position: Sitting)   Pulse 96   Temp (!) 97 4 °F (36 3 °C) (Tympanic)   Wt 82 4 kg (181 lb 9 6 oz)   SpO2 96%   BMI 28 44 kg/m²          Physical Exam  Vitals and nursing note reviewed  Constitutional:       Comments: Pt appears worn out and can't sit still  Cardiovascular:      Rate and Rhythm: Normal rate and regular rhythm  Pulses: Normal pulses  Heart sounds: Normal heart sounds  Pulmonary:      Effort: Pulmonary effort is normal       Breath sounds: Normal breath sounds  Abdominal:      General: Bowel sounds are normal       Palpations: Abdomen is soft  Musculoskeletal:      Comments: Increase irradict body movements where pt is moving all 4 extremities and head at the same time and appears to be rocking from side to side  Skin:     General: Skin is warm and dry  Capillary Refill: Capillary refill takes less than 2 seconds  Neurological:      Mental Status: He is alert and oriented to person, place, and time  Psychiatric:         Mood and Affect: Mood normal          Behavior: Behavior normal          Thought Content: Thought content normal          Judgment: Judgment normal        BMI Counseling: Body mass index is 28 44 kg/m²  The BMI is above normal  Nutrition recommendations include reducing portion sizes, decreasing overall calorie intake, 3-5 servings of fruits/vegetables daily, reducing fast food intake, consuming healthier snacks, moderation in carbohydrate intake and increasing intake of lean protein  Exercise recommendations include moderate aerobic physical activity for 150 minutes/week, exercising 3-5 times per week and strength training exercises

## 2021-08-10 NOTE — ASSESSMENT & PLAN NOTE
Ongoing numbness and tingling in neck and bilateral shoulders  Pt reports this problem is not new but is getting worse  Pt feels pressure/pain in cervical region with radiating numbness, tingling, and pain to bilateral arms  Pt did not find relief with gabapentin    · MRI cervical spine wo contrasts was ordered but never completed  · EMG since pt is dropping items  · Tylenol for pain  · May trial muscle relaxant but concerned about frequent body movements

## 2021-08-10 NOTE — ASSESSMENT & PLAN NOTE
Worsening pain  Pt is experiecing generallized pain in joint issues  Tylenol does help to provide pain relief  Pt appears to be sensitivty to sitting in certain position with increase in experience of pain  Did not get relief from gabapentin    DDx: FIbromyalgia, arthritis, autoimmune disorders, or psoriatic arthritis     · Continue Tylenol  · May require autoimmune workup  · May benefit from PT referral

## 2021-08-10 NOTE — ASSESSMENT & PLAN NOTE
Pt is smoking less due to become apathetic  · Stressed the importance smoking cessation  Nicotine Dependency - Primary    Counseled for greater than 15 minutes on the importance of smoking cessation  Education was given regarding the cardiovascular effects of how nicotine affects the heart, lungs, kidneys, and peripheral vascular system    [place] [facility] [place] in [place] cessation program

## 2021-08-10 NOTE — ASSESSMENT & PLAN NOTE
Worse today  Pt is very active with his body being constantly in motion and not being able to stop moving  TD was ruled out in the past     Pt in the past has been able to stop moving when he thought about it but today it has been much harder to stop  Some movements are rhythmical and at times all 4 extremities are moving in different directions  Pt is exhausted with his constant body being in motion  Pt appears to be more bothered by constant movements although he does not always appear to aware of how much he is moving  Pt is experiencing dystonia of feet especially left foot with great toe and shooting pain on anterior lateral foot  Generalized body aches and pain  Shooting pain in right wrist and left foot  Pain is relieved with Tylenol  DDx: Myclonous, Akathasia, Tardive dyskinesia, Chorea, autoimmune disorder, and psychiatric in nature  Unclear etiology except body movements are worse without change or stress in patient's life  This is worst his body movements have been during visits    MRI Brain 1/2019:IMPRESSION:   Normal       · Referral to neurology  · Consider repeating MRI of brain since has been greater than 2 years and symptoms are worsening and MRI of cervical area  · Depending on MRI results, may require autoimmune work up

## 2021-08-10 NOTE — ASSESSMENT & PLAN NOTE
Doing good  Pt reports 100% medication compliance  · Stressed the importance of 100% medication adherence  · Reviewed labs    HIV Counseling:    Viral Load: < 20    CD4 Count: 766    ART: Mariam Pretty     Denies side effects  Stressed the importance of adherence  Continue follow up in the ID clinic with Dr Hazel Jiang  Reviewed the most recent labs, including the CD4 and viral load  Discussed the risks and benefits of treatment options, instructions for management, importance of treatment adherence, and reduction of risk factors  Educated on possible medication side effects  Counseled on routes of HIV transmission, including the risk of  infection  Emphasized that viral suppression is the best method to prevent HIV transmission  At this time the pt denies the need for HIV testing of anyone in their life  Total encounter time was greater than 35 minutes  Greater than 20 minutes were spent on counseling and patient education  Pt voices understanding and agreement with treatment plan

## 2021-08-11 NOTE — PROGRESS NOTES
Assessment    Annual nutrition assessment   Last annual: 4/21/2020    Clinical Data/Client History    HIV: yes  AIDS: no    : Patricia Strange    Socio- Economic Status: SNAP, [employer] and [employer] grocery shops    Living Situation: House and in [place], with mom    Food Prep/Access: Refrigerator, Stove and Microwave    Psycho Social Factors: Jerky body movements, anxiety and bipolar per chart notes    Functional Status: Ambulatory, Able to [employer] and Prepared Own Meals    Activity Level: sedentary aside from ADLs    Ambulation Difficulty with None discussed    Oral Problems: Poor dentition resulting in difficulty chewing      Last Dental Exam: pt states he has been working to get oral health addressed    Procedures Performed: working to get appt with oral surgeon, per pt     Typical Food/Beverage Intake:    · Breakfast cereal, or eggs, cheese, and potatoes   · Lunch tuna sandwich   · Dinner roast beef, or frozen convenience meal   · Snacks chocolate, fruits (canned peaches, watermelon), cake   · Fluids iced tea, carlota aid, "sugary" drinks     Appetite: Good    Supplements: No     Food Intolerance: Pt denied n/v/d; denied constipation; does not tolerate tomato    Weight Change Percent: 6# loss since Mar-21    Time Period of Weight Change: months    Significant % Weight Loss: 3%    Usual Body Weight:   169# (Jul-20)  187# (Mar-21)  180# (Romel Serrano)    Current Body Weight:   181# (82 4 kg)  BMI 28 44  # (67 3 kg)  5'7" (1 702 m)    Nutrition Diagnosis    Problem: Poor nutrition quality of life    Related to: Poor Dentition    As Evidenced By: Dietary Recall    Intervention Diet Prescription    Nutritional needs based on:   [facility] REE 1636, sedentary/low active, -250 kcal  1g/kg CW; 1 5 g/kg IBW, CD4 >200    · 6622-6454 kcal  ·  g protein  · 8852-3315 fluid  · 2 g Na    Current intake: Inadequate    Snack/Supplement Recommendations: No supplement recommendations at this time    Nutrition Recommendations: Soft nutritious foods for adequate PO intake; use caution with excess TV dinner consumption due to high Na content; hydrate primarily with water, dilute high sugar beverages; aim for 30 minutes daily exercise as able     Goals: Pt willing to include soft foods, dilute beverages with water     Nutrition Education Intervention: Provided     Person Educated: patient    Topics Discussed: general assessment items, mobile market, [employer], food pantries, Soft nutritious foods for adequate PO intake; use caution with excess TV dinner consumption due to high Na content; hydrate primarily with water, dilute high sugar beverages    Teaching Method: Verbal    Readiness to Change: Contemplation:  (Acknowledging that there is a problem but not yet ready or sure of wanting to make a change)    Visit Summary    RD met with Andrzej Chavarria in conjunction with PCP appt, in order to complete annual nutrition assessment  Laceyher Chavarria admitted to poor dentition and difficulty chewing, and frustration with getting appt for oral surgery  RD reviewed soft foods to include, I e  scrambled eggs, oatmeal, canned fruits, well cooked vegetables, braised/stewed meats  RD cautioned Andrzej Chavarria about his consumption of TV dinners due to high Na content  Advised pt to dilute sugary beverages with water, as pt reported drinking a lot of carlota aid and iced tea  Pt reported some food security concerns, he was receptive to a printed list of [address] from the [employer]  RD also explained [employer] [place] with [employer], provided flyer  RD explained [employer] mobile market voucher, pt accepted voucher #[id number] and schedule  Andrzej Chavarria inquired about amino acids, RD advised that well balanced diet and including dietary protein is adequate and advised against OTC amino supplement  Pt is not vegetarian/vegan, no dietary restrictions        Lab Results   Component Value Date    CHOLESTEROL 145 03/16/2021    CHOLESTEROL 183 09/28/2020 CHOLESTEROL 231 (H) 06/14/2019       Lab Results   Component Value Date    TRIG 174 (H) 03/16/2021    TRIG 222 (H) 09/28/2020    TRIG 172 (H) 06/14/2019     Lab Results   Component Value Date    HGBA1C 5 6 03/16/2021    HGBA1C 5 6 09/28/2020    HGBA1C 5 1 01/24/2020     Lab Results   Component Value Date    GLUF 95 03/16/2021    LDLCALC 61 03/16/2021    CREATININE 0 80 03/16/2021     Monitor weight trend, oral health, diet and exercise, food security, lipid panel, hgbA1C        Jessi Garza, MS, RD, LDN

## 2021-08-13 DIAGNOSIS — K58.2 IRRITABLE BOWEL SYNDROME WITH BOTH CONSTIPATION AND DIARRHEA: ICD-10-CM

## 2021-08-13 DIAGNOSIS — J30.2 SEASONAL ALLERGIES: ICD-10-CM

## 2021-08-13 DIAGNOSIS — B20 HIV DISEASE (HCC): ICD-10-CM

## 2021-08-16 RX ORDER — CETIRIZINE HYDROCHLORIDE 10 MG/1
TABLET ORAL
Qty: 90 TABLET | Refills: 1 | Status: SHIPPED | OUTPATIENT
Start: 2021-08-16 | End: 2022-01-20

## 2021-08-16 RX ORDER — DICYCLOMINE HCL 20 MG
TABLET ORAL
Qty: 60 TABLET | Refills: 2 | Status: SHIPPED | OUTPATIENT
Start: 2021-08-16 | End: 2021-11-12

## 2021-08-16 RX ORDER — BICTEGRAVIR SODIUM, EMTRICITABINE, AND TENOFOVIR ALAFENAMIDE FUMARATE 50; 200; 25 MG/1; MG/1; MG/1
1 TABLET ORAL
Qty: 30 TABLET | Refills: 3 | Status: SHIPPED | OUTPATIENT
Start: 2021-08-16 | End: 2021-12-06

## 2021-08-20 ENCOUNTER — PATIENT OUTREACH (OUTPATIENT)
Dept: SURGERY | Facility: CLINIC | Age: 51
End: 2021-08-20

## 2021-08-26 NOTE — DISCHARGE INSTRUCTIONS
DIAGNOSIS; LOW BACK PAIN/ RIGHT BUTTOCK PAIN / RIGHT ANTERIOR LEG PAIN     - ACTIVITY AS TOLERATED    - THE LIDODERM  PATCH NEEDS TO BE REMOVED IN 12 HRS- CAN USE OVER THE COUNTER LIDOCAINE PATCHES TO AREA    - FOR PAIN- FOR THE NEXT WEEK- WOULD START WITH OVER THE COUNTER NAPROXEN 2-220 MG TABLETS TAKEN WITH 2-500 MG TYLENOL TABLETS 2 TIMES A DAY WITH MEALS    - FOR SEVERE PAIN- MORPHINE 1 TABLET EVERY 4-6 HRS AS NEEDED- CAN CAUSE NAUSEA    - IF NO RELIEF IN BACK PAIN IN 1 WEEK - PLEASE CALL  [facility] TO SCHEDULE AN APPT- IF YOU NEED A REFERRAL PLEASE TAKE THE REFERRAL PRESCRIPTION WITH YOU    - PLEASE RETURN TO  THE ER FOR ANY WORSENING/ INTRACTABLE PAIN // ANY INABILITY TO WALK/ ANY FEVERS- TEMP > 100 4/ ANY RIGHT LEG/FOTO WEAKNESS- ANY INABILITY TO MOVE OR CONTROL YOUR URINE/STOOLS /ANY NUMBNESS OF YOUR SADDLE AREA
show

## 2021-08-30 ENCOUNTER — TELEPHONE (OUTPATIENT)
Dept: PSYCHIATRY | Facility: CLINIC | Age: 51
End: 2021-08-30

## 2021-09-03 ENCOUNTER — PATIENT OUTREACH (OUTPATIENT)
Dept: SURGERY | Facility: CLINIC | Age: 51
End: 2021-09-03

## 2021-09-08 ENCOUNTER — PATIENT OUTREACH (OUTPATIENT)
Dept: SURGERY | Facility: CLINIC | Age: 51
End: 2021-09-08

## 2021-09-14 ENCOUNTER — PATIENT OUTREACH (OUTPATIENT)
Dept: SURGERY | Facility: CLINIC | Age: 51
End: 2021-09-14

## 2021-09-16 ENCOUNTER — TELEPHONE (OUTPATIENT)
Dept: SURGERY | Facility: CLINIC | Age: 51
End: 2021-09-16

## 2021-09-16 NOTE — TELEPHONE ENCOUNTER
Mailed appointment reminder:        Neurology - 04/26/21 @ 1:00PM      [address] [address]  [phone]

## 2021-10-07 ENCOUNTER — PATIENT OUTREACH (OUTPATIENT)
Dept: SURGERY | Facility: CLINIC | Age: 51
End: 2021-10-07

## 2021-10-13 ENCOUNTER — PATIENT OUTREACH (OUTPATIENT)
Dept: SURGERY | Facility: CLINIC | Age: 51
End: 2021-10-13

## 2021-10-15 ENCOUNTER — PATIENT OUTREACH (OUTPATIENT)
Dept: SURGERY | Facility: CLINIC | Age: 51
End: 2021-10-15

## 2021-10-19 ENCOUNTER — IMMUNIZATIONS (OUTPATIENT)
Dept: SURGERY | Facility: CLINIC | Age: 51
End: 2021-10-19
Payer: COMMERCIAL

## 2021-10-19 ENCOUNTER — PATIENT OUTREACH (OUTPATIENT)
Dept: SURGERY | Facility: CLINIC | Age: 51
End: 2021-10-19

## 2021-10-19 DIAGNOSIS — Z23 ENCOUNTER FOR IMMUNIZATION: ICD-10-CM

## 2021-10-19 DIAGNOSIS — Z23 NEED FOR INFLUENZA VACCINATION: Primary | ICD-10-CM

## 2021-10-19 PROCEDURE — 90682 RIV4 VACC RECOMBINANT DNA IM: CPT

## 2021-10-19 PROCEDURE — 90471 IMMUNIZATION ADMIN: CPT

## 2021-11-01 ENCOUNTER — OFFICE VISIT (OUTPATIENT)
Dept: PSYCHIATRY | Facility: CLINIC | Age: 51
End: 2021-11-01
Payer: COMMERCIAL

## 2021-11-01 DIAGNOSIS — Z79.899 HIGH RISK MEDICATION USE: ICD-10-CM

## 2021-11-01 DIAGNOSIS — Z53.29 NO-SHOW FOR APPOINTMENT: ICD-10-CM

## 2021-11-01 DIAGNOSIS — F31.81 BIPOLAR 2 DISORDER, MAJOR DEPRESSIVE EPISODE (HCC): Primary | ICD-10-CM

## 2021-11-01 DIAGNOSIS — F41.1 GAD (GENERALIZED ANXIETY DISORDER): ICD-10-CM

## 2021-11-01 PROCEDURE — 99214 OFFICE O/P EST MOD 30 MIN: CPT | Performed by: PSYCHIATRY & NEUROLOGY

## 2021-11-01 RX ORDER — OLANZAPINE 5 MG/1
5 TABLET ORAL
Qty: 30 TABLET | Refills: 3 | Status: SHIPPED | OUTPATIENT
Start: 2021-11-01 | End: 2021-12-02 | Stop reason: SDUPTHER

## 2021-11-01 RX ORDER — FLUOXETINE HYDROCHLORIDE 40 MG/1
80 CAPSULE ORAL DAILY
Qty: 60 CAPSULE | Refills: 3 | Status: SHIPPED | OUTPATIENT
Start: 2021-11-01 | End: 2021-12-02 | Stop reason: SDUPTHER

## 2021-11-05 ENCOUNTER — PATIENT OUTREACH (OUTPATIENT)
Dept: SURGERY | Facility: CLINIC | Age: 51
End: 2021-11-05

## 2021-11-05 DIAGNOSIS — K21.00 GASTROESOPHAGEAL REFLUX DISEASE WITH ESOPHAGITIS: ICD-10-CM

## 2021-11-08 ENCOUNTER — PATIENT OUTREACH (OUTPATIENT)
Dept: SURGERY | Facility: CLINIC | Age: 51
End: 2021-11-08

## 2021-11-08 RX ORDER — OMEPRAZOLE 40 MG/1
CAPSULE, DELAYED RELEASE ORAL
Qty: 30 CAPSULE | Refills: 3 | Status: SHIPPED | OUTPATIENT
Start: 2021-11-08 | End: 2022-02-28

## 2021-11-09 ENCOUNTER — APPOINTMENT (OUTPATIENT)
Dept: LAB | Age: 51
End: 2021-11-09
Payer: COMMERCIAL

## 2021-11-09 DIAGNOSIS — L74.9 SWEATING ABNORMALITY: ICD-10-CM

## 2021-11-09 DIAGNOSIS — B20 HIV DISEASE (HCC): ICD-10-CM

## 2021-11-09 DIAGNOSIS — Z79.899 HIGH RISK MEDICATION USE: ICD-10-CM

## 2021-11-09 LAB
ALBUMIN SERPL BCP-MCNC: 3.3 G/DL (ref 3.5–5)
ALP SERPL-CCNC: 112 U/L (ref 46–116)
ALT SERPL W P-5'-P-CCNC: 30 U/L (ref 12–78)
ANION GAP SERPL CALCULATED.3IONS-SCNC: 6 MMOL/L (ref 4–13)
AST SERPL W P-5'-P-CCNC: 22 U/L (ref 5–45)
BASOPHILS # BLD AUTO: 0.03 THOUSANDS/ΜL (ref 0–0.1)
BASOPHILS NFR BLD AUTO: 0 % (ref 0–1)
BILIRUB SERPL-MCNC: 0.61 MG/DL (ref 0.2–1)
BUN SERPL-MCNC: 16 MG/DL (ref 5–25)
CALCIUM ALBUM COR SERPL-MCNC: 9.8 MG/DL (ref 8.3–10.1)
CALCIUM SERPL-MCNC: 9.2 MG/DL (ref 8.3–10.1)
CHLORIDE SERPL-SCNC: 104 MMOL/L (ref 100–108)
CHOLEST SERPL-MCNC: 146 MG/DL (ref 50–200)
CO2 SERPL-SCNC: 26 MMOL/L (ref 21–32)
CREAT SERPL-MCNC: 0.88 MG/DL (ref 0.6–1.3)
EOSINOPHIL # BLD AUTO: 0.24 THOUSAND/ΜL (ref 0–0.61)
EOSINOPHIL NFR BLD AUTO: 3 % (ref 0–6)
ERYTHROCYTE [DISTWIDTH] IN BLOOD BY AUTOMATED COUNT: 13 % (ref 11.6–15.1)
EST. AVERAGE GLUCOSE BLD GHB EST-MCNC: 111 MG/DL
GFR SERPL CREATININE-BSD FRML MDRD: 99 ML/MIN/1.73SQ M
GLUCOSE P FAST SERPL-MCNC: 88 MG/DL (ref 65–99)
HBA1C MFR BLD: 5.5 %
HCT VFR BLD AUTO: 39.3 % (ref 36.5–49.3)
HDLC SERPL-MCNC: 49 MG/DL
HGB BLD-MCNC: 12.8 G/DL (ref 12–17)
IMM GRANULOCYTES # BLD AUTO: 0.04 THOUSAND/UL (ref 0–0.2)
IMM GRANULOCYTES NFR BLD AUTO: 1 % (ref 0–2)
LDLC SERPL CALC-MCNC: 79 MG/DL (ref 0–100)
LYMPHOCYTES # BLD AUTO: 2.93 THOUSANDS/ΜL (ref 0.6–4.47)
LYMPHOCYTES NFR BLD AUTO: 42 % (ref 14–44)
MCH RBC QN AUTO: 30 PG (ref 26.8–34.3)
MCHC RBC AUTO-ENTMCNC: 32.6 G/DL (ref 31.4–37.4)
MCV RBC AUTO: 92 FL (ref 82–98)
MONOCYTES # BLD AUTO: 0.67 THOUSAND/ΜL (ref 0.17–1.22)
MONOCYTES NFR BLD AUTO: 10 % (ref 4–12)
NEUTROPHILS # BLD AUTO: 3.07 THOUSANDS/ΜL (ref 1.85–7.62)
NEUTS SEG NFR BLD AUTO: 44 % (ref 43–75)
NRBC BLD AUTO-RTO: 0 /100 WBCS
PLATELET # BLD AUTO: 359 THOUSANDS/UL (ref 149–390)
PMV BLD AUTO: 8.2 FL (ref 8.9–12.7)
POTASSIUM SERPL-SCNC: 4.2 MMOL/L (ref 3.5–5.3)
PROT SERPL-MCNC: 7.4 G/DL (ref 6.4–8.2)
RBC # BLD AUTO: 4.26 MILLION/UL (ref 3.88–5.62)
SODIUM SERPL-SCNC: 136 MMOL/L (ref 136–145)
TRIGL SERPL-MCNC: 88 MG/DL
TSH SERPL DL<=0.05 MIU/L-ACNC: 1.48 UIU/ML (ref 0.36–3.74)
WBC # BLD AUTO: 6.98 THOUSAND/UL (ref 4.31–10.16)

## 2021-11-09 PROCEDURE — 83036 HEMOGLOBIN GLYCOSYLATED A1C: CPT

## 2021-11-09 PROCEDURE — 36415 COLL VENOUS BLD VENIPUNCTURE: CPT

## 2021-11-09 PROCEDURE — 84443 ASSAY THYROID STIM HORMONE: CPT

## 2021-11-09 PROCEDURE — 85025 COMPLETE CBC W/AUTO DIFF WBC: CPT

## 2021-11-09 PROCEDURE — 80053 COMPREHEN METABOLIC PANEL: CPT

## 2021-11-09 PROCEDURE — 86361 T CELL ABSOLUTE COUNT: CPT

## 2021-11-09 PROCEDURE — 80061 LIPID PANEL: CPT

## 2021-11-09 PROCEDURE — 87536 HIV-1 QUANT&REVRSE TRNSCRPJ: CPT

## 2021-11-10 LAB
BASOPHILS # BLD AUTO: 0 X10E3/UL (ref 0–0.2)
BASOPHILS NFR BLD AUTO: 1 %
CD3+CD4+ CELLS # BLD: 1039 /UL (ref 359–1519)
CD3+CD4+ CELLS NFR BLD: 33.5 % (ref 30.8–58.5)
EOSINOPHIL # BLD AUTO: 0.2 X10E3/UL (ref 0–0.4)
EOSINOPHIL NFR BLD AUTO: 3 %
ERYTHROCYTE [DISTWIDTH] IN BLOOD BY AUTOMATED COUNT: 13.4 % (ref 11.6–15.4)
HCT VFR BLD AUTO: 38.8 % (ref 37.5–51)
HGB BLD-MCNC: 12.5 G/DL (ref 13–17.7)
HIV1 RNA # SERPL NAA+PROBE: <20 COPIES/ML
HIV1 RNA SERPL NAA+PROBE-LOG#: NORMAL LOG10COPY/ML
IMM GRANULOCYTES # BLD: 0 X10E3/UL (ref 0–0.1)
IMM GRANULOCYTES NFR BLD: 1 %
LYMPHOCYTES # BLD AUTO: 3.1 X10E3/UL (ref 0.7–3.1)
LYMPHOCYTES NFR BLD AUTO: 42 %
MCH RBC QN AUTO: 29.8 PG (ref 26.6–33)
MCHC RBC AUTO-ENTMCNC: 32.2 G/DL (ref 31.5–35.7)
MCV RBC AUTO: 93 FL (ref 79–97)
MONOCYTES # BLD AUTO: 0.6 X10E3/UL (ref 0.1–0.9)
MONOCYTES NFR BLD AUTO: 8 %
NEUTROPHILS # BLD AUTO: 3.4 X10E3/UL (ref 1.4–7)
NEUTROPHILS NFR BLD AUTO: 45 %
PLATELET # BLD AUTO: 359 X10E3/UL (ref 150–450)
RBC # BLD AUTO: 4.19 X10E6/UL (ref 4.14–5.8)
WBC # BLD AUTO: 7.3 X10E3/UL (ref 3.4–10.8)

## 2021-11-11 DIAGNOSIS — K58.2 IRRITABLE BOWEL SYNDROME WITH BOTH CONSTIPATION AND DIARRHEA: ICD-10-CM

## 2021-11-12 RX ORDER — DICYCLOMINE HCL 20 MG
TABLET ORAL
Qty: 60 TABLET | Refills: 2 | Status: SHIPPED | OUTPATIENT
Start: 2021-11-12 | End: 2022-01-21

## 2021-11-15 ENCOUNTER — PATIENT OUTREACH (OUTPATIENT)
Dept: SURGERY | Facility: CLINIC | Age: 51
End: 2021-11-15

## 2021-11-23 ENCOUNTER — PATIENT OUTREACH (OUTPATIENT)
Dept: SURGERY | Facility: CLINIC | Age: 51
End: 2021-11-23

## 2021-12-02 ENCOUNTER — TELEPHONE (OUTPATIENT)
Dept: PSYCHIATRY | Facility: CLINIC | Age: 51
End: 2021-12-02

## 2021-12-02 ENCOUNTER — OFFICE VISIT (OUTPATIENT)
Dept: PSYCHIATRY | Facility: CLINIC | Age: 51
End: 2021-12-02
Payer: COMMERCIAL

## 2021-12-02 VITALS — HEART RATE: 76 BPM | DIASTOLIC BLOOD PRESSURE: 80 MMHG | SYSTOLIC BLOOD PRESSURE: 122 MMHG

## 2021-12-02 DIAGNOSIS — F41.8 PERFORMANCE ANXIETY: ICD-10-CM

## 2021-12-02 DIAGNOSIS — F41.1 GAD (GENERALIZED ANXIETY DISORDER): ICD-10-CM

## 2021-12-02 DIAGNOSIS — F31.81 BIPOLAR 2 DISORDER, MAJOR DEPRESSIVE EPISODE (HCC): ICD-10-CM

## 2021-12-02 PROCEDURE — 99214 OFFICE O/P EST MOD 30 MIN: CPT | Performed by: PSYCHIATRY & NEUROLOGY

## 2021-12-02 RX ORDER — PROPRANOLOL HYDROCHLORIDE 20 MG/1
20-40 TABLET ORAL 2 TIMES DAILY PRN
Qty: 120 TABLET | Refills: 3 | Status: SHIPPED | OUTPATIENT
Start: 2021-12-02 | End: 2022-02-15 | Stop reason: SDUPTHER

## 2021-12-02 RX ORDER — FLUOXETINE HYDROCHLORIDE 40 MG/1
80 CAPSULE ORAL DAILY
Qty: 60 CAPSULE | Refills: 3 | Status: SHIPPED | OUTPATIENT
Start: 2021-12-02 | End: 2022-02-15 | Stop reason: SDUPTHER

## 2021-12-02 RX ORDER — OLANZAPINE 10 MG/1
10 TABLET ORAL
Qty: 30 TABLET | Refills: 2 | Status: SHIPPED | OUTPATIENT
Start: 2021-12-02 | End: 2022-02-15 | Stop reason: SDUPTHER

## 2021-12-03 DIAGNOSIS — N52.8 OTHER MALE ERECTILE DYSFUNCTION: ICD-10-CM

## 2021-12-03 DIAGNOSIS — B20 HIV DISEASE (HCC): ICD-10-CM

## 2021-12-06 RX ORDER — BICTEGRAVIR SODIUM, EMTRICITABINE, AND TENOFOVIR ALAFENAMIDE FUMARATE 50; 200; 25 MG/1; MG/1; MG/1
1 TABLET ORAL
Qty: 30 TABLET | Refills: 3 | Status: SHIPPED | OUTPATIENT
Start: 2021-12-06 | End: 2022-03-18

## 2021-12-06 RX ORDER — TADALAFIL 2.5 MG/1
TABLET ORAL
Qty: 10 TABLET | Refills: 2 | Status: SHIPPED | OUTPATIENT
Start: 2021-12-06 | End: 2022-02-28

## 2021-12-20 ENCOUNTER — PATIENT OUTREACH (OUTPATIENT)
Dept: SURGERY | Facility: CLINIC | Age: 51
End: 2021-12-20

## 2022-01-12 ENCOUNTER — OFFICE VISIT (OUTPATIENT)
Dept: SURGERY | Facility: CLINIC | Age: 52
End: 2022-01-12
Payer: COMMERCIAL

## 2022-01-12 ENCOUNTER — TELEPHONE (OUTPATIENT)
Dept: SURGERY | Facility: CLINIC | Age: 52
End: 2022-01-12

## 2022-01-12 VITALS
RESPIRATION RATE: 18 BRPM | DIASTOLIC BLOOD PRESSURE: 79 MMHG | HEIGHT: 67 IN | TEMPERATURE: 98.2 F | BODY MASS INDEX: 27.97 KG/M2 | HEART RATE: 80 BPM | SYSTOLIC BLOOD PRESSURE: 141 MMHG | OXYGEN SATURATION: 97 % | WEIGHT: 178.2 LBS

## 2022-01-12 DIAGNOSIS — M25.561 CHRONIC PAIN OF BOTH KNEES: ICD-10-CM

## 2022-01-12 DIAGNOSIS — B00.9 HSV INFECTION: ICD-10-CM

## 2022-01-12 DIAGNOSIS — Z00.00 ANNUAL PHYSICAL EXAM: ICD-10-CM

## 2022-01-12 DIAGNOSIS — J45.20 MILD INTERMITTENT ASTHMA, UNSPECIFIED WHETHER COMPLICATED: ICD-10-CM

## 2022-01-12 DIAGNOSIS — R42 LIGHTHEADEDNESS: ICD-10-CM

## 2022-01-12 DIAGNOSIS — R06.02 SOB (SHORTNESS OF BREATH): ICD-10-CM

## 2022-01-12 DIAGNOSIS — R42 FEELING FAINT: ICD-10-CM

## 2022-01-12 DIAGNOSIS — B20 HIV DISEASE (HCC): Primary | ICD-10-CM

## 2022-01-12 DIAGNOSIS — G89.29 CHRONIC PAIN OF BOTH KNEES: ICD-10-CM

## 2022-01-12 DIAGNOSIS — R42 EPISODIC LIGHTHEADEDNESS: ICD-10-CM

## 2022-01-12 DIAGNOSIS — M25.562 CHRONIC PAIN OF BOTH KNEES: ICD-10-CM

## 2022-01-12 DIAGNOSIS — T65.222D TOXIC EFFECT OF TOBACCO CIGARETTE, INTENTIONAL SELF-HARM, SUBSEQUENT ENCOUNTER: ICD-10-CM

## 2022-01-12 PROBLEM — R73.03 PREDIABETES: Status: RESOLVED | Noted: 2018-12-18 | Resolved: 2022-01-12

## 2022-01-12 PROCEDURE — 99396 PREV VISIT EST AGE 40-64: CPT | Performed by: NURSE PRACTITIONER

## 2022-01-12 RX ORDER — ALBUTEROL SULFATE 2.5 MG/3ML
2.5 SOLUTION RESPIRATORY (INHALATION) EVERY 6 HOURS PRN
Qty: 3 ML | Refills: 5 | Status: SHIPPED | OUTPATIENT
Start: 2022-01-12 | End: 2022-02-07 | Stop reason: CLARIF

## 2022-01-12 RX ORDER — VALACYCLOVIR HYDROCHLORIDE 1 G/1
1000 TABLET, FILM COATED ORAL 2 TIMES DAILY
Qty: 14 TABLET | Refills: 0 | Status: SHIPPED | OUTPATIENT
Start: 2022-01-12 | End: 2022-01-19

## 2022-01-12 RX ORDER — ALBUTEROL SULFATE 90 UG/1
2 AEROSOL, METERED RESPIRATORY (INHALATION) EVERY 6 HOURS PRN
Qty: 18 G | Refills: 2 | Status: CANCELLED | OUTPATIENT
Start: 2022-01-12

## 2022-01-12 NOTE — PATIENT INSTRUCTIONS
Wellness Visit for Adults   AMBULATORY CARE:   A wellness visit  is when you see your healthcare provider to get screened for health problems  Your healthcare provider will also give you advice on how to stay healthy  Write down your questions so you remember to ask them  Ask your healthcare provider how often you should have a wellness visit  What happens at a wellness visit:  Your healthcare provider will ask about your health, and your family history of health problems  This includes high blood pressure, heart disease, and cancer  He or she will ask if you have symptoms that concern you, if you smoke, and about your mood  You may also be asked about your intake of medicines, supplements, food, and alcohol  Any of the following may be done:  · Your weight  will be checked  Your height may also be checked so your body mass index (BMI) can be calculated  Your BMI shows if you are at a healthy weight  · Your blood pressure  and heart rate will be checked  Your temperature may also be checked  · Blood and urine tests  may be done  Blood tests may be done to check your cholesterol levels  Abnormal cholesterol levels increase your risk for heart disease and stroke  You may also need a blood or urine test to check for diabetes if you are at increased risk  Urine tests may be done to look for signs of an infection or kidney disease  · A physical exam  includes checking your heartbeat and lungs with a stethoscope  Your healthcare provider may also check your skin to look for sun damage  · Screening tests  may be recommended  A screening test is done to check for diseases that may not cause symptoms  The screening tests you may need depend on your age, gender, family history, and lifestyle habits  For example, colorectal screening may be recommended if you are 48years old or older  Screening tests you need if you are a woman:   · A Pap smear  is used to screen for cervical cancer   Pap smears are usually done every 3 to 5 years depending on your age  You may need them more often if you have had abnormal Pap smear test results in the past  Ask your healthcare provider how often you should have a Pap smear  · A mammogram  is an x-ray of your breasts to screen for breast cancer  Experts recommend mammograms every 2 years starting at age 48 years  You may need a mammogram at age 52 years or younger if you have an increased risk for breast cancer  Talk to your healthcare provider about when you should start having mammograms and how often you need them  Vaccines you may need:   · Get an influenza vaccine  every year  The influenza vaccine protects you from the flu  Several types of viruses cause the flu  The viruses change over time, so new vaccines are made each year  · Get a tetanus-diphtheria (Td) booster vaccine  every 10 years  This vaccine protects you against tetanus and diphtheria  Tetanus is a severe infection that may cause painful muscle spasms and lockjaw  Diphtheria is a severe bacterial infection that causes a thick covering in the back of your mouth and throat  · Get a human papillomavirus (HPV) vaccine  if you are female and aged 23 to 32 or male 23 to 24 and never received it  This vaccine protects you from HPV infection  HPV is the most common infection spread by sexual contact  HPV may also cause vaginal, penile, and anal cancers  · Get a pneumococcal vaccine  if you are aged 72 years or older  The pneumococcal vaccine is an injection given to protect you from pneumococcal disease  Pneumococcal disease is an infection caused by pneumococcal bacteria  The infection may cause pneumonia, meningitis, or an ear infection  · Get a shingles vaccine  if you are 60 or older, even if you have had shingles before  The shingles vaccine is an injection to protect you from the varicella-zoster virus  This is the same virus that causes chickenpox   Shingles is a painful rash that develops in people who had chickenpox or have been exposed to the virus  How to eat healthy:  My Plate is a model for planning healthy meals  It shows the types and amounts of foods that should go on your plate  Fruits and vegetables make up about half of your plate, and grains and protein make up the other half  A serving of dairy is included on the side of your plate  The amount of calories and serving sizes you need depends on your age, gender, weight, and height  Examples of healthy foods are listed below:  · Eat a variety of vegetables  such as dark green, red, and orange vegetables  You can also include canned vegetables low in sodium (salt) and frozen vegetables without added butter or sauces  · Eat a variety of fresh fruits , canned fruit in 100% juice, frozen fruit, and dried fruit  · Include whole grains  At least half of the grains you eat should be whole grains  Examples include whole-wheat bread, wheat pasta, brown rice, and whole-grain cereals such as oatmeal     · Eat a variety of protein foods such as seafood (fish and shellfish), lean meat, and poultry without skin (turkey and chicken)  Examples of lean meats include pork leg, shoulder, or tenderloin, and beef round, sirloin, tenderloin, and extra lean ground beef  Other protein foods include eggs and egg substitutes, beans, peas, soy products, nuts, and seeds  · Choose low-fat dairy products such as skim or 1% milk or low-fat yogurt, cheese, and cottage cheese  · Limit unhealthy fats  such as butter, hard margarine, and shortening  Exercise:  Exercise at least 30 minutes per day on most days of the week  Some examples of exercise include walking, biking, dancing, and swimming  You can also fit in more physical activity by taking the stairs instead of the elevator or parking farther away from stores  Include muscle strengthening activities 2 days each week  Regular exercise provides many health benefits   It helps you manage your weight, and decreases your risk for type 2 diabetes, heart disease, stroke, and high blood pressure  Exercise can also help improve your mood  Ask your healthcare provider about the best exercise plan for you  General health and safety guidelines:   · Do not smoke  Nicotine and other chemicals in cigarettes and cigars can cause lung damage  Ask your healthcare provider for information if you currently smoke and need help to quit  E-cigarettes or smokeless tobacco still contain nicotine  Talk to your healthcare provider before you use these products  · Limit alcohol  A drink of alcohol is 12 ounces of beer, 5 ounces of wine, or 1½ ounces of liquor  · Lose weight, if needed  Being overweight increases your risk of certain health conditions  These include heart disease, high blood pressure, type 2 diabetes, and certain types of cancer  · Protect your skin  Do not sunbathe or use tanning beds  Use sunscreen with a SPF 15 or higher  Apply sunscreen at least 15 minutes before you go outside  Reapply sunscreen every 2 hours  Wear protective clothing, hats, and sunglasses when you are outside  · Drive safely  Always wear your seatbelt  Make sure everyone in your car wears a seatbelt  A seatbelt can save your life if you are in an accident  Do not use your cell phone when you are driving  This could distract you and cause an accident  Pull over if you need to make a call or send a text message  · Practice safe sex  Use latex condoms if are sexually active and have more than one partner  Your healthcare provider may recommend screening tests for sexually transmitted infections (STIs)  · Wear helmets, lifejackets, and protective gear  Always wear a helmet when you ride a bike or motorcycle, go skiing, or play sports that could cause a head injury  Wear protective equipment when you play sports  Wear a lifejacket when you are on a boat or doing water sports      © Copyright [employer] 2021 Information is for End User's use only and may not be sold, redistributed or otherwise used for commercial purposes  All illustrations and images included in CareNotes® are the copyrighted property of A D A M [employer] Inc  or Kianna Patricio  The above information is an  only  It is not intended as medical advice for individual conditions or treatments  Talk to your doctor, nurse or pharmacist before following any medical regimen to see if it is safe and effective for you  Cigarette Smoking and Your Health   AMBULATORY CARE:   Risks to your health if you smoke:  Nicotine and other chemicals found in tobacco and e-cigarettes can damage every cell in your body  Even if you are a light smoker, you have an increased risk for cancer, heart disease, and lung disease  If you are pregnant or have diabetes, smoking increases your risk for complications  Nicotine can affect an adolescent's developing brain  This can lead to trouble thinking, learning, or paying attention  Benefits to your health if you stop smoking:   · You decrease respiratory symptoms such as coughing, wheezing, and shortness of breath  · You reduce your risk for cancers of the lung, mouth, throat, kidney, bladder, pancreas, stomach, and cervix  If you already have cancer, you increase the benefits of chemotherapy  You also reduce your risk for cancer returning or a second cancer from developing  · You reduce your risk for heart disease, blood clots, heart attack, and stroke  · You reduce your risk for lung infections, and diseases such as pneumonia, asthma, chronic bronchitis, and emphysema  · Your circulation improves  More oxygen can be delivered to your body  If you have diabetes, you lower your risk for complications, such as kidney, artery, and eye diseases  You also lower your risk for nerve damage  Nerve damage can lead to amputations, poor vision, and blindness      · You improve your body's ability to heal and to fight infections  · An adolescent can help his or her brain and body develop in a healthy way  Talk to your adolescent about all the health risks of nicotine  If you can, start talking about nicotine when your child is younger than 12 years  This may make it easier for him or her not to start using nicotine as a teenager or adult  Explain to him or her that it is best never to start  It can be hard to try to quit later  Benefits to the health of others if you stop smoking:  Tobacco is harmful to nonsmokers who breathe in your secondhand smoke  The following are ways the health of others around you may improve when you stop smoking:  · You lower the risks for lung cancer and heart disease in nonsmoking adults  · If you are pregnant, you lower the risk for miscarriage, early delivery, low birth weight, and stillbirth  You also lower your baby's risk for SIDS, obesity, developmental delay, and neurobehavioral problems, such as ADHD  · If you have children, you lower their risk for ear infections, colds, pneumonia, bronchitis, and asthma  Follow up with your doctor as directed:  Write down your questions so you remember to ask them during your visits  For support and more information:   · American [employer] Association  [address] [place] [address],[address]  [address]  Phone: [facility]  Phone: 2- 019 - 099-5694  Web Address: [employer]  org    · Smokefree  gov  Phone: 0- 871 - 620-3212  Web Address: www smokefree  [facility] 2021 Information is for End User's use only and may not be sold, redistributed or otherwise used for commercial purposes  All illustrations and images included in CareNotes® are the copyrighted property of A [employer]  or [facility] Rashida Reed   The above information is an  only  It is not intended as medical advice for individual conditions or treatments   Talk to your doctor, nurse or pharmacist before following any medical regimen to see if it is safe and effective for you  Cholesterol and Your Health   AMBULATORY CARE:   Cholesterol  is a waxy, fat-like substance  Your body uses cholesterol to make hormones and new cells, and to protect nerves  Cholesterol is made by your body  It also comes from certain foods you eat, such as meat and dairy products  Your healthcare provider can help you set goals for your cholesterol levels  He or she can help you create a plan to meet your goals  Cholesterol level goals: Your cholesterol level goals depend on your risk for heart disease, your age, and your other health conditions  The following are general guidelines:  · Total cholesterol  includes low-density lipoprotein (LDL), high-density lipoprotein (HDL), and triglyceride levels  The total cholesterol level should be lower than 200 mg/dL and is best at about 150 mg/dL  · LDL cholesterol  is called bad cholesterol  because it forms plaque in your arteries  As plaque builds up, your arteries become narrow, and less blood flows through  When plaque decreases blood flow to your heart, you may have chest pain  If plaque completely blocks an artery that brings blood to your heart, you may have a heart attack  Plaque can break off and form blood clots  Blood clots may block arteries in your brain and cause a stroke  The level should be less than 130 mg/dL and is best at about 100 mg/dL  · HDL cholesterol  is called good cholesterol  because it helps remove LDL cholesterol from your arteries  It does this by attaching to LDL cholesterol and carrying it to your liver  Your liver breaks down LDL cholesterol so your body can get rid of it  High levels of HDL cholesterol can help prevent a heart attack and stroke  Low levels of HDL cholesterol can increase your risk for heart disease, heart attack, and stroke  The level should be 60 mg/dL or higher  · Triglycerides  are a type of fat that store energy from foods you eat   High levels of triglycerides also cause plaque buildup  This can increase your risk for a heart attack or stroke  If your triglyceride level is high, your LDL cholesterol level may also be high  The level should be less than 150 mg/dL  Any of the following can increase your risk for high cholesterol:   · Smoking cigarettes    · Being overweight or obese, or not getting enough exercise    · Drinking large amounts of alcohol    · A medical condition such as hypertension (high blood pressure) or diabetes    · Certain genes passed from your parents to you    · Age older than 65 years    What you need to know about having your cholesterol levels checked: Adults 21to 39years of age should have their cholesterol levels checked every 4 to 6 years  Adults 45 years or older should have their cholesterol checked every 1 to 2 years  You may need your cholesterol checked more often, or at a younger age, if you have risk factors for heart disease  You may also need to have your cholesterol checked more often if you have other health conditions, such as diabetes  Blood tests are used to check cholesterol levels  Blood tests measure your levels of triglycerides, LDL cholesterol, and HDL cholesterol  How healthy fats affect your cholesterol levels:  Healthy fats, also called unsaturated fats, help lower LDL cholesterol and triglyceride levels  Healthy fats include the following:  · Monounsaturated fats  are found in foods such as olive oil, canola oil, avocado, nuts, and olives  · Polyunsaturated fats,  such as omega 3 fats, are found in fish, such as salmon, trout, and tuna  They can also be found in plant foods such as flaxseed, walnuts, and soybeans  How unhealthy fats affect your cholesterol levels:  Unhealthy fats increase LDL cholesterol and triglyceride levels  They are found in foods high in cholesterol, saturated fat, and trans fat:  · Cholesterol  is found in eggs, dairy, and meat      · Saturated fat  is found in butter, cheese, ice cream, whole milk, and coconut oil  Saturated fat is also found in meat, such as sausage, hot dogs, and bologna  · Trans fat  is found in liquid oils and is used in fried and baked foods  Foods that contain trans fats include chips, crackers, muffins, sweet rolls, microwave popcorn, and cookies  Treatment  for high cholesterol will also decrease your risk of heart disease, heart attack, and stroke  Treatment may include any of the following:  · Lifestyle changes  may include food, exercise, weight loss, and quitting smoking  You may also need to decrease the amount of alcohol you drink  Your healthcare provider will want you to start with lifestyle changes  Other treatment may be added if lifestyle changes are not enough  Your healthcare provider may recommend you work with a team to manage hyperlipidemia  The team may include medical experts such as a dietitian, an exercise or physical therapist, and a behavior therapist  Your family members may be included in helping you create lifestyle changes  · Medicines  may be given to lower your LDL cholesterol, triglyceride levels, or total cholesterol level  You may need medicines to lower your cholesterol if any of the following is true:    ? You have a history of stroke, TIA, unstable angina, or a heart attack  ? Your LDL cholesterol level is 190 mg/dL or higher  ? You are age 36 to 76 years, have diabetes or heart disease risk factors, and your LDL cholesterol is 70 mg/dL or higher  · Supplements  include fish oil, red yeast rice, and garlic  Fish oil may help lower your triglyceride and LDL cholesterol levels  It may also increase your HDL cholesterol level  Red yeast rice may help decrease your total cholesterol level and LDL cholesterol level  Garlic may help lower your total cholesterol level  Do not take any supplements without talking to your healthcare provider      Food changes you can make to lower your cholesterol levels:  A dietitian can help you create a healthy eating plan  He or she can show you how to read food labels and choose foods low in saturated fat, trans fats, and cholesterol  · Decrease the total amount of fat you eat  Choose lean meats, fat-free or 1% fat milk, and low-fat dairy products, such as yogurt and cheese  Try to limit or avoid red meats  Limit or do not eat fried foods or baked goods, such as cookies  · Replace unhealthy fats with healthy fats  Cook foods in olive oil or canola oil  Choose soft margarines that are low in saturated fat and trans fat  Seeds, nuts, and avocados are other examples of healthy fats  · Eat foods with omega-3 fats  Examples include salmon, tuna, mackerel, walnuts, and flaxseed  Eat fish 2 times per week  Pregnant women should not eat fish that have high levels of mercury, such as shark, swordfish, and esme mackerel  · Increase the amount of high-fiber foods you eat  High-fiber foods can help lower your LDL cholesterol  Aim to get between 20 and 30 grams of fiber each day  Fruits and vegetables are high in fiber  Eat at least 5 servings each day  Other high-fiber foods are whole-grain or whole-wheat breads, pastas, or cereals, and brown rice  Eat 3 ounces of whole-grain foods each day  Increase fiber slowly  You may have abdominal discomfort, bloating, and gas if you add fiber to your diet too quickly  · Eat healthy protein foods  Examples include low-fat dairy products, skinless chicken and turkey, fish, and nuts  · Limit foods and drinks that are high in sugar  Your dietitian or healthcare provider can help you create daily limits for high-sugar foods and drinks  The limit may be lower if you have diabetes or another health condition  Limits can also help you lose weight if needed  Lifestyle changes you can make to lower your cholesterol levels:   · Maintain a healthy weight  Ask your healthcare provider what a healthy weight is for you   Ask him or her to help you create a weight loss plan if needed  Weight loss can decrease your total cholesterol and triglyceride levels  Weight loss may also help keep your blood pressure at a healthy level  · Be physically active throughout the day  Physical activity, such as exercise, can help lower your total cholesterol level and maintain a healthy weight  Physical activity can also help increase your HDL cholesterol level  Work with your healthcare provider to create an program that is right for you  Get at least 30 to 40 minutes of moderate physical activity most days of the week  Examples of exercise include brisk walking, swimming, or biking  Also include strength training at least 2 times each week  Your healthcare providers can help you create a physical activity plan  · Do not smoke  Nicotine and other chemicals in cigarettes and cigars can raise your cholesterol levels  Ask your healthcare provider for information if you currently smoke and need help to quit  E-cigarettes or smokeless tobacco still contain nicotine  Talk to your healthcare provider before you use these products  · Limit or do not drink alcohol  Alcohol can increase your triglyceride levels  Ask your healthcare provider before you drink alcohol  Ask how much is okay for you to drink in 24 hours or 1 week  Follow up with your doctor as directed:  Write down your questions so you remember to ask them during your visits  © [employer] 2021 Information is for End User's use only and may not be sold, redistributed or otherwise used for commercial purposes  All illustrations and images included in CareNotes® are the copyrighted property of [employer]  or Kianna Patricio  The above information is an  only  It is not intended as medical advice for individual conditions or treatments  Talk to your doctor, nurse or pharmacist before following any medical regimen to see if it is safe and effective for you

## 2022-01-12 NOTE — ASSESSMENT & PLAN NOTE
Recurrent onset of cold sore      · Valacyclovir 1000 mg q 12 X 7 days  · May use OTC cream for sore

## 2022-01-12 NOTE — ASSESSMENT & PLAN NOTE
Worsening  Associated with lightheadedness, and feeling like he is going to pass out  DDx: asthma, cardiac abnormalities, allergies, or psychogenic   No previous work up was done  · Check CXR, PFT's   EKG, and ECHO  · Call office if worsens

## 2022-01-12 NOTE — ASSESSMENT & PLAN NOTE
Doing good  Pt reports 100% medication compliance  · Stressed the importance of 100% medication adherence  · Reviewed labs    HIV Counseling:    Viral Load: < 20    CD4 Count: 9228    ART: Sarah Mckeon     Denies side effects  Stressed the importance of adherence  Continue follow up in the ID clinic with Dr Rosmery Pryor  Reviewed the most recent labs, including the CD4 and viral load  Discussed the risks and benefits of treatment options, instructions for management, importance of treatment adherence, and reduction of risk factors  Educated on possible medication side effects  Counseled on routes of HIV transmission, including the risk of  infection  Emphasized that viral suppression is the best method to prevent HIV transmission  At this time the pt denies the need for HIV testing of anyone in their life  Total encounter time was greater than 35 minutes  Greater than 20 minutes were spent on counseling and patient education  Pt voices understanding and agreement with treatment plan

## 2022-01-12 NOTE — PROGRESS NOTES
[address] [facility] [address]    NAME: Chad Brown  AGE: 46 y o  SEX: male  : 1970     DATE: 2022     Assessment and Plan:     Problem List Items Addressed This Visit        Other    HIV disease ([facility] ) - Primary     Doing good  Pt reports 100% medication compliance  · Stressed the importance of 100% medication adherence  · Reviewed labs    HIV Counseling:    Viral Load: < 20    CD4 Count: 9003    ART: Warden Mendez     Denies side effects  Stressed the importance of adherence  Continue follow up in the ID clinic with Dr Susannah Lipscomb  Reviewed the most recent labs, including the CD4 and viral load  Discussed the risks and benefits of treatment options, instructions for management, importance of treatment adherence, and reduction of risk factors  Educated on possible medication side effects  Counseled on routes of HIV transmission, including the risk of  infection  Emphasized that viral suppression is the best method to prevent HIV transmission  At this time the pt denies the need for HIV testing of anyone in their life  Total encounter time was greater than 35 minutes  Greater than 20 minutes were spent on counseling and patient education  Pt voices understanding and agreement with treatment plan  Relevant Medications    valACYclovir (VALTREX) 1,000 mg tablet    Toxic effect of tobacco cigarette, intentional self-harm ([facility] )     Pt is smoking 1/4 ppd  · Stressed the importance of complete smoking cessation  Nicotine Dependency - Primary    Counseled for greater than 15 minutes on the importance of smoking cessation  Education was given regarding the cardiovascular effects of how nicotine affects the heart, lungs, kidneys, and peripheral vascular system  Referred to [facility] for enrollment in smoking cessation program            SOB (shortness of breath)     Worsening    Associated with lightheadedness, and feeling like he is going to pass out  DDx: asthma, cardiac abnormalities, allergies, or psychogenic   No previous work up was done  · Check CXR, PFT's  EKG, and ECHO  · Call office if worsens           Relevant Orders    Complete PFT with post bronchodilator    ECG 12 lead    Echo complete w/ contrast if indicated    XR chest pa & lateral    HSV infection     Recurrent onset of cold sore  · Valacyclovir 1000 mg q 12 X 7 days  · May use OTC cream for sore         Relevant Medications    valACYclovir (VALTREX) 1,000 mg tablet    Chronic pain of both knees     Chronic pain when kneeling  · Xray of bilat knees  · May need referral to ortho         Relevant Orders    XR knee 3 vw left non injury    XR knee 3 vw right non injury      Other Visit Diagnoses     Annual physical exam        Mild intermittent asthma, unspecified whether complicated        Relevant Medications    albuterol (2 5 mg/3 mL) 0 083 % nebulizer solution    Other Relevant Orders    Complete PFT with post bronchodilator    XR chest pa & lateral    Episodic lightheadedness        Lightheadedness        Relevant Orders    ECG 12 lead    Echo complete w/ contrast if indicated    Feeling faint        Relevant Orders    ECG 12 lead    Echo complete w/ contrast if indicated          Immunizations and preventive care screenings were discussed with patient today  Appropriate education was printed on patient's after visit summary  Counseling:  Injury prevention: discussed safety/seat belts, safety helmets, smoke detectors, carbon dioxide detectors, and smoking near bedding or upholstery  Sexual health: discussed sexually transmitted diseases, partner selection, use of condoms, avoidance of unintended pregnancy, and contraceptive alternatives  · Exercise: the importance of regular exercise/physical activity was discussed  Recommend exercise 3-5 times per week for at least 30 minutes  Return in 6 months (on 7/12/2022)       Chief Complaint:   Pt presents today for annual physical and management of HIV  PMH: HIV, dyslipidemia, GERD, jerky body movements, IBS, poor dentition, smoker, ED, bipolar, and balance disorder  Pt reports doing ok  Pt does acknowledge he has been having some SOB over the past 4 to 6 months has worsened during activity  SOB does resolve on rest   Pt is smoking less  Pt does have panic attacks  Pt was seen by allergist and was negative skin test   Pt denies any chest pain or pressure, or palpitations  Pt does experience lightheadedness, dizziness, and feeling like he is going to pass out  Pt has cold sore sensation of lip and would like treatment  Pt is experiencing bilateral knee pain when kneeling and does have lumps on both knee/tibia  Chief Complaint   Patient presents with    annual physical      History of Present Illness:     Adult Annual Physical   Patient here for a comprehensive physical exam  The patient reports problems - generalized health problems       Diet and Physical Activity  · Diet/Nutrition: well balanced diet, low carb diet and consuming 3-5 servings of fruits/vegetables daily  · Exercise: 5-7 times a week on average and less than 30 minutes on average  Depression Screening  PHQ-2/9 Depression Screening         General Health  · Sleep: gets 4-6 hours of sleep on average  · Hearing: normal - bilateral   · Vision: previous LASIK surgery and wears reading glasses      · Dental: regular dental visits, brushes teeth twice daily and flosses teeth occasionally   Health  · Symptoms include: none     Review of Systems:     Review of Systems   HENT:        Lip pain   Respiratory: Positive for shortness of breath  Cardiovascular: Negative  Gastrointestinal: Negative  Musculoskeletal: Positive for arthralgias  Skin: Negative  Neurological: Negative  Psychiatric/Behavioral: Negative         Past Medical History:     Past Medical History:   Diagnosis Date    Anxiety     Last Assessed: 7/18/2016     Dysuria     Last Assessed: 9/14/2015    Fecal urgency     Pt has had fecal incontinence in past, has weakened sphincter  would benefit from fiber, Needs f/u flex sig will refer for scheduling -        GERD (gastroesophageal reflux disease)     Hemorrhage of anus and rectum     Last Assessed: 3/5/2014     Herpes zoster     Last Assessed: 9/26/2016    History of chickenpox     History of pneumonia     HIV (human immunodeficiency virus infection) ([facility] )     IBS (irritable bowel syndrome)     Proctitis, chlamydial     Last Assessed: 9/29/2014     Recurrent major depressive episodes, moderate (HCC)     Last Assessed: 9/15/2017     Wears glasses     reading      Past Surgical History:     Past Surgical History:   Procedure Laterality Date    CIRCUMCISION      COLONOSCOPY      CYSTOSCOPY  2014    LASIK      OTHER SURGICAL HISTORY      embolisation 2018    SD ESOPHAGOGASTRODUODENOSCOPY TRANSORAL DIAGNOSTIC N/A 03/09/2017    Procedure: ESOPHAGOGASTRODUODENOSCOPY (EGD); Surgeon: Gordon Chavarria MD;  Location: [facility];   Service: Gastroenterology    VARICOCELE EXCISION      Spermatic cord Excision - Last Assessed: 3/17/2016     WISDOM TOOTH EXTRACTION        Family History:     Family History   Problem Relation Age of Onset    Diabetes type II Mother     Heart attack Father     No Known Problems Brother     No Known Problems Brother     Substance Abuse Cousin       Social History:     Social History     Socioeconomic History    Marital status: Single     Spouse name: None    Number of children: None    Years of education: None    Highest education level: None   Occupational History    None   Tobacco Use    Smoking status: Current Every Day Smoker     Packs/day: 0 50     Years: 25 00     Pack years: 12 50     Types: Cigarettes    Smokeless tobacco: Former User    Tobacco comment: 1/4 pack daily    Vaping Use    Vaping Use: Never used   Substance and Sexual Activity    Alcohol use: Yes     Alcohol/week: 1 0 standard drink     Types: 1 Cans of beer per week     Comment: socially    Drug use: No    Sexual activity: Not Currently     Birth control/protection: Condom     Comment: active monogamous relationship    Other Topics Concern    None   Social History Narrative        What type of home do you live in: Single house    Age of your home: 77 yrs    How long have you been living there: 8 yrs    Type of heat: Baseboard    Type of fuel: Gas    What type of michelle is in your bedroom: Tile    Do you have the following in or near your home:    Air products: Window air conditioning    Pests: Other sometimes ants    Pets: Cat    Are pets allowed in bedroom: Yes    Open fields, wooded areas nearby: N/A    Basement: None    Exposure to second hand smoke: Yes         Social Determinants of Health     Financial Resource Strain: Not on file   Food Insecurity: Not on file   Transportation Needs: Not on file   Physical Activity: Insufficiently Active    Days of Exercise per Week: 2 days    Minutes of Exercise per Session: 30 min   Stress: Not on file   Social Connections: Not on file   Intimate Partner Violence: Not on file   Housing Stability: Not on file      Current Medications:     Current Outpatient Medications   Medication Sig Dispense Refill    atorvastatin (LIPITOR) 10 mg tablet TAKE 1 TABLET BY MOUTH DAILY 90 tablet 1    Biktarvy -25 MG tablet TAKE 1 TABLET BY MOUTH DAILY WITH BREAKFAST 30 tablet 3    cetirizine (ZyrTEC) 10 mg tablet TAKE 1 TABLET BY MOUTH DAILY 90 tablet 1    dicyclomine (BENTYL) 20 mg tablet TAKE 1 TABLET BY MOUTH TWICE A DAY BEFORE MEALS 60 tablet 2    FLUoxetine (PROzac) 40 MG capsule Take 2 capsules (80 mg total) by mouth daily 60 capsule 3    fluticasone (FLONASE) 50 mcg/act nasal spray USE 1 SPRAY IN EACH NOSTRIL DAILY 16 g 5    montelukast (SINGULAIR) 10 mg tablet TAKE 1 TABLET BY MOUTH DAILY AT BEDTIME 90 tablet 1    OLANZapine (ZyPREXA) 10 mg tablet Take 1 tablet (10 mg total) by mouth daily at bedtime 30 tablet 2    omeprazole (PriLOSEC) 40 MG capsule TAKE 1 CAPSULE BY MOUTH DAILY 30 capsule 3    propranolol (INDERAL) 20 mg tablet Take 1-2 tablets (20-40 mg total) by mouth 2 (two) times a day as needed (anxiety or physical agitation) 120 tablet 3    tadalafil (CIALIS) 2 5 MG tablet TAKE 1 TABLET BY MOUTH DAILY AS NEEDED FOR ERECTILE DYSFUNCTION 10 tablet 2    albuterol (2 5 mg/3 mL) 0 083 % nebulizer solution Take 3 mL (2 5 mg total) by nebulization every 6 (six) hours as needed for wheezing or shortness of breath 3 mL 5    valACYclovir (VALTREX) 1,000 mg tablet Take 1 tablet (1,000 mg total) by mouth 2 (two) times a day for 7 days 14 tablet 0     No current facility-administered medications for this visit  Allergies: Allergies   Allergen Reactions    Tomato - Food Allergy Vomiting      Physical Exam:     /79 (BP Location: Right arm, Patient Position: Sitting, Cuff Size: Standard)   Pulse 80   Temp 98 2 °F (36 8 °C)   Resp 18   Ht 5' 7" (1 702 m)   Wt 80 8 kg (178 lb 3 2 oz)   SpO2 97%   BMI 27 91 kg/m²     Physical Exam  Vitals and nursing note reviewed  Constitutional:       General: He is not in acute distress  Appearance: Normal appearance  He is not ill-appearing  HENT:      Right Ear: Tympanic membrane normal       Left Ear: Tympanic membrane normal       Nose: Nose normal       Mouth/Throat:      Mouth: Mucous membranes are moist       Pharynx: Oropharynx is clear  Neck:      Vascular: No carotid bruit  Cardiovascular:      Rate and Rhythm: Normal rate and regular rhythm  Pulses: Normal pulses  Heart sounds: Normal heart sounds  Pulmonary:      Effort: Pulmonary effort is normal       Breath sounds: Normal breath sounds  Abdominal:      General: Bowel sounds are normal       Palpations: Abdomen is soft  Musculoskeletal:         General: Deformity present        Right knee: No swelling, bony tenderness or crepitus  Normal range of motion  No tenderness  Left knee: No swelling, bony tenderness or crepitus  Normal range of motion  No tenderness  Legs:    Lymphadenopathy:      Cervical: No cervical adenopathy  Skin:     General: Skin is warm and dry  Capillary Refill: Capillary refill takes less than 2 seconds  Neurological:      Mental Status: He is alert and oriented to person, place, and time  Psychiatric:         Mood and Affect: Mood normal          Behavior: Behavior normal          Thought Content:  Thought content normal          Judgment: Judgment normal           CLAYTON Roper  [facility] [place] [address] [place] [address]

## 2022-01-12 NOTE — TELEPHONE ENCOUNTER
Spoke with patient regarding ortho referral and appointment scheduled 01/13 @ 3PM         Patient stated too many appointments at this time, patient declined service at this time  Patient was provided with ortho phone number to schedule appointment when available  Patient expressed understanding

## 2022-01-12 NOTE — ASSESSMENT & PLAN NOTE
Pt is smoking 1/4 ppd  · Stressed the importance of complete smoking cessation  Nicotine Dependency - Primary    Counseled for greater than 15 minutes on the importance of smoking cessation  Education was given regarding the cardiovascular effects of how nicotine affects the heart, lungs, kidneys, and peripheral vascular system    Referred to [address] for enrollment in smoking cessation program

## 2022-01-13 NOTE — PROGRESS NOTES
RD met briefly with Korina Thompson in conjunction with PCP appt, to check in on nutritional status and offer education and support as warranted and receptive  Korina Thompson reported doing okay, he denied making any significant dietary changes but stated he has been "trying to eat less crap"  He reported not eating ice cream any more but will snack on small bowl of cereal instead  Pt discussed his pet cat and walking  He had no questions or concerns for RD at this time, he was advised to call as needed  Wt Readings from Last 3 Encounters:   01/12/22 80 8 kg (178 lb 3 2 oz)   10/20/21 78 5 kg (173 lb)   08/10/21 82 4 kg (181 lb 9 6 oz)     Body mass index is 27 91 kg/m²  Lab Results   Component Value Date    HGBA1C 5 5 11/09/2021     Lab Results   Component Value Date    CHOLESTEROL 146 11/09/2021    CHOLESTEROL 145 03/16/2021    CHOLESTEROL 183 09/28/2020     Lab Results   Component Value Date    HDL 49 11/09/2021    HDL 49 03/16/2021    HDL 46 09/28/2020     Lab Results   Component Value Date    TRIG 88 11/09/2021    TRIG 174 (H) 03/16/2021    TRIG 222 (H) 09/28/2020     Monitor weight trend, nutritional lifestyle, HgbA1C, lipid panel        Yair Mojica, MS, RD, LDN

## 2022-01-20 DIAGNOSIS — J30.2 SEASONAL ALLERGIES: ICD-10-CM

## 2022-01-20 DIAGNOSIS — K58.2 IRRITABLE BOWEL SYNDROME WITH BOTH CONSTIPATION AND DIARRHEA: ICD-10-CM

## 2022-01-20 RX ORDER — CETIRIZINE HYDROCHLORIDE 10 MG/1
TABLET ORAL
Qty: 90 TABLET | Refills: 1 | Status: SHIPPED | OUTPATIENT
Start: 2022-01-20 | End: 2022-07-11 | Stop reason: CLARIF

## 2022-01-21 RX ORDER — DICYCLOMINE HCL 20 MG
TABLET ORAL
Qty: 60 TABLET | Refills: 2 | Status: SHIPPED | OUTPATIENT
Start: 2022-01-21 | End: 2022-04-30

## 2022-02-01 ENCOUNTER — PATIENT OUTREACH (OUTPATIENT)
Dept: SURGERY | Facility: CLINIC | Age: 52
End: 2022-02-01

## 2022-02-02 ENCOUNTER — TELEPHONE (OUTPATIENT)
Dept: PSYCHIATRY | Facility: CLINIC | Age: 52
End: 2022-02-02

## 2022-02-02 NOTE — TELEPHONE ENCOUNTER
Requested a call back to see if he was willing to change his appointment to next Thursday (2/10) at 10:30 instead of tomorrow

## 2022-02-07 DIAGNOSIS — J45.40 MODERATE PERSISTENT ASTHMA WITHOUT COMPLICATION: ICD-10-CM

## 2022-02-07 DIAGNOSIS — R06.02 SOB (SHORTNESS OF BREATH): Primary | ICD-10-CM

## 2022-02-07 RX ORDER — ALBUTEROL SULFATE 90 UG/1
2 AEROSOL, METERED RESPIRATORY (INHALATION) EVERY 6 HOURS PRN
Qty: 6.7 G | Refills: 5 | Status: SHIPPED | OUTPATIENT
Start: 2022-02-07 | End: 2022-07-18

## 2022-02-10 ENCOUNTER — TELEPHONE (OUTPATIENT)
Dept: PSYCHIATRY | Facility: CLINIC | Age: 52
End: 2022-02-10

## 2022-02-10 NOTE — TELEPHONE ENCOUNTER
Pt came into office thinking he had an appt today at 1030    no appt was on the scheduled    pt needs a new appt   set up

## 2022-02-14 ENCOUNTER — HOSPITAL ENCOUNTER (OUTPATIENT)
Dept: NON INVASIVE DIAGNOSTICS | Facility: HOSPITAL | Age: 52
Discharge: HOME | End: 2022-02-14
Payer: COMMERCIAL

## 2022-02-14 ENCOUNTER — HOSPITAL ENCOUNTER (OUTPATIENT)
Dept: PULMONOLOGY | Facility: HOSPITAL | Age: 52
Discharge: HOME | End: 2022-02-14
Payer: COMMERCIAL

## 2022-02-14 VITALS
SYSTOLIC BLOOD PRESSURE: 141 MMHG | HEIGHT: 67 IN | BODY MASS INDEX: 27.94 KG/M2 | HEART RATE: 71 BPM | WEIGHT: 178 LBS | DIASTOLIC BLOOD PRESSURE: 79 MMHG

## 2022-02-14 DIAGNOSIS — R42 FEELING FAINT: ICD-10-CM

## 2022-02-14 DIAGNOSIS — R42 LIGHTHEADEDNESS: ICD-10-CM

## 2022-02-14 DIAGNOSIS — R06.02 SOB (SHORTNESS OF BREATH): ICD-10-CM

## 2022-02-14 DIAGNOSIS — J45.20 MILD INTERMITTENT ASTHMA, UNSPECIFIED WHETHER COMPLICATED: ICD-10-CM

## 2022-02-14 LAB
AORTIC ROOT: 3.7 CM
APICAL FOUR CHAMBER EJECTION FRACTION: 56 %
ASCENDING AORTA: 3.7 CM (ref 2.01–3.01)
E WAVE DECELERATION TIME: 220 MS
FRACTIONAL SHORTENING: 33 % (ref 28–44)
INTERVENTRICULAR SEPTUM IN DIASTOLE (PARASTERNAL SHORT AXIS VIEW): 1.2 CM (ref 0.53–0.99)
INTERVENTRICULAR SEPTUM: 1.2 CM (ref 0.6–1.1)
LEFT ATRIUM AREA SYSTOLE SINGLE PLANE A4C: 22.6 CM2
LEFT ATRIUM SIZE: 4.2 CM
LEFT INTERNAL DIMENSION IN SYSTOLE: 3.2 CM (ref 2.1–4)
LEFT VENTRICULAR INTERNAL DIMENSION IN DIASTOLE: 4.8 CM (ref 4.64–6.91)
LEFT VENTRICULAR POSTERIOR WALL IN END DIASTOLE: 1 CM (ref 0.52–0.98)
LEFT VENTRICULAR STROKE VOLUME: 66 ML
LVSV (TEICH): 66 ML
MV E'TISSUE VEL-SEP: 11 CM/S
MV PEAK A VEL: 0.57 M/S
MV PEAK E VEL: 83 CM/S
MV STENOSIS PRESSURE HALF TIME: 0 MS
RA PRESSURE ESTIMATED: 5 MMHG
RIGHT ATRIUM AREA SYSTOLE A4C: 20.9 CM2
RIGHT VENTRICLE ID DIMENSION: 4.1 CM
RV PSP: 30 MMHG
SL CV LV EF: 65
SL CV PED ECHO LEFT VENTRICLE DIASTOLIC VOLUME (MOD BIPLANE) 2D: 107 ML
SL CV PED ECHO LEFT VENTRICLE SYSTOLIC VOLUME (MOD BIPLANE) 2D: 41 ML
TR MAX PG: 25 MMHG
TR PEAK VELOCITY: 2.5 M/S
TRICUSPID VALVE PEAK REGURGITATION VELOCITY: 2.49 M/S
Z-SCORE OF ASCENDING AORTA: 4.7
Z-SCORE OF INTERVENTRICULAR SEPTUM IN END DIASTOLE: 3.72
Z-SCORE OF LEFT VENTRICULAR DIMENSION IN END SYSTOLE: -1.65
Z-SCORE OF LEFT VENTRICULAR POSTERIOR WALL IN END DIASTOLE: 2.14

## 2022-02-14 PROCEDURE — 93306 TTE W/DOPPLER COMPLETE: CPT | Performed by: INTERNAL MEDICINE

## 2022-02-14 PROCEDURE — 94729 DIFFUSING CAPACITY: CPT | Performed by: INTERNAL MEDICINE

## 2022-02-14 PROCEDURE — 94726 PLETHYSMOGRAPHY LUNG VOLUMES: CPT | Performed by: INTERNAL MEDICINE

## 2022-02-14 PROCEDURE — 94760 N-INVAS EAR/PLS OXIMETRY 1: CPT

## 2022-02-14 PROCEDURE — 94729 DIFFUSING CAPACITY: CPT

## 2022-02-14 PROCEDURE — 94060 EVALUATION OF WHEEZING: CPT

## 2022-02-14 PROCEDURE — 94060 EVALUATION OF WHEEZING: CPT | Performed by: INTERNAL MEDICINE

## 2022-02-14 PROCEDURE — 93306 TTE W/DOPPLER COMPLETE: CPT

## 2022-02-14 PROCEDURE — 94726 PLETHYSMOGRAPHY LUNG VOLUMES: CPT

## 2022-02-14 RX ORDER — ALBUTEROL SULFATE 2.5 MG/3ML
2.5 SOLUTION RESPIRATORY (INHALATION) ONCE
Status: COMPLETED | OUTPATIENT
Start: 2022-02-14 | End: 2022-02-14

## 2022-02-14 RX ADMIN — ALBUTEROL SULFATE 2.5 MG: 2.5 SOLUTION RESPIRATORY (INHALATION) at 10:12

## 2022-02-15 ENCOUNTER — OFFICE VISIT (OUTPATIENT)
Dept: PSYCHIATRY | Facility: CLINIC | Age: 52
End: 2022-02-15
Payer: COMMERCIAL

## 2022-02-15 VITALS
BODY MASS INDEX: 27.72 KG/M2 | DIASTOLIC BLOOD PRESSURE: 72 MMHG | SYSTOLIC BLOOD PRESSURE: 124 MMHG | WEIGHT: 177 LBS | HEART RATE: 81 BPM

## 2022-02-15 DIAGNOSIS — F41.8 PERFORMANCE ANXIETY: ICD-10-CM

## 2022-02-15 DIAGNOSIS — F41.1 GAD (GENERALIZED ANXIETY DISORDER): ICD-10-CM

## 2022-02-15 DIAGNOSIS — F31.81 BIPOLAR 2 DISORDER, MAJOR DEPRESSIVE EPISODE (HCC): Primary | ICD-10-CM

## 2022-02-15 PROCEDURE — 99213 OFFICE O/P EST LOW 20 MIN: CPT | Performed by: PSYCHIATRY & NEUROLOGY

## 2022-02-15 PROCEDURE — 90833 PSYTX W PT W E/M 30 MIN: CPT | Performed by: PSYCHIATRY & NEUROLOGY

## 2022-02-15 RX ORDER — FLUOXETINE HYDROCHLORIDE 40 MG/1
80 CAPSULE ORAL DAILY
Qty: 60 CAPSULE | Refills: 3 | Status: SHIPPED | OUTPATIENT
Start: 2022-02-15 | End: 2022-05-09 | Stop reason: SDUPTHER

## 2022-02-15 RX ORDER — OLANZAPINE 10 MG/1
10 TABLET ORAL
Qty: 30 TABLET | Refills: 2 | Status: SHIPPED | OUTPATIENT
Start: 2022-02-15 | End: 2022-05-09 | Stop reason: SDUPTHER

## 2022-02-15 RX ORDER — PROPRANOLOL HYDROCHLORIDE 20 MG/1
20-40 TABLET ORAL 2 TIMES DAILY PRN
Qty: 120 TABLET | Refills: 3 | Status: SHIPPED | OUTPATIENT
Start: 2022-02-15 | End: 2022-06-20
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
## 2022-02-22 ENCOUNTER — PATIENT OUTREACH (OUTPATIENT)
Dept: SURGERY | Facility: CLINIC | Age: 52
End: 2022-02-22

## 2022-02-25 DIAGNOSIS — R06.02 SOB (SHORTNESS OF BREATH): Primary | ICD-10-CM

## 2022-02-25 DIAGNOSIS — I51.7 ATRIAL DILATION: ICD-10-CM

## 2022-02-28 DIAGNOSIS — K21.00 GASTROESOPHAGEAL REFLUX DISEASE WITH ESOPHAGITIS: ICD-10-CM

## 2022-02-28 DIAGNOSIS — N52.8 OTHER MALE ERECTILE DYSFUNCTION: ICD-10-CM

## 2022-02-28 RX ORDER — TADALAFIL 2.5 MG/1
TABLET ORAL
Qty: 10 TABLET | Refills: 2 | Status: SHIPPED | OUTPATIENT
Start: 2022-02-28 | End: 2022-05-26

## 2022-02-28 RX ORDER — OMEPRAZOLE 40 MG/1
CAPSULE, DELAYED RELEASE ORAL
Qty: 30 CAPSULE | Refills: 3 | Status: SHIPPED | OUTPATIENT
Start: 2022-02-28 | End: 2022-06-20

## 2022-03-01 ENCOUNTER — PATIENT OUTREACH (OUTPATIENT)
Dept: SURGERY | Facility: CLINIC | Age: 52
End: 2022-03-01

## 2022-03-17 ENCOUNTER — PATIENT OUTREACH (OUTPATIENT)
Dept: SURGERY | Facility: CLINIC | Age: 52
End: 2022-03-17

## 2022-03-18 DIAGNOSIS — B20 HIV DISEASE (HCC): ICD-10-CM

## 2022-03-18 RX ORDER — BICTEGRAVIR SODIUM, EMTRICITABINE, AND TENOFOVIR ALAFENAMIDE FUMARATE 50; 200; 25 MG/1; MG/1; MG/1
1 TABLET ORAL
Qty: 30 TABLET | Refills: 3 | Status: SHIPPED | OUTPATIENT
Start: 2022-03-18 | End: 2022-07-18

## 2022-03-18 NOTE — PROGRESS NOTES
Cardiology Consultation     Satinder Antonio  [id number]  1970  HEART [facility]ASCULAR [facility] [address] [address]  1  Atrial dilation  Ambulatory referral to Cardiology    Stress test only, exercise   2  Pure hypercholesterolemia  Stress test only, exercise   3  SOB (shortness of breath)  Ambulatory referral to Cardiology    POCT ECG    Stress test only, exercise   4  LVH (left ventricular hypertrophy)  Stress test only, exercise   5  Chest pain, unspecified type  Stress test only, exercise     Patient Active Problem List   Diagnosis    Bilateral varicoceles    Embolism involving vascular device (HCC)    HIV disease (HCC)    GERD (gastroesophageal reflux disease)    Irritable bowel syndrome with both constipation and diarrhea    Toxic effect of tobacco cigarette, intentional self-harm ([facility] [address] )    SOPHIA (generalized anxiety disorder)    Acute right-sided low back pain with right-sided sciatica    Jerky body movements    Panic attacks    Dysphagia    Moderate persistent asthma without complication    ED (erectile dysfunction)    Constipation    Red eyes    Chronic pain syndrome    Seasonal allergies    Discomfort    History of sensory changes    Gingival abscess    Difficulty in voiding    Foot pain, bilateral    At high risk for falls    Balance problems    Elevated cholesterol with elevated triglycerides    Weight loss    Ganglion cyst of right foot    Upper respiratory tract infection    Testicular pain    Bipolar 2 disorder, major depressive episode (HCC)    Abdominal bloating    Poor dentition    Chronic generalized pain disorder    Cervical pain    Abnormality of gait and mobility    SOB (shortness of breath)    HSV infection    Chronic pain of both knees    Atrial dilation       HPI patient is here for a cardiology evaluation  There been issues with dyspnea   There is a tobacco history  Echocardiogram done 2022 demonstrated preserved LV systolic function with mild LVH  There was mild GORDON  Estimated PAP was in the normal range  Lipid profile done 2021 demonstrated total cholesterol of 146 with an HDL 49 and a calculated LDL of 79  EKG today demonstrates sinus rhythm and is a normal tracing  Vital signs are stable  Patient occasionally gets chest discomfort  He is concerned about a cardiac etiology  He does occasionally get shortness of breath as well  His stepfather was a long-term patient of [facility]  In reference to his biologic family history his maternal grandmother had a heart attack in her 62s  His father  suddenly of a heart attack in his 76s  PMH-  Past Medical History:   Diagnosis Date    Anxiety     Last Assessed: 2016     Dysuria     Last Assessed: 2015    Fecal urgency     Pt has had fecal incontinence in past, has weakened sphincter   would benefit from fiber, Needs f/u flex sig will refer for scheduling -        GERD (gastroesophageal reflux disease)     Hemorrhage of anus and rectum     Last Assessed: 3/5/2014     Herpes zoster     Last Assessed: 2016    History of chickenpox     History of pneumonia     HIV (human immunodeficiency virus infection) ([facility] [address] )     IBS (irritable bowel syndrome)     Proctitis, chlamydial     Last Assessed: 2014     Recurrent major depressive episodes, moderate (HCC)     Last Assessed: 9/15/2017     Wears glasses     reading        SOCIAL HISTORY-  Social History     Socioeconomic History    Marital status: Single     Spouse name: Not on file    Number of children: Not on file    Years of education: Not on file    Highest education level: Not on file   Occupational History    Not on file   Tobacco Use    Smoking status: Current Every Day Smoker     Packs/day: 0 50     Years: 25 00     Pack years: 12 50     Types: Cigarettes    Smokeless tobacco: Former User  Tobacco comment: 1/4 pack daily    Vaping Use    Vaping Use: Never used   Substance and Sexual Activity    Alcohol use: Yes     Alcohol/week: 1 0 standard drink     Types: 1 Cans of beer per week     Comment: socially    Drug use: No    Sexual activity: Not Currently     Birth control/protection: Condom     Comment: active monogamous relationship    Other Topics Concern    Not on file   Social History Narrative        What type of home do you live in: Single house    Age of your home: 79 yrs    How long have you been living there: 8 yrs    Type of heat: Baseboard    Type of fuel: Gas    What type of michelle is in your bedroom: Tile    Do you have the following in or near your home:    Air products: Window air conditioning    Pests: Other sometimes ants    Pets: Cat    Are pets allowed in bedroom: Yes    Open fields, wooded areas nearby: N/A    Basement: None    Exposure to second hand smoke: Yes         Social Determinants of Health     Financial Resource Strain: Not on file   Food Insecurity: Not on file   Transportation Needs: Not on file   Physical Activity: Insufficiently Active    Days of Exercise per Week: 2 days    Minutes of Exercise per Session: 30 min   Stress: Not on file   Social Connections: Not on file   Intimate Partner Violence: Not on file   Housing Stability: Not on file        FAMILY HISTORY-  Family History   Problem Relation Age of Onset    Diabetes type II Mother     Heart attack Father     No Known Problems Brother     No Known Problems Brother     Substance Abuse Cousin        SURGICAL HISTORY-  Past Surgical History:   Procedure Laterality Date    CIRCUMCISION      COLONOSCOPY      CYSTOSCOPY  2014    LASIK      OTHER SURGICAL HISTORY      embolisation 2018    [place] ESOPHAGOGASTRODUODENOSCOPY TRANSORAL DIAGNOSTIC N/A 03/09/2017    Procedure: ESOPHAGOGASTRODUODENOSCOPY (EGD); Surgeon: Sharri Mitchell MD;  Location: [facility];   Service: Gastroenterology    VARICOCELE EXCISION      Spermatic cord Excision - Last Assessed: 3/17/2016     WISDOM TOOTH EXTRACTION           Current Outpatient Medications:     albuterol (Proventil HFA) 90 mcg/act inhaler, Inhale 2 puffs every 6 (six) hours as needed for wheezing, Disp: 6 7 g, Rfl: 5    atorvastatin (LIPITOR) 10 mg tablet, TAKE 1 TABLET BY MOUTH DAILY, Disp: 90 tablet, Rfl: 1    Biktarvy -25 MG tablet, TAKE 1 TABLET BY MOUTH DAILY WITH BREAKFAST, Disp: 30 tablet, Rfl: 3    cetirizine (ZyrTEC) 10 mg tablet, TAKE 1 TABLET BY MOUTH DAILY, Disp: 90 tablet, Rfl: 1    dicyclomine (BENTYL) 20 mg tablet, TAKE 1 TABLET BY MOUTH TWICE A DAY BEFORE MEALS, Disp: 60 tablet, Rfl: 2    FLUoxetine (PROzac) 40 MG capsule, Take 2 capsules (80 mg total) by mouth daily, Disp: 60 capsule, Rfl: 3    fluticasone (FLONASE) 50 mcg/act nasal spray, USE 1 SPRAY IN EACH NOSTRIL DAILY, Disp: 16 g, Rfl: 5    montelukast (SINGULAIR) 10 mg tablet, TAKE 1 TABLET BY MOUTH DAILY AT BEDTIME, Disp: 90 tablet, Rfl: 1    OLANZapine (ZyPREXA) 10 mg tablet, Take 1 tablet (10 mg total) by mouth daily at bedtime, Disp: 30 tablet, Rfl: 2    omeprazole (PriLOSEC) 40 MG capsule, TAKE 1 CAPSULE BY MOUTH DAILY, Disp: 30 capsule, Rfl: 3    propranolol (INDERAL) 20 mg tablet, Take 1-2 tablets (20-40 mg total) by mouth 2 (two) times a day as needed (anxiety or physical agitation), Disp: 120 tablet, Rfl: 3    tadalafil (CIALIS) 2 5 MG tablet, TAKE 1 TABLET BY MOUTH DAILY AS NEEDED FOR ERECTILE DYSFUNCTION, Disp: 10 tablet, Rfl: 2    valACYclovir (VALTREX) 1,000 mg tablet, Take 1 tablet (1,000 mg total) by mouth 2 (two) times a day for 7 days, Disp: 14 tablet, Rfl: 0  Allergies   Allergen Reactions    Tomato - Food Allergy Vomiting     Vitals:    03/28/22 0902   BP: 120/76   BP Location: Right arm   Patient Position: Sitting   Cuff Size: Standard   Pulse: 81   Weight: 79 5 kg (175 lb 4 8 oz)         Review of Systems:  Review of Systems   All other systems reviewed and are negative  Physical Exam:  Physical Exam  Vitals reviewed  Constitutional:       Appearance: He is well-developed  HENT:      Head: Normocephalic and atraumatic  Eyes:      Conjunctiva/sclera: Conjunctivae normal       Pupils: Pupils are equal, round, and reactive to light  Cardiovascular:      Rate and Rhythm: Normal rate  Heart sounds: Normal heart sounds  Pulmonary:      Effort: Pulmonary effort is normal       Breath sounds: Normal breath sounds  Musculoskeletal:      Cervical back: Normal range of motion and neck supple  Skin:     General: Skin is warm and dry  Neurological:      Mental Status: He is alert and oriented to person, place, and time  Discussion/Summary:  Etiology of shortness of breath and intermittent chest discomfort is unclear  Patient does have tobacco history  Does have family history  Will schedule a ETT to exclude severe obstructive coronary disease  I have asked the patient to call if there is a change in his status  I will see him back in 4-6 months time in sooner as is necessary

## 2022-03-28 ENCOUNTER — CONSULT (OUTPATIENT)
Dept: CARDIOLOGY CLINIC | Facility: CLINIC | Age: 52
End: 2022-03-28
Payer: COMMERCIAL

## 2022-03-28 ENCOUNTER — PATIENT OUTREACH (OUTPATIENT)
Dept: SURGERY | Facility: CLINIC | Age: 52
End: 2022-03-28

## 2022-03-28 VITALS
WEIGHT: 175.3 LBS | DIASTOLIC BLOOD PRESSURE: 76 MMHG | BODY MASS INDEX: 27.46 KG/M2 | HEART RATE: 81 BPM | SYSTOLIC BLOOD PRESSURE: 120 MMHG

## 2022-03-28 DIAGNOSIS — I51.7 LVH (LEFT VENTRICULAR HYPERTROPHY): ICD-10-CM

## 2022-03-28 DIAGNOSIS — R06.02 SOB (SHORTNESS OF BREATH): ICD-10-CM

## 2022-03-28 DIAGNOSIS — E78.00 PURE HYPERCHOLESTEROLEMIA: ICD-10-CM

## 2022-03-28 DIAGNOSIS — R07.9 CHEST PAIN, UNSPECIFIED TYPE: ICD-10-CM

## 2022-03-28 DIAGNOSIS — I51.7 ATRIAL DILATION: Primary | ICD-10-CM

## 2022-03-28 PROCEDURE — 93000 ELECTROCARDIOGRAM COMPLETE: CPT | Performed by: INTERNAL MEDICINE

## 2022-03-28 PROCEDURE — 99244 OFF/OP CNSLTJ NEW/EST MOD 40: CPT | Performed by: INTERNAL MEDICINE

## 2022-04-06 DIAGNOSIS — J30.2 SEASONAL ALLERGIES: ICD-10-CM

## 2022-04-06 RX ORDER — FLUTICASONE PROPIONATE 50 MCG
SPRAY, SUSPENSION (ML) NASAL
Qty: 16 G | Refills: 5 | Status: SHIPPED | OUTPATIENT
Start: 2022-04-06

## 2022-04-14 ENCOUNTER — PATIENT OUTREACH (OUTPATIENT)
Dept: SURGERY | Facility: CLINIC | Age: 52
End: 2022-04-14

## 2022-04-21 ENCOUNTER — TELEPHONE (OUTPATIENT)
Dept: NEUROLOGY | Facility: CLINIC | Age: 52
End: 2022-04-21

## 2022-04-21 NOTE — TELEPHONE ENCOUNTER
THE [facility] to confirm patient's 4/26/2022 @ 1 pm appointment with Dr Junie [place] and to provide the new office address for that appointment of [address] [place] [address],[address], [place]  Call back number given [phone]

## 2022-04-26 ENCOUNTER — TELEPHONE (OUTPATIENT)
Dept: NEUROLOGY | Facility: CLINIC | Age: 52
End: 2022-04-26

## 2022-04-26 NOTE — TELEPHONE ENCOUNTER
I called the patient and left a voicemail that I was calling to reschedule his appointment from today and to offer him an appt with Dr Julieta Rose on 5/3 at 1:00 pm at the [place]  [place] back line given

## 2022-04-29 DIAGNOSIS — K58.2 IRRITABLE BOWEL SYNDROME WITH BOTH CONSTIPATION AND DIARRHEA: ICD-10-CM

## 2022-04-30 RX ORDER — DICYCLOMINE HCL 20 MG
TABLET ORAL
Qty: 60 TABLET | Refills: 2 | Status: SHIPPED | OUTPATIENT
Start: 2022-04-30 | End: 2022-07-18

## 2022-05-02 ENCOUNTER — PATIENT OUTREACH (OUTPATIENT)
Dept: SURGERY | Facility: CLINIC | Age: 52
End: 2022-05-02

## 2022-05-09 ENCOUNTER — OFFICE VISIT (OUTPATIENT)
Dept: PSYCHIATRY | Facility: CLINIC | Age: 52
End: 2022-05-09
Payer: COMMERCIAL

## 2022-05-09 DIAGNOSIS — F31.81 BIPOLAR 2 DISORDER, MAJOR DEPRESSIVE EPISODE (HCC): Primary | ICD-10-CM

## 2022-05-09 DIAGNOSIS — Z79.899 HIGH RISK MEDICATION USE: ICD-10-CM

## 2022-05-09 DIAGNOSIS — F41.1 GAD (GENERALIZED ANXIETY DISORDER): ICD-10-CM

## 2022-05-09 PROCEDURE — 90833 PSYTX W PT W E/M 30 MIN: CPT | Performed by: PSYCHIATRY & NEUROLOGY

## 2022-05-09 PROCEDURE — 99214 OFFICE O/P EST MOD 30 MIN: CPT | Performed by: PSYCHIATRY & NEUROLOGY

## 2022-05-09 RX ORDER — OLANZAPINE 15 MG/1
15 TABLET ORAL
Qty: 30 TABLET | Refills: 2 | Status: SHIPPED | OUTPATIENT
Start: 2022-05-09 | End: 2022-07-18 | Stop reason: SDUPTHER

## 2022-05-09 RX ORDER — FLUOXETINE HYDROCHLORIDE 40 MG/1
80 CAPSULE ORAL DAILY
Qty: 60 CAPSULE | Refills: 3 | Status: SHIPPED | OUTPATIENT
Start: 2022-05-09 | End: 2022-07-18 | Stop reason: SDUPTHER

## 2022-05-09 NOTE — BH TREATMENT PLAN
TREATMENT PLAN (Medication Management Only)        [facility]    Name/Date of Birth/MRN:  Georges Martinez 46 y o  1970 MRN: [id number]  Date of Treatment Plan: May 9, 2022  Diagnosis/Diagnoses:   1  Bipolar 2 disorder, major depressive episode ([facility] [address] )    2  SOPHIA (generalized anxiety disorder)    3  High risk medication use      Strengths/Personal Resources for Self-Care: "I accept things"  Area/Areas of need (in own words): ongoing medical issues, dental work  1  Long Term Goal: "improve anxiety and depresion"   Target Date: 180 days from treatment plan  Person/Persons responsible for completion of goal: Dr Ghassan Ortiz and Self  2  Short Term Objective (s) - How will we reach this goal?:   A  Provider new recommended medication/dosage changes and/or continue medication(s): as noted  B   Make and keep appointment with neurology and dentist  C  Continue treamtment and get labs as recommended  Target Date: 6 months from treatment plan unless noted otherwise  Person/Persons Responsible for Completion of Goal: Dr Ghassan Ortiz and Self   Progress Towards Goals: continuing treatment   Treatment Modality: Medication management and therapy PRN  Review due 180 days from date of this plan: Approximately 6 months from today ( 11/9/2022 )    Expected length of service: ongoing treatment unless revised  My Physician/PA/NP and I have developed this plan together and I agree to work on the goals and objectives  I understand the treatment goals that were developed for my treatment  Signature: Verbal consent given   Georges Martinez  1970 5/9/2022  2:33 PM        Date and time:  Signature of parent/guardian if under age of 15 years: Date and time:  Signature of provider:      Date and time:  Signature of Supervising Physician:    Date and time: 5/9/2022      Boby Lujan III, DO

## 2022-05-12 ENCOUNTER — TELEPHONE (OUTPATIENT)
Dept: SURGERY | Facility: CLINIC | Age: 52
End: 2022-05-12

## 2022-05-12 NOTE — TELEPHONE ENCOUNTER
Spoke with patient regarding labs needed to schedule an appointment  Patient will get labs done over the weekend and call the office to schedule f/u

## 2022-05-16 ENCOUNTER — APPOINTMENT (OUTPATIENT)
Dept: LAB | Age: 52
End: 2022-05-16
Payer: COMMERCIAL

## 2022-05-16 DIAGNOSIS — Z13.6 SCREENING FOR CARDIOVASCULAR CONDITION: ICD-10-CM

## 2022-05-16 DIAGNOSIS — Z11.3 ENCOUNTER FOR SCREENING FOR INFECTIONS WITH A PREDOMINANTLY SEXUAL MODE OF TRANSMISSION: ICD-10-CM

## 2022-05-16 DIAGNOSIS — B20 HIV DISEASE (HCC): ICD-10-CM

## 2022-05-16 DIAGNOSIS — Z11.3 ROUTINE SCREENING FOR STI (SEXUALLY TRANSMITTED INFECTION): ICD-10-CM

## 2022-05-16 DIAGNOSIS — Z11.1 SCREENING FOR TUBERCULOSIS: ICD-10-CM

## 2022-05-16 DIAGNOSIS — Z79.899 HIGH RISK MEDICATION USE: ICD-10-CM

## 2022-05-16 LAB
ALBUMIN SERPL BCP-MCNC: 2.7 G/DL (ref 3.5–5)
ALP SERPL-CCNC: 384 U/L (ref 46–116)
ALT SERPL W P-5'-P-CCNC: 39 U/L (ref 12–78)
ANION GAP SERPL CALCULATED.3IONS-SCNC: 3 MMOL/L (ref 4–13)
AST SERPL W P-5'-P-CCNC: 37 U/L (ref 5–45)
BASOPHILS # BLD AUTO: 0.03 THOUSANDS/ΜL (ref 0–0.1)
BASOPHILS NFR BLD AUTO: 0 % (ref 0–1)
BILIRUB SERPL-MCNC: 0.71 MG/DL (ref 0.2–1)
BUN SERPL-MCNC: 15 MG/DL (ref 5–25)
CALCIUM ALBUM COR SERPL-MCNC: 10.3 MG/DL (ref 8.3–10.1)
CALCIUM SERPL-MCNC: 9.3 MG/DL (ref 8.3–10.1)
CHLORIDE SERPL-SCNC: 103 MMOL/L (ref 100–108)
CHOLEST SERPL-MCNC: 86 MG/DL
CO2 SERPL-SCNC: 27 MMOL/L (ref 21–32)
CREAT SERPL-MCNC: 0.89 MG/DL (ref 0.6–1.3)
EOSINOPHIL # BLD AUTO: 0.05 THOUSAND/ΜL (ref 0–0.61)
EOSINOPHIL NFR BLD AUTO: 1 % (ref 0–6)
ERYTHROCYTE [DISTWIDTH] IN BLOOD BY AUTOMATED COUNT: 21.5 % (ref 11.6–15.1)
EST. AVERAGE GLUCOSE BLD GHB EST-MCNC: 97 MG/DL
GFR SERPL CREATININE-BSD FRML MDRD: 97 ML/MIN/1.73SQ M
GLUCOSE P FAST SERPL-MCNC: 121 MG/DL (ref 65–99)
HBA1C MFR BLD: 5 %
HCT VFR BLD AUTO: 34.5 % (ref 36.5–49.3)
HCV AB SER QL: NORMAL
HDLC SERPL-MCNC: 6 MG/DL
HGB BLD-MCNC: 11.1 G/DL (ref 12–17)
IMM GRANULOCYTES # BLD AUTO: 0.07 THOUSAND/UL (ref 0–0.2)
IMM GRANULOCYTES NFR BLD AUTO: 1 % (ref 0–2)
LDLC SERPL CALC-MCNC: 49 MG/DL (ref 0–100)
LYMPHOCYTES # BLD AUTO: 1.72 THOUSANDS/ΜL (ref 0.6–4.47)
LYMPHOCYTES NFR BLD AUTO: 25 % (ref 14–44)
MCH RBC QN AUTO: 26.9 PG (ref 26.8–34.3)
MCHC RBC AUTO-ENTMCNC: 32.2 G/DL (ref 31.4–37.4)
MCV RBC AUTO: 84 FL (ref 82–98)
MONOCYTES # BLD AUTO: 1.41 THOUSAND/ΜL (ref 0.17–1.22)
MONOCYTES NFR BLD AUTO: 20 % (ref 4–12)
NEUTROPHILS # BLD AUTO: 3.62 THOUSANDS/ΜL (ref 1.85–7.62)
NEUTS SEG NFR BLD AUTO: 53 % (ref 43–75)
NRBC BLD AUTO-RTO: 0 /100 WBCS
PMV BLD AUTO: 9.6 FL (ref 8.9–12.7)
POTASSIUM SERPL-SCNC: 4.1 MMOL/L (ref 3.5–5.3)
PROT SERPL-MCNC: 8.8 G/DL (ref 6.4–8.2)
RBC # BLD AUTO: 4.12 MILLION/UL (ref 3.88–5.62)
RPR SER QL: REACTIVE
RPR SER-TITR: ABNORMAL {TITER}
SODIUM SERPL-SCNC: 133 MMOL/L (ref 136–145)
TRIGL SERPL-MCNC: 153 MG/DL
WBC # BLD AUTO: 6.9 THOUSAND/UL (ref 4.31–10.16)

## 2022-05-16 PROCEDURE — 87591 N.GONORRHOEAE DNA AMP PROB: CPT

## 2022-05-16 PROCEDURE — 83036 HEMOGLOBIN GLYCOSYLATED A1C: CPT

## 2022-05-16 PROCEDURE — 86780 TREPONEMA PALLIDUM: CPT

## 2022-05-16 PROCEDURE — 86592 SYPHILIS TEST NON-TREP QUAL: CPT

## 2022-05-16 PROCEDURE — 87536 HIV-1 QUANT&REVRSE TRNSCRPJ: CPT

## 2022-05-16 PROCEDURE — 36415 COLL VENOUS BLD VENIPUNCTURE: CPT

## 2022-05-16 PROCEDURE — 85025 COMPLETE CBC W/AUTO DIFF WBC: CPT

## 2022-05-16 PROCEDURE — 87491 CHLMYD TRACH DNA AMP PROBE: CPT

## 2022-05-16 PROCEDURE — 86593 SYPHILIS TEST NON-TREP QUANT: CPT

## 2022-05-16 PROCEDURE — 86480 TB TEST CELL IMMUN MEASURE: CPT

## 2022-05-16 PROCEDURE — 86803 HEPATITIS C AB TEST: CPT

## 2022-05-16 PROCEDURE — 86361 T CELL ABSOLUTE COUNT: CPT

## 2022-05-16 PROCEDURE — 80053 COMPREHEN METABOLIC PANEL: CPT

## 2022-05-16 PROCEDURE — 80061 LIPID PANEL: CPT

## 2022-05-17 LAB
BASOPHILS # BLD AUTO: 0 X10E3/UL (ref 0–0.2)
BASOPHILS NFR BLD AUTO: 0 %
CD3+CD4+ CELLS # BLD: 583 /UL (ref 359–1519)
CD3+CD4+ CELLS NFR BLD: 34.3 % (ref 30.8–58.5)
EOSINOPHIL # BLD AUTO: 0 X10E3/UL (ref 0–0.4)
EOSINOPHIL NFR BLD AUTO: 1 %
ERYTHROCYTE [DISTWIDTH] IN BLOOD BY AUTOMATED COUNT: 19.7 % (ref 11.6–15.4)
GAMMA INTERFERON BACKGROUND BLD IA-ACNC: 0.04 IU/ML
HCT VFR BLD AUTO: 33.7 % (ref 37.5–51)
HGB BLD-MCNC: 11.4 G/DL (ref 13–17.7)
IMM GRANULOCYTES # BLD: 0 X10E3/UL (ref 0–0.1)
IMM GRANULOCYTES NFR BLD: 1 %
LYMPHOCYTES # BLD AUTO: 1.7 X10E3/UL (ref 0.7–3.1)
LYMPHOCYTES NFR BLD AUTO: 26 %
M TB IFN-G BLD-IMP: NEGATIVE
M TB IFN-G CD4+ BCKGRND COR BLD-ACNC: 0.01 IU/ML
M TB IFN-G CD4+ BCKGRND COR BLD-ACNC: 0.02 IU/ML
MCH RBC QN AUTO: 27.1 PG (ref 26.6–33)
MCHC RBC AUTO-ENTMCNC: 33.8 G/DL (ref 31.5–35.7)
MCV RBC AUTO: 80 FL (ref 79–97)
MITOGEN IGNF BCKGRD COR BLD-ACNC: 6.06 IU/ML
MONOCYTES # BLD AUTO: 1.2 X10E3/UL (ref 0.1–0.9)
MONOCYTES NFR BLD AUTO: 19 %
NEUTROPHILS # BLD AUTO: 3.6 X10E3/UL (ref 1.4–7)
NEUTROPHILS NFR BLD AUTO: 53 %
PLATELET # BLD AUTO: 206 X10E3/UL (ref 150–450)
RBC # BLD AUTO: 4.2 X10E6/UL (ref 4.14–5.8)
T PALLIDUM AB SER QL IF: REACTIVE
WBC # BLD AUTO: 6.7 X10E3/UL (ref 3.4–10.8)

## 2022-05-18 PROBLEM — R74.8 ELEVATED ALKALINE PHOSPHATASE LEVEL: Status: ACTIVE | Noted: 2022-05-18

## 2022-05-18 LAB
HIV1 RNA # SERPL NAA+PROBE: <20 COPIES/ML
HIV1 RNA SERPL NAA+PROBE-LOG#: NORMAL LOG10COPY/ML

## 2022-05-19 ENCOUNTER — APPOINTMENT (OUTPATIENT)
Dept: LAB | Age: 52
End: 2022-05-19
Payer: COMMERCIAL

## 2022-05-19 LAB
BACTERIA UR QL AUTO: ABNORMAL /HPF
BILIRUB UR QL STRIP: NEGATIVE
CLARITY UR: CLEAR
COLOR UR: YELLOW
GLUCOSE UR STRIP-MCNC: NEGATIVE MG/DL
HGB UR QL STRIP.AUTO: NEGATIVE
HYALINE CASTS #/AREA URNS LPF: ABNORMAL /LPF
KETONES UR STRIP-MCNC: NEGATIVE MG/DL
LEUKOCYTE ESTERASE UR QL STRIP: NEGATIVE
NITRITE UR QL STRIP: NEGATIVE
NON-SQ EPI CELLS URNS QL MICRO: ABNORMAL /HPF
OTHER STN SPEC: ABNORMAL
PH UR STRIP.AUTO: 6 [PH]
PROT UR STRIP-MCNC: ABNORMAL MG/DL
RBC #/AREA URNS AUTO: ABNORMAL /HPF
SP GR UR STRIP.AUTO: 1.02 (ref 1–1.03)
UROBILINOGEN UR STRIP-ACNC: 2 MG/DL
WBC #/AREA URNS AUTO: ABNORMAL /HPF
WBC CASTS URNS QL MICRO: ABNORMAL /LPF

## 2022-05-19 PROCEDURE — 81001 URINALYSIS AUTO W/SCOPE: CPT

## 2022-05-20 LAB
C TRACH DNA SPEC QL NAA+PROBE: NEGATIVE
N GONORRHOEA DNA SPEC QL NAA+PROBE: NEGATIVE

## 2022-05-25 DIAGNOSIS — N52.8 OTHER MALE ERECTILE DYSFUNCTION: ICD-10-CM

## 2022-05-26 PROBLEM — D63.8 ANEMIA IN OTHER CHRONIC DISEASES CLASSIFIED ELSEWHERE: Status: ACTIVE | Noted: 2022-05-26

## 2022-05-26 PROBLEM — E83.52 HYPERCALCEMIA: Status: ACTIVE | Noted: 2022-05-26

## 2022-05-26 RX ORDER — TADALAFIL 2.5 MG/1
TABLET ORAL
Qty: 10 TABLET | Refills: 2 | Status: SHIPPED | OUTPATIENT
Start: 2022-05-26

## 2022-05-26 NOTE — PROGRESS NOTES
Progress Note - Infectious Disease   Caryn Sultana 46 y o  male MRN: [id number]  Unit/Bed#:  Encounter: [id number]    Impression/Plan:  1   HIV - doing well on Biktarvy with an undetectable viral load and a CD4 count of 583    Continue ART, recheck labs in 5 months, follow-up in 6 months  Counseled pt on importance of strict adherence with ART      2  Nicotine dependence - patient not ready to quit smoking yet  Discussed the importance of tobacco cessation      3  Generalized anxiety disorder -   continue mental health follow-up     4  Late latent syphilis: RPR was previously non-reactive in March 2021  RPR now reactive at 1:64 dils with reactive FTA  Treat with benzathine penicillin x 3 injections weekly, first dose today  Pt advised that partner(s) should also be tested and treated  Repeat RPR 3, 6, and 12 months after completion of treatment  5  Anemia: Pt denies bloody stools, or hematuria  Pt denies symptoms of anemia  UA is negative for blood/RBCs  Colonoscopy in Dec 2019: small internal hemorrhoids  Check iron panel  Repeat CBC prior to next visit  6  Elevated alk phos: pt denies abdominal pain  Check GGT  Repeat LFTs prior to next visit  My recommendations were discussed with the patient who verbalized understanding  Discussed in detail with Jg Fernando, the primary  Subjective:  Pt presents for routine HIV follow-up  Patient says he missed one dose of ART this past month and misses about 3 doses/year  Overall pt is feeling well  He denies fever, chills, nausea, vomiting or diarrhea, cough or shortness of breath  Followup portions patient history reviewed and updated as:   Allergies, current medications, past medical history, past social history, past surgical history, and the problem list     Objective:  Vitals:  Vitals:    05/27/22 0803   BP: 109/75   BP Location: Left arm   Patient Position: Sitting   Cuff Size: Standard   Pulse: 81   Temp: (!) 97 2 °F (36 2 °C)   SpO2: 97%   Weight: 82 6 kg (182 lb)   Height: 5' 7" (1 702 m)   Physical Exam:   General Appearance:  Alert, interactive, appearing well, nontoxic, no acute distress  Throat:   Deferred as pt is wearing a facemask   Neck: Supple    Lungs:   Clear to auscultation bilaterally; no wheezes; respirations unlabored   Heart:  S1, S2, RRR; no murmur, rub or gallop   Abdomen:   Soft, non-tender, non-distended  Extremities: No cyanosis or distal leg edema b/l   Neurologic: AAO to person, surroundings, conversant, fluent speech   Skin: Scabbed rash noted on lower extremities and b/l shoulders  No rash on palms  Labs, Imaging, & Other studies:   All pertinent labs and imaging studies were personally reviewed    Lab Results   Component Value Date     12/30/2015    K 4 1 05/16/2022     05/16/2022    CO2 27 05/16/2022    ANIONGAP 9 12/30/2015    BUN 15 05/16/2022    CREATININE 0 89 05/16/2022    GLUCOSE 124 12/30/2015    GLUF 121 (H) 05/16/2022    CALCIUM 9 3 05/16/2022    CORRECTEDCA 10 3 (H) 05/16/2022    AST 37 05/16/2022    ALT 39 05/16/2022    ALKPHOS 384 (H) 05/16/2022    PROT 8 4 (H) 12/30/2015    BILITOT 0 37 12/30/2015    EGFR 97 05/16/2022     Lab Results   Component Value Date    WBC 6 90 05/16/2022    WBC 6 7 05/16/2022    HGB 11 1 (L) 05/16/2022    HGB 11 4 (L) 05/16/2022    HCT 34 5 (L) 05/16/2022    HCT 33 7 (L) 05/16/2022    MCV 84 05/16/2022    MCV 80 05/16/2022    PLT  05/16/2022      Comment:      Not reported due to platelet clumping       05/16/2022     Lab Results   Component Value Date    HEPCAB Non-reactive 05/16/2022     Lab Results   Component Value Date    HEPCAB Non-reactive 05/16/2022     Lab Results   Component Value Date    RPR Reactive (A) 05/16/2022    RPR Reactive 64 dils (A) 05/16/2022     CD4 ABS   Date/Time Value Ref Range Status   05/16/2022 09:07  359 - 1519 /uL Final     HIV-1 RNA by PCR, Qn   Date/Time Value Ref Range Status   05/16/2022 09:07 AM <20 copies/mL Final Comment:     HIV-1 RNA detected  The reportable range for this assay is 20 to 10,000,000  copies HIV-1 RNA/mL         Current Outpatient Medications:     albuterol (Proventil HFA) 90 mcg/act inhaler, Inhale 2 puffs every 6 (six) hours as needed for wheezing, Disp: 6 7 g, Rfl: 5    atorvastatin (LIPITOR) 10 mg tablet, TAKE 1 TABLET BY MOUTH DAILY, Disp: 90 tablet, Rfl: 1    Biktarvy -25 MG tablet, TAKE 1 TABLET BY MOUTH DAILY WITH BREAKFAST, Disp: 30 tablet, Rfl: 3    cetirizine (ZyrTEC) 10 mg tablet, TAKE 1 TABLET BY MOUTH DAILY, Disp: 90 tablet, Rfl: 1    dicyclomine (BENTYL) 20 mg tablet, TAKE 1 TABLET BY MOUTH TWICE A DAY BEFORE MEALS, Disp: 60 tablet, Rfl: 2    FLUoxetine (PROzac) 40 MG capsule, Take 2 capsules (80 mg total) by mouth daily, Disp: 60 capsule, Rfl: 3    fluticasone (FLONASE) 50 mcg/act nasal spray, USE 1 SPRAY IN EACH NOSTRIL DAILY, Disp: 16 g, Rfl: 5    montelukast (SINGULAIR) 10 mg tablet, TAKE 1 TABLET BY MOUTH DAILY AT BEDTIME, Disp: 90 tablet, Rfl: 1    OLANZapine (ZyPREXA) 15 mg tablet, Take 1 tablet (15 mg total) by mouth daily at bedtime, Disp: 30 tablet, Rfl: 2    omeprazole (PriLOSEC) 40 MG capsule, TAKE 1 CAPSULE BY MOUTH DAILY, Disp: 30 capsule, Rfl: 3    penicillin G benzathine (BICILLIN L-A) 2,400,000 units/4 mL intramuscular injection, Inject 4 mL (2 4 Million Units total) into a muscle once a week for 3 doses, Disp: 12 mL, Rfl: 0    propranolol (INDERAL) 20 mg tablet, Take 1-2 tablets (20-40 mg total) by mouth 2 (two) times a day as needed (anxiety or physical agitation), Disp: 120 tablet, Rfl: 3    tadalafil (CIALIS) 2 5 MG tablet, TAKE 1 TABLET BY MOUTH AS NEEDED FOR ERECTILE DYSFUNCTION, Disp: 10 tablet, Rfl: 2    valACYclovir (VALTREX) 1,000 mg tablet, Take 1 tablet (1,000 mg total) by mouth 2 (two) times a day for 7 days, Disp: 14 tablet, Rfl: 0

## 2022-05-27 ENCOUNTER — DOCUMENTATION (OUTPATIENT)
Dept: SURGERY | Facility: CLINIC | Age: 52
End: 2022-05-27

## 2022-05-27 ENCOUNTER — OFFICE VISIT (OUTPATIENT)
Dept: SURGERY | Facility: CLINIC | Age: 52
End: 2022-05-27
Payer: COMMERCIAL

## 2022-05-27 VITALS
SYSTOLIC BLOOD PRESSURE: 109 MMHG | TEMPERATURE: 97.2 F | HEIGHT: 67 IN | OXYGEN SATURATION: 97 % | DIASTOLIC BLOOD PRESSURE: 75 MMHG | BODY MASS INDEX: 28.56 KG/M2 | HEART RATE: 81 BPM | WEIGHT: 182 LBS

## 2022-05-27 DIAGNOSIS — F41.1 GAD (GENERALIZED ANXIETY DISORDER): ICD-10-CM

## 2022-05-27 DIAGNOSIS — A52.8 LATE LATENT SYPHILIS: ICD-10-CM

## 2022-05-27 DIAGNOSIS — D64.9 ANEMIA, UNSPECIFIED TYPE: ICD-10-CM

## 2022-05-27 DIAGNOSIS — B20 HIV DISEASE (HCC): Primary | ICD-10-CM

## 2022-05-27 DIAGNOSIS — T65.222D TOXIC EFFECT OF TOBACCO CIGARETTE, INTENTIONAL SELF-HARM, SUBSEQUENT ENCOUNTER: ICD-10-CM

## 2022-05-27 DIAGNOSIS — R74.8 ELEVATED ALKALINE PHOSPHATASE LEVEL: ICD-10-CM

## 2022-05-27 PROCEDURE — 96372 THER/PROPH/DIAG INJ SC/IM: CPT

## 2022-05-27 PROCEDURE — 99214 OFFICE O/P EST MOD 30 MIN: CPT | Performed by: INTERNAL MEDICINE

## 2022-05-27 NOTE — PROGRESS NOTES
RD met briefly with Man Thompson In conjunction with ID appt, to check in on nutritional status and offer education and support as warranted and as pt was receptive  Pt reported doing just fine  RD shared cooking demo flyer with pt for healthy recipes and nutritious cooking demonstrations  No questions or concerns for RD at this time      Sasha Peterson, MS, RD, LDN

## 2022-05-27 NOTE — PROGRESS NOTES
Patient presented to clinic as scheduled  Administered Penicillin G Benzathine 2 4 Million Units Intramuscular injection without noted complications   Patient to return to clinic Friday, Sveta 3,2022 for second dose/injection of Bicillin L-A

## 2022-05-27 NOTE — PROGRESS NOTES
Assessment/Plan: [facility] approached pt to explore emotional, cognitive and behavioral displays's stability  During today's brief contact, pt disclosed being "ok" within his living circumstances, sharing that he's "waiting" for his 80year-old mom to pass away  It was explored pt's mother condition, which pt mentioned "Oh, nothing is happening to her, she's just getting old"  Pt proceeded to share his family dynamics which he described as "stressful" mentioning that he's the "main caregiver" of his mother, and apparently he "doesn't have anywhere to go"  Pt was encouraged to elaborate regarding disclosure which allowed him to described that he's "the baby" of his siblings, and his mother apparently told him that she's "taking care of him"  Pt acknowledged codependency within interpersonal relationships except with with his oldest brother which he shared not having contact with  It was explored pt's self-care patterns which he disclosed not having, relying on smoking cigarettes as a grounding source to manage identified stress  Before session's completion, pt was encouraged to consider ongoing [employer] interventions to address stress management and codependency factors, along with offering smoking cessation counseling which he shared been willing to consider as needed  No SI or HI were disclosed, nor displayed throughout contact  [facility]     Today patient present with   Chief Complaint   Patient presents with    HIV Positive    Follow-up     Patient would likely benefit from: Addressing codependency factors within interpersonal relationships;stress management sources; smoking cessation counseling  Consider/focus/continue: Monitoring pt's emotional, cognitive and behavioral displays' stability  Stage of change: Pre-contemplation  Plan/ Behavioral Recommendations: Ongoing  interventions per pt's coordinated care, and/or pt's request of services         Diagnoses and all orders for this visit:    HIV disease ([facility] [address] )  -     CBC and differential; Future  -     Comprehensive metabolic panel; Future  -     HIV-1 RNA, quantitative, PCR; Future  -     T-helper cells (CD4) count; Future  -     RPR; Future    Late latent syphilis  -     penicillin G benzathine (BICILLIN L-A) intramuscular injection 2 4 Million Units    Anemia, unspecified type  -     Iron Panel (Includes Ferritin, Iron Sat%, Iron, and TIBC); Future    Elevated alkaline phosphatase level  -     Gamma GT; Future    Toxic effect of tobacco cigarette, intentional self-harm, subsequent encounter    SOPHIA (generalized anxiety disorder)          Discussion:     Patient can reach out to [facility] PRN if they experiences changes in their behavioral health  Subjective:     Patient ID: Landry Rojas is a 46 y o  male  HPI    History of Present Illness: The patient is seeing the [facility] today for a routine behavioral health follow up  Review of Systems      Objective:     Physical Exam      [facility]    Orientation     Person: yes    Place: yes    Time: yes    Appearance    Well Developed: yes healthy    Uncomfortable: no    Normal Body Odor: yes    Smells of Feces: no    Smells of Urine: no    Disheveled: no    Well Nourished: yes overweight    Grooming Unkempt: no    Poor Eye Contact: no    Hirsute: yes    Looks Tired: no    Acutely Exhausted: noappearance reflects stated age    Mood and Affect:     Appropriate: yes    Euthymicyes    Irritable: no    Angry: no    Anxious: yes    Depressed:no    Blunted:no    Labile: no    Restricted: no    Harm to Self or Others: No SI or HI were disclosed nor displayed throughout contact       Substance Abuse: Tobacco Use Disorder, Severe

## 2022-06-02 ENCOUNTER — PATIENT OUTREACH (OUTPATIENT)
Dept: SURGERY | Facility: CLINIC | Age: 52
End: 2022-06-02

## 2022-06-03 ENCOUNTER — CLINICAL SUPPORT (OUTPATIENT)
Dept: SURGERY | Facility: CLINIC | Age: 52
End: 2022-06-03
Payer: COMMERCIAL

## 2022-06-03 DIAGNOSIS — L73.9 FOLLICULITIS: ICD-10-CM

## 2022-06-03 DIAGNOSIS — A53.0 POSITIVE RPR TEST: Primary | ICD-10-CM

## 2022-06-03 PROCEDURE — 96372 THER/PROPH/DIAG INJ SC/IM: CPT

## 2022-06-03 RX ORDER — DOXYCYCLINE 100 MG/1
100 CAPSULE ORAL 2 TIMES DAILY
Qty: 14 CAPSULE | Refills: 0 | Status: SHIPPED | OUTPATIENT
Start: 2022-06-03 | End: 2022-06-10

## 2022-06-03 NOTE — PROGRESS NOTES
Pt was seen in office for 2nd bicillin shot  Pt reported he felt fatigued and rundown for a day after he received his first bicillin injections  No fevers or other symptoms  Pt also reported having a marks on his body that were getting worse  Pt reports they have a white head and are often itchy  Pt does scratch them open and most of generalized area were healed scabs  Some are reddened with open areas from scratched papules  Pt does use a loofah when showering and has not changed  No contraindication to pt receiving bicillin dose today  Suspect could be related to side effect of vaccine, viral infection, or dehydration  · Doxycyline 100 mg BID X 7 days    · Throw out loofah  · 3rd bicillin 6/10

## 2022-06-03 NOTE — PROGRESS NOTES
Patient presented to clinic as scheduled  Administered Penicillin G Benzathine 2 4 Million Units Intramuscular injection without noted complications   Patient to return to clinic Friday 4/10/22 for third dose injection of Bicillin L-A

## 2022-06-06 ENCOUNTER — PATIENT OUTREACH (OUTPATIENT)
Dept: SURGERY | Facility: CLINIC | Age: 52
End: 2022-06-06

## 2022-06-13 ENCOUNTER — CLINICAL SUPPORT (OUTPATIENT)
Dept: SURGERY | Facility: CLINIC | Age: 52
End: 2022-06-13

## 2022-06-13 DIAGNOSIS — A53.9 SYPHILIS: Primary | ICD-10-CM

## 2022-06-15 NOTE — PROGRESS NOTES
Patient presented to clinic as scheduled  Administered Penicillin G Benzathine 2 4 Million Units Intramuscular injection without noted complications

## 2022-06-20 DIAGNOSIS — K21.00 GASTROESOPHAGEAL REFLUX DISEASE WITH ESOPHAGITIS: ICD-10-CM

## 2022-06-20 RX ORDER — OMEPRAZOLE 40 MG/1
CAPSULE, DELAYED RELEASE ORAL
Qty: 30 CAPSULE | Refills: 3 | Status: SHIPPED | OUTPATIENT
Start: 2022-06-20

## 2022-06-27 ENCOUNTER — PATIENT OUTREACH (OUTPATIENT)
Dept: SURGERY | Facility: CLINIC | Age: 52
End: 2022-06-27

## 2022-07-01 ENCOUNTER — TELEPHONE (OUTPATIENT)
Dept: SURGERY | Facility: CLINIC | Age: 52
End: 2022-07-01

## 2022-07-01 NOTE — TELEPHONE ENCOUNTER
Patient would like to know if you can gie him a refill for Doxycline  He states his skin was getting better and now its getting worse    Please send to [facility]

## 2022-07-06 ENCOUNTER — PATIENT OUTREACH (OUTPATIENT)
Dept: SURGERY | Facility: CLINIC | Age: 52
End: 2022-07-06

## 2022-07-08 NOTE — PROGRESS NOTES
Assessment/Plan:  · Addended to send to Dr Devyn Cobos for signing  HIV disease  Doing well on Biktarvy with an undetectable VL  Pt reports he may have missed 1 dose of ART due to the days rolling together  · Stressed the importance of 100% medication adherence    HIV Counseling:    Viral Load: < 20    CD4 Count: 583      Denies side effects  Stressed the importance of adherence  Continue follow up in the ID clinic with Dr Devyn Cobos or Dr Chloe Miller  Reviewed the most recent labs, including the CD4 and viral load  Discussed the risks and benefits of treatment options, instructions for management, importance of treatment adherence, and reduction of risk factors  Educated on possible medication side effects  Counseled on routes of HIV transmission, including the risk of  infection  Emphasized that viral suppression is the best method to prevent HIV transmission  At this time the pt denies the need for HIV testing of anyone in their life  Total encounter time was greater than 35 minutes  Greater than 20 minutes were spent on counseling and patient education  Pt voices understanding and agreement with treatment plan  Generalized maculopapular rash        Most of the rash does itch but is not sure what the trigger is  Rash does not bother pt during sleep just when he is doing something  The itchiness is the worse part of the rash  Does not appear to be folliculitis in presentation  DDx: atopic or contact dermatitis, or lichen simplex  Less likely: scabies, eczema,or drug eruption  · Triamcinolone cream 0 1% to affected area bid  · Hydroxyzine 25 mg TID as needed  · Loratadine 10 mg PO QD  · Avoid scratching    Chronic pain of both shoulders  Ongoing  Pt had gone to PT in past with no change in pain  Pt has not tried Tylenol or followed up with ortho      DDx: OA, cervical joint disease, or rotator cuff    · Check xray of cervical and bilateral shoulders  · Recommend Tylenol for pain and heating pad or lido derm pads   · Lidocaine cream 4 %  · May need referral to orthopedic pending xray results/EMG/gabapentin       Diagnoses and all orders for this visit:    HIV disease ([facility] )    Generalized maculopapular rash  -     triamcinolone (KENALOG) 0 1 % cream; Apply topically 2 (two) times a day  -     loratadine (CLARITIN) 10 mg tablet; Take 1 tablet (10 mg total) by mouth daily  -     hydrOXYzine HCL (ATARAX) 25 mg tablet;  Take 1 tablet (25 mg total) by mouth every 6 (six) hours as needed for itching    Chronic pain of both shoulders    Numbness and tingling of both upper extremities    Cervical neuropathic pain        Patient Active Problem List   Diagnosis    Bilateral varicoceles    Embolism involving vascular device ([facility])    HIV disease ([facility])    GERD (gastroesophageal reflux disease)    Irritable bowel syndrome with both constipation and diarrhea    Toxic effect of tobacco cigarette, intentional self-harm ([facility] )    SOPHIA (generalized anxiety disorder)    Acute right-sided low back pain with right-sided sciatica    Jerky body movements    Panic attacks    Dysphagia    Moderate persistent asthma without complication    ED (erectile dysfunction)    Constipation    Red eyes    Chronic pain syndrome    Seasonal allergies    Discomfort    History of sensory changes    Gingival abscess    Difficulty in voiding    Foot pain, bilateral    At high risk for falls    Balance problems    Elevated cholesterol with elevated triglycerides    Weight loss    Ganglion cyst of right foot    Upper respiratory tract infection    Testicular pain    Bipolar 2 disorder, major depressive episode ([facility])    Abdominal bloating    Poor dentition    Chronic generalized pain disorder    Cervical pain    Abnormality of gait and mobility    SOB (shortness of breath)    HSV infection    Chronic pain of both knees    Atrial dilation    Elevated alkaline phosphatase level    Anemia in other chronic diseases classified elsewhere    Hypercalcemia    Generalized maculopapular rash    Chronic pain of both shoulders     Lab Results   Component Value Date     12/30/2015    K 4 1 05/16/2022     05/16/2022    CO2 27 05/16/2022    ANIONGAP 9 12/30/2015    BUN 15 05/16/2022    CREATININE 0 89 05/16/2022    GLUCOSE 124 12/30/2015    GLUF 121 (H) 05/16/2022    CALCIUM 9 3 05/16/2022    CORRECTEDCA 10 3 (H) 05/16/2022    AST 37 05/16/2022    ALT 39 05/16/2022    ALKPHOS 384 (H) 05/16/2022    PROT 8 4 (H) 12/30/2015    BILITOT 0 37 12/30/2015    EGFR 97 05/16/2022     Lab Results   Component Value Date    WBC 6 90 05/16/2022    WBC 6 7 05/16/2022    HGB 11 1 (L) 05/16/2022    HGB 11 4 (L) 05/16/2022    HCT 34 5 (L) 05/16/2022    HCT 33 7 (L) 05/16/2022    MCV 84 05/16/2022    MCV 80 05/16/2022    PLT  05/16/2022      Comment:      Not reported due to platelet clumping   05/16/2022       Subjective:      Patient ID: Iglesia Ramey is a 46 y o  male  ARLENE Davis is a 45 y/o male who presents for follow up visit, management of HIV, and chronic health care conditions  Pt reports having sporadic presentation of rash of different parts of body with folliculitis presentation and was given doxycyline X 10 days  Rash had cleared but has returned again  Pt reports rash has returned that is on chest, head, and bilateral legs  The itchiness is the worse part of the rash  Pt denies any new soaps, lotions, deodorant, or creams  No new medications  Pt does scratch rash on arm and legs  Pt reports bilateral posterior shoulder and neck pain with intermittent numbness and tingling bilateral finger tips when pt is moving arms or at rest   Pt has been doing exercises with no change in pain  Pain does travel down bilateral shoulder blades and pt has to keep moving his arms  Pt has not tried anything for the pain and denies any trauma  Pt reports things feel tight in that area   Pt does not drop anything at this time  The following portions of the patient's history were reviewed and updated as appropriate: allergies, current medications, past family history, past medical history, past social history and problem list     Review of Systems   Constitutional: Negative  HENT: Negative  Respiratory: Negative  Cardiovascular: Negative  Gastrointestinal: Negative  Musculoskeletal: Positive for myalgias  Skin: Positive for rash  Neurological:        TIC   Psychiatric/Behavioral: Negative  Objective:      /78 (BP Location: Right arm, Patient Position: Sitting, Cuff Size: Standard)   Temp 98 1 °F (36 7 °C)   Ht 5' 7" (1 702 m)   Wt 76 8 kg (169 lb 6 4 oz)   BMI 26 53 kg/m²          Physical Exam  Vitals and nursing note reviewed  Constitutional:       General: He is not in acute distress  Appearance: Normal appearance  He is not ill-appearing  HENT:      Mouth/Throat:      Mouth: Mucous membranes are moist       Pharynx: Oropharynx is clear  Cardiovascular:      Rate and Rhythm: Normal rate and regular rhythm  Chest Wall: PMI is not displaced  Pulses: Normal pulses  Heart sounds: Normal heart sounds  Pulmonary:      Effort: Pulmonary effort is normal       Breath sounds: Normal breath sounds  Musculoskeletal:         General: Normal range of motion  Skin:     General: Skin is warm and dry  Capillary Refill: Capillary refill takes less than 2 seconds  Findings: Rash present  Rash is crusting, macular, papular and urticarial           Neurological:      Mental Status: He is alert and oriented to person, place, and time  Psychiatric:         Mood and Affect: Mood normal          Behavior: Behavior normal          Thought Content:  Thought content normal          Judgment: Judgment normal

## 2022-07-08 NOTE — ASSESSMENT & PLAN NOTE
Doing well on Biktarvy with an undetectable VL  Pt reports he may have missed 1 dose of ART due to the days rolling together  · Stressed the importance of 100% medication adherence    HIV Counseling:    Viral Load: < 20    CD4 Count: 583      Denies side effects  Stressed the importance of adherence  Continue follow up in the ID clinic with Dr Garth Gonzalez or Dr Raymond Rojas  Reviewed the most recent labs, including the CD4 and viral load  Discussed the risks and benefits of treatment options, instructions for management, importance of treatment adherence, and reduction of risk factors  Educated on possible medication side effects  Counseled on routes of HIV transmission, including the risk of  infection  Emphasized that viral suppression is the best method to prevent HIV transmission  At this time the pt denies the need for HIV testing of anyone in their life  Total encounter time was greater than 35 minutes  Greater than 20 minutes were spent on counseling and patient education  Pt voices understanding and agreement with treatment plan

## 2022-07-11 ENCOUNTER — OFFICE VISIT (OUTPATIENT)
Dept: SURGERY | Facility: CLINIC | Age: 52
End: 2022-07-11
Payer: COMMERCIAL

## 2022-07-11 ENCOUNTER — PATIENT OUTREACH (OUTPATIENT)
Dept: SURGERY | Facility: CLINIC | Age: 52
End: 2022-07-11

## 2022-07-11 VITALS
TEMPERATURE: 98.1 F | BODY MASS INDEX: 26.59 KG/M2 | HEIGHT: 67 IN | DIASTOLIC BLOOD PRESSURE: 78 MMHG | SYSTOLIC BLOOD PRESSURE: 132 MMHG | WEIGHT: 169.4 LBS

## 2022-07-11 DIAGNOSIS — R20.2 NUMBNESS AND TINGLING OF BOTH UPPER EXTREMITIES: ICD-10-CM

## 2022-07-11 DIAGNOSIS — M54.12 CERVICAL NEUROPATHIC PAIN: ICD-10-CM

## 2022-07-11 DIAGNOSIS — M25.512 CHRONIC PAIN OF BOTH SHOULDERS: ICD-10-CM

## 2022-07-11 DIAGNOSIS — G89.29 CHRONIC PAIN OF BOTH SHOULDERS: ICD-10-CM

## 2022-07-11 DIAGNOSIS — R20.0 NUMBNESS AND TINGLING OF BOTH UPPER EXTREMITIES: ICD-10-CM

## 2022-07-11 DIAGNOSIS — B20 HIV DISEASE (HCC): Primary | ICD-10-CM

## 2022-07-11 DIAGNOSIS — R21 GENERALIZED MACULOPAPULAR RASH: ICD-10-CM

## 2022-07-11 DIAGNOSIS — M25.511 CHRONIC PAIN OF BOTH SHOULDERS: ICD-10-CM

## 2022-07-11 PROCEDURE — 99214 OFFICE O/P EST MOD 30 MIN: CPT | Performed by: NURSE PRACTITIONER

## 2022-07-11 RX ORDER — HYDROXYZINE HYDROCHLORIDE 25 MG/1
25 TABLET, FILM COATED ORAL EVERY 6 HOURS PRN
Qty: 30 TABLET | Refills: 2 | Status: SHIPPED | OUTPATIENT
Start: 2022-07-11 | End: 2022-07-12 | Stop reason: SDUPTHER

## 2022-07-11 RX ORDER — LIDOCAINE 40 MG/G
CREAM TOPICAL AS NEEDED
Qty: 30 G | Refills: 0 | Status: SHIPPED | OUTPATIENT
Start: 2022-07-11 | End: 2022-07-12 | Stop reason: SDUPTHER

## 2022-07-11 RX ORDER — TRIAMCINOLONE ACETONIDE 1 MG/G
CREAM TOPICAL 2 TIMES DAILY
Qty: 453.6 G | Refills: 1 | Status: SHIPPED | OUTPATIENT
Start: 2022-07-11 | End: 2022-07-12 | Stop reason: SDUPTHER

## 2022-07-11 RX ORDER — LORATADINE 10 MG/1
10 TABLET ORAL DAILY
Qty: 30 TABLET | Refills: 2 | Status: SHIPPED | OUTPATIENT
Start: 2022-07-11

## 2022-07-11 NOTE — ASSESSMENT & PLAN NOTE
Most of the rash does itch but is not sure what the trigger is  Rash does not bother pt during sleep just when he is doing something  The itchiness is the worse part of the rash     Does not appear to be folliculitis in presentation  DDx: atopic or contact dermatitis, or lichen simplex  Less likely: scabies, eczema,or drug eruption  · Triamcinolone cream 0 1% to affected area bid  · Hydroxyzine 25 mg TID as needed  · Loratadine 10 mg PO QD  · Avoid scratching

## 2022-07-11 NOTE — ASSESSMENT & PLAN NOTE
Ongoing  Pt had gone to PT in past with no change in pain  Pt has not tried Tylenol or followed up with ortho      DDx: OA, cervical joint disease, or rotator cuff    · Check xray of cervical and bilateral shoulders  · Recommend Tylenol for pain and heating pad or lido derm pads   · Lidocaine cream 4 %  · May need referral to orthopedic pending xray results/EMG/gabapentin

## 2022-07-12 ENCOUNTER — TELEPHONE (OUTPATIENT)
Dept: SURGERY | Facility: CLINIC | Age: 52
End: 2022-07-12

## 2022-07-12 DIAGNOSIS — G89.29 CHRONIC PAIN OF BOTH SHOULDERS: ICD-10-CM

## 2022-07-12 DIAGNOSIS — M25.511 CHRONIC PAIN OF BOTH SHOULDERS: ICD-10-CM

## 2022-07-12 DIAGNOSIS — M25.512 CHRONIC PAIN OF BOTH SHOULDERS: ICD-10-CM

## 2022-07-12 DIAGNOSIS — R21 GENERALIZED MACULOPAPULAR RASH: ICD-10-CM

## 2022-07-12 RX ORDER — LIDOCAINE 40 MG/G
CREAM TOPICAL AS NEEDED
Qty: 30 G | Refills: 0 | Status: SHIPPED | OUTPATIENT
Start: 2022-07-12 | End: 2022-07-18

## 2022-07-12 RX ORDER — TRIAMCINOLONE ACETONIDE 1 MG/G
CREAM TOPICAL 2 TIMES DAILY
Qty: 453.6 G | Refills: 1 | Status: SHIPPED | OUTPATIENT
Start: 2022-07-12 | End: 2022-10-11

## 2022-07-12 RX ORDER — HYDROXYZINE HYDROCHLORIDE 25 MG/1
25 TABLET, FILM COATED ORAL EVERY 6 HOURS PRN
Qty: 30 TABLET | Refills: 2 | Status: SHIPPED | OUTPATIENT
Start: 2022-07-12 | End: 2022-09-14

## 2022-07-12 NOTE — TELEPHONE ENCOUNTER
Per pt, medication sent to [facility] is not covered by his insurance  I made pt aware CLAYTON Breaux, will send rx to [facility]  Pt stated his understanding

## 2022-07-18 ENCOUNTER — OFFICE VISIT (OUTPATIENT)
Dept: PSYCHIATRY | Facility: CLINIC | Age: 52
End: 2022-07-18
Payer: COMMERCIAL

## 2022-07-18 VITALS
BODY MASS INDEX: 26.63 KG/M2 | SYSTOLIC BLOOD PRESSURE: 128 MMHG | DIASTOLIC BLOOD PRESSURE: 86 MMHG | WEIGHT: 170 LBS | HEART RATE: 80 BPM

## 2022-07-18 DIAGNOSIS — G89.29 CHRONIC PAIN OF BOTH SHOULDERS: ICD-10-CM

## 2022-07-18 DIAGNOSIS — J45.40 MODERATE PERSISTENT ASTHMA WITHOUT COMPLICATION: ICD-10-CM

## 2022-07-18 DIAGNOSIS — Z79.899 HIGH RISK MEDICATION USE: ICD-10-CM

## 2022-07-18 DIAGNOSIS — M25.511 CHRONIC PAIN OF BOTH SHOULDERS: ICD-10-CM

## 2022-07-18 DIAGNOSIS — M25.512 CHRONIC PAIN OF BOTH SHOULDERS: ICD-10-CM

## 2022-07-18 DIAGNOSIS — F41.8 PERFORMANCE ANXIETY: ICD-10-CM

## 2022-07-18 DIAGNOSIS — K58.2 IRRITABLE BOWEL SYNDROME WITH BOTH CONSTIPATION AND DIARRHEA: ICD-10-CM

## 2022-07-18 DIAGNOSIS — F31.81 BIPOLAR 2 DISORDER, MAJOR DEPRESSIVE EPISODE (HCC): Primary | ICD-10-CM

## 2022-07-18 DIAGNOSIS — F41.1 GAD (GENERALIZED ANXIETY DISORDER): ICD-10-CM

## 2022-07-18 DIAGNOSIS — B20 HIV DISEASE (HCC): ICD-10-CM

## 2022-07-18 PROCEDURE — 99214 OFFICE O/P EST MOD 30 MIN: CPT | Performed by: PSYCHIATRY & NEUROLOGY

## 2022-07-18 RX ORDER — LIDOCAINE 40 MG/G
CREAM TOPICAL AS NEEDED
Qty: 30 G | Refills: 0 | Status: SHIPPED | OUTPATIENT
Start: 2022-07-18

## 2022-07-18 RX ORDER — OLANZAPINE 15 MG/1
15 TABLET ORAL
Qty: 30 TABLET | Refills: 3 | Status: SHIPPED | OUTPATIENT
Start: 2022-07-18 | End: 2022-10-07 | Stop reason: SDUPTHER

## 2022-07-18 RX ORDER — FLUOXETINE HYDROCHLORIDE 40 MG/1
80 CAPSULE ORAL DAILY
Qty: 60 CAPSULE | Refills: 3 | Status: SHIPPED | OUTPATIENT
Start: 2022-07-18 | End: 2022-10-07 | Stop reason: SDUPTHER

## 2022-07-18 RX ORDER — ALBUTEROL SULFATE 90 UG/1
AEROSOL, METERED RESPIRATORY (INHALATION)
Qty: 6.7 G | Refills: 5 | Status: SHIPPED | OUTPATIENT
Start: 2022-07-18

## 2022-07-18 RX ORDER — BICTEGRAVIR SODIUM, EMTRICITABINE, AND TENOFOVIR ALAFENAMIDE FUMARATE 50; 200; 25 MG/1; MG/1; MG/1
1 TABLET ORAL
Qty: 30 TABLET | Refills: 3 | Status: SHIPPED | OUTPATIENT
Start: 2022-07-18

## 2022-07-18 RX ORDER — DICYCLOMINE HCL 20 MG
TABLET ORAL
Qty: 60 TABLET | Refills: 2 | Status: SHIPPED | OUTPATIENT
Start: 2022-07-18 | End: 2022-10-11

## 2022-07-18 RX ORDER — PROPRANOLOL HYDROCHLORIDE 20 MG/1
20 TABLET ORAL 2 TIMES DAILY
Qty: 60 TABLET | Refills: 3 | Status: SHIPPED | OUTPATIENT
Start: 2022-07-18 | End: 2022-10-07 | Stop reason: SDUPTHER
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
## 2022-07-28 ENCOUNTER — PATIENT OUTREACH (OUTPATIENT)
Dept: SURGERY | Facility: CLINIC | Age: 52
End: 2022-07-28

## 2022-08-04 ENCOUNTER — PATIENT OUTREACH (OUTPATIENT)
Dept: SURGERY | Facility: CLINIC | Age: 52
End: 2022-08-04

## 2022-08-05 ENCOUNTER — HOSPITAL ENCOUNTER (OUTPATIENT)
Dept: RADIOLOGY | Facility: HOSPITAL | Age: 52
Discharge: HOME | End: 2022-08-05
Payer: COMMERCIAL

## 2022-08-05 DIAGNOSIS — M25.511 CHRONIC PAIN OF BOTH SHOULDERS: ICD-10-CM

## 2022-08-05 DIAGNOSIS — G89.29 CHRONIC PAIN OF BOTH SHOULDERS: ICD-10-CM

## 2022-08-05 DIAGNOSIS — M25.561 CHRONIC PAIN OF BOTH KNEES: ICD-10-CM

## 2022-08-05 DIAGNOSIS — M25.512 CHRONIC PAIN OF BOTH SHOULDERS: ICD-10-CM

## 2022-08-05 DIAGNOSIS — R20.2 NUMBNESS AND TINGLING OF BOTH UPPER EXTREMITIES: ICD-10-CM

## 2022-08-05 DIAGNOSIS — M25.562 CHRONIC PAIN OF BOTH KNEES: ICD-10-CM

## 2022-08-05 DIAGNOSIS — G89.29 CHRONIC PAIN OF BOTH KNEES: ICD-10-CM

## 2022-08-05 DIAGNOSIS — R20.0 NUMBNESS AND TINGLING OF BOTH UPPER EXTREMITIES: ICD-10-CM

## 2022-08-05 DIAGNOSIS — M54.12 CERVICAL NEUROPATHIC PAIN: ICD-10-CM

## 2022-08-05 PROCEDURE — 73562 X-RAY EXAM OF KNEE 3: CPT

## 2022-08-05 PROCEDURE — 72050 X-RAY EXAM NECK SPINE 4/5VWS: CPT

## 2022-08-05 PROCEDURE — 73030 X-RAY EXAM OF SHOULDER: CPT

## 2022-08-11 DIAGNOSIS — M47.22 OSTEOARTHRITIS OF SPINE WITH RADICULOPATHY, CERVICAL REGION: ICD-10-CM

## 2022-08-11 DIAGNOSIS — G89.29 CHRONIC PAIN OF LEFT KNEE: ICD-10-CM

## 2022-08-11 DIAGNOSIS — G89.29 CHRONIC PAIN OF BOTH SHOULDERS: ICD-10-CM

## 2022-08-11 DIAGNOSIS — G89.29 CHRONIC PAIN OF BOTH KNEES: ICD-10-CM

## 2022-08-11 DIAGNOSIS — M47.892 OTHER OSTEOARTHRITIS OF SPINE, CERVICAL REGION: ICD-10-CM

## 2022-08-11 DIAGNOSIS — M25.562 CHRONIC PAIN OF LEFT KNEE: ICD-10-CM

## 2022-08-11 DIAGNOSIS — R93.6 ABNORMAL X-RAY OF SHOULDER: ICD-10-CM

## 2022-08-11 DIAGNOSIS — M19.012 PRIMARY OSTEOARTHRITIS OF LEFT SHOULDER: ICD-10-CM

## 2022-08-11 DIAGNOSIS — R21 RASH AND NONSPECIFIC SKIN ERUPTION: Primary | ICD-10-CM

## 2022-08-11 DIAGNOSIS — M25.561 CHRONIC PAIN OF BOTH KNEES: ICD-10-CM

## 2022-08-11 DIAGNOSIS — M25.562 CHRONIC PAIN OF BOTH KNEES: ICD-10-CM

## 2022-08-11 DIAGNOSIS — M25.512 CHRONIC PAIN OF BOTH SHOULDERS: ICD-10-CM

## 2022-08-11 DIAGNOSIS — R93.7 ABNORMAL X-RAY OF CERVICAL SPINE: ICD-10-CM

## 2022-08-11 DIAGNOSIS — M25.511 CHRONIC PAIN OF BOTH SHOULDERS: ICD-10-CM

## 2022-08-11 NOTE — PROGRESS NOTES
Reviewed xray results with pt regarding OA of cervical spine and left shoulder with degenerative changes with referral to orthopedic  Xray of left knee was unremarkable but pt still experiencing pain; referral placed  Rash still have residual from previous rash not any worse  Pt does declined tx for scabies since this is not scabies  Referral placed to dermatology

## 2022-08-17 ENCOUNTER — PATIENT OUTREACH (OUTPATIENT)
Dept: SURGERY | Facility: CLINIC | Age: 52
End: 2022-08-17

## 2022-08-22 ENCOUNTER — PATIENT OUTREACH (OUTPATIENT)
Dept: SURGERY | Facility: CLINIC | Age: 52
End: 2022-08-22

## 2022-08-30 ENCOUNTER — OFFICE VISIT (OUTPATIENT)
Dept: OBGYN CLINIC | Facility: OTHER | Age: 52
End: 2022-08-30
Payer: COMMERCIAL

## 2022-08-30 VITALS
SYSTOLIC BLOOD PRESSURE: 110 MMHG | WEIGHT: 170 LBS | BODY MASS INDEX: 26.68 KG/M2 | HEART RATE: 61 BPM | DIASTOLIC BLOOD PRESSURE: 69 MMHG | HEIGHT: 67 IN

## 2022-08-30 DIAGNOSIS — M47.22 OSTEOARTHRITIS OF SPINE WITH RADICULOPATHY, CERVICAL REGION: Primary | ICD-10-CM

## 2022-08-30 DIAGNOSIS — M70.21 OLECRANON BURSITIS OF BOTH ELBOWS: ICD-10-CM

## 2022-08-30 DIAGNOSIS — M25.511 CHRONIC PAIN OF BOTH SHOULDERS: ICD-10-CM

## 2022-08-30 DIAGNOSIS — M25.522 BILATERAL ELBOW JOINT PAIN: ICD-10-CM

## 2022-08-30 DIAGNOSIS — M25.521 BILATERAL ELBOW JOINT PAIN: ICD-10-CM

## 2022-08-30 DIAGNOSIS — M70.22 OLECRANON BURSITIS OF BOTH ELBOWS: ICD-10-CM

## 2022-08-30 DIAGNOSIS — G89.29 CHRONIC PAIN OF BOTH SHOULDERS: ICD-10-CM

## 2022-08-30 DIAGNOSIS — M25.512 CHRONIC PAIN OF BOTH SHOULDERS: ICD-10-CM

## 2022-08-30 PROCEDURE — 99204 OFFICE O/P NEW MOD 45 MIN: CPT | Performed by: ORTHOPAEDIC SURGERY

## 2022-08-30 NOTE — PROGRESS NOTES
Assessment  Diagnoses and all orders for this visit:    Healed right distal clavicle fracture - asymptomatic    Osteoarthritis of spine with radiculopathy, cervical region    Bilateral olecranon bursitis        Discussion and Plan:  I reviewed the patient that certainly his images show a old healed distal clavicle fracture which is currently asymptomatic and he does not have any obvious signs or symptoms on exam of intrinsic shoulder pathology today  Indeed most his symptoms appear to be emanating from his cervical spine pathology including significant osteoarthritic change throughout the cervical spine and most pronounced at at C6-C7  and I do feel he would benefit greatly from physical therapy as well as [facility] and further evaluation of the symptoms  This was provided for him today and will see him back on an as-needed basis as we do not have any intervention for his shoulder at this time    At the end the visit the patient also asked me to evaluate his bilateral elbows, he does have olecranon bursitis bilaterally which is nonfluctuant and non erythematous, this is consistent with a noninfectious bursitis and does not require treatment at this time, we did discuss the use of elbow pads to prevent local trauma to the area and further evaluation the form of radiographs and possibly further imaging could be considered in the future to evaluate there is any surgical indication for his bilateral olecranon bursa although I doubt that would be the case  Subjective:   Patient ID: Alfreda Roman is a 46 y o  male      [employer] is a 41-year-old male presenting with described scapular pain radiating into his back  He states he fell onto his right side 10 years ago and was told he sustained a fracture of the right scapula or AC joint, and was feeling fine until pain presented about 1 month ago  Pain is described as achy, and is present at random times   Pain is somewhat alleviated by Tylenol  He has not initiated any formal treatment such as physical therapy or injections, and denies any surgery or recent injury or distal paresthesias at this time  He briefly reported bilateral elbow pain and swelling present over the last month as well, with pain present with putting pressure on them  He denies any recent injury to either elbow  The following portions of the patient's history were reviewed and updated as appropriate: allergies, current medications, past family history, past medical history, past social history, past surgical history and problem list     Review of Systems    Objective:  /69 (BP Location: Left arm, Patient Position: Sitting, Cuff Size: Adult)   Pulse 61   Ht 5' 7" (1 702 m)   Wt 77 1 kg (170 lb)   BMI 26 63 kg/m²       Left Shoulder Exam     Tenderness   Left shoulder tenderness location: Scapular tenderness  Range of Motion   Active abduction: normal   External rotation: normal   Forward flexion: normal   Internal rotation 90 degrees: normal     Muscle Strength   Abduction: 5/5   Internal rotation: 5/5   External rotation: 5/5     Tests   Cornejo test: negative  Cross arm: negative  Impingement: negative  Drop arm: negative            Physical Exam      I have personally reviewed pertinent films in PACS and my interpretation is as follows  X-rays of the left shoulder obtained on 8/5/22 demonstrate mild osteoarthritis of the glenohumeral and acromioclavicular joints  X-rays of the cervical spine obtained on 8/5/22 demonstrate disc space narrowing at C6-C7 and osteophyte formation at C3, C5, C6, and C7

## 2022-09-01 ENCOUNTER — OFFICE VISIT (OUTPATIENT)
Dept: OBGYN CLINIC | Facility: OTHER | Age: 52
End: 2022-09-01
Payer: COMMERCIAL

## 2022-09-01 VITALS
BODY MASS INDEX: 28.09 KG/M2 | HEART RATE: 62 BPM | DIASTOLIC BLOOD PRESSURE: 78 MMHG | SYSTOLIC BLOOD PRESSURE: 131 MMHG | WEIGHT: 179 LBS | HEIGHT: 67 IN

## 2022-09-01 DIAGNOSIS — M25.561 CHRONIC PAIN OF RIGHT KNEE: Primary | ICD-10-CM

## 2022-09-01 DIAGNOSIS — G89.29 CHRONIC PAIN OF RIGHT KNEE: Primary | ICD-10-CM

## 2022-09-01 DIAGNOSIS — M22.2X1 PATELLOFEMORAL PAIN SYNDROME OF RIGHT KNEE: ICD-10-CM

## 2022-09-01 PROCEDURE — 99213 OFFICE O/P EST LOW 20 MIN: CPT | Performed by: FAMILY MEDICINE

## 2022-09-01 NOTE — PROGRESS NOTES
1  Chronic pain of right knee  Ambulatory referral to Orthopedic Surgery    Ambulatory Referral to Physical Therapy   2  Patellofemoral pain syndrome of right knee  Ambulatory Referral to Physical Therapy    Brace     Orders Placed This Encounter   Procedures    Brace    Ambulatory Referral to Physical Therapy        Imaging Studies (I personally reviewed images in PACS and report):  Of knee obtained 08/05/2022 read as no acute osseous abnormality  IMPRESSION:  Chronic right knee pain  PFPS      Repeat X-ray next visit: None    Return if symptoms worsen or fail to improve  Patient Instructions   You have been provided a script for physical therapy for your knee  You have also been provided a brace to wear for stability  This is expect to improve with physical therapy and compliance with home exercises as directed in a course of 4-6 months  Follow-up as needed  CHIEF COMPLAINT:  Chronic left knee pain    HPI:  Marylou Garvin is a 46 y o  male new patient  who presents for evaluation of chronic right knee pain  Patient first mention this to his PCP in January of 2022, endorsing pain when kneeling, for which she was ordered x-rays  X-ray of the left knee obtained 08/05/2022 was read as revealing no acute osseous abnormality  Visit 9/1/2022 :   Today, patient reports     Bilateral pain, worse on right  lateral  Worse with kneeling  Present for over 1 year  Points laterally  No constant   No pain with ROM  Some effusion after activity  No erythema  Atraumatic  Sometimes a click or pop when standing from a kneel  No locking or catching  Pall with tylenol  PMH of bad sciatica a few years ago resulting in 6 months of debility that improved with PT      Review of Systems      Following history reviewed and update:    Past Medical History:   Diagnosis Date    Anxiety     Last Assessed: 7/18/2016     Dysuria     Last Assessed: 9/14/2015    Fecal urgency     Pt has had fecal incontinence in past, has weakened sphincter  would benefit from fiber, Needs f/u flex sig will refer for scheduling -        GERD (gastroesophageal reflux disease)     Hemorrhage of anus and rectum     Last Assessed: 3/5/2014     Herpes zoster     Last Assessed: 9/26/2016    History of chickenpox     History of pneumonia     HIV (human immunodeficiency virus infection) ([facility] [address] )     IBS (irritable bowel syndrome)     Proctitis, chlamydial     Last Assessed: 9/29/2014     Recurrent major depressive episodes, moderate ([facility] )     Last Assessed: 9/15/2017     Wears glasses     reading     Past Surgical History:   Procedure Laterality Date    CIRCUMCISION      COLONOSCOPY      CYSTOSCOPY  2014    LASIK      OTHER SURGICAL HISTORY      embolisation 2018    DE ESOPHAGOGASTRODUODENOSCOPY TRANSORAL DIAGNOSTIC N/A 03/09/2017    Procedure: ESOPHAGOGASTRODUODENOSCOPY (EGD); Surgeon: Corin Grullon MD;  Location: [facility];   Service: Gastroenterology    VARICOCELE EXCISION      Spermatic cord Excision - Last Assessed: 3/17/2016     WISDOM TOOTH EXTRACTION       Social History   Social History     Substance and Sexual Activity   Alcohol Use Yes    Alcohol/week: 1 0 standard drink    Types: 1 Cans of beer per week    Comment: socially     Social History     Substance and Sexual Activity   Drug Use No     Social History     Tobacco Use   Smoking Status Current Every Day Smoker    Packs/day: 0 50    Years: 25 00    Pack years: 12 50    Types: Cigarettes   Smokeless Tobacco Former User   Tobacco Comment    4 daily     Family History   Problem Relation Age of Onset    Diabetes type II Mother     Heart attack Father     No Known Problems Brother     No Known Problems Brother     Substance Abuse Cousin      Allergies   Allergen Reactions    Tomato - Food Allergy Vomiting          Physical Exam  /78 (BP Location: Left arm, Patient Position: Sitting, Cuff Size: Adult)   Pulse 62   Ht 5' 7" (1 702 m)   Wt 81 2 kg (179 lb) BMI 28 04 kg/m²     Constitutional:  see vital signs  Gen: well-developed, normocephalic/atraumatic, well-groomed  Eyes: No inflammation or discharge of conjunctiva or lids; sclera clear   Pharynx: no inflammation, lesion, or mass of lips  Neck: supple, no masses, non-distended  MSK: no inflammation, lesion, mass, or clubbing of nails and digits except for other than mentioned below  SKIN: no visible rashes or skin lesions  Pulmonary/Chest: Effort normal  No respiratory distress     NEURO: cranial nerves grossly intact  PSYCH:  Alert and oriented to person, place, and time; recent and remote memory intact; mood normal, no depression, anxiety, or agitation, judgment and insight good and intact     Ortho Exam  RIGHT KNEE:  Erythema: no  Swelling: no  Increased Warmth: no  Tenderness: LJL > MJL  Flexion: intact  Extension: intact  Patellar Displacement:  Patellar Tilt:  Patellar Apprehension: +  Patellar Grind Dozier's: +  Lachman's: negative  Drawer: negative  Varus laxity: negative  Valgus laxity: negatvie  [facility]: negative    Procedures

## 2022-09-01 NOTE — PATIENT INSTRUCTIONS
You have been provided a script for physical therapy for your knee  You have also been provided a brace to wear for stability  This is expect to improve with physical therapy and compliance with home exercises as directed in a course of 4-6 months  Follow-up as needed

## 2022-09-12 ENCOUNTER — PATIENT OUTREACH (OUTPATIENT)
Dept: SURGERY | Facility: CLINIC | Age: 52
End: 2022-09-12
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
## 2022-10-07 ENCOUNTER — OFFICE VISIT (OUTPATIENT)
Dept: PSYCHIATRY | Facility: CLINIC | Age: 52
End: 2022-10-07
Payer: COMMERCIAL

## 2022-10-07 VITALS — WEIGHT: 180 LBS | BODY MASS INDEX: 28.19 KG/M2

## 2022-10-07 DIAGNOSIS — F41.8 PERFORMANCE ANXIETY: ICD-10-CM

## 2022-10-07 DIAGNOSIS — Z79.899 HIGH RISK MEDICATION USE: ICD-10-CM

## 2022-10-07 DIAGNOSIS — F31.81 BIPOLAR 2 DISORDER, MAJOR DEPRESSIVE EPISODE (HCC): Primary | ICD-10-CM

## 2022-10-07 DIAGNOSIS — F41.1 GAD (GENERALIZED ANXIETY DISORDER): ICD-10-CM

## 2022-10-07 PROCEDURE — 99214 OFFICE O/P EST MOD 30 MIN: CPT | Performed by: PSYCHIATRY & NEUROLOGY

## 2022-10-07 RX ORDER — OLANZAPINE 15 MG/1
15 TABLET ORAL
Qty: 30 TABLET | Refills: 3 | Status: SHIPPED | OUTPATIENT
Start: 2022-10-07

## 2022-10-07 RX ORDER — PROPRANOLOL HYDROCHLORIDE 20 MG/1
20 TABLET ORAL 2 TIMES DAILY
Qty: 60 TABLET | Refills: 3 | Status: SHIPPED | OUTPATIENT
Start: 2022-10-07

## 2022-10-07 RX ORDER — FLUOXETINE HYDROCHLORIDE 40 MG/1
80 CAPSULE ORAL DAILY
Qty: 60 CAPSULE | Refills: 3 | Status: SHIPPED | OUTPATIENT
Start: 2022-10-07

## 2022-10-07 NOTE — BH TREATMENT PLAN
TREATMENT PLAN (Medication Management Only)        [facility]    Name/Date of Birth/MRN:  Александр Desouza 46 y o  1970 MRN: [id number]  Date of Treatment Plan: October 7, 2022  Diagnosis/Diagnoses:   1  Bipolar 2 disorder, major depressive episode ([facility] [address] )    2  SOPHIA (generalized anxiety disorder)    3  Performance anxiety      Strengths/Personal Resources for Self-Care: "I accept things"  Area/Areas of need (in own words): ongoing medical issues, dental work  1  Long Term Goal: "improve anxiety and depresion"   Target Date: 180 days from treatment plan  Person/Persons responsible for completion of goal: Dr Leitha Heimlich and Self  2  Short Term Objective (s) - How will we reach this goal?:   A  Provider new recommended medication/dosage changes and/or continue medication(s): as noted  B   Make and keep appointment with neurology and dentist  C  Continue treamtment and get labs as recommended  Target Date: 6 months from treatment plan unless noted otherwise  Person/Persons Responsible for Completion of Goal: Dr Leitha Heimlich and Self   Progress Towards Goals: continuing treatment   Treatment Modality: Medication management and therapy PRN  Review due 180 days from date of this plan: Approximately 6 months from today ( 4/7/2023 )    Expected length of service: ongoing treatment unless revised  My Physician/PA/NP and I have developed this plan together and I agree to work on the goals and objectives  I understand the treatment goals that were developed for my treatment    Signature:     Date and time:  Signature of parent/guardian if under age of 15 years: Date and time:  Signature of provider:      Date and time:  Signature of Supervising Physician:    Date and time: 10/7/2022      Deidre Sis III

## 2022-10-10 DIAGNOSIS — K21.00 GASTROESOPHAGEAL REFLUX DISEASE WITH ESOPHAGITIS: ICD-10-CM

## 2022-10-10 DIAGNOSIS — K58.2 IRRITABLE BOWEL SYNDROME WITH BOTH CONSTIPATION AND DIARRHEA: ICD-10-CM

## 2022-10-10 DIAGNOSIS — R21 GENERALIZED MACULOPAPULAR RASH: ICD-10-CM

## 2022-10-10 RX ORDER — OMEPRAZOLE 40 MG/1
CAPSULE, DELAYED RELEASE ORAL
Qty: 30 CAPSULE | Refills: 3 | Status: SHIPPED | OUTPATIENT
Start: 2022-10-10

## 2022-10-11 RX ORDER — HYDROXYZINE HYDROCHLORIDE 25 MG/1
TABLET, FILM COATED ORAL
Qty: 90 TABLET | Refills: 0 | Status: SHIPPED | OUTPATIENT
Start: 2022-10-11

## 2022-10-11 RX ORDER — DICYCLOMINE HCL 20 MG
TABLET ORAL
Qty: 60 TABLET | Refills: 2 | Status: SHIPPED | OUTPATIENT
Start: 2022-10-11

## 2022-10-11 RX ORDER — TRIAMCINOLONE ACETONIDE 1 MG/G
CREAM TOPICAL
Qty: 454 G | Refills: 0 | Status: SHIPPED | OUTPATIENT
Start: 2022-10-11

## 2022-11-02 ENCOUNTER — CONSULT (OUTPATIENT)
Dept: MULTI SPECIALTY CLINIC | Facility: CLINIC | Age: 52
End: 2022-11-02

## 2022-11-02 VITALS — WEIGHT: 181 LBS | BODY MASS INDEX: 28.35 KG/M2

## 2022-11-02 DIAGNOSIS — R21 RASH AND NONSPECIFIC SKIN ERUPTION: ICD-10-CM

## 2022-11-02 DIAGNOSIS — L73.9 FOLLICULITIS: Primary | ICD-10-CM

## 2022-11-02 DIAGNOSIS — L29.9 PRURITUS: ICD-10-CM

## 2022-11-02 RX ORDER — DOXYCYCLINE 100 MG/1
100 TABLET ORAL 2 TIMES DAILY
Qty: 60 TABLET | Refills: 0 | Status: SHIPPED | OUTPATIENT
Start: 2022-11-02 | End: 2022-12-02

## 2022-11-02 NOTE — PROGRESS NOTES
[facility] Note     Patient Name: Claudean Redbird  Encounter Date: 11/02/22       Have you been cared for by a [place] [id number] Dermatologist in the last 3 years and, if so, which description applies to you? NO  I am considered a "new" patient and must complete all patient intake questions  I am MALE/not capable of bearing children  REVIEW OF SYSTEMS:  Have you recently had or currently have any of the following? · Recent fever or chills? No  · Any non-healing wound? YES, lower back    PAST MEDICAL HISTORY:  Have you personally ever had or currently have any of the following? If "YES," then please provide more detail  · Skin cancer (such as Melanoma, Basal Cell Carcinoma, Squamous Cell Carcinoma? No  · Tuberculosis, HIV/AIDS, Hepatitis B or C: YES, HIV  · Systemic Immunosuppression such as Diabetes, Biologic or Immunotherapy, Chemotherapy, Organ Transplantation, Bone Marrow Transplantation YES,   · Radiation Treatment No   FAMILY HISTORY:  Any "first degree relatives" (parent, brother, sister, or child) with the following? • Skin Cancer, Pancreatic or Other Cancer? No   PATIENT EXPERIENCE:    • Do you want the Dermatologist to perform a COMPLETE skin exam today including a clinical examination under the "bra and underwear" areas? Yes  • If necessary, do we have your permission to call and leave a detailed message on your Preferred Phone number that includes your specific medical information?   Yes      Allergies   Allergen Reactions   • Tomato - Food Allergy Vomiting      Current Outpatient Medications:   •  albuterol (PROVENTIL HFA,VENTOLIN HFA) 90 mcg/act inhaler, INHALE 2 PUFFS EVERY SIX HOURS AS NEEDED FOR WHEEZING, Disp: 6 7 g, Rfl: 5  •  atorvastatin (LIPITOR) 10 mg tablet, TAKE 1 TABLET BY MOUTH DAILY, Disp: 90 tablet, Rfl: 1  •  Biktarvy -25 MG tablet, TAKE 1 TABLET BY MOUTH DAILY WITH BREAKFAST, Disp: 30 tablet, Rfl: 3  •  dicyclomine (BENTYL) 20 mg tablet, TAKE 1 TABLET BY MOUTH TWICE A DAY BEFORE MEALS, Disp: 60 tablet, Rfl: 2  •  FLUoxetine (PROzac) 40 MG capsule, Take 2 capsules (80 mg total) by mouth daily, Disp: 60 capsule, Rfl: 3  •  fluticasone (FLONASE) 50 mcg/act nasal spray, USE 1 SPRAY IN EACH NOSTRIL DAILY, Disp: 16 g, Rfl: 5  •  hydrOXYzine HCL (ATARAX) 25 mg tablet, TAKE 1 TABLET BY MOUTH EVERY SIX HOURS AS NEEDED FOR ITCHING, Disp: 90 tablet, Rfl: 0  •  lidocaine (LMX) 4 % cream, APPLY TOPICALLY AS NEEDED FOR MILD PAIN, Disp: 30 g, Rfl: 0  •  loratadine (CLARITIN) 10 mg tablet, Take 1 tablet (10 mg total) by mouth daily, Disp: 30 tablet, Rfl: 2  •  montelukast (SINGULAIR) 10 mg tablet, TAKE 1 TABLET BY MOUTH DAILY AT BEDTIME, Disp: 90 tablet, Rfl: 1  •  OLANZapine (ZyPREXA) 15 mg tablet, Take 1 tablet (15 mg total) by mouth daily at bedtime, Disp: 30 tablet, Rfl: 3  •  omeprazole (PriLOSEC) 40 MG capsule, TAKE 1 CAPSULE BY MOUTH DAILY, Disp: 30 capsule, Rfl: 3  •  propranolol (INDERAL) 20 mg tablet, Take 1 tablet (20 mg total) by mouth 2 (two) times a day, Disp: 60 tablet, Rfl: 3  •  tadalafil (CIALIS) 2 5 MG tablet, TAKE 1 TABLET BY MOUTH AS NEEDED FOR ERECTILE DYSFUNCTION, Disp: 10 tablet, Rfl: 2  •  triamcinolone (KENALOG) 0 1 % cream, APPLY TOPICALLY TWICE A DAY, Disp: 454 g, Rfl: 0  •  valACYclovir (VALTREX) 1,000 mg tablet, Take 1 tablet (1,000 mg total) by mouth 2 (two) times a day for 7 days, Disp: 14 tablet, Rfl: 0          • Whom besides the patient is providing clinical information about today's encounter?   o NO ADDITIONAL HISTORIAN (patient alone provided history)    Physical Exam and Assessment/Plan by Diagnosis:    Rash- 3 months or more ; itchy sometimes, redness, irritation, Triamcinolone Cream 0 1% ; helping somewhat     RASH  EROSIONS  PRURITUS POSSIBLY DUE TO FORMICATION-LIKE SYMPTOMS FROM MEDICATIONS (E G  OMEPRAZOLE, OLANZAPINE, BIKTARVY, PROPRANOLOL)    Physical Exam:  • Anatomic Location Affected:  Legs, arms, chest, back  • Morphological Description:  Scattered linear erosions; scattered eroded pink papules     Additional History of Present Condition:  Patient states he has had this rash for 3 months; states the areas start off as itchy; then he itches them and they become pimple like  Does think the triamcinolone cream was helping but not much  HIV positive but well controlled; denies any new medications in the last 3 months     Assessment and Plan:  Based on a thorough discussion of this condition and the management approach to it (including a comprehensive discussion of the known risks, side effects and potential benefits of treatment), the patient (family) agrees to implement the following specific plan:   Avoid long hot showers; if you are using hot water, should be less than 5 minutes  Start using Dove moisturizer bar soap in the shower  •   Use moisturizer like Eucerin,Cerave, Vanicream or Aveeno Cream 3 times a day for the dry skin     •      • Obtain over the counter topical pramoxine (Sarna)- apply as needed to itchy areas multiple times daily as needed  • When itchiness is really bad, can start using triamcinolone ointment twice daily for up to 14 days on itchy spots; it is important to take at least 1 week break between two week uses  Do NOT use on face, armpits, or groin  • Will order thyroid testing and syphillis screening  • Follow-up in 2-3 months     FOLLICULITIS    Physical Exam:  • Anatomic Location Affected:  Buttocks, R >>>L  • Morphological Description:  Pink nodules, small dime shaped ulcer     Additional History of Present Condition:  Patient states this rash is different from the rest; states he has been itching the ulcerated area; start off as painful bumps       Assessment and Plan:  Based on a thorough discussion of this condition and the management approach to it (including a comprehensive discussion of the known risks, side effects and potential benefits of treatment), the patient (family) agrees to implement the following specific plan:    · Wound culture obtained today  · Start doxycycline- take 100mg twice daily with a meal and a glass of water; do NOT lie down for at least 30 minutes after taking it (1 month course)  · Will call patient with wound culture results       What is folliculitis? Folliculitis is the name given to a group of skin conditions in which there are inflamed hair follicles  The result is a tender red spot, often with a surface pustule  Folliculitis may be superficial or deep  It can affect anywhere there are hairs, including chest, back, buttocks, arms and legs  Acne and its variants are also types of folliculitis  What causes folliculitis? Folliculitis can be due to infection, occlusion (blockage), irritation and various skin diseases  Folliculitis due to infection  To determine if folliculitis is due to an infection, swabs should be taken from the pustules for cytology and culture in the laboratory  Bacteria  Bacterial folliculitis is commonly due to Staphylococcus aureus  If the infection involves the deep part of the follicle, it results in a painful boil  Recommended treatment includes careful hygiene, antiseptic cleanser or cream, antibiotic ointment, and/or oral antibiotics  Spa pool folliculitis is due to infection with Pseudomonas aeruginosa, which thrives in inadequately chlorinated warm water  Gram negative folliculitis is a pustular facial eruption also due to infection with Pseudomonas aeruginosa or other similar organisms  When it appears, it usually follows tetracycline treatment of acne, but is quite rare  Yeasts  The most common yeast to cause a folliculitis is Pityrosporum ovale, also known as Malassezia  Malassezia folliculitis (Pityrosporum folliculitis) is an itchy acne-like condition usually affecting the upper trunk of a young adult   Treatment includes avoiding moisturisers, stopping any antibiotics and topical antifungal or oral antifungal medication for several weeks  Candida albicans can also provoke a folliculitis in skin folds (intertrigo) or in the beard area  It is treated with topical or oral antifungal agents  Fungi  Ringworm of the scalp (tinea capitis) usually results in scaling and hair loss, but sometimes results in folliculitis  In [place], cat ringworm (Microsporum canis) is the commonest organism causing scalp fungal infection  Other fungi such as Trichophyton tonsurans are increasingly reported  Treatment is with oral antifungal agents for several months  Viral infections  Folliculitis may caused by herpes simplex virus  This tends to be tender, and resolves without treatment in around 10 days  Severe recurrent attacks may be treated with acyclovir and other antiviral agents  Herpes zoster (the cause of shingles) may also present as folliculitis with painful pustules and crusted spots within a dermatome (an area of skin supplied by a single nerve)  It is treated with hihg-dose acyclovir  Molluscum contagiosum, common in young children, may also cause follicular umbilicated papules, usually clustered in and around a body fold  Molluscum may provoke dermatitis  Parasitic infection  Folliculitis on the face or scalp of older or immunosuppressed adults may be due to colonisation by hair follicle mites (demodex)  This is known as demodicosis  The human infestation, scabies, often provokes folliculitis, as well as non-follicular papules, vesicles and pustules  Folliculitis due to irritation from regrowing hairs  Folliculitis may arise as hairs regrow after shaving, waxing, electrolysis or plucking  Swabs taken from the pustules are sterile ie there is no growth of bacteria or other organisms  In the beard area irritant folliculitis is known as pseudofolliculitis barbae  Irritant folliculitis is also common on the lower legs of women (shaving rash)  It is frequently very itchy   Treatment is by stopping hair removal, and not beginning again for about three months after the folliculitis has settled  To prevent reoccurring irritant folliculitis, use a gentle hair removal method, such as a lady's electric razor  Avoid soap and apply plenty of shaving gel, if using a blade shaver  Folliculitis due to contact reactions  Occlusion  Paraffin-based ointments, moisturisers, and adhesive plasters may all result in a sterile folliculitis  If a moisturiser is needed, choose an oil-free product, as it is less likely to cause occlusion  Chemicals  Coal tar, cutting oils and other chemicals may cause an irritant folliculitis  Avoid contact with the causative product  Topical steroids  Overuse of topical steroids may produce a folliculitis  Perioral dermatitis is a facial folliculitis provoked by moisturisers and topical steroids  Perioral dermatitis is treated with tetracycline antibiotics for six weeks or so  Folliculitis due to immunosuppression  Eosinophilic folliculitis is a specific type of folliculitis that may arise in some immune suppressed individuals such as those infected by human immunodeficiency virus (HIV) or those who have cancer  Folliculitis due to drugs  Folliculitis may be due to drugs, particularly corticosteroids (steroid acne), androgens (male hormones), ACTH, lithium, isoniazid (INH), phenytoin and B-complex vitamins  Protein kinase inhibitors (epidermal growth factor receptor inhibitors) and targeted therapy for metastatic melanoma (vemurafenib, dabrafenib) nearly always result in folliculitis  Folliculitis due to inflammatory skin diseases  Certain uncommon inflammatory skin diseases may cause permanent hair loss and scarring because of deep seated sterile folliculitis  These include:  • Lichen planus  • Discoid lupus erythematosus  • Folliculitis decalvans  • Folliculitis keloidalis     Treatment depends on the underlying condition and its severity   A skin biopsy is often necessary to establish the diagnosis  Acne variants   Acne and acne-like or acneform disorders are also forms of folliculitis  These include:  • Acne vulgaris  • Nodulocystic acne  • Rosacea  • Scalp folliculitis  • Chloracne    The follicular occlusion syndrome refers to:  • Hidradenitis suppurativa (acne inversa)  • Acne conglobata (a severe form of nodulocystic acne)  • Dissecting cellulitis (perifolliculitis capitis abscedens et suffodiens)  • Pilonidal sinus  Treatment of the acne variants may include topical therapy as well as long courses of tetracycline antibiotics, isotretinoin (vitamin-A derivative) and in women, antiandrogenic therapy  Buttock folliculitis  Folliculitis affecting the buttocks is quite common and is often nonspecific, ie no specific cause is found  Buttock folliculitis is equally common in males and females  • Acute buttock folliculitis is usually bacterial in origin (like boils), resulting in red painful papules and pustules  It clears with antibiotics  • Chronic buttock folliculitis does not often cause significant symptoms but it can be very persistent  Although antiseptics, topical acne treatments, peeling agents such as alphahydroxy acids, long courses of oral antibiotics and isotretinoin can help buttock folliculitis, they are not always effective  Hair removal might be worth trying if the affected area is hairy  As regrowth of hair can make it worse, permanent hair reduction by laser or intense pulsed light (IPL) is best     The patient was seen and discussed with Dr Aakash Pimentel       RTC: 2-3 months for rash follow-up    Laura Proctor  Dermatology PGY-4 Resident Physician

## 2022-11-02 NOTE — PATIENT INSTRUCTIONS
PRURIGO NODULES     Assessment and Plan:  Based on a thorough discussion of this condition and the management approach to it (including a comprehensive discussion of the known risks, side effects and potential benefits of treatment), the patient (family) agrees to implement the following specific plan:   Avoid long hot showers; if you are using hot water, should be less than 5 minutes  Start using Dove moisturizer bar soap in the shower    Use moisturizer like Eucerin,Cerave, Vanicream or Aveeno Cream 3 times a day for the dry skin          Obtain over the counter topical pramoxine (Sarna)- apply as needed to itchy areas multiple times daily as needed  When itchiness is really bad, can start using triamcinolone ointment twice daily for up to 14 days on itchy spots; it is important to take at least 1 week break between two week uses  Do NOT use on face, armpits, or groin  Will order thyroid testing and syphillis screening  Follow-up in 2-3 months     FOLLICULITIS    Assessment and Plan:  Based on a thorough discussion of this condition and the management approach to it (including a comprehensive discussion of the known risks, side effects and potential benefits of treatment), the patient (family) agrees to implement the following specific plan: Wound culture obtained today  Start doxycycline- take 100mg twice daily with a meal and a glass of water; do NOT lie down for at least 30 minutes after taking it (1 month course)      What is folliculitis? Folliculitis is the name given to a group of skin conditions in which there are inflamed hair follicles  The result is a tender red spot, often with a surface pustule  Folliculitis may be superficial or deep  It can affect anywhere there are hairs, including chest, back, buttocks, arms and legs  Acne and its variants are also types of folliculitis  What causes folliculitis?   Folliculitis can be due to infection, occlusion (blockage), irritation and various skin diseases  Folliculitis due to infection  To determine if folliculitis is due to an infection, swabs should be taken from the pustules for cytology and culture in the laboratory  Bacteria  Bacterial folliculitis is commonly due to Staphylococcus aureus  If the infection involves the deep part of the follicle, it results in a painful boil  Recommended treatment includes careful hygiene, antiseptic cleanser or cream, antibiotic ointment, and/or oral antibiotics  Spa pool folliculitis is due to infection with Pseudomonas aeruginosa, which thrives in inadequately chlorinated warm water  Gram negative folliculitis is a pustular facial eruption also due to infection with Pseudomonas aeruginosa or other similar organisms  When it appears, it usually follows tetracycline treatment of acne, but is quite rare  Yeasts  The most common yeast to cause a folliculitis is Pityrosporum ovale, also known as Malassezia  Malassezia folliculitis (Pityrosporum folliculitis) is an itchy acne-like condition usually affecting the upper trunk of a young adult  Treatment includes avoiding moisturisers, stopping any antibiotics and topical antifungal or oral antifungal medication for several weeks  Candida albicans can also provoke a folliculitis in skin folds (intertrigo) or in the beard area  It is treated with topical or oral antifungal agents  Fungi  Ringworm of the scalp (tinea capitis) usually results in scaling and hair loss, but sometimes results in folliculitis  In [place], cat ringworm (Microsporum canis) is the commonest organism causing scalp fungal infection  Other fungi such as Trichophyton tonsurans are increasingly reported  Treatment is with oral antifungal agents for several months  Viral infections  Folliculitis may caused by herpes simplex virus  This tends to be tender, and resolves without treatment in around 10 days   Severe recurrent attacks may be treated with acyclovir and other antiviral agents  Herpes zoster (the cause of shingles) may also present as folliculitis with painful pustules and crusted spots within a dermatome (an area of skin supplied by a single nerve)  It is treated with hihg-dose acyclovir  Molluscum contagiosum, common in young children, may also cause follicular umbilicated papules, usually clustered in and around a body fold  Molluscum may provoke dermatitis  Parasitic infection  Folliculitis on the face or scalp of older or immunosuppressed adults may be due to colonisation by hair follicle mites (demodex)  This is known as demodicosis  The human infestation, scabies, often provokes folliculitis, as well as non-follicular papules, vesicles and pustules  Folliculitis due to irritation from regrowing hairs  Folliculitis may arise as hairs regrow after shaving, waxing, electrolysis or plucking  Swabs taken from the pustules are sterile ie there is no growth of bacteria or other organisms  In the beard area irritant folliculitis is known as pseudofolliculitis barbae  Irritant folliculitis is also common on the lower legs of women (shaving rash)  It is frequently very itchy  Treatment is by stopping hair removal, and not beginning again for about three months after the folliculitis has settled  To prevent reoccurring irritant folliculitis, use a gentle hair removal method, such as a lady's electric razor  Avoid soap and apply plenty of shaving gel, if using a blade shaver  Folliculitis due to contact reactions  Occlusion  Paraffin-based ointments, moisturisers, and adhesive plasters may all result in a sterile folliculitis  If a moisturiser is needed, choose an oil-free product, as it is less likely to cause occlusion  Chemicals  Coal tar, cutting oils and other chemicals may cause an irritant folliculitis  Avoid contact with the causative product  Topical steroids  Overuse of topical steroids may produce a folliculitis   Perioral dermatitis is a facial folliculitis provoked by moisturisers and topical steroids  Perioral dermatitis is treated with tetracycline antibiotics for six weeks or so  Folliculitis due to immunosuppression  Eosinophilic folliculitis is a specific type of folliculitis that may arise in some immune suppressed individuals such as those infected by human immunodeficiency virus (HIV) or those who have cancer  Folliculitis due to drugs  Folliculitis may be due to drugs, particularly corticosteroids (steroid acne), androgens (male hormones), ACTH, lithium, isoniazid (INH), phenytoin and B-complex vitamins  Protein kinase inhibitors (epidermal growth factor receptor inhibitors) and targeted therapy for metastatic melanoma (vemurafenib, dabrafenib) nearly always result in folliculitis  Folliculitis due to inflammatory skin diseases  Certain uncommon inflammatory skin diseases may cause permanent hair loss and scarring because of deep seated sterile folliculitis  These include:  Lichen planus  Discoid lupus erythematosus  Folliculitis decalvans  Folliculitis keloidalis     Treatment depends on the underlying condition and its severity  A skin biopsy is often necessary to establish the diagnosis  Acne variants   Acne and acne-like or acneform disorders are also forms of folliculitis  These include:  Acne vulgaris  Nodulocystic acne  Rosacea  Scalp folliculitis  Chloracne    The follicular occlusion syndrome refers to:  Hidradenitis suppurativa (acne inversa)  Acne conglobata (a severe form of nodulocystic acne)  Dissecting cellulitis (perifolliculitis capitis abscedens et suffodiens)  Pilonidal sinus  Treatment of the acne variants may include topical therapy as well as long courses of tetracycline antibiotics, isotretinoin (vitamin-A derivative) and in women, antiandrogenic therapy  Buttock folliculitis  Folliculitis affecting the buttocks is quite common and is often nonspecific, ie no specific cause is found   Buttock folliculitis is equally common in males and females  Acute buttock folliculitis is usually bacterial in origin (like boils), resulting in red painful papules and pustules  It clears with antibiotics  Chronic buttock folliculitis does not often cause significant symptoms but it can be very persistent  Although antiseptics, topical acne treatments, peeling agents such as alphahydroxy acids, long courses of oral antibiotics and isotretinoin can help buttock folliculitis, they are not always effective  Hair removal might be worth trying if the affected area is hairy   As regrowth of hair can make it worse, permanent hair reduction by laser or intense pulsed light (IPL) is best

## 2022-11-04 DIAGNOSIS — R21 GENERALIZED MACULOPAPULAR RASH: ICD-10-CM

## 2022-11-04 DIAGNOSIS — B20 HIV DISEASE (HCC): ICD-10-CM

## 2022-11-05 LAB
BACTERIA WND AEROBE CULT: ABNORMAL
GRAM STN SPEC: ABNORMAL

## 2022-11-07 ENCOUNTER — TELEPHONE (OUTPATIENT)
Dept: DERMATOLOGY | Facility: CLINIC | Age: 52
End: 2022-11-07

## 2022-11-08 RX ORDER — HYDROXYZINE HYDROCHLORIDE 25 MG/1
TABLET, FILM COATED ORAL
Qty: 90 TABLET | Refills: 0 | Status: SHIPPED | OUTPATIENT
Start: 2022-11-08

## 2022-11-08 RX ORDER — TRIAMCINOLONE ACETONIDE 1 MG/G
CREAM TOPICAL
Qty: 454 G | Refills: 0 | Status: SHIPPED | OUTPATIENT
Start: 2022-11-08

## 2022-11-08 RX ORDER — BICTEGRAVIR SODIUM, EMTRICITABINE, AND TENOFOVIR ALAFENAMIDE FUMARATE 50; 200; 25 MG/1; MG/1; MG/1
1 TABLET ORAL
Qty: 30 TABLET | Refills: 3 | Status: SHIPPED | OUTPATIENT
Start: 2022-11-08

## 2022-11-11 DIAGNOSIS — L73.9 FOLLICULITIS: ICD-10-CM

## 2022-11-11 RX ORDER — DOXYCYCLINE 100 MG/1
TABLET ORAL
Qty: 60 TABLET | Refills: 0 | OUTPATIENT
Start: 2022-11-11

## 2022-11-11 NOTE — TELEPHONE ENCOUNTER
The prescription was filled a week ago for a month supply of doxycycline with zero refills  It is unclear why a prescription request is coming through  I checked and this was the same pharmacy I sent it to last week

## 2022-11-12 ENCOUNTER — APPOINTMENT (OUTPATIENT)
Dept: LAB | Age: 52
End: 2022-11-12

## 2022-11-12 DIAGNOSIS — R74.8 ELEVATED ALKALINE PHOSPHATASE LEVEL: ICD-10-CM

## 2022-11-12 DIAGNOSIS — Z79.899 HIGH RISK MEDICATION USE: ICD-10-CM

## 2022-11-12 DIAGNOSIS — L29.9 PRURITUS: ICD-10-CM

## 2022-11-12 DIAGNOSIS — B20 HIV DISEASE (HCC): ICD-10-CM

## 2022-11-12 DIAGNOSIS — D64.9 ANEMIA, UNSPECIFIED TYPE: ICD-10-CM

## 2022-11-12 DIAGNOSIS — R21 RASH AND NONSPECIFIC SKIN ERUPTION: ICD-10-CM

## 2022-11-12 LAB
ALBUMIN SERPL BCP-MCNC: 3.5 G/DL (ref 3.5–5)
ALP SERPL-CCNC: 113 U/L (ref 46–116)
ALT SERPL W P-5'-P-CCNC: 33 U/L (ref 12–78)
ANION GAP SERPL CALCULATED.3IONS-SCNC: 8 MMOL/L (ref 4–13)
AST SERPL W P-5'-P-CCNC: 20 U/L (ref 5–45)
BASOPHILS # BLD AUTO: 0.04 THOUSANDS/ÂΜL (ref 0–0.1)
BASOPHILS NFR BLD AUTO: 1 % (ref 0–1)
BILIRUB SERPL-MCNC: 0.42 MG/DL (ref 0.2–1)
BUN SERPL-MCNC: 17 MG/DL (ref 5–25)
CALCIUM SERPL-MCNC: 9.4 MG/DL (ref 8.3–10.1)
CHLORIDE SERPL-SCNC: 101 MMOL/L (ref 96–108)
CHOLEST SERPL-MCNC: 201 MG/DL
CO2 SERPL-SCNC: 27 MMOL/L (ref 21–32)
CREAT SERPL-MCNC: 1 MG/DL (ref 0.6–1.3)
EOSINOPHIL # BLD AUTO: 0.14 THOUSAND/ÂΜL (ref 0–0.61)
EOSINOPHIL NFR BLD AUTO: 2 % (ref 0–6)
ERYTHROCYTE [DISTWIDTH] IN BLOOD BY AUTOMATED COUNT: 16.6 % (ref 11.6–15.1)
EST. AVERAGE GLUCOSE BLD GHB EST-MCNC: 111 MG/DL
FERRITIN SERPL-MCNC: 11 NG/ML (ref 8–388)
GFR SERPL CREATININE-BSD FRML MDRD: 85 ML/MIN/1.73SQ M
GGT SERPL-CCNC: 33 U/L (ref 5–85)
GLUCOSE P FAST SERPL-MCNC: 97 MG/DL (ref 65–99)
HBA1C MFR BLD: 5.5 %
HCT VFR BLD AUTO: 41.9 % (ref 36.5–49.3)
HDLC SERPL-MCNC: 49 MG/DL
HGB BLD-MCNC: 13.5 G/DL (ref 12–17)
IMM GRANULOCYTES # BLD AUTO: 0.05 THOUSAND/UL (ref 0–0.2)
IMM GRANULOCYTES NFR BLD AUTO: 1 % (ref 0–2)
IRON SATN MFR SERPL: 14 % (ref 20–50)
IRON SERPL-MCNC: 74 UG/DL (ref 65–175)
LDLC SERPL CALC-MCNC: 102 MG/DL (ref 0–100)
LDLC SERPL DIRECT ASSAY-MCNC: 112 MG/DL (ref 0–100)
LYMPHOCYTES # BLD AUTO: 4.2 THOUSANDS/ÂΜL (ref 0.6–4.47)
LYMPHOCYTES NFR BLD AUTO: 54 % (ref 14–44)
MCH RBC QN AUTO: 29.3 PG (ref 26.8–34.3)
MCHC RBC AUTO-ENTMCNC: 32.2 G/DL (ref 31.4–37.4)
MCV RBC AUTO: 91 FL (ref 82–98)
MONOCYTES # BLD AUTO: 0.53 THOUSAND/ÂΜL (ref 0.17–1.22)
MONOCYTES NFR BLD AUTO: 7 % (ref 4–12)
NEUTROPHILS # BLD AUTO: 2.68 THOUSANDS/ÂΜL (ref 1.85–7.62)
NEUTS SEG NFR BLD AUTO: 35 % (ref 43–75)
NONHDLC SERPL-MCNC: 152 MG/DL
NRBC BLD AUTO-RTO: 0 /100 WBCS
PLATELET # BLD AUTO: 325 THOUSANDS/UL (ref 149–390)
PMV BLD AUTO: 8 FL (ref 8.9–12.7)
POTASSIUM SERPL-SCNC: 4.3 MMOL/L (ref 3.5–5.3)
PROT SERPL-MCNC: 8.4 G/DL (ref 6.4–8.4)
RBC # BLD AUTO: 4.61 MILLION/UL (ref 3.88–5.62)
RPR SER QL: REACTIVE
RPR SER-TITR: ABNORMAL {TITER}
SODIUM SERPL-SCNC: 136 MMOL/L (ref 135–147)
T4 FREE SERPL-MCNC: 0.81 NG/DL (ref 0.76–1.46)
TIBC SERPL-MCNC: 519 UG/DL (ref 250–450)
TRIGL SERPL-MCNC: 251 MG/DL
TSH SERPL DL<=0.05 MIU/L-ACNC: 2.74 UIU/ML (ref 0.45–4.5)
WBC # BLD AUTO: 7.64 THOUSAND/UL (ref 4.31–10.16)

## 2022-11-13 LAB
BASOPHILS # BLD AUTO: 0 X10E3/UL (ref 0–0.2)
BASOPHILS NFR BLD AUTO: 1 %
CD3+CD4+ CELLS # BLD: 1239 /UL (ref 359–1519)
CD3+CD4+ CELLS NFR BLD: 32.6 % (ref 30.8–58.5)
EOSINOPHIL # BLD AUTO: 0.2 X10E3/UL (ref 0–0.4)
EOSINOPHIL NFR BLD AUTO: 3 %
ERYTHROCYTE [DISTWIDTH] IN BLOOD BY AUTOMATED COUNT: 17.3 % (ref 11.6–15.4)
HCT VFR BLD AUTO: 34.6 % (ref 37.5–51)
HGB BLD-MCNC: 11.7 G/DL (ref 13–17.7)
IMM GRANULOCYTES # BLD: 0.1 X10E3/UL (ref 0–0.1)
IMM GRANULOCYTES NFR BLD: 1 %
LYMPHOCYTES # BLD AUTO: 3.8 X10E3/UL (ref 0.7–3.1)
LYMPHOCYTES NFR BLD AUTO: 48 %
MCH RBC QN AUTO: 30.3 PG (ref 26.6–33)
MCHC RBC AUTO-ENTMCNC: 33.8 G/DL (ref 31.5–35.7)
MCV RBC AUTO: 90 FL (ref 79–97)
MONOCYTES # BLD AUTO: 0.6 X10E3/UL (ref 0.1–0.9)
MONOCYTES NFR BLD AUTO: 7 %
NEUTROPHILS # BLD AUTO: 3 X10E3/UL (ref 1.4–7)
NEUTROPHILS NFR BLD AUTO: 40 %
PLATELET # BLD AUTO: 372 X10E3/UL (ref 150–450)
RBC # BLD AUTO: 3.86 X10E6/UL (ref 4.14–5.8)
WBC # BLD AUTO: 7.7 X10E3/UL (ref 3.4–10.8)

## 2022-11-14 DIAGNOSIS — A53.9 SYPHILIS: Primary | ICD-10-CM

## 2022-11-14 LAB — T PALLIDUM AB SER QL IF: REACTIVE

## 2022-11-15 ENCOUNTER — PATIENT OUTREACH (OUTPATIENT)
Dept: SURGERY | Facility: CLINIC | Age: 52
End: 2022-11-15

## 2022-11-15 LAB
HIV1 RNA # SERPL NAA+PROBE: <20 COPIES/ML
HIV1 RNA SERPL NAA+PROBE-LOG#: NORMAL LOG10COPY/ML

## 2022-11-16 DIAGNOSIS — A53.9 SYPHILIS: Primary | ICD-10-CM

## 2022-11-18 ENCOUNTER — PATIENT OUTREACH (OUTPATIENT)
Dept: SURGERY | Facility: CLINIC | Age: 52
End: 2022-11-18

## 2022-11-18 ENCOUNTER — OFFICE VISIT (OUTPATIENT)
Dept: SURGERY | Facility: CLINIC | Age: 52
End: 2022-11-18

## 2022-11-18 ENCOUNTER — DOCUMENTATION (OUTPATIENT)
Dept: SURGERY | Facility: CLINIC | Age: 52
End: 2022-11-18

## 2022-11-18 VITALS
DIASTOLIC BLOOD PRESSURE: 69 MMHG | HEART RATE: 74 BPM | WEIGHT: 186 LBS | BODY MASS INDEX: 29.19 KG/M2 | SYSTOLIC BLOOD PRESSURE: 123 MMHG | HEIGHT: 67 IN | TEMPERATURE: 98.4 F | OXYGEN SATURATION: 99 %

## 2022-11-18 DIAGNOSIS — F17.210 CIGARETTE SMOKER: ICD-10-CM

## 2022-11-18 DIAGNOSIS — B20 HIV DISEASE (HCC): Primary | ICD-10-CM

## 2022-11-18 DIAGNOSIS — R74.8 ELEVATED ALKALINE PHOSPHATASE LEVEL: ICD-10-CM

## 2022-11-18 DIAGNOSIS — Z23 NEED FOR INFLUENZA VACCINATION: ICD-10-CM

## 2022-11-18 DIAGNOSIS — A52.8 LATE LATENT SYPHILIS: ICD-10-CM

## 2022-11-18 DIAGNOSIS — D63.8 ANEMIA IN OTHER CHRONIC DISEASES CLASSIFIED ELSEWHERE: ICD-10-CM

## 2022-11-18 NOTE — PROGRESS NOTES
Progress Note - Infectious Disease   Lanie Galan 45 y o  male MRN: [id number]  Unit/Bed#:  Encounter: [id number]    Impression/Plan:  3   HIV - doing well on Biktarvy with an undetectable viral load and a CD4 count of 1239    Continue ART, recheck labs in 5 months, follow-up in 6 months  Counseled pt on importance of adherence with ART      2  Nicotine dependence - patient has cut down to 3-5 cigarettes/day but is not ready to quit smoking yet  Discussed the importance of tobacco cessation      3  Generalized anxiety disorder -   continue mental health follow-up     4  Late latent syphilis: RPR was previously non-reactive in March 2021  RPR has decreased appropriately from 1:64 to 1:16 following syphilis treatment  Pt received benzathine penicillin x 3 injections weekly  Repeat RPR 3, 6, and 12 months after completion of treatment      5  Anemia: Pt denies bloody stools, or hematuria  Pt denies symptoms of anemia  UA is negative for blood/RBCs  Colonoscopy in Dec 2019: small internal hemorrhoids  Repeat CBC prior to next visit      6  Elevated alk phos: pt denies abdominal pain  GGT of 33 is WNL  Repeat LFTs prior to next visit  7  Vaccine counseling: Pt advised to receive influenza vaccine today  Pt to receive influenza vaccine today      My recommendations were discussed with the patient who verbalized understanding  Patient care coordinated with CLAYTON Mauricio  Subjective:  Pt presents for routine HIV follow-up  Patient claims 100% adherence with ART regimen  Overall pt is feeling well  He denies fever, chills, nausea, vomiting or diarrhea, cough, SOB  Followup portions patient history reviewed and updated as:   Allergies, current medications, past medical history, past social history, past surgical history, and the problem list     Objective:  Vitals:  Vitals:    11/18/22 0751   BP: 123/69   BP Location: Right arm   Patient Position: Sitting   Cuff Size: Standard   Pulse: 74   Temp: 98 4 °F (36 9 °C)   SpO2: 99%   Weight: 84 4 kg (186 lb)   Height: 5' 7" (1 702 m)     Physical Exam:   General Appearance:  Alert, interactive, appearing well,  nontoxic, no acute distress  Throat:   Deferred as pt is wearing a facemask   Neck: Supple    Lungs:   Clear to auscultation bilaterally; no wheezes; respirations unlabored   Heart:  S1, S2, RRR; no murmur, rub or gallop   Abdomen:   Soft, non-tender, non-distended       Extremities: No cyanosis or distal leg edema b/l   Neurologic: AAO to person, surroundings, conversant, fluent speech   Skin: No rash     Labs, Imaging, & Other studies:   All pertinent labs and imaging studies were personally reviewed    Lab Results   Component Value Date     12/30/2015    K 4 3 11/12/2022     11/12/2022    CO2 27 11/12/2022    ANIONGAP 9 12/30/2015    BUN 17 11/12/2022    CREATININE 1 00 11/12/2022    GLUCOSE 124 12/30/2015    GLUF 97 11/12/2022    CALCIUM 9 4 11/12/2022    CORRECTEDCA 10 3 (H) 05/16/2022    AST 20 11/12/2022    ALT 33 11/12/2022    ALKPHOS 113 11/12/2022    PROT 8 4 (H) 12/30/2015    BILITOT 0 37 12/30/2015    EGFR 85 11/12/2022     Lab Results   Component Value Date    WBC 7 64 11/12/2022    WBC 7 7 11/12/2022    HGB 13 5 11/12/2022    HGB 11 7 (L) 11/12/2022    HCT 41 9 11/12/2022    HCT 34 6 (L) 11/12/2022    MCV 91 11/12/2022    MCV 90 11/12/2022     11/12/2022     11/12/2022     Lab Results   Component Value Date    HEPCAB Non-reactive 05/16/2022     Lab Results   Component Value Date    HEPCAB Non-reactive 05/16/2022     Lab Results   Component Value Date    RPR Reactive (A) 11/12/2022    RPR Reactive 16 dils (A) 11/12/2022     CD4 ABS   Date/Time Value Ref Range Status   11/12/2022 09:47 AM 1239 359 - 1519 /uL Final     HIV-1 RNA by PCR, Qn   Date/Time Value Ref Range Status   11/12/2022 09:47 AM <20 copies/mL Final     Comment:     HIV-1 RNA not detected  The reportable range for this assay is 20 to 10,000,000  copies HIV-1 RNA/mL  Current Outpatient Medications:   •  albuterol (PROVENTIL HFA,VENTOLIN HFA) 90 mcg/act inhaler, INHALE 2 PUFFS EVERY SIX HOURS AS NEEDED FOR WHEEZING, Disp: 6 7 g, Rfl: 5  •  atorvastatin (LIPITOR) 10 mg tablet, TAKE 1 TABLET BY MOUTH DAILY, Disp: 90 tablet, Rfl: 1  •  Biktarvy -25 MG tablet, TAKE 1 TABLET BY MOUTH DAILY WITH BREAKFAST, Disp: 30 tablet, Rfl: 3  •  dicyclomine (BENTYL) 20 mg tablet, TAKE 1 TABLET BY MOUTH TWICE A DAY BEFORE MEALS, Disp: 60 tablet, Rfl: 2  •  doxycycline (ADOXA) 100 MG tablet, Take 1 tablet (100 mg total) by mouth 2 (two) times a day With a meal and a glass of water   Do NOT lie down for at least 30 minutes after taking the medicine , Disp: 60 tablet, Rfl: 0  •  FLUoxetine (PROzac) 40 MG capsule, Take 2 capsules (80 mg total) by mouth daily, Disp: 60 capsule, Rfl: 3  •  fluticasone (FLONASE) 50 mcg/act nasal spray, USE 1 SPRAY IN EACH NOSTRIL DAILY, Disp: 16 g, Rfl: 5  •  hydrOXYzine HCL (ATARAX) 25 mg tablet, TAKE 1 TABLET BY MOUTH EVERY SIX HOURS AS NEEDED FOR ITCHING, Disp: 90 tablet, Rfl: 0  •  lidocaine (LMX) 4 % cream, APPLY TOPICALLY AS NEEDED FOR MILD PAIN, Disp: 30 g, Rfl: 0  •  loratadine (CLARITIN) 10 mg tablet, Take 1 tablet (10 mg total) by mouth daily, Disp: 30 tablet, Rfl: 2  •  montelukast (SINGULAIR) 10 mg tablet, TAKE 1 TABLET BY MOUTH DAILY AT BEDTIME, Disp: 90 tablet, Rfl: 1  •  OLANZapine (ZyPREXA) 15 mg tablet, Take 1 tablet (15 mg total) by mouth daily at bedtime, Disp: 30 tablet, Rfl: 3  •  omeprazole (PriLOSEC) 40 MG capsule, TAKE 1 CAPSULE BY MOUTH DAILY, Disp: 30 capsule, Rfl: 3  •  propranolol (INDERAL) 20 mg tablet, Take 1 tablet (20 mg total) by mouth 2 (two) times a day, Disp: 60 tablet, Rfl: 3  •  tadalafil (CIALIS) 2 5 MG tablet, TAKE 1 TABLET BY MOUTH AS NEEDED FOR ERECTILE DYSFUNCTION, Disp: 10 tablet, Rfl: 2  •  triamcinolone (KENALOG) 0 1 % cream, APPLY TOPICALLY TWICE A DAY, Disp: 454 g, Rfl: 0  •  valACYclovir (VALTREX) 1,000 mg tablet, Take 1 tablet (1,000 mg total) by mouth 2 (two) times a day for 7 days, Disp: 14 tablet, Rfl: 0

## 2022-11-18 NOTE — PROGRESS NOTES
Pastoral Care Progress Note    2022  Patient: Lucinda Castro : 1970  Encounter Date & Time: 2022   MRN: [id number]        The  met with Mr Lyssa Campoverde for continued care and support  He advised he was well but did not have a life  He shared he plans on getting dental inplants  (in past encounters patient advised his dental conditional has kept him from socializing )  Mr Lyssa Campoverde advised he had not reached out to the  or looked into  services  The  encouraged Mr Lyssa Campoverde to move forward with his goals for better dental health and to pay attention to why he is ignoring the issue  The  offered an listening ear and spoke to the provider and  regarding dental coordination

## 2022-11-26 NOTE — PROGRESS NOTES
Arriving Monday                                           Cardiology Follow Up    No Mckeon  1970  [id number]  [facility] Arya Coats  [address] [place] [id number] [phone]    1  LVH (left ventricular hypertrophy)        2  Pure hypercholesterolemia            Interval History:  Patient is here for a follow-up visit  Patient had dyspnea and prior use of tobacco   Echocardiogram done February 2022 demonstrated LVEF of 65% with mild LVH and mild GORDON   ETT scheduled by me but not done  Lipid profile done November 2022 demonstrated total cholesterol of 201 with an HDL 49 and a calculated LDL of 102  Triglyceride level was 251  He has been well  He is had no chest pain or significant dyspnea      Patient Active Problem List   Diagnosis   • Bilateral varicoceles   • Embolism involving vascular device (HCC)   • HIV disease ([facility] [address] )   • GERD (gastroesophageal reflux disease)   • Irritable bowel syndrome with both constipation and diarrhea   • Toxic effect of tobacco cigarette, intentional [facility]-harm ([facility] )   • SOPHIA (generalized anxiety disorder)   • Acute right-sided low back pain with right-sided sciatica   • Jerky body movements   • Panic attacks   • Dysphagia   • Moderate persistent asthma without complication   • ED (erectile dysfunction)   • Constipation   • Red eyes   • Chronic pain syndrome   • Seasonal allergies   • Discomfort   • History of sensory changes   • Gingival abscess   • Difficulty in voiding   • Foot pain, bilateral   • At high risk for falls   • Balance problems   • Elevated cholesterol with elevated triglycerides   • Weight loss   • Ganglion cyst of right foot   • Upper respiratory tract infection   • Testicular pain   • Bipolar 2 disorder, major depressive episode (HCC)   • Abdominal bloating   • Poor dentition   • Chronic generalized pain disorder   • Cervical pain   • Abnormality of gait and mobility   • SOB (shortness of breath)   • HSV infection   • Chronic pain of both knees   • Atrial dilation   • Elevated alkaline phosphatase level   • Anemia in other chronic diseases classified elsewhere   • Hypercalcemia   • Generalized maculopapular rash   • Chronic pain of both shoulders   • Osteoarthritis of spine with radiculopathy, cervical region   • Olecranon bursitis of both elbows   • Late latent syphilis     Past Medical History:   Diagnosis Date   • Anxiety     Last Assessed: 7/18/2016    • Dysuria     Last Assessed: 9/14/2015   • Fecal urgency     Pt has had fecal incontinence in past, has weakened sphincter  would benefit from fiber, Needs f/u flex sig will refer for scheduling -       • GERD (gastroesophageal reflux disease)    • Hemorrhage of anus and rectum     Last Assessed: 3/5/2014    • Herpes zoster     Last Assessed: 9/26/2016   • History of chickenpox    • History of pneumonia    • HIV (human immunodeficiency virus infection) ([facility] [address] )    • IBS (irritable bowel syndrome)    • Proctitis, chlamydial     Last Assessed: 9/29/2014    • Recurrent major depressive episodes, moderate (HCC)     Last Assessed: 9/15/2017    • Wears glasses     reading     Social History     Socioeconomic History   • Marital status: Single     Spouse name: Not on file   • Number of children: Not on file   • Years of education: Not on file   • Highest education level: Not on file   Occupational History   • Not on file   Tobacco Use   • Smoking status: Every Day     Packs/day: 0 50     Years: 25 00     Pack years: 12 50     Types: Cigarettes   • Smokeless tobacco: Former   • Tobacco comments:     4 daily   Vaping Use   • Vaping Use: Never used   Substance and Sexual Activity   • Alcohol use:  Yes     Alcohol/week: 1 0 standard drink     Types: 1 Cans of beer per week     Comment: socially   • Drug use: No   • Sexual activity: Not Currently     Birth control/protection: Condom     Comment: active monogamous relationship    Other Topics Concern   • Not on file   Social History Narrative        What type of home do you live in: Single house    Age of your home: 77 yrs    How long have you been living there: 8 yrs    Type of heat: Baseboard    Type of fuel: Gas    What type of michelle is in your bedroom: Tile    Do you have the following in or near your home:    Air products: Window air conditioning    Pests: Other sometimes ants    Pets: Cat    Are pets allowed in bedroom: Yes    Open fields, wooded areas nearby: N/A    Basement: None    Exposure to second hand smoke: Yes         Social Determinants of Health     Financial Resource Strain: Not on file   Food Insecurity: Food Insecurity Present   • Worried About Running Out of Food in the Last Year: Often true   • Ran Out of Food in the Last Year: Often true   Transportation Needs: Not on file   Physical Activity: Not on file   Stress: Not on file   Social Connections: Not on file   Intimate Partner Violence: Not on file   Housing Stability: Not on file      Family History   Problem Relation Age of Onset   • Diabetes type II Mother    • Heart attack Father    • No Known Problems Brother    • No Known Problems Brother    • Substance Abuse Cousin      Past Surgical History:   Procedure Laterality Date   • CIRCUMCISION     • COLONOSCOPY     • CYSTOSCOPY  2014   • LASIK     • OTHER SURGICAL HISTORY      embolisation 2018   • AK ESOPHAGOGASTRODUODENOSCOPY TRANSORAL DIAGNOSTIC N/A 03/09/2017    Procedure: ESOPHAGOGASTRODUODENOSCOPY (EGD); Surgeon: Liz De La Fuente MD;  Location: [facility];   Service: Gastroenterology   • VARICOCELE EXCISION      Spermatic cord Excision - Last Assessed: 3/17/2016    • WISDOM TOOTH EXTRACTION         Current Outpatient Medications:   •  albuterol (PROVENTIL HFA,VENTOLIN HFA) 90 mcg/act inhaler, INHALE 2 PUFFS EVERY SIX HOURS AS NEEDED FOR WHEEZING, Disp: 6 7 g, Rfl: 5  •  atorvastatin (LIPITOR) 10 mg tablet, TAKE 1 TABLET BY MOUTH DAILY, Disp: 90 tablet, Rfl: 1  •  Biktarvy -25 MG tablet, TAKE 1 TABLET BY MOUTH DAILY WITH BREAKFAST, Disp: 30 tablet, Rfl: 3  •  dicyclomine (BENTYL) 20 mg tablet, TAKE 1 TABLET BY MOUTH TWICE A DAY BEFORE MEALS, Disp: 60 tablet, Rfl: 2  •  FLUoxetine (PROzac) 40 MG capsule, Take 2 capsules (80 mg total) by mouth daily, Disp: 60 capsule, Rfl: 3  •  fluticasone (FLONASE) 50 mcg/act nasal spray, USE 1 SPRAY IN EACH NOSTRIL DAILY, Disp: 16 g, Rfl: 5  •  hydrOXYzine HCL (ATARAX) 25 mg tablet, TAKE 1 TABLET BY MOUTH EVERY SIX HOURS AS NEEDED FOR ITCHING, Disp: 90 tablet, Rfl: 0  •  lidocaine (LMX) 4 % cream, APPLY TOPICALLY AS NEEDED FOR MILD PAIN, Disp: 30 g, Rfl: 0  •  loratadine (CLARITIN) 10 mg tablet, Take 1 tablet (10 mg total) by mouth daily, Disp: 30 tablet, Rfl: 2  •  montelukast (SINGULAIR) 10 mg tablet, TAKE 1 TABLET BY MOUTH DAILY AT BEDTIME, Disp: 90 tablet, Rfl: 1  •  OLANZapine (ZyPREXA) 15 mg tablet, Take 1 tablet (15 mg total) by mouth daily at bedtime, Disp: 30 tablet, Rfl: 3  •  omeprazole (PriLOSEC) 40 MG capsule, TAKE 1 CAPSULE BY MOUTH DAILY, Disp: 30 capsule, Rfl: 3  •  propranolol (INDERAL) 20 mg tablet, Take 1 tablet (20 mg total) by mouth 2 (two) times a day, Disp: 60 tablet, Rfl: 3  •  tadalafil (CIALIS) 2 5 MG tablet, TAKE 1 TABLET BY MOUTH AS NEEDED FOR ERECTILE DYSFUNCTION, Disp: 10 tablet, Rfl: 2  •  triamcinolone (KENALOG) 0 1 % cream, APPLY TOPICALLY TWICE A DAY, Disp: 454 g, Rfl: 0  •  valACYclovir (VALTREX) 1,000 mg tablet, Take 1 tablet (1,000 mg total) by mouth 2 (two) times a day for 7 days, Disp: 14 tablet, Rfl: 0  Allergies   Allergen Reactions   • Tomato - Food Allergy Vomiting       Labs:not applicable  Imaging: No results found  Review of Systems:  Review of Systems   All other systems reviewed and are negative        Physical Exam:  /70 (BP Location: Left arm, Patient Position: Sitting, Cuff Size: Standard)   Pulse 66   Ht 5' 7" (1 702 m)   Wt 83 1 kg (183 lb 4 8 oz)   SpO2 97%   BMI 28 71 kg/m²   Physical Exam  Vitals reviewed  Constitutional:       Appearance: He is well-developed  HENT:      Head: Normocephalic and atraumatic  Cardiovascular:      Rate and Rhythm: Normal rate  Heart sounds: Normal heart sounds  Pulmonary:      Effort: Pulmonary effort is normal       Breath sounds: Normal breath sounds  Musculoskeletal:      Cervical back: Normal range of motion  Skin:     General: Skin is warm and dry  Neurological:      Mental Status: He is alert and oriented to person, place, and time  Discussion/Summary:I will continue the patient's present medical regimen  Patient appears well compensated  I have asked the patient to call if there is a problem in the interim otherwise I will see the patient in one years time

## 2022-11-29 PROBLEM — A52.8 LATE LATENT SYPHILIS: Status: ACTIVE | Noted: 2022-11-29

## 2022-12-05 ENCOUNTER — OFFICE VISIT (OUTPATIENT)
Dept: CARDIOLOGY CLINIC | Facility: CLINIC | Age: 52
End: 2022-12-05

## 2022-12-05 VITALS
HEIGHT: 67 IN | WEIGHT: 183.3 LBS | OXYGEN SATURATION: 97 % | DIASTOLIC BLOOD PRESSURE: 70 MMHG | SYSTOLIC BLOOD PRESSURE: 118 MMHG | HEART RATE: 66 BPM | BODY MASS INDEX: 28.77 KG/M2

## 2022-12-05 DIAGNOSIS — R21 GENERALIZED MACULOPAPULAR RASH: ICD-10-CM

## 2022-12-05 DIAGNOSIS — E78.00 PURE HYPERCHOLESTEROLEMIA: ICD-10-CM

## 2022-12-05 DIAGNOSIS — I51.7 LVH (LEFT VENTRICULAR HYPERTROPHY): Primary | ICD-10-CM

## 2022-12-05 RX ORDER — TRIAMCINOLONE ACETONIDE 1 MG/G
CREAM TOPICAL
Qty: 454 G | Refills: 0 | Status: SHIPPED | OUTPATIENT
Start: 2022-12-05

## 2022-12-05 RX ORDER — HYDROXYZINE HYDROCHLORIDE 25 MG/1
TABLET, FILM COATED ORAL
Qty: 90 TABLET | Refills: 0 | Status: SHIPPED | OUTPATIENT
Start: 2022-12-05

## 2022-12-15 ENCOUNTER — PATIENT OUTREACH (OUTPATIENT)
Dept: SURGERY | Facility: CLINIC | Age: 52
End: 2022-12-15

## 2022-12-22 ENCOUNTER — PATIENT OUTREACH (OUTPATIENT)
Dept: SURGERY | Facility: CLINIC | Age: 52
End: 2022-12-22

## 2022-12-30 DIAGNOSIS — K58.2 IRRITABLE BOWEL SYNDROME WITH BOTH CONSTIPATION AND DIARRHEA: ICD-10-CM

## 2022-12-30 DIAGNOSIS — R21 GENERALIZED MACULOPAPULAR RASH: ICD-10-CM

## 2022-12-30 DIAGNOSIS — J45.40 MODERATE PERSISTENT ASTHMA WITHOUT COMPLICATION: ICD-10-CM

## 2023-01-03 RX ORDER — HYDROXYZINE HYDROCHLORIDE 25 MG/1
TABLET, FILM COATED ORAL
Qty: 90 TABLET | Refills: 0 | Status: SHIPPED | OUTPATIENT
Start: 2023-01-03

## 2023-01-03 RX ORDER — DICYCLOMINE HCL 20 MG
TABLET ORAL
Qty: 60 TABLET | Refills: 2 | Status: SHIPPED | OUTPATIENT
Start: 2023-01-03

## 2023-01-03 RX ORDER — ALBUTEROL SULFATE 90 UG/1
AEROSOL, METERED RESPIRATORY (INHALATION)
Qty: 6.7 G | Refills: 5 | Status: SHIPPED | OUTPATIENT
Start: 2023-01-03

## 2023-01-03 RX ORDER — TRIAMCINOLONE ACETONIDE 1 MG/G
CREAM TOPICAL
Qty: 454 G | Refills: 0 | Status: SHIPPED | OUTPATIENT
Start: 2023-01-03

## 2023-01-11 ENCOUNTER — OFFICE VISIT (OUTPATIENT)
Dept: PSYCHIATRY | Facility: CLINIC | Age: 53
End: 2023-01-11

## 2023-01-11 DIAGNOSIS — F41.1 GAD (GENERALIZED ANXIETY DISORDER): ICD-10-CM

## 2023-01-11 DIAGNOSIS — F31.81 BIPOLAR 2 DISORDER, MAJOR DEPRESSIVE EPISODE (HCC): Primary | ICD-10-CM

## 2023-01-11 DIAGNOSIS — F41.8 PERFORMANCE ANXIETY: ICD-10-CM

## 2023-01-11 RX ORDER — PROPRANOLOL HYDROCHLORIDE 10 MG/1
10 TABLET ORAL 2 TIMES DAILY
Start: 2023-01-11

## 2023-01-11 RX ORDER — FLUOXETINE HYDROCHLORIDE 40 MG/1
80 CAPSULE ORAL DAILY
Qty: 60 CAPSULE | Refills: 3 | Status: SHIPPED | OUTPATIENT
Start: 2023-01-11

## 2023-01-11 RX ORDER — OLANZAPINE 15 MG/1
15 TABLET ORAL
Qty: 30 TABLET | Refills: 3 | Status: SHIPPED | OUTPATIENT
Start: 2023-01-11
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
## 2023-01-11 NOTE — BH TREATMENT PLAN
TREATMENT PLAN (Medication Management Only)        [facility]    Name/Date of Birth/MRN:  Nicki Walter 45 y o  1970 MRN: [id number]  Date of Treatment Plan: January 11, 2023  Diagnosis/Diagnoses:   1  Performance anxiety    2  Bipolar 2 disorder, major depressive episode ([facility] )    3  SOPHIA (generalized anxiety disorder)      Strengths/Personal Resources for Self-Care: "I accept things"  Area/Areas of need (in own words): ongoing medical issues, dental work  1  Long Term Goal: "improve anxiety and depresion"   Target Date: 180 days from treatment plan  Person/Persons responsible for completion of goal: Dr Phoenix and Self  2  Short Term Objective (s) - How will we reach this goal?:   A  Provider new recommended medication/dosage changes and/or continue medication(s): as noted  B   Make and keep appointment with neurology and dentist  C  Continue treamtment and follow up as recommended  Target Date: 6 months from treatment plan unless noted otherwise  Person/Persons Responsible for Completion of Goal: Dr Phoenix and Self   Progress Towards Goals: continuing treatment   Treatment Modality: Medication management and therapy PRN  Review due 180 days from date of this plan: Approximately 6 months from today ( 7/11/2023 )    Expected length of service: ongoing treatment unless revised  My Physician/PA/NP and I have developed this plan together and I agree to work on the goals and objectives  I understand the treatment goals that were developed for my treatment    Signature:     Date and time:  Signature of parent/guardian if under age of 15 years: Date and time:  Signature of provider:      Date and time:  Signature of Supervising Physician:    Date and time: 1/11/2023      Lianet Martin III

## 2023-01-16 ENCOUNTER — PATIENT OUTREACH (OUTPATIENT)
Dept: SURGERY | Facility: CLINIC | Age: 53
End: 2023-01-16

## 2023-01-23 ENCOUNTER — PATIENT OUTREACH (OUTPATIENT)
Dept: SURGERY | Facility: CLINIC | Age: 53
End: 2023-01-23

## 2023-01-23 NOTE — PROGRESS NOTES
Re-cert completed and cm tried referring him to the dentis and ct declined  Cm completed goals and acuity scale is lvl 2

## 2023-01-30 DIAGNOSIS — R21 GENERALIZED MACULOPAPULAR RASH: ICD-10-CM

## 2023-01-30 DIAGNOSIS — K21.00 GASTROESOPHAGEAL REFLUX DISEASE WITH ESOPHAGITIS: ICD-10-CM

## 2023-01-30 RX ORDER — HYDROXYZINE HYDROCHLORIDE 25 MG/1
TABLET, FILM COATED ORAL
Qty: 90 TABLET | Refills: 0 | Status: SHIPPED | OUTPATIENT
Start: 2023-01-30

## 2023-01-30 RX ORDER — TRIAMCINOLONE ACETONIDE 1 MG/G
CREAM TOPICAL
Qty: 454 G | Refills: 0 | Status: SHIPPED | OUTPATIENT
Start: 2023-01-30 | End: 2023-02-08

## 2023-01-30 RX ORDER — OMEPRAZOLE 40 MG/1
CAPSULE, DELAYED RELEASE ORAL
Qty: 30 CAPSULE | Refills: 3 | Status: SHIPPED | OUTPATIENT
Start: 2023-01-30

## 2023-01-31 ENCOUNTER — PATIENT OUTREACH (OUTPATIENT)
Dept: SURGERY | Facility: CLINIC | Age: 53
End: 2023-01-31

## 2023-02-06 ENCOUNTER — OFFICE VISIT (OUTPATIENT)
Dept: SURGERY | Facility: CLINIC | Age: 53
End: 2023-02-06

## 2023-02-06 ENCOUNTER — DOCUMENTATION (OUTPATIENT)
Dept: SURGERY | Facility: CLINIC | Age: 53
End: 2023-02-06

## 2023-02-06 VITALS
RESPIRATION RATE: 20 BRPM | BODY MASS INDEX: 28.06 KG/M2 | HEART RATE: 92 BPM | WEIGHT: 178.8 LBS | SYSTOLIC BLOOD PRESSURE: 131 MMHG | OXYGEN SATURATION: 97 % | DIASTOLIC BLOOD PRESSURE: 73 MMHG | TEMPERATURE: 98.2 F | HEIGHT: 67 IN

## 2023-02-06 DIAGNOSIS — Z77.21 PERSONAL HISTORY OF EXPOSURE TO POTENTIALLY HAZARDOUS BODY FLUIDS, PRESENTING HAZARDS TO HEALTH: ICD-10-CM

## 2023-02-06 DIAGNOSIS — N52.8 OTHER MALE ERECTILE DYSFUNCTION: ICD-10-CM

## 2023-02-06 DIAGNOSIS — T65.222D TOXIC EFFECT OF TOBACCO CIGARETTE, INTENTIONAL SELF-HARM, SUBSEQUENT ENCOUNTER: ICD-10-CM

## 2023-02-06 DIAGNOSIS — Z20.2 CONTACT WITH AND (SUSPECTED) EXPOSURE TO INFECTIONS WITH A PREDOMINANTLY SEXUAL MODE OF TRANSMISSION: ICD-10-CM

## 2023-02-06 DIAGNOSIS — L73.9 FOLLICULITIS: ICD-10-CM

## 2023-02-06 DIAGNOSIS — N40.1 BENIGN PROSTATIC HYPERPLASIA WITH INCOMPLETE BLADDER EMPTYING: ICD-10-CM

## 2023-02-06 DIAGNOSIS — Z11.3 ENCOUNTER FOR SCREENING FOR INFECTIONS WITH A PREDOMINANTLY SEXUAL MODE OF TRANSMISSION: ICD-10-CM

## 2023-02-06 DIAGNOSIS — S31.000A WOUND OF SACRAL REGION, INITIAL ENCOUNTER: ICD-10-CM

## 2023-02-06 DIAGNOSIS — R39.14 BENIGN PROSTATIC HYPERPLASIA WITH INCOMPLETE BLADDER EMPTYING: ICD-10-CM

## 2023-02-06 DIAGNOSIS — B20 HIV DISEASE (HCC): Primary | ICD-10-CM

## 2023-02-06 DIAGNOSIS — R25.8 JERKY BODY MOVEMENTS: ICD-10-CM

## 2023-02-06 DIAGNOSIS — A52.8 LATE LATENT SYPHILIS: ICD-10-CM

## 2023-02-06 DIAGNOSIS — Z00.00 ANNUAL PHYSICAL EXAM: ICD-10-CM

## 2023-02-06 RX ORDER — DOXYCYCLINE HYCLATE 100 MG
100 TABLET ORAL 2 TIMES DAILY
Qty: 14 TABLET | Refills: 0 | Status: SHIPPED | OUTPATIENT
Start: 2023-02-06 | End: 2023-02-08

## 2023-02-06 RX ORDER — PROPRANOLOL HYDROCHLORIDE 20 MG/1
TABLET ORAL
COMMUNITY
Start: 2023-01-11 | End: 2023-02-06 | Stop reason: SDUPTHER

## 2023-02-06 RX ORDER — TADALAFIL 2.5 MG/1
2.5 TABLET ORAL DAILY PRN
Qty: 30 TABLET | Refills: 2 | Status: SHIPPED | OUTPATIENT
Start: 2023-02-06

## 2023-02-06 RX ORDER — TAMSULOSIN HYDROCHLORIDE 0.4 MG/1
0.4 CAPSULE ORAL
Qty: 90 CAPSULE | Refills: 0 | Status: SHIPPED | OUTPATIENT
Start: 2023-02-06

## 2023-02-06 NOTE — ASSESSMENT & PLAN NOTE
Continue to smoke  · Stressed the importance of complete smoking cessation  Nicotine Dependency - Primary    Counseled for greater than 15 minutes on the importance of smoking cessation  Education was given regarding the cardiovascular effects of how nicotine affects the heart, lungs, kidneys, and peripheral vascular system    Referred to [facility]  [address] [place] [address] for enrollment in smoking cessation program

## 2023-02-06 NOTE — PROGRESS NOTES
[address] [facility] [address]    NAME: Renard Arias  AGE: 45 y o  SEX: male  : 1970     DATE: 2023     Assessment and Plan:     Problem List Items Addressed This Visit        Musculoskeletal and Integument    Folliculitis     Previous culture of wounds did grow Staph  Completed doxycycline and felt the sore would have gone away if he had a few more doses of abx  Pt is also using cream   Previously seen by dermatology  Concern that pt may be scratching area and infecting areas  Some areas do appear to be related to contact dermatitis  · Doxycycline 100 mg BID X 7 days  · Recommend changing laundry detergent and soap  · No scratching  · Follow up with dermatology  · Not able to swab any open wounds on sacral area         Relevant Medications    doxycycline hyclate (VIBRA-TABS) 100 mg tablet       Genitourinary    Benign prostatic hyperplasia with incomplete bladder emptying     New dx  Urinary frequency and incomplete bladder emptying  Right lobe > left lobe  · Flomax at HS   · Reviewed side effects  · Decline referral to urology            Relevant Medications    tadalafil (CIALIS) 2 5 MG tablet       Other    HIV disease ([facility] [address] ) - Primary     Doing well on Biktarvy with an undetectable VL  Pt reports 100% medication compliance on HAART  Stressed the importance of 100% medication adherence  HIV Counseling:    Viral Load: < 20    CD4 Count: 7360      Denies side effects  Stressed the importance of adherence  Continue follow up in the ID clinic with Dr Leanne Primrose or Dr Selena Barr  Reviewed the most recent labs, including the CD4 and viral load  Discussed the risks and benefits of treatment options, instructions for management, importance of treatment adherence, and reduction of risk factors  Educated on possible medication side effects  Counseled on routes of HIV transmission, including the risk of  infection  Emphasized that viral suppression is the best method to prevent HIV transmission  At this time the pt denies the need for HIV testing of anyone in their life  Total encounter time was greater than 35 minutes  Greater than 20 minutes were spent on counseling and patient education  Pt voices understanding and agreement with treatment plan  Toxic effect of tobacco cigarette, intentional self-harm ([address] )     Continue to smoke  · Stressed the importance of complete smoking cessation  Nicotine Dependency - Primary    Counseled for greater than 15 minutes on the importance of smoking cessation  Education was given regarding the cardiovascular effects of how nicotine affects the heart, lungs, kidneys, and peripheral vascular system  Referred to [facility] for enrollment in smoking cessation program            Jerky body movements     Continues  More noticeable when pt is agitated  · Continue to monitor         ED (erectile dysfunction)    Relevant Medications    tadalafil (CIALIS) 2 5 MG tablet    Late latent syphilis     RPR down to 1:16 dils from 1:64 dils  Last treated for latent syphilis on June 2022 with 3 bicillin injections  · Recheck RPR with next labs  · Recommend consistent condom use         Relevant Medications    doxycycline hyclate (VIBRA-TABS) 100 mg tablet    Sacral wound     Ongoing  Pt reports he need to nguyen them or they keep weeping  All 5 wounds are healed over but concern wound on left upper sacral slit has raised tender firm area with warmth that may need to be I&D as it is a source of discomfort for the pt  No obvious explanation for these wounds on sacral area  They do not appear to yeast like or herpes in presentation     DDx: hidradenitis suppurativa, dermatitis, or acne  · Check CT/GC rectal and pharyngeal   · No open wound to be cultures  · Doxycycline 100 mg BID X 7 days  · Recommend referral to wound management  · Screening for rectal and pharyngeal CT/GC Other Visit Diagnoses     Annual physical exam        Contact with and (suspected) exposure to infections with a predominantly sexual mode of transmission        Relevant Orders    MISCELLANEOUS LAB TEST    MISCELLANEOUS LAB TEST    Encounter for screening for infections with a predominantly sexual mode of transmission        Relevant Orders    MISCELLANEOUS LAB TEST    MISCELLANEOUS LAB TEST    Personal history of exposure to potentially hazardous body fluids, presenting hazards to health        Relevant Orders    MISCELLANEOUS LAB TEST    MISCELLANEOUS LAB TEST          Immunizations and preventive care screenings were discussed with patient today  Appropriate education was printed on patient's after visit summary  Discussed risks and benefits of prostate cancer screening  We discussed the controversial history of PSA screening for prostate cancer in the [place] as well as the risk of over detection and over treatment of prostate cancer by way of PSA screening  The patient understands that PSA blood testing is an imperfect way to screen for prostate cancer and that elevated PSA levels in the blood may also be caused by infection, inflammation, prostatic trauma or manipulation, urological procedures, or by benign prostatic enlargement  The role of the digital rectal examination in prostate cancer screening was also discussed and I discussed with him that there is large interobserver variability in the findings of digital rectal examination  Counseling:  Dental Health: discussed importance of regular tooth brushing, flossing, and dental visits  Injury prevention: discussed safety/seat belts, safety helmets, smoke detectors, carbon dioxide detectors, and smoking near bedding or upholstery  · Sexual health: discussed sexually transmitted diseases, partner selection, use of condoms, avoidance of unintended pregnancy, and contraceptive alternatives  Return in 6 months (on 8/6/2023)       Chief Complaint:   Pt presents for AP and for management of HIV   PMH: HIV, RPR, IBS, GERD, dysphagia, poor dentition, atrial dilation, OA of spine, embolism involving vascular device, SOPHIA, Bipolar, smoker, jerky body movements, chronic right knee pain, balance issues, and sensory issues  Pt reports he is doing good  Pt denies any concerns or issues  Pt is still experiencing these sores all over the body  1 in each area that are generalized and rotate  The sore do itch and drainage on the butt  Pt is sexually active practices insertive or neither with consistence condom use  Pt does not endorse any fever, chills, nausea, vomiting, diarrhea, or night sweats  Chief Complaint   Patient presents with   • Annual Exam      History of Present Illness:     Adult Annual Physical   Patient here for a comprehensive physical exam  The patient reports problems - continues with body sores and aches throughout the body  Pt does try to stretch and do exercises muscles are tense and sore and has challenges with movement       Diet and Physical Activity  · Diet/Nutrition: well balanced diet  · Exercise: no formal exercise  Depression Screening  PHQ-2/9 Depression Screening         General Health  · Sleep: gets 4-6 hours of sleep on average  · Hearing: decreased - bilateral   · Vision: vision problems: right eye is blurry and hard to see out of and most recent eye exam >1 year ago  · Dental: no dental visits for >1 year and brushes teeth once daily   Health  · Symptoms include: erectile dysfunction     Review of Systems:     Review of Systems   Constitutional: Negative  HENT: Negative  Cardiovascular: Negative  Gastrointestinal: Negative  Genitourinary: Positive for decreased urine volume  Musculoskeletal: Positive for myalgias  Skin: Positive for rash and wound  Psychiatric/Behavioral: Negative         Past Medical History:     Past Medical History:   Diagnosis Date   • Anxiety     Last Assessed: 7/18/2016    • Dysuria     Last Assessed: 9/14/2015   • Fecal urgency     Pt has had fecal incontinence in past, has weakened sphincter  would benefit from fiber, Needs f/u flex sig will refer for scheduling -       • GERD (gastroesophageal reflux disease)    • Hemorrhage of anus and rectum     Last Assessed: 3/5/2014    • Herpes zoster     Last Assessed: 9/26/2016   • History of chickenpox    • History of pneumonia    • HIV (human immunodeficiency virus infection) ([facility] [address] )    • IBS (irritable bowel syndrome)    • Proctitis, chlamydial     Last Assessed: 9/29/2014    • Recurrent major depressive episodes, moderate (HCC)     Last Assessed: 9/15/2017    • Wears glasses     reading      Past Surgical History:     Past Surgical History:   Procedure Laterality Date   • CIRCUMCISION     • COLONOSCOPY     • CYSTOSCOPY  2014   • LASIK     • OTHER SURGICAL HISTORY      embolisation 2018   • KS ESOPHAGOGASTRODUODENOSCOPY TRANSORAL DIAGNOSTIC N/A 03/09/2017    Procedure: ESOPHAGOGASTRODUODENOSCOPY (EGD); Surgeon: Teena Singletary MD;  Location: [facility];   Service: Gastroenterology   • VARICOCELE EXCISION      Spermatic cord Excision - Last Assessed: 3/17/2016    • WISDOM TOOTH EXTRACTION        Family History:     Family History   Problem Relation Age of Onset   • Diabetes type II Mother    • Heart attack Father    • No Known Problems Brother    • No Known Problems Brother    • Substance Abuse Cousin       Social History:     Social History     Socioeconomic History   • Marital status: Single     Spouse name: None   • Number of children: None   • Years of education: None   • Highest education level: None   Occupational History   • None   Tobacco Use   • Smoking status: Every Day     Packs/day: 0 50     Years: 25 00     Pack years: 12 50     Types: Cigarettes   • Smokeless tobacco: Former   • Tobacco comments:     4 daily   Vaping Use   • Vaping Use: Never used   Substance and Sexual Activity   • Alcohol use: Not Currently     Alcohol/week: 1 0 standard drink     Types: 1 Cans of beer per week     Comment: socially   • Drug use: No   • Sexual activity: Not Currently     Birth control/protection: Condom     Comment: active monogamous relationship    Other Topics Concern   • None   Social History Narrative        What type of home do you live in: Single house    Age of your home: 77 yrs    How long have you been living there: 8 yrs    Type of heat: Baseboard    Type of fuel: Gas    What type of michelle is in your bedroom: Tile    Do you have the following in or near your home:    Air products: Window air conditioning    Pests: Other sometimes ants    Pets: Cat    Are pets allowed in bedroom: Yes    Open fields, wooded areas nearby: N/A    Basement: None    Exposure to second hand smoke: Yes         Social Determinants of Health     Financial Resource Strain: Not on file   Food Insecurity: Food Insecurity Present   • Worried About Running Out of Food in the Last Year: Often true   • Ran Out of Food in the Last Year: Often true   Transportation Needs: Not on file   Physical Activity: Not on file   Stress: Not on file   Social Connections: Not on file   Intimate Partner Violence: Not on file   Housing Stability: Not on file      Current Medications:     Current Outpatient Medications   Medication Sig Dispense Refill   • atorvastatin (LIPITOR) 10 mg tablet TAKE 1 TABLET BY MOUTH DAILY 90 tablet 1   • Biktarvy -25 MG tablet TAKE 1 TABLET BY MOUTH DAILY WITH BREAKFAST 30 tablet 3   • dicyclomine (BENTYL) 20 mg tablet TAKE 1 TABLET BY MOUTH TWICE A DAY BEFORE MEALS 60 tablet 2   • doxycycline hyclate (VIBRA-TABS) 100 mg tablet Take 1 tablet (100 mg total) by mouth 2 (two) times a day for 7 days 14 tablet 0   • FLUoxetine (PROzac) 40 MG capsule Take 2 capsules (80 mg total) by mouth daily 60 capsule 3   • fluticasone (FLONASE) 50 mcg/act nasal spray USE 1 SPRAY IN EACH NOSTRIL DAILY 16 g 5   • hydrOXYzine HCL (ATARAX) 25 mg tablet TAKE 1 TABLET BY MOUTH EVERY SIX HOURS AS NEEDED FOR ITCH 90 tablet 0   • lidocaine (LMX) 4 % cream APPLY TOPICALLY AS NEEDED FOR MILD PAIN 30 g 0   • montelukast (SINGULAIR) 10 mg tablet TAKE 1 TABLET BY MOUTH DAILY AT BEDTIME 90 tablet 1   • OLANZapine (ZyPREXA) 15 mg tablet Take 1 tablet (15 mg total) by mouth daily at bedtime 30 tablet 3   • omeprazole (PriLOSEC) 40 MG capsule TAKE 1 CAPSULE BY MOUTH DAILY 30 capsule 3   • propranolol (INDERAL) 20 mg tablet      • tadalafil (CIALIS) 2 5 MG tablet Take 1 tablet (2 5 mg total) by mouth daily as needed for erectile dysfunction 30 tablet 2   • triamcinolone (KENALOG) 0 1 % cream APPLY TOPICALLY TWICE A  g 0   • loratadine (CLARITIN) 10 mg tablet Take 1 tablet (10 mg total) by mouth daily (Patient not taking: Reported on 2/6/2023) 30 tablet 2   • propranolol (INDERAL) 10 mg tablet Take 1 tablet (10 mg total) by mouth 2 (two) times a day     • valACYclovir (VALTREX) 1,000 mg tablet Take 1 tablet (1,000 mg total) by mouth 2 (two) times a day for 7 days 14 tablet 0     No current facility-administered medications for this visit  Allergies: Allergies   Allergen Reactions   • Tomato - Food Allergy Vomiting      Physical Exam:     /73 (BP Location: Right arm, Patient Position: Sitting, Cuff Size: Standard)   Pulse 92   Temp 98 2 °F (36 8 °C) (Tympanic)   Resp 20   Ht 5' 7" (1 702 m)   Wt 81 1 kg (178 lb 12 8 oz)   SpO2 97%   BMI 28 00 kg/m²     Physical Exam  Vitals and nursing note reviewed  Constitutional:       General: He is not in acute distress  Appearance: Normal appearance  He is not ill-appearing  HENT:      Right Ear: Tympanic membrane normal       Left Ear: Tympanic membrane normal       Nose: Nose normal       Mouth/Throat:      Mouth: Mucous membranes are moist       Pharynx: Oropharynx is clear  Eyes:      Extraocular Movements: Extraocular movements intact        Pupils: Pupils are equal, round, and reactive to light    Neck:      Thyroid: No thyromegaly or thyroid tenderness  Cardiovascular:      Rate and Rhythm: Normal rate and regular rhythm  Chest Wall: PMI is not displaced  Pulses: Normal pulses  Heart sounds: Normal heart sounds  Pulmonary:      Effort: Pulmonary effort is normal       Breath sounds: Normal breath sounds  Abdominal:      General: Bowel sounds are normal       Palpations: Abdomen is soft  Genitourinary:     Prostate: Enlarged  Comments: Right prostate lobe was enlarge, smooth, and firm  Musculoskeletal:         General: Normal range of motion  Comments: Continues jerky movements  Lymphadenopathy:      Cervical: No cervical adenopathy  Skin:     General: Skin is warm and dry  Findings: Lesion, rash and wound present  Rash is macular, pustular and scaling  Neurological:      Mental Status: He is alert and oriented to person, place, and time  Psychiatric:         Mood and Affect: Mood normal          Behavior: Behavior normal          Thought Content: Thought content normal          Judgment: Judgment normal           CLAYTON Lehman  [facility] AT [facility] BMI Counseling: Body mass index is 28 kg/m²  The BMI is above normal  Nutrition recommendations include reducing portion sizes, decreasing overall calorie intake, 3-5 servings of fruits/vegetables daily, moderation in carbohydrate intake, increasing intake of lean protein and reducing intake of saturated fat and trans fat  Exercise recommendations include moderate aerobic physical activity for 150 minutes/week and strength training exercises

## 2023-02-06 NOTE — ASSESSMENT & PLAN NOTE
Previous culture of wounds did grow Staph  Completed doxycycline and felt the sore would have gone away if he had a few more doses of abx  Pt is also using cream   Previously seen by dermatology  Concern that pt may be scratching area and infecting areas  Some areas do appear to be related to contact dermatitis    · Doxycycline 100 mg BID X 7 days  · Recommend changing laundry detergent and soap  · No scratching  · Follow up with dermatology  · Not able to swab any open wounds on sacral area

## 2023-02-06 NOTE — PATIENT INSTRUCTIONS
Wellness Visit for Adults   AMBULATORY CARE:   A wellness visit  is when you see your healthcare provider to get screened for health problems  Your healthcare provider will also give you advice on how to stay healthy  Write down your questions so you remember to ask them  Ask your healthcare provider how often you should have a wellness visit  What happens at a wellness visit:  Your healthcare provider will ask about your health, and your family history of health problems  This includes high blood pressure, heart disease, and cancer  He or she will ask if you have symptoms that concern you, if you smoke, and about your mood  You may also be asked about your intake of medicines, supplements, food, and alcohol  Any of the following may be done:  · Your weight  will be checked  Your height may also be checked so your body mass index (BMI) can be calculated  Your BMI shows if you are at a healthy weight  · Your blood pressure  and heart rate will be checked  Your temperature may also be checked  · Blood and urine tests  may be done  Blood tests may be done to check your cholesterol levels  Abnormal cholesterol levels increase your risk for heart disease and stroke  You may also need a blood or urine test to check for diabetes if you are at increased risk  Urine tests may be done to look for signs of an infection or kidney disease  · A physical exam  includes checking your heartbeat and lungs with a stethoscope  Your healthcare provider may also check your skin to look for sun damage  · Screening tests  may be recommended  A screening test is done to check for diseases that may not cause symptoms  The screening tests you may need depend on your age, gender, family history, and lifestyle habits  For example, colorectal screening may be recommended if you are 48years old or older  Screening tests you need if you are a woman:   · A Pap smear  is used to screen for cervical cancer   Pap smears are usually done every 3 to 5 years depending on your age  You may need them more often if you have had abnormal Pap smear test results in the past  Ask your healthcare provider how often you should have a Pap smear  · A mammogram  is an x-ray of your breasts to screen for breast cancer  Experts recommend mammograms every 2 years starting at age 48 years  You may need a mammogram at age 52 years or younger if you have an increased risk for breast cancer  Talk to your healthcare provider about when you should start having mammograms and how often you need them  Vaccines you may need:   · Get an influenza vaccine  every year  The influenza vaccine protects you from the flu  Several types of viruses cause the flu  The viruses change over time, so new vaccines are made each year  · Get a tetanus-diphtheria (Td) booster vaccine  every 10 years  This vaccine protects you against tetanus and diphtheria  Tetanus is a severe infection that may cause painful muscle spasms and lockjaw  Diphtheria is a severe bacterial infection that causes a thick covering in the back of your mouth and throat  · Get a human papillomavirus (HPV) vaccine  if you are female and aged 23 to 32 or male 23 to 24 and never received it  This vaccine protects you from HPV infection  HPV is the most common infection spread by sexual contact  HPV may also cause vaginal, penile, and anal cancers  · Get a pneumococcal vaccine  if you are aged 72 years or older  The pneumococcal vaccine is an injection given to protect you from pneumococcal disease  Pneumococcal disease is an infection caused by pneumococcal bacteria  The infection may cause pneumonia, meningitis, or an ear infection  · Get a shingles vaccine  if you are 60 or older, even if you have had shingles before  The shingles vaccine is an injection to protect you from the varicella-zoster virus  This is the same virus that causes chickenpox   Shingles is a painful rash that develops in people who had chickenpox or have been exposed to the virus  How to eat healthy:  My Plate is a model for planning healthy meals  It shows the types and amounts of foods that should go on your plate  Fruits and vegetables make up about half of your plate, and grains and protein make up the other half  A serving of dairy is included on the side of your plate  The amount of calories and serving sizes you need depends on your age, gender, weight, and height  Examples of healthy foods are listed below:  · Eat a variety of vegetables  such as dark green, red, and orange vegetables  You can also include canned vegetables low in sodium (salt) and frozen vegetables without added butter or sauces  · Eat a variety of fresh fruits , canned fruit in 100% juice, frozen fruit, and dried fruit  · Include whole grains  At least half of the grains you eat should be whole grains  Examples include whole-wheat bread, wheat pasta, brown rice, and whole-grain cereals such as oatmeal     · Eat a variety of protein foods such as seafood (fish and shellfish), lean meat, and poultry without skin (turkey and chicken)  Examples of lean meats include pork leg, shoulder, or tenderloin, and beef round, sirloin, tenderloin, and extra lean ground beef  Other protein foods include eggs and egg substitutes, beans, peas, soy products, nuts, and seeds  · Choose low-fat dairy products such as skim or 1% milk or low-fat yogurt, cheese, and cottage cheese  · Limit unhealthy fats  such as butter, hard margarine, and shortening  Exercise:  Exercise at least 30 minutes per day on most days of the week  Some examples of exercise include walking, biking, dancing, and swimming  You can also fit in more physical activity by taking the stairs instead of the elevator or parking farther away from stores  Include muscle strengthening activities 2 days each week  Regular exercise provides many health benefits   It helps you manage your weight, and decreases your risk for type 2 diabetes, heart disease, stroke, and high blood pressure  Exercise can also help improve your mood  Ask your healthcare provider about the best exercise plan for you  General health and safety guidelines:   · Do not smoke  Nicotine and other chemicals in cigarettes and cigars can cause lung damage  Ask your healthcare provider for information if you currently smoke and need help to quit  E-cigarettes or smokeless tobacco still contain nicotine  Talk to your healthcare provider before you use these products  · Limit alcohol  A drink of alcohol is 12 ounces of beer, 5 ounces of wine, or 1½ ounces of liquor  · Lose weight, if needed  Being overweight increases your risk of certain health conditions  These include heart disease, high blood pressure, type 2 diabetes, and certain types of cancer  · Protect your skin  Do not sunbathe or use tanning beds  Use sunscreen with a SPF 15 or higher  Apply sunscreen at least 15 minutes before you go outside  Reapply sunscreen every 2 hours  Wear protective clothing, hats, and sunglasses when you are outside  · Drive safely  Always wear your seatbelt  Make sure everyone in your car wears a seatbelt  A seatbelt can save your life if you are in an accident  Do not use your cell phone when you are driving  This could distract you and cause an accident  Pull over if you need to make a call or send a text message  · Practice safe sex  Use latex condoms if are sexually active and have more than one partner  Your healthcare provider may recommend screening tests for sexually transmitted infections (STIs)  · Wear helmets, lifejackets, and protective gear  Always wear a helmet when you ride a bike or motorcycle, go skiing, or play sports that could cause a head injury  Wear protective equipment when you play sports  Wear a lifejacket when you are on a boat or doing water sports      © Copyright [employer] 2022 Information is for End User's use only and may not be sold, redistributed or otherwise used for commercial purposes  All illustrations and images included in CareNotes® are the copyrighted property of A D A M , Inc  or Kianna Patricio  The above information is an  only  It is not intended as medical advice for individual conditions or treatments  Talk to your doctor, nurse or pharmacist before following any medical regimen to see if it is safe and effective for you  Cigarette Smoking and Your Health   AMBULATORY CARE:   Risks to your health if you smoke:  Nicotine and other chemicals found in tobacco and e-cigarettes can damage every cell in your body  Even if you are a light smoker, you have an increased risk for cancer, heart disease, and lung disease  If you are pregnant or have diabetes, smoking increases your risk for complications  Nicotine can affect an adolescent's developing brain  This can lead to trouble thinking, learning, or paying attention  Benefits to your health if you stop smoking:   · You decrease respiratory symptoms such as coughing, wheezing, and shortness of breath  · You reduce your risk for cancers of the lung, mouth, throat, kidney, bladder, pancreas, stomach, and cervix  If you already have cancer, you increase the benefits of chemotherapy  You also reduce your risk for cancer returning or a second cancer from developing  · You reduce your risk for heart disease, blood clots, heart attack, and stroke  · You reduce your risk for lung infections, and diseases such as pneumonia, asthma, chronic bronchitis, and emphysema  · Your circulation improves  More oxygen can be delivered to your body  If you have diabetes, you lower your risk for complications, such as kidney, artery, and eye diseases  You also lower your risk for nerve damage  Nerve damage can lead to amputations, poor vision, and blindness      · You improve your body's ability to heal and to fight infections  · An adolescent can help his or her brain and body develop in a healthy way  Talk to your adolescent about all the health risks of nicotine  If you can, start talking about nicotine when your child is younger than 12 years  This may make it easier for him or her not to start using nicotine as a teenager or adult  Explain to him or her that it is best never to start  It can be hard to try to quit later  Benefits to the health of others if you stop smoking:  Tobacco is harmful to nonsmokers who breathe in your secondhand smoke  The following are ways the health of others around you may improve when you stop smoking:  · You lower the risks for lung cancer and heart disease in nonsmoking adults  · If you are pregnant, you lower the risk for miscarriage, early delivery, low birth weight, and stillbirth  You also lower your baby's risk for SIDS, obesity, developmental delay, and neurobehavioral problems, such as ADHD  · If you have children, you lower their risk for ear infections, colds, pneumonia, bronchitis, and asthma  Follow up with your doctor as directed:  Write down your questions so you remember to ask them during your visits  For support and more information:   · American [employer] Association  [address] [facility]  [address]  Phone: [facility] [address]  Phone: 4- 600 - 858-3956  Web Address: Prosper baptiste    · Smokefree  gov  Phone: 1- 050 - 543-8227  Web Address: www smokefree  [facility] 2022 Information is for End User's use only and may not be sold, redistributed or otherwise used for commercial purposes  All illustrations and images included in CareNotes® are the copyrighted property of A D A [employer]  or [address] [place], [place] [address]mary Reed   The above information is an  only  It is not intended as medical advice for individual conditions or treatments   Talk to your doctor, nurse or pharmacist before following any medical regimen to see if it is safe and effective for you  Cholesterol and Your Health   AMBULATORY CARE:   Cholesterol  is a waxy, fat-like substance  Your body uses cholesterol to make hormones and new cells, and to protect nerves  Cholesterol is made by your body  It also comes from certain foods you eat, such as meat and dairy products  Your healthcare provider can help you set goals for your cholesterol levels  He or she can help you create a plan to meet your goals  Cholesterol level goals: Your cholesterol level goals depend on your risk for heart disease, your age, and your other health conditions  The following are general guidelines:  · Total cholesterol  includes low-density lipoprotein (LDL), high-density lipoprotein (HDL), and triglyceride levels  The total cholesterol level should be lower than 200 mg/dL and is best at about 150 mg/dL  · LDL cholesterol  is called bad cholesterol  because it forms plaque in your arteries  As plaque builds up, your arteries become narrow, and less blood flows through  When plaque decreases blood flow to your heart, you may have chest pain  If plaque completely blocks an artery that brings blood to your heart, you may have a heart attack  Plaque can break off and form blood clots  Blood clots may block arteries in your brain and cause a stroke  The level should be less than 130 mg/dL and is best at about 100 mg/dL  · HDL cholesterol  is called good cholesterol  because it helps remove LDL cholesterol from your arteries  It does this by attaching to LDL cholesterol and carrying it to your liver  Your liver breaks down LDL cholesterol so your body can get rid of it  High levels of HDL cholesterol can help prevent a heart attack and stroke  Low levels of HDL cholesterol can increase your risk for heart disease, heart attack, and stroke  The level should be 60 mg/dL or higher  · Triglycerides  are a type of fat that store energy from foods you eat   High levels of triglycerides also cause plaque buildup  This can increase your risk for a heart attack or stroke  If your triglyceride level is high, your LDL cholesterol level may also be high  The level should be less than 150 mg/dL  Any of the following can increase your risk for high cholesterol:   · Smoking cigarettes    · Being overweight or obese, or not getting enough exercise    · Drinking large amounts of alcohol    · A medical condition such as hypertension (high blood pressure) or diabetes    · Certain genes passed from your parents to you    · Age older than 65 years    What you need to know about having your cholesterol levels checked: Adults 21to 39years of age should have their cholesterol levels checked every 4 to 6 years  Adults 45 years or older should have their cholesterol checked every 1 to 2 years  You may need your cholesterol checked more often, or at a younger age, if you have risk factors for heart disease  You may also need to have your cholesterol checked more often if you have other health conditions, such as diabetes  Blood tests are used to check cholesterol levels  Blood tests measure your levels of triglycerides, LDL cholesterol, and HDL cholesterol  How healthy fats affect your cholesterol levels:  Healthy fats, also called unsaturated fats, help lower LDL cholesterol and triglyceride levels  Healthy fats include the following:  · Monounsaturated fats  are found in foods such as olive oil, canola oil, avocado, nuts, and olives  · Polyunsaturated fats,  such as omega 3 fats, are found in fish, such as salmon, trout, and tuna  They can also be found in plant foods such as flaxseed, walnuts, and soybeans  How unhealthy fats affect your cholesterol levels:  Unhealthy fats increase LDL cholesterol and triglyceride levels  They are found in foods high in cholesterol, saturated fat, and trans fat:  · Cholesterol  is found in eggs, dairy, and meat      · Saturated fat  is found in butter, cheese, ice cream, whole milk, and coconut oil  Saturated fat is also found in meat, such as sausage, hot dogs, and bologna  · Trans fat  is found in liquid oils and is used in fried and baked foods  Foods that contain trans fats include chips, crackers, muffins, sweet rolls, microwave popcorn, and cookies  Treatment  for high cholesterol will also decrease your risk of heart disease, heart attack, and stroke  Treatment may include any of the following:  · Lifestyle changes  may include food, exercise, weight loss, and quitting smoking  You may also need to decrease the amount of alcohol you drink  Your healthcare provider will want you to start with lifestyle changes  Other treatment may be added if lifestyle changes are not enough  Your healthcare provider may recommend you work with a team to manage hyperlipidemia  The team may include medical experts such as a dietitian, an exercise or physical therapist, and a behavior therapist  Your family members may be included in helping you create lifestyle changes  · Medicines  may be given to lower your LDL cholesterol, triglyceride levels, or total cholesterol level  You may need medicines to lower your cholesterol if any of the following is true:    ? You have a history of stroke, TIA, unstable angina, or a heart attack  ? Your LDL cholesterol level is 190 mg/dL or higher  ? You are age 36 to 76 years, have diabetes or heart disease risk factors, and your LDL cholesterol is 70 mg/dL or higher  · Supplements  include fish oil, red yeast rice, and garlic  Fish oil may help lower your triglyceride and LDL cholesterol levels  It may also increase your HDL cholesterol level  Red yeast rice may help decrease your total cholesterol level and LDL cholesterol level  Garlic may help lower your total cholesterol level  Do not take any supplements without talking to your healthcare provider      Food changes you can make to lower your cholesterol levels:  A dietitian can help you create a healthy eating plan  He or she can show you how to read food labels and choose foods low in saturated fat, trans fats, and cholesterol  · Decrease the total amount of fat you eat  Choose lean meats, fat-free or 1% fat milk, and low-fat dairy products, such as yogurt and cheese  Try to limit or avoid red meats  Limit or do not eat fried foods or baked goods, such as cookies  · Replace unhealthy fats with healthy fats  Cook foods in olive oil or canola oil  Choose soft margarines that are low in saturated fat and trans fat  Seeds, nuts, and avocados are other examples of healthy fats  · Eat foods with omega-3 fats  Examples include salmon, tuna, mackerel, walnuts, and flaxseed  Eat fish 2 times per week  Pregnant women should not eat fish that have high levels of mercury, such as shark, swordfish, and esme mackerel  · Increase the amount of high-fiber foods you eat  High-fiber foods can help lower your LDL cholesterol  Aim to get between 20 and 30 grams of fiber each day  Fruits and vegetables are high in fiber  Eat at least 5 servings each day  Other high-fiber foods are whole-grain or whole-wheat breads, pastas, or cereals, and brown rice  Eat 3 ounces of whole-grain foods each day  Increase fiber slowly  You may have abdominal discomfort, bloating, and gas if you add fiber to your diet too quickly  · Eat healthy protein foods  Examples include low-fat dairy products, skinless chicken and turkey, fish, and nuts  · Limit foods and drinks that are high in sugar  Your dietitian or healthcare provider can help you create daily limits for high-sugar foods and drinks  The limit may be lower if you have diabetes or another health condition  Limits can also help you lose weight if needed  Lifestyle changes you can make to lower your cholesterol levels:   · Maintain a healthy weight  Ask your healthcare provider what a healthy weight is for you   Ask him or her to help you create a weight loss plan if needed  Weight loss can decrease your total cholesterol and triglyceride levels  Weight loss may also help keep your blood pressure at a healthy level  · Be physically active throughout the day  Physical activity, such as exercise, can help lower your total cholesterol level and maintain a healthy weight  Physical activity can also help increase your HDL cholesterol level  Work with your healthcare provider to create an program that is right for you  Get at least 30 to 40 minutes of moderate physical activity most days of the week  Examples of exercise include brisk walking, swimming, or biking  Also include strength training at least 2 times each week  Your healthcare providers can help you create a physical activity plan  · Do not smoke  Nicotine and other chemicals in cigarettes and cigars can raise your cholesterol levels  Ask your healthcare provider for information if you currently smoke and need help to quit  E-cigarettes or smokeless tobacco still contain nicotine  Talk to your healthcare provider before you use these products  · Limit or do not drink alcohol  Alcohol can increase your triglyceride levels  Ask your healthcare provider before you drink alcohol  Ask how much is okay for you to drink in 24 hours or 1 week  Follow up with your doctor as directed:  Write down your questions so you remember to ask them during your visits  © [employer] 2022 Information is for End User's use only and may not be sold, redistributed or otherwise used for commercial purposes  All illustrations and images included in CareNotes® are the copyrighted property of A [employer]  or Kianna Patricio  The above information is an  only  It is not intended as medical advice for individual conditions or treatments  Talk to your doctor, nurse or pharmacist before following any medical regimen to see if it is safe and effective for you

## 2023-02-06 NOTE — ASSESSMENT & PLAN NOTE
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

## 2023-02-06 NOTE — ASSESSMENT & PLAN NOTE
Doing well on Biktarvy with an undetectable VL  Pt reports 100% medication compliance on HAART  Stressed the importance of 100% medication adherence  HIV Counseling:    Viral Load: < 20    CD4 Count: 1614      Denies side effects  Stressed the importance of adherence  Continue follow up in the ID clinic with Dr Zak Pacheco or Dr Vinny Gibbons  Reviewed the most recent labs, including the CD4 and viral load  Discussed the risks and benefits of treatment options, instructions for management, importance of treatment adherence, and reduction of risk factors  Educated on possible medication side effects  Counseled on routes of HIV transmission, including the risk of  infection  Emphasized that viral suppression is the best method to prevent HIV transmission  At this time the pt denies the need for HIV testing of anyone in their life  Total encounter time was greater than 35 minutes  Greater than 20 minutes were spent on counseling and patient education  Pt voices understanding and agreement with treatment plan

## 2023-02-06 NOTE — ASSESSMENT & PLAN NOTE
New dx  Urinary frequency and incomplete bladder emptying  Right lobe > left lobe    · Flomax at HS   · Reviewed side effects  · Decline referral to urology

## 2023-02-06 NOTE — ASSESSMENT & PLAN NOTE
Ongoing  Pt reports he need to nguyen them or they keep weeping  All 5 wounds are healed over but concern wound on left upper sacral slit has raised tender firm area with warmth that may need to be I&D as it is a source of discomfort for the pt  No obvious explanation for these wounds on sacral area  They do not appear to yeast like or herpes in presentation     DDx: hidradenitis suppurativa, dermatitis, or acne  · Check CT/GC rectal and pharyngeal   · No open wound to be cultures  · Doxycycline 100 mg BID X 7 days  · Recommend referral to wound management  · Screening for rectal and pharyngeal CT/GC

## 2023-02-07 ENCOUNTER — PATIENT OUTREACH (OUTPATIENT)
Dept: SURGERY | Facility: CLINIC | Age: 53
End: 2023-02-07

## 2023-02-07 DIAGNOSIS — S31.000A WOUND OF SACRAL REGION, INITIAL ENCOUNTER: Primary | ICD-10-CM

## 2023-02-07 NOTE — PROGRESS NOTES
Rodrigo Soto is a 45 y o  male seen by RD in conjunction with PCP annual physical   Houstonmaria elena Caal  is established patient (last annual was 8/10/2021)    PMHx:  GERD, IBS, poor dentition, jerky body movements, sacral wound, elevated cholesterol with hi triglycerides, anemia, chronic pain       Clinical Data/Client History    CD4 count:  1239  Viral load:  <20  ART:   Biktarvy      , Patient Navigator: VITOR Darling 30 M-A  : N/A    Food assistance: Lives with mom who cooks/shops;  Pt requested needing SNAP, RD did f/u with CM for assistance    Living situation: House or apartment  and in [address] [place] [address] factors: Depression  anxiety  bipolar panic    Mobility:  self ambulates    Physical activity: No exercise, pt does have consistent jerky body movements       Oral problems: Reported that he is not interested in f/u with dental due to prior poor experiences; difficulty chewing missing teeth      Typical food/beverage intake:  Most meals eaten at home, frequent reliance on frozen meals/ultra processed convenience foods     · Breakfast hashbrowns from diner, eggs, cheese   · Lunch I e sandwich (needs to eat each component separately in order to chew)   · Dinner frozen meal, sometimes hot dog, sometimes soup   · Snacks "eat all day" lots of candy (chocolate)   · Beverages [employer], trying to drink more water     Appetite: "fine"    Vitamin, mineral, herbal supplements: none    Oral/enteral nutrition supplements:  no    GI problems: heartburn and denied n/v/d/c    Food allergies/intolerances:  Tomato     Weight history:  170# (Aug-22)  181# (Nov-22)  183# (Dec-22)      Current body weight:  178# (81 1 kg)  Height:  5' 7" (1 702 m)  BMI:  28 00  IBW:  148# (67 3 kg)  %IBW  121     Weight change: 5# (3%) decrease     Time period of weight change: 2 months    Clinically significant/severe: no       Nutrition-related labs:    Lab Results   Component Value Date    CHOLESTEROL 201 (H) 11/12/2022    CHOLESTEROL 86 05/16/2022    CHOLESTEROL 146 11/09/2021     Lab Results   Component Value Date    HDL 49 11/12/2022    HDL 6 (L) 05/16/2022    HDL 49 11/09/2021     Lab Results   Component Value Date    TRIG 251 (H) 11/12/2022    TRIG 153 (H) 05/16/2022    TRIG 88 11/09/2021     Lab Results   Component Value Date    [place] 152 11/12/2022     Lab Results   Component Value Date    LDLCALC 102 (H) 11/12/2022     Lab Results   Component Value Date    IRON 74 11/12/2022    TIBC 519 (H) 11/12/2022    FERRITIN 11 11/12/2022   Fe saturation 14 (L) 11/12/2022    Nutrition-related medications: prilosec, prozac, atarax, zyprexa, statin, doxycycline     Physical findings/skin integrity:  wound on sacrum per CRNP notes       Nutrition Diagnosis    Problem: undesirable food choices     Related to: chewing difficulty  unwilling or disinterested in learning/applying information     As Evidenced By: diet recall  patient interview  poor dentition       Estimated Nutritional Needs    [facility] REE: 1613 kcal     · ~2430 kcal (based on: 30 kcal/kg BW)  · ~97 g protein (based on: 1 2 kg/BW)  · ~2400 ml fluid (based on: 30 ml/kg BW)      Current intake estimation: not meeting needs      Nutrition Intervention/Recommendations    Nutrition education intervention: provided    Nutrition recommendations: Soft nutritious foods, dietary sources of iron     Supplement recommendations: No supplement recommendations at this time  Stressed whole food diet, including soft easy to chew foods      Teaching Method: verbal     Person Educated: patient    Comprehension: fair    Receptivity: fair    Expected compliance: fair    Collaboration/referral of nutrition care:    (SNAP)  and (dental)  Further nutritional support as receptive      Goals:  1    Improve quality of diet   -Include nutritious easy to chew foods (I e  fish, yogurt, oatmeal, canned fruits, well cooked vegetables, mashed sweet potato, etc )  -F/u with CM for SNAP application   -CM to reach out and explore other dental options for pt     Monitoring/Evaluation:  1  Dietary patterns     Visit Summary    RD met with oJvana Draper in conjunction with PCP appt, annual nutrition assessment was completed  Jovana Draper has consistent full-body jerky movements  He expressed difficulties chewing foods and makes adjustments to reduce dental pain when he masticates  Discussed soft easy to chew foods, which he was receptive to including  He expressed significant frustrations with past dental care and has no desire to return  He c/o heartburn when he forgets to take prilosec  Pt also admitted that he has been somewhat working on drinking more water, in place of extra soda; RD positively reinforced change  Case was discussed after appt, with CM who will reach out to pt for [facility] assistance and explore other dental options  Continue to f/u with pt as he is receptive to it        Mikel Zaldivar, MS, RD, LDN

## 2023-02-08 ENCOUNTER — OFFICE VISIT (OUTPATIENT)
Dept: MULTI SPECIALTY CLINIC | Facility: CLINIC | Age: 53
End: 2023-02-08

## 2023-02-08 ENCOUNTER — PATIENT OUTREACH (OUTPATIENT)
Dept: SURGERY | Facility: CLINIC | Age: 53
End: 2023-02-08

## 2023-02-08 VITALS — TEMPERATURE: 97.3 F | BODY MASS INDEX: 27.62 KG/M2 | WEIGHT: 176 LBS | HEIGHT: 67 IN

## 2023-02-08 DIAGNOSIS — R21 RASH: Primary | ICD-10-CM

## 2023-02-08 RX ORDER — DOXYCYCLINE 100 MG/1
100 CAPSULE ORAL 2 TIMES DAILY
Qty: 120 CAPSULE | Refills: 0 | Status: SHIPPED | OUTPATIENT
Start: 2023-02-08 | End: 2023-04-09

## 2023-02-08 NOTE — PROGRESS NOTES
[place] [facility] [address] [facility] Note     Patient Name: Mike Zavala  Encounter Date: 02/08/2023     Have you been cared for by a [place] [address] Dermatologist in the last 3 years and, if so, which description applies to you? Yes  I have been here within the last 3 years, and my medical history has NOT changed since that time  I am MALE/not capable of bearing children  REVIEW OF SYSTEMS:  Have you recently had or currently have any of the following? · No changes in my recent health  PAST MEDICAL HISTORY:  Have you personally ever had or currently have any of the following? If "YES," then please provide more detail  · No changes in my medical history  FAMILY HISTORY:  Any "first degree relatives" (parent, brother, sister, or child) with the following? • No changes in my family's known health  PATIENT EXPERIENCE:    • Do you want the Dermatologist to perform a COMPLETE skin exam today including a clinical examination under the "bra and underwear" areas? NO  • If necessary, do we have your permission to call and leave a detailed message on your Preferred Phone number that includes your specific medical information?   Yes      Allergies   Allergen Reactions   • Tomato - Food Allergy Vomiting      Current Outpatient Medications:   •  atorvastatin (LIPITOR) 10 mg tablet, TAKE 1 TABLET BY MOUTH DAILY, Disp: 90 tablet, Rfl: 1  •  Biktarvy -25 MG tablet, TAKE 1 TABLET BY MOUTH DAILY WITH BREAKFAST, Disp: 30 tablet, Rfl: 3  •  dicyclomine (BENTYL) 20 mg tablet, TAKE 1 TABLET BY MOUTH TWICE A DAY BEFORE MEALS, Disp: 60 tablet, Rfl: 2  •  doxycycline hyclate (VIBRA-TABS) 100 mg tablet, Take 1 tablet (100 mg total) by mouth 2 (two) times a day for 7 days, Disp: 14 tablet, Rfl: 0  •  FLUoxetine (PROzac) 40 MG capsule, Take 2 capsules (80 mg total) by mouth daily, Disp: 60 capsule, Rfl: 3  •  fluticasone (FLONASE) 50 mcg/act nasal spray, USE 1 SPRAY IN EACH NOSTRIL DAILY, Disp: 16 g, Rfl: 5  •  hydrOXYzine HCL (ATARAX) 25 mg tablet, TAKE 1 TABLET BY MOUTH EVERY SIX HOURS AS NEEDED FOR ITCH, Disp: 90 tablet, Rfl: 0  •  lidocaine (LMX) 4 % cream, APPLY TOPICALLY AS NEEDED FOR MILD PAIN, Disp: 30 g, Rfl: 0  •  loratadine (CLARITIN) 10 mg tablet, Take 1 tablet (10 mg total) by mouth daily (Patient not taking: Reported on 2/6/2023), Disp: 30 tablet, Rfl: 2  •  montelukast (SINGULAIR) 10 mg tablet, TAKE 1 TABLET BY MOUTH DAILY AT BEDTIME, Disp: 90 tablet, Rfl: 1  •  OLANZapine (ZyPREXA) 15 mg tablet, Take 1 tablet (15 mg total) by mouth daily at bedtime, Disp: 30 tablet, Rfl: 3  •  omeprazole (PriLOSEC) 40 MG capsule, TAKE 1 CAPSULE BY MOUTH DAILY, Disp: 30 capsule, Rfl: 3  •  propranolol (INDERAL) 10 mg tablet, Take 1 tablet (10 mg total) by mouth 2 (two) times a day, Disp: , Rfl:   •  tadalafil (CIALIS) 2 5 MG tablet, Take 1 tablet (2 5 mg total) by mouth daily as needed for erectile dysfunction, Disp: 30 tablet, Rfl: 2  •  tamsulosin (FLOMAX) 0 4 mg, Take 1 capsule (0 4 mg total) by mouth daily with dinner, Disp: 90 capsule, Rfl: 0  •  triamcinolone (KENALOG) 0 1 % cream, APPLY TOPICALLY TWICE A DAY, Disp: 454 g, Rfl: 0  •  valACYclovir (VALTREX) 1,000 mg tablet, Take 1 tablet (1,000 mg total) by mouth 2 (two) times a day for 7 days, Disp: 14 tablet, Rfl: 0          • Whom besides the patient is providing clinical information about today's encounter?   o NO ADDITIONAL HISTORIAN (patient alone provided history)    Physical Exam and Assessment/Plan by Diagnosis:    RASH   FOLLICULITIS   PRURITUS POSSIBLY DUE TO FORMICATION-LIKE SYMPTOMS FROM MEDICATIONS (E G  OMEPRAZOLE, OLANZAPINE, BIKTARVY, PROPRANOLOL)     Physical Exam:  • Anatomic Location Affected:  Legs, arms, chest, Sacral area   • Morphological Description:  Scattered eroded pink papules      Additional History of Present Condition:  Patient states his itch has significantly improved with the doxycycline   Feels like if he had one more week of the antibiotic all of this would have gone away  The spots on his sacrum are painful and he says last time he applied the triamcinolone to them and it helped       Assessment and Plan:  Based on a thorough discussion of this condition and the management approach to it (including a comprehensive discussion of the known risks, side effects and potential benefits of treatment), the patient (family) agrees to implement the following specific plan:             Avoid long hot showers; if you are using hot water, should be less than 5 minutes  • Start using Dove moisturizer bar soap in the shower  •   • Use moisturizer like Eucerin,Cerave, Vanicream or Aveeno Cream 3 times a day for the dry skin   •    •      • When itchiness is bad, can start using triamcinolone ointment twice daily for up to 14 days on itchy spots; it is important to take at least 1 week break between two week uses  Do NOT use on face, armpits, or groin  • Previous wound culture was growing staph aureus- instructed patient to obtain over the counter hibiclens and use as a body wash once daily  • Will start doxycycline 100mg twice daily for 2 months  • After last visit, discussed with ID and they stated his syphilis titers were appropriately down trending at that time  • Patient states HIV load is undetectable      The patient was seen and discussed with Dr Regina Hutchison       RTC: 3 months for rash follow-up    Sabine Rodríguez  Dermatology PGY-4 Resident Physician

## 2023-02-08 NOTE — PATIENT INSTRUCTIONS
Rash  - Get over the counter hibiclens- use as body wash once daily  - Doxycycline 100mg twice daily for 2 months  - If spots are really itchy- can use triamcinolone twice daily for up to 2 weeks at a time  It is important to take at least 1 week break between 2 week uses  Do NOT use on face, armpits, or groin

## 2023-02-09 ENCOUNTER — PATIENT OUTREACH (OUTPATIENT)
Dept: SURGERY | Facility: CLINIC | Age: 53
End: 2023-02-09

## 2023-02-15 ENCOUNTER — TELEPHONE (OUTPATIENT)
Dept: WOUND CARE | Facility: HOSPITAL | Age: 53
End: 2023-02-15

## 2023-02-21 ENCOUNTER — TELEPHONE (OUTPATIENT)
Dept: SURGERY | Facility: CLINIC | Age: 53
End: 2023-02-21

## 2023-02-21 LAB
MISCELLANEOUS LAB TEST RESULT: NORMAL
MISCELLANEOUS LAB TEST RESULT: NORMAL

## 2023-02-23 ENCOUNTER — PATIENT OUTREACH (OUTPATIENT)
Dept: SURGERY | Facility: CLINIC | Age: 53
End: 2023-02-23

## 2023-02-23 ENCOUNTER — OFFICE VISIT (OUTPATIENT)
Dept: PSYCHIATRY | Facility: CLINIC | Age: 53
End: 2023-02-23

## 2023-02-23 VITALS
BODY MASS INDEX: 28.72 KG/M2 | WEIGHT: 183.4 LBS | DIASTOLIC BLOOD PRESSURE: 75 MMHG | HEART RATE: 96 BPM | SYSTOLIC BLOOD PRESSURE: 116 MMHG

## 2023-02-23 DIAGNOSIS — F41.8 PERFORMANCE ANXIETY: ICD-10-CM

## 2023-02-23 DIAGNOSIS — F31.81 BIPOLAR 2 DISORDER, MAJOR DEPRESSIVE EPISODE (HCC): ICD-10-CM

## 2023-02-23 DIAGNOSIS — F41.1 GAD (GENERALIZED ANXIETY DISORDER): ICD-10-CM

## 2023-02-23 RX ORDER — FLUOXETINE HYDROCHLORIDE 40 MG/1
80 CAPSULE ORAL DAILY
Qty: 60 CAPSULE | Refills: 3 | Status: SHIPPED | OUTPATIENT
Start: 2023-02-23

## 2023-02-23 RX ORDER — OLANZAPINE 15 MG/1
15 TABLET ORAL
Qty: 30 TABLET | Refills: 3 | Status: SHIPPED | OUTPATIENT
Start: 2023-02-23

## 2023-02-23 RX ORDER — TRIAMCINOLONE ACETONIDE 1 MG/G
CREAM TOPICAL
COMMUNITY
Start: 2023-02-08 | End: 2023-02-23
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
## 2023-02-24 ENCOUNTER — TELEPHONE (OUTPATIENT)
Dept: SURGERY | Facility: CLINIC | Age: 53
End: 2023-02-24

## 2023-03-10 ENCOUNTER — TELEPHONE (OUTPATIENT)
Dept: INTERNAL MEDICINE CLINIC | Facility: CLINIC | Age: 53
End: 2023-03-10

## 2023-03-10 NOTE — TELEPHONE ENCOUNTER
Patient called regarding a rash that is itchy on his back arms,looks like a pimple or sores that scabs a little  He doesn't know if its from the Triamcinolone ointment  He does have an appt 5?17/23 but really needs to be seen sooner  I did let patient know we will reach out to Derm to see if there willing to do an Overbook to see patient       He understood and had no further questions

## 2023-03-13 NOTE — TELEPHONE ENCOUNTER
Patient called again with concerns about the rash  Has he been scheduled in your clinic for an urgent appt per Dr Romy Celeste? If not, please reach out to patient to schedule an appt

## 2023-03-14 ENCOUNTER — OFFICE VISIT (OUTPATIENT)
Dept: DERMATOLOGY | Facility: CLINIC | Age: 53
End: 2023-03-14

## 2023-03-14 DIAGNOSIS — L73.9 FOLLICULITIS: ICD-10-CM

## 2023-03-14 DIAGNOSIS — L29.9 PRURITUS: Primary | ICD-10-CM

## 2023-03-14 DIAGNOSIS — B20 HIV DISEASE (HCC): ICD-10-CM

## 2023-03-14 NOTE — PATIENT INSTRUCTIONS
RASH   FOLLICULITIS   PRURITUS POSSIBLY DUE TO FORMICATION-LIKE SYMPTOMS FROM MEDICATIONS (E G  OMEPRAZOLE, OLANZAPINE, BIKTARVY, PROPRANOLOL)        Assessment and Plan:  Based on a thorough discussion of this condition and the management approach to it (including a comprehensive discussion of the known risks, side effects and potential benefits of treatment), the patient (family) agrees to implement the following specific plan:   Dove moisturizer bar soap in the shower    Use moisturizer like Eucerin,Cerave, Vanicream or Aveeno Cream 3 times a day for the dry skin           When itchiness is bad, can start using triamcinolone ointment twice daily for up to 14 days on itchy spots; it is important to take at least 1 week break between two week uses  Do NOT use on face, armpits, or groin     instructed patient to obtain over the counter Hibiclens and use as a body wash once daily  Will continue Doxycycline 100mg twice daily   Obtained wound cultures in office - will call with results   Bacterial culture #1 - nares  Bacterial culture #2 - buttocks   Viral HSV PCR   Follow up as needed

## 2023-03-14 NOTE — PROGRESS NOTES
[facility]     Patient Name: Que Grace  Encounter Date: 3/14/2023     Have you been cared for by a [place] [id number] Dermatologist in the last 3 years and, if so, which description applies to you? Yes  I have been here within the last 3 years, and my medical history has NOT changed since that time  I am MALE/not capable of bearing children  REVIEW OF SYSTEMS:  Have you recently had or currently have any of the following? · No changes in my recent health  PAST MEDICAL HISTORY:  Have you personally ever had or currently have any of the following? If "YES," then please provide more detail  · No changes in my medical history  FAMILY HISTORY:  Any "first degree relatives" (parent, brother, sister, or child) with the following? • No changes in my family's known health  PATIENT EXPERIENCE:    • Do you want the Dermatologist to perform a COMPLETE skin exam today including a clinical examination under the "bra and underwear" areas? NO  • If necessary, do we have your permission to call and leave a detailed message on your Preferred Phone number that includes your specific medical information? Yes      Allergies   Allergen Reactions   • Tomato - Food Allergy Vomiting      Current Outpatient Medications:   •  atorvastatin (LIPITOR) 10 mg tablet, TAKE 1 TABLET BY MOUTH DAILY, Disp: 90 tablet, Rfl: 1  •  Biktarvy -25 MG tablet, TAKE 1 TABLET BY MOUTH DAILY WITH BREAKFAST, Disp: 90 tablet, Rfl: 1  •  dicyclomine (BENTYL) 20 mg tablet, TAKE 1 TABLET BY MOUTH TWICE A DAY BEFORE MEALS, Disp: 60 tablet, Rfl: 2  •  doxycycline monohydrate (MONODOX) 100 mg capsule, Take 1 capsule (100 mg total) by mouth 2 (two) times a day With a meal and a glass of water  Do NOT lie down for at least 30 minutes after taking it   Use sunscreen while on this medicine , Disp: 120 capsule, Rfl: 0  •  FLUoxetine (PROzac) 40 MG capsule, Take 2 capsules (80 mg total) by mouth daily, Disp: 60 capsule, Rfl: 3  • fluticasone (FLONASE) 50 mcg/act nasal spray, USE 1 SPRAY IN EACH NOSTRIL DAILY, Disp: 16 g, Rfl: 5  •  hydrOXYzine HCL (ATARAX) 25 mg tablet, TAKE 1 TABLET BY MOUTH EVERY SIX HOURS AS NEEDED FOR ITCH, Disp: 90 tablet, Rfl: 0  •  lidocaine (LMX) 4 % cream, APPLY TOPICALLY AS NEEDED FOR MILD PAIN, Disp: 30 g, Rfl: 0  •  loratadine (CLARITIN) 10 mg tablet, Take 1 tablet (10 mg total) by mouth daily (Patient not taking: Reported on 2/6/2023), Disp: 30 tablet, Rfl: 2  •  montelukast (SINGULAIR) 10 mg tablet, TAKE 1 TABLET BY MOUTH DAILY AT BEDTIME, Disp: 90 tablet, Rfl: 1  •  OLANZapine (ZyPREXA) 15 mg tablet, Take 1 tablet (15 mg total) by mouth daily at bedtime, Disp: 30 tablet, Rfl: 3  •  omeprazole (PriLOSEC) 40 MG capsule, TAKE 1 CAPSULE BY MOUTH DAILY, Disp: 30 capsule, Rfl: 3  •  tadalafil (CIALIS) 2 5 MG tablet, Take 1 tablet (2 5 mg total) by mouth daily as needed for erectile dysfunction, Disp: 30 tablet, Rfl: 2  •  tamsulosin (FLOMAX) 0 4 mg, Take 1 capsule (0 4 mg total) by mouth daily with dinner, Disp: 90 capsule, Rfl: 0  •  triamcinolone (KENALOG) 0 1 % ointment, Apply twice daily for up to 2 weeks to itchy spots  It is important to take at least 1 week break between 2 week uses  Do NOT use on face, armpits, or groin , Disp: 80 g, Rfl: 1  •  valACYclovir (VALTREX) 1,000 mg tablet, Take 1 tablet (1,000 mg total) by mouth 2 (two) times a day for 7 days, Disp: 14 tablet, Rfl: 0          • Whom besides the patient is providing clinical information about today's encounter?   o NO ADDITIONAL HISTORIAN (patient alone provided history)    Physical Exam and Assessment/Plan by Diagnosis:      RASH - FOLLICULITIS vs PRURIGO NODULARIS vs INFECTION (HSV VS RECURRENT STAPH AUREUS)      Physical Exam:  • Anatomic Location Affected:  Legs, arms, chest, Sacral area   • Morphological Description:  Scattered eroded pink papules      Additional History of Present Condition:  Patient presents for follow up    States improvement however hard to apply ointment on back  Continues with doxycycline however misses some doses  Patient did not use Hibiclens  Patient states titers are being monitored       Assessment and Plan:  Based on a thorough discussion of this condition and the management approach to it (including a comprehensive discussion of the known risks, side effects and potential benefits of treatment), the patient (family) agrees to implement the following specific plan:  •  Dove moisturizer bar soap in the shower  • Use moisturizer like Eucerin,Cerave, Vanicream or Aveeno Cream 3 times a day for the dry skin  • When itchiness is bad, can start using triamcinolone ointment twice daily for up to 14 days on itchy spots; it is important to take at least 1 week break between two week uses  Do NOT use on face, armpits, or groin    • instructed patient to obtain over the counter Hibiclens and use as a body wash once daily  • Will continue Doxycycline 100mg twice daily until course complete (10 days left per patient)  • Obtained wound cultures in office - will call with results  If positive for colonization with staph aureus, would recommend decolonization with intranasal mupirocin     • Bacterial culture #1 - nares  • Bacterial culture #2 - buttocks   • Viral HSV PCR   • Follow up as needed              Scribe Attestation    I,:  Erica Axtell am acting as a scribe while in the presence of the attending physician :       I,:  Tory Burrows MD personally performed the services described in this documentation    as scribed in my presence :         Ashley Khan MD  Dermatology, PGY-2

## 2023-03-16 LAB
BACTERIA WND AEROBE CULT: ABNORMAL
BACTERIA WND AEROBE CULT: ABNORMAL
GRAM STN SPEC: ABNORMAL

## 2023-03-17 LAB
BACTERIA WND AEROBE CULT: NORMAL
GRAM STN SPEC: NORMAL

## 2023-03-19 LAB
HSV1 DNA SPEC QL NAA+PROBE: NEGATIVE
HSV2 DNA SPEC QL NAA+PROBE: NEGATIVE

## 2023-03-27 DIAGNOSIS — R21 GENERALIZED MACULOPAPULAR RASH: ICD-10-CM

## 2023-03-27 DIAGNOSIS — K58.2 IRRITABLE BOWEL SYNDROME WITH BOTH CONSTIPATION AND DIARRHEA: ICD-10-CM

## 2023-03-28 RX ORDER — DICYCLOMINE HCL 20 MG
TABLET ORAL
Qty: 60 TABLET | Refills: 2 | Status: SHIPPED | OUTPATIENT
Start: 2023-03-28

## 2023-03-28 RX ORDER — HYDROXYZINE HYDROCHLORIDE 25 MG/1
TABLET, FILM COATED ORAL
Qty: 90 TABLET | Refills: 0 | Status: SHIPPED | OUTPATIENT
Start: 2023-03-28

## 2023-04-04 ENCOUNTER — NURSE TRIAGE (OUTPATIENT)
Dept: OTHER | Facility: OTHER | Age: 53
End: 2023-04-04

## 2023-04-04 NOTE — TELEPHONE ENCOUNTER
Regarding: More sore popping up  ----- Message from Kaya Oakes sent at 4/4/2023  4:52 PM EDT -----  I have more sore that are popping up looking for medical advice  ''

## 2023-04-04 NOTE — TELEPHONE ENCOUNTER
Patient stated he has had this rash for months; wanted to have message sent to the office for follow up regarding antibiotic tomorrow  Patient declined reaching out tonight, stated it wasn't as bad as when she was seen on 3/14, but has worsened

## 2023-04-04 NOTE — TELEPHONE ENCOUNTER
"  Reason for Disposition  • SEVERE itching (i e , interferes with sleep, normal activities or school)    Answer Assessment - Initial Assessment Questions  1  APPEARANCE of RASH: \"Describe the rash  \" (e g , spots, blisters, raised areas, skin peeling, scaly)      Patient stated his rash has returned; he stopped the doxycycline within the last week  2  SIZE: \"How big are the spots? \" (e g , tip of pen, eraser, coin; inches, centimeters)      Various sizes  3  LOCATION: \"Where is the rash located? \"     All over the body, on shoulders, bottom, chest, legs  Patient stated it was better when he was on the doxycycline  4  COLOR: \"What color is the rash? \" (Note: It is difficult to assess rash color in people with darker-colored skin  When this situation occurs, simply ask the caller to describe what they see )      Sores look like whiteheads and are scab like in color  5  ONSET: \"When did the rash begin? \"      Simliar to previous rash/sores he was seen on 3/14  6  FEVER: \"Do you have a fever? \" If Yes, ask: \"What is your temperature, how was it measured, and when did it start? \"      Denies  7  ITCHING: \"Does the rash itch? \" If Yes, ask: \"How bad is the itch? \" (Scale 1-10; or mild, moderate, severe)      Moderate  8  CAUSE: \"What do you think is causing the rash? \"      Unsure, was on antibiotics  9  MEDICATION FACTORS: \"Have you started any new medications within the last 2 weeks? \" (e g , antibiotics)       Denies  10  OTHER SYMPTOMS: \"Do you have any other symptoms? \" (e g , dizziness, headache, sore throat, joint pain)        N/A    Protocols used: RASH OR REDNESS - WIDESPREAD-ADULT-AH    "

## 2023-04-24 DIAGNOSIS — J30.2 SEASONAL ALLERGIES: ICD-10-CM

## 2023-04-24 DIAGNOSIS — R39.14 BENIGN PROSTATIC HYPERPLASIA WITH INCOMPLETE BLADDER EMPTYING: ICD-10-CM

## 2023-04-24 DIAGNOSIS — R21 GENERALIZED MACULOPAPULAR RASH: ICD-10-CM

## 2023-04-24 DIAGNOSIS — N40.1 BENIGN PROSTATIC HYPERPLASIA WITH INCOMPLETE BLADDER EMPTYING: ICD-10-CM

## 2023-04-25 RX ORDER — TAMSULOSIN HYDROCHLORIDE 0.4 MG/1
CAPSULE ORAL
Qty: 90 CAPSULE | Refills: 0 | Status: SHIPPED | OUTPATIENT
Start: 2023-04-25

## 2023-04-25 RX ORDER — FLUTICASONE PROPIONATE 50 MCG
SPRAY, SUSPENSION (ML) NASAL
Qty: 16 G | Refills: 5 | Status: SHIPPED | OUTPATIENT
Start: 2023-04-25

## 2023-04-25 RX ORDER — HYDROXYZINE HYDROCHLORIDE 25 MG/1
TABLET, FILM COATED ORAL
Qty: 90 TABLET | Refills: 0 | Status: SHIPPED | OUTPATIENT
Start: 2023-04-25

## 2023-05-12 NOTE — PROGRESS NOTES
Cm called ct to complete survey, cm left message Ivermectin Counseling:  Patient instructed to take medication on an empty stomach with a full glass of water.  Patient informed of potential adverse effects including but not limited to nausea, diarrhea, dizziness, itching, and swelling of the extremities or lymph nodes.  The patient verbalized understanding of the proper use and possible adverse effects of ivermectin.  All of the patient's questions and concerns were addressed.

## 2023-05-15 DIAGNOSIS — R21 GENERALIZED MACULOPAPULAR RASH: ICD-10-CM

## 2023-05-15 DIAGNOSIS — K21.00 GASTROESOPHAGEAL REFLUX DISEASE WITH ESOPHAGITIS: ICD-10-CM

## 2023-05-15 RX ORDER — HYDROXYZINE HYDROCHLORIDE 25 MG/1
TABLET, FILM COATED ORAL
Qty: 90 TABLET | Refills: 0 | Status: SHIPPED | OUTPATIENT
Start: 2023-05-15

## 2023-05-15 RX ORDER — OMEPRAZOLE 40 MG/1
CAPSULE, DELAYED RELEASE ORAL
Qty: 30 CAPSULE | Refills: 3 | Status: SHIPPED | OUTPATIENT
Start: 2023-05-15

## 2023-05-17 ENCOUNTER — TELEPHONE (OUTPATIENT)
Dept: SURGERY | Facility: CLINIC | Age: 53
End: 2023-05-17

## 2023-05-17 ENCOUNTER — OFFICE VISIT (OUTPATIENT)
Dept: MULTI SPECIALTY CLINIC | Facility: CLINIC | Age: 53
End: 2023-05-17

## 2023-05-17 ENCOUNTER — APPOINTMENT (OUTPATIENT)
Dept: LAB | Facility: CLINIC | Age: 53
End: 2023-05-17

## 2023-05-17 VITALS — HEIGHT: 68 IN | BODY MASS INDEX: 27.58 KG/M2 | WEIGHT: 182 LBS

## 2023-05-17 DIAGNOSIS — B20 HIV DISEASE (HCC): ICD-10-CM

## 2023-05-17 DIAGNOSIS — A53.9 SYPHILIS: ICD-10-CM

## 2023-05-17 DIAGNOSIS — R21 RASH: Primary | ICD-10-CM

## 2023-05-17 LAB
ALBUMIN SERPL BCP-MCNC: 3.6 G/DL (ref 3.5–5)
ALP SERPL-CCNC: 122 U/L (ref 46–116)
ALT SERPL W P-5'-P-CCNC: 36 U/L (ref 12–78)
ANION GAP SERPL CALCULATED.3IONS-SCNC: 1 MMOL/L (ref 4–13)
AST SERPL W P-5'-P-CCNC: 28 U/L (ref 5–45)
BASOPHILS # BLD AUTO: 0.03 THOUSANDS/ÂΜL (ref 0–0.1)
BASOPHILS NFR BLD AUTO: 0 % (ref 0–1)
BILIRUB SERPL-MCNC: 0.27 MG/DL (ref 0.2–1)
BUN SERPL-MCNC: 15 MG/DL (ref 5–25)
CALCIUM SERPL-MCNC: 9.1 MG/DL (ref 8.3–10.1)
CHLORIDE SERPL-SCNC: 104 MMOL/L (ref 96–108)
CO2 SERPL-SCNC: 28 MMOL/L (ref 21–32)
CREAT SERPL-MCNC: 0.95 MG/DL (ref 0.6–1.3)
EOSINOPHIL # BLD AUTO: 0.17 THOUSAND/ÂΜL (ref 0–0.61)
EOSINOPHIL NFR BLD AUTO: 2 % (ref 0–6)
ERYTHROCYTE [DISTWIDTH] IN BLOOD BY AUTOMATED COUNT: 14 % (ref 11.6–15.1)
GFR SERPL CREATININE-BSD FRML MDRD: 90 ML/MIN/1.73SQ M
GLUCOSE SERPL-MCNC: 93 MG/DL (ref 65–140)
HCT VFR BLD AUTO: 40.4 % (ref 36.5–49.3)
HGB BLD-MCNC: 13.3 G/DL (ref 12–17)
IMM GRANULOCYTES # BLD AUTO: 0.04 THOUSAND/UL (ref 0–0.2)
IMM GRANULOCYTES NFR BLD AUTO: 1 % (ref 0–2)
LYMPHOCYTES # BLD AUTO: 3.48 THOUSANDS/ÂΜL (ref 0.6–4.47)
LYMPHOCYTES NFR BLD AUTO: 50 % (ref 14–44)
MCH RBC QN AUTO: 28.3 PG (ref 26.8–34.3)
MCHC RBC AUTO-ENTMCNC: 32.9 G/DL (ref 31.4–37.4)
MCV RBC AUTO: 86 FL (ref 82–98)
MONOCYTES # BLD AUTO: 0.56 THOUSAND/ÂΜL (ref 0.17–1.22)
MONOCYTES NFR BLD AUTO: 8 % (ref 4–12)
NEUTROPHILS # BLD AUTO: 2.75 THOUSANDS/ÂΜL (ref 1.85–7.62)
NEUTS SEG NFR BLD AUTO: 39 % (ref 43–75)
NRBC BLD AUTO-RTO: 0 /100 WBCS
PLATELET # BLD AUTO: 365 THOUSANDS/UL (ref 149–390)
PMV BLD AUTO: 8.1 FL (ref 8.9–12.7)
POTASSIUM SERPL-SCNC: 4 MMOL/L (ref 3.5–5.3)
PROT SERPL-MCNC: 7.8 G/DL (ref 6.4–8.4)
RBC # BLD AUTO: 4.7 MILLION/UL (ref 3.88–5.62)
SODIUM SERPL-SCNC: 133 MMOL/L (ref 135–147)
TREPONEMA PALLIDUM IGG+IGM AB [PRESENCE] IN SERUM OR PLASMA BY IMMUNOASSAY: REACTIVE
WBC # BLD AUTO: 7.03 THOUSAND/UL (ref 4.31–10.16)

## 2023-05-17 RX ORDER — CLINDAMYCIN PHOSPHATE 11.9 MG/ML
SOLUTION TOPICAL
Qty: 60 ML | Refills: 8 | Status: SHIPPED | OUTPATIENT
Start: 2023-05-17

## 2023-05-17 NOTE — PATIENT INSTRUCTIONS
Assessment and Plan:  Based on a thorough discussion of this condition and the management approach to it (including a comprehensive discussion of the known risks, side effects and potential benefits of treatment), the patient (family) agrees to implement the following specific plan:             Avoid long hot showers; if you are using hot water, should be less than 5 minutes  Start using Dove moisturizer bar soap in the shower    Use moisturizer like Eucerin,Cerave, Vanicream or Aveeno Cream 3 times a day for the dry skin           Finish the remaining doxycycline  Counseled patient about sun protection while on the doxycycline  Start clindamycin solution twice a day to buttocks area  Instructed patient to start using benzoyl peroxide wash in shower once a day     Instructed patient to get his labs done and follow-up with ID- need to make sure his syphilis titers are down-trending appropriately  Patient's HIV previously undetectable- again encouraged patient to get repeat labs done  Will see patient 6/14 and will consider biopsy of newer lesions- patient states today his lesions are all old and resolving

## 2023-05-17 NOTE — PROGRESS NOTES
"[facility] Note     Patient Name: Siria Wu  Encounter Date: 05/17/2023     Have you been cared for by a [place] [id number] Dermatologist in the last 3 years and, if so, which description applies to you? Yes  I have been here within the last 3 years, and my medical history has NOT changed since that time  I am MALE/not capable of bearing children  REVIEW OF SYSTEMS:  Have you recently had or currently have any of the following? · No changes in my recent health  PAST MEDICAL HISTORY:  Have you personally ever had or currently have any of the following? If \"YES,\" then please provide more detail  · No changes in my medical history  FAMILY HISTORY:  Any \"first degree relatives\" (parent, brother, sister, or child) with the following? • No changes in my family's known health  PATIENT EXPERIENCE:    • Do you want the Dermatologist to perform a COMPLETE skin exam today including a clinical examination under the \"bra and underwear\" areas? NO  • If necessary, do we have your permission to call and leave a detailed message on your Preferred Phone number that includes your specific medical information?   Yes      Allergies   Allergen Reactions   • Tomato - Food Allergy Vomiting      Current Outpatient Medications:   •  atorvastatin (LIPITOR) 10 mg tablet, TAKE 1 TABLET BY MOUTH DAILY, Disp: 90 tablet, Rfl: 1  •  Biktarvy -25 MG tablet, TAKE 1 TABLET BY MOUTH DAILY WITH BREAKFAST, Disp: 90 tablet, Rfl: 1  •  dicyclomine (BENTYL) 20 mg tablet, TAKE 1 TABLET BY MOUTH TWICE A DAY BEFORE MEALS, Disp: 60 tablet, Rfl: 2  •  FLUoxetine (PROzac) 40 MG capsule, Take 2 capsules (80 mg total) by mouth daily, Disp: 60 capsule, Rfl: 3  •  fluticasone (FLONASE) 50 mcg/act nasal spray, USE 1 SPRAY IN EACH NOSTRIL DAILY, Disp: 16 g, Rfl: 5  •  hydrOXYzine HCL (ATARAX) 25 mg tablet, TAKE 1 TABLET BY MOUTH EVERY SIX HOURS AS NEEDED FOR ITCH, Disp: 90 tablet, Rfl: 0  •  lidocaine (LMX) 4 % cream, APPLY " TOPICALLY AS NEEDED FOR MILD PAIN, Disp: 30 g, Rfl: 0  •  loratadine (CLARITIN) 10 mg tablet, Take 1 tablet (10 mg total) by mouth daily (Patient not taking: Reported on 2/6/2023), Disp: 30 tablet, Rfl: 2  •  montelukast (SINGULAIR) 10 mg tablet, TAKE 1 TABLET BY MOUTH DAILY AT BEDTIME, Disp: 90 tablet, Rfl: 1  •  OLANZapine (ZyPREXA) 15 mg tablet, Take 1 tablet (15 mg total) by mouth daily at bedtime, Disp: 30 tablet, Rfl: 3  •  omeprazole (PriLOSEC) 40 MG capsule, TAKE 1 CAPSULE BY MOUTH DAILY, Disp: 30 capsule, Rfl: 3  •  tadalafil (CIALIS) 2 5 MG tablet, Take 1 tablet (2 5 mg total) by mouth daily as needed for erectile dysfunction, Disp: 30 tablet, Rfl: 2  •  tamsulosin (FLOMAX) 0 4 mg, TAKE 1 CAPSULE BY MOUTH DAILY WITH DINNER, Disp: 90 capsule, Rfl: 0  •  triamcinolone (KENALOG) 0 1 % ointment, Apply twice daily for up to 2 weeks to itchy spots  It is important to take at least 1 week break between 2 week uses  Do NOT use on face, armpits, or groin , Disp: 80 g, Rfl: 1  •  valACYclovir (VALTREX) 1,000 mg tablet, Take 1 tablet (1,000 mg total) by mouth 2 (two) times a day for 7 days, Disp: 14 tablet, Rfl: 0          • Whom besides the patient is providing clinical information about today's encounter?   o NO ADDITIONAL HISTORIAN (patient alone provided history)    Physical Exam and Assessment/Plan by Diagnosis:    RASH (IMPROVING)  FOLLICULITIS   PRURITUS POSSIBLY DUE TO FORMICATION-LIKE SYMPTOMS FROM MEDICATIONS (E G  OMEPRAZOLE, OLANZAPINE, BIKTARVY, PROPRANOLOL)     Physical Exam:  • Anatomic Location Affected:  Legs, arms, chest, buttocks  • Morphological Description:  Scattered eroded pink papules; buttocks: 2 furuncles      Additional History of Present Condition:  Patient was seen by Dr Vadim Salamanca in April; was told to continue doxycycline   Does think itch and rash have overall improved       Assessment and Plan:  Based on a thorough discussion of this condition and the management approach to it (including a comprehensive discussion of the known risks, side effects and potential benefits of treatment), the patient (family) agrees to implement the following specific plan:             Avoid long hot showers; if you are using hot water, should be less than 5 minutes  • Start using Dove moisturizer bar soap in the shower  •   • Use moisturizer like Eucerin,Cerave, Vanicream or Aveeno Cream 3 times a day for the dry skin   •    •      • Finish the remaining doxycycline  Counseled patient about sun protection while on the doxycycline  • Start clindamycin solution twice a day to buttocks area  • Instructed patient to start using benzoyl peroxide wash in shower once a day     • Instructed patient to get his labs done and follow-up with ID- need to make sure his syphilis titers are down-trending appropriately  • Patient's HIV previously undetectable- again encouraged patient to get repeat labs done  • Will see patient 6/14 and will consider biopsy of newer lesions- patient states today his lesions are all old and resolving       RTC: 6/14/2023 months for rash follow-up     John De Guzman  Dermatology PGY-4 Resident Physician

## 2023-05-18 LAB
BASOPHILS # BLD AUTO: 0 X10E3/UL (ref 0–0.2)
BASOPHILS NFR BLD AUTO: 0 %
CD3+CD4+ CELLS # BLD: 1271 /UL (ref 359–1519)
CD3+CD4+ CELLS NFR BLD: 35.3 % (ref 30.8–58.5)
EOSINOPHIL # BLD AUTO: 0.2 X10E3/UL (ref 0–0.4)
EOSINOPHIL NFR BLD AUTO: 3 %
ERYTHROCYTE [DISTWIDTH] IN BLOOD BY AUTOMATED COUNT: 14.3 % (ref 11.6–15.4)
HCT VFR BLD AUTO: 39.7 % (ref 37.5–51)
HGB BLD-MCNC: 13.3 G/DL (ref 13–17.7)
IMM GRANULOCYTES # BLD: 0 X10E3/UL (ref 0–0.1)
IMM GRANULOCYTES NFR BLD: 0 %
LYMPHOCYTES # BLD AUTO: 3.6 X10E3/UL (ref 0.7–3.1)
LYMPHOCYTES NFR BLD AUTO: 50 %
MCH RBC QN AUTO: 28.3 PG (ref 26.6–33)
MCHC RBC AUTO-ENTMCNC: 33.5 G/DL (ref 31.5–35.7)
MCV RBC AUTO: 85 FL (ref 79–97)
MONOCYTES # BLD AUTO: 0.6 X10E3/UL (ref 0.1–0.9)
MONOCYTES NFR BLD AUTO: 8 %
NEUTROPHILS # BLD AUTO: 2.8 X10E3/UL (ref 1.4–7)
NEUTROPHILS NFR BLD AUTO: 39 %
PLATELET # BLD AUTO: 356 X10E3/UL (ref 150–450)
RBC # BLD AUTO: 4.7 X10E6/UL (ref 4.14–5.8)
RPR SER QL: REACTIVE
RPR SER-TITR: ABNORMAL {TITER}
WBC # BLD AUTO: 7.1 X10E3/UL (ref 3.4–10.8)

## 2023-05-19 LAB
HCV RNA SERPL NAA+PROBE-ACNC: NORMAL IU/ML
T PALLIDUM AB SER QL IF: REACTIVE
TEST INFORMATION: NORMAL

## 2023-06-06 LAB
HCV RNA SERPL NAA+PROBE-ACNC: NORMAL IU/ML
TEST INFORMATION: NORMAL

## 2023-06-14 ENCOUNTER — TELEPHONE (OUTPATIENT)
Dept: INTERNAL MEDICINE CLINIC | Facility: CLINIC | Age: 53
End: 2023-06-14

## 2023-07-14 ENCOUNTER — OFFICE VISIT (OUTPATIENT)
Dept: PSYCHIATRY | Facility: CLINIC | Age: 53
End: 2023-07-14
Payer: COMMERCIAL

## 2023-07-14 DIAGNOSIS — F31.81 BIPOLAR 2 DISORDER, MAJOR DEPRESSIVE EPISODE (HCC): ICD-10-CM

## 2023-07-14 DIAGNOSIS — F41.1 GAD (GENERALIZED ANXIETY DISORDER): ICD-10-CM

## 2023-07-14 PROCEDURE — 99214 OFFICE O/P EST MOD 30 MIN: CPT | Performed by: PSYCHIATRY & NEUROLOGY

## 2023-07-14 RX ORDER — OLANZAPINE 10 MG/1
10 TABLET ORAL
Qty: 30 TABLET | Refills: 2 | Status: SHIPPED | OUTPATIENT
Start: 2023-07-14

## 2023-07-14 RX ORDER — FLUOXETINE HYDROCHLORIDE 40 MG/1
80 CAPSULE ORAL DAILY
Qty: 60 CAPSULE | Refills: 3 | Status: SHIPPED | OUTPATIENT
Start: 2023-07-14

## 2023-07-14 NOTE — BH TREATMENT PLAN
Treatment Plan not completed within required time limits due to:     presenting  with emotional/behavorial issues that required clinical intervention. He/ She was  very anxious  during the appointment and time mostly spent in therapy. treatment plan discussed but not not formally created in document form and he agreed to sign at future visit.

## 2023-07-14 NOTE — BH CRISIS PLAN
Crisis Plan not completed within required time limits due to:  presenting  with emotional/behavorial issues that required clinical intervention. We agreed to develop in future.
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
## 2023-07-17 ENCOUNTER — TELEPHONE (OUTPATIENT)
Dept: PSYCHIATRY | Facility: CLINIC | Age: 53
End: 2023-07-17

## 2023-07-19 ENCOUNTER — SOCIAL WORK (OUTPATIENT)
Dept: BEHAVIORAL/MENTAL HEALTH CLINIC | Facility: CLINIC | Age: 53
End: 2023-07-19
Payer: COMMERCIAL

## 2023-07-19 DIAGNOSIS — F31.81 BIPOLAR II DISORDER (HCC): Primary | ICD-10-CM

## 2023-07-19 PROCEDURE — 90791 PSYCH DIAGNOSTIC EVALUATION: CPT | Performed by: SOCIAL WORKER
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
## 2023-07-26 ENCOUNTER — TELEPHONE (OUTPATIENT)
Dept: SURGERY | Facility: CLINIC | Age: 53
End: 2023-07-26

## 2023-07-26 DIAGNOSIS — D72.89 OTHER SPECIFIED DISORDERS OF WHITE BLOOD CELLS: ICD-10-CM

## 2023-07-26 DIAGNOSIS — R74.8 ELEVATED ALKALINE PHOSPHATASE LEVEL: ICD-10-CM

## 2023-07-26 DIAGNOSIS — Z20.2 CONTACT WITH AND (SUSPECTED) EXPOSURE TO INFECTIONS WITH A PREDOMINANTLY SEXUAL MODE OF TRANSMISSION: ICD-10-CM

## 2023-07-26 DIAGNOSIS — Z11.1 SCREENING-PULMONARY TB: ICD-10-CM

## 2023-07-26 DIAGNOSIS — Z11.3 ENCOUNTER FOR SCREENING FOR BACTERIAL SEXUALLY TRANSMITTED DISEASE: ICD-10-CM

## 2023-07-26 DIAGNOSIS — D63.8 ANEMIA IN OTHER CHRONIC DISEASES CLASSIFIED ELSEWHERE: ICD-10-CM

## 2023-07-26 DIAGNOSIS — Z72.89 OTHER PROBLEMS RELATED TO LIFESTYLE: ICD-10-CM

## 2023-07-26 DIAGNOSIS — B20 HIV DISEASE (HCC): Primary | ICD-10-CM

## 2023-07-26 NOTE — TELEPHONE ENCOUNTER
LVM for patient to call back. Wanted him to get labs done so we can schedule him to see the ID Provider.

## 2023-07-27 ENCOUNTER — TELEPHONE (OUTPATIENT)
Dept: SURGERY | Facility: CLINIC | Age: 53
End: 2023-07-27

## 2023-07-27 NOTE — PROGRESS NOTES
[facility] - Periop Services  Brief Operative Note    Surgery Date: 7/27/2023     Surgeon(s) and Role:     * Alie Carrillo DDS - Primary    Assisting Surgeon: None    Pre-op Diagnosis:  Acute dentin caries [K02.9]  Preop diagnosis dental caries  Post-op Diagnosis:  Post-Op Diagnosis Codes:     * Acute dentin caries [K02.9]  Postop diagnosis dental caries Procedure(s) (LRB):  RESTORATION, TOOTH (N/A)  8 silver crowns, 5 pulp treatments, radiographs, exam, and cleaning.  Anesthesia: General    Operative Findings: the following teeth were restored maxillary right primary 2nd molar stainless steel crown, maxillary right primary 1st molar stainless steel crown, maxillary left primary 1st molar stainless steel crown, maxillary left primary 2nd molar stainless steel crown, mandibular left primary 2nd molar stainless steel crown, mandibular left primary 1st molar stainless steel crown, mandibular right primary 1st molar stainless steel crown, mandibular right primary 2nd molar stainless steel crown.  Pulp therapy in the form of ferric sulfate and zinc oxide and eugenol was performed on the following teeth maxillary left primary 1st molar, maxillary left primary 2nd molar, mandibular left primary 1st molar, mandibular right primary 1st molar, mandibular right primary 2nd molar.     Estimated Blood Loss: * No values recorded between 7/27/2023 11:45 AM and 7/27/2023 12:58 PM *         Specimens:  None  Specimen (24h ago, onward)      None              Discharge Note    OUTCOME: Patient tolerated treatment/procedure well without complication and is now ready for discharge.    DISPOSITION: Home or Self Care    FINAL DIAGNOSIS:  Restoration of dental caries.    FOLLOWUP: With primary care provider    DISCHARGE INSTRUCTIONS:  Short stay.  Two week postop office follow-up.  Soft diet limited activity day of surgery, return to normal as tolerated.     Progress Note - Infectious Disease   Danny Felix 46 y o  male MRN: [id number]  Unit/Bed#:  Encounter: [id number]      Impression/Plan:    1  HIV  -Doing well on GENVOYA with undetectable viral load and CD4 count 1075  Creatinine 0 90  -advised adherence  -continue GENVOYA, repeat labs in 5 months and follow-up in 6 monhts    2  Nicotine Dependence  -Currently in the process of quitting; goes for long periods of time without smoking and relapses often   -smoking 3-4 cigarettes per day (down from 1 pack per day)  -advised cessation     3  SOPHIA  -follows with mental health provider   -currently taking propranolol and prozac      Patient was provided medication, adherence and prevention education    Subjective:  Routine follow-up for HIV  Patient claims 100% adherence with GENVOYA  Patient denies any notable side effects  Overall the feeling well  The patient denies any fever chills or sweats, denies any nausea vomiting or diarrhea, denies any cough or shortness of breath  ROS: A complete 12 point ROS is negative other than that noted in the HPI    Objective:  Vitals:  Vitals:    12/19/18 1707   BP: 120/56   BP Location: Left arm   Patient Position: Sitting   Pulse: (!) 54   Temp: 97 6 °F (36 4 °C)   TempSrc: Oral   SpO2: 95%   Weight: 79 4 kg (175 lb)       Physical Exam:   General Appearance:  Alert, interactive, appearing well,  nontoxic, no acute distress  Neck:   Supple without lymphadenopathy, no thyromegaly or masses   Throat: Oropharynx moist without lesions  Lungs:   Clear to auscultation bilaterally; no wheezes, rhonchi or rales; respirations unlabored   Heart:  RRR; no murmur, rub or gallop   Abdomen:   Soft, non-tender, non-distended, positive bowel sounds  Extremities: No clubbing, cyanosis or edema   Skin: No new rashes or lesions  No draining wounds noted         Lab Results   Component Value Date     12/30/2015    K 3 9 11/19/2018     11/19/2018    CO2 28 11/19/2018    ANIONGAP 9 12/30/2015    BUN 12 11/19/2018    CREATININE 0 90 11/19/2018    GLUCOSE 124 12/30/2015    GLUF 84 11/19/2018    CALCIUM 9 1 11/19/2018    AST 23 11/19/2018    ALT 30 11/19/2018    ALKPHOS 93 11/19/2018    PROT 8 4 (H) 12/30/2015    BILITOT 0 37 12/30/2015    EGFR 99 11/19/2018     Lab Results   Component Value Date    WBC 7 8 11/19/2018    WBC 7 89 11/19/2018    HGB 12 8 (L) 11/19/2018    HGB 13 9 11/19/2018    HCT 38 0 11/19/2018    HCT 42 4 11/19/2018    MCV 96 11/19/2018    MCV 96 11/19/2018     11/19/2018     11/19/2018     Lab Results   Component Value Date    HEPCAB Non-reactive 06/14/2018     Lab Results   Component Value Date    HEPCAB Non-reactive 06/14/2018     Lab Results   Component Value Date    RPR Non-Reactive 06/14/2018     CD4 T CELL ABSOLUTE   Date/Time Value Ref Range Status   12/30/2015 10:18  359 - 1,519 /uL Final     HIV-1 RNA Viral Load Log   Date/Time Value Ref Range Status   11/19/2018 09:18 AM COMMENT [id number]/mL Final     Comment:     Unable to calculate result since non-numeric result obtained for  component test      No results found for: SCANRESULT    Labs, Imaging, & Other studies:   All pertinent labs and imaging studies were personally reviewed      Current Outpatient Prescriptions:     dicyclomine (BENTYL) 10 mg capsule, TAKE 1 CAPSULE BY MOUTH FOUR TIMES A DAY BEFORE MEALS AND AT BEDTIME, Disp: 120 capsule, Rfl: 2    FLUoxetine (PROzac) 20 mg capsule, TAKE 3 CAPSULES BY MOUTH DAILY, Disp: 90 capsule, Rfl: 2    gabapentin (NEURONTIN) 300 mg capsule, Take 1 capsule (300 mg total) by mouth daily at bedtime for 30 days, Disp: 30 capsule, Rfl: 0    gabapentin (NEURONTIN) 300 mg capsule, , Disp: , Rfl:     GENVOYA tablet, TAKE 1 TABLET BY MOUTH DAILY, Disp: 30 tablet, Rfl: 2    ibuprofen (MOTRIN) 600 mg tablet, TAKE 1 TABLET EVERY 6 HOURS AS NEEDED PAIN, Disp: , Rfl: 0    omeprazole (PriLOSEC) 40 MG capsule, Take 1 capsule by mouth daily, Disp: 90 capsule, Rfl: 3    propranolol (INDERAL) 10 mg tablet, TAKE ONE TO TWO TABLETS BY MOUTH TWICE A DAY AS NEEDED FOR ANXIETY, RESTLESSNESS, Disp: 120 tablet, Rfl: 2    vardenafil (LEVITRA) 2 5 MG tablet, Take 1 tablet (2 5 mg total) by mouth daily as needed for erectile dysfunction for up to 3 days, Disp: 3 tablet, Rfl: 0    VENTOLIN  (90 Base) MCG/ACT inhaler, INHALE 2 PUFFS EVERY FOUR HOURS AS NEEDED FOR WHEEZING, Disp: 18 g, Rfl: 2

## 2023-08-04 ENCOUNTER — TELEPHONE (OUTPATIENT)
Dept: PSYCHIATRY | Facility: CLINIC | Age: 53
End: 2023-08-04

## 2023-08-04 NOTE — TELEPHONE ENCOUNTER
Writer ARIANA for patient to return call to switch their 8/8 appointment from 11 am to 4 pm due to provider being unavailable. Please switch upon return call or reschedule. Thank you

## 2023-08-07 ENCOUNTER — OFFICE VISIT (OUTPATIENT)
Dept: SURGERY | Facility: CLINIC | Age: 53
End: 2023-08-07
Payer: COMMERCIAL

## 2023-08-07 ENCOUNTER — APPOINTMENT (OUTPATIENT)
Dept: LAB | Facility: CLINIC | Age: 53
End: 2023-08-07
Payer: COMMERCIAL

## 2023-08-07 VITALS
HEIGHT: 68 IN | DIASTOLIC BLOOD PRESSURE: 78 MMHG | TEMPERATURE: 97.6 F | HEART RATE: 91 BPM | WEIGHT: 179.6 LBS | RESPIRATION RATE: 18 BRPM | SYSTOLIC BLOOD PRESSURE: 143 MMHG | BODY MASS INDEX: 27.22 KG/M2 | OXYGEN SATURATION: 99 %

## 2023-08-07 DIAGNOSIS — F31.81 BIPOLAR 2 DISORDER, MAJOR DEPRESSIVE EPISODE (HCC): ICD-10-CM

## 2023-08-07 DIAGNOSIS — A52.8 LATE LATENT SYPHILIS: ICD-10-CM

## 2023-08-07 DIAGNOSIS — K02.9 DENTAL CAVITIES: ICD-10-CM

## 2023-08-07 DIAGNOSIS — K08.9 POOR DENTITION: ICD-10-CM

## 2023-08-07 DIAGNOSIS — K58.2 IRRITABLE BOWEL SYNDROME WITH BOTH CONSTIPATION AND DIARRHEA: ICD-10-CM

## 2023-08-07 DIAGNOSIS — B20 HIV DISEASE (HCC): Primary | ICD-10-CM

## 2023-08-07 DIAGNOSIS — S02.5XXA BROKEN TEETH: ICD-10-CM

## 2023-08-07 DIAGNOSIS — L73.9 FOLLICULITIS: ICD-10-CM

## 2023-08-07 LAB
ALBUMIN SERPL BCP-MCNC: 3.8 G/DL (ref 3.5–5)
ALP SERPL-CCNC: 125 U/L (ref 46–116)
ALT SERPL W P-5'-P-CCNC: 43 U/L (ref 12–78)
ANION GAP SERPL CALCULATED.3IONS-SCNC: 3 MMOL/L
AST SERPL W P-5'-P-CCNC: 28 U/L (ref 5–45)
BASOPHILS # BLD AUTO: 0.04 THOUSANDS/ÂΜL (ref 0–0.1)
BASOPHILS NFR BLD AUTO: 1 % (ref 0–1)
BILIRUB SERPL-MCNC: 0.4 MG/DL (ref 0.2–1)
BUN SERPL-MCNC: 17 MG/DL (ref 5–25)
CALCIUM SERPL-MCNC: 9.4 MG/DL (ref 8.3–10.1)
CHLORIDE SERPL-SCNC: 109 MMOL/L (ref 96–108)
CO2 SERPL-SCNC: 28 MMOL/L (ref 21–32)
CREAT SERPL-MCNC: 1.09 MG/DL (ref 0.6–1.3)
EOSINOPHIL # BLD AUTO: 0.24 THOUSAND/ÂΜL (ref 0–0.61)
EOSINOPHIL NFR BLD AUTO: 3 % (ref 0–6)
ERYTHROCYTE [DISTWIDTH] IN BLOOD BY AUTOMATED COUNT: 14.7 % (ref 11.6–15.1)
GFR SERPL CREATININE-BSD FRML MDRD: 76 ML/MIN/1.73SQ M
GLUCOSE SERPL-MCNC: 89 MG/DL (ref 65–140)
HCT VFR BLD AUTO: 39.3 % (ref 36.5–49.3)
HCV AB SER QL: NORMAL
HGB BLD-MCNC: 13.2 G/DL (ref 12–17)
IMM GRANULOCYTES # BLD AUTO: 0.02 THOUSAND/UL (ref 0–0.2)
IMM GRANULOCYTES NFR BLD AUTO: 0 % (ref 0–2)
LYMPHOCYTES # BLD AUTO: 3.52 THOUSANDS/ÂΜL (ref 0.6–4.47)
LYMPHOCYTES NFR BLD AUTO: 47 % (ref 14–44)
MCH RBC QN AUTO: 28.5 PG (ref 26.8–34.3)
MCHC RBC AUTO-ENTMCNC: 33.6 G/DL (ref 31.4–37.4)
MCV RBC AUTO: 85 FL (ref 82–98)
MONOCYTES # BLD AUTO: 0.53 THOUSAND/ÂΜL (ref 0.17–1.22)
MONOCYTES NFR BLD AUTO: 7 % (ref 4–12)
NEUTROPHILS # BLD AUTO: 3.2 THOUSANDS/ÂΜL (ref 1.85–7.62)
NEUTS SEG NFR BLD AUTO: 42 % (ref 43–75)
NRBC BLD AUTO-RTO: 0 /100 WBCS
PLATELET # BLD AUTO: 353 THOUSANDS/UL (ref 149–390)
PMV BLD AUTO: 8 FL (ref 8.9–12.7)
POTASSIUM SERPL-SCNC: 4 MMOL/L (ref 3.5–5.3)
PROT SERPL-MCNC: 7.7 G/DL (ref 6.4–8.4)
RBC # BLD AUTO: 4.63 MILLION/UL (ref 3.88–5.62)
SODIUM SERPL-SCNC: 140 MMOL/L (ref 135–147)
WBC # BLD AUTO: 7.55 THOUSAND/UL (ref 4.31–10.16)

## 2023-08-07 PROCEDURE — 99214 OFFICE O/P EST MOD 30 MIN: CPT | Performed by: NURSE PRACTITIONER

## 2023-08-07 RX ORDER — DICYCLOMINE HCL 20 MG
TABLET ORAL
Qty: 60 TABLET | Refills: 2 | Status: SHIPPED | OUTPATIENT
Start: 2023-08-07

## 2023-08-07 RX ORDER — DOXYCYCLINE HYCLATE 100 MG
100 TABLET ORAL 2 TIMES DAILY
Qty: 20 TABLET | Refills: 0 | Status: SHIPPED | OUTPATIENT
Start: 2023-08-07 | End: 2023-08-17

## 2023-08-07 NOTE — ASSESSMENT & PLAN NOTE
Notable manic behavior during visit. Sweating, irritable, and blaming others for his health issues.   · Follow with /psyciatry  · Still on Zyprexa

## 2023-08-07 NOTE — ASSESSMENT & PLAN NOTE
S/P treatment. Had 2 fold decrease in dils; now serofast at 1:8 dills  No further treatment for now.   · Continue to monitor PRP titers

## 2023-08-07 NOTE — ASSESSMENT & PLAN NOTE
Needs to take PPI daily to live.   · Recommend weaning off of PPI and trial H2 blocker  · Discussed long term side effects of PPI  · Recommend avoiding foods that cause GERD  · Follow up with GI

## 2023-08-07 NOTE — PROGRESS NOTES
Assessment/Plan:    HIV disease  Doing well on Biktarvy with an undetectable VL. Pt reports 100% medication compliance on HAART. Stressed the importance of 100% medication adherence. HIV Counseling:    Viral Load: < 20    CD4 Count: 2731      Denies side effects. Stressed the importance of adherence. Continue follow up in the ID clinic with Dr. Desirae Flores or Dr. Denise Oliva. Reviewed the most recent labs, including the CD4 and viral load. Discussed the risks and benefits of treatment options, instructions for management, importance of treatment adherence, and reduction of risk factors. Educated on possible medication side effects. Counseled on routes of HIV transmission, including the risk of  infection. Emphasized that viral suppression is the best method to prevent HIV transmission. At this time the pt denies the need for HIV testing of anyone in their life. Total encounter time was greater than 35 minutes. Greater than 20 minutes were spent on counseling and patient education. Pt voices understanding and agreement with treatment plan. Folliculitis  Reports   Last treated by Dermatology 23  Finished last course of doxycycline   Could be related to medications like omeprazole, olanzapine, Biktarvy, and propanolol  Concern since pt   · Continue to follow with Dermatology   · Doxycyline 100 mg BID X 1o days    Late latent syphilis  S/P treatment. Had 2 fold decrease in dils; now serofast at 1:8 dills  No further treatment for now. · Continue to monitor PRP titers    GERD (gastroesophageal reflux disease)  Needs to take PPI daily to live. · Recommend weaning off of PPI and trial H2 blocker  · Discussed long term side effects of PPI  · Recommend avoiding foods that cause GERD  · Follow up with GI    Bipolar 2 disorder, major depressive episode ([address])  Notable manic behavior during visit. Sweating, irritable, and blaming others for his health issues.   · Follow with BH/psyciatry  · Still on Zyprexa    Poor dentition  Per pt he still has posts for caps. Missing multiply teeth front lower gum line has teeth that are black and appear rotted. Many teeth are worn down or broken. · Referral to dental  · Referral to OMS       Diagnoses and all orders for this visit:    HIV disease ([address])    Folliculitis  -     doxycycline hyclate (VIBRA-TABS) 100 mg tablet; Take 1 tablet (100 mg total) by mouth 2 (two) times a day for 10 days    Late latent syphilis  -     RPR (Monitor) W/Refl Titer; Future    Bipolar 2 disorder, major depressive episode ([address])    Poor dentition  -     Ambulatory Referral to Oral Maxillofacial Surgery; Future    Broken teeth  -     Ambulatory Referral to Oral Maxillofacial Surgery; Future    Dental cavities  -     Ambulatory Referral to Oral Maxillofacial Surgery; Future          Subjective:      Patient ID: Satinder Shane is a 45 y.o. male. HPI  Yady Ocasio presents for follow up visit for management of HIV and chronic health care conditions. Yady Ocasio reports he's just charting through. Yady Ocasio needs to renew his food stamps. His rash continues to be a problem. He notices new lesions that will appear spontaneously and will erupt and then scab over. Clindamycin cream seems to work the best.     He does not endorse any fever, chills, nausea, vomiting, cough, SOB, diarrhea, or night sweats. The following portions of the patient's history were reviewed and updated as appropriate: allergies, current medications, past family history, past medical history, past social history and problem list.    Review of Systems   Constitutional: Negative. HENT: Negative. Respiratory: Negative. Cardiovascular: Negative. Gastrointestinal: Negative. Skin: Positive for rash. Neurological: Negative. Psychiatric/Behavioral: Negative.           Objective:      /78 (BP Location: Left arm, Patient Position: Sitting, Cuff Size: Large) Comment: Manual BP  Pulse 91   Temp 97.6 °F (36.4 °C) (Tympanic)   Resp 18   Ht 5' 7.5" (1.715 m)   Wt 81.5 kg (179 lb 9.6 oz)   SpO2 99%   BMI 27.71 kg/m²          Physical Exam  Vitals and nursing note reviewed. Constitutional:       General: He is not in acute distress. Appearance: Normal appearance. He is not ill-appearing. Cardiovascular:      Rate and Rhythm: Normal rate and regular rhythm. Chest Wall: PMI is not displaced. Pulses: Normal pulses. Heart sounds: Normal heart sounds. Pulmonary:      Effort: Pulmonary effort is normal.      Breath sounds: Normal breath sounds. Abdominal:      General: Bowel sounds are normal.   Skin:     General: Skin is warm. Capillary Refill: Capillary refill takes less than 2 seconds. Findings: Rash and wound present. Rash is crusting, macular and pustular. Rash is not urticarial.          Neurological:      Mental Status: He is alert and oriented to person, place, and time. Psychiatric:         Mood and Affect: Mood normal.         Behavior: Behavior normal.         Thought Content:  Thought content normal.         Judgment: Judgment normal.

## 2023-08-07 NOTE — ASSESSMENT & PLAN NOTE
Per pt he still has posts for caps. Missing multiply teeth front lower gum line has teeth that are black and appear rotted. Many teeth are worn down or broken.     · Referral to dental  · Referral to OMS n/a

## 2023-08-07 NOTE — ASSESSMENT & PLAN NOTE
Reports   Last treated by Dermatology 5/17/23  Finished last course of doxycycline   Could be related to medications like omeprazole, olanzapine, Biktarvy, and propanolol  Concern since pt   · Continue to follow with Dermatology   · Doxycyline 100 mg BID X 1o days

## 2023-08-07 NOTE — PROGRESS NOTES
Assessment/Plan: Pt was approached by [facility] after completing the PCP's appointment to explore multidimensional's stability by completing the PHQ-9 screening. During today's contact, pt displayed sustained hyperactive behaviors displayed as pressured and rapid speech, disorganized tangential speech, rapidly body movements, profuse perspiration, formication, and tardive dyskinesia. Pt identified the following issues that seem to trigger his stress: Oral health (dentistry issues), and his interpersonal relationships with his mother and her SO. Pt constantly repeated that he was "the baby" of the family, and that everything was "well". Pt identified insomnia issues in an extended period of time, along with not "liking the summer". Pt was pacing back and forward within the room. It was explored pt's capacity to complete the PHQ-9 screening under observed circumstances, which pt scored 16 as a display of moderately severe depressive traits without SI or HI at the time. Pt was encouraged to consider ongoing [address] [place] [address] interventions to explore assertive and healthy ways to help pt acquire effective emotional, cognitive, and behavioral self-regulatory application skills which pt mentioned going to therapy at the time. Once the session was completed, it was discussed with pt's PCP the behavioral observations experienced throughout contact that seem to correlate with possible cocaine, and/or methamphetamine use. [facility]     Today patient present with   Chief Complaint   Patient presents with   • 6 month f/u     Patient would likely benefit from: Exploring possible co-occurrence issues associated to MH's dysregulations and possible RAYSHAWN; smoking cessation counseling, and resources. Consider/focus/continue: Monitor pt's emotional, cognitive, and behavioral health's stability.    Stage of change: Pre-contemplation  Plan/ Behavioral Recommendations: Ongoing  interventions per pt's coordinated care, and/or pt's request of services. Diagnoses and all orders for this visit:    HIV disease ([address])    Folliculitis  -     doxycycline hyclate (VIBRA-TABS) 100 mg tablet; Take 1 tablet (100 mg total) by mouth 2 (two) times a day for 10 days    Late latent syphilis  -     RPR (Monitor) W/Refl Titer; Future    Bipolar 2 disorder, major depressive episode ([address])    Poor dentition  -     Ambulatory Referral to Oral Maxillofacial Surgery; Future    Broken teeth  -     Ambulatory Referral to Oral Maxillofacial Surgery; Future    Dental cavities  -     Ambulatory Referral to Oral Maxillofacial Surgery; Future          Subjective:     Patient ID: Florinda Hughes is a 45 y.o. male. HPI    History of Present Illness:      Patient is being seen for annual behavioral health assessment. Patient reports new behavioral health concerns. [unfilled]       Review of Systems      Objective:     Physical Exam      [facility]    Orientation     Person: yes    Place: yes    Time: yes    Appearance    Well Developed: yes healthy    Uncomfortable: yes    Normal Body Odor: yes    Smells of Feces: no    Smells of Urine: no    Disheveled: yes    Well Nourished: yes overweight    Grooming Unkempt: yes    Poor Eye Contact: yes    Hirsute: yes    Looks Tired: no    Acutely Exhausted: noappears older    Mood and Affect:     Appropriate: no    Euthymicyes    Irritable: yes    Angry: no    Anxious: yes    Depressed:yes    Blunted:no    Labile: no    Restricted: no    Harm to Self or Others: No SI or HI were disclosed, nor displayed throughout contact. Substance Abuse: Clinical Impression- Possible Methamphetamine Use Disorder, Severe.

## 2023-08-07 NOTE — ASSESSMENT & PLAN NOTE
Doing well on Biktarvy with an undetectable VL. Pt reports 100% medication compliance on HAART. Stressed the importance of 100% medication adherence. HIV Counseling:    Viral Load: < 20    CD4 Count: 5771      Denies side effects. Stressed the importance of adherence. Continue follow up in the ID clinic with Dr. Alex Cain or Dr. Linwood Dominguez. Reviewed the most recent labs, including the CD4 and viral load. Discussed the risks and benefits of treatment options, instructions for management, importance of treatment adherence, and reduction of risk factors. Educated on possible medication side effects. Counseled on routes of HIV transmission, including the risk of  infection. Emphasized that viral suppression is the best method to prevent HIV transmission. At this time the pt denies the need for HIV testing of anyone in their life. Total encounter time was greater than 35 minutes. Greater than 20 minutes were spent on counseling and patient education. Pt voices understanding and agreement with treatment plan.

## 2023-08-08 ENCOUNTER — SOCIAL WORK (OUTPATIENT)
Dept: BEHAVIORAL/MENTAL HEALTH CLINIC | Facility: CLINIC | Age: 53
End: 2023-08-08

## 2023-08-08 ENCOUNTER — PATIENT OUTREACH (OUTPATIENT)
Dept: SURGERY | Facility: CLINIC | Age: 53
End: 2023-08-08

## 2023-08-08 DIAGNOSIS — F31.81 BIPOLAR II DISORDER (HCC): Primary | ICD-10-CM

## 2023-08-08 LAB
RPR SER QL: REACTIVE
RPR SER-TITR: ABNORMAL {TITER}
TREPONEMA PALLIDUM IGG+IGM AB [PRESENCE] IN SERUM OR PLASMA BY IMMUNOASSAY: REACTIVE

## 2023-08-08 NOTE — PSYCH
Behavioral Health Psychotherapy Progress Note    Psychotherapy Provided: Family Therapy    1. Bipolar II disorder ([address])            Goals addressed in session: Goal 1 and Goal 2     DATA: Today I met with Lindsey Starr and his mom. Today we discussed the conflicts between Lindsey Starr and his mom. Their communication is poor. Lindsey Starr feels he is viewed that he is in his words mooching off his mother. He feels he does things for her also not just her doing things for him but yet he feels like he is the one labeled as the loser. He did share he is currently not suicidal but sees himself ending things if and when his mom dies. I told him this is quite concerning. He shared he moved in 10 years ago to help care for his dad who had dementia and other health issues but that now it is viewed that he is the loser his mom is now taking care of. He realizes he placed himself in his situation and he admits she helps him but what he does resent is that he cares for her also and he feels she does not recognize this or show it to him. We will continue to work on these issues. He feels his mom has influenced family members to think of him as the burden to his mom. During this session, this clinician used the following therapeutic modalities: Client-centered Therapy, Cognitive Behavioral Therapy, Mindfulness-based Strategies and Supportive Psychotherapy    Substance Abuse was not addressed during this session. If the client is diagnosed with a co-occurring substance use disorder, please indicate any changes in the frequency or amount of use: n/a. Stage of change for addressing substance use diagnoses: No substance use/Not applicable    ASSESSMENT:  Radha Joshi presents with a Anxious and Depressed mood. his affect is anxious and depressed, which is congruent, with his mood and the content of the session. The client just started therapy in order to have made progress on his goals.      Radha Joshi presents with a none risk of suicide, none risk of self-harm, and none risk of harm to others. For any risk assessment that surpasses a "low" rating, a safety plan must be developed. A safety plan was indicated: no  If yes, describe in detail n/a    PLAN: Between sessions, Juliana Perez will use mindfulness and CBT. At the next session, the therapist will use Client-centered Therapy, Cognitive Behavioral Therapy, Mindfulness-based Strategies and Supportive Psychotherapy to address issues and symptoms as they may arise . Behavioral Health Treatment Plan and Discharge Planning: Juliana Perez is aware of and agrees to continue to work on their treatment plan. They have identified and are working toward their discharge goals.  yes    Visit start and stop times:    08/08/23  Start Time: 1610  Stop Time: 1700  Total Visit Time: 50 minutes

## 2023-08-09 LAB
BASOPHILS # BLD AUTO: 0 X10E3/UL (ref 0–0.2)
BASOPHILS NFR BLD AUTO: 1 %
CD3+CD4+ CELLS # BLD: 1222 /UL (ref 359–1519)
CD3+CD4+ CELLS NFR BLD: 34.9 % (ref 30.8–58.5)
EOSINOPHIL # BLD AUTO: 0.2 X10E3/UL (ref 0–0.4)
EOSINOPHIL NFR BLD AUTO: 3 %
ERYTHROCYTE [DISTWIDTH] IN BLOOD BY AUTOMATED COUNT: 15.6 % (ref 11.6–15.4)
GAMMA INTERFERON BACKGROUND BLD IA-ACNC: 0.06 IU/ML
HCT VFR BLD AUTO: 39.4 % (ref 37.5–51)
HGB BLD-MCNC: 13 G/DL (ref 13–17.7)
HIV1 RNA # PLAS NAA DL=20: 42.6 CP/ML
HIV1 RNA # PLAS NAA DL=20: DETECTED {COPIES}/ML
IMM GRANULOCYTES # BLD: 0 X10E3/UL (ref 0–0.1)
IMM GRANULOCYTES NFR BLD: 0 %
LYMPHOCYTES # BLD AUTO: 3.5 X10E3/UL (ref 0.7–3.1)
LYMPHOCYTES NFR BLD AUTO: 49 %
M TB IFN-G BLD-IMP: NEGATIVE
M TB IFN-G CD4+ BCKGRND COR BLD-ACNC: 0 IU/ML
M TB IFN-G CD4+ BCKGRND COR BLD-ACNC: 0.01 IU/ML
MCH RBC QN AUTO: 28.4 PG (ref 26.6–33)
MCHC RBC AUTO-ENTMCNC: 33 G/DL (ref 31.5–35.7)
MCV RBC AUTO: 86 FL (ref 79–97)
MITOGEN IGNF BCKGRD COR BLD-ACNC: >10 IU/ML
MONOCYTES # BLD AUTO: 0.4 X10E3/UL (ref 0.1–0.9)
MONOCYTES NFR BLD AUTO: 5 %
NEUTROPHILS # BLD AUTO: 3 X10E3/UL (ref 1.4–7)
NEUTROPHILS NFR BLD AUTO: 42 %
PLATELET # BLD AUTO: 369 X10E3/UL (ref 150–450)
RBC # BLD AUTO: 4.57 X10E6/UL (ref 4.14–5.8)
T PALLIDUM AB SER QL IF: REACTIVE
WBC # BLD AUTO: 7.1 X10E3/UL (ref 3.4–10.8)

## 2023-08-16 ENCOUNTER — OFFICE VISIT (OUTPATIENT)
Dept: SURGERY | Facility: CLINIC | Age: 53
End: 2023-08-16
Payer: COMMERCIAL

## 2023-08-16 ENCOUNTER — PATIENT OUTREACH (OUTPATIENT)
Dept: SURGERY | Facility: CLINIC | Age: 53
End: 2023-08-16

## 2023-08-16 VITALS
OXYGEN SATURATION: 97 % | BODY MASS INDEX: 27.4 KG/M2 | HEIGHT: 67 IN | TEMPERATURE: 98.2 F | DIASTOLIC BLOOD PRESSURE: 98 MMHG | WEIGHT: 174.6 LBS | SYSTOLIC BLOOD PRESSURE: 162 MMHG | HEART RATE: 108 BPM | RESPIRATION RATE: 18 BRPM

## 2023-08-16 DIAGNOSIS — Z13.1 SCREENING FOR DIABETES MELLITUS: ICD-10-CM

## 2023-08-16 DIAGNOSIS — A52.8 LATE LATENT SYPHILIS: ICD-10-CM

## 2023-08-16 DIAGNOSIS — T65.222D TOXIC EFFECT OF TOBACCO CIGARETTE, INTENTIONAL SELF-HARM, SUBSEQUENT ENCOUNTER: ICD-10-CM

## 2023-08-16 DIAGNOSIS — L73.9 FOLLICULITIS: ICD-10-CM

## 2023-08-16 DIAGNOSIS — K21.9 GASTROESOPHAGEAL REFLUX DISEASE WITHOUT ESOPHAGITIS: ICD-10-CM

## 2023-08-16 DIAGNOSIS — R74.8 ELEVATED ALKALINE PHOSPHATASE LEVEL: ICD-10-CM

## 2023-08-16 DIAGNOSIS — D63.8 ANEMIA IN OTHER CHRONIC DISEASES CLASSIFIED ELSEWHERE: ICD-10-CM

## 2023-08-16 DIAGNOSIS — F41.1 GAD (GENERALIZED ANXIETY DISORDER): ICD-10-CM

## 2023-08-16 DIAGNOSIS — E78.2 ELEVATED CHOLESTEROL WITH ELEVATED TRIGLYCERIDES: ICD-10-CM

## 2023-08-16 DIAGNOSIS — B20 HIV DISEASE (HCC): Primary | ICD-10-CM

## 2023-08-16 DIAGNOSIS — R21 GENERALIZED MACULOPAPULAR RASH: ICD-10-CM

## 2023-08-16 DIAGNOSIS — K08.9 POOR DENTITION: ICD-10-CM

## 2023-08-16 PROCEDURE — 99215 OFFICE O/P EST HI 40 MIN: CPT | Performed by: INTERNAL MEDICINE

## 2023-08-16 NOTE — PROGRESS NOTES
Cm completed 6 month assessment   Ct denied any referral for the dentist   Ct needs help with snap scheduled to do the application   Gave ct 10 bus passes for the month of august   Ct and cm agreement scanned

## 2023-08-16 NOTE — PROGRESS NOTES
Progress Note - Infectious Disease   Chrystal Hernandez 45 y.o. male MRN: [id number]  Unit/Bed#:  Encounter: [id number]      Impression/Plan:  1. HIV. Doing well on Biktarvy with a near undetectable viral load and CD4 count of 1200. Continue ART, recheck labs in 5 months and follow-up in 6 months. Stressed adherence    2. Folliculitis. Culture proven MSSA and group B strep isolated in the past.  Status post treatment course with Doxy. Improved but not completely resolved. The patient will try dilute bleach baths 2-3 times per week for 2 to 3 months. Stressed that he will need to use moisturizing cream after drying off from the bath in order to prevent excessive drying of the skin    3. Nicotine dependence. 4.  Poor dentition. Unfortunately has been nonadherent with his dental follow-up. 5.  Generalized anxiety disorder. 6.  Latent syphilis. Status post treatment x3 doses with benzathine penicillin and his RPR has dropped from 1: 64-1: 2. We will continue to monitor the RPR    7. GERD. Patient was provided medication, adherence and prevention education    Subjective:  Routine follow-up for HIV. Patient claims 100% adherence with Biktarvy   . Patient denies any notable side effects. Overall the feeling well. The patient denies any fever chills or sweats, denies any nausea vomiting or diarrhea, denies any cough or shortness of breath. ROS:  A complete review of systems is negative other than that noted above in the subjective    Followup portions patient history reviewed and updated as:   Allergies, current medications, past medical history, past social history, past surgical history, and the problem list    Objective:  Vitals:  Vitals:    08/16/23 1717   BP: 162/98   BP Location: Right arm   Patient Position: Sitting   Cuff Size: Standard   Pulse: (!) 108   Resp: 18   Temp: 98.2 °F (36.8 °C)   TempSrc: Tympanic   SpO2: 97%   Weight: 79.2 kg (174 lb 9.6 oz)   Height: 5' 7" (1.702 m) Physical Exam:   General Appearance:  Alert, interactive, appearing well,  nontoxic, no acute distress. Neck:   Supple without lymphadenopathy, no thyromegaly or masses   Throat: Oropharynx moist without lesions. Lungs:   Clear to auscultation bilaterally; no wheezes, rhonchi or rales; respirations unlabored   Heart:  RRR; no murmur, rub or gallop   Abdomen:   Soft, non-tender, non-distended, positive bowel sounds. Extremities: No clubbing, cyanosis or edema   Skin: No new rashes or lesions. No draining wounds noted.        Labs, Imaging, & Other studies:   All pertinent labs and imaging studies were personally reviewed    Lab Results   Component Value Date     12/30/2015    K 4.0 08/07/2023     (H) 08/07/2023    CO2 28 08/07/2023    ANIONGAP 9 12/30/2015    BUN 17 08/07/2023    CREATININE 1.09 08/07/2023    GLUCOSE 124 12/30/2015    GLUF 97 11/12/2022    CALCIUM 9.4 08/07/2023    CORRECTEDCA 10.3 (H) 05/16/2022    AST 28 08/07/2023    ALT 43 08/07/2023    ALKPHOS 125 (H) 08/07/2023    PROT 8.4 (H) 12/30/2015    BILITOT 0.37 12/30/2015    EGFR 76 08/07/2023     Lab Results   Component Value Date    WBC 7.55 08/07/2023    WBC 7.1 08/07/2023    HGB 13.2 08/07/2023    HGB 13.0 08/07/2023    HCT 39.3 08/07/2023    HCT 39.4 08/07/2023    MCV 85 08/07/2023    MCV 86 08/07/2023     08/07/2023     08/07/2023     Lab Results   Component Value Date    HEPCAB Non-reactive 08/07/2023     Lab Results   Component Value Date    HEPCAB Non-reactive 08/07/2023     Lab Results   Component Value Date    RPR Reactive (A) 08/07/2023    RPR Reactive 2 dils (A) 08/07/2023     CD4 ABS   Date/Time Value Ref Range Status   08/07/2023 09:00 AM 1222 359 - 1519 /uL Final       HIV-1 TARGET   Date/Time Value Ref Range Status   08/07/2023 09:00 AM Detected (A) Not Detected Final     HIV RNA 42.6        Current Outpatient Medications:   •  Biktarvy -25 MG tablet, TAKE 1 TABLET BY MOUTH DAILY WITH

## 2023-08-17 ENCOUNTER — PATIENT OUTREACH (OUTPATIENT)
Dept: SURGERY | Facility: CLINIC | Age: 53
End: 2023-08-17

## 2023-08-22 ENCOUNTER — PATIENT OUTREACH (OUTPATIENT)
Dept: SURGERY | Facility: CLINIC | Age: 53
End: 2023-08-22

## 2023-08-22 NOTE — PROGRESS NOTES
ct updated clinic about refusal of dental and nure practitioner in ct not wanting to help find a dentist

## 2023-09-11 ENCOUNTER — SOCIAL WORK (OUTPATIENT)
Dept: BEHAVIORAL/MENTAL HEALTH CLINIC | Facility: CLINIC | Age: 53
End: 2023-09-11
Payer: COMMERCIAL

## 2023-09-11 DIAGNOSIS — F31.81 BIPOLAR II DISORDER (HCC): Primary | ICD-10-CM

## 2023-09-11 PROCEDURE — 90847 FAMILY PSYTX W/PT 50 MIN: CPT | Performed by: SOCIAL WORKER

## 2023-09-11 NOTE — PSYCH
Behavioral Health Psychotherapy Progress Note    Psychotherapy Provided: Family Therapy    1. Bipolar II disorder ([address])            Goals addressed in session: Goal 1 and Goal 2     DATA: Gricel Mendiola and Mari Fox arrived for his session. He discussed his medical issues and he feels neutral in his words concerning his depression and his anxiety. He discussed some of  what he calls blockades in his medical care. His mother shared since coming here he is much better to her. He no longer in her words yells at her. We discussed he needs to reapply for social security disability again since he was rejected and only applied once. He is progressing with conflict resolution in regards to his relationship with his mother. He feels his depression and anxiety are neutral. He shared he is not suicidal but he realizes in his words he does not have a lot in his words to live for. He claims he also has informed his MD's of this. I provided support, encouragement and strategies to cope. During this session, this clinician used the following therapeutic modalities: Client-centered Therapy, Cognitive Behavioral Therapy, Mindfulness-based Strategies and Supportive Psychotherapy    Substance Abuse was not addressed during this session. If the client is diagnosed with a co-occurring substance use disorder, please indicate any changes in the frequency or amount of use: n/a. Stage of change for addressing substance use diagnoses: No substance use/Not applicable    ASSESSMENT:  [place] May presents with a Anxious mood. his affect is anxious, which is congruent, with his mood and the content of the session. The client has made progress on their goals. However he feels he does not have a lot to live for but denies any thoughts of self harm. [facility] presents with a none risk of suicide, none risk of self-harm, and none risk of harm to others.     For any risk assessment that surpasses a "low" rating, a safety plan must be developed. A safety plan was indicated: no  If yes, describe in detail n/a    PLAN: Between sessions, Harika Tarango will use mindfulness and CBT. At the next session, the therapist will use Client-centered Therapy, Cognitive Behavioral Therapy, Mindfulness-based Strategies and Supportive Psychotherapy to address issues and symptoms as they may arise. .    Behavioral Health Treatment Plan and Discharge Planning: Harika Tarango is aware of and agrees to continue to work on their treatment plan. They have identified and are working toward their discharge goals.  yes    Visit start and stop times:    09/11/23  Start Time: 0910  Stop Time: 1000  Total Visit Time: 50 minutes

## 2023-09-14 ENCOUNTER — OFFICE VISIT (OUTPATIENT)
Dept: PSYCHIATRY | Facility: CLINIC | Age: 53
End: 2023-09-14
Payer: COMMERCIAL

## 2023-09-14 VITALS — WEIGHT: 179 LBS | BODY MASS INDEX: 28.04 KG/M2

## 2023-09-14 DIAGNOSIS — F31.81 BIPOLAR 2 DISORDER, MAJOR DEPRESSIVE EPISODE (HCC): Primary | ICD-10-CM

## 2023-09-14 DIAGNOSIS — F41.1 GAD (GENERALIZED ANXIETY DISORDER): ICD-10-CM

## 2023-09-14 DIAGNOSIS — Z79.899 HIGH RISK MEDICATION USE: ICD-10-CM

## 2023-09-14 PROCEDURE — 99214 OFFICE O/P EST MOD 30 MIN: CPT | Performed by: PSYCHIATRY & NEUROLOGY

## 2023-09-14 RX ORDER — FLUOXETINE HYDROCHLORIDE 40 MG/1
80 CAPSULE ORAL DAILY
Qty: 60 CAPSULE | Refills: 3 | Status: SHIPPED | OUTPATIENT
Start: 2023-09-14

## 2023-09-14 RX ORDER — OLANZAPINE 10 MG/1
10 TABLET ORAL
Qty: 30 TABLET | Refills: 2 | Status: SHIPPED | OUTPATIENT
Start: 2023-09-14
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
## 2023-09-20 ENCOUNTER — SOCIAL WORK (OUTPATIENT)
Dept: BEHAVIORAL/MENTAL HEALTH CLINIC | Facility: CLINIC | Age: 53
End: 2023-09-20
Payer: COMMERCIAL

## 2023-09-20 DIAGNOSIS — F31.81 BIPOLAR II DISORDER (HCC): Primary | ICD-10-CM

## 2023-09-20 PROCEDURE — 90847 FAMILY PSYTX W/PT 50 MIN: CPT | Performed by: SOCIAL WORKER

## 2023-09-20 NOTE — PSYCH
Behavioral Health Psychotherapy Progress Note    Psychotherapy Provided: Family Therapy    1. Bipolar II disorder ([address])            Goals addressed in session: Goal 1 and Goal 2     DATA: Anna Gregory and his mother arrived for their family therapy session. He lives at his mother's house and critiques her every action or whatever she says. We continue to work on conflict resolution and to help Anna Gregory manage his depression and his anxiety. I provided communication strategies, conflict resolution strategies and strategies to cope. During this session, this clinician used the following therapeutic modalities: Client-centered Therapy, Cognitive Behavioral Therapy, Mindfulness-based Strategies and Supportive Psychotherapy    Substance Abuse was addressed during this session. If the client is diagnosed with a co-occurring substance use disorder, please indicate any changes in the frequency or amount of use: social drinker. Stage of change for addressing substance use diagnoses: Pre-contemplation    ASSESSMENT:  Vero Hatch presents with a Anxious mood. his affect is anxious, which is congruent, with his mood and the content of the session. The client has made progress on their goals. Vero Hatch presents with a none risk of suicide, none risk of self-harm, and none risk of harm to others. For any risk assessment that surpasses a "low" rating, a safety plan must be developed. A safety plan was indicated: no  If yes, describe in detail n/a    PLAN: Between sessions, Vero Hatch will use mindfulness and CBT. At the next session, the therapist will use Client-centered Therapy, Cognitive Behavioral Therapy, Mindfulness-based Strategies and Supportive Psychotherapy to address issues and symptoms as they may arise. .    Behavioral Health Treatment Plan and Discharge Planning: Vero Hatch is aware of and agrees to continue to work on their treatment plan.  They have identified and are working toward their discharge goals.  yes    Visit start and stop times:    09/20/23  Start Time: 1410  Stop Time: 1500  Total Visit Time: 50 minutes

## 2023-10-04 ENCOUNTER — SOCIAL WORK (OUTPATIENT)
Dept: BEHAVIORAL/MENTAL HEALTH CLINIC | Facility: CLINIC | Age: 53
End: 2023-10-04

## 2023-10-04 DIAGNOSIS — F31.81 BIPOLAR II DISORDER (HCC): Primary | ICD-10-CM

## 2023-10-04 PROCEDURE — 90847 FAMILY PSYTX W/PT 50 MIN: CPT | Performed by: SOCIAL WORKER

## 2023-10-04 NOTE — PSYCH
Behavioral Health Psychotherapy Progress Note    Psychotherapy Provided: family therapy    1. Bipolar II disorder ([address])            Goals addressed in session: Goal 1 and Goal 2     DATA: Kaushal Garcia shared Danielle Cuenca is doing better talking to her. They continue to work on their communication with each other. Danielle Cuenca claims he is less depressed and anxious. We worked on communication strategies. We also work on mindfulness and CBT strategies to manage any depression and anxiety that may arise. I provided support, encouragement and strategies to cope. During this session, this clinician used the following therapeutic modalities: Client-centered Therapy, Cognitive Behavioral Therapy, Mindfulness-based Strategies and Supportive Psychotherapy    Substance Abuse was not addressed during this session. If the client is diagnosed with a co-occurring substance use disorder, please indicate any changes in the frequency or amount of use: n/a. Stage of change for addressing substance use diagnoses: No substance use/Not applicable    ASSESSMENT:  Aburee Trinh presents with a Anxious mood. his affect is anxious, which is congruent, with his mood and the content of the session. Danielle Cuenca and his mom are starting to make progress on their goals. Aubree Trinh presents with a none risk of suicide, none risk of self-harm, and none risk of harm to others. For any risk assessment that surpasses a "low" rating, a safety plan must be developed. A safety plan was indicated: no  If yes, describe in detail n/a    PLAN: Between sessions, Aubree Trinh will use mindfulness and CBT. At the next session, the therapist will use Client-centered Therapy, Cognitive Behavioral Therapy, Mindfulness-based Strategies and Supportive Psychotherapy to address issues and symptoms as they may arise. .    Behavioral Health Treatment Plan and Discharge Planning: Aubree Trinh is aware of and agrees to continue to work on their treatment plan.  They have identified and are working toward their discharge goals.  yes    Visit start and stop times:    10/04/23  Start Time: 1310  Stop Time: 1400  Total Visit Time: 50 minutes

## 2023-10-11 DIAGNOSIS — B20 HIV DISEASE (HCC): ICD-10-CM

## 2023-10-11 RX ORDER — BICTEGRAVIR SODIUM, EMTRICITABINE, AND TENOFOVIR ALAFENAMIDE FUMARATE 50; 200; 25 MG/1; MG/1; MG/1
1 TABLET ORAL
Qty: 90 TABLET | Refills: 1 | Status: SHIPPED | OUTPATIENT
Start: 2023-10-11

## 2023-10-12 ENCOUNTER — PATIENT OUTREACH (OUTPATIENT)
Dept: SURGERY | Facility: CLINIC | Age: 53
End: 2023-10-12

## 2023-10-12 NOTE — PROGRESS NOTES
got updated by clinic, called ct causee a no show to appointment with cllinic and ct gave an update about wanting to fill out snap cm will do it next week

## 2023-10-26 ENCOUNTER — TELEPHONE (OUTPATIENT)
Dept: PSYCHIATRY | Facility: CLINIC | Age: 53
End: 2023-10-26

## 2023-10-26 NOTE — TELEPHONE ENCOUNTER
This writer left a message informing patient that he insurance is inactive at this time for his appointment with Larissa Mccormick on 10/30/23. Suggested he call or bring in any new insurance information to visit. Also informed him of [facility] pay option and an estimate of the cost. Requested that he call office with any questions.

## 2023-11-02 ENCOUNTER — PATIENT OUTREACH (OUTPATIENT)
Dept: SURGERY | Facility: CLINIC | Age: 53
End: 2023-11-02

## 2023-11-16 ENCOUNTER — TELEPHONE (OUTPATIENT)
Dept: PSYCHIATRY | Facility: CLINIC | Age: 53
End: 2023-11-16

## 2023-11-16 NOTE — TELEPHONE ENCOUNTER
Patient is calling regarding cancelling an appointment. Date/Time: 11/17 @3pm    Reason: Doesn't have insurance, working to get it back    Patient was rescheduled: YES [] NO [x]  If yes, when was Patient reschedule for: N/A    Patient requesting call back to reschedule: YES [x] NO []    Patient will also contact the office when or if there are insurance questions or it is reinstated.

## 2023-12-04 ENCOUNTER — PATIENT OUTREACH (OUTPATIENT)
Dept: SURGERY | Facility: CLINIC | Age: 53
End: 2023-12-04

## 2023-12-04 NOTE — PROGRESS NOTES
Client called CM. Client reports his Medicaid has been discontinued, not sure why since he submitted all required documentation to previously assigned CM. Client asked this CM to meet in order to assist completing new Medicaid application. CM agreed to meet with Client tomorrow (12/5/2023) at 1 pm.  CM asked Client to bring the following documentation:  - State ID  - SSA Card  - SSA Statement of Benefits  - [address]    CM informed Client that he his RW annual recert is due next month but will completed it once he comes in tomorrow. Client in agreement.

## 2023-12-05 ENCOUNTER — PATIENT OUTREACH (OUTPATIENT)
Dept: SURGERY | Facility: CLINIC | Age: 53
End: 2023-12-05

## 2023-12-05 NOTE — PROGRESS NOTES
CM met with Client and his mother. Client reports receiving DHS letter re: SNAP/CHIP application and need for additional information. [facility] requesting letter of support from his mother, copy of bank statement and dhs form 1902 signed by Client. CM assisted Client complete dhs form 1902, attached supportive documentation and mailing to [facility] office located at:  [address] [place] [address] [address], [address] [facility]    Client reports the following needs:  [address] Sliding Fee Scale to continue with dental treatment needs. Annual eye exam.  Applying for SSI/SSDI. Client reports applied for SSI many years ago but got denied. Client's last employment was five years ago at [employer]. Client has not been able to find job since. [employer] service needs/referral discussed. Client declined, reports unable to keep job due to multiple medical conditions, specially related to body aches and pains. CM assisted Client request SSI appointment via ssa. gov website portal. Client will received an appointment via mail within 5 to 10 business days. Client agree to inform this CM once letter is received. Annual re-certification forms completed:  - RW Part-B Payer of Last [employer]  - RW Part-B Acuity Scale  - JOCY for DHS and SSA  - [facility] Sliding Fee Scale    Client's mother provided letter of support. HIV and Psych treatment adherence discussed. Client reports adherence to HIV and Psych treatment. No other needs reported.

## 2023-12-19 ENCOUNTER — PATIENT OUTREACH (OUTPATIENT)
Dept: SURGERY | Facility: CLINIC | Age: 53
End: 2023-12-19

## 2023-12-19 NOTE — PROGRESS NOTES
Client called CM.  Client reports received call from [facility] office informing him that his SNAP/medicaid benefits were discontinued and encouraged to reapply again online or in person.     Client asked CM for assistance applying for SNAP/medicaid.    CM assisted Client creating online [employer] account, completing/submitting snap/medicaid application and uploading supportive documents.    Client pleased with assistance.  No other needs reported.    Epic Goal updated.  Epic Assessment completed.

## 2024-01-03 ENCOUNTER — PATIENT OUTREACH (OUTPATIENT)
Dept: SURGERY | Facility: CLINIC | Age: 54
End: 2024-01-03

## 2024-01-03 NOTE — PROGRESS NOTES
CM called Client to follow up with medicaid/snap application.  Client reports received DHS letter stating that his application was approved.  Client also reports received call from DHS worker informing him that his snap benefits were reinstated.  No other needs discussed.    CM updated annual screening in careware.

## 2024-01-23 DIAGNOSIS — K58.2 IRRITABLE BOWEL SYNDROME WITH BOTH CONSTIPATION AND DIARRHEA: ICD-10-CM

## 2024-01-23 DIAGNOSIS — R39.14 BENIGN PROSTATIC HYPERPLASIA WITH INCOMPLETE BLADDER EMPTYING: ICD-10-CM

## 2024-01-23 DIAGNOSIS — N40.1 BENIGN PROSTATIC HYPERPLASIA WITH INCOMPLETE BLADDER EMPTYING: ICD-10-CM

## 2024-01-24 RX ORDER — TAMSULOSIN HYDROCHLORIDE 0.4 MG/1
CAPSULE ORAL
Qty: 90 CAPSULE | Refills: 1 | Status: SHIPPED | OUTPATIENT
Start: 2024-01-24

## 2024-01-24 RX ORDER — DICYCLOMINE HCL 20 MG
TABLET ORAL
Qty: 60 TABLET | Refills: 2 | Status: SHIPPED | OUTPATIENT
Start: 2024-01-24

## 2024-01-29 DIAGNOSIS — K21.00 GASTROESOPHAGEAL REFLUX DISEASE WITH ESOPHAGITIS: ICD-10-CM

## 2024-01-29 RX ORDER — OMEPRAZOLE 40 MG/1
CAPSULE, DELAYED RELEASE ORAL
Qty: 30 CAPSULE | Refills: 5 | Status: SHIPPED | OUTPATIENT
Start: 2024-01-29

## 2024-01-31 ENCOUNTER — PATIENT OUTREACH (OUTPATIENT)
Dept: SURGERY | Facility: CLINIC | Age: 54
End: 2024-01-31

## 2024-01-31 NOTE — PROGRESS NOTES
Cm conducted chart review to find recert + SF scale expiration dates. Cm mailed out introduction letter.

## 2024-02-08 ENCOUNTER — PATIENT OUTREACH (OUTPATIENT)
Dept: SURGERY | Facility: CLINIC | Age: 54
End: 2024-02-08

## 2024-02-08 ENCOUNTER — TELEPHONE (OUTPATIENT)
Dept: SURGERY | Facility: CLINIC | Age: 54
End: 2024-02-08

## 2024-02-12 ENCOUNTER — TELEPHONE (OUTPATIENT)
Dept: SURGERY | Facility: CLINIC | Age: 54
End: 2024-02-12

## 2024-02-12 DIAGNOSIS — N52.8 OTHER MALE ERECTILE DYSFUNCTION: ICD-10-CM

## 2024-02-12 RX ORDER — TADALAFIL 2.5 MG/1
TABLET ORAL
Qty: 10 TABLET | Refills: 2 | Status: SHIPPED | OUTPATIENT
Start: 2024-02-12

## 2024-02-13 ENCOUNTER — PATIENT OUTREACH (OUTPATIENT)
Dept: SURGERY | Facility: CLINIC | Age: 54
End: 2024-02-13

## 2024-02-15 ENCOUNTER — TELEPHONE (OUTPATIENT)
Dept: SURGERY | Facility: CLINIC | Age: 54
End: 2024-02-15

## 2024-02-19 ENCOUNTER — TELEPHONE (OUTPATIENT)
Dept: SURGERY | Facility: CLINIC | Age: 54
End: 2024-02-19

## 2024-02-19 ENCOUNTER — PATIENT OUTREACH (OUTPATIENT)
Dept: SURGERY | Facility: CLINIC | Age: 54
End: 2024-02-19

## 2024-02-19 DIAGNOSIS — B20 HIV DISEASE (HCC): Primary | ICD-10-CM

## 2024-02-19 DIAGNOSIS — D72.89 OTHER SPECIFIED DISORDERS OF WHITE BLOOD CELLS: ICD-10-CM

## 2024-02-19 DIAGNOSIS — Z11.3 ENCOUNTER FOR SCREENING FOR BACTERIAL SEXUALLY TRANSMITTED DISEASE: ICD-10-CM

## 2024-02-19 DIAGNOSIS — Z72.89 OTHER PROBLEMS RELATED TO LIFESTYLE: ICD-10-CM

## 2024-02-19 DIAGNOSIS — Z20.2 CONTACT WITH AND (SUSPECTED) EXPOSURE TO INFECTIONS WITH A PREDOMINANTLY SEXUAL MODE OF TRANSMISSION: ICD-10-CM

## 2024-02-19 NOTE — TELEPHONE ENCOUNTER
Spoke with patient and scheduled him to see ID Provider. He states he will go tomorrow. He is scheduled for 2/28 at 5:30pm.

## 2024-02-20 ENCOUNTER — APPOINTMENT (OUTPATIENT)
Dept: LAB | Age: 54
End: 2024-02-20
Payer: COMMERCIAL

## 2024-02-20 DIAGNOSIS — A52.8 LATE LATENT SYPHILIS: ICD-10-CM

## 2024-02-20 DIAGNOSIS — Z79.899 HIGH RISK MEDICATION USE: ICD-10-CM

## 2024-02-20 LAB
ALBUMIN SERPL BCP-MCNC: 4.2 G/DL (ref 3.5–5)
ALP SERPL-CCNC: 87 U/L (ref 34–104)
ALT SERPL W P-5'-P-CCNC: 17 U/L (ref 7–52)
ANION GAP SERPL CALCULATED.3IONS-SCNC: 8 MMOL/L
AST SERPL W P-5'-P-CCNC: 19 U/L (ref 13–39)
BASOPHILS # BLD AUTO: 0.03 THOUSANDS/ÂΜL (ref 0–0.1)
BASOPHILS NFR BLD AUTO: 0 % (ref 0–1)
BILIRUB SERPL-MCNC: 0.46 MG/DL (ref 0.2–1)
BILIRUB UR QL STRIP: NEGATIVE
BUN SERPL-MCNC: 15 MG/DL (ref 5–25)
CALCIUM SERPL-MCNC: 9.3 MG/DL (ref 8.4–10.2)
CHLORIDE SERPL-SCNC: 98 MMOL/L (ref 96–108)
CHOLEST SERPL-MCNC: 169 MG/DL
CLARITY UR: CLEAR
CO2 SERPL-SCNC: 29 MMOL/L (ref 21–32)
COLOR UR: COLORLESS
CREAT SERPL-MCNC: 0.91 MG/DL (ref 0.6–1.3)
EOSINOPHIL # BLD AUTO: 0.14 THOUSAND/ÂΜL (ref 0–0.61)
EOSINOPHIL NFR BLD AUTO: 2 % (ref 0–6)
ERYTHROCYTE [DISTWIDTH] IN BLOOD BY AUTOMATED COUNT: 15.2 % (ref 11.6–15.1)
EST. AVERAGE GLUCOSE BLD GHB EST-MCNC: 126 MG/DL
GFR SERPL CREATININE-BSD FRML MDRD: 94 ML/MIN/1.73SQ M
GLUCOSE P FAST SERPL-MCNC: 89 MG/DL (ref 65–99)
GLUCOSE UR STRIP-MCNC: NEGATIVE MG/DL
HBA1C MFR BLD: 6 %
HCT VFR BLD AUTO: 38.4 % (ref 36.5–49.3)
HDLC SERPL-MCNC: 59 MG/DL
HGB BLD-MCNC: 12.6 G/DL (ref 12–17)
HGB UR QL STRIP.AUTO: NEGATIVE
IMM GRANULOCYTES # BLD AUTO: 0.04 THOUSAND/UL (ref 0–0.2)
IMM GRANULOCYTES NFR BLD AUTO: 1 % (ref 0–2)
KETONES UR STRIP-MCNC: NEGATIVE MG/DL
LDLC SERPL CALC-MCNC: 97 MG/DL (ref 0–100)
LEUKOCYTE ESTERASE UR QL STRIP: NEGATIVE
LYMPHOCYTES # BLD AUTO: 3.09 THOUSANDS/ÂΜL (ref 0.6–4.47)
LYMPHOCYTES NFR BLD AUTO: 41 % (ref 14–44)
MCH RBC QN AUTO: 27.6 PG (ref 26.8–34.3)
MCHC RBC AUTO-ENTMCNC: 32.8 G/DL (ref 31.4–37.4)
MCV RBC AUTO: 84 FL (ref 82–98)
MONOCYTES # BLD AUTO: 0.65 THOUSAND/ÂΜL (ref 0.17–1.22)
MONOCYTES NFR BLD AUTO: 9 % (ref 4–12)
NEUTROPHILS # BLD AUTO: 3.55 THOUSANDS/ÂΜL (ref 1.85–7.62)
NEUTS SEG NFR BLD AUTO: 47 % (ref 43–75)
NITRITE UR QL STRIP: NEGATIVE
NRBC BLD AUTO-RTO: 0 /100 WBCS
PH UR STRIP.AUTO: 7 [PH]
PLATELET # BLD AUTO: 340 THOUSANDS/UL (ref 149–390)
PMV BLD AUTO: 8 FL (ref 8.9–12.7)
POTASSIUM SERPL-SCNC: 4.1 MMOL/L (ref 3.5–5.3)
PROT SERPL-MCNC: 7.2 G/DL (ref 6.4–8.4)
PROT UR STRIP-MCNC: NEGATIVE MG/DL
RBC # BLD AUTO: 4.56 MILLION/UL (ref 3.88–5.62)
RPR SER QL: REACTIVE
RPR SER-TITR: ABNORMAL {TITER}
SODIUM SERPL-SCNC: 135 MMOL/L (ref 135–147)
SP GR UR STRIP.AUTO: 1.01 (ref 1–1.03)
TREPONEMA PALLIDUM IGG+IGM AB [PRESENCE] IN SERUM OR PLASMA BY IMMUNOASSAY: REACTIVE
TRIGL SERPL-MCNC: 65 MG/DL
UROBILINOGEN UR STRIP-ACNC: <2 MG/DL
WBC # BLD AUTO: 7.5 THOUSAND/UL (ref 4.31–10.16)

## 2024-02-20 PROCEDURE — 86592 SYPHILIS TEST NON-TREP QUAL: CPT

## 2024-02-20 PROCEDURE — 80061 LIPID PANEL: CPT

## 2024-02-20 PROCEDURE — 86780 TREPONEMA PALLIDUM: CPT

## 2024-02-20 PROCEDURE — 86593 SYPHILIS TEST NON-TREP QUANT: CPT

## 2024-02-20 PROCEDURE — 36415 COLL VENOUS BLD VENIPUNCTURE: CPT

## 2024-02-21 LAB
BASOPHILS # BLD AUTO: 0 X10E3/UL (ref 0–0.2)
BASOPHILS NFR BLD AUTO: 1 %
C TRACH DNA SPEC QL NAA+PROBE: NEGATIVE
CD3+CD4+ CELLS # BLD: 1078 /UL (ref 359–1519)
CD3+CD4+ CELLS NFR BLD: 33.7 % (ref 30.8–58.5)
EOSINOPHIL # BLD AUTO: 0.1 X10E3/UL (ref 0–0.4)
EOSINOPHIL NFR BLD AUTO: 2 %
ERYTHROCYTE [DISTWIDTH] IN BLOOD BY AUTOMATED COUNT: 15.5 % (ref 11.6–15.4)
HCT VFR BLD AUTO: 39.2 % (ref 37.5–51)
HGB BLD-MCNC: 12.6 G/DL (ref 13–17.7)
HIV1 RNA # PLAS NAA DL=20: NOT DETECTED {COPIES}/ML
IMM GRANULOCYTES # BLD: 0 X10E3/UL (ref 0–0.1)
IMM GRANULOCYTES NFR BLD: 1 %
LYMPHOCYTES # BLD AUTO: 3.2 X10E3/UL (ref 0.7–3.1)
LYMPHOCYTES NFR BLD AUTO: 41 %
MCH RBC QN AUTO: 27.9 PG (ref 26.6–33)
MCHC RBC AUTO-ENTMCNC: 32.1 G/DL (ref 31.5–35.7)
MCV RBC AUTO: 87 FL (ref 79–97)
MONOCYTES # BLD AUTO: 0.6 X10E3/UL (ref 0.1–0.9)
MONOCYTES NFR BLD AUTO: 8 %
N GONORRHOEA DNA SPEC QL NAA+PROBE: NEGATIVE
NEUTROPHILS # BLD AUTO: 3.7 X10E3/UL (ref 1.4–7)
NEUTROPHILS NFR BLD AUTO: 47 %
PLATELET # BLD AUTO: 332 X10E3/UL (ref 150–450)
RBC # BLD AUTO: 4.51 X10E6/UL (ref 4.14–5.8)
WBC # BLD AUTO: 7.7 X10E3/UL (ref 3.4–10.8)

## 2024-02-23 ENCOUNTER — PATIENT OUTREACH (OUTPATIENT)
Dept: SURGERY | Facility: CLINIC | Age: 54
End: 2024-02-23

## 2024-02-27 ENCOUNTER — PATIENT OUTREACH (OUTPATIENT)
Dept: SURGERY | Facility: CLINIC | Age: 54
End: 2024-02-27

## 2024-02-27 DIAGNOSIS — F31.81 BIPOLAR 2 DISORDER, MAJOR DEPRESSIVE EPISODE (HCC): ICD-10-CM

## 2024-02-27 DIAGNOSIS — J30.2 SEASONAL ALLERGIES: ICD-10-CM

## 2024-02-28 ENCOUNTER — DOCUMENTATION (OUTPATIENT)
Dept: SURGERY | Facility: CLINIC | Age: 54
End: 2024-02-28

## 2024-02-28 ENCOUNTER — OFFICE VISIT (OUTPATIENT)
Dept: SURGERY | Facility: CLINIC | Age: 54
End: 2024-02-28
Payer: COMMERCIAL

## 2024-02-28 VITALS
OXYGEN SATURATION: 97 % | WEIGHT: 173.6 LBS | TEMPERATURE: 97.1 F | SYSTOLIC BLOOD PRESSURE: 118 MMHG | HEIGHT: 67 IN | RESPIRATION RATE: 18 BRPM | HEART RATE: 72 BPM | DIASTOLIC BLOOD PRESSURE: 79 MMHG | BODY MASS INDEX: 27.25 KG/M2

## 2024-02-28 DIAGNOSIS — F31.81 BIPOLAR 2 DISORDER, MAJOR DEPRESSIVE EPISODE (HCC): ICD-10-CM

## 2024-02-28 DIAGNOSIS — T65.222D TOXIC EFFECT OF TOBACCO CIGARETTE, INTENTIONAL SELF-HARM, SUBSEQUENT ENCOUNTER: ICD-10-CM

## 2024-02-28 DIAGNOSIS — D63.8 ANEMIA IN OTHER CHRONIC DISEASES CLASSIFIED ELSEWHERE: ICD-10-CM

## 2024-02-28 DIAGNOSIS — R74.8 ELEVATED ALKALINE PHOSPHATASE LEVEL: ICD-10-CM

## 2024-02-28 DIAGNOSIS — R73.03 PREDIABETES: ICD-10-CM

## 2024-02-28 DIAGNOSIS — R21 GENERALIZED MACULOPAPULAR RASH: ICD-10-CM

## 2024-02-28 DIAGNOSIS — Z23 NEED FOR INFLUENZA VACCINATION: ICD-10-CM

## 2024-02-28 DIAGNOSIS — J30.2 SEASONAL ALLERGIES: ICD-10-CM

## 2024-02-28 DIAGNOSIS — B20 HIV DISEASE (HCC): Primary | ICD-10-CM

## 2024-02-28 DIAGNOSIS — E83.52 HYPERCALCEMIA: ICD-10-CM

## 2024-02-28 DIAGNOSIS — Z11.1 SCREENING-PULMONARY TB: ICD-10-CM

## 2024-02-28 DIAGNOSIS — A52.8 LATE LATENT SYPHILIS: ICD-10-CM

## 2024-02-28 DIAGNOSIS — L73.9 FOLLICULITIS: ICD-10-CM

## 2024-02-28 DIAGNOSIS — K08.9 POOR DENTITION: ICD-10-CM

## 2024-02-28 DIAGNOSIS — B20 HIV DISEASE (HCC): ICD-10-CM

## 2024-02-28 PROCEDURE — 99215 OFFICE O/P EST HI 40 MIN: CPT | Performed by: INTERNAL MEDICINE

## 2024-02-28 PROCEDURE — 90471 IMMUNIZATION ADMIN: CPT

## 2024-02-28 PROCEDURE — 90686 IIV4 VACC NO PRSV 0.5 ML IM: CPT

## 2024-02-28 RX ORDER — FLUTICASONE PROPIONATE 50 MCG
SPRAY, SUSPENSION (ML) NASAL
Qty: 16 G | Refills: 5 | Status: SHIPPED | OUTPATIENT
Start: 2024-02-28

## 2024-02-28 RX ORDER — FLUTICASONE PROPIONATE 50 MCG
SPRAY, SUSPENSION (ML) NASAL
Qty: 16 G | Refills: 5 | Status: SHIPPED | OUTPATIENT
Start: 2024-02-28 | End: 2024-02-28

## 2024-02-28 RX ORDER — BICTEGRAVIR SODIUM, EMTRICITABINE, AND TENOFOVIR ALAFENAMIDE FUMARATE 50; 200; 25 MG/1; MG/1; MG/1
1 TABLET ORAL
Qty: 90 TABLET | Refills: 1 | Status: SHIPPED | OUTPATIENT
Start: 2024-02-28

## 2024-02-28 RX ORDER — OLANZAPINE 10 MG/1
10 TABLET ORAL
Qty: 30 TABLET | Refills: 2 | Status: SHIPPED | OUTPATIENT
Start: 2024-02-28

## 2024-02-28 NOTE — PROGRESS NOTES
Progress Note - Infectious Disease   Portillo Pendleton 53 y.o. male MRN: [id number]  Unit/Bed#:  Encounter: [id number]      Impression/Plan:  1.  HIV.  Doing well on Biktarvy with a near undetectable viral load and CD4 count of greater than 1000.  Continue ART, recheck CBC with differential, CMP, HIV RNA, and CD4 in 5 months to make sure no developing toxicities or treatment failure and follow-up in 6 months.  Stressed adherence     2.  Possible folliculitis.  Culture proven MSSA and group B strep isolated in the past. Status post treatment course with Doxy.  Attempted using dilute bleach baths without apparent improvement in kidney according to the patient.  The lesions keep jumping from 1 area to the other after they go away.  I suspect the excoriation is playing at least some role.  Refer back to dermatology.     3.  Nicotine dependence.  Stressed the importance of complete tobacco cessation     4.  Poor dentition.  Unfortunately has been nonadherent with his dental follow-up.     5.  Bipolar disorder.  Needs mental health follow-up     6.  Latent syphilis.  Status post treatment x3 doses with benzathine penicillin and his RPR has dropped from 1: 64-1: 2.  Repeat RPR 1: 4.  We will continue to monitor the RPR     7.  GERD.    8.  Prediabetes.  Refer back to the primary    Patient was provided medication, adherence and prevention education    Subjective:  Routine follow-up for HIV.  Patient claims 100% adherence with bictegravir. Patient denies any notable side effects.  Overall the feeling well.  The patient denies any fever chills or sweats, denies any nausea vomiting or diarrhea, denies any cough or shortness of breath.    ROS:  A complete review of systems is negative other than that noted above in the subjective    Followup portions patient history reviewed and updated as:  Allergies, current medications, past medical history, past social history, past surgical history, and the problem  "list    Objective:  Vitals:  Vitals:    02/28/24 1732   BP: 118/79   BP Location: Right arm   Patient Position: Sitting   Cuff Size: Standard   Pulse: 72   Resp: 18   Temp: (!) 97.1 °F (36.2 °C)   TempSrc: Tympanic   SpO2: 97%   Weight: 78.7 kg (173 lb 9.6 oz)   Height: 5' 7\" (1.702 m)       Physical Exam:   General Appearance:  Alert, interactive, appearing well,  nontoxic, no acute distress.   Neck:   Supple without lymphadenopathy, no thyromegaly or masses   Throat: Oropharynx moist without lesions.    Lungs:   Clear to auscultation bilaterally; no wheezes, rhonchi or rales; respirations unlabored   Heart:  RRR; no murmur, rub or gallop   Abdomen:   Soft, non-tender, non-distended, positive bowel sounds.     Extremities: No clubbing, cyanosis or edema   Skin: No new rashes or lesions. No draining wounds noted.       Labs, Imaging, & Other studies:   All pertinent labs and imaging studies were personally reviewed    Lab Results   Component Value Date     12/30/2015    K 4.1 02/20/2024    CL 98 02/20/2024    CO2 29 02/20/2024    ANIONGAP 9 12/30/2015    BUN 15 02/20/2024    CREATININE 0.91 02/20/2024    GLUCOSE 124 12/30/2015    GLUF 89 02/20/2024    CALCIUM 9.3 02/20/2024    CORRECTEDCA 10.3 (H) 05/16/2022    AST 19 02/20/2024    ALT 17 02/20/2024    ALKPHOS 87 02/20/2024    PROT 8.4 (H) 12/30/2015    BILITOT 0.37 12/30/2015    EGFR 94 02/20/2024     Lab Results   Component Value Date    WBC 7.50 02/20/2024    WBC 7.7 02/20/2024    HGB 12.6 02/20/2024    HGB 12.6 (L) 02/20/2024    HCT 38.4 02/20/2024    HCT 39.2 02/20/2024    MCV 84 02/20/2024    MCV 87 02/20/2024     02/20/2024     02/20/2024     Lab Results   Component Value Date    HEPCAB Non-reactive 08/07/2023     Lab Results   Component Value Date    HEPCAB Non-reactive 08/07/2023     Lab Results   Component Value Date    RPR Reactive (A) 02/20/2024    RPR Reactive 4 dils (A) 02/20/2024     CD4 ABS   Date/Time Value Ref Range Status "   02/20/2024 08:23 AM 1078 359 - 1519 /uL Final     HIV-1 [facility]   Date/Time Value Ref Range Status   02/20/2024 08:23 AM Not Detected Not Detected Final         Current Outpatient Medications:     atorvastatin (LIPITOR) 10 mg tablet, TAKE 1 TABLET BY MOUTH DAILY, Disp: 90 tablet, Rfl: 1    bictegravir-emtricitab-tenofovir alafenamide (Biktarvy) -25 MG tablet, TAKE 1 TABLET BY MOUTH DAILY WITH BREAKFAST, Disp: 90 tablet, Rfl: 1    clindamycin (CLEOCIN T) 1 % external solution, Start clindamycin solution twice a day to buttocks area., Disp: 60 mL, Rfl: 8    dicyclomine (BENTYL) 20 mg tablet, TAKE 1 TABLET BY MOUTH TWICE A DAY BEFORE MEALS, Disp: 60 tablet, Rfl: 2    FLUoxetine (PROzac) 40 MG capsule, Take 2 capsules (80 mg total) by mouth daily, Disp: 60 capsule, Rfl: 3    fluticasone (FLONASE) 50 mcg/act nasal spray, USE 1 SPRAY IN EACH NOSTRIL DAILY, Disp: 16 g, Rfl: 5    hydrOXYzine HCL (ATARAX) 25 mg tablet, TAKE 1 TABLET BY MOUTH EVERY SIX HOURS AS NEEDED FOR ITCH, Disp: 90 tablet, Rfl: 0    lidocaine (LMX) 4 % cream, APPLY TOPICALLY AS NEEDED FOR MILD PAIN, Disp: 30 g, Rfl: 0    loratadine (CLARITIN) 10 mg tablet, Take 1 tablet (10 mg total) by mouth daily, Disp: 30 tablet, Rfl: 2    montelukast (SINGULAIR) 10 mg tablet, TAKE 1 TABLET BY MOUTH DAILY AT BEDTIME (Patient not taking: Reported on 2/28/2024), Disp: 90 tablet, Rfl: 1    OLANZapine (ZyPREXA) 10 mg tablet, TAKE 1 TABLET BY MOUTH DAILY AT BEDTIME, Disp: 30 tablet, Rfl: 2    omeprazole (PriLOSEC) 40 MG capsule, TAKE 1 CAPSULE BY MOUTH DAILY, Disp: 30 capsule, Rfl: 5    tadalafil (CIALIS) 2.5 MG tablet, TAKE 1 TABLET BY MOUTH (2.5MG TOTAL) DAILY AS NEEDED FOR ERECTILE DYSFUNCTION, Disp: 10 tablet, Rfl: 2    tamsulosin (FLOMAX) 0.4 mg, TAKE 1 CAPSULE BY MOUTH DAILY WITH DINNER, Disp: 90 capsule, Rfl: 1    triamcinolone (KENALOG) 0.1 % ointment, Apply twice daily for up to 2 weeks to itchy spots. It is important to take at least 1 week break between 2  week uses. Do NOT use on face, armpits, or groin., Disp: 80 g, Rfl: 1

## 2024-02-29 NOTE — PROGRESS NOTES
RD attempted to completed Portillo Pendleton 's annual nutrition assessment per Yunior White robert requirements, however pt adamantly refused.

## 2024-03-04 ENCOUNTER — PATIENT OUTREACH (OUTPATIENT)
Dept: SURGERY | Facility: CLINIC | Age: 54
End: 2024-03-04

## 2024-03-04 NOTE — PROGRESS NOTES
Cm called ct to check-in. Ct answered and states that insurance is all sorted out. Cm and ct agreed to meet tomorrow at 9:30 to do sliding fee scale application. Ct states that he does not receive income and that his parents support him. Cm found a support letter in media that can be used for sliding fee scale application.

## 2024-03-05 ENCOUNTER — PATIENT OUTREACH (OUTPATIENT)
Dept: SURGERY | Facility: CLINIC | Age: 54
End: 2024-03-05

## 2024-03-05 NOTE — PROGRESS NOTES
Ct presented to office to do sliding fee scale application. Cm emailed application to the financial counselors.  Ct states that he is still waiting on his food stamps to be reloaded but states that he can call them himself to follow-up. Ct states that is doing well otherwise.

## 2024-03-19 ENCOUNTER — PATIENT OUTREACH (OUTPATIENT)
Dept: SURGERY | Facility: CLINIC | Age: 54
End: 2024-03-19

## 2024-03-21 DIAGNOSIS — F31.81 BIPOLAR 2 DISORDER, MAJOR DEPRESSIVE EPISODE (HCC): ICD-10-CM

## 2024-03-21 DIAGNOSIS — F41.1 GAD (GENERALIZED ANXIETY DISORDER): ICD-10-CM

## 2024-03-22 RX ORDER — FLUOXETINE HYDROCHLORIDE 40 MG/1
80 CAPSULE ORAL DAILY
Qty: 60 CAPSULE | Refills: 3 | Status: SHIPPED | OUTPATIENT
Start: 2024-03-22

## 2024-03-26 ENCOUNTER — OFFICE VISIT (OUTPATIENT)
Dept: SURGERY | Facility: CLINIC | Age: 54
End: 2024-03-26
Payer: COMMERCIAL

## 2024-03-26 ENCOUNTER — DOCUMENTATION (OUTPATIENT)
Dept: SURGERY | Facility: CLINIC | Age: 54
End: 2024-03-26

## 2024-03-26 VITALS
SYSTOLIC BLOOD PRESSURE: 137 MMHG | TEMPERATURE: 97.8 F | BODY MASS INDEX: 27.91 KG/M2 | OXYGEN SATURATION: 99 % | DIASTOLIC BLOOD PRESSURE: 86 MMHG | RESPIRATION RATE: 18 BRPM | HEIGHT: 67 IN | WEIGHT: 177.8 LBS | HEART RATE: 96 BPM

## 2024-03-26 DIAGNOSIS — R73.03 PREDIABETES: ICD-10-CM

## 2024-03-26 DIAGNOSIS — K58.2 IRRITABLE BOWEL SYNDROME WITH BOTH CONSTIPATION AND DIARRHEA: ICD-10-CM

## 2024-03-26 DIAGNOSIS — J30.2 SEASONAL ALLERGIES: ICD-10-CM

## 2024-03-26 DIAGNOSIS — A49.01 MSSA INFECTION, NON-INVASIVE: ICD-10-CM

## 2024-03-26 DIAGNOSIS — Z00.00 ANNUAL PHYSICAL EXAM: ICD-10-CM

## 2024-03-26 DIAGNOSIS — L73.9 FOLLICULITIS: ICD-10-CM

## 2024-03-26 DIAGNOSIS — B20 HIV DISEASE (HCC): Primary | ICD-10-CM

## 2024-03-26 DIAGNOSIS — R21 GENERALIZED MACULOPAPULAR RASH: ICD-10-CM

## 2024-03-26 DIAGNOSIS — R21 RASH: ICD-10-CM

## 2024-03-26 DIAGNOSIS — A52.8 LATE LATENT SYPHILIS: ICD-10-CM

## 2024-03-26 DIAGNOSIS — Z12.5 SCREENING FOR PROSTATE CANCER: ICD-10-CM

## 2024-03-26 PROCEDURE — 99396 PREV VISIT EST AGE 40-64: CPT | Performed by: NURSE PRACTITIONER

## 2024-03-26 RX ORDER — DICYCLOMINE HCL 20 MG
40 TABLET ORAL
Qty: 120 TABLET | Refills: 0 | Status: SHIPPED | OUTPATIENT
Start: 2024-03-26

## 2024-03-26 RX ORDER — MONTELUKAST SODIUM 10 MG/1
10 TABLET ORAL
Qty: 90 TABLET | Refills: 1 | Status: SHIPPED | OUTPATIENT
Start: 2024-03-26

## 2024-03-26 RX ORDER — HYDROXYZINE HYDROCHLORIDE 25 MG/1
25 TABLET, FILM COATED ORAL 3 TIMES DAILY
Qty: 90 TABLET | Refills: 0 | Status: SHIPPED | OUTPATIENT
Start: 2024-03-26

## 2024-03-26 NOTE — ASSESSMENT & PLAN NOTE
S/P treatment with Bicillin 3 injections with RPR at 1:64 dils.  RPR now 1;4 dils up from 1:2 dils.  He had a 1 log increase if he has any further increase in dils he will require treatment again.  RPR titer with next labs  Reminder to use condoms

## 2024-03-26 NOTE — ASSESSMENT & PLAN NOTE
Ongoing.  Reviewed skin dx and dermatology note with pt.  Does not want to try Hibiclens again.  Rash does not appear as last time.  Noted on anterior chest with macular and erythremic.   Refer back to Dermatology  Willing to try the diluted bleach bath again  Hydroxyzine 25 mg TID as need for pruritic  Discussed this may be related to drug he is taking

## 2024-03-26 NOTE — ASSESSMENT & PLAN NOTE
A1C now at prediabetic range up from 5.5.  Stressed importance of exercising for 30 minutes 5 days a week per recommended guidelines.  Stressed importance of eating a diet low in fat, carbohydrates, cholesterol, sugar intake, monitor portion control and eating more fruits and vegetables.   Follow with [facility] dietician  Recheck A1C

## 2024-03-26 NOTE — PROGRESS NOTES
ADULT ANNUAL PHYSICAL  [facility] - ASC AT [facility]    NAME: Portillo Pendleton  AGE: 53 y.o. SEX: male  : 1970     DATE: 3/26/2024     Assessment and Plan:     Problem List Items Addressed This Visit          Musculoskeletal and Integument    Folliculitis     Ongoing with   Had followed with dermatology and proven on BX with MSSA and group B strep isolates.  Has had several courses of doxycycline with no resolution of symptoms.  Had lost insurance in past.  Has tried diluted bleach bath with no real relief.  Per dermatology could be related to certain medications: omeprazole, olanzapine, Biktarvy, and propanolol   Hibiclens bath 2 X weekly  Refer back to Dermatology for further workup and poss bx again              Other    HIV disease (HCC) - Primary     Doing well on Biktarvy with an undetectable VL.  Pt reports 100% medication compliance on HAART.  Stressed the importance of 100% medication adherence  Monitor CD4. HIV RNA, CMP, and CBCD for virologic response and drug toxicities  Follow up with Dr. Thakur or Dr. Ferrari   HIV Counseling:    Viral Load: not detected    CD4 Count: 1078      Denies side effects.  Stressed the importance of adherence.  Continue follow up in the ID clinic with Dr. Thakur or Dr. Ferrari.    Reviewed the most recent labs, including the CD4 and viral load.  Discussed the risks and benefits of treatment options, instructions for management, importance of treatment adherence, and reduction of risk factors.  Educated on possible medication side effects.    Counseled on routes of HIV transmission, including the risk of  infection.  Emphasized that viral suppression is the best method to prevent HIV transmission.  At this time the pt denies the need for HIV testing of anyone in their life.    Total encounter time was greater than 35 minutes.  Greater than 20 minutes were spent on counseling and patient education.  Pt  voices understanding and agreement with treatment plan.           Prediabetes     A1C now at prediabetic range up from 5.5.  Stressed importance of exercising for 30 minutes 5 days a week per recommended guidelines.  Stressed importance of eating a diet low in fat, carbohydrates, cholesterol, sugar intake, monitor portion control and eating more fruits and vegetables.   Follow with [place] dietician  Recheck A1C         Late latent syphilis     S/P treatment with Bicillin 3 injections with RPR at 1:64 dils.  RPR now 1;4 dils up from 1:2 dils.  He had a 1 log increase if he has any further increase in dils he will require treatment again.  RPR titer with next labs  Reminder to use condoms          Other Visit Diagnoses       Rash        MSSA infection, non-invasive                Immunizations and preventive care screenings were discussed with patient today. Appropriate education was printed on patient's after visit summary.    Discussed risks and benefits of prostate cancer screening. We discussed the controversial history of PSA screening for prostate cancer in the [place] as well as the risk of over detection and over treatment of prostate cancer by way of PSA screening.  The patient understands that PSA blood testing is an imperfect way to screen for prostate cancer and that elevated PSA levels in the blood may also be caused by infection, inflammation, prostatic trauma or manipulation, urological procedures, or by benign prostatic enlargement.    The role of the digital rectal examination in prostate cancer screening was also discussed and I discussed with him that there is large interobserver variability in the findings of digital rectal examination.    Counseling:  Alcohol/drug use: discussed moderation in alcohol intake, the recommendations for healthy alcohol use, and avoidance of illicit drug use.  Dental Health: discussed importance of regular tooth brushing, flossing, and dental visits.  Injury  prevention: discussed safety/seat belts, safety helmets, smoke detectors, carbon dioxide detectors, and smoking near bedding or upholstery.  Sexual health: discussed sexually transmitted diseases, partner selection, use of condoms, avoidance of unintended pregnancy, and contraceptive alternatives.  Exercise: the importance of regular exercise/physical activity was discussed. Recommend exercise 3-5 times per week for at least 30 minutes.     BMI Counseling: Body mass index is 27.85 kg/m². The BMI is above normal. Exercise recommendations include exercising 3-5 times per week.     Tobacco Cessation Counseling: The patient is sincerely urged to quit consumption of tobacco. He is not ready to quit tobacco.       No follow-ups on file.     Chief Complaint:   Portillo presents for annual physical and for management of HIV.  PMH: HIV, GERD, dyslipidemia, elevated alk phosphate, poor dentition, folliculitis, gingivitis, asthma, OA spine, BPH, abdominal bloating, bipolar, ED, SOPHIA, ganglion cyst right foot, syphilis, and smoker.  Portillo reports he has been ok.  He was able to see the rash pop out on his chest and does move around.  He is frustrated by the rash and they it has not resolved. He know as insurance and needs to go to an     He does not endorse any fever, chills, nausea, vomiting, cough, SOB, diarrhea, or night sweats.  Chief Complaint   Patient presents with    Annual Exam     Patient wants to address his skin issues.      History of Present Illness:     Adult Annual Physical   Patient here for a comprehensive physical exam. The patient reports problems - rash that is beyond irritating .    Diet and Physical Activity  Diet/Nutrition: limited fruits/vegetables, adequate fiber intake, and not enough fish lately .   Exercise: no formal exercise.      Depression Screening  PHQ-2/9 Depression Screening           General Health  Sleep: sleeps well and gets 7-8 hours of sleep on average.   Hearing: normal -  bilateral.  Vision: vision problems: colors have changed on me and sometimes for a minute or two where parts of it are blurry or not clear this is sporadic and most recent eye exam >1 year ago.   Dental: no dental visits for >1 year, brushes teeth once daily, and flosses teeth occasionally.        Health  Symptoms include: none    Advanced Care Planning  Do you have an advanced directive? yes  Do you have a durable medical power of ? no  ACP document given to patient? no     Review of Systems:     Review of Systems   Constitutional: Negative.    HENT: Negative.     Respiratory: Negative.     Cardiovascular: Negative.    Gastrointestinal: Negative.    Genitourinary: Negative.    Musculoskeletal: Negative.    Skin:  Positive for rash.   Neurological: Negative.    Psychiatric/Behavioral: Negative.        Past Medical History:     Past Medical History:   Diagnosis Date    Anxiety     Last Assessed: 7/18/2016     Dysuria     Last Assessed: 9/14/2015    Fecal urgency     Pt has had fecal incontinence in past, has weakened sphincter. would benefit from fiber, Needs f/u flex sig will refer for scheduling -        GERD (gastroesophageal reflux disease)     Hemorrhage of anus and rectum     Last Assessed: 3/5/2014     Herpes zoster     Last Assessed: 9/26/2016    History of chickenpox     History of pneumonia     HIV (human immunodeficiency virus infection) ([facility])     IBS (irritable bowel syndrome)     Proctitis, chlamydial     Last Assessed: 9/29/2014     Recurrent major depressive episodes, moderate ([facility])     Last Assessed: 9/15/2017     Wears glasses     reading      Past Surgical History:     Past Surgical History:   Procedure Laterality Date    CIRCUMCISION      COLONOSCOPY      CYSTOSCOPY  2014    LASIK      OTHER SURGICAL HISTORY      embolisation 2018    CA ESOPHAGOGASTRODUODENOSCOPY TRANSORAL DIAGNOSTIC N/A 03/09/2017    Procedure: ESOPHAGOGASTRODUODENOSCOPY (EGD);  Surgeon: Ella Moreno MD;  Location:  [facility];  Service: Gastroenterology    VARICOCELE EXCISION      Spermatic cord Excision - Last Assessed: 3/17/2016     WISDOM TOOTH EXTRACTION        Family History:     Family History   Problem Relation Age of Onset    Diabetes type II Mother     Heart attack Father     No Known Problems Brother     No Known Problems Brother     Skin cancer Paternal Grandmother     Substance Abuse Cousin       Social History:     Social History     Socioeconomic History    Marital status: Single     Spouse name: None    Number of children: None    Years of education: None    Highest education level: None   Occupational History    None   Tobacco Use    Smoking status: Every Day     Current packs/day: 0.50     Average packs/day: 0.5 packs/day for 25.0 years (12.5 ttl pk-yrs)     Types: Cigarettes    Smokeless tobacco: Former    Tobacco comments:     3 daily   Vaping Use    Vaping status: Never Used   Substance and Sexual Activity    Alcohol use: Not Currently     Alcohol/week: 1.0 standard drink of alcohol     Types: 1 Cans of beer per week     Comment: socially    Drug use: No    Sexual activity: Not Currently     Birth control/protection: Condom     Comment: active monogamous relationship    Other Topics Concern    None   Social History Narrative        What type of home do you live in: Single house    Age of your home: 65 yrs    How long have you been living there: 8 yrs    Type of heat: Baseboard    Type of fuel: Gas    What type of michelle is in your bedroom: Tile    Do you have the following in or near your home:    Air products: Window air conditioning    Pests: Other sometimes ants    Pets: Cat    Are pets allowed in bedroom: Yes    Open fields, wooded areas nearby: N/A    Basement: None    Exposure to second hand smoke: Yes         Social Determinants of Health     Financial Resource Strain: Not on file   Food Insecurity: No Food Insecurity (3/26/2024)    Hunger Vital Sign     Worried About Running Out of Food in the Last  Year: Never true     Ran Out of Food in the Last Year: Never true   Transportation Needs: Not on file   Physical Activity: Insufficiently Active (10/20/2021)    Exercise Vital Sign     Days of Exercise per Week: 2 days     Minutes of Exercise per Session: 30 min   Stress: Not on file   Social Connections: Not on file   Intimate Partner Violence: Not on file   Housing Stability: Not on file      Current Medications:     Current Outpatient Medications   Medication Sig Dispense Refill    atorvastatin (LIPITOR) 10 mg tablet TAKE 1 TABLET BY MOUTH DAILY 90 tablet 1    bictegravir-emtricitab-tenofovir alafenamide (Biktarvy) -25 MG tablet TAKE 1 TABLET BY MOUTH DAILY WITH BREAKFAST 90 tablet 1    clindamycin (CLEOCIN T) 1 % external solution Start clindamycin solution twice a day to buttocks area. 60 mL 8    dicyclomine (BENTYL) 20 mg tablet TAKE 1 TABLET BY MOUTH TWICE A DAY BEFORE MEALS 60 tablet 2    FLUoxetine (PROzac) 40 MG capsule TAKE 2 CAPSULES BY MOUTH DAILY 60 capsule 3    fluticasone (FLONASE) 50 mcg/act nasal spray USE 1 SPRAY IN EACH NOSTRIL DAILY 16 g 5    hydrOXYzine HCL (ATARAX) 25 mg tablet TAKE 1 TABLET BY MOUTH EVERY SIX HOURS AS NEEDED FOR ITCH 90 tablet 0    lidocaine (LMX) 4 % cream APPLY TOPICALLY AS NEEDED FOR MILD PAIN 30 g 0    loratadine (CLARITIN) 10 mg tablet Take 1 tablet (10 mg total) by mouth daily 30 tablet 2    OLANZapine (ZyPREXA) 10 mg tablet TAKE 1 TABLET BY MOUTH DAILY AT BEDTIME 30 tablet 2    omeprazole (PriLOSEC) 40 MG capsule TAKE 1 CAPSULE BY MOUTH DAILY 30 capsule 5    tadalafil (CIALIS) 2.5 MG tablet TAKE 1 TABLET BY MOUTH (2.5MG TOTAL) DAILY AS NEEDED FOR ERECTILE DYSFUNCTION 10 tablet 2    tamsulosin (FLOMAX) 0.4 mg TAKE 1 CAPSULE BY MOUTH DAILY WITH DINNER 90 capsule 1    triamcinolone (KENALOG) 0.1 % ointment Apply twice daily for up to 2 weeks to itchy spots. It is important to take at least 1 week break between 2 week uses. Do NOT use on face, armpits, or groin. 80 g  "1    montelukast (SINGULAIR) 10 mg tablet TAKE 1 TABLET BY MOUTH DAILY AT BEDTIME (Patient not taking: Reported on 2/28/2024) 90 tablet 1     No current facility-administered medications for this visit.      Allergies:     Allergies   Allergen Reactions    Tomato - Food Allergy Vomiting      Physical Exam:     /86 (BP Location: Right arm, Patient Position: Sitting, Cuff Size: Large)   Pulse 96   Temp 97.8 °F (36.6 °C) (Tympanic)   Resp 18   Ht 5' 7\" (1.702 m)   Wt 80.6 kg (177 lb 12.8 oz)   SpO2 99%   BMI 27.85 kg/m²     Physical Exam  Vitals and nursing note reviewed.   Constitutional:       General: He is not in acute distress.     Appearance: Normal appearance. He is not ill-appearing.   HENT:      Head: Normocephalic.      Right Ear: Tympanic membrane normal.      Left Ear: Tympanic membrane normal.      Nose: Nasal deformity and septal deviation present.      Mouth/Throat:      Mouth: Mucous membranes are moist.      Dentition: Dental caries present.      Pharynx: Oropharynx is clear.   Eyes:      Extraocular Movements: Extraocular movements intact.      Pupils: Pupils are equal, round, and reactive to light.   Cardiovascular:      Rate and Rhythm: Normal rate and regular rhythm.      Chest Wall: PMI is not displaced.      Pulses: Normal pulses.      Heart sounds: Normal heart sounds.   Pulmonary:      Effort: Pulmonary effort is normal.      Breath sounds: Normal breath sounds.   Abdominal:      General: Bowel sounds are normal.      Palpations: Abdomen is soft.   Musculoskeletal:         General: Normal range of motion.   Lymphadenopathy:      Cervical: No cervical adenopathy.   Skin:     General: Skin is warm and dry.      Capillary Refill: Capillary refill takes less than 2 seconds.      Findings: Erythema and rash present. Rash is macular and urticarial.          Neurological:      Mental Status: He is alert and oriented to person, place, and time.   Psychiatric:         Mood and Affect: Mood " normal.         Behavior: Behavior normal.         Thought Content: Thought content normal.         Judgment: Judgment normal.        CLAYTON Bañuelos  ASC AT [facility]  BMI Counseling: Body mass index is 27.85 kg/m². The BMI is above normal. Nutrition recommendations include reducing portion sizes, decreasing overall calorie intake, 3-5 servings of fruits/vegetables daily, reducing fast food intake, consuming healthier snacks, decreasing soda and/or juice intake, moderation in carbohydrate intake, increasing intake of lean protein, reducing intake of saturated fat and trans fat, and reducing intake of cholesterol. Exercise recommendations include moderate aerobic physical activity for 150 minutes/week, exercising 3-5 times per week, and joining a gym.

## 2024-03-26 NOTE — ASSESSMENT & PLAN NOTE
Ongoing with   Had followed with dermatology and proven on BX with MSSA and group B strep isolates.  Has had several courses of doxycycline with no resolution of symptoms.  Had lost insurance in past.  Has tried diluted bleach bath with no real relief.  Per dermatology could be related to certain medications: omeprazole, olanzapine, Biktarvy, and propanolol   Hibiclens bath 2 X weekly  Refer back to Dermatology for further workup and poss bx again

## 2024-03-26 NOTE — PATIENT INSTRUCTIONS
Wellness Visit for Adults   AMBULATORY CARE:   A wellness visit  is when you see your healthcare provider to get screened for health problems. Your healthcare provider will also give you advice on how to stay healthy. Write down your questions so you remember to ask them. Ask your healthcare provider how often you should have a wellness visit.  What happens at a wellness visit:  Your healthcare provider will ask about your health, and your family history of health problems. This includes high blood pressure, heart disease, and cancer. He or she will ask if you have symptoms that concern you, if you smoke, and about your mood. You may also be asked about your intake of medicines, supplements, food, and alcohol. Any of the following may be done:  Your weight  will be checked. Your height may also be checked so your body mass index (BMI) can be calculated. Your BMI shows if you are at a healthy weight.    Your blood pressure  and heart rate will be checked. Your temperature may also be checked.    Blood and urine tests  may be done. Blood tests may be done to check your cholesterol levels. Abnormal cholesterol levels increase your risk for heart disease and stroke. You may also need a blood or urine test to check for diabetes if you are at increased risk. Urine tests may be done to look for signs of an infection or kidney disease.    A physical exam  includes checking your heartbeat and lungs with a stethoscope. Your healthcare provider may also check your skin to look for sun damage.    Screening tests  may be recommended. A screening test is done to check for diseases that may not cause symptoms. The screening tests you may need depend on your age, gender, family history, and lifestyle habits. For example, colorectal screening may be recommended if you are 50 years old or older.    Screening tests you need if you are a woman:   A Pap smear  is used to screen for cervical cancer. Pap smears are usually done every 3 to  5 years depending on your age. You may need them more often if you have had abnormal Pap smear test results in the past. Ask your healthcare provider how often you should have a Pap smear.    A mammogram  is an x-ray of your breasts to screen for breast cancer. Experts recommend mammograms every 2 years starting at age 50 years. You may need a mammogram at age 49 years or younger if you have an increased risk for breast cancer. Talk to your healthcare provider about when you should start having mammograms and how often you need them.    Vaccines you may need:   Get an influenza vaccine  every year. The influenza vaccine protects you from the flu. Several types of viruses cause the flu. The viruses change over time, so new vaccines are made each year.    Get a tetanus-diphtheria (Td) booster vaccine  every 10 years. This vaccine protects you against tetanus and diphtheria. Tetanus is a severe infection that may cause painful muscle spasms and lockjaw. Diphtheria is a severe bacterial infection that causes a thick covering in the back of your mouth and throat.    Get a human papillomavirus (HPV) vaccine  if you are female and aged 19 to 26 or male 19 to 21 and never received it. This vaccine protects you from HPV infection. HPV is the most common infection spread by sexual contact. HPV may also cause vaginal, penile, and anal cancers.    Get a pneumococcal vaccine  if you are aged 65 years or older. The pneumococcal vaccine is an injection given to protect you from pneumococcal disease. Pneumococcal disease is an infection caused by pneumococcal bacteria. The infection may cause pneumonia, meningitis, or an ear infection.    Get a shingles vaccine  if you are 60 or older, even if you have had shingles before. The shingles vaccine is an injection to protect you from the varicella-zoster virus. This is the same virus that causes chickenpox. Shingles is a painful rash that develops in people who had chickenpox or have  been exposed to the virus.    How to eat healthy:  My Plate is a model for planning healthy meals. It shows the types and amounts of foods that should go on your plate. Fruits and vegetables make up about half of your plate, and grains and protein make up the other half. A serving of dairy is included on the side of your plate. The amount of calories and serving sizes you need depends on your age, gender, weight, and height. Examples of healthy foods are listed below:  Eat a variety of vegetables  such as dark green, red, and orange vegetables. You can also include canned vegetables low in sodium (salt) and frozen vegetables without added butter or sauces.    Eat a variety of fresh fruits , canned fruit in 100% juice, frozen fruit, and dried fruit.    Include whole grains.  At least half of the grains you eat should be whole grains. Examples include whole-wheat bread, wheat pasta, brown rice, and whole-grain cereals such as oatmeal.    Eat a variety of protein foods such as seafood (fish and shellfish), lean meat, and poultry without skin (turkey and chicken). Examples of lean meats include pork leg, shoulder, or tenderloin, and beef round, sirloin, tenderloin, and extra lean ground beef. Other protein foods include eggs and egg substitutes, beans, peas, soy products, nuts, and seeds.    Choose low-fat dairy products such as skim or 1% milk or low-fat yogurt, cheese, and cottage cheese.    Limit unhealthy fats  such as butter, hard margarine, and shortening.       Exercise:  Exercise at least 30 minutes per day on most days of the week. Some examples of exercise include walking, biking, dancing, and swimming. You can also fit in more physical activity by taking the stairs instead of the elevator or parking farther away from stores. Include muscle strengthening activities 2 days each week. Regular exercise provides many health benefits. It helps you manage your weight, and decreases your risk for type 2 diabetes,  heart disease, stroke, and high blood pressure. Exercise can also help improve your mood. Ask your healthcare provider about the best exercise plan for you.       General health and safety guidelines:   Do not smoke.  Nicotine and other chemicals in cigarettes and cigars can cause lung damage. Ask your healthcare provider for information if you currently smoke and need help to quit. E-cigarettes or smokeless tobacco still contain nicotine. Talk to your healthcare provider before you use these products.    Limit alcohol.  A drink of alcohol is 12 ounces of beer, 5 ounces of wine, or 1½ ounces of liquor.    Lose weight, if needed.  Being overweight increases your risk of certain health conditions. These include heart disease, high blood pressure, type 2 diabetes, and certain types of cancer.    Protect your skin.  Do not sunbathe or use tanning beds. Use sunscreen with a SPF 15 or higher. Apply sunscreen at least 15 minutes before you go outside. Reapply sunscreen every 2 hours. Wear protective clothing, hats, and sunglasses when you are outside.    Drive safely.  Always wear your seatbelt. Make sure everyone in your car wears a seatbelt. A seatbelt can save your life if you are in an accident. Do not use your cell phone when you are driving. This could distract you and cause an accident. Pull over if you need to make a call or send a text message.    Practice safe sex.  Use latex condoms if are sexually active and have more than one partner. Your healthcare provider may recommend screening tests for sexually transmitted infections (STIs).    Wear helmets, lifejackets, and protective gear.  Always wear a helmet when you ride a bike or motorcycle, go skiing, or play sports that could cause a head injury. Wear protective equipment when you play sports. Wear a lifejacket when you are on a boat or doing water sports.    © Copyright [employer] 2023 Information is for End User's use only and may not be sold, redistributed or  otherwise used for commercial purposes.  The above information is an  only. It is not intended as medical advice for individual conditions or treatments. Talk to your doctor, nurse or pharmacist before following any medical regimen to see if it is safe and effective for you.    Obesity   AMBULATORY CARE:   Obesity  means your body mass index (BMI) is greater than 30. Your healthcare provider will use your age, height, and weight to measure your BMI.  The risks of obesity include  many health problems, including injuries or physical disability.  Diabetes (high blood sugar level)    High blood pressure or high cholesterol    Heart disease or heart failure    Stroke    Gallbladder or liver disease    Cancer of the colon, breast, prostate, liver, or kidney    Sleep apnea    Arthritis or gout    Screening  is done to check for health conditions before you have signs or symptoms. If you are 35 to 70 years old, your blood sugar level may be checked every 3 years for signs of prediabetes or diabetes. Your healthcare provider will check your blood pressure at each visit. High blood pressure can lead to a stroke or other problems. Your provider may check for signs of heart disease, cancer, or other health problems.  Seek care immediately if:   You have a severe headache, confusion, or difficulty speaking.    You have weakness on one side of your body.    You have chest pain, sweating, or shortness of breath.    Call your doctor if:   You have symptoms of gallbladder or liver disease, such as pain in your upper abdomen.    You have knee or hip pain and discomfort while walking.    You have symptoms of diabetes, such as intense hunger and thirst, and frequent urination.    You have symptoms of sleep apnea, such as snoring or daytime sleepiness.    You have questions or concerns about your condition or care.    Treatment for obesity  focuses on helping you lose weight to improve your health. Even a small decrease  in BMI can reduce the risk for many health problems. Your healthcare provider will help you set a weight-loss goal.  Lifestyle changes  are the first step in treating obesity. These include making healthy food choices and getting regular physical activity. Your healthcare provider may suggest a weight-loss program that involves coaching, education, and therapy.    Medicine  may help you lose weight when it is used with a healthy foods and physical activity.    Surgery  can help you lose weight if you have obesity along with other health problems. Several types of weight-loss surgery are available. Ask your healthcare provider for more information.    Tips for safe weight loss:   Set small, realistic goals.  An example of a small goal is to walk for 20 minutes 5 days a week. Anther goal is to lose 5% of your body weight.    Ask for support.  Tell friends, family members, and coworkers about your goals. Ask someone to lose weight with you. You may also want to join a weight-loss support group.    Identify foods or triggers that may cause you to overeat.  Remove tempting high-calorie foods from your home and workplace. Place a bowl of fresh fruit on your kitchen counter. If stress causes you to eat, find other ways to cope with stress. A counselor or therapist may be able to help you.    Track your daily calories and activity.  Write down what you eat and drink. Also write down how many minutes of physical activity you do each day.     Track your weekly weight.  Weigh yourself in the morning, before you eat or drink anything but after you use the bathroom. Use the same scale, in the same place, and in similar clothing each time. Only weigh yourself 1 to 2 times each week, or as directed. You may become discouraged if you weigh yourself every day.    Eating changes:  You will need to eat 500 to 1,000 fewer calories each day than you currently eat to lose 1 to 2 pounds a week. The following changes will help you cut  calories:  Eat smaller portions.  Use small plates, no larger than 9 inches in diameter. Fill your plate half full of fruits and vegetables. Measure your food using measuring cups until you know what a serving size looks like.         Eat 3 meals and 1 or 2 snacks each day.  Plan your meals in advance. Cook and eat at home most of the time. Eat slowly. Do not skip meals. Skipping meals can lead to overeating later in the day. This can make it harder for you to lose weight. Talk with a dietitian to help you make a meal plan and schedule that is right for you.    Eat fruits and vegetables at every meal.  They are low in calories and high in fiber, which makes you feel full. Do not add butter, margarine, or cream sauce to vegetables. Use herbs to season steamed vegetables.    Eat less fat and fewer fried foods.  Eat more baked or grilled chicken and fish. These protein sources are lower in calories and fat than red meat. Limit fast food. Dress your salads with olive oil and vinegar instead of bottled dressing.    Limit the amount of sugar you eat.  Do not drink sugary beverages. Limit alcohol.       Activity changes:  Physical activity is good for your body in many ways. It helps you burn calories and build strong muscles. It decreases stress and depression, and improves your mood. It can also help you sleep better. Talk to your healthcare provider before you begin an exercise program.  Exercise for at least 30 minutes 5 days a week.  Start slowly. Set aside time each day for physical activity that you enjoy and that is convenient for you. It is best to do both weight training and an activity that increases your heart rate, such as walking, bicycling, or swimming.            Find ways to be more active.  Do yard work and housecleaning. Walk up the stairs instead of using elevators. Spend your leisure time going to events that require walking, such as outdoor festivals or fairs. This extra physical activity can help you  lose weight and keep it off.       Follow up with your doctor as directed:  You may need to meet with a dietitian. Write down your questions so you remember to ask them during your visits.  © Copyright [employer] 2023 Information is for End User's use only and may not be sold, redistributed or otherwise used for commercial purposes.  The above information is an  only. It is not intended as medical advice for individual conditions or treatments. Talk to your doctor, nurse or pharmacist before following any medical regimen to see if it is safe and effective for you.    Cigarette Smoking and Your Health   AMBULATORY CARE:   Risks to your health if you smoke:  Nicotine and other chemicals found in tobacco and e-cigarettes can damage every cell in your body. Even if you are a light smoker, you have an increased risk for cancer, heart disease, and lung disease. If you are pregnant or have diabetes, smoking increases your risk for complications. Nicotine can affect an adolescent's developing brain. This can lead to trouble thinking, learning, or paying attention.  Benefits to your health if you stop smoking:   You decrease respiratory symptoms such as coughing, wheezing, and shortness of breath.    You reduce your risk for cancers of the lung, mouth, throat, kidney, bladder, pancreas, stomach, and cervix. If you already have cancer, you increase the benefits of chemotherapy. You also reduce your risk for cancer returning or a second cancer from developing.    You reduce your risk for heart disease, blood clots, heart attack, and stroke.    You reduce your risk for lung infections, and diseases such as pneumonia, asthma, chronic bronchitis, and emphysema.    Your circulation improves. More oxygen can be delivered to your body. If you have diabetes, you lower your risk for complications, such as kidney, artery, and eye diseases. You also lower your risk for nerve damage. Nerve damage can lead to amputations,  poor vision, and blindness.    You improve your body's ability to heal and to fight infections.    An adolescent can help his or her brain and body develop in a healthy way. Talk to your adolescent about all the health risks of nicotine. If you can, start talking about nicotine when your child is younger than 12 years. This may make it easier for him or her not to start using nicotine as a teenager or adult. Explain to him or her that it is best never to start. It can be hard to try to quit later.    Benefits to the health of others if you stop smoking:  Tobacco is harmful to nonsmokers who breathe in your secondhand smoke. The following are ways the health of others around you may improve when you stop smoking:  You lower the risks for lung cancer, heart disease, and stroke in nonsmoking adults.    If you are pregnant, you lower the risk for miscarriage, early delivery, low birth weight, and stillbirth. You also lower your baby's risk for SIDS, obesity, developmental delay, and neurobehavioral problems, such as ADHD.    If you have children, you lower their risk for ear infections, colds, pneumonia, bronchitis, and asthma.    Follow up with your doctor as directed:  Write down your questions so you remember to ask them during your visits.  For support and more information:   [employer]  [address] [place] [address]  Phone: 6- 661 - 245-3933  Phone: 4- 414 - 364-3352  Web Address: www.lung.org    Smokefree[employer]gov  Phone: 4- 354 - 314-6769  Web Address: www.smokefree.gov  © Copyright [employer] 2023 Information is for End User's use only and may not be sold, redistributed or otherwise used for commercial purposes.  The above information is an  only. It is not intended as medical advice for individual conditions or treatments. Talk to your doctor, nurse or pharmacist before following any medical regimen to see if it is safe and effective for you.    Cholesterol and Your  Health   AMBULATORY CARE:   Cholesterol  is a waxy, fat-like substance. Your body uses cholesterol to make hormones and new cells, and to protect nerves. Cholesterol is made by your body. It also comes from certain foods you eat, such as meat and dairy products. Your healthcare provider can help you set goals for your cholesterol levels. Your provider can help you create a plan to meet your goals.  Cholesterol level goals:  Your cholesterol level goals depend on your risk for heart disease, your age, and your other health conditions. The following are general guidelines:  Total cholesterol  includes low-density lipoprotein (LDL), high-density lipoprotein (HDL), and triglyceride levels. The total cholesterol level should be lower than 200 mg/dL and is best at about 150 mg/dL.    LDL cholesterol  is called bad cholesterol  because it forms plaque in your arteries. As plaque builds up, your arteries become narrow, and less blood flows through. When plaque decreases blood flow to your heart, you may have chest pain. If plaque completely blocks an artery that brings blood to your heart, you may have a heart attack. Plaque can break off and form blood clots. Blood clots may block arteries in your brain and cause a stroke. The level should be less than 130 mg/dL and is best at about 100 mg/dL.         HDL cholesterol  is called good cholesterol  because it helps remove LDL cholesterol from your arteries. It does this by attaching to LDL cholesterol and carrying it to your liver. Your liver breaks down LDL cholesterol so your body can get rid of it. High levels of HDL cholesterol can help prevent a heart attack and stroke. Low levels of HDL cholesterol can increase your risk for heart disease, heart attack, and stroke. The level should be at least 40 mg/dL in males or at least 50 mg/dL in females.    Triglycerides  are a type of fat that store energy from foods you eat. High levels of triglycerides also cause plaque  buildup. This can increase your risk for a heart attack or stroke. If your triglyceride level is high, your LDL cholesterol level may also be high. The level should be less than 150 mg/dL.    Any of the following can increase your risk for high cholesterol:   Smoking or drinking large amounts of alcohol    Having overweight or obesity, or not getting enough exercise    A medical condition such as hypertension (high blood pressure) or diabetes    A family history of high cholesterol    Age older than 65    What you need to know about having your cholesterol levels checked:  Adults 20 to 45 years of age should have their cholesterol levels checked every 4 to 6 years. Adults 45 years or older should have their cholesterol checked every 1 to 2 years. You may need your cholesterol checked more often, or at a younger age, if you have risk factors for heart disease. You may also need to have your cholesterol checked more often if you have other health conditions, such as diabetes. Blood tests are used to check cholesterol levels. Blood tests measure your levels of triglycerides, LDL cholesterol, and HDL cholesterol.  How healthy fats affect your cholesterol levels:  Healthy fats, also called unsaturated fats, help lower LDL cholesterol and triglyceride levels. Healthy fats include the following:  Monounsaturated fats  are found in foods such as olive oil, canola oil, avocado, nuts, and olives.    Polyunsaturated fats,  such as omega 3 fats, are found in fish, such as salmon, trout, and tuna. They can also be found in plant foods such as flaxseed, walnuts, and soybeans.    How unhealthy fats affect your cholesterol levels:  Unhealthy fats increase LDL cholesterol and triglyceride levels. They are found in foods high in cholesterol, saturated fat, and trans fat:  Cholesterol  is found in eggs, dairy, and meat.    Saturated fat  is found in butter, cheese, ice cream, whole milk, and coconut oil. Saturated fat is also found in  meat, such as sausage, hot dogs, and bologna.    Trans fat  is found in liquid oils and is used in fried and baked foods. Foods that contain trans fats include chips, crackers, muffins, sweet rolls, microwave popcorn, and cookies.    Treatment  for high cholesterol will also decrease your risk of heart disease, heart attack, and stroke. Treatment may include any of the following:  Lifestyle changes  may include food, exercise, weight loss, and quitting smoking. You may also need to decrease the amount of alcohol you drink. Your healthcare provider will want you to start with lifestyle changes. Other treatment may be added if lifestyle changes are not enough. Your healthcare provider may recommend you work with a team to manage hyperlipidemia. The team may include medical experts such as a dietitian, an exercise or physical therapist, and a behavior therapist. Your family members may be included in helping you create lifestyle changes.    Medicines  may be given to lower your LDL cholesterol, triglyceride levels, or total cholesterol level. You may need medicines to lower your cholesterol if any of the following is true:    You have a history of stroke, TIA, unstable angina, or a heart attack.    Your LDL cholesterol level is 190 mg/dL or higher.    You are age 40 to 75 years, have diabetes or heart disease risk factors, and your LDL cholesterol is 70 mg/dL or higher.    Supplements  include fish oil, red yeast rice, and garlic. Fish oil may help lower your triglyceride and LDL cholesterol levels. It may also increase your HDL cholesterol level. Red yeast rice may help decrease your total cholesterol level and LDL cholesterol level. Garlic may help lower your total cholesterol level. Do not take any supplements without talking to your healthcare provider.    Food changes you can make to lower your cholesterol levels:  A dietitian can help you create a healthy eating plan. Your dietitian can show you how to read food  labels and choose foods low in saturated fat, trans fats, and cholesterol.     Decrease the total amount of fat you eat.  Choose lean meats, fat-free or 1% fat milk, and low-fat dairy products, such as yogurt and cheese. Try to limit or avoid red meats. Limit or do not eat fried foods or baked goods, such as cookies.    Replace unhealthy fats with healthy fats.  Cook foods in olive oil or canola oil. Choose soft margarines that are low in saturated fat and trans fat. Seeds, nuts, and avocados are other examples of healthy fats.    Eat foods with omega-3 fats.  Examples include salmon, tuna, mackerel, walnuts, and flaxseed. Eat fish 2 times per week. Pregnant women should not eat fish that have high levels of mercury, such as shark, swordfish, and esme mackerel.         Increase the amount of high-fiber foods you eat.  High-fiber foods can help lower your LDL cholesterol. Aim to get between 20 and 30 grams of fiber each day. Fruits and vegetables are high in fiber. Eat at least 5 servings each day. Other high-fiber foods are whole-grain or whole-wheat breads, pastas, or cereals, and brown rice. Eat 3 ounces of whole-grain foods each day. Increase fiber slowly. You may have abdominal discomfort, bloating, and gas if you add fiber to your diet too quickly.         Eat healthy protein foods.  Examples include low-fat dairy products, skinless chicken and turkey, fish, and nuts.    Limit foods and drinks that are high in sugar.  Your dietitian or healthcare provider can help you create daily limits for high-sugar foods and drinks. The limit may be lower if you have diabetes or another health condition. Limits can also help you lose weight if needed.  Lifestyle changes you can make to lower your cholesterol levels:   Maintain a healthy weight.  Ask your healthcare provider what a healthy weight is for you. Ask your provider to help you create a weight loss plan if needed. Weight loss can decrease your total cholesterol and  triglyceride levels. Weight loss may also help keep your blood pressure at a healthy level.    Be physically active throughout the day.  Physical activity, such as exercise, can help lower your total cholesterol level and maintain a healthy weight. Physical activity can also help increase your HDL cholesterol level. Work with your healthcare provider to create an program that is right for you. Get at least 30 to 40 minutes of moderate physical activity most days of the week. Examples of exercise include brisk walking, swimming, or biking. Also include strength training at least 2 times each week. Your healthcare providers can help you create a physical activity plan.            Do not smoke.  Nicotine and other chemicals in cigarettes and cigars can raise your cholesterol levels. Ask your healthcare provider for information if you currently smoke and need help to quit. E-cigarettes or smokeless tobacco still contain nicotine. Talk to your healthcare provider before you use these products.         Limit or do not drink alcohol.  Alcohol can increase your triglyceride levels. Ask your healthcare provider before you drink alcohol. Ask how much is okay for you to drink in 24 hours or 1 week.    Follow up with your doctor as directed:  Write down your questions so you remember to ask them during your visits.  © Copyright [employer] 2023 Information is for End User's use only and may not be sold, redistributed or otherwise used for commercial purposes.  The above information is an  only. It is not intended as medical advice for individual conditions or treatments. Talk to your doctor, nurse or pharmacist before following any medical regimen to see if it is safe and effective for you.    Weight Management   AMBULATORY CARE:   Why it is important to manage your weight:  Being overweight increases your risk of health conditions such as heart disease, high blood pressure, type 2 diabetes, and certain types of  cancer. It can also increase your risk for osteoarthritis, sleep apnea, and other respiratory problems. Aim for a slow, steady weight loss. Even a small amount of weight loss can lower your risk of health problems.  Risks of being overweight:  Extra weight can cause many health problems, including the following:  Diabetes (high blood sugar level)    High blood pressure or high cholesterol    Heart disease    Stroke    Gallbladder or liver disease    Cancer of the colon, breast, prostate, liver, or kidney    Sleep apnea    Arthritis or gout    Screening  is done to check for health conditions before you have signs or symptoms. If you are 35 to 70 years old, your blood sugar level may be checked every 3 years for signs of prediabetes or diabetes. Your healthcare provider will check your blood pressure at each visit. High blood pressure can lead to a stroke or other problems. Your provider may check for signs of heart disease, cancer, or other health problems.  How to lose weight safely:  A safe and healthy way to lose weight is to eat fewer calories and get regular exercise.  You can lose up about 1 pound a week by decreasing the number of calories you eat by 500 calories each day. You can decrease calories by eating smaller portion sizes or by cutting out high-calorie foods. Read labels to find out how many calories are in the foods you eat.         You can also burn calories with exercise such as walking, swimming, or biking. You will be more likely to keep weight off if you make these changes part of your lifestyle. Exercise at least 30 minutes per day on most days of the week. You can also fit in more physical activity by taking the stairs instead of the elevator or parking farther away from stores. Ask your healthcare provider about the best exercise plan for you.       Healthy meal plan for weight management:  A healthy meal plan includes a variety of foods, contains fewer calories, and helps you stay healthy. A  healthy meal plan includes the following:     Eat whole-grain foods more often.  A healthy meal plan should contain fiber. Fiber is the part of grains, fruits, and vegetables that is not broken down by your body. Whole-grain foods are healthy and provide extra fiber in your diet. Some examples of whole-grain foods are whole-wheat breads and pastas, oatmeal, brown rice, and bulgur.    Eat a variety of vegetables every day.  Include dark, leafy greens such as spinach, kale, moiz greens, and mustard greens. Eat yellow and orange vegetables such as carrots, sweet potatoes, and winter squash.     Eat a variety of fruits every day.  Choose fresh or canned fruit (canned in its own juice or light syrup) instead of juice. Fruit juice has very little or no fiber.    Eat low-fat dairy foods.  Drink fat-free (skim) milk or 1% milk. Eat fat-free yogurt and low-fat cottage cheese. Try low-fat cheeses such as mozzarella and other reduced-fat cheeses.    Choose meat and other protein foods that are low in fat.  Choose beans or other legumes such as split peas or lentils. Choose fish, skinless poultry (chicken or turkey), or lean cuts of red meat (beef or pork). Before you cook meat or poultry, cut off any visible fat.     Use less fat and oil.  Try baking foods instead of frying them. Add less fat, such as margarine, sour cream, regular salad dressing and mayonnaise to foods. Eat fewer high-fat foods. Some examples of high-fat foods include french fries, doughnuts, ice cream, and cakes.    Eat fewer sweets.  Limit foods and drinks that are high in sugar. This includes candy, cookies, regular soda, and sweetened drinks.  Ways to decrease calories:   Eat smaller portions.     Use a small plate with smaller servings.    Do not eat second helpings.    When you eat at a restaurant, ask for a box and place half of your meal in the box before you eat.    Share an entrée with someone else.    Replace high-calorie snacks with healthy,  low-calorie snacks.     Choose fresh fruit, vegetables, fat-free rice cakes, or air-popped popcorn instead of potato chips, nuts, or chocolate.    Choose water or calorie-free drinks instead of soda or sweetened drinks.    Do not shop for groceries when you are hungry.  You may be more likely to make unhealthy food choices. Take a grocery list of healthy foods and shop after you have eaten.    Eat regular meals. Do not skip meals. Skipping meals can lead to overeating later in the day. This can make it harder for you to lose weight. Eat a healthy snack in place of a meal if you do not have time to eat a regular meal. Talk with a dietitian to help you create a meal plan and schedule that is right for you.    Other things to consider as you try to lose weight:   Be aware of situations that may give you the urge to overeat, such as eating while watching television. Find ways to avoid these situations. For example, read a book, go for a walk, or do crafts.    Meet with a weight loss support group or friends who are also trying to lose weight. This may help you stay motivated to continue working on your weight loss goals.    © Copyright [employer] 2023 Information is for End User's use only and may not be sold, redistributed or otherwise used for commercial purposes.  The above information is an  only. It is not intended as medical advice for individual conditions or treatments. Talk to your doctor, nurse or pharmacist before following any medical regimen to see if it is safe and effective for you.    Low Fat Diet   AMBULATORY CARE:   A low-fat diet  is an eating plan that is low in total fat, unhealthy fat, and cholesterol. You may need to follow a low-fat diet if you have trouble digesting or absorbing fat. You may also need to follow this diet if you have high cholesterol. You can also lower your cholesterol by increasing the amount of fiber in your diet. Soluble fiber is a type of fiber that helps to  decrease cholesterol levels.   Different types of fat in food:   Limit unhealthy fats.  A diet that is high in cholesterol, saturated fat, and trans fat may cause unhealthy cholesterol levels. Unhealthy cholesterol levels increase your risk of heart disease.    Cholesterol:  Limit intake of cholesterol to less than 200 mg per day. Cholesterol is found in meat, eggs, and dairy.    Saturated fat:  Limit saturated fat to less than 7% of your total daily calories. Ask your dietitian how many calories you need each day. Saturated fat is found in butter, cheese, ice cream, whole milk, and palm oil. Saturated fat is also found in meat, such as beef, pork, chicken skin, and processed meats. Processed meats include sausage, hot dogs, and bologna.     Trans fat:  Avoid trans fat as much as possible. Trans fat is used in fried and baked foods. Foods that say trans fat free on the label may still have up to 0.5 grams of trans fat per serving.    Include healthy fats.  Replace foods that are high in saturated and trans fat with foods high in healthy fats. This may help to decrease high cholesterol levels.     Monounsaturated fats:  These are found in avocados, nuts, and vegetable oils, such as olive, canola, and sunflower oil.    Polyunsaturated fats:  These can be found in vegetable oils, such as soybean or corn oil. Omega-3 fats can help to decrease the risk of heart disease. Omega-3 fats are found in fish, such as salmon, herring, trout, and tuna. Omega-3 fats can also be found in plant foods, such as walnuts, flaxseed, soybeans, and canola oil.       Foods to limit or avoid:   Grains:      Snacks that are made with partially hydrogenated oils, such as chips, regular crackers, and butter-flavored popcorn    High-fat baked goods, such as biscuits, croissants, doughnuts, pies, cookies, and pastries    Dairy:      Whole milk, 2% milk, and yogurt and ice cream made with whole milk    Half and half creamer, heavy cream, and  whipping cream    Cheese, cream cheese, and sour cream    Meats and proteins:      High-fat cuts of meat (T-bone steak, regular hamburger, and ribs)    Fried meat, poultry (turkey and chicken), and fish    Poultry (chicken and turkey) with skin    Cold cuts (salami or bologna), hot dogs, dawn, and sausage    Whole eggs and egg yolks    Vegetables and fruits with added fat:      Fried vegetables or vegetables in butter or high-fat sauces, such as cream or cheese sauces    Fried fruit or fruit served with butter or cream    Fats:      Butter, stick margarine, and shortening    Coconut, palm oil, and palm kernel oil    Foods to include:       Grains:      Whole-grain breads, cereals, pasta, and brown rice    Low-fat crackers and pretzels    Vegetables and fruits:      Fresh, frozen, or canned vegetables (no salt or low-sodium)    Fresh, frozen, dried, or canned fruit (canned in light syrup or fruit juice)    Avocado    Low-fat dairy products:      Nonfat (skim) or 1% milk    Nonfat or low-fat cheese, yogurt, and cottage cheese    Meats and proteins:      Chicken or turkey with no skin    Baked or broiled fish    Lean beef and pork (loin, round, extra lean hamburger)    Beans and peas, unsalted nuts, soy products    Egg whites and substitutes    Seeds and nuts    Fats:      Unsaturated oil, such as canola, olive, peanut, soybean, or sunflower oil    Soft or liquid margarine and vegetable oil spread    Low-fat salad dressing  Other ways to decrease fat:   Read food labels before you buy foods.  Choose foods that have less than 30% of calories from fat. Choose low-fat or fat-free dairy products. Remember that fat free does not mean calorie free. These foods still contain calories, and too many calories can lead to weight gain.     Trim fat from meat and avoid fried food.  Trim all visible fat from meat before you cook it. Remove the skin from poultry. Do not carlson meat, fish, or poultry. Bake, roast, boil, or broil these  foods instead. Avoid fried foods. Eat a baked potato instead of French fries. Steam vegetables instead of sautéing them in butter.     Add less fat to foods.  Use imitation dawn bits on salads and baked potatoes instead of regular dawn bits. Use fat-free or low-fat salad dressings instead of regular dressings. Use low-fat or nonfat butter-flavored topping instead of regular butter or margarine on popcorn and other foods.    Ways to decrease fat in recipes:  Replace high-fat ingredients with low-fat or nonfat ones. This may cause baked goods to be drier than usual. You may need to use nonfat cooking spray on pans to prevent food from sticking. You also may need to change the amount of other ingredients, such as water, in the recipe. Try the following:  Use low-fat or light margarine instead of regular margarine or shortening.     Use lean ground turkey breast or chicken, or lean ground beef (less than 5% fat) instead of hamburger.     Add 1 teaspoon of canola oil to 8 ounces of skim milk instead of using cream or half and half.     Use grated zucchini, carrots, or apples in breads instead of coconut.    Use blenderized, low-fat cottage cheese, plain tofu, or low-fat ricotta cheese instead of cream cheese.     Use 1 egg white and 1 teaspoon of canola oil, or use ¼ cup (2 ounces) of fat-free egg substitute instead of a whole egg.     Replace half of the oil that is called for in a recipe with applesauce when you bake. Use 3 tablespoons of cocoa powder and 1 tablespoon of canola oil instead of a square of baking chocolate.    How to increase fiber:  Eat enough high-fiber foods to get 20 to 30 grams of fiber every day. Slowly increase your fiber intake to avoid stomach cramps, gas, and other problems.  Eat 3 ounces of whole-grain foods each day.  An ounce is about 1 slice of bread. Eat whole-grain breads, such as whole-wheat bread. Whole wheat, whole-wheat flour, or other whole grains should be listed as the first  ingredient on the food label. Replace white flour with whole-grain flour or use half of each in recipes. Whole-grain flour is heavier than white flour, so you may have to add more yeast or baking powder.     Eat a high-fiber cereal for breakfast.  Oatmeal is a good source of soluble fiber. Look for cereals that have bran or fiber in the name. Choose whole-grain products, such as brown rice, barley, and whole-wheat pasta.     Eat more beans, peas, and lentils.  For example, add beans to soups or salads. Eat at least 5 cups of fruits and vegetables each day. Eat fruits and vegetables with the peel because the peel is high in fiber.       © Copyright [employer] 2023 Information is for End User's use only and may not be sold, redistributed or otherwise used for commercial purposes.  The above information is an  only. It is not intended as medical advice for individual conditions or treatments. Talk to your doctor, nurse or pharmacist before following any medical regimen to see if it is safe and effective for you.    Calorie Counting Diet   WHAT YOU NEED TO KNOW:   What is a calorie counting diet?  It is a meal plan based on counting calories each day to reach a healthy body weight. You will need to eat fewer calories if you are trying to lose weight. Weight loss may decrease your risk for certain health problems or improve your health if you have health problems. Some of these health problems include heart disease, high blood pressure, and diabetes.   What foods should I avoid?  Your dietitian will tell you if you need to avoid certain foods based on your body weight and health condition. You may need to avoid high-fat foods if you are at risk for or have heart disease. You may need to eat fewer foods from the breads and starches food group if you have diabetes.   How many calories are in foods?  The following is a list of foods and drinks with the approximate number of calories in each. Check the food  label to find the exact number of calories. A dietitian can tell you how many calories you should have from each food group each day.  Carbohydrate:      ½ of a 3-inch bagel, 1 slice of bread, or ½ of a hamburger bun or hot dog bun (80)    1 (8-inch) flour tortilla or ½ cup of cooked rice (100)    1 (6-inch) corn tortilla (80)    1 (6-inch) pancake or 1 cup of bran flakes cereal (110)    ½ cup of cooked cereal (80)    ½ cup of cooked pasta (85)    1 ounce of pretzels (100)    3 cups of air-popped popcorn without butter or oil (80)    Dairy:      1 cup of skim or 1% milk (90)    1 cup of 2% milk (120)    1 cup of whole milk (160)    1 cup of 2% chocolate milk (220)    1 ounce of low-fat cheese with 3 grams of fat per ounce (70)    1 ounce of cheddar cheese (114)    ½ cup of 1% fat cottage cheese (80)    1 cup of plain or sugar-free, fat-free yogurt (90)    Protein foods:      3 ounces of fish (not breaded or fried) (95)    3 ounces of breaded, fried fish (195)    ¾ cup of tuna canned in water (105)    3 ounces of chicken breast without skin (105)    1 fried chicken breast with skin (350)    ¼ cup of fat free egg substitute (40)    1 large egg (75)    3 ounces of lean beef or pork (165)    3 ounces of fried pork chop or ham (185)    ½ cup of cooked dried beans, such as kidney, buchanan, lentils, or navy (115)    3 ounces of bologna or lunch meat (225)    2 links of breakfast sausage (140)    Vegetables:      ½ cup of sliced mushrooms (10)    1 cup of salad greens, such as lettuce, spinach, or ranjit (15)    ½ cup of steamed asparagus (20)    ½ cup of cooked summer squash, zucchini squash, or green or wax beans (25)    1 cup of broccoli or cauliflower florets, or 1 medium tomato (25)    1 large raw carrot or ½ cup of cooked carrots (40)    ? of a medium cucumber or 1 stalk of celery (5)    1 small baked potato (160)    1 cup of breaded, fried vegetables (230)    Fruit:      1 (6-inch) banana (55)     ½ of a 4-inch  grapefruit (55)    15 grapes (60)    1 medium orange or apple (70)    1 large peach (65)    1 cup of fresh pineapple chunks (75)    1 cup of melon cubes (50)    1¼ cups of whole strawberries (45)    ½ cup of fruit canned in juice (55)    ½ cup of fruit canned in heavy syrup (110)    ? cup of raisins (130)    ½ cup of unsweetened fruit juice (60)    ½ cup of grape, cranberry, or prune juice (90)    Fat:      10 peanuts or 2 teaspoons of peanut butter (55)    2 tablespoons of avocado or 1 tablespoon of regular salad dressing (45)    2 slices of dawn (90)    1 teaspoon of oil, such as safflower, canola, corn, or olive oil (45)    2 teaspoons of low-fat margarine, or 1 tablespoon of low-fat mayonnaise (50)    1 teaspoon of regular margarine (40)    1 tablespoon of regular mayonnaise (135)    1 tablespoon of cream cheese or 2 tablespoons of low-fat cream cheese (45)    2 tablespoons of vegetable shortening (215)    Dessert and sweets:      8 animal crackers or 5 vanilla wafers (80)    1 frozen fruit juice bar (80)    ½ cup of ice milk or low-fat frozen yogurt (90)    ½ cup of sherbet or sorbet (125)    ½ cup of sugar-free pudding or custard (60)    ½ cup of ice cream (140)    ½ cup of pudding or custard (175)    1 (2-inch) square chocolate brownie (185)    Combination foods:      Bean burrito made with an 8-inch tortilla, without cheese (275)    Chicken breast sandwich with lettuce and tomato (325)    1 cup of chicken noodle soup (60)    1 beef taco (175)    Regular hamburger with lettuce and tomato (310)    Regular cheeseburger with lettuce and tomato (410)     ¼ of a 12-inch cheese pizza (280)    Fried fish sandwich with lettuce and tomato (425)    Hot dog and bun (275)    1½ cups of macaroni and cheese (310)    Taco salad with a fried tortilla shell (870)    Low-calorie foods:      1 tablespoon of ketchup or 1 tablespoon of fat free sour cream (15)    1 teaspoon of mustard (5)    ¼ cup of salsa (20)    1 large dill  pickle (15)    1 tablespoon of fat free salad dressing (10)    2 teaspoons of low-sugar, light jam or jelly, or 1 tablespoon of sugar-free syrup (15)    1 sugar-free popsicle (15)    1 cup of club soda, seltzer water, or diet soda (0)    CARE AGREEMENT:   You have the right to help plan your care. Discuss treatment options with your healthcare provider to decide what care you want to receive. You always have the right to refuse treatment. The above information is an  only. It is not intended as medical advice for individual conditions or treatments. Talk to your doctor, nurse or pharmacist before following any medical regimen to see if it is safe and effective for you.  © Copyright [employer] 2023 Information is for End User's use only and may not be sold, redistributed or otherwise used for commercial purposes.

## 2024-03-26 NOTE — ASSESSMENT & PLAN NOTE
Doing well on Biktarvy with an undetectable VL.  Pt reports 100% medication compliance on HAART.  Stressed the importance of 100% medication adherence  Monitor CD4. HIV RNA, CMP, and CBCD for virologic response and drug toxicities  Follow up with Dr. Thakur or Dr. Ferrari   HIV Counseling:    Viral Load: not detected    CD4 Count: 1078      Denies side effects.  Stressed the importance of adherence.  Continue follow up in the ID clinic with Dr. Thakur or Dr. Ferrari.    Reviewed the most recent labs, including the CD4 and viral load.  Discussed the risks and benefits of treatment options, instructions for management, importance of treatment adherence, and reduction of risk factors.  Educated on possible medication side effects.    Counseled on routes of HIV transmission, including the risk of  infection.  Emphasized that viral suppression is the best method to prevent HIV transmission.  At this time the pt denies the need for HIV testing of anyone in their life.    Total encounter time was greater than 35 minutes.  Greater than 20 minutes were spent on counseling and patient education.  Pt voices understanding and agreement with treatment plan.

## 2024-03-26 NOTE — PROGRESS NOTES
"[facility] Annual Nutrition Assessment    Portillo Pendleton is a 53 y.o. male seen by RD in conjunction with PCP annual physical     Portillo Pendleton  is established patient (last annual was 2/6/2023)    PMHx:  GERD, dysphagia, OA, prediabetes, elevated CHOL/TRIG, SOPHIA with panic, bipolar 2 disorder with depression       Clinical Data/Client History    CD4 count:  1078  Viral load:  <20  ART:   Biktarvy      , Patient Navigator: Maryellen NICHOLAS   : n/a    Food assistance: SNAP    Living situation: House or apartment  in [place]     Psychosocial factors: Depression  anxiety  bipolar    Mobility:  self ambulates    Physical activity: Walks his cat       Oral health concerns:  Does not f/u with dental; soft diet due to missing teeth      Typical food/beverage intake:  Eats out once every 2 weeks   High intake of ultra processed convenience foods     Breakfast cereal (Cocoa Krispies, Apple Jacks, or Life); or leftover home fries with eggs, cheese, onion   Lunch sandwich (tuna salad, egg salad, or lunch meat), with cheesy puffs or Cheez Its  Dinner microwave meal (I.e. breaded chicken with cheese) or Hamburger [place]   Snacks Tasty Cakes, ice cream (vanilla or chocolate marshmallow)   Beverages Pepsi, water, peach or raspberry iced tea     Appetite: Unchanged from normal    OTC vitamin, mineral, herbal supplements: denied, pt was considering taking supplement for memory, and fruit/vegetable supplement; RD encouraged consuming whole fruits and vegetables not supplements to meet recommendations, advised that should he decide to take any supplements that they must be  at least 6 hrs from ART     Oral/enteral nutrition supplements:  n/a    GI problems: denied n/v/d/c    Food allergies/intolerances:  Tomato    Weight history:  174# (Aug-23)  173# (Feb-24)      Current body weight:  177# (80.6 kg)  Height:  5' 7\" (1.702 m)  BMI:  27.85  IBW:  148# (67.3 kg)  %IBW  120%    Weight change: 4# increase over 1 " month period       Nutrition-related labs:    Lab Results   Component Value Date    HGBA1C 6.0 (H) 02/20/2024    HGBA1C 5.5 11/12/2022    HGBA1C 5.0 05/16/2022     Lab Results   Component Value Date    GLUF 89 02/20/2024    LDLCALC 97 02/20/2024    CREATININE 0.91 02/20/2024         Current medications:     Current Outpatient Medications:     atorvastatin (LIPITOR) 10 mg tablet, TAKE 1 TABLET BY MOUTH DAILY, Disp: 90 tablet, Rfl: 1    bictegravir-emtricitab-tenofovir alafenamide (Biktarvy) -25 MG tablet, TAKE 1 TABLET BY MOUTH DAILY WITH BREAKFAST, Disp: 90 tablet, Rfl: 1    clindamycin (CLEOCIN T) 1 % external solution, Start clindamycin solution twice a day to buttocks area., Disp: 60 mL, Rfl: 8    dicyclomine (BENTYL) 20 mg tablet, Take 2 tablets (40 mg total) by mouth 2 (two) times a day before meals, Disp: 120 tablet, Rfl: 0    FLUoxetine (PROzac) 40 MG capsule, TAKE 2 CAPSULES BY MOUTH DAILY, Disp: 60 capsule, Rfl: 3    fluticasone (FLONASE) 50 mcg/act nasal spray, USE 1 SPRAY IN EACH NOSTRIL DAILY, Disp: 16 g, Rfl: 5    hydrOXYzine HCL (ATARAX) 25 mg tablet, Take 1 tablet (25 mg total) by mouth 3 (three) times a day, Disp: 90 tablet, Rfl: 0    lidocaine (LMX) 4 % cream, APPLY TOPICALLY AS NEEDED FOR MILD PAIN, Disp: 30 g, Rfl: 0    loratadine (CLARITIN) 10 mg tablet, Take 1 tablet (10 mg total) by mouth daily, Disp: 30 tablet, Rfl: 2    montelukast (SINGULAIR) 10 mg tablet, Take 1 tablet (10 mg total) by mouth daily at bedtime, Disp: 90 tablet, Rfl: 1    OLANZapine (ZyPREXA) 10 mg tablet, TAKE 1 TABLET BY MOUTH DAILY AT BEDTIME, Disp: 30 tablet, Rfl: 2    omeprazole (PriLOSEC) 40 MG capsule, TAKE 1 CAPSULE BY MOUTH DAILY, Disp: 30 capsule, Rfl: 5    tadalafil (CIALIS) 2.5 MG tablet, TAKE 1 TABLET BY MOUTH (2.5MG TOTAL) DAILY AS NEEDED FOR ERECTILE DYSFUNCTION, Disp: 10 tablet, Rfl: 2    tamsulosin (FLOMAX) 0.4 mg, TAKE 1 CAPSULE BY MOUTH DAILY WITH DINNER, Disp: 90 capsule, Rfl: 1    triamcinolone  (KENALOG) 0.1 % ointment, Apply twice daily for up to 2 weeks to itchy spots. It is important to take at least 1 week break between 2 week uses. Do NOT use on face, armpits, or groin., Disp: 80 g, Rfl: 1      Physical findings/skin integrity:  Frequent body movements       Nutrition Diagnosis    Problem: altered nutrition-related lab values (HgbA1C within prediabetes range)    Related to: energy intake > energy output over time  lack of food planning, purchasing, and preparation skills not ready for diet/lifestyle change  unwilling or disinterested in learning/applying information     As Evidenced By: abnormal labs (HgbA1C 6.0) diet recall  patient interview  demonstrates inability to apply food/nutrition related information       Estimated Nutritional Needs    Uziel Raza REE: 1603 kcal    ~4611-3929 kcal (based on: REE x 1.3, -500 kcal; 26 kcal/kg BW, -250 kcal)  ~65 g protein (based on: 0.8 g/kg BW)  ~2418 ml fluid (based on: 30 ml/kg BW)      Current intake estimation: exceeds needs       Nutrition Intervention/Recommendations    Nutrition education intervention: no interest     Nutrition recommendations: Limit sugary beverages, hydrate primarily with water, include more vegetables into diet     Supplement recommendations: Separate supplements at least 6 hrs from ART      Teaching Method: verbal     Person Educated: patient      Comprehension: fair    Receptivity: poor    Expected compliance: poor      Collaboration/referral of nutrition care:    Nutrition f/u as pt prefers       Goals:    No SMART goals established at present  Encourage lowering HgbA1C, preventing T2DM     Monitoring/Evaluation:    BW  Diabetes labs   Dietary patterns     Visit Summary    RD met with Portillo Pendleton during appt with PCP.      Annual nutrition assessment was completed, per robert requirements.      Portillo Pendleton admits to eating sugary cereal, eating unhealthy processed meals, drinking sugary beverages, and eating unhealthy  "snacks; yet he is unwilling to make any nutritional changes at this time.  RD encouraged that he reach out as he feels ready.  He was advised that his HgbA1C was elevated.  Pt consumes soft diet due to poor dentition and no f/u with dental.  He does not exercise, but says that he walks his cat.  Portillo Pendleton feels that he is \"addicted to sugar\", does not wish to change.  Recommend slowly cutting back.      F/u with pt as he feels ready and would like to meet with RD.      Aleja Hamilton, MS, RD, LDN     "

## 2024-04-11 DIAGNOSIS — R21 GENERALIZED MACULOPAPULAR RASH: ICD-10-CM

## 2024-04-11 RX ORDER — HYDROXYZINE HYDROCHLORIDE 25 MG/1
25 TABLET, FILM COATED ORAL 3 TIMES DAILY
Qty: 90 TABLET | Refills: 0 | Status: SHIPPED | OUTPATIENT
Start: 2024-04-11

## 2024-04-18 ENCOUNTER — OFFICE VISIT (OUTPATIENT)
Dept: DERMATOLOGY | Facility: CLINIC | Age: 54
End: 2024-04-18
Payer: COMMERCIAL

## 2024-04-18 ENCOUNTER — TELEPHONE (OUTPATIENT)
Age: 54
End: 2024-04-18

## 2024-04-18 VITALS — WEIGHT: 177 LBS | HEIGHT: 67 IN | BODY MASS INDEX: 27.78 KG/M2

## 2024-04-18 DIAGNOSIS — R21 RASH: ICD-10-CM

## 2024-04-18 DIAGNOSIS — L28.1 PRURIGO NODULARIS: ICD-10-CM

## 2024-04-18 DIAGNOSIS — K58.2 IRRITABLE BOWEL SYNDROME WITH BOTH CONSTIPATION AND DIARRHEA: ICD-10-CM

## 2024-04-18 DIAGNOSIS — N52.8 OTHER MALE ERECTILE DYSFUNCTION: ICD-10-CM

## 2024-04-18 DIAGNOSIS — L73.9 FOLLICULITIS: Primary | ICD-10-CM

## 2024-04-18 PROCEDURE — 99214 OFFICE O/P EST MOD 30 MIN: CPT | Performed by: NURSE PRACTITIONER

## 2024-04-18 RX ORDER — TADALAFIL 2.5 MG/1
TABLET ORAL
Qty: 10 TABLET | Refills: 2 | Status: SHIPPED | OUTPATIENT
Start: 2024-04-18

## 2024-04-18 RX ORDER — DICYCLOMINE HCL 20 MG
TABLET ORAL
Qty: 120 TABLET | Refills: 0 | Status: SHIPPED | OUTPATIENT
Start: 2024-04-18

## 2024-04-18 RX ORDER — DOXYCYCLINE HYCLATE 100 MG/1
100 CAPSULE ORAL EVERY 12 HOURS SCHEDULED
Qty: 60 CAPSULE | Refills: 0 | Status: SHIPPED | OUTPATIENT
Start: 2024-04-18 | End: 2024-05-18

## 2024-04-18 RX ORDER — TRIAMCINOLONE ACETONIDE 1 MG/G
OINTMENT TOPICAL
Qty: 453.6 G | Refills: 1 | Status: SHIPPED | OUTPATIENT
Start: 2024-04-18

## 2024-04-25 DIAGNOSIS — F31.81 BIPOLAR 2 DISORDER, MAJOR DEPRESSIVE EPISODE (HCC): ICD-10-CM

## 2024-04-25 DIAGNOSIS — F41.1 GAD (GENERALIZED ANXIETY DISORDER): ICD-10-CM

## 2024-04-26 RX ORDER — FLUOXETINE HYDROCHLORIDE 40 MG/1
80 CAPSULE ORAL DAILY
Qty: 60 CAPSULE | Refills: 1 | Status: SHIPPED | OUTPATIENT
Start: 2024-04-26

## 2024-05-16 ENCOUNTER — TELEPHONE (OUTPATIENT)
Dept: DERMATOLOGY | Facility: CLINIC | Age: 54
End: 2024-05-16

## 2024-05-16 DIAGNOSIS — K58.2 IRRITABLE BOWEL SYNDROME WITH BOTH CONSTIPATION AND DIARRHEA: ICD-10-CM

## 2024-05-16 DIAGNOSIS — B20 HIV DISEASE (HCC): ICD-10-CM

## 2024-05-16 DIAGNOSIS — R21 GENERALIZED MACULOPAPULAR RASH: ICD-10-CM

## 2024-05-16 DIAGNOSIS — F31.81 BIPOLAR 2 DISORDER, MAJOR DEPRESSIVE EPISODE (HCC): ICD-10-CM

## 2024-05-16 RX ORDER — BICTEGRAVIR SODIUM, EMTRICITABINE, AND TENOFOVIR ALAFENAMIDE FUMARATE 50; 200; 25 MG/1; MG/1; MG/1
1 TABLET ORAL
Qty: 90 TABLET | Refills: 1 | Status: SHIPPED | OUTPATIENT
Start: 2024-05-16

## 2024-05-16 RX ORDER — OLANZAPINE 10 MG/1
10 TABLET ORAL
Qty: 30 TABLET | Refills: 2 | Status: SHIPPED | OUTPATIENT
Start: 2024-05-16

## 2024-05-16 RX ORDER — HYDROXYZINE HYDROCHLORIDE 25 MG/1
25 TABLET, FILM COATED ORAL 3 TIMES DAILY
Qty: 90 TABLET | Refills: 0 | Status: SHIPPED | OUTPATIENT
Start: 2024-05-16

## 2024-05-16 RX ORDER — DICYCLOMINE HCL 20 MG
TABLET ORAL
Qty: 120 TABLET | Refills: 0 | Status: SHIPPED | OUTPATIENT
Start: 2024-05-16

## 2024-05-16 NOTE — TELEPHONE ENCOUNTER
Attempted to call patient to schedule him for phototherapy. Both numbers listed on his chart have a busy signal when trying to call. Will attempt again at a later date.

## 2024-05-17 DIAGNOSIS — R21 RASH: ICD-10-CM

## 2024-05-29 ENCOUNTER — TELEPHONE (OUTPATIENT)
Dept: DERMATOLOGY | Facility: CLINIC | Age: 54
End: 2024-05-29
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
## 2024-05-29 NOTE — TELEPHONE ENCOUNTER
Called to schedule phototherapy, unable to leave a message on both numbers listed on his chart. Phone numbers not working

## 2024-05-30 ENCOUNTER — OFFICE VISIT (OUTPATIENT)
Dept: PSYCHIATRY | Facility: CLINIC | Age: 54
End: 2024-05-30

## 2024-05-30 VITALS — WEIGHT: 177 LBS | BODY MASS INDEX: 27.72 KG/M2

## 2024-05-30 DIAGNOSIS — F41.1 GAD (GENERALIZED ANXIETY DISORDER): ICD-10-CM

## 2024-05-30 DIAGNOSIS — F31.81 BIPOLAR 2 DISORDER, MAJOR DEPRESSIVE EPISODE (HCC): Primary | ICD-10-CM

## 2024-05-30 RX ORDER — FLUOXETINE HYDROCHLORIDE 40 MG/1
80 CAPSULE ORAL DAILY
Qty: 60 CAPSULE | Refills: 1 | Status: SHIPPED | OUTPATIENT
Start: 2024-05-30

## 2024-05-30 RX ORDER — OLANZAPINE 10 MG/1
10 TABLET ORAL
Qty: 30 TABLET | Refills: 2 | Status: SHIPPED | OUTPATIENT
Start: 2024-05-30

## 2024-05-30 NOTE — BH CRISIS PLAN
Need, but unable to complete due to factors with patient, priority conversations. Agreed to do in future. Patient is safe at this time.

## 2024-05-30 NOTE — BH TREATMENT PLAN
"TREATMENT PLAN (Medication Management Only)        [facility]    Name/Date of Birth/MRN:  Portillo Pendleton 53 y.o. 1970 MRN: [id number]  Date of Treatment Plan: May 30, 2024  Diagnosis/Diagnoses:   1. Bipolar 2 disorder, major depressive episode (HCC)    2. SOPHIA (generalized anxiety disorder)    3. Generalized maculopapular rash      Strengths/Personal Resources for Self-Care: \"I accept things\"  Area/Areas of need (in own words): ongoing medical issues, dental work  1. Long Term Goal: \"improve anxiety and depresion\"   Target Date: 180 days from treatment plan  Person/Persons responsible for completion of goal: Dr. Stratton and Self  2.  Short Term Objective (s) - How will we reach this goal?:   A.  Provider new recommended medication/dosage changes and/or continue medication(s): as noted.  B.  Make an appointment with IM and Derm, and f/u with dentist/oral surgeon  C.  Continue treamtment and follow up as recommended  Target Date: 6 months from treatment plan unless noted otherwise  Person/Persons Responsible for Completion of Goal: Dr. Stratton and Self   Progress Towards Goals: continuing treatment   Treatment Modality: Medication management and therapy PRN  Review due 180 days from date of this plan: Approximately 6 months from today ( 11/30/2024 )    Expected length of service: ongoing treatment unless revised  My Physician/PA/NP and I have developed this plan together and I agree to work on the goals and objectives. I understand the treatment goals that were developed for my treatment.  Signature:     Date and time:  Signature of parent/guardian if under age of 14 years: Date and time:  Signature of provider:      Date and time:  Signature of Supervising Physician:    Date and time: 5/30/2024      Viral Stratton III  "

## 2024-06-13 ENCOUNTER — PATIENT OUTREACH (OUTPATIENT)
Dept: SURGERY | Facility: CLINIC | Age: 54
End: 2024-06-13

## 2024-06-13 NOTE — PROGRESS NOTES
Cm called ct to do 6 month assessment. Ct is living with his mom and states that housing is stable. Ct reports to have consistent and reliable access to transportation. Ct is still insured through [employer]. Ct states that he is independent and can follow up on his own referrals. Ct states that he needs occasional help accessing assistance. Ct reports to be sexually active with just one person in the last few months. Ct reports no subastance use and has some mental health concerns. Ct receives no income at this time. Ct declined a dental referral today.  Cm and ct worked on Acuity Scale and ct's score is a 17.

## 2024-06-17 DIAGNOSIS — B85.2 NITS: Primary | ICD-10-CM

## 2024-06-27 DIAGNOSIS — R21 GENERALIZED MACULOPAPULAR RASH: ICD-10-CM

## 2024-06-27 DIAGNOSIS — K58.2 IRRITABLE BOWEL SYNDROME WITH BOTH CONSTIPATION AND DIARRHEA: ICD-10-CM

## 2024-06-27 RX ORDER — DICYCLOMINE HCL 20 MG
TABLET ORAL
Qty: 120 TABLET | Refills: 0 | Status: SHIPPED | OUTPATIENT
Start: 2024-06-27 | End: 2024-07-03

## 2024-06-27 RX ORDER — HYDROXYZINE HYDROCHLORIDE 25 MG/1
25 TABLET, FILM COATED ORAL 3 TIMES DAILY
Qty: 90 TABLET | Refills: 0 | Status: SHIPPED | OUTPATIENT
Start: 2024-06-27 | End: 2024-07-03

## 2024-07-02 DIAGNOSIS — R21 RASH: ICD-10-CM

## 2024-07-02 DIAGNOSIS — N40.1 BENIGN PROSTATIC HYPERPLASIA WITH INCOMPLETE BLADDER EMPTYING: ICD-10-CM

## 2024-07-02 DIAGNOSIS — R21 GENERALIZED MACULOPAPULAR RASH: ICD-10-CM

## 2024-07-02 DIAGNOSIS — R39.14 BENIGN PROSTATIC HYPERPLASIA WITH INCOMPLETE BLADDER EMPTYING: ICD-10-CM

## 2024-07-02 DIAGNOSIS — K58.2 IRRITABLE BOWEL SYNDROME WITH BOTH CONSTIPATION AND DIARRHEA: ICD-10-CM

## 2024-07-02 RX ORDER — TRIAMCINOLONE ACETONIDE 1 MG/G
OINTMENT TOPICAL
Qty: 454 G | Refills: 1 | Status: SHIPPED | OUTPATIENT
Start: 2024-07-02

## 2024-07-03 RX ORDER — DICYCLOMINE HCL 20 MG
TABLET ORAL
Qty: 120 TABLET | Refills: 0 | Status: SHIPPED | OUTPATIENT
Start: 2024-07-03

## 2024-07-03 RX ORDER — TAMSULOSIN HYDROCHLORIDE 0.4 MG/1
CAPSULE ORAL
Qty: 90 CAPSULE | Refills: 1 | Status: SHIPPED | OUTPATIENT
Start: 2024-07-03

## 2024-07-03 RX ORDER — HYDROXYZINE HYDROCHLORIDE 25 MG/1
25 TABLET, FILM COATED ORAL 3 TIMES DAILY
Qty: 90 TABLET | Refills: 0 | Status: SHIPPED | OUTPATIENT
Start: 2024-07-03

## 2024-07-09 ENCOUNTER — TELEPHONE (OUTPATIENT)
Dept: SURGERY | Facility: CLINIC | Age: 54
End: 2024-07-09

## 2024-07-09 NOTE — TELEPHONE ENCOUNTER
Patient had requested a shampoo and I let patient's Mother know PCP sent script to [facility] on file. Patient was sleeping.

## 2024-07-29 DIAGNOSIS — L98.9 GENERALIZED SKIN LESIONS: ICD-10-CM

## 2024-07-29 DIAGNOSIS — A49.01 MSSA INFECTION, NON-INVASIVE: Primary | ICD-10-CM

## 2024-08-01 DIAGNOSIS — N52.8 OTHER MALE ERECTILE DYSFUNCTION: ICD-10-CM

## 2024-08-01 RX ORDER — TADALAFIL 2.5 MG/1
TABLET ORAL
Qty: 30 TABLET | Refills: 2 | Status: SHIPPED | OUTPATIENT
Start: 2024-08-01

## 2024-08-13 ENCOUNTER — OFFICE VISIT (OUTPATIENT)
Dept: WOUND CARE | Facility: HOSPITAL | Age: 54
End: 2024-08-13
Payer: COMMERCIAL

## 2024-08-13 VITALS
TEMPERATURE: 95.7 F | DIASTOLIC BLOOD PRESSURE: 77 MMHG | RESPIRATION RATE: 20 BRPM | HEIGHT: 67 IN | BODY MASS INDEX: 27.94 KG/M2 | HEART RATE: 84 BPM | WEIGHT: 178 LBS | SYSTOLIC BLOOD PRESSURE: 161 MMHG

## 2024-08-13 DIAGNOSIS — R21 RASH: Primary | ICD-10-CM

## 2024-08-13 PROCEDURE — 99202 OFFICE O/P NEW SF 15 MIN: CPT | Performed by: FAMILY MEDICINE

## 2024-08-13 PROCEDURE — 99212 OFFICE O/P EST SF 10 MIN: CPT | Performed by: FAMILY MEDICINE

## 2024-08-13 NOTE — PATIENT INSTRUCTIONS
Orders Placed This Encounter   Procedures    Wound cleansing and dressings     At this time, there are no chronic wounds for the Wound Center to treat.  You do not need to follow up at Wound Center at this time.      Dermatology had recommended that you try Phototherapy.  Dr. Matt recommends trying this for your condition.    Please call Dermatology to see if they are able to set up Phototherapy for you and also refill the topical med that you had been using that was effective.     Continue to follow with Dermatology for routine follow up.        Thank you for visiting our center today.     Standing Status:   Future     Standing Expiration Date:   8/20/2024

## 2024-08-13 NOTE — PROGRESS NOTES
Patient ID: Portillo Pendleton is a 53 y.o. male Date of Birth 1970     Chief Complaint  Chief Complaint   Patient presents with    No Wound Consult     No current open wounds.       Allergies  Tomato - food allergy    Assessment:    No problem-specific Assessment & Plan notes found for this encounter.       Diagnoses and all orders for this visit:    Rash  -     Wound cleansing and dressings; Future              Procedures    Plan:  Scattered rash.  No open wounds  Recommend following up with dermatology as directed.  Call dermatology to pursue the previously recommended treatment of phototherapy as well as any topical agents they would suggest to help control his symptoms.  Unfortunately I do not have much to offer for treatment of patient's condition.  Follow-up here in the wound management center as needed for any chronic wound.         Incision 02/24/16 Neck Right (Active)   Date First Assessed/Time First Assessed: 02/24/16 1658   Pre-Existing Wound: No  Location: Neck  Wound Location Orientation: Right  Wound Description (Comments): punx sitefrom variocele embolization , with steri-strips   Dressing Status: Clean;Dry;Intact       Incision 03/28/16 Neck Right (Active)   Date First Assessed/Time First Assessed: 03/28/16 0949   Location: Neck  Wound Location Orientation: Right  Wound Description (Comments): (c)        Subjective:      .    Patient presents for initial evaluation of scattered rash.  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  Topical triamcinolone was recommended and sent to the pharmacy but patient states that this does not help him.  It was also recommended that he consider phototherapy to which he was agreeable.  Patient states that he had issues with his phone for the past few weeks as it appears that dermatology has been trying to reach patient to set up the phototherapy.  Patient states that he does plan to return the phone call  to dermatology to pursue this treatment modality.  Patient has a history of HIV as well as latent syphilis.  No diabetes.  No smoking.        The following portions of the patient's history were reviewed and updated as appropriate: He  has a past medical history of Anxiety, Dysuria, Fecal urgency, GERD (gastroesophageal reflux disease), Hemorrhage of anus and rectum, Herpes zoster, History of chickenpox, History of pneumonia, HIV (human immunodeficiency virus infection) ([facility]), IBS (irritable bowel syndrome), Proctitis, chlamydial, Recurrent major depressive episodes, moderate ([facility]), and Wears glasses.  He   Patient Active Problem List    Diagnosis Date Noted    MSSA infection, non-invasive 03/26/2024    Folliculitis 02/06/2023    Sacral wound 02/06/2023    Benign prostatic hyperplasia with incomplete bladder emptying 02/06/2023    Late latent syphilis 11/29/2022    Osteoarthritis of spine with radiculopathy, cervical region 08/30/2022    Olecranon bursitis of both elbows 08/30/2022    Rash 07/11/2022    Chronic pain of both shoulders 07/11/2022    Anemia in other chronic diseases classified elsewhere 05/26/2022    Hypercalcemia 05/26/2022    Elevated alkaline phosphatase level 05/18/2022    Atrial dilation 02/25/2022    SOB (shortness of breath) 01/12/2022    HSV infection 01/12/2022    Chronic pain of both knees 01/12/2022    Chronic generalized pain disorder 08/10/2021    Cervical pain 08/10/2021    Abnormality of gait and mobility 08/10/2021    Poor dentition 03/24/2021    Abdominal bloating 11/03/2020    Bipolar 2 disorder, major depressive episode (HCC) 09/04/2020    Weight loss 12/03/2019    Ganglion cyst of right foot 12/03/2019    Upper respiratory tract infection 12/03/2019    Testicular pain 12/03/2019    Elevated cholesterol with elevated triglycerides 11/18/2019    Difficulty in voiding 07/30/2019    Foot pain, bilateral 07/30/2019    At high risk for falls 07/30/2019    Balance problems 07/30/2019     Gingival abscess 07/09/2019    History of sensory changes     Discomfort 04/23/2019    Red eyes 01/23/2019    Chronic pain syndrome 01/23/2019    Seasonal allergies 01/23/2019    Prediabetes 12/18/2018    Constipation 10/23/2018    Moderate persistent asthma without complication 07/25/2018    ED (erectile dysfunction) 07/25/2018    Dysphagia 07/18/2018    Acute right-sided low back pain with right-sided sciatica 02/23/2018    SOPHIA (generalized anxiety disorder) 02/20/2018    Panic attacks 08/31/2017    Jerky body movements 08/07/2017    Bilateral varicoceles 02/24/2016    Embolism involving vascular device ([facility]) 02/24/2016    HIV disease ([facility]) 02/24/2016    GERD (gastroesophageal reflux disease) 02/24/2016    Irritable bowel syndrome with both constipation and diarrhea 02/24/2016    Toxic effect of tobacco cigarette, intentional self-harm ([facility]) 02/24/2016     He  reports that he has been smoking cigarettes. He has a 12.5 pack-year smoking history. He has quit using smokeless tobacco. He reports that he does not currently use alcohol after a past usage of about 1.0 standard drink of alcohol per week. He reports that he does not use drugs.  Current Outpatient Medications   Medication Sig Dispense Refill    atorvastatin (LIPITOR) 10 mg tablet TAKE 1 TABLET BY MOUTH DAILY 90 tablet 1    bictegravir-emtricitab-tenofovir alafenamide (Biktarvy) -25 MG tablet TAKE 1 TABLET BY MOUTH DAILY WITH BREAKFAST 90 tablet 1    clindamycin (CLEOCIN T) 1 % external solution Start clindamycin solution twice a day to buttocks area. 60 mL 8    dicyclomine (BENTYL) 20 mg tablet TAKE TWO TABLETS BY MOUTH TWICE A DAY BEFORE MEALS 120 tablet 0    FLUoxetine (PROzac) 40 MG capsule Take 2 capsules (80 mg total) by mouth daily 60 capsule 1    fluticasone (FLONASE) 50 mcg/act nasal spray USE 1 SPRAY IN EACH NOSTRIL DAILY 16 g 5    hydrOXYzine HCL (ATARAX) 25 mg tablet TAKE 1 TABLET BY MOUTH THREE TIMES A DAY 90 tablet 0    lidocaine (LMX) 4  "% cream APPLY TOPICALLY AS NEEDED FOR MILD PAIN 30 g 0    loratadine (CLARITIN) 10 mg tablet Take 1 tablet (10 mg total) by mouth daily 30 tablet 2    OLANZapine (ZyPREXA) 10 mg tablet Take 1 tablet (10 mg total) by mouth daily at bedtime 30 tablet 2    omeprazole (PriLOSEC) 40 MG capsule TAKE 1 CAPSULE BY MOUTH DAILY 30 capsule 5    tadalafil (CIALIS) 2.5 MG tablet TAKE 1 TABLET BY MOUTH (2.5MG TOTAL) DAILY AS NEEDED FOR ERECTILE DYSFUNCTION 30 tablet 2    tamsulosin (FLOMAX) 0.4 mg TAKE 1 CAPSULE BY MOUTH DAILY WITH DINNER 90 capsule 1    triamcinolone (KENALOG) 0.1 % ointment APPLY TWICE A DAY FOR UP TO 2 WEEKS, TAKE 1 WEEK BREAK BETWEEN USES *DO NOT USE ON FACE, ARMPITS, OR GROIN 454 g 1     No current facility-administered medications for this visit.     He is allergic to tomato - food allergy..    Review of Systems   Constitutional:  Negative for chills and fever.   HENT:  Negative for congestion and sneezing.    Respiratory:  Negative for cough.    Skin:  Positive for wound.   Psychiatric/Behavioral:  The patient is hyperactive.          Objective:            /77   Pulse 84   Temp (!) 95.7 °F (35.4 °C)   Resp 20   Ht 5' 7\" (1.702 m)   Wt 80.7 kg (178 lb)   BMI 27.88 kg/m²     Physical Exam  Vitals reviewed.   Constitutional:       General: He is not in acute distress.     Appearance: Normal appearance. He is obese. He is not ill-appearing, toxic-appearing or diaphoretic.   HENT:      Head: Normocephalic and atraumatic.      Right Ear: External ear normal.      Left Ear: External ear normal.   Eyes:      Conjunctiva/sclera: Conjunctivae normal.   Pulmonary:      Effort: Pulmonary effort is normal. No respiratory distress.   Musculoskeletal:      Cervical back: Neck supple.   Skin:     Findings: Rash present.      Comments: No open wounds   Neurological:      Mental Status: He is alert.   Psychiatric:         Speech: Speech is rapid and pressured.         Behavior: Behavior is agitated and " hyperactive. Behavior is cooperative.           Wound Instructions:  Orders Placed This Encounter   Procedures    Wound cleansing and dressings     At this time, there are no chronic wounds for the Wound Center to treat.  You do not need to follow up at Wound Center at this time.      Dermatology had recommended that you try Phototherapy.  Dr. Matt recommends trying this for your condition.    Please call Dermatology to see if they are able to set up Phototherapy for you and also refill the topical med that you had been using that was effective.     Continue to follow with Dermatology for routine follow up.        Thank you for visiting our center today.     Standing Status:   Future     Standing Expiration Date:   8/20/2024        Diagnosis ICD-10-CM Associated Orders   1. Rash  R21 Wound cleansing and dressings

## 2024-08-15 RX ORDER — CLINDAMYCIN PHOSPHATE 11.9 MG/ML
SOLUTION TOPICAL
Qty: 60 ML | Refills: 8 | OUTPATIENT
Start: 2024-08-15

## 2024-08-22 ENCOUNTER — TELEPHONE (OUTPATIENT)
Dept: SURGERY | Facility: CLINIC | Age: 54
End: 2024-08-22

## 2024-08-22 NOTE — TELEPHONE ENCOUNTER
Spoke with patient and gave a reminded to get labs done for upcoming ID appointment on 8/28 at 5:30pm.

## 2024-08-23 DIAGNOSIS — K58.2 IRRITABLE BOWEL SYNDROME WITH BOTH CONSTIPATION AND DIARRHEA: ICD-10-CM

## 2024-08-23 DIAGNOSIS — R21 GENERALIZED MACULOPAPULAR RASH: ICD-10-CM

## 2024-08-26 ENCOUNTER — TELEPHONE (OUTPATIENT)
Dept: SURGERY | Facility: CLINIC | Age: 54
End: 2024-08-26

## 2024-08-26 ENCOUNTER — APPOINTMENT (OUTPATIENT)
Dept: LAB | Age: 54
End: 2024-08-26
Payer: COMMERCIAL

## 2024-08-26 DIAGNOSIS — R73.03 PREDIABETES: ICD-10-CM

## 2024-08-26 DIAGNOSIS — A52.8 LATE LATENT SYPHILIS: ICD-10-CM

## 2024-08-26 DIAGNOSIS — Z12.5 SCREENING FOR PROSTATE CANCER: ICD-10-CM

## 2024-08-26 LAB
ALBUMIN SERPL BCG-MCNC: 3.8 G/DL (ref 3.5–5)
ALP SERPL-CCNC: 89 U/L (ref 34–104)
ALT SERPL W P-5'-P-CCNC: 18 U/L (ref 7–52)
ANION GAP SERPL CALCULATED.3IONS-SCNC: 8 MMOL/L (ref 4–13)
AST SERPL W P-5'-P-CCNC: 15 U/L (ref 13–39)
BASOPHILS # BLD AUTO: 0.04 THOUSANDS/ÂΜL (ref 0–0.1)
BASOPHILS NFR BLD AUTO: 1 % (ref 0–1)
BILIRUB SERPL-MCNC: 0.2 MG/DL (ref 0.2–1)
BUN SERPL-MCNC: 14 MG/DL (ref 5–25)
CALCIUM SERPL-MCNC: 9.1 MG/DL (ref 8.4–10.2)
CHLORIDE SERPL-SCNC: 104 MMOL/L (ref 96–108)
CO2 SERPL-SCNC: 25 MMOL/L (ref 21–32)
CREAT SERPL-MCNC: 0.76 MG/DL (ref 0.6–1.3)
EOSINOPHIL # BLD AUTO: 0.07 THOUSAND/ÂΜL (ref 0–0.61)
EOSINOPHIL NFR BLD AUTO: 1 % (ref 0–6)
ERYTHROCYTE [DISTWIDTH] IN BLOOD BY AUTOMATED COUNT: 15.3 % (ref 11.6–15.1)
EST. AVERAGE GLUCOSE BLD GHB EST-MCNC: 123 MG/DL
GFR SERPL CREATININE-BSD FRML MDRD: 102 ML/MIN/1.73SQ M
GLUCOSE P FAST SERPL-MCNC: 96 MG/DL (ref 65–99)
HBA1C MFR BLD: 5.9 %
HCT VFR BLD AUTO: 40.7 % (ref 36.5–49.3)
HGB BLD-MCNC: 13 G/DL (ref 12–17)
IMM GRANULOCYTES # BLD AUTO: 0.05 THOUSAND/UL (ref 0–0.2)
IMM GRANULOCYTES NFR BLD AUTO: 1 % (ref 0–2)
LYMPHOCYTES # BLD AUTO: 2.53 THOUSANDS/ÂΜL (ref 0.6–4.47)
LYMPHOCYTES NFR BLD AUTO: 45 % (ref 14–44)
MCH RBC QN AUTO: 28.1 PG (ref 26.8–34.3)
MCHC RBC AUTO-ENTMCNC: 31.9 G/DL (ref 31.4–37.4)
MCV RBC AUTO: 88 FL (ref 82–98)
MONOCYTES # BLD AUTO: 0.35 THOUSAND/ÂΜL (ref 0.17–1.22)
MONOCYTES NFR BLD AUTO: 6 % (ref 4–12)
NEUTROPHILS # BLD AUTO: 2.53 THOUSANDS/ÂΜL (ref 1.85–7.62)
NEUTS SEG NFR BLD AUTO: 46 % (ref 43–75)
NRBC BLD AUTO-RTO: 0 /100 WBCS
PLATELET # BLD AUTO: 321 THOUSANDS/UL (ref 149–390)
PMV BLD AUTO: 7.9 FL (ref 8.9–12.7)
POTASSIUM SERPL-SCNC: 4 MMOL/L (ref 3.5–5.3)
PROT SERPL-MCNC: 6.9 G/DL (ref 6.4–8.4)
RBC # BLD AUTO: 4.63 MILLION/UL (ref 3.88–5.62)
RPR SER QL: REACTIVE
RPR SER-TITR: ABNORMAL {TITER}
SODIUM SERPL-SCNC: 137 MMOL/L (ref 135–147)
TREPONEMA PALLIDUM IGG+IGM AB [PRESENCE] IN SERUM OR PLASMA BY IMMUNOASSAY: REACTIVE
WBC # BLD AUTO: 5.57 THOUSAND/UL (ref 4.31–10.16)

## 2024-08-26 PROCEDURE — 86592 SYPHILIS TEST NON-TREP QUAL: CPT

## 2024-08-26 PROCEDURE — 86593 SYPHILIS TEST NON-TREP QUANT: CPT

## 2024-08-26 PROCEDURE — 84153 ASSAY OF PSA TOTAL: CPT

## 2024-08-26 PROCEDURE — 36415 COLL VENOUS BLD VENIPUNCTURE: CPT

## 2024-08-26 PROCEDURE — 86780 TREPONEMA PALLIDUM: CPT

## 2024-08-26 PROCEDURE — 83036 HEMOGLOBIN GLYCOSYLATED A1C: CPT

## 2024-08-27 LAB
BASOPHILS # BLD AUTO: 0 X10E3/UL (ref 0–0.2)
BASOPHILS NFR BLD AUTO: 1 %
CD3+CD4+ CELLS # BLD: 1000 /UL (ref 359–1519)
CD3+CD4+ CELLS NFR BLD: 40 % (ref 30.8–58.5)
EOSINOPHIL # BLD AUTO: 0.1 X10E3/UL (ref 0–0.4)
EOSINOPHIL NFR BLD AUTO: 2 %
ERYTHROCYTE [DISTWIDTH] IN BLOOD BY AUTOMATED COUNT: 15.6 % (ref 11.6–15.4)
GAMMA INTERFERON BACKGROUND BLD IA-ACNC: 0.05 IU/ML
HCT VFR BLD AUTO: 39.2 % (ref 37.5–51)
HCV AB SER QL: NORMAL
HGB BLD-MCNC: 12.5 G/DL (ref 13–17.7)
IMM GRANULOCYTES # BLD: 0.1 X10E3/UL (ref 0–0.1)
IMM GRANULOCYTES NFR BLD: 1 %
LYMPHOCYTES # BLD AUTO: 2.5 X10E3/UL (ref 0.7–3.1)
LYMPHOCYTES NFR BLD AUTO: 42 %
M TB IFN-G BLD-IMP: NEGATIVE
M TB IFN-G CD4+ BCKGRND COR BLD-ACNC: -0.01 IU/ML
M TB IFN-G CD4+ BCKGRND COR BLD-ACNC: 0 IU/ML
MCH RBC QN AUTO: 28.3 PG (ref 26.6–33)
MCHC RBC AUTO-ENTMCNC: 31.9 G/DL (ref 31.5–35.7)
MCV RBC AUTO: 89 FL (ref 79–97)
MISCELLANEOUS LAB TEST RESULT: NORMAL
MITOGEN IGNF BCKGRD COR BLD-ACNC: 9.95 IU/ML
MONOCYTES # BLD AUTO: 0.4 X10E3/UL (ref 0.1–0.9)
MONOCYTES NFR BLD AUTO: 7 %
NEUTROPHILS # BLD AUTO: 2.8 X10E3/UL (ref 1.4–7)
NEUTROPHILS NFR BLD AUTO: 47 %
PLATELET # BLD AUTO: 358 X10E3/UL (ref 150–450)
RBC # BLD AUTO: 4.42 X10E6/UL (ref 4.14–5.8)
WBC # BLD AUTO: 5.8 X10E3/UL (ref 3.4–10.8)

## 2024-08-27 RX ORDER — HYDROXYZINE HYDROCHLORIDE 25 MG/1
25 TABLET, FILM COATED ORAL 3 TIMES DAILY
Qty: 90 TABLET | Refills: 0 | Status: SHIPPED | OUTPATIENT
Start: 2024-08-27

## 2024-08-27 RX ORDER — DICYCLOMINE HCL 20 MG
TABLET ORAL
Qty: 120 TABLET | Refills: 0 | Status: SHIPPED | OUTPATIENT
Start: 2024-08-27

## 2024-08-28 ENCOUNTER — OFFICE VISIT (OUTPATIENT)
Dept: PSYCHIATRY | Facility: CLINIC | Age: 54
End: 2024-08-28
Payer: COMMERCIAL

## 2024-08-28 ENCOUNTER — OFFICE VISIT (OUTPATIENT)
Dept: SURGERY | Facility: CLINIC | Age: 54
End: 2024-08-28
Payer: COMMERCIAL

## 2024-08-28 VITALS
OXYGEN SATURATION: 96 % | HEART RATE: 114 BPM | TEMPERATURE: 99.7 F | WEIGHT: 180.8 LBS | RESPIRATION RATE: 18 BRPM | BODY MASS INDEX: 28.38 KG/M2 | HEIGHT: 67 IN

## 2024-08-28 VITALS — BODY MASS INDEX: 28.19 KG/M2 | WEIGHT: 180 LBS

## 2024-08-28 DIAGNOSIS — Z20.2 CONTACT WITH AND (SUSPECTED) EXPOSURE TO INFECTIONS WITH A PREDOMINANTLY SEXUAL MODE OF TRANSMISSION: ICD-10-CM

## 2024-08-28 DIAGNOSIS — R00.0 TACHYCARDIA: ICD-10-CM

## 2024-08-28 DIAGNOSIS — L73.9 FOLLICULITIS: ICD-10-CM

## 2024-08-28 DIAGNOSIS — D72.89 OTHER SPECIFIED DISORDERS OF WHITE BLOOD CELLS: ICD-10-CM

## 2024-08-28 DIAGNOSIS — T65.222D TOXIC EFFECT OF TOBACCO CIGARETTE, INTENTIONAL SELF-HARM, SUBSEQUENT ENCOUNTER: ICD-10-CM

## 2024-08-28 DIAGNOSIS — R74.8 ELEVATED ALKALINE PHOSPHATASE LEVEL: ICD-10-CM

## 2024-08-28 DIAGNOSIS — F31.81 BIPOLAR 2 DISORDER, MAJOR DEPRESSIVE EPISODE (HCC): ICD-10-CM

## 2024-08-28 DIAGNOSIS — D63.8 ANEMIA IN OTHER CHRONIC DISEASES CLASSIFIED ELSEWHERE: ICD-10-CM

## 2024-08-28 DIAGNOSIS — E78.2 ELEVATED CHOLESTEROL WITH ELEVATED TRIGLYCERIDES: ICD-10-CM

## 2024-08-28 DIAGNOSIS — A53.9 SYPHILIS: ICD-10-CM

## 2024-08-28 DIAGNOSIS — B20 HIV DISEASE (HCC): Primary | ICD-10-CM

## 2024-08-28 DIAGNOSIS — Z72.89 OTHER PROBLEMS RELATED TO LIFESTYLE: ICD-10-CM

## 2024-08-28 DIAGNOSIS — Z11.3 ENCOUNTER FOR SCREENING FOR BACTERIAL SEXUALLY TRANSMITTED DISEASE: ICD-10-CM

## 2024-08-28 DIAGNOSIS — F41.1 GAD (GENERALIZED ANXIETY DISORDER): ICD-10-CM

## 2024-08-28 LAB — HIV1 RNA # PLAS NAA DL=20: ABNORMAL {COPIES}/ML

## 2024-08-28 PROCEDURE — 99214 OFFICE O/P EST MOD 30 MIN: CPT | Performed by: INTERNAL MEDICINE

## 2024-08-28 PROCEDURE — 90833 PSYTX W PT W E/M 30 MIN: CPT | Performed by: PSYCHIATRY & NEUROLOGY

## 2024-08-28 PROCEDURE — 99214 OFFICE O/P EST MOD 30 MIN: CPT | Performed by: PSYCHIATRY & NEUROLOGY

## 2024-08-28 RX ORDER — FLUOXETINE 40 MG/1
80 CAPSULE ORAL DAILY
Qty: 60 CAPSULE | Refills: 1 | Status: SHIPPED | OUTPATIENT
Start: 2024-08-28

## 2024-08-28 RX ORDER — OLANZAPINE 10 MG/1
10 TABLET ORAL
Qty: 30 TABLET | Refills: 2 | Status: SHIPPED | OUTPATIENT
Start: 2024-08-28

## 2024-08-28 NOTE — ASSESSMENT & PLAN NOTE
Peak RPR titer at 1: 64.  Status post treatment with reach shots of benzathine penicillin.  Now remains serofast at 1: 4.  Continue to monitor RPR titer at least annually.

## 2024-08-28 NOTE — PROGRESS NOTES
Progress Note - Infectious Disease   Portillo Pendleton 53 y.o. male MRN: [id number]  Unit/Bed#:  Encounter: [id number]      Impression/Plan:  HIV disease  Doing well on Biktarvy with an undetectable viral load and a CD4 count of 1000.  Continue ART, recheck CBC with differential, CMP, HIV RNA, and CD4 in 5 months to make sure no developing toxicity or treatment failure and follow-up in 6 months.  Stressed adherence.    Folliculitis  Being followed by dermatology.  Received a prolonged treatment course with doxycycline.  Continue dermatology follow-up.    Toxic effect of tobacco cigarette, intentional self-harm (HCC)  Continue stressed the importance of complete tobacco cessation.    Syphilis  Peak RPR titer at 1: 64.  Status post treatment with reach shots of benzathine penicillin.  Now remains serofast at 1: 4.  Continue to monitor RPR titer at least annually.    Bipolar 2 disorder, major depressive episode (HCC)  Being followed by behavioral health and remains on Zyprexa, Prozac.  No concerning drug interactions.    Tachycardia  Suspect all related to the bipolar disorder.  Patient also walked in quickly out of the heat.  Asymptomatic.  Monitor for now.    Patient was provided medication, adherence and prevention education    Subjective:  Routine follow-up for HIV.  Patient claims 100% adherence with Biktarvy. Patient denies any notable side effects.  Overall the feeling well.  The patient denies any fever chills or sweats, denies any nausea vomiting or diarrhea, denies any cough or shortness of breath.    ROS:  A complete review of systems is negative other than that noted above in the subjective    Followup portions patient history reviewed and updated as:  Allergies, current medications, past medical history, past social history, past surgical history, and the problem list    Objective:  Vitals:  Vitals:    08/28/24 1728   Pulse: (!) 114   Resp: 18   Temp: 99.7 °F (37.6 °C)   TempSrc: Tympanic   SpO2: 96%   Weight:  "82 kg (180 lb 12.8 oz)   Height: 5' 7\" (1.702 m)       Physical Exam:   General Appearance:  Alert, interactive, appearing well,  nontoxic, no acute distress.  Agitated as he is at baseline   Neck:   Supple without lymphadenopathy, no thyromegaly or masses   Throat: Oropharynx moist without lesions.    Lungs:   Clear to auscultation bilaterally; no wheezes, rhonchi or rales; respirations unlabored   Heart:  Tachycardic; no murmur, rub or gallop   Abdomen:   Soft, non-tender, non-distended, positive bowel sounds.     Extremities: No clubbing, cyanosis or edema   Skin: No new rashes or lesions. No draining wounds noted.       Labs, Imaging, & Other studies:   All pertinent labs and imaging studies were personally reviewed    Lab Results   Component Value Date     12/30/2015    K 4.0 08/26/2024     08/26/2024    CO2 25 08/26/2024    ANIONGAP 9 12/30/2015    BUN 14 08/26/2024    CREATININE 0.76 08/26/2024    GLUCOSE 124 12/30/2015    GLUF 96 08/26/2024    CALCIUM 9.1 08/26/2024    CORRECTEDCA 10.3 (H) 05/16/2022    AST 15 08/26/2024    ALT 18 08/26/2024    ALKPHOS 89 08/26/2024    PROT 8.4 (H) 12/30/2015    BILITOT 0.37 12/30/2015    EGFR 102 08/26/2024     Lab Results   Component Value Date    WBC 5.57 08/26/2024    WBC 5.8 08/26/2024    HGB 13.0 08/26/2024    HGB 12.5 (L) 08/26/2024    HCT 40.7 08/26/2024    HCT 39.2 08/26/2024    MCV 88 08/26/2024    MCV 89 08/26/2024     08/26/2024     08/26/2024     Lab Results   Component Value Date    HEPCAB Non-reactive 08/26/2024     Lab Results   Component Value Date    HEPCAB Non-reactive 08/26/2024     Lab Results   Component Value Date    RPR Reactive (A) 08/26/2024    RPR Reactive 4 dils (A) 08/26/2024     CD4 ABS   Date/Time Value Ref Range Status   08/26/2024 09:44 AM 1000 359 - 1519 /uL Final       HIV-1 [facility]   Date/Time Value Ref Range Status   08/26/2024 09:44 AM Detected <20 cp/mL (A) Not Detected Final           Current Outpatient " Medications:     atorvastatin (LIPITOR) 10 mg tablet, TAKE 1 TABLET BY MOUTH DAILY, Disp: 90 tablet, Rfl: 1    bictegravir-emtricitab-tenofovir alafenamide (Biktarvy) -25 MG tablet, TAKE 1 TABLET BY MOUTH DAILY WITH BREAKFAST, Disp: 90 tablet, Rfl: 1    clindamycin (CLEOCIN T) 1 % external solution, Start clindamycin solution twice a day to buttocks area., Disp: 60 mL, Rfl: 8    dicyclomine (BENTYL) 20 mg tablet, TAKE TWO TABLETS BY MOUTH TWICE A DAY BEFORE MEALS, Disp: 120 tablet, Rfl: 0    FLUoxetine (PROzac) 40 MG capsule, Take 2 capsules (80 mg total) by mouth daily, Disp: 60 capsule, Rfl: 1    fluticasone (FLONASE) 50 mcg/act nasal spray, USE 1 SPRAY IN EACH NOSTRIL DAILY, Disp: 16 g, Rfl: 5    hydrOXYzine HCL (ATARAX) 25 mg tablet, TAKE 1 TABLET BY MOUTH THREE TIMES A DAY, Disp: 90 tablet, Rfl: 0    lidocaine (LMX) 4 % cream, APPLY TOPICALLY AS NEEDED FOR MILD PAIN, Disp: 30 g, Rfl: 0    loratadine (CLARITIN) 10 mg tablet, Take 1 tablet (10 mg total) by mouth daily, Disp: 30 tablet, Rfl: 2    OLANZapine (ZyPREXA) 10 mg tablet, Take 1 tablet (10 mg total) by mouth daily at bedtime, Disp: 30 tablet, Rfl: 2    omeprazole (PriLOSEC) 40 MG capsule, TAKE 1 CAPSULE BY MOUTH DAILY, Disp: 30 capsule, Rfl: 5    tadalafil (CIALIS) 2.5 MG tablet, TAKE 1 TABLET BY MOUTH (2.5MG TOTAL) DAILY AS NEEDED FOR ERECTILE DYSFUNCTION, Disp: 30 tablet, Rfl: 2    tamsulosin (FLOMAX) 0.4 mg, TAKE 1 CAPSULE BY MOUTH DAILY WITH DINNER, Disp: 90 capsule, Rfl: 1    triamcinolone (KENALOG) 0.1 % ointment, APPLY TWICE A DAY FOR UP TO 2 WEEKS, TAKE 1 WEEK BREAK BETWEEN USES *DO NOT USE ON FACE, ARMPITS, OR GROIN, Disp: 454 g, Rfl: 1

## 2024-08-28 NOTE — ASSESSMENT & PLAN NOTE
Being followed by behavioral health and remains on Zyprexa, Prozac.  No concerning drug interactions.

## 2024-08-28 NOTE — ASSESSMENT & PLAN NOTE
Doing well on Biktarvy with an undetectable viral load and a CD4 count of 1000.  Continue ART, recheck CBC with differential, CMP, HIV RNA, and CD4 in 5 months to make sure no developing toxicity or treatment failure and follow-up in 6 months.  Stressed adherence.

## 2024-08-28 NOTE — ASSESSMENT & PLAN NOTE
Suspect all related to the bipolar disorder.  Patient also walked in quickly out of the heat.  Asymptomatic.  Monitor for now.

## 2024-08-28 NOTE — ASSESSMENT & PLAN NOTE
Being followed by dermatology.  Received a prolonged treatment course with doxycycline.  Continue dermatology follow-up.
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
## 2024-08-28 NOTE — BH CRISIS PLAN
[facility] Safety Plan    Creation Date: 8/28/24    Created By: Viral Stratton III, DO       Step 1: Warning Signs:   Warning Signs   worse concentration, increased irritability   If i am doing a puzzle or Sudoku and I cannot get anywhere with it            Step 2: Internal Coping Strategies:   Internal Coping Strategies   TV, puzzles            Step 3: People and social settings that provide distraction:   Name Contact Information   Anastasia. friend cell   Mary, friend cell   Dar, friend cell          Step 4: People whom I can ask for help during a crisis:    Name Contact Information    anastasia, friend cell    Mary, friend cell    Dar, friend cell      Step 5: Professionals or agencies I can contact during a crisis:    Clinican/Agency Name Phone Emergency Contact    Dr Stratton [phone]     [facility] Emergency Department Emergency Department Phone Emergency   Department Address    [facility]        Crisis Phone Numbers:   Suicide Prevention Lifeline: Call or Text  376 Crisis Text Line: Text HOME   to 438-834   Please note: Some [facility] do not have a separate number for   Child/Adolescent specific crisis. If your county is not listed under   Child/Adolescent, please call the adult number for your county      Adult Crisis Numbers: Child/Adolescent Crisis Numbers   [facility]: [phone] [facility]: [phone]   [facility]: [phone] [facility]: [phone]   [facility]: [phone] [place]: [phone]   [facility]: [phone] [facility]: [phone]   [facility]: [phone]   [facility]: [phone]   [facility]: [phone]   [facility]: [phone] (daytime) [phone]   (after hours, weekends, holidays)      Step 6: Making the environment safer (plan for lethal means safety):   Plan: No guns     Optional: What is most important to me and worth living for?      [employer] Safety Plan. Elizabeth  Ari and Pete Weaver. Used with   permission of the authors.

## 2024-08-29 ENCOUNTER — PATIENT OUTREACH (OUTPATIENT)
Dept: SURGERY | Facility: CLINIC | Age: 54
End: 2024-08-29

## 2024-08-29 NOTE — PROGRESS NOTES
Data: Pt completed the PHQ-9 screening within ID clinic, yet [facility] couldn't discuss the results at the time due to conflicting individual session. Pt scored was (PHQ-9=4) as a display of minimal depressive traits without SI or HI. Results will be discussed with pt to address MH's stability on a phone f/u, or direct pt contact.

## 2024-08-30 DIAGNOSIS — K21.00 GASTROESOPHAGEAL REFLUX DISEASE WITH ESOPHAGITIS: ICD-10-CM

## 2024-08-30 RX ORDER — OMEPRAZOLE 40 MG/1
CAPSULE, DELAYED RELEASE ORAL
Qty: 30 CAPSULE | Refills: 5 | Status: SHIPPED | OUTPATIENT
Start: 2024-08-30

## 2024-09-05 DIAGNOSIS — K21.00 GASTROESOPHAGEAL REFLUX DISEASE WITH ESOPHAGITIS: ICD-10-CM

## 2024-09-05 RX ORDER — OMEPRAZOLE 40 MG/1
40 CAPSULE, DELAYED RELEASE ORAL DAILY
Qty: 30 CAPSULE | Refills: 5 | Status: SHIPPED | OUTPATIENT
Start: 2024-09-05

## 2024-09-06 ENCOUNTER — TELEPHONE (OUTPATIENT)
Dept: DERMATOLOGY | Facility: CLINIC | Age: 54
End: 2024-09-06

## 2024-09-06 NOTE — TELEPHONE ENCOUNTER
Called and spoke with patient and scheduled him for phototherapy. He is scheduled on Tuesdays and Thursdays and should only be scheduled on these days moving forward

## 2024-09-11 DIAGNOSIS — R26.9 ABNORMALITY OF GAIT AND MOBILITY: ICD-10-CM

## 2024-09-11 DIAGNOSIS — R25.8 JERKY BODY MOVEMENTS: Primary | ICD-10-CM

## 2024-09-23 ENCOUNTER — OFFICE VISIT (OUTPATIENT)
Dept: SURGERY | Facility: CLINIC | Age: 54
End: 2024-09-23
Payer: COMMERCIAL

## 2024-09-23 ENCOUNTER — CLINICAL SUPPORT (OUTPATIENT)
Dept: DERMATOLOGY | Facility: CLINIC | Age: 54
End: 2024-09-23
Payer: COMMERCIAL

## 2024-09-23 VITALS
WEIGHT: 181 LBS | RESPIRATION RATE: 18 BRPM | TEMPERATURE: 98.6 F | HEIGHT: 67 IN | DIASTOLIC BLOOD PRESSURE: 90 MMHG | BODY MASS INDEX: 28.41 KG/M2 | HEART RATE: 100 BPM | SYSTOLIC BLOOD PRESSURE: 143 MMHG | OXYGEN SATURATION: 96 %

## 2024-09-23 DIAGNOSIS — Z23 NEED FOR COVID-19 VACCINE: ICD-10-CM

## 2024-09-23 DIAGNOSIS — R73.03 PREDIABETES: ICD-10-CM

## 2024-09-23 DIAGNOSIS — Z71.85 VACCINE COUNSELING: ICD-10-CM

## 2024-09-23 DIAGNOSIS — A52.8 LATE LATENT SYPHILIS: ICD-10-CM

## 2024-09-23 DIAGNOSIS — L28.1 PRURIGO NODULARIS: Primary | ICD-10-CM

## 2024-09-23 DIAGNOSIS — N40.1 BENIGN PROSTATIC HYPERPLASIA WITH INCOMPLETE BLADDER EMPTYING: ICD-10-CM

## 2024-09-23 DIAGNOSIS — Z23 NEED FOR INFLUENZA VACCINATION: ICD-10-CM

## 2024-09-23 DIAGNOSIS — L28.1 PRURIGO NODULARIS: ICD-10-CM

## 2024-09-23 DIAGNOSIS — B20 HIV DISEASE (HCC): Primary | ICD-10-CM

## 2024-09-23 DIAGNOSIS — J45.40 MODERATE PERSISTENT ASTHMA WITHOUT COMPLICATION: ICD-10-CM

## 2024-09-23 DIAGNOSIS — T65.222D TOXIC EFFECT OF TOBACCO CIGARETTE, INTENTIONAL SELF-HARM, SUBSEQUENT ENCOUNTER: ICD-10-CM

## 2024-09-23 DIAGNOSIS — R39.14 BENIGN PROSTATIC HYPERPLASIA WITH INCOMPLETE BLADDER EMPTYING: ICD-10-CM

## 2024-09-23 PROBLEM — D63.8 ANEMIA IN OTHER CHRONIC DISEASES CLASSIFIED ELSEWHERE: Status: RESOLVED | Noted: 2022-05-26 | Resolved: 2024-09-23

## 2024-09-23 PROBLEM — J06.9 UPPER RESPIRATORY TRACT INFECTION: Status: RESOLVED | Noted: 2019-12-03 | Resolved: 2024-09-23

## 2024-09-23 PROCEDURE — 96910 PHOTCHMTX TAR&UVB/PTRLTM&UVB: CPT | Performed by: DERMATOLOGY

## 2024-09-23 PROCEDURE — 91320 SARSCV2 VAC 30MCG TRS-SUC IM: CPT

## 2024-09-23 PROCEDURE — 90471 IMMUNIZATION ADMIN: CPT

## 2024-09-23 PROCEDURE — 90673 RIV3 VACCINE NO PRESERV IM: CPT

## 2024-09-23 PROCEDURE — 90480 ADMN SARSCOV2 VAC 1/ONLY CMP: CPT

## 2024-09-23 PROCEDURE — 99214 OFFICE O/P EST MOD 30 MIN: CPT | Performed by: NURSE PRACTITIONER

## 2024-09-23 NOTE — ASSESSMENT & PLAN NOTE
Doing better on Flomax.  PSA 3.6 was 1.6 in 2000.  Continue to check annually  Continue Flomax

## 2024-09-23 NOTE — ASSESSMENT & PLAN NOTE
Dx after being seen by dermatology and treatment for folliculitis.  Reviewed dermatology noted with pt.  Reports completing his fist phototherapy session.  Continue with triamcinolone cream  Continue with Hydroxyzine  Continue with phototherapy  Follow with Derm

## 2024-09-23 NOTE — ASSESSMENT & PLAN NOTE
Smoking less about 3 per day for past couple of years.  He used to smoke 1/2 ppd.   Does not qualify for LDCT since he has 12.5 year history

## 2024-09-23 NOTE — PROGRESS NOTES
PHOTOTHERAPY    Treatment type: Phototherapy  Visit number: 1  Diagnosis: Prurigo Nodularis  Anatomical location: Trunk, Upper extremity, Lower extremity  Severity: Severe  BSA: Other (15%)  Treatment schedule: 2x weekly  Reaction: None  Increase %: 10%  mJoules: 301mJ  Max dose: 2000mJ body; 1000mJ face  Topical: Mineral oil  Topical applied by: Patient  Special shield: Isabel  Tech: FAROOQ Sarabia  Comments: Next appt: 09/27/2024      No [place]

## 2024-09-23 NOTE — ASSESSMENT & PLAN NOTE
Remains serofast at 1:4 dils.  Continue to monitor for new infections      Orders:    RPR Treatment Monitoring; Future

## 2024-09-23 NOTE — PROGRESS NOTES
Ambulatory Visit  Name: Portillo Pendleton      : 1970      MRN: [id number]  Encounter Provider: CLAYTON Bañuelos  Encounter Date: 2024   Encounter department: [facility]    Assessment & Plan  HIV disease (HCC)  Doing well on Biktarvy with an undetectable VL.  Pt reports 100% medication compliance on HAART.  Stressed the importance of 100% medication adherence  Monitor CD4. HIV RNA, CMP, and CBCD for virologic response and drug toxicities  Follow up with Dr. Thakur or Dr. Ferrari   HIV Counseling:    Viral Load: undetectable     CD4 Count: 1000      Denies side effects.  Stressed the importance of adherence.  Continue follow up in the ID clinic with Dr. Thakur or Dr. Ferrari.    Reviewed the most recent labs, including the CD4 and viral load.  Discussed the risks and benefits of treatment options, instructions for management, importance of treatment adherence, and reduction of risk factors.  Educated on possible medication side effects.    Counseled on routes of HIV transmission, including the risk of  infection.  Emphasized that viral suppression is the best method to prevent HIV transmission.  At this time the pt denies the need for HIV testing of anyone in their life.    Total encounter time was greater than 35 minutes.  Greater than 20 minutes were spent on counseling and patient education.  Pt voices understanding and agreement with treatment plan.        Orders:    influenza vaccine, recombinant, PF, 0.5 mL IM (Flublok)    Need for COVID-19 vaccine    Orders:    COVID-19 Pfizer mRNA vaccine 12 yr and older (Comirnaty pre-filled syringe)    Need for influenza vaccination    Orders:    influenza vaccine, recombinant, PF, 0.5 mL IM (Flublok)    Moderate persistent asthma without complication  No signs of acute exacerbation.  Continue to monitor         Benign prostatic hyperplasia with incomplete bladder  emptying  Doing better on Flomax.  PSA 3.6 was 1.6 in 2000.  Continue to check annually  Continue Flomax         Toxic effect of tobacco cigarette, intentional self-harm, subsequent encounter         Prediabetes  Stable.  Continue to focus on life style modifications         Prurigo nodularis  Dx after being seen by dermatology and treatment for folliculitis.  Reviewed dermatology noted with pt.  Reports completing his fist phototherapy session.  Continue with triamcinolone cream  Continue with Hydroxyzine  Continue with phototherapy  Follow with Derm           Late latent syphilis  Remains serofast at 1:4 dils.  Continue to monitor for new infections      Orders:    RPR Treatment Monitoring; Future    Vaccine counseling  Spoke about receiving monkey pox.  He is agreeable to receiving vaccine  He is also interested in Shingrix when the vaccine arrives           History of Present Illness     Portillo Pendleton is a 53 y.o. male who presents for follow up visit for management of HIV.  Portillo reports he is doing good.  He does not have any concerns except for his ongoing rash.  He did have his first phototherapy session for prurigo nodularis.      He does not endorse any fever, chills, nausea, vomiting, cough, SOB, diarrhea, or night sweats.      History obtained from : patient  Review of Systems   Constitutional: Negative.    HENT: Negative.     Respiratory: Negative.     Cardiovascular: Negative.    Gastrointestinal: Negative.    Skin:  Positive for rash.   Neurological: Negative.    Psychiatric/Behavioral: Negative.       Current Outpatient Medications on File Prior to Visit   Medication Sig Dispense Refill    atorvastatin (LIPITOR) 10 mg tablet TAKE 1 TABLET BY MOUTH DAILY 90 tablet 1    bictegravir-emtricitab-tenofovir alafenamide (Biktarvy) -25 MG tablet TAKE 1 TABLET BY MOUTH DAILY WITH BREAKFAST 90 tablet 1    clindamycin (CLEOCIN T) 1 % external solution Start clindamycin solution twice a day to buttocks  "area. 60 mL 8    dicyclomine (BENTYL) 20 mg tablet TAKE TWO TABLETS BY MOUTH TWICE A DAY BEFORE MEALS 120 tablet 0    FLUoxetine (PROzac) 40 MG capsule Take 2 capsules (80 mg total) by mouth daily 60 capsule 1    fluticasone (FLONASE) 50 mcg/act nasal spray USE 1 SPRAY IN EACH NOSTRIL DAILY 16 g 5    hydrOXYzine HCL (ATARAX) 25 mg tablet TAKE 1 TABLET BY MOUTH THREE TIMES A DAY 90 tablet 0    lidocaine (LMX) 4 % cream APPLY TOPICALLY AS NEEDED FOR MILD PAIN 30 g 0    loratadine (CLARITIN) 10 mg tablet Take 1 tablet (10 mg total) by mouth daily 30 tablet 2    OLANZapine (ZyPREXA) 10 mg tablet Take 1 tablet (10 mg total) by mouth daily at bedtime 30 tablet 2    omeprazole (PriLOSEC) 40 MG capsule Take 1 capsule (40 mg total) by mouth daily 30 capsule 5    tadalafil (CIALIS) 2.5 MG tablet TAKE 1 TABLET BY MOUTH (2.5MG TOTAL) DAILY AS NEEDED FOR ERECTILE DYSFUNCTION 30 tablet 2    tamsulosin (FLOMAX) 0.4 mg TAKE 1 CAPSULE BY MOUTH DAILY WITH DINNER 90 capsule 1    triamcinolone (KENALOG) 0.1 % ointment APPLY TWICE A DAY FOR UP TO 2 WEEKS, TAKE 1 WEEK BREAK BETWEEN USES *DO NOT USE ON FACE, ARMPITS, OR GROIN 454 g 1    [DISCONTINUED] gabapentin (NEURONTIN) 300 mg capsule        No current facility-administered medications on file prior to visit.          Objective     /90 (BP Location: Right arm, Patient Position: Sitting, Cuff Size: Large)   Pulse 100   Temp 98.6 °F (37 °C) (Tympanic)   Resp 18   Ht 5' 7\" (1.702 m)   Wt 82.1 kg (181 lb)   SpO2 96%   BMI 28.35 kg/m²     Physical Exam  Vitals and nursing note reviewed.   Constitutional:       General: He is not in acute distress.     Appearance: Normal appearance. He is not ill-appearing.   Cardiovascular:      Rate and Rhythm: Normal rate and regular rhythm.      Chest Wall: PMI is not displaced.      Pulses: Normal pulses.      Heart sounds: Normal heart sounds.   Pulmonary:      Effort: Pulmonary effort is normal.      Breath sounds: Normal breath sounds. "   Abdominal:      General: Bowel sounds are normal.      Palpations: Abdomen is soft.   Musculoskeletal:         General: Normal range of motion.   Lymphadenopathy:      Cervical: No cervical adenopathy.   Skin:     General: Skin is warm and dry.      Capillary Refill: Capillary refill takes less than 2 seconds.      Findings: Lesion and rash present. Rash is macular and papular.      Comments: Generalized scabbed lesions over body.    Neurological:      Mental Status: He is alert and oriented to person, place, and time.   Psychiatric:         Mood and Affect: Mood normal.         Behavior: Behavior normal.         Thought Content: Thought content normal.         Judgment: Judgment normal.

## 2024-09-23 NOTE — ASSESSMENT & PLAN NOTE
Spoke about receiving monkey pox.  He is agreeable to receiving vaccine  He is also interested in Shingrix when the vaccine arrives

## 2024-09-23 NOTE — ASSESSMENT & PLAN NOTE
Doing well on Biktarvy with an undetectable VL.  Pt reports 100% medication compliance on HAART.  Stressed the importance of 100% medication adherence  Monitor CD4. HIV RNA, CMP, and CBCD for virologic response and drug toxicities  Follow up with Dr. Thakur or Dr. Ferrari   HIV Counseling:    Viral Load: undetectable     CD4 Count: 1000      Denies side effects.  Stressed the importance of adherence.  Continue follow up in the ID clinic with Dr. Thakur or Dr. Ferrari.    Reviewed the most recent labs, including the CD4 and viral load.  Discussed the risks and benefits of treatment options, instructions for management, importance of treatment adherence, and reduction of risk factors.  Educated on possible medication side effects.    Counseled on routes of HIV transmission, including the risk of  infection.  Emphasized that viral suppression is the best method to prevent HIV transmission.  At this time the pt denies the need for HIV testing of anyone in their life.    Total encounter time was greater than 35 minutes.  Greater than 20 minutes were spent on counseling and patient education.  Pt voices understanding and agreement with treatment plan.        Orders:    influenza vaccine, recombinant, PF, 0.5 mL IM (Flublok)

## 2024-09-24 DIAGNOSIS — R21 GENERALIZED MACULOPAPULAR RASH: ICD-10-CM

## 2024-09-24 DIAGNOSIS — K58.2 IRRITABLE BOWEL SYNDROME WITH BOTH CONSTIPATION AND DIARRHEA: ICD-10-CM

## 2024-09-25 RX ORDER — HYDROXYZINE HYDROCHLORIDE 25 MG/1
25 TABLET, FILM COATED ORAL 3 TIMES DAILY
Qty: 90 TABLET | Refills: 0 | Status: SHIPPED | OUTPATIENT
Start: 2024-09-25

## 2024-09-25 RX ORDER — DICYCLOMINE HCL 20 MG
TABLET ORAL
Qty: 120 TABLET | Refills: 0 | Status: SHIPPED | OUTPATIENT
Start: 2024-09-25

## 2024-09-27 ENCOUNTER — CLINICAL SUPPORT (OUTPATIENT)
Dept: DERMATOLOGY | Facility: CLINIC | Age: 54
End: 2024-09-27
Payer: COMMERCIAL

## 2024-09-27 DIAGNOSIS — L28.1 PRURIGO NODULARIS: Primary | ICD-10-CM

## 2024-09-27 PROCEDURE — 96910 PHOTCHMTX TAR&UVB/PTRLTM&UVB: CPT | Performed by: DERMATOLOGY

## 2024-09-27 NOTE — PROGRESS NOTES
PHOTOTHERAPY    Treatment type: Phototherapy  Visit number: 2  Diagnosis: Prurigo Nodularis  Anatomical location: Trunk, Upper extremity, Lower extremity  Severity: Severe  BSA: Other (15%)  Treatment schedule: 2x weekly  Reaction: None  Increase %: 10%  mJoules: 330 mJ  Max dose: 2000mJ body; 1000mJ face  Topical: Mineral oil  Topical applied by: Patient  Special shield: Isabel  Tech: DAV Love  Comments: Next appt: 10/1/2024

## 2024-10-01 ENCOUNTER — CLINICAL SUPPORT (OUTPATIENT)
Dept: DERMATOLOGY | Facility: CLINIC | Age: 54
End: 2024-10-01
Payer: COMMERCIAL

## 2024-10-01 DIAGNOSIS — L28.1 PRURIGO NODULARIS: Primary | ICD-10-CM

## 2024-10-01 PROCEDURE — 96910 PHOTCHMTX TAR&UVB/PTRLTM&UVB: CPT | Performed by: DERMATOLOGY

## 2024-10-01 NOTE — PROGRESS NOTES
PHOTOTHERAPY    Treatment type: Phototherapy  Visit number: 3  Diagnosis: Prurigo Nodularis  Anatomical location: Trunk, Upper extremity, Lower extremity  Severity: Severe  BSA:  (15%)  Treatment schedule: 2x weekly  Reaction: None  Increase %: 10%  mJoules: 363mJ  Max dose: 2000mJ body; 1000mJ face  Topical: Mineral oil  Topical applied by: Patient  Special shield: Isabel  Tech: Edward Magallanes RN  Comments: Next Appointment: 10/03/2024

## 2024-10-02 ENCOUNTER — PATIENT OUTREACH (OUTPATIENT)
Dept: SURGERY | Facility: CLINIC | Age: 54
End: 2024-10-02

## 2024-10-08 ENCOUNTER — CLINICAL SUPPORT (OUTPATIENT)
Dept: DERMATOLOGY | Facility: CLINIC | Age: 54
End: 2024-10-08

## 2024-10-08 DIAGNOSIS — L28.1 PRURIGO NODULARIS: Primary | ICD-10-CM

## 2024-10-08 NOTE — PROGRESS NOTES
PHOTOTHERAPY    Treatment type: Phototherapy  Visit number: 4  Diagnosis: prurigo nodularis  Anatomical location: Trunk, Upper extremity  Severity: Severe  BSA: Other (15%)  Treatment schedule: 2x weekly  Reaction: None  Increase %: 10%  mJoules: 400 mJ  Max dose: 2000 mJ body, 1000 mJ face  Topical: Mineral oil  Topical applied by: Patient  Special shield: Goggles  Tech: Jenny DEMARCO  Comments: Next appt 10/10/2024

## 2024-10-10 ENCOUNTER — OFFICE VISIT (OUTPATIENT)
Dept: DERMATOLOGY | Facility: CLINIC | Age: 54
End: 2024-10-10
Payer: COMMERCIAL

## 2024-10-10 DIAGNOSIS — L28.1 PRURIGO NODULARIS: Primary | ICD-10-CM

## 2024-10-10 PROCEDURE — 96910 PHOTCHMTX TAR&UVB/PTRLTM&UVB: CPT | Performed by: DERMATOLOGY

## 2024-10-10 NOTE — PROGRESS NOTES
PHOTOTHERAPY    Treatment type: Phototherapy  Visit number: 5  Diagnosis: prurigo nodularis  Anatomical location: Trunk, Upper extremity  Severity: Severe  BSA: Other (15%)  Treatment schedule: 2x weekly  Reaction: None  Increase %: 10%  mJoules: 440 mJ  Max dose: 2000 mJ body, 1000 mJ face  Topical: Mineral oil  Topical applied by: Patient  Special shield: Isabel  Tech: Mono DEMARCO  Comments: Next appt 10/15/2024

## 2024-10-14 ENCOUNTER — PATIENT OUTREACH (OUTPATIENT)
Dept: SURGERY | Facility: CLINIC | Age: 54
End: 2024-10-14

## 2024-10-15 ENCOUNTER — CLINICAL SUPPORT (OUTPATIENT)
Dept: DERMATOLOGY | Facility: CLINIC | Age: 54
End: 2024-10-15
Payer: COMMERCIAL

## 2024-10-15 DIAGNOSIS — L28.1 PRURIGO NODULARIS: Primary | ICD-10-CM

## 2024-10-15 PROCEDURE — 96910 PHOTCHMTX TAR&UVB/PTRLTM&UVB: CPT | Performed by: DERMATOLOGY

## 2024-10-15 NOTE — PROGRESS NOTES
PHOTOTHERAPY    Treatment type: Phototherapy  Visit number: 6  Diagnosis: prurigo nodularis  Anatomical location: Trunk, Upper extremity  Severity: Severe  BSA: Other (15%)  Treatment schedule: 2x weekly  Reaction: None  Increase %: 10%  mJoules: 484 mJ  Extra UVA: No  Max dose: 2000 mJ body, 1000 mJ face  Topical: Mineral oil  Topical applied by: Patient  Special shield: Isabel  Tech: DAV Love  Comments: Next appt: 10/17/2024

## 2024-10-18 DIAGNOSIS — R21 RASH: ICD-10-CM

## 2024-10-18 DIAGNOSIS — R21 GENERALIZED MACULOPAPULAR RASH: ICD-10-CM

## 2024-10-18 DIAGNOSIS — K58.2 IRRITABLE BOWEL SYNDROME WITH BOTH CONSTIPATION AND DIARRHEA: ICD-10-CM

## 2024-10-21 RX ORDER — HYDROXYZINE HYDROCHLORIDE 25 MG/1
25 TABLET, FILM COATED ORAL 3 TIMES DAILY
Qty: 90 TABLET | Refills: 0 | Status: SHIPPED | OUTPATIENT
Start: 2024-10-21

## 2024-10-21 RX ORDER — DICYCLOMINE HCL 20 MG
TABLET ORAL
Qty: 120 TABLET | Refills: 0 | Status: SHIPPED | OUTPATIENT
Start: 2024-10-21

## 2024-10-24 ENCOUNTER — CLINICAL SUPPORT (OUTPATIENT)
Dept: DERMATOLOGY | Facility: CLINIC | Age: 54
End: 2024-10-24
Payer: COMMERCIAL

## 2024-10-24 DIAGNOSIS — L28.1 PRURIGO NODULARIS: Primary | ICD-10-CM

## 2024-10-24 PROCEDURE — 96910 PHOTCHMTX TAR&UVB/PTRLTM&UVB: CPT | Performed by: DERMATOLOGY

## 2024-10-24 NOTE — PROGRESS NOTES
.PHOTOTHERAPY    Treatment type: Phototherapy  Visit number: 7  Diagnosis: prurigo nodularis  Anatomical location: Trunk, Upper extremity  Severity: Severe  BSA: Other (15%)  Treatment schedule: 2x weekly  Reaction: None  Increase %: 10%  mJoules: 531 mJ  Extra UVA: No  Max dose: 2000 mJ body, 1000 mJ face  Topical: Mineral oil  Topical applied by: Patient  Special shield: Goggshahla  Tech: Jerri DEMARCO  Comments: Next appt: 10/29/24

## 2024-10-29 ENCOUNTER — CLINICAL SUPPORT (OUTPATIENT)
Dept: DERMATOLOGY | Facility: CLINIC | Age: 54
End: 2024-10-29
Payer: COMMERCIAL

## 2024-10-29 DIAGNOSIS — R21 RASH: ICD-10-CM

## 2024-10-29 DIAGNOSIS — L28.1 PRURIGO NODULARIS: Primary | ICD-10-CM

## 2024-10-29 PROCEDURE — 96910 PHOTCHMTX TAR&UVB/PTRLTM&UVB: CPT | Performed by: DERMATOLOGY

## 2024-10-29 RX ORDER — CLINDAMYCIN PHOSPHATE 11.9 MG/ML
SOLUTION TOPICAL
Qty: 60 ML | Refills: 1 | Status: SHIPPED | OUTPATIENT
Start: 2024-10-29

## 2024-10-29 NOTE — PROGRESS NOTES
PHOTOTHERAPY    Treatment type: Phototherapy  Visit number: 8  Diagnosis: prurigo nodularis  Anatomical location: Trunk, Upper extremity  Severity: Severe  BSA: Other (15%)  Treatment schedule: 2x weekly  Reaction: None  Increase %: 10%  mJoules: 584 mJ  Extra UVA: No  Max dose: 2000 mJ body, 1000 mJ face  Topical: Mineral oil  Topical applied by: Patient  Special shield: Goggshahla  Tech: DAV Fischer  Comments: Next appt: 10/31/2024

## 2024-10-30 DIAGNOSIS — B00.9 HSV INFECTION: Primary | ICD-10-CM

## 2024-10-30 RX ORDER — VALACYCLOVIR HYDROCHLORIDE 1 G/1
1000 TABLET, FILM COATED ORAL 2 TIMES DAILY
Qty: 14 TABLET | Refills: 0 | Status: SHIPPED | OUTPATIENT
Start: 2024-10-30 | End: 2024-11-06

## 2024-10-31 ENCOUNTER — CLINICAL SUPPORT (OUTPATIENT)
Dept: DERMATOLOGY | Facility: CLINIC | Age: 54
End: 2024-10-31
Payer: COMMERCIAL

## 2024-10-31 DIAGNOSIS — L28.1 PRURIGO NODULARIS: Primary | ICD-10-CM

## 2024-10-31 PROCEDURE — 96910 PHOTCHMTX TAR&UVB/PTRLTM&UVB: CPT | Performed by: DERMATOLOGY

## 2024-10-31 NOTE — PROGRESS NOTES
PHOTOTHERAPY    Treatment type: Phototherapy  Visit number: 9  Diagnosis: prurigo nodularis  Anatomical location: Trunk, Upper extremity  Severity: Severe  Treatment schedule: 2x weekly  Reaction: None  Increase %: 10%  mJoules: 642mJ  Extra UVA: No  Max dose: 2000 mJ body, 1000 mJ face  Topical: Mineral oil  Topical applied by: Patient  Special shield: Isabel  Tech: Dayana Meier

## 2024-11-06 ENCOUNTER — CLINICAL SUPPORT (OUTPATIENT)
Dept: DERMATOLOGY | Facility: CLINIC | Age: 54
End: 2024-11-06
Payer: COMMERCIAL

## 2024-11-06 DIAGNOSIS — L28.1 PRURIGO NODULARIS: Primary | ICD-10-CM

## 2024-11-06 PROCEDURE — 96910 PHOTCHMTX TAR&UVB/PTRLTM&UVB: CPT | Performed by: DERMATOLOGY

## 2024-11-06 NOTE — PROGRESS NOTES
PHOTOTHERAPY    Treatment type: Phototherapy  Visit number: 10  Diagnosis: prurigo nodularis  Anatomical location: Trunk, Upper extremity  Severity: Severe  BSA: Other (15%)  Treatment schedule: 2x weekly  Reaction: None  Increase %: 10%  mJoules: 707mJ  Extra UVA: No  Max dose: 2000 mJ body, 1000 mJ face  Topical: Mineral oil  Topical applied by: Patient  Special shield: Isabel  Tech: FAROOQ Sarabia  Comments: Next appt: 11/12/2024    No Weston

## 2024-11-13 ENCOUNTER — TELEPHONE (OUTPATIENT)
Age: 54
End: 2024-11-13
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
## 2024-11-14 ENCOUNTER — OFFICE VISIT (OUTPATIENT)
Dept: DERMATOLOGY | Facility: CLINIC | Age: 54
End: 2024-11-14

## 2024-11-14 DIAGNOSIS — L28.1 PRURIGO NODULARIS: Primary | ICD-10-CM

## 2024-11-14 NOTE — PROGRESS NOTES
PHOTOTHERAPY    Treatment type: Phototherapy  Visit number: 11  Diagnosis: prurigo nodularis  Anatomical location: Trunk, Upper extremity  Severity: Severe  Treatment schedule: 2x weekly  Increase %: 10%  mJoules: 778 mj  Max dose: 2000mj body, 1000 face  Topical: Mineral oil  Topical applied by: Patient  Special shield: Goggles  Tech: Lakisha DEMARCO  Comments: Next appt 11/19/24

## 2024-11-21 ENCOUNTER — CLINICAL SUPPORT (OUTPATIENT)
Dept: DERMATOLOGY | Facility: CLINIC | Age: 54
End: 2024-11-21
Payer: COMMERCIAL

## 2024-11-21 DIAGNOSIS — K58.2 IRRITABLE BOWEL SYNDROME WITH BOTH CONSTIPATION AND DIARRHEA: ICD-10-CM

## 2024-11-21 DIAGNOSIS — F31.81 BIPOLAR 2 DISORDER, MAJOR DEPRESSIVE EPISODE (HCC): ICD-10-CM

## 2024-11-21 DIAGNOSIS — L28.1 PRURIGO NODULARIS: Primary | ICD-10-CM

## 2024-11-21 DIAGNOSIS — R21 GENERALIZED MACULOPAPULAR RASH: ICD-10-CM

## 2024-11-21 PROCEDURE — 96910 PHOTCHMTX TAR&UVB/PTRLTM&UVB: CPT | Performed by: DERMATOLOGY

## 2024-11-21 RX ORDER — HYDROXYZINE HYDROCHLORIDE 25 MG/1
25 TABLET, FILM COATED ORAL 3 TIMES DAILY
Qty: 90 TABLET | Refills: 0 | Status: SHIPPED | OUTPATIENT
Start: 2024-11-21

## 2024-11-21 RX ORDER — DICYCLOMINE HCL 20 MG
TABLET ORAL
Qty: 120 TABLET | Refills: 0 | Status: SHIPPED | OUTPATIENT
Start: 2024-11-21

## 2024-11-21 NOTE — PROGRESS NOTES
PHOTOTHERAPY    Treatment type: Phototherapy  Visit number: 12  Diagnosis: prurigo nodularis  Anatomical location: Trunk, Upper extremity  Severity: Severe  BSA: Other (15%)  Treatment schedule: 2x weekly  Increase %: 10%  mJoules: 855 mJ  Extra UVA: No  Max dose: 2000mj body, 1000 face  Topical: Mineral oil  Topical applied by: Patient  Special shield: Goggshahla  Tech: Jerri DEMARCO  Comments: Next appt: 11/26

## 2024-11-22 RX ORDER — OLANZAPINE 10 MG/1
10 TABLET ORAL
Qty: 30 TABLET | Refills: 2 | Status: SHIPPED | OUTPATIENT
Start: 2024-11-22

## 2024-11-27 ENCOUNTER — OFFICE VISIT (OUTPATIENT)
Dept: PSYCHIATRY | Facility: CLINIC | Age: 54
End: 2024-11-27
Payer: COMMERCIAL

## 2024-11-27 DIAGNOSIS — F41.1 GAD (GENERALIZED ANXIETY DISORDER): ICD-10-CM

## 2024-11-27 DIAGNOSIS — F31.81 BIPOLAR 2 DISORDER, MAJOR DEPRESSIVE EPISODE (HCC): Primary | ICD-10-CM

## 2024-11-27 DIAGNOSIS — Z79.899 HIGH RISK MEDICATION USE: ICD-10-CM

## 2024-11-27 PROCEDURE — 99214 OFFICE O/P EST MOD 30 MIN: CPT | Performed by: PSYCHIATRY & NEUROLOGY

## 2024-11-27 RX ORDER — FLUOXETINE 40 MG/1
80 CAPSULE ORAL DAILY
Qty: 60 CAPSULE | Refills: 1 | Status: SHIPPED | OUTPATIENT
Start: 2024-11-27

## 2024-11-27 RX ORDER — OLANZAPINE 10 MG/1
10 TABLET ORAL
Qty: 30 TABLET | Refills: 2 | Status: SHIPPED | OUTPATIENT
Start: 2024-11-27

## 2024-11-27 NOTE — ASSESSMENT & PLAN NOTE
Not at goal, but adequately controlled presently  Orders:    OLANZapine (ZyPREXA) 10 mg tablet; Take 1 tablet (10 mg total) by mouth daily at bedtime    FLUoxetine (PROzac) 40 MG capsule; Take 2 capsules (80 mg total) by mouth daily
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
## 2024-11-27 NOTE — BH TREATMENT PLAN
"TREATMENT PLAN (Medication Management Only)        [facility]    Name/Date of Birth/MRN:  Portillo Pendleton 54 y.o. 1970 MRN: [id number]  Date of Treatment Plan: November 27, 2024  Diagnosis/Diagnoses:   1. Bipolar 2 disorder, major depressive episode (HCC)    2. SOPHIA (generalized anxiety disorder)      Strengths/Personal Resources for Self-Care: \"I accept things\"  Area/Areas of need (in own words): ongoing medical issues, dental work  1. Long Term Goal: \"improve anxiety and depresion\"   Target Date: 180 days from treatment plan  Person/Persons responsible for completion of goal: Dr. Stratton and Self  2.  Short Term Objective (s) - How will we reach this goal?:   A.  Provider new recommended medication/dosage changes and/or continue medication(s): as noted.  B.  Make an appointment with neurology, and f/u with dentist/oral surgeon  C.  Reconnect with therapist.  Continue treamtment and follow up as recommended  Target Date: 6 months from treatment plan unless noted otherwise  Person/Persons Responsible for Completion of Goal: Dr. Stratton and Self   Progress Towards Goals: continuing treatment   Treatment Modality: Medication management and therapy PRN  Review due 180 days from date of this plan: Approximately 6 months from today ( 5/27/2025 )    Expected length of service: ongoing treatment unless revised  My Physician/PA/NP and I have developed this plan together and I agree to work on the goals and objectives. I understand the treatment goals that were developed for my treatment.  Signature:     Date and time:  Signature of parent/guardian if under age of 14 years: Date and time:  Signature of provider:      Date and time:  Signature of Supervising Physician:    Date and time: 11/27/2024      Viral Stratton III  "

## 2024-11-27 NOTE — ASSESSMENT & PLAN NOTE
Improved some, not at goal  Orders:    FLUoxetine (PROzac) 40 MG capsule; Take 2 capsules (80 mg total) by mouth daily

## 2024-12-03 ENCOUNTER — CLINICAL SUPPORT (OUTPATIENT)
Dept: DERMATOLOGY | Facility: CLINIC | Age: 54
End: 2024-12-03
Payer: COMMERCIAL

## 2024-12-03 DIAGNOSIS — L28.1 PRURIGO NODULARIS: Primary | ICD-10-CM

## 2024-12-03 PROCEDURE — 96910 PHOTCHMTX TAR&UVB/PTRLTM&UVB: CPT | Performed by: DERMATOLOGY

## 2024-12-03 NOTE — PROGRESS NOTES
PHOTOTHERAPY    Treatment type: Phototherapy  Visit number: 13  Diagnosis: Prurigo Nodularis  Anatomical location: Trunk, Upper extremity  Severity: Severe  BSA: Other (15%)  Treatment schedule: 2x weekly  Increase %: Other (I'm maintaining the previous dose due to the patient missing a week of treatment.)  mJoules: 855 mJ  Extra UVA: No  Max dose: 2000 mJ Body; 1000 mJ Face  Topical: Mineral oil  Topical applied by: Patient & Assistant (Assisted the patient in applying mineral oil to his upper daryn today.)  Special shield: Workstir  Tech: DAV Fischer  Comments: Pt canceled next appt.

## 2024-12-04 ENCOUNTER — TELEPHONE (OUTPATIENT)
Age: 54
End: 2024-12-04

## 2024-12-04 NOTE — TELEPHONE ENCOUNTER
I called and spoke with patient and scheduled a sooner appointment. Patient confirmed date time and location.

## 2024-12-10 DIAGNOSIS — R21 GENERALIZED MACULOPAPULAR RASH: ICD-10-CM

## 2024-12-10 DIAGNOSIS — N40.1 BENIGN PROSTATIC HYPERPLASIA WITH INCOMPLETE BLADDER EMPTYING: ICD-10-CM

## 2024-12-10 DIAGNOSIS — K58.2 IRRITABLE BOWEL SYNDROME WITH BOTH CONSTIPATION AND DIARRHEA: ICD-10-CM

## 2024-12-10 DIAGNOSIS — R39.14 BENIGN PROSTATIC HYPERPLASIA WITH INCOMPLETE BLADDER EMPTYING: ICD-10-CM

## 2024-12-10 RX ORDER — HYDROXYZINE HYDROCHLORIDE 25 MG/1
25 TABLET, FILM COATED ORAL 3 TIMES DAILY
Qty: 90 TABLET | Refills: 0 | Status: SHIPPED | OUTPATIENT
Start: 2024-12-10

## 2024-12-10 RX ORDER — DICYCLOMINE HCL 20 MG
TABLET ORAL
Qty: 120 TABLET | Refills: 0 | Status: SHIPPED | OUTPATIENT
Start: 2024-12-10

## 2024-12-10 RX ORDER — TAMSULOSIN HYDROCHLORIDE 0.4 MG/1
0.4 CAPSULE ORAL
Qty: 90 CAPSULE | Refills: 1 | Status: SHIPPED | OUTPATIENT
Start: 2024-12-10

## 2024-12-13 ENCOUNTER — PATIENT OUTREACH (OUTPATIENT)
Dept: SURGERY | Facility: CLINIC | Age: 54
End: 2024-12-13

## 2024-12-16 ENCOUNTER — PATIENT OUTREACH (OUTPATIENT)
Dept: SURGERY | Facility: CLINIC | Age: 54
End: 2024-12-16

## 2024-12-17 ENCOUNTER — PATIENT OUTREACH (OUTPATIENT)
Dept: SURGERY | Facility: CLINIC | Age: 54
End: 2024-12-17

## 2024-12-17 ENCOUNTER — OFFICE VISIT (OUTPATIENT)
Dept: NEUROLOGY | Facility: CLINIC | Age: 54
End: 2024-12-17

## 2024-12-17 VITALS
SYSTOLIC BLOOD PRESSURE: 142 MMHG | HEART RATE: 112 BPM | BODY MASS INDEX: 29.35 KG/M2 | OXYGEN SATURATION: 97 % | DIASTOLIC BLOOD PRESSURE: 78 MMHG | WEIGHT: 187 LBS | HEIGHT: 67 IN

## 2024-12-17 DIAGNOSIS — F41.1 GAD (GENERALIZED ANXIETY DISORDER): ICD-10-CM

## 2024-12-17 DIAGNOSIS — R26.9 ABNORMALITY OF GAIT AND MOBILITY: ICD-10-CM

## 2024-12-17 DIAGNOSIS — R25.8 JERKY BODY MOVEMENTS: Primary | ICD-10-CM

## 2024-12-17 RX ORDER — CLINDAMYCIN PHOSPHATE 11.9 MG/ML
SOLUTION TOPICAL
Qty: 60 ML | Refills: 8 | OUTPATIENT
Start: 2024-12-17

## 2024-12-17 NOTE — ASSESSMENT & PLAN NOTE
Portillo is a 54 year old male who presents for initial evaluation of jerky body movements. Reports progressively worsening abnormal jerky movements involving all 4 limbs ongoing since his early 40s that is worse with anxiety and improves with voluntary suppression or relaxing. Notes frequent falls due to the abnormal movements.   MRI Brain WO (1/9/2019): Normal  EMG (3/15/2019): Evidence of a R ulnar entrapment mononeuropathy with conduction slowing across the elbow, consistent with clinical diagnosis of right cubital tunnel syndrome. Evidence of a R peroneal entrapment mononeuropathy with conduction slowing across the fibular head, with chronic and ongoing denervation findings to the right tibialis anterior.    EEG (5/31/2019): No epileptiform features or seizures  The patient's jerky movements and constant motion are consistent with motor restlessness that would support a diagnosis of akathisia.  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  Etiology remains unclear at this time and differential is not limited only these possibilities. Lower suspicion for restless leg syndrome.     Plan  Case discussed with neurology attending Dr. Weldon  Provided referral to [facility] Neurology movement clinic  Continue to follow with psychiatry. Could consider trial of switching olanzapine to the less akathisia-inducing antipsychotics such as aripiprazole or quetiapine, but would ultimately defer to psychiatry  Provided referral to physical therapy to assist with ambulatory dysfunction and recurrent falls  Recommend talking to behavioral health for talk therapy. Provided referral.  Follow up 6 in months. If able to establish care with movement specialist, patient may call to let us know he will continue his care with specialist or continue to see us in clinic if he wishes.    Orders:    Ambulatory Referral to Neurology     Ambulatory referral to Physical Therapy; Future    Ambulatory Referral to Neurology; Future

## 2024-12-17 NOTE — ASSESSMENT & PLAN NOTE
See above.  Orders:    Ambulatory Referral to Neurology    Ambulatory referral to Physical Therapy; Future    Ambulatory Referral to Neurology; Future

## 2024-12-17 NOTE — PROGRESS NOTES
Name: Portillo Pendleton      : 1970      MRN: [id number]  Encounter Provider: Geovanni Maldonado DO  Encounter Date: 2024   Encounter department: [facility]  :  Assessment & Plan  Jerky body movements  Portillo is a 54 year old male who presents for initial evaluation of jerky body movements. Reports progressively worsening abnormal jerky movements involving all 4 limbs ongoing since his early 40s that is worse with anxiety and improves with voluntary suppression or relaxing. Notes frequent falls due to the abnormal movements.   MRI Brain WO (2019): Normal  EMG (3/15/2019): Evidence of a R ulnar entrapment mononeuropathy with conduction slowing across the elbow, consistent with clinical diagnosis of right cubital tunnel syndrome. Evidence of a R peroneal entrapment mononeuropathy with conduction slowing across the fibular head, with chronic and ongoing denervation findings to the right tibialis anterior.    EEG (2019): No epileptiform features or seizures  The patient's jerky movements and constant motion are consistent with motor restlessness that would support a diagnosis of akathisia.  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  Etiology remains unclear at this time and differential is not limited only these possibilities. Lower suspicion for restless leg syndrome.     Plan  Case discussed with neurology attending Dr. Weldon  Provided referral to [facility] Neurology movement clinic  Continue to follow with psychiatry. Could consider trial of switching olanzapine to the less akathisia-inducing antipsychotics such as aripiprazole or quetiapine, but would ultimately defer to psychiatry  Provided referral to physical therapy to assist with ambulatory dysfunction and recurrent falls  Recommend talking to behavioral health for talk therapy. Provided referral.  Follow up 6 in  months. If able to establish care with movement specialist, patient may call to let us know he will continue his care with specialist or continue to see us in clinic if he wishes.    Orders:    Ambulatory Referral to Neurology    Ambulatory referral to Physical Therapy; Future    Ambulatory Referral to Neurology; Future    Abnormality of gait and mobility  See above.  Orders:    Ambulatory Referral to Neurology    Ambulatory referral to Physical Therapy; Future    Ambulatory Referral to Neurology; Future    SOPHIA (generalized anxiety disorder)  Plan  Continue talk therapy  Provided referral to behavioral health  Orders:    Ambulatory referral to Psych Services; Future          History of Present Illness   ARLENE Pendleton is a 54 year old male with a PMHx of HIV, anxiety, bipolar 2 disorder, GERD, panic attacks, chronic pain syndrome, and BPH who presents for initial evaluation of jerky body movements. Patient reports that he has been having abnormal jerky body movements throughout his entire body and all 4 limbs since his early 40s. He states that it has progressively worsened over time. Patient notes that when he watches TV or tries to relax his symptoms improve. Patient reports that times of stress or feeling anxious will worsen his abnormal movements. Patient notes that the jerky movements of his limbs will sometimes cause them to hit walls or objects. He states that he also falls often. Patient notes that he has attended physical therapy in the past which he found improved his balance. He states that he sleeps 6-8 hours per night on average, his mother notes that he continues to move his limbs in his sleep. His mother reports that she sometimes finds him reorganizing her kitchen cabinets at night. Denies alcohol and illicit drug use.     Review of Systems   A 12 point ROS was completed. Other than the above mentioned  complaints, all remaining systems were negative.         Objective   /78 (BP Location:  "Left arm, Patient Position: Sitting, Cuff Size: Standard)   Pulse (!) 112   Ht 5' 7\" (1.702 m)   Wt 84.8 kg (187 lb)   SpO2 97%   BMI 29.29 kg/m²     Physical Exam  Constitutional:       Appearance: He is diaphoretic.   HENT:      Right Ear: Hearing normal.      Left Ear: Hearing normal.   Eyes:      General: Lids are normal.      Extraocular Movements: Extraocular movements intact.      Pupils: Pupils are equal, round, and reactive to light.   Neurological:      Deep Tendon Reflexes:      Reflex Scores:       Tricep reflexes are 2+ on the right side and 2+ on the left side.       Bicep reflexes are 2+ on the right side and 2+ on the left side.       Brachioradialis reflexes are 2+ on the right side and 2+ on the left side.       Patellar reflexes are 2+ on the right side and 2+ on the left side.       Achilles reflexes are 2+ on the right side and 2+ on the left side.  Psychiatric:         Speech: Speech is rapid and pressured.         Behavior: Behavior is cooperative.       Neurological Exam  Mental Status  Awake, alert and oriented to person, place and time. Language is fluent with no aphasia.    Cranial Nerves  CN II: Visual fields full to confrontation.  CN III, IV, VI: Extraocular movements intact bilaterally. Normal lids and orbits bilaterally. Pupils equal round and reactive to light bilaterally.  CN V:  Right: Facial sensation is normal.  Left: Facial sensation is normal on the left.  CN VII:  Right: There is no facial weakness.  Left: There is no facial weakness.  CN VIII:  Right: Hearing is normal.  Left: Hearing is normal.  CN IX, X: Palate elevates symmetrically  CN XI:  Right: Sternocleidomastoid strength is normal. Trapezius strength is normal.  Left: Sternocleidomastoid strength is normal. Trapezius strength is normal.  CN XII: Tongue midline without atrophy or fasciculations.    Motor  Normal muscle bulk throughout. No fasciculations present. Normal muscle tone. No pronator drift.              "                                Right                     Left   Shoulder abduction               5                          5  Elbow flexion                         5                          5  Elbow extension                    5                          5  Hip flexion                              5                          5  Knee flexion                           5                          5  Knee extension                      5                          5  Plantarflexion                         5                          5  Dorsiflexion                            5                          5  Restless movements of all 4 limbs and torso, is able to suppress these movements on command  Fine tremor of bilateral fingers appreciated with arms held out.    Sensory  Light touch is normal in upper and lower extremities.     Reflexes                                            Right                      Left  Brachioradialis                    2+                         2+  Biceps                                 2+                         2+  Triceps                                2+                         2+  Patellar                                2+                         2+  Achilles                                2+                         2+    Right pathological reflexes: Jayne's absent.  Left pathological reflexes: Jayne's absent.    Coordination  Right: Finger-to-nose normal. Rapid alternating movement normal.Left: Finger-to-nose normal. Rapid alternating movement normal.    Gait  Casual gait is normal including stance, stride, and arm swing.              Geovanni Maldonado DO  [facility]  Neurology Residency PGY-II

## 2024-12-17 NOTE — PROGRESS NOTES
Ct presented to office for recert. Ct is not working and does not receive income and has all his needs supported by his mom. Ct sees Susannah for PCP care and Dr. Thakur for ID. Ct has [place] for insurance and has dental coverage. Ct declined dental referral. Ct already receives [facility]. Ct reports housing to be stable. Cm provided ct with bus passes. Cm will find out if cm can do referral for [employer] so that ct can do appeal for SSD. Ct signed an JOCY for his insurance. Ct's Acuity Scale score is a 15.

## 2024-12-17 NOTE — ASSESSMENT & PLAN NOTE
Plan  Continue talk therapy  Provided referral to behavioral health  Orders:    Ambulatory referral to Psych Services; Future

## 2024-12-19 DIAGNOSIS — B20 HIV DISEASE (HCC): ICD-10-CM

## 2024-12-19 RX ORDER — BICTEGRAVIR SODIUM, EMTRICITABINE, AND TENOFOVIR ALAFENAMIDE FUMARATE 50; 200; 25 MG/1; MG/1; MG/1
1 TABLET ORAL
Qty: 90 TABLET | Refills: 1 | Status: SHIPPED | OUTPATIENT
Start: 2024-12-19

## 2024-12-20 ENCOUNTER — HOSPITAL ENCOUNTER (EMERGENCY)
Facility: HOSPITAL | Age: 54
Discharge: HOME | End: 2024-12-20
Attending: EMERGENCY MEDICINE
Payer: COMMERCIAL

## 2024-12-20 VITALS
SYSTOLIC BLOOD PRESSURE: 139 MMHG | HEART RATE: 105 BPM | OXYGEN SATURATION: 100 % | TEMPERATURE: 98.4 F | RESPIRATION RATE: 22 BRPM | DIASTOLIC BLOOD PRESSURE: 77 MMHG

## 2024-12-20 DIAGNOSIS — J02.9 PHARYNGITIS: Primary | ICD-10-CM

## 2024-12-20 DIAGNOSIS — Z21 HISTORY OF HIV INFECTION (HCC): ICD-10-CM

## 2024-12-20 LAB
ALBUMIN SERPL BCG-MCNC: 4.2 G/DL (ref 3.5–5)
ALP SERPL-CCNC: 103 U/L (ref 34–104)
ALT SERPL W P-5'-P-CCNC: 17 U/L (ref 7–52)
ANION GAP SERPL CALCULATED.3IONS-SCNC: 7 MMOL/L (ref 4–13)
AST SERPL W P-5'-P-CCNC: 22 U/L (ref 13–39)
BASOPHILS # BLD AUTO: 0.03 THOUSANDS/ΜL (ref 0–0.1)
BASOPHILS NFR BLD AUTO: 1 % (ref 0–1)
BILIRUB SERPL-MCNC: 0.56 MG/DL (ref 0.2–1)
BUN SERPL-MCNC: 12 MG/DL (ref 5–25)
CALCIUM SERPL-MCNC: 9.2 MG/DL (ref 8.4–10.2)
CHLORIDE SERPL-SCNC: 102 MMOL/L (ref 96–108)
CO2 SERPL-SCNC: 27 MMOL/L (ref 21–32)
CREAT SERPL-MCNC: 0.82 MG/DL (ref 0.6–1.3)
EOSINOPHIL # BLD AUTO: 0.09 THOUSAND/ΜL (ref 0–0.61)
EOSINOPHIL NFR BLD AUTO: 1 % (ref 0–6)
ERYTHROCYTE [DISTWIDTH] IN BLOOD BY AUTOMATED COUNT: 14.8 % (ref 11.6–15.1)
GFR SERPL CREATININE-BSD FRML MDRD: 98 ML/MIN/1.73SQ M
GLUCOSE SERPL-MCNC: 91 MG/DL (ref 65–140)
HCT VFR BLD AUTO: 38.1 % (ref 36.5–49.3)
HGB BLD-MCNC: 12.7 G/DL (ref 12–17)
HOLD SPECIMEN: NORMAL
HOLD SPECIMEN: NORMAL
IMM GRANULOCYTES # BLD AUTO: 0.02 THOUSAND/UL (ref 0–0.2)
IMM GRANULOCYTES NFR BLD AUTO: 0 % (ref 0–2)
LACTATE SERPL-SCNC: 0.7 MMOL/L (ref 0.5–2)
LYMPHOCYTES # BLD AUTO: 2.25 THOUSANDS/ΜL (ref 0.6–4.47)
LYMPHOCYTES NFR BLD AUTO: 34 % (ref 14–44)
MCH RBC QN AUTO: 28.1 PG (ref 26.8–34.3)
MCHC RBC AUTO-ENTMCNC: 33.3 G/DL (ref 31.4–37.4)
MCV RBC AUTO: 84 FL (ref 82–98)
MONOCYTES # BLD AUTO: 0.6 THOUSAND/ΜL (ref 0.17–1.22)
MONOCYTES NFR BLD AUTO: 9 % (ref 4–12)
NEUTROPHILS # BLD AUTO: 3.61 THOUSANDS/ΜL (ref 1.85–7.62)
NEUTS SEG NFR BLD AUTO: 55 % (ref 43–75)
NRBC BLD AUTO-RTO: 0 /100 WBCS
PLATELET # BLD AUTO: 277 THOUSANDS/UL (ref 149–390)
PMV BLD AUTO: 7.9 FL (ref 8.9–12.7)
POTASSIUM SERPL-SCNC: 3.5 MMOL/L (ref 3.5–5.3)
PROT SERPL-MCNC: 7.5 G/DL (ref 6.4–8.4)
RBC # BLD AUTO: 4.52 MILLION/UL (ref 3.88–5.62)
S PYO DNA THROAT QL NAA+PROBE: NOT DETECTED
SODIUM SERPL-SCNC: 136 MMOL/L (ref 135–147)
WBC # BLD AUTO: 6.6 THOUSAND/UL (ref 4.31–10.16)

## 2024-12-20 PROCEDURE — 85025 COMPLETE CBC W/AUTO DIFF WBC: CPT | Performed by: EMERGENCY MEDICINE

## 2024-12-20 PROCEDURE — 36415 COLL VENOUS BLD VENIPUNCTURE: CPT

## 2024-12-20 PROCEDURE — 99283 EMERGENCY DEPT VISIT LOW MDM: CPT

## 2024-12-20 PROCEDURE — 87070 CULTURE OTHR SPECIMN AEROBIC: CPT | Performed by: EMERGENCY MEDICINE

## 2024-12-20 PROCEDURE — 80053 COMPREHEN METABOLIC PANEL: CPT | Performed by: EMERGENCY MEDICINE

## 2024-12-20 PROCEDURE — 87147 CULTURE TYPE IMMUNOLOGIC: CPT | Performed by: EMERGENCY MEDICINE

## 2024-12-20 PROCEDURE — 99284 EMERGENCY DEPT VISIT MOD MDM: CPT | Performed by: EMERGENCY MEDICINE

## 2024-12-20 PROCEDURE — 96374 THER/PROPH/DIAG INJ IV PUSH: CPT

## 2024-12-20 PROCEDURE — 83605 ASSAY OF LACTIC ACID: CPT | Performed by: EMERGENCY MEDICINE

## 2024-12-20 PROCEDURE — 87651 STREP A DNA AMP PROBE: CPT | Performed by: EMERGENCY MEDICINE

## 2024-12-20 RX ORDER — KETOROLAC TROMETHAMINE 30 MG/ML
15 INJECTION, SOLUTION INTRAMUSCULAR; INTRAVENOUS ONCE
Status: COMPLETED | OUTPATIENT
Start: 2024-12-20 | End: 2024-12-20

## 2024-12-20 RX ADMIN — KETOROLAC TROMETHAMINE 15 MG: 30 INJECTION, SOLUTION INTRAMUSCULAR; INTRAVENOUS at 16:18

## 2024-12-20 NOTE — SEPSIS NOTE
Sepsis Note   Portillo Pendleton 54 y.o. male MRN: [id number]  Unit/Bed#: BOWLES-02 Encounter: [id number]       Initial Sepsis Screening       Row Name 12/20/24 1729                Is the patient's history suggestive of a new or worsening infection? No  -HA                  User Key  (r) = Recorded By, (t) = Taken By, (c) = Cosigned By      Initials Name Provider Type    AKOSUA Luciano DO Resident                        There is no height or weight on file to calculate BMI.  Wt Readings from Last 1 Encounters:   12/17/24 84.8 kg (187 lb)        Ideal body weight: 66.1 kg (145 lb 11.6 oz)  Adjusted ideal body weight: 73.6 kg (162 lb 3.8 oz)

## 2024-12-20 NOTE — ED PROVIDER NOTES
"Time reflects when diagnosis was documented in both MDM as applicable and the Disposition within this note       Time User Action Codes Description Comment    12/20/2024  5:16 PM Jamal Luciano [J02.9] Pharyngitis     12/20/2024  5:16 PM Jamal Luciano [Z21] History of HIV infection (HCC)           ED Disposition       ED Disposition   Discharge    Condition   Stable    Date/Time   Fri Dec 20, 2024  5:16 PM    Comment   Portillo Pendleton discharge to home/self care.                   Assessment & Plan       Medical Decision Making  Patient with history as below presented to triage with CC of \" Patient presents with:  Sore Throat: Patient to ED from home with c/o swelling in the back of the throat since this morning. Patient denies shortness of breath or difficulty breathing related to the throat swelling.   \"  Hx obtained from pt and mother. Exam as below.    54-year-old male with history of HIV presenting to the ED with sore throat starting this morning.  Occurred acutely after eating cookie, suspect irritation from this versus early onset viral illness.  On exam he is afebrile, initially tachycardic and tachypneic which quickly resolved.  Do not suspect sepsis or SIRS at this time.  Oropharynx clear without significant erythema, tonsillar exudates or swelling, thrush or unilateral swelling/uvula deviation.  Low suspicion for PTA, RPA, Ludwigs, Epiglottitis or Bacterial Tracheitis, EBV.  Will test for strep and send throat culture.  Patient does have history of HIV, currently maintained on Biktarvy, with normal CD4 counts 4 months prior.  HIV certainly on the differential however patient with known history and no active disease.  No signs of airway compromise.  Patient tolerating p.o.  If strep negative will discharge home with close outpatient follow-up.  Patient requested ENT referral which was given to him.      I have independently ordered, reviewed and interpreted the following: labs and/or imaging studies " and or EKG listed below.  Reviewed external records including notes, and prior labs/imaging results.    Disposition: Patient stable for outpatient management. Discussed need for follow up with their primary doctor or specialist to review all results, including incidental findings as below. Patient discharged with explanation of ED workup and diagnosis, instructions on how to obtain outpatient follow up, care instructions at home, and strict return precautions if patient develops new or worsening symptoms. Patients questions answered and agreeable with discharge plan.     See ED Course for further MDM.      PLEASE NOTE:  This encounter was completed utilizing the [employer] Direct Speech Voice Recognition Software. Grammatical errors, random word insertions, pronoun errors and incomplete sentences are occasional inherent consequences of the system due to software limitations, ambient noise and hardware issues.These may be missed by proof reading prior to affixing electronic signature. Any questions or concerns about the content, text or information contained within the body of this dictation should be directly addressed to the physician for clarification. Please do not hesitate to call me directly if you have any questions or concerns.      Amount and/or Complexity of Data Reviewed  Independent Historian: parent  External Data Reviewed: labs and notes.  Labs: ordered. Decision-making details documented in ED Course.    Risk  Prescription drug management.        ED Course as of 12/20/24 2326   Fri Dec 20, 2024   1516 CBC and differential(!)  No leukocytosis.  No anemia.  Platelets WNL.   1517 Comprehensive metabolic panel  No acute electrolyte abnormality.  Renal function at baseline.  LFTs WNL.   1517 LACTIC ACID: 0.7  WNL.   1517 Pulse: 104   1517 Respirations(!): 24   1517 Temperature: 98.4 °F (36.9 °C)   1519 Reviewed most recent CD4 count from August 2024:  CD4 ABS 1000  CD4 helper T cells 40.   1729  Patient tolerated p.o.  Strep test negative.  Will send for culture.  Airway remains patent.  No signs of allergic reaction.  Patient feeling better after Toradol.  Will discharge with close outpatient follow-up and ENT referral if needed.  Reviewed strict return precautions with patient and his mother.  Patient agreeable discharge plan.       Medications   ketorolac (TORADOL) injection 15 mg (15 mg Intravenous Given 12/20/24 1613)       ED Risk Strat Scores                                              History of Present Illness       Chief Complaint   Patient presents with    Sore Throat     Patient to ED from home with c/o swelling in the back of the throat since this morning. Patient denies shortness of breath or difficulty breathing related to the throat swelling.       Past Medical History:   Diagnosis Date    Anxiety     Last Assessed: 7/18/2016     Dysuria     Last Assessed: 9/14/2015    Fecal urgency     Pt has had fecal incontinence in past, has weakened sphincter. would benefit from fiber, Needs f/u flex sig will refer for scheduling -        GERD (gastroesophageal reflux disease)     Hemorrhage of anus and rectum     Last Assessed: 3/5/2014     Herpes zoster     Last Assessed: 9/26/2016    History of chickenpox     History of pneumonia     HIV (human immunodeficiency virus infection) ([facility])     IBS (irritable bowel syndrome)     Proctitis, chlamydial     Last Assessed: 9/29/2014     Recurrent major depressive episodes, moderate ([facility])     Last Assessed: 9/15/2017     Wears glasses     reading      Past Surgical History:   Procedure Laterality Date    CIRCUMCISION      COLONOSCOPY      CYSTOSCOPY  2014    LASIK      OTHER SURGICAL HISTORY      embolisation 2018    OK ESOPHAGOGASTRODUODENOSCOPY TRANSORAL DIAGNOSTIC N/A 03/09/2017    Procedure: ESOPHAGOGASTRODUODENOSCOPY (EGD);  Surgeon: Ella Moreno MD;  Location: [facility];  Service: Gastroenterology    VARICOCELE EXCISION      Spermatic cord Excision -  Last Assessed: 3/17/2016     WISDOM TOOTH EXTRACTION        Family History   Problem Relation Age of Onset    Diabetes type II Mother     Heart attack Father     No Known Problems Brother     No Known Problems Brother     Skin cancer Paternal Grandmother     Substance Abuse Cousin       Social History     Tobacco Use    Smoking status: Every Day     Current packs/day: 0.50     Average packs/day: 0.5 packs/day for 25.0 years (12.5 ttl pk-yrs)     Types: Cigarettes    Smokeless tobacco: Former    Tobacco comments:     3 daily   Vaping Use    Vaping status: Never Used   Substance Use Topics    Alcohol use: Not Currently     Alcohol/week: 1.0 standard drink of alcohol     Types: 1 Cans of beer per week     Comment: socially    Drug use: No      E-Cigarette/Vaping    E-Cigarette Use Never User       E-Cigarette/Vaping Substances    Nicotine No     THC No     CBD No     Other No     Unknown No       I have reviewed and agree with the history as documented.     54-year-old male with history of HIV (on Biktarvy), syphilis, anxiety, bipolar 2 disorder, GERD, panic attacks, chronic pain syndrome, and BPH presents to the ED with acute onset sore throat throat with swelling sensation after eating cookie this morning.  Patient reports irritation of the throat after eating a cookie, without significant shortness of breath.  Reports irritation with swallowing, but has been able to tolerate p.o.  Denies recent fever, chills, cough, congestion, rash, nausea, vomiting, diarrhea, constipation, abdominal pain, chest pain, shortness of breath, neck pain, neck swelling.  Patient with history of HIV, currently on Biktarvy with normal CD4 count on last check.  Patient denies any other foreign body ingestion, or recent oral intercourse.        Review of Systems   Constitutional:  Negative for chills and fever.   HENT:  Positive for sore throat and trouble swallowing. Negative for congestion and postnasal drip.    Eyes:  Negative for  photophobia, pain, redness and visual disturbance.   Respiratory:  Negative for cough, chest tightness and shortness of breath.    Cardiovascular:  Negative for chest pain, palpitations and leg swelling.   Gastrointestinal:  Negative for abdominal pain, constipation, diarrhea, nausea and vomiting.   Genitourinary:  Negative for dysuria, flank pain, frequency, hematuria, testicular pain and urgency.   Musculoskeletal:  Negative for arthralgias, back pain, neck pain and neck stiffness.   Skin:  Negative for color change and rash.   Neurological:  Negative for dizziness, seizures, syncope, light-headedness, numbness and headaches.   All other systems reviewed and are negative.          Objective       ED Triage Vitals [12/20/24 1219]   Temperature Pulse Blood Pressure Respirations SpO2 Patient Position - Orthostatic VS   98.4 °F (36.9 °C) 104 158/84 (!) 24 96 % Sitting      Temp Source Heart Rate Source BP Location FiO2 (%) Pain Score    Oral Monitor Left arm -- 5      Vitals      Date and Time Temp Pulse SpO2 Resp BP Pain Score FACES Pain Rating User   12/20/24 1728 -- 105 100 % 22 139/77 -- -- RC   12/20/24 1219 98.4 °F (36.9 °C) 104 96 % 24 158/84 5 -- AP            Physical Exam  Vitals and nursing note reviewed.   Constitutional:       General: He is not in acute distress.     Appearance: He is well-developed. He is not toxic-appearing.   HENT:      Head: Normocephalic and atraumatic.      Right Ear: Tympanic membrane, ear canal and external ear normal.      Left Ear: Tympanic membrane, ear canal and external ear normal.      Nose: Nose normal. No congestion.      Mouth/Throat:      Mouth: Mucous membranes are moist. No oral lesions.      Pharynx: Oropharynx is clear. Uvula midline. Posterior oropharyngeal erythema present. No pharyngeal swelling, oropharyngeal exudate or uvula swelling.      Tonsils: No tonsillar exudate or tonsillar abscesses.      Comments: No thrush.  Eyes:      Extraocular Movements:  Extraocular movements intact.      Conjunctiva/sclera: Conjunctivae normal.      Pupils: Pupils are equal, round, and reactive to light.   Cardiovascular:      Rate and Rhythm: Normal rate and regular rhythm.      Pulses: Normal pulses.      Heart sounds: Normal heart sounds. No murmur heard.     No friction rub. No gallop.   Pulmonary:      Effort: Pulmonary effort is normal. No respiratory distress.      Breath sounds: Normal breath sounds. No stridor. No wheezing, rhonchi or rales.   Abdominal:      General: Bowel sounds are normal.      Palpations: Abdomen is soft.      Tenderness: There is no abdominal tenderness. There is no right CVA tenderness, left CVA tenderness, guarding or rebound.   Musculoskeletal:         General: No swelling, tenderness or deformity. Normal range of motion.      Cervical back: Normal range of motion and neck supple. No rigidity or tenderness.      Right lower leg: No edema.      Left lower leg: No edema.   Lymphadenopathy:      Cervical: No cervical adenopathy.   Skin:     General: Skin is warm and dry.      Capillary Refill: Capillary refill takes less than 2 seconds.      Coloration: Skin is not jaundiced or pale.      Findings: No bruising, erythema, lesion or rash.   Neurological:      General: No focal deficit present.      Mental Status: He is alert and oriented to person, place, and time. Mental status is at baseline.      GCS: GCS eye subscore is 4. GCS verbal subscore is 5. GCS motor subscore is 6.      Cranial Nerves: Cranial nerves 2-12 are intact. No cranial nerve deficit, dysarthria or facial asymmetry.      Sensory: Sensation is intact. No sensory deficit.      Motor: Motor function is intact. No abnormal muscle tone or pronator drift.      Coordination: Coordination is intact.      Gait: Gait is intact.   Psychiatric:         Mood and Affect: Mood normal.         Behavior: Behavior normal.         Thought Content: Thought content normal.         Results Reviewed        Procedure Component Value Units Date/Time    Strep A PCR [[id number]]  (Normal) Collected: 12/20/24 1619    Lab Status: Final result Specimen: Throat Updated: 12/20/24 1701     STREP A PCR Not Detected    Throat culture [[id number]] Collected: 12/20/24 1619    Lab Status: In process Specimen: Throat Updated: 12/20/24 1625    Trauma tubes on hold [[id number]] Collected: 12/20/24 1228    Lab Status: Final result Specimen: Blood from Arm, Left Updated: 12/20/24 1401    Narrative:      The following orders were created for panel order Trauma tubes on hold.  Procedure                               Abnormality         Status                     ---------                               -----------         ------                     Green / Yellow tube on hold[[id number]]                      Final result               Green / Black tube on hold[[id number]]                       Final result                 Please view results for these tests on the individual orders.    Comprehensive metabolic panel [[id number]] Collected: 12/20/24 1228    Lab Status: Final result Specimen: Blood from Arm, Left Updated: 12/20/24 1311     Sodium 136 mmol/L      Potassium 3.5 mmol/L      Chloride 102 mmol/L      CO2 27 mmol/L      ANION GAP 7 mmol/L      BUN 12 mg/dL      Creatinine 0.82 mg/dL      Glucose 91 mg/dL      Calcium 9.2 mg/dL      AST 22 U/L      ALT 17 U/L      Alkaline Phosphatase 103 U/L      Total Protein 7.5 g/dL      Albumin 4.2 g/dL      Total Bilirubin 0.56 mg/dL      eGFR 98 ml/min/1.73sq m     Narrative:      National Kid[employer] Disease Foundation guidelines for Chronic Kidney Disease (CKD):     Stage 1 with normal or high GFR (GFR > 90 mL/min/1.73 square meters)    Stage 2 Mild CKD (GFR = 60-89 mL/min/1.73 square meters)    Stage 3A Moderate CKD (GFR = 45-59 mL/min/1.73 square meters)    Stage 3B Moderate CKD (GFR = 30-44 mL/min/1.73 square meters)    Stage 4 Severe CKD (GFR = 15-29 mL/min/1.73 square meters)    Stage 5 End Stage  CKD (GFR <15 mL/min/1.73 square meters)  Note: GFR calculation is accurate only with a steady state creatinine    Lactic acid, plasma (w/reflex if result > 2.0) [[id number]]  (Normal) Collected: 12/20/24 1228    Lab Status: Final result Specimen: Blood from Arm, Left Updated: 12/20/24 1310     LACTIC ACID 0.7 mmol/L     Narrative:      Result may be elevated if tourniquet was used during collection.    CBC and differential [[id number]]  (Abnormal) Collected: 12/20/24 1228    Lab Status: Final result Specimen: Blood from Arm, Left Updated: 12/20/24 1258     WBC 6.60 Thousand/uL      RBC 4.52 Million/uL      Hemoglobin 12.7 g/dL      Hematocrit 38.1 %      MCV 84 fL      MCH 28.1 pg      MCHC 33.3 g/dL      RDW 14.8 %      MPV 7.9 fL      Platelets 277 Thousands/uL      nRBC 0 /100 WBCs      Segmented % 55 %      Immature Grans % 0 %      Lymphocytes % 34 %      Monocytes % 9 %      Eosinophils Relative 1 %      Basophils Relative 1 %      Absolute Neutrophils 3.61 Thousands/µL      Absolute Immature Grans 0.02 Thousand/uL      Absolute Lymphocytes 2.25 Thousands/µL      Absolute Monocytes 0.60 Thousand/µL      Eosinophils Absolute 0.09 Thousand/µL      Basophils Absolute 0.03 Thousands/µL             No orders to display       Procedures    ED Medication and Procedure Management   Prior to Admission Medications   Prescriptions Last Dose Informant Patient Reported? Taking?   FLUoxetine (PROzac) 40 MG capsule  Self No No   Sig: Take 2 capsules (80 mg total) by mouth daily   OLANZapine (ZyPREXA) 10 mg tablet  Self No No   Sig: Take 1 tablet (10 mg total) by mouth daily at bedtime   atorvastatin (LIPITOR) 10 mg tablet  Self No No   Sig: TAKE 1 TABLET BY MOUTH DAILY   bictegravir-emtricitab-tenofovir alafenamide (Biktarvy) -25 MG tablet   No No   Sig: TAKE 1 TABLET BY MOUTH DAILY WITH BREAKFAST   clindamycin (CLEOCIN T) 1 % external solution  Self No No   Sig: Start clindamycin solution twice a day to buttocks area.    dicyclomine (BENTYL) 20 mg tablet  Self No No   Sig: TAKE TWO TABLETS BY MOUTH TWICE A DAY BEFORE MEALS   fluticasone (FLONASE) 50 mcg/act nasal spray  Self No No   Sig: USE 1 SPRAY IN EACH NOSTRIL DAILY   hydrOXYzine HCL (ATARAX) 25 mg tablet  Self No No   Sig: TAKE 1 TABLET BY MOUTH THREE TIMES A DAY   lidocaine (LMX) 4 % cream  Self No No   Sig: APPLY TOPICALLY AS NEEDED FOR MILD PAIN   Patient not taking: Reported on 12/17/2024   loratadine (CLARITIN) 10 mg tablet  Self No No   Sig: Take 1 tablet (10 mg total) by mouth daily   omeprazole (PriLOSEC) 40 MG capsule  Self No No   Sig: Take 1 capsule (40 mg total) by mouth daily   tadalafil (CIALIS) 2.5 MG tablet  Self No No   Sig: TAKE 1 TABLET BY MOUTH (2.5MG TOTAL) DAILY AS NEEDED FOR ERECTILE DYSFUNCTION   tamsulosin (FLOMAX) 0.4 mg  Self No No   Sig: TAKE 1 CAPSULE BY MOUTH DAILY WITH DINNER   triamcinolone (KENALOG) 0.1 % ointment  Self No No   Sig: APPLY TWICE A DAY FOR UP TO 2 WEEKS, TAKE 1 WEEK BREAK BETWEEN USES *DO NOT USE ON FACE, ARMPITS, OR GROIN   Patient not taking: Reported on 12/17/2024   valACYclovir (VALTREX) 1,000 mg tablet  Self No No   Sig: Take 1 tablet (1,000 mg total) by mouth 2 (two) times a day for 7 days      Facility-Administered Medications: None     Discharge Medication List as of 12/20/2024  5:17 PM        CONTINUE these medications which have NOT CHANGED    Details   atorvastatin (LIPITOR) 10 mg tablet TAKE 1 TABLET BY MOUTH DAILY, Normal      bictegravir-emtricitab-tenofovir alafenamide (Biktarvy) -25 MG tablet TAKE 1 TABLET BY MOUTH DAILY WITH BREAKFAST, Starting Thu 12/19/2024, Normal      clindamycin (CLEOCIN T) 1 % external solution Start clindamycin solution twice a day to buttocks area., Normal      dicyclomine (BENTYL) 20 mg tablet TAKE TWO TABLETS BY MOUTH TWICE A DAY BEFORE MEALS, Normal      FLUoxetine (PROzac) 40 MG capsule Take 2 capsules (80 mg total) by mouth daily, Starting Wed 11/27/2024, Normal       fluticasone (FLONASE) 50 mcg/act nasal spray USE 1 SPRAY IN EACH NOSTRIL DAILY, Normal      hydrOXYzine HCL (ATARAX) 25 mg tablet TAKE 1 TABLET BY MOUTH THREE TIMES A DAY, Starting Tue 12/10/2024, Normal      lidocaine (LMX) 4 % cream APPLY TOPICALLY AS NEEDED FOR MILD PAIN, Starting Mon 7/18/2022, Normal      loratadine (CLARITIN) 10 mg tablet Take 1 tablet (10 mg total) by mouth daily, Starting Mon 7/11/2022, Normal      OLANZapine (ZyPREXA) 10 mg tablet Take 1 tablet (10 mg total) by mouth daily at bedtime, Starting Wed 11/27/2024, Normal      omeprazole (PriLOSEC) 40 MG capsule Take 1 capsule (40 mg total) by mouth daily, Starting Thu 9/5/2024, Normal      tadalafil (CIALIS) 2.5 MG tablet TAKE 1 TABLET BY MOUTH (2.5MG TOTAL) DAILY AS NEEDED FOR ERECTILE DYSFUNCTION, Normal      tamsulosin (FLOMAX) 0.4 mg TAKE 1 CAPSULE BY MOUTH DAILY WITH DINNER, Starting Tue 12/10/2024, Normal      triamcinolone (KENALOG) 0.1 % ointment APPLY TWICE A DAY FOR UP TO 2 WEEKS, TAKE 1 WEEK BREAK BETWEEN USES *DO NOT USE ON FACE, ARMPITS, OR GROIN, Normal      valACYclovir (VALTREX) 1,000 mg tablet Take 1 tablet (1,000 mg total) by mouth 2 (two) times a day for 7 days, Starting Wed 10/30/2024, Until Tue 12/17/2024, Normal             ED SEPSIS DOCUMENTATION   Time reflects when diagnosis was documented in both MDM as applicable and the Disposition within this note       Time User Action Codes Description Comment    12/20/2024  5:16 PM Jamal Luciano [J02.9] Pharyngitis     12/20/2024  5:16 PM Jamal Luciano [Z21] History of HIV infection (HCC)            Initial Sepsis Screening       Row Name 12/20/24 8519                Is the patient's history suggestive of a new or worsening infection? No  -HA                  User Key  (r) = Recorded By, (t) = Taken By, (c) = Cosigned By      Initials Name Provider Type    [facility] Jamal Luciano,  Resident                       Jamal Luciano DO  12/20/24 1768

## 2024-12-20 NOTE — Clinical Note
Portillo Pendleton was seen and treated in our emergency department on 12/20/2024.                Diagnosis:     Portillo  is off the rest of the shift today, may return to work on return date.    He may return on this date: 12/21/2024         If you have any questions or concerns, please don't hesitate to call.      Jamal Luciano, DO    ______________________________           _______________          _______________  Hospital Representative                              Date                                Time

## 2024-12-20 NOTE — ED ATTENDING ATTESTATION
12/20/2024  ITravis DO, saw and evaluated the patient. I have discussed the patient with the resident/non-physician practitioner and agree with the resident's/non-physician practitioner's findings, Plan of Care, and MDM as documented in the resident's/non-physician practitioner's note, except where noted. All available labs and Radiology studies were reviewed.  I was present for key portions of any procedure(s) performed by the resident/non-physician practitioner and I was immediately available to provide assistance.       At this point I agree with the current assessment done in the Emergency Department.  I have conducted an independent evaluation of this patient a history and physical is as follows:    Patient is a 54-year-old male with a history of HIV, bipolar disorder who presents with sore throat.  Patient states that he was eating a cookie this morning and felt like it got stuck for a moment.  He was able to swallow and get it down but he has noted discomfort in throat since that time.  He has been able to tolerate p.o. fluids.  He complains of mild discomfort but it has been getting better since this morning.  He denies rhinorrhea, headaches, fever, chills, cough, other infectious symptoms.  His viral load has been undetectable and his last CD4 count was greater than 1000.  Has been seen by neurology for jerking movements in all 4 extremities.  He was referred to [facility] movement disorder clinic.  He was also told to follow-up with his psychiatrist about possibly changing his antipsychotics.  Patient states that this is at baseline.    On exam, patient is in no acute distress.  He does have abnormal movements involving head/neck and all extremities.  Again, patient states that this is baseline and he is following with neurology.  Mild, generalized erythema of the posterior pharynx.  No tonsillar swelling or exudates.  No evidence of Candida or ulcerations.  No lymphadenopathy  "in the cervical region.  Heart is regular rate and rhythm.  Breath sounds normal.    No evidence of Candida.  Strep negative.  I am not concerned for peritonsillar abscess or retropharyngeal abscess.  Possible viral pharyngitis versus coastal irritation.  Patient is in no distress and is tolerating p.o.  He is appropriate for discharge and outpatient follow-up.    Portions of the above record have been created with voice recognition software.  Occasional wrong word or \"sound alike\" substitutions may have occurred due to the inherent limitations of voice recognition software.  Read the chart carefully and recognize, using context, where substitutions may have occurred.      ED Course         Critical Care Time  Procedures      "

## 2024-12-20 NOTE — DISCHARGE INSTRUCTIONS
Today Portillo Pendleton was seen in the emergency department for throat discomfort. Emergency department workup included strep testing, labs. I believe the symptoms to be the result of pharyngitis. At this time there does not appear to be an emergent life threatening cause to explain these symptoms. Portillo is stable for discharge with outpatient follow up.     Please follow up with your primary care provider in the week. If a specialist referral was placed for you please call them tomorrow to be seen at the next available appointment. Please review all results discussed today with your primary care provider and/or specialist.    Please return to the emergency department as soon as possible if you develop uncontrollable fevers (Temp >100.4F), uncontrollable pain, vomiting, chest pain, trouble breathing, weakness on one side of your body, slurred speech, vision changes or any other concerning symptoms.

## 2024-12-22 LAB — BACTERIA THROAT CULT: NORMAL

## 2024-12-23 LAB — BACTERIA THROAT CULT: ABNORMAL

## 2025-01-02 ENCOUNTER — TELEPHONE (OUTPATIENT)
Age: 55
End: 2025-01-02

## 2025-01-02 NOTE — TELEPHONE ENCOUNTER
"Behavioral Health Outpatient Intake Questions    Referred By   : Neurology    Please advise interviewee that they need to answer all questions truthfully to allow for best care, and any misrepresentations of information may affect their ability to be seen at this clinic   => Was this discussed? Yes       Behavioral Health Outpatient Intake History -     Presenting Problem (in patient's own words): Moving around a lot, pain, irritability    Are there any communication barriers for this patient?     No                                                 Are you taking any psychiatric medications? Yes     If \"YES\" -What are they Prozac     If \"YES\" -Who prescribes? Psychiatrist    Has the Patient previously received outpatient Talk Therapy or Medication Management from [facility]  Yes        If \"YES\"- When, Where and with Whom? Medication Management at [facility]       Has the Patient abused alcohol or other substances in the last 6 months ? No  No concerns of substance abuse are reported.    Legal History-     Is this treatment court ordered? No     Has the Patient been convicted of a felony?  Yes - 25 years ago   If \"Yes\" send to -When, What?Delivery of a controlled substance  Talk Therapy: Send to Carson Olivas for final determination   Med Management: Send to Dr. Fuentes for final determination     ACCEPTED as a patient Yes  If \"Yes\" Appointment Date:      Referred Elsewhere? No  If “Yes” - (Where? Ex: [facility], [facility], [facility], [facility], etc.) 03/10/2025 @ 12pm with Brooke Bhatti at the [facility]      Name of Insurance Co:[facility] [employer]  Insurance ID#[id number]   Insurance Phone #  If ins is primary or secondary?Primary  If patient is a minor, parents information such as Name, D.O.B of guarantor.    Verified in [facility]  [phone]  " [Time Spent: ___ minutes] : I have spent [unfilled] minutes of time on the encounter. Suturegard Retention Suture: 2-0 Nylon

## 2025-01-08 ENCOUNTER — TELEPHONE (OUTPATIENT)
Dept: PSYCHIATRY | Facility: CLINIC | Age: 55
End: 2025-01-08

## 2025-01-08 NOTE — TELEPHONE ENCOUNTER
One week follow up call for New Patient appointment with   TYSON Galaviz   on 03/10/2025 was made on 01/08/2025. Writer informed patient of New Patient paperwork needing to be completed 5 days prior to the appointment. Writer confirmed paperwork has been sent via [facility].    Appointment was made on: 01/02/2025

## 2025-01-16 DIAGNOSIS — R21 GENERALIZED MACULOPAPULAR RASH: ICD-10-CM

## 2025-01-16 DIAGNOSIS — K58.2 IRRITABLE BOWEL SYNDROME WITH BOTH CONSTIPATION AND DIARRHEA: ICD-10-CM

## 2025-01-16 DIAGNOSIS — R21 RASH: ICD-10-CM

## 2025-01-16 DIAGNOSIS — F41.1 GAD (GENERALIZED ANXIETY DISORDER): ICD-10-CM

## 2025-01-16 DIAGNOSIS — F31.81 BIPOLAR 2 DISORDER, MAJOR DEPRESSIVE EPISODE (HCC): ICD-10-CM

## 2025-01-16 RX ORDER — DICYCLOMINE HCL 20 MG
20 TABLET ORAL EVERY 6 HOURS
Qty: 120 TABLET | Refills: 0 | Status: SHIPPED | OUTPATIENT
Start: 2025-01-16

## 2025-01-16 RX ORDER — HYDROXYZINE HYDROCHLORIDE 25 MG/1
25 TABLET, FILM COATED ORAL 3 TIMES DAILY
Qty: 90 TABLET | Refills: 0 | Status: SHIPPED | OUTPATIENT
Start: 2025-01-16

## 2025-01-16 RX ORDER — FLUOXETINE 40 MG/1
80 CAPSULE ORAL DAILY
Qty: 60 CAPSULE | Refills: 1 | Status: SHIPPED | OUTPATIENT
Start: 2025-01-16

## 2025-01-20 RX ORDER — HYDROXYZINE HYDROCHLORIDE 25 MG/1
25 TABLET, FILM COATED ORAL 3 TIMES DAILY
Qty: 90 TABLET | Refills: 0 | OUTPATIENT
Start: 2025-01-20

## 2025-01-20 RX ORDER — DICYCLOMINE HCL 20 MG
TABLET ORAL
Qty: 120 TABLET | Refills: 0 | OUTPATIENT
Start: 2025-01-20

## 2025-01-20 RX ORDER — CLINDAMYCIN PHOSPHATE 11.9 MG/ML
SOLUTION TOPICAL
Qty: 60 ML | Refills: 1 | OUTPATIENT
Start: 2025-01-20

## 2025-02-05 DIAGNOSIS — K58.2 IRRITABLE BOWEL SYNDROME WITH BOTH CONSTIPATION AND DIARRHEA: ICD-10-CM

## 2025-02-05 DIAGNOSIS — R21 GENERALIZED MACULOPAPULAR RASH: ICD-10-CM

## 2025-02-05 RX ORDER — DICYCLOMINE HCL 20 MG
TABLET ORAL
Qty: 120 TABLET | Refills: 0 | Status: SHIPPED | OUTPATIENT
Start: 2025-02-05

## 2025-02-05 RX ORDER — HYDROXYZINE HYDROCHLORIDE 25 MG/1
25 TABLET, FILM COATED ORAL 3 TIMES DAILY
Qty: 90 TABLET | Refills: 0 | Status: SHIPPED | OUTPATIENT
Start: 2025-02-05

## 2025-02-13 DIAGNOSIS — R21 RASH: ICD-10-CM

## 2025-02-13 DIAGNOSIS — B00.9 HSV INFECTION: ICD-10-CM

## 2025-02-14 RX ORDER — CLINDAMYCIN PHOSPHATE 11.9 MG/ML
SOLUTION TOPICAL
Qty: 60 ML | Refills: 1 | OUTPATIENT
Start: 2025-02-14

## 2025-02-14 RX ORDER — VALACYCLOVIR HYDROCHLORIDE 1 G/1
TABLET, FILM COATED ORAL
Qty: 14 TABLET | Refills: 0 | OUTPATIENT
Start: 2025-02-14

## 2025-02-17 ENCOUNTER — TELEPHONE (OUTPATIENT)
Dept: SURGERY | Facility: CLINIC | Age: 55
End: 2025-02-17

## 2025-02-18 NOTE — PSYCH
Psychotherapy Provided: Individual Psychotherapy 50 minutes     Length of time in session: 50 minutes, follow up in 2 week    Goals addressed in session: Goal 1, Goal 2 and Goal 3      Pain:      none    0    Current suicide risk : Low     D: Met with Portillo LAZARO; ' I have my days but I'm good'  Feels brief check ins with therapy are enough for him  Session focused upon dad being placed in [address]  Still communicates with Shanique Navas; moved in with sister and Keysha Parisi has cat which makes him happy  Struggling with boredom 'trying to keep busy'  Now that dad is in care, mom insisting on Keysha Parisi getting a full time job  Has to find the right job 'I have trouble with stupid '  Denied SI    A: Keysha Parisi presented as focused and engaged  Spastic movements have greatly increased today  Seems content with his daily stability though he acknowledges 'days' which is expected  P: Continue check in sessions when needed  Behavioral Health Treatment Plan [facility]: Diagnosis and Treatment Plan explained to Alicia Krishna relates understanding diagnosis and is agreeable to Treatment Plan   Yes no

## 2025-02-24 ENCOUNTER — PATIENT OUTREACH (OUTPATIENT)
Dept: SURGERY | Facility: CLINIC | Age: 55
End: 2025-02-24

## 2025-02-24 NOTE — PROGRESS NOTES
Cm called ct to see when he can come in to do sliding fee scale desmond- ct states that his benefits have lapsed and he needs help with applications. Cm and ct agreed to meet tomorrow at 9:30 am.    Cm reminded ct to get his labs done- ct asks if he still can uninsured and cm informed ct that his RW sliding fee scale is active until 2/25/25.

## 2025-02-25 ENCOUNTER — PATIENT OUTREACH (OUTPATIENT)
Dept: SURGERY | Facility: CLINIC | Age: 55
End: 2025-02-25

## 2025-02-25 NOTE — TELEPHONE ENCOUNTER
I would have to see him again  He needs to let the abx does it's job  What symptoms does he have? He needs to follow up with dental asap!
LM for patient
Pt stated you gave him a script for Amoxicillin for 3 days , pt would like to know if you can give him 10 days instead or another script for 3 days    I asked him about his dental appointment  He called today and is waiting for a call back from the  Now 
25-Feb-2025

## 2025-02-25 NOTE — PROGRESS NOTES
Ct came in to do MA renewal and SNAP renewal. Ct's benefits lapsed last month. Cm is waiting on ct to bring back bank statements to be able to complete application. Ct will drop off his bank statements during his appt tomorrow.  Ct signed sliding fee scale desmond and cm sent it to [facility].  Ct signed ROIs for [facility] so cm faxed it to [facility].

## 2025-02-26 ENCOUNTER — APPOINTMENT (OUTPATIENT)
Dept: LAB | Age: 55
End: 2025-02-26
Payer: COMMERCIAL

## 2025-02-26 ENCOUNTER — DOCUMENTATION (OUTPATIENT)
Dept: SURGERY | Facility: CLINIC | Age: 55
End: 2025-02-26

## 2025-02-26 ENCOUNTER — OFFICE VISIT (OUTPATIENT)
Dept: SURGERY | Facility: CLINIC | Age: 55
End: 2025-02-26
Payer: COMMERCIAL

## 2025-02-26 ENCOUNTER — OFFICE VISIT (OUTPATIENT)
Dept: PSYCHIATRY | Facility: CLINIC | Age: 55
End: 2025-02-26
Payer: COMMERCIAL

## 2025-02-26 ENCOUNTER — PATIENT OUTREACH (OUTPATIENT)
Dept: SURGERY | Facility: CLINIC | Age: 55
End: 2025-02-26

## 2025-02-26 VITALS — HEIGHT: 67 IN | TEMPERATURE: 98.3 F | RESPIRATION RATE: 20 BRPM | BODY MASS INDEX: 28.66 KG/M2 | WEIGHT: 182.6 LBS

## 2025-02-26 DIAGNOSIS — F31.81 BIPOLAR 2 DISORDER, MAJOR DEPRESSIVE EPISODE (HCC): ICD-10-CM

## 2025-02-26 DIAGNOSIS — B20 HIV DISEASE (HCC): ICD-10-CM

## 2025-02-26 DIAGNOSIS — Z11.3 ENCOUNTER FOR SCREENING FOR BACTERIAL SEXUALLY TRANSMITTED DISEASE: ICD-10-CM

## 2025-02-26 DIAGNOSIS — E78.2 ELEVATED CHOLESTEROL WITH ELEVATED TRIGLYCERIDES: ICD-10-CM

## 2025-02-26 DIAGNOSIS — A52.8 LATE LATENT SYPHILIS: ICD-10-CM

## 2025-02-26 DIAGNOSIS — A53.9 SYPHILIS: ICD-10-CM

## 2025-02-26 DIAGNOSIS — R74.8 ELEVATED ALKALINE PHOSPHATASE LEVEL: ICD-10-CM

## 2025-02-26 DIAGNOSIS — L28.1 PRURIGO NODULARIS: ICD-10-CM

## 2025-02-26 DIAGNOSIS — Z20.2 CONTACT WITH AND (SUSPECTED) EXPOSURE TO INFECTIONS WITH A PREDOMINANTLY SEXUAL MODE OF TRANSMISSION: ICD-10-CM

## 2025-02-26 DIAGNOSIS — B20 HUMAN IMMUNODEFICIENCY VIRUS (HIV) DISEASE (HCC): Primary | ICD-10-CM

## 2025-02-26 DIAGNOSIS — Z72.89 OTHER PROBLEMS RELATED TO LIFESTYLE: ICD-10-CM

## 2025-02-26 DIAGNOSIS — Z23 NEED FOR PNEUMOCOCCAL VACCINATION: ICD-10-CM

## 2025-02-26 DIAGNOSIS — D72.89 OTHER SPECIFIED DISORDERS OF WHITE BLOOD CELLS: ICD-10-CM

## 2025-02-26 DIAGNOSIS — D63.8 ANEMIA IN OTHER CHRONIC DISEASES CLASSIFIED ELSEWHERE: ICD-10-CM

## 2025-02-26 DIAGNOSIS — F41.1 GAD (GENERALIZED ANXIETY DISORDER): Primary | ICD-10-CM

## 2025-02-26 DIAGNOSIS — Z79.899 HIGH RISK MEDICATION USE: ICD-10-CM

## 2025-02-26 LAB
ALBUMIN SERPL BCG-MCNC: 4 G/DL (ref 3.5–5)
ALP SERPL-CCNC: 120 U/L (ref 34–104)
ALT SERPL W P-5'-P-CCNC: 19 U/L (ref 7–52)
ANION GAP SERPL CALCULATED.3IONS-SCNC: 8 MMOL/L (ref 4–13)
AST SERPL W P-5'-P-CCNC: 22 U/L (ref 13–39)
BASOPHILS # BLD AUTO: 0.03 THOUSANDS/ÂΜL (ref 0–0.1)
BASOPHILS NFR BLD AUTO: 1 % (ref 0–1)
BILIRUB SERPL-MCNC: 0.56 MG/DL (ref 0.2–1)
BILIRUB UR QL STRIP: NEGATIVE
BUN SERPL-MCNC: 18 MG/DL (ref 5–25)
CALCIUM SERPL-MCNC: 9.4 MG/DL (ref 8.4–10.2)
CHLORIDE SERPL-SCNC: 101 MMOL/L (ref 96–108)
CHOLEST SERPL-MCNC: 171 MG/DL (ref ?–200)
CLARITY UR: CLEAR
CO2 SERPL-SCNC: 26 MMOL/L (ref 21–32)
COLOR UR: COLORLESS
CREAT SERPL-MCNC: 0.84 MG/DL (ref 0.6–1.3)
EOSINOPHIL # BLD AUTO: 0.13 THOUSAND/ÂΜL (ref 0–0.61)
EOSINOPHIL NFR BLD AUTO: 2 % (ref 0–6)
ERYTHROCYTE [DISTWIDTH] IN BLOOD BY AUTOMATED COUNT: 15.1 % (ref 11.6–15.1)
GFR SERPL CREATININE-BSD FRML MDRD: 97 ML/MIN/1.73SQ M
GLUCOSE P FAST SERPL-MCNC: 89 MG/DL (ref 65–99)
GLUCOSE UR STRIP-MCNC: NEGATIVE MG/DL
HCT VFR BLD AUTO: 39.8 % (ref 36.5–49.3)
HDLC SERPL-MCNC: 51 MG/DL
HGB BLD-MCNC: 13.1 G/DL (ref 12–17)
HGB UR QL STRIP.AUTO: NEGATIVE
IMM GRANULOCYTES # BLD AUTO: 0.02 THOUSAND/UL (ref 0–0.2)
IMM GRANULOCYTES NFR BLD AUTO: 0 % (ref 0–2)
KETONES UR STRIP-MCNC: NEGATIVE MG/DL
LDLC SERPL CALC-MCNC: 103 MG/DL (ref 0–100)
LEUKOCYTE ESTERASE UR QL STRIP: NEGATIVE
LYMPHOCYTES # BLD AUTO: 2.9 THOUSANDS/ÂΜL (ref 0.6–4.47)
LYMPHOCYTES NFR BLD AUTO: 52 % (ref 14–44)
MCH RBC QN AUTO: 27.6 PG (ref 26.8–34.3)
MCHC RBC AUTO-ENTMCNC: 32.9 G/DL (ref 31.4–37.4)
MCV RBC AUTO: 84 FL (ref 82–98)
MONOCYTES # BLD AUTO: 0.52 THOUSAND/ÂΜL (ref 0.17–1.22)
MONOCYTES NFR BLD AUTO: 9 % (ref 4–12)
NEUTROPHILS # BLD AUTO: 2.05 THOUSANDS/ÂΜL (ref 1.85–7.62)
NEUTS SEG NFR BLD AUTO: 36 % (ref 43–75)
NITRITE UR QL STRIP: NEGATIVE
NONHDLC SERPL-MCNC: 120 MG/DL
NRBC BLD AUTO-RTO: 0 /100 WBCS
PH UR STRIP.AUTO: 6 [PH]
PLATELET # BLD AUTO: 373 THOUSANDS/UL (ref 149–390)
PMV BLD AUTO: 7.6 FL (ref 8.9–12.7)
POTASSIUM SERPL-SCNC: 4.6 MMOL/L (ref 3.5–5.3)
PROT SERPL-MCNC: 7.1 G/DL (ref 6.4–8.4)
PROT UR STRIP-MCNC: NEGATIVE MG/DL
RBC # BLD AUTO: 4.75 MILLION/UL (ref 3.88–5.62)
SODIUM SERPL-SCNC: 135 MMOL/L (ref 135–147)
SP GR UR STRIP.AUTO: 1.01 (ref 1–1.03)
TRIGL SERPL-MCNC: 87 MG/DL (ref ?–150)
UROBILINOGEN UR STRIP-ACNC: <2 MG/DL
WBC # BLD AUTO: 5.65 THOUSAND/UL (ref 4.31–10.16)

## 2025-02-26 PROCEDURE — 36415 COLL VENOUS BLD VENIPUNCTURE: CPT

## 2025-02-26 PROCEDURE — 99214 OFFICE O/P EST MOD 30 MIN: CPT | Performed by: PSYCHIATRY & NEUROLOGY

## 2025-02-26 PROCEDURE — 86592 SYPHILIS TEST NON-TREP QUAL: CPT

## 2025-02-26 PROCEDURE — 86593 SYPHILIS TEST NON-TREP QUANT: CPT

## 2025-02-26 PROCEDURE — 83036 HEMOGLOBIN GLYCOSYLATED A1C: CPT

## 2025-02-26 PROCEDURE — 87591 N.GONORRHOEAE DNA AMP PROB: CPT

## 2025-02-26 PROCEDURE — 80061 LIPID PANEL: CPT

## 2025-02-26 PROCEDURE — 87491 CHLMYD TRACH DNA AMP PROBE: CPT

## 2025-02-26 PROCEDURE — 80053 COMPREHEN METABOLIC PANEL: CPT

## 2025-02-26 PROCEDURE — 99214 OFFICE O/P EST MOD 30 MIN: CPT | Performed by: INTERNAL MEDICINE

## 2025-02-26 PROCEDURE — 86361 T CELL ABSOLUTE COUNT: CPT

## 2025-02-26 PROCEDURE — 90677 PCV20 VACCINE IM: CPT

## 2025-02-26 PROCEDURE — 90833 PSYTX W PT W E/M 30 MIN: CPT | Performed by: PSYCHIATRY & NEUROLOGY

## 2025-02-26 PROCEDURE — 90471 IMMUNIZATION ADMIN: CPT

## 2025-02-26 PROCEDURE — 81003 URINALYSIS AUTO W/O SCOPE: CPT

## 2025-02-26 PROCEDURE — 87536 HIV-1 QUANT&REVRSE TRNSCRPJ: CPT

## 2025-02-26 PROCEDURE — 85025 COMPLETE CBC W/AUTO DIFF WBC: CPT

## 2025-02-26 RX ORDER — FLUOXETINE HYDROCHLORIDE 40 MG/1
80 CAPSULE ORAL DAILY
Qty: 60 CAPSULE | Refills: 2 | Status: SHIPPED | OUTPATIENT
Start: 2025-02-26

## 2025-02-26 RX ORDER — HYDROXYZINE HYDROCHLORIDE 25 MG/1
25-50 TABLET, FILM COATED ORAL 2 TIMES DAILY PRN
Qty: 120 TABLET | Refills: 2 | Status: SHIPPED | OUTPATIENT
Start: 2025-02-26 | End: 2025-02-26 | Stop reason: SDUPTHER

## 2025-02-26 RX ORDER — HYDROXYZINE HYDROCHLORIDE 25 MG/1
25-50 TABLET, FILM COATED ORAL 2 TIMES DAILY PRN
Qty: 120 TABLET | Refills: 2 | Status: SHIPPED | OUTPATIENT
Start: 2025-02-26

## 2025-02-26 RX ORDER — OLANZAPINE 10 MG/1
10 TABLET ORAL
Qty: 30 TABLET | Refills: 2 | Status: SHIPPED | OUTPATIENT
Start: 2025-02-26

## 2025-02-26 NOTE — ASSESSMENT & PLAN NOTE
Have been doing well on Biktarvy with an undetectable viral load and a high CD4 count.  However his labs are still pending as they were just drawn today.  As long as his viral load remains undetectable, Continue ART, recheck CBC with differential, CMP, HIV RNA, and CD4 in 5 months to make sure no developing toxicity or treatment failure and follow-up in 6 months.  Stressed adherence.     Orders:    Pneumococcal Conjugate Vaccine 20-valent (Pcv20)
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
## 2025-02-26 NOTE — ASSESSMENT & PLAN NOTE
Not at goal but manageable he notes  Orders:    OLANZapine (ZyPREXA) 10 mg tablet; Take 1 tablet (10 mg total) by mouth daily at bedtime    FLUoxetine (PROzac) 40 MG capsule; Take 2 capsules (80 mg total) by mouth daily

## 2025-02-26 NOTE — ASSESSMENT & PLAN NOTE
Peak RPR titer 1:64 status post treatment with a drop in the RPR to 1:4.  Repeat RPR still pending but as long as no increase in the titer that is two tube dilutions, will continue to monitor

## 2025-02-26 NOTE — ASSESSMENT & PLAN NOTE
Not at goal, does find some relief with hydroxyzine, so I will prescribe but alter slightly sig.  Orders:    FLUoxetine (PROzac) 40 MG capsule; Take 2 capsules (80 mg total) by mouth daily    hydrOXYzine HCL (ATARAX) 25 mg tablet; Take 1-2 tablets (25-50 mg total) by mouth 2 (two) times a day as needed for anxiety or itching (Do not exceed 100mg per day)

## 2025-02-26 NOTE — PROGRESS NOTES
"Name: Portillo Pendleton      : 1970      MRN: [id number]  Encounter Provider: Jesus Thakur MD  Encounter Date: 2025   Encounter department: [facility] AT [facility]  :  Assessment & Plan  Human immunodeficiency virus (HIV) disease (HCC)  Have been doing well on Biktarvy with an undetectable viral load and a high CD4 count.  However his labs are still pending as they were just drawn today.  As long as his viral load remains undetectable, Continue ART, recheck CBC with differential, CMP, HIV RNA, and CD4 in 5 months to make sure no developing toxicity or treatment failure and follow-up in 6 months.  Stressed adherence.     Orders:    Pneumococcal Conjugate Vaccine 20-valent (Pcv20)    Need for pneumococcal vaccination    Orders:    Pneumococcal Conjugate Vaccine 20-valent (Pcv20)    Syphilis  Peak RPR titer 1:64 status post treatment with a drop in the RPR to 1:4.  Repeat RPR still pending but as long as no increase in the titer that is two tube dilutions, will continue to monitor       Bipolar 2 disorder, major depressive episode (HCC)  Remains on Zyprexa and Prozac.  No concerning drug interactions with the ART.       Prurigo nodularis  Being followed by Derm and he is on phototherapy.           Patient was provided medication, adherence and prevention education.    History of Present Illness   Routine follow-up for HIV.  Patient claims 100% adherence with Biktarvy. Patient denies any notable side effects.  Overall the feeling well.  The patient denies any fever chills or sweats, denies any nausea vomiting or diarrhea, denies any cough or shortness of breath.    ROS: A complete review of systems are negative other than that noted in the HPI        Objective   Temp 98.3 °F (36.8 °C) (Tympanic)   Resp 20   Ht 5' 7.25\" (1.708 m)   Wt 82.8 kg (182 lb 9.6 oz)   BMI 28.39 kg/m²     General: Alert, interactive, appearing well, nontoxic, no acute distress.  Neck: Supple without lymphadenopathy, no " thyromegaly or masses  Throat: Oropharynx moist without lesions.   Lungs: Clear to auscultation bilaterally; no wheezes, rhonchi or rales; respirations unlabored  Heart: RRR; no murmur, rub or gallop  Abdomen: Soft, non-tender, non-distended, positive bowel sounds.    Extremities: No clubbing, cyanosis or edema  Skin: No new rashes or lesions. No draining wounds noted.    Lab Results: I have personally reviewed pertinent labs.  Lab Results   Component Value Date     12/30/2015    K 4.6 02/26/2025     02/26/2025    CO2 26 02/26/2025    ANIONGAP 9 12/30/2015    BUN 18 02/26/2025    CREATININE 0.84 02/26/2025    GLUCOSE 124 12/30/2015    GLUF 89 02/26/2025    CALCIUM 9.4 02/26/2025    CORRECTEDCA 10.3 (H) 05/16/2022    AST 22 02/26/2025    ALT 19 02/26/2025    ALKPHOS 120 (H) 02/26/2025    PROT 8.4 (H) 12/30/2015    BILITOT 0.37 12/30/2015    EGFR 97 02/26/2025     Lab Results   Component Value Date    WBC 5.65 02/26/2025    HGB 13.1 02/26/2025    HCT 39.8 02/26/2025    MCV 84 02/26/2025     02/26/2025     Lab Results   Component Value Date    HEPCAB Non-reactive 08/26/2024     Lab Results   Component Value Date    HEPCAB Non-reactive 08/26/2024     Lab Results   Component Value Date    RPR Reactive (A) 08/26/2024    RPR Reactive 4 dils (A) 08/26/2024     CD4 ABS   Date/Time Value Ref Range Status   08/26/2024 09:44 AM 1000 359 - 1519 /uL Final     HIV-1 TARGET   Date/Time Value Ref Range Status   08/26/2024 09:44 AM Detected <20 cp/mL (A) Not Detected Final

## 2025-02-27 ENCOUNTER — PATIENT OUTREACH (OUTPATIENT)
Dept: SURGERY | Facility: CLINIC | Age: 55
End: 2025-02-27

## 2025-02-27 LAB
BASOPHILS # BLD AUTO: 0 X10E3/UL (ref 0–0.2)
BASOPHILS NFR BLD AUTO: 1 %
C TRACH DNA SPEC QL NAA+PROBE: NEGATIVE
CD3+CD4+ CELLS # BLD: 991 /UL (ref 359–1519)
CD3+CD4+ CELLS NFR BLD: 36.7 % (ref 30.8–58.5)
EOSINOPHIL # BLD AUTO: 0.1 X10E3/UL (ref 0–0.4)
EOSINOPHIL NFR BLD AUTO: 3 %
ERYTHROCYTE [DISTWIDTH] IN BLOOD BY AUTOMATED COUNT: 15.6 % (ref 11.6–15.4)
HCT VFR BLD AUTO: 37.7 % (ref 37.5–51)
HGB BLD-MCNC: 12.3 G/DL (ref 13–17.7)
IMM GRANULOCYTES # BLD: 0 X10E3/UL (ref 0–0.1)
IMM GRANULOCYTES NFR BLD: 0 %
LYMPHOCYTES # BLD AUTO: 2.7 X10E3/UL (ref 0.7–3.1)
LYMPHOCYTES NFR BLD AUTO: 48 %
MCH RBC QN AUTO: 27.6 PG (ref 26.6–33)
MCHC RBC AUTO-ENTMCNC: 32.6 G/DL (ref 31.5–35.7)
MCV RBC AUTO: 85 FL (ref 79–97)
MONOCYTES # BLD AUTO: 0.5 X10E3/UL (ref 0.1–0.9)
MONOCYTES NFR BLD AUTO: 9 %
N GONORRHOEA DNA SPEC QL NAA+PROBE: NEGATIVE
NEUTROPHILS # BLD AUTO: 2.1 X10E3/UL (ref 1.4–7)
NEUTROPHILS NFR BLD AUTO: 39 %
PLATELET # BLD AUTO: 409 X10E3/UL (ref 150–450)
RBC # BLD AUTO: 4.46 X10E6/UL (ref 4.14–5.8)
RPR SER QL: REACTIVE
RPR SER-TITR: ABNORMAL {TITER}
WBC # BLD AUTO: 5.5 X10E3/UL (ref 3.4–10.8)

## 2025-02-27 NOTE — PROGRESS NOTES
[facility] called cm because they need the zero income letter to actually state that ct is zero income and that they will need proof of social security number because the number on the application does not match their records. Cm tried to call ct but the call only goes through as a busy tone.

## 2025-02-28 ENCOUNTER — PATIENT OUTREACH (OUTPATIENT)
Dept: SURGERY | Facility: CLINIC | Age: 55
End: 2025-02-28

## 2025-02-28 LAB — HIV1 RNA # PLAS NAA DL=20: NOT DETECTED {COPIES}/ML

## 2025-02-28 NOTE — PROGRESS NOTES
Cm called ct because ct did not drop off correct bank statements and cm needs ct to drop off additional documentation. Ct did not  so cm left a vm.

## 2025-02-28 NOTE — PROGRESS NOTES
"[facility] Annual Sexual Health Assessment     Portillo was seen by Prevention Nurse in conjunction with his ID appt on 2/26/25.     Patient is an established patient.  CD4 count:  991  Viral load:  still in process  ART:   Biktarvy    Introduced myself and explained to patient that we complete a sexual health assessment once yearly. Portillo reports he is not currently sexually active. Jokes that he needs a reminder as to what sex is because it has \"been so long\". He says he lives with his mom who thinks she is taking care of him but he is taking care of her. He has no sexual concerns at this time. Not planning to be looking for a partner at this time. Viral load is still in process but was <20 in August 2024. Stressed medication adherence.        "

## 2025-03-03 ENCOUNTER — PATIENT OUTREACH (OUTPATIENT)
Dept: SURGERY | Facility: CLINIC | Age: 55
End: 2025-03-03

## 2025-03-03 LAB — MISCELLANEOUS LAB TEST RESULT: NORMAL

## 2025-03-03 NOTE — PROGRESS NOTES
Cm called ct to discuss additional documentation needed for applications. Ct will drop off docs at 10 am tomorrow.

## 2025-03-03 NOTE — PROGRESS NOTES
Pastoral Care Progress Note    3/3/2025  Patient: Portillo Pendleton : 1970  Encounter Date & Time: 2025   MRN: [id number]        Visited Mr. Pendleton for continued care and support. Mr. Pendleton reported he was doing just okay. He expressed frustration about his teeth and wished the issue could be done with.  He advised he did not know what the update was on this issue. He shared he enjoyed serving in the  and wished he had been given other opportunities to learn different occupational specialities.  Overall Mr. Pendleton advised he is coping and hopes that things get better in his life. This visit and past visits Mr. Pendleton has consistently expressed himself from a pessimistic prospective thus his frustration regarding dental work did not appear to be causing emotional distress. However, Mr. Pendleton smiled and thanked the  for visiting him.  The  will send a note to the PCP and  regarding his dental questions. Further support as needed.

## 2025-03-04 ENCOUNTER — PATIENT OUTREACH (OUTPATIENT)
Dept: SURGERY | Facility: CLINIC | Age: 55
End: 2025-03-04

## 2025-03-04 NOTE — PROGRESS NOTES
Ct presented to office to drop off additional documentation. Cm submitted docs to [facility]. Cm faxed MA desmond to the [facility].

## 2025-03-06 DIAGNOSIS — R73.03 PREDIABETES: ICD-10-CM

## 2025-03-06 DIAGNOSIS — E78.2 ELEVATED CHOLESTEROL WITH ELEVATED TRIGLYCERIDES: ICD-10-CM

## 2025-03-06 DIAGNOSIS — Z11.1 SCREENING-PULMONARY TB: ICD-10-CM

## 2025-03-06 DIAGNOSIS — B20 HUMAN IMMUNODEFICIENCY VIRUS (HIV) DISEASE (HCC): Primary | ICD-10-CM

## 2025-03-06 DIAGNOSIS — R74.8 ELEVATED ALKALINE PHOSPHATASE LEVEL: ICD-10-CM

## 2025-03-07 DIAGNOSIS — J30.2 SEASONAL ALLERGIES: ICD-10-CM

## 2025-03-10 DIAGNOSIS — N52.8 OTHER MALE ERECTILE DYSFUNCTION: ICD-10-CM

## 2025-03-10 RX ORDER — FLUTICASONE PROPIONATE 50 MCG
1 SPRAY, SUSPENSION (ML) NASAL DAILY
Qty: 16 G | Refills: 5 | Status: SHIPPED | OUTPATIENT
Start: 2025-03-10

## 2025-03-10 RX ORDER — TADALAFIL 2.5 MG/1
2.5 TABLET ORAL DAILY PRN
Qty: 30 TABLET | Refills: 2 | Status: SHIPPED | OUTPATIENT
Start: 2025-03-10

## 2025-03-11 ENCOUNTER — PATIENT OUTREACH (OUTPATIENT)
Dept: SURGERY | Facility: CLINIC | Age: 55
End: 2025-03-11

## 2025-03-11 NOTE — PROGRESS NOTES
Cm called [facility] to see why the won't accept ct's zero income letter- rep states that they want one by ct or CM- cm emailed a new zero income letter to [facility].

## 2025-03-12 ENCOUNTER — PATIENT OUTREACH (OUTPATIENT)
Dept: SURGERY | Facility: CLINIC | Age: 55
End: 2025-03-12

## 2025-03-19 ENCOUNTER — OFFICE VISIT (OUTPATIENT)
Dept: GASTROENTEROLOGY | Facility: AMBULARY SURGERY CENTER | Age: 55
End: 2025-03-19
Payer: COMMERCIAL

## 2025-03-19 VITALS
HEART RATE: 83 BPM | HEIGHT: 67 IN | SYSTOLIC BLOOD PRESSURE: 127 MMHG | BODY MASS INDEX: 28.97 KG/M2 | DIASTOLIC BLOOD PRESSURE: 88 MMHG | WEIGHT: 184.6 LBS

## 2025-03-19 DIAGNOSIS — R13.19 ESOPHAGEAL DYSPHAGIA: ICD-10-CM

## 2025-03-19 DIAGNOSIS — Z86.0100 HISTORY OF COLON POLYPS: ICD-10-CM

## 2025-03-19 DIAGNOSIS — K59.00 CONSTIPATION, UNSPECIFIED CONSTIPATION TYPE: ICD-10-CM

## 2025-03-19 DIAGNOSIS — K21.9 GASTROESOPHAGEAL REFLUX DISEASE WITHOUT ESOPHAGITIS: Primary | ICD-10-CM

## 2025-03-19 PROCEDURE — 99214 OFFICE O/P EST MOD 30 MIN: CPT | Performed by: PHYSICIAN ASSISTANT

## 2025-03-19 RX ORDER — SODIUM CHLORIDE, SODIUM LACTATE, POTASSIUM CHLORIDE, CALCIUM CHLORIDE 600; 310; 30; 20 MG/100ML; MG/100ML; MG/100ML; MG/100ML
125 INJECTION, SOLUTION INTRAVENOUS CONTINUOUS
OUTPATIENT
Start: 2025-03-19

## 2025-03-19 NOTE — ASSESSMENT & PLAN NOTE
-Last colonoscopy July 2020 with adequate bowel prep was normal noting only small internal hemorrhoids.  -Recall colonoscopy July 2025  Orders:  •  polyethylene glycol (GOLYTELY) 4000 mL solution; Take 4,000 mL by mouth once for 1 dose  •  Colonoscopy; Future

## 2025-03-19 NOTE — ASSESSMENT & PLAN NOTE
-May be from his dicyclomine.  Will have him hold this at the moment.  -Commend good daily fluids and fiber

## 2025-03-19 NOTE — LETTER
2025     CLAYTON [place]  [address] [facility] [address]    Patient: Portillo Pendleton   YOB: 1970   Date of Visit: 3/19/2025       Dear Dr. Mauricio:    Thank you for referring Portillo Pendleton to me for evaluation. Below are my notes for this consultation.    If you have questions, please do not hesitate to call me. I look forward to following your patient along with you.         Sincerely,        Vicente Hester PA-C        CC: No Recipients    Vicente Hester PA-C  3/19/2025  2:08 PM  Incomplete  Name: Portillo Pendleton      : 1970      MRN: [id number]  Encounter Provider: Vicente Hester PA-C  Encounter Date: 3/19/2025   Encounter department: [facility]  :  Assessment & Plan  Gastroesophageal reflux disease without esophagitis  -Having some breakthrough reflux symptoms.  Occasionally slow-moving food through esophagus.  No pain with swallowing.  No need to regurgitate no unintentional weight loss.  -Last EGD 2020 with some plaques in the lower esophagus and gastritis.  Esophageal biopsy negative for any eosinophilic process or Candida.  Gastric biopsy negative for H. pylori.  Duodenal biopsy negative for celiac disease.  Also recommend:  - avoid NSAIDS  - avoid eating within 3 hours of sleeping  - elevated head of bed 4-6 inches while sleeping  - avoid trigger foods  - recommend daily supply of calcium and vitamin D      Orders:  •  EGD; Future    History of colon polyps  -Last colonoscopy 2020 with adequate bowel prep was normal noting only small internal hemorrhoids.  -Recall colonoscopy 2025  Orders:  •  polyethylene glycol (GOLYTELY) 4000 mL solution; Take 4,000 mL by mouth once for 1 dose  •  Colonoscopy; Future    Esophageal dysphagia  -With some breakthrough GERD and occasional issues with food going down slowly will plan repeat EGD       Constipation, unspecified constipation  type  -May be from his dicyclomine.  Will have him hold this at the moment.  -Commend good daily fluids and fiber           History of Present Illness  Portillo Pendleton is a 54 y.o. male new to me with past medical history of moderate persistent asthma, GERD, osteoarthritis of the spine, generalized anxiety, bipolar disorder type II, chronic pain, HIV, constipation, seasonal allergies and history of colon polyps who presents for follow-up to GERD and history of colon polyps.    Patient normally stable on omeprazole 40 mg daily but having some increased breakthrough symptoms.  He he does report food moving slowly through his esophagus at times but does not need to regurgitate.  No abdominal pain.  No nausea or vomiting.  Food has never been stuck.  He also reports some issues with constipation at times.  Takes him a long time to evacuate.  He is on dicyclomine up to 80 mg daily.  Black or bloody stool.        HPI  History obtained from: patient  Review of Systems   Constitutional:  Negative for activity change, appetite change, chills, diaphoresis, fatigue and unexpected weight change.   HENT:  Positive for trouble swallowing. Negative for mouth sores, rhinorrhea and sore throat.    Eyes:  Negative for discharge, redness and visual disturbance.   Respiratory:  Negative for apnea, cough, choking, chest tightness and shortness of breath.    Cardiovascular:  Negative for chest pain, palpitations and leg swelling.   Gastrointestinal:  Positive for constipation. Negative for abdominal distention, abdominal pain, anal bleeding, blood in stool, diarrhea, nausea, rectal pain and vomiting.        GERD   Genitourinary:  Negative for difficulty urinating, dysuria, frequency and hematuria.   Musculoskeletal:  Negative for arthralgias, back pain, gait problem, joint swelling and myalgias.   Skin:  Negative for color change, pallor and rash.   Allergic/Immunologic: Negative for environmental allergies and food allergies.    Neurological:  Negative for dizziness, tremors, weakness, light-headedness, numbness and headaches.   Psychiatric/Behavioral:  Negative for agitation, behavioral problems, confusion and sleep disturbance. The patient is not nervous/anxious.     A complete review of systems is negative other than that noted above in the HPI.    Past Medical History  Past Medical History:   Diagnosis Date   • Anxiety     Last Assessed: 7/18/2016    • Dysuria     Last Assessed: 9/14/2015   • Fecal urgency     Pt has had fecal incontinence in past, has weakened sphincter. would benefit from fiber, Needs f/u flex sig will refer for scheduling -       • GERD (gastroesophageal reflux disease)    • Hemorrhage of anus and rectum     Last Assessed: 3/5/2014    • Herpes zoster     Last Assessed: 9/26/2016   • History of chickenpox    • History of pneumonia    • HIV (human immunodeficiency virus infection) ([facility])    • IBS (irritable bowel syndrome)    • Proctitis, chlamydial     Last Assessed: 9/29/2014    • Recurrent major depressive episodes, moderate ([facility])     Last Assessed: 9/15/2017    • Wears glasses     reading     Past Surgical History:   Procedure Laterality Date   • CIRCUMCISION     • COLONOSCOPY     • CYSTOSCOPY  2014   • LASIK     • OTHER SURGICAL HISTORY      embolisation 2018   • ND ESOPHAGOGASTRODUODENOSCOPY TRANSORAL DIAGNOSTIC N/A 03/09/2017    Procedure: ESOPHAGOGASTRODUODENOSCOPY (EGD);  Surgeon: Ella Moreno MD;  Location: [facility];  Service: Gastroenterology   • VARICOCELE EXCISION      Spermatic cord Excision - Last Assessed: 3/17/2016    • WISDOM TOOTH EXTRACTION       Family History   Problem Relation Age of Onset   • Diabetes type II Mother    • Heart attack Father    • No Known Problems Brother    • No Known Problems Brother    • Skin cancer Paternal Grandmother    • Substance Abuse Cousin       reports that he has been smoking cigarettes. He has a 12.5 pack-year smoking history. He has quit using smokeless  tobacco. He reports that he does not currently use alcohol after a past usage of about 1.0 standard drink of alcohol per week. He reports that he does not use drugs.  Current Outpatient Medications   Medication Instructions   • atorvastatin (LIPITOR) 10 mg tablet TAKE 1 TABLET BY MOUTH DAILY   • bictegravir-emtricitab-tenofovir alafenamide (Biktarvy) -25 MG tablet 1 tablet, Oral, Daily with breakfast   • dicyclomine (BENTYL) 20 mg tablet TAKE 1 TABLET BY MOUTH EVERY SIX HOURS   • FLUoxetine (PROZAC) 80 mg, Oral, Daily   • fluticasone (FLONASE) 50 mcg/act nasal spray 1 spray, Daily   • hydrOXYzine HCL (ATARAX) 25-50 mg, Oral, 2 times daily PRN   • loratadine (CLARITIN) 10 mg, Oral, Daily   • OLANZapine (ZYPREXA) 10 mg, Oral, Daily at bedtime   • omeprazole (PRILOSEC) 40 mg, Oral, Daily   • polyethylene glycol (GOLYTELY) 4000 mL solution 4,000 mL, Oral, Once   • tadalafil (CIALIS) 2.5 mg, Oral, Daily PRN   • tamsulosin (FLOMAX) 0.4 mg, Oral, Daily with dinner     Allergies   Allergen Reactions   • Tomato - Food Allergy Vomiting      Current Outpatient Medications on File Prior to Visit   Medication Sig Dispense Refill   • atorvastatin (LIPITOR) 10 mg tablet TAKE 1 TABLET BY MOUTH DAILY 90 tablet 1   • bictegravir-emtricitab-tenofovir alafenamide (Biktarvy) -25 MG tablet TAKE 1 TABLET BY MOUTH DAILY WITH BREAKFAST 90 tablet 1   • dicyclomine (BENTYL) 20 mg tablet TAKE 1 TABLET BY MOUTH EVERY SIX HOURS (Patient taking differently: Take 20 mg by mouth Takes 2 tablets in the am and takes 2 tablets in the pm) 120 tablet 0   • FLUoxetine (PROzac) 40 MG capsule Take 2 capsules (80 mg total) by mouth daily 60 capsule 2   • fluticasone (FLONASE) 50 mcg/act nasal spray USE 1 SPRAY IN EACH NOSTRIL DAILY 16 g 5   • hydrOXYzine HCL (ATARAX) 25 mg tablet Take 1-2 tablets (25-50 mg total) by mouth 2 (two) times a day as needed for anxiety or itching (Do not exceed 100mg per day) 120 tablet 2   • loratadine (CLARITIN) 10  "mg tablet Take 1 tablet (10 mg total) by mouth daily 30 tablet 2   • OLANZapine (ZyPREXA) 10 mg tablet Take 1 tablet (10 mg total) by mouth daily at bedtime 30 tablet 2   • omeprazole (PriLOSEC) 40 MG capsule Take 1 capsule (40 mg total) by mouth daily 30 capsule 5   • tadalafil (CIALIS) 2.5 MG tablet Take 1 tablet (2.5 mg total) by mouth daily as needed for erectile dysfunction 30 tablet 2   • tamsulosin (FLOMAX) 0.4 mg TAKE 1 CAPSULE BY MOUTH DAILY WITH DINNER 90 capsule 1   • [DISCONTINUED] gabapentin (NEURONTIN) 300 mg capsule        No current facility-administered medications on file prior to visit.      Social History     Tobacco Use   • Smoking status: Every Day     Current packs/day: 0.50     Average packs/day: 0.5 packs/day for 25.0 years (12.5 ttl pk-yrs)     Types: Cigarettes   • Smokeless tobacco: Former   • Tobacco comments:     3 daily   Vaping Use   • Vaping status: Never Used   Substance and Sexual Activity   • Alcohol use: Not Currently     Alcohol/week: 1.0 standard drink of alcohol     Types: 1 Cans of beer per week     Comment: socially   • Drug use: No   • Sexual activity: Not Currently     Birth control/protection: Condom     Comment: active monogamous relationship         Objective  /88 (Patient Position: Sitting, Cuff Size: Standard)   Pulse 83   Ht 5' 7\" (1.702 m)   Wt 83.7 kg (184 lb 9.6 oz)   BMI 28.91 kg/m²     Physical Exam  Constitutional:       General: He is not in acute distress.     Appearance: Normal appearance. He is not ill-appearing.   HENT:      Head: Normocephalic and atraumatic.      Mouth/Throat:      Comments: Poor dentition  Eyes:      General: No scleral icterus.     Conjunctiva/sclera: Conjunctivae normal.   Cardiovascular:      Rate and Rhythm: Normal rate and regular rhythm.   Pulmonary:      Effort: Pulmonary effort is normal. No respiratory distress.      Breath sounds: Normal breath sounds.   Abdominal:      General: Bowel sounds are normal. There is no " distension.      Palpations: Abdomen is soft.      Tenderness: There is no abdominal tenderness. There is no guarding.   Skin:     General: Skin is warm and dry.   Neurological:      Mental Status: He is alert and oriented to person, place, and time.   Psychiatric:         Mood and Affect: Mood normal.         Behavior: Behavior normal.            Lab Results: I personally reviewed relevant lab results. CBC/BMP: No new results in last 24 hours. , LFTs: No new results in last 24 hours. , PTT/INR:No new results in last 24 hours.       Results for orders placed during the hospital encounter of 20    EGD    Impression  1. Possible esophagitis desiccans superficialis.  2. Mild gastritis.    RECOMMENDATION:  1. Follow-up biopsy results in 2-3 weeks.  2. Continue PPI.  3. Avoid NSAIDs.  4. Advised to avoid fatty foods, chocolates, caffeine, alcohol and any other triggering foods.  Avoid eating for at least 3 hours before going to bed.        MD Vicente Wilkins PA-C  [facility] - Gastroentrology      Vicente Hester PA-C  3/19/2025  2:04 PM  Sign when Signing Visit  Name: Portillo Pendleton      : 1970      MRN: [id number]  Encounter Provider: Vicente Hester PA-C  Encounter Date: 3/19/2025   Encounter department: [facility]  :  Assessment & Plan  Gastroesophageal reflux disease without esophagitis  - Patient's GERD currently stable on pantoprazole 40 mg once daily.   No current daytime or night time break through symptoms.  No pain or difficulty swallowing.  No unintentional weight loss.  -Last EGD 2020 with some plaques in the lower esophagus and gastritis.  Esophageal biopsy negative for any eosinophilic process or Candida.  Gastric biopsy negative for H. pylori.  Duodenal biopsy negative for celiac disease.  Also recommend:  - avoid NSAIDS  - avoid eating within 3 hours of sleeping  - elevated  head of bed 4-6 inches while sleeping  - avoid trigger foods  - recommend daily supply of calcium and vitamin D      Orders:  •  EGD; Future    History of colon polyps  -Last colonoscopy July 2020 with adequate bowel prep was normal noting only small internal hemorrhoids.  -Recall colonoscopy July 2025  Orders:  •  polyethylene glycol (GOLYTELY) 4000 mL solution; Take 4,000 mL by mouth once for 1 dose  •  Colonoscopy; Future        History of Present Illness  Portillo Pendleton is a 54 y.o. male new to me with past medical history of moderate persistent asthma, GERD, osteoarthritis of the spine, generalized anxiety, bipolar disorder type II, chronic pain, HIV, constipation, seasonal allergies and history of colon polyps who presents for follow-up to GERD and history of colon polyps.        HPI  History obtained from: patient  Review of Systems   Constitutional:  Negative for activity change, appetite change, chills, diaphoresis, fatigue and unexpected weight change.   HENT:  Negative for mouth sores, rhinorrhea, sore throat and trouble swallowing.    Eyes:  Negative for discharge, redness and visual disturbance.   Respiratory:  Negative for apnea, cough, choking, chest tightness and shortness of breath.    Cardiovascular:  Negative for chest pain, palpitations and leg swelling.   Gastrointestinal:  Negative for abdominal distention, abdominal pain, anal bleeding, blood in stool, constipation, diarrhea, nausea, rectal pain and vomiting.   Genitourinary:  Negative for difficulty urinating, dysuria, frequency and hematuria.   Musculoskeletal:  Negative for arthralgias, back pain, gait problem, joint swelling and myalgias.   Skin:  Negative for color change, pallor and rash.   Allergic/Immunologic: Negative for environmental allergies and food allergies.   Neurological:  Negative for dizziness, tremors, weakness, light-headedness, numbness and headaches.   Psychiatric/Behavioral:  Negative for agitation, behavioral  problems, confusion and sleep disturbance. The patient is not nervous/anxious.     A complete review of systems is negative other than that noted above in the HPI.    Past Medical History  Past Medical History:   Diagnosis Date   • Anxiety     Last Assessed: 7/18/2016    • Dysuria     Last Assessed: 9/14/2015   • Fecal urgency     Pt has had fecal incontinence in past, has weakened sphincter. would benefit from fiber, Needs f/u flex sig will refer for scheduling -       • GERD (gastroesophageal reflux disease)    • Hemorrhage of anus and rectum     Last Assessed: 3/5/2014    • Herpes zoster     Last Assessed: 9/26/2016   • History of chickenpox    • History of pneumonia    • HIV (human immunodeficiency virus infection) ([facility])    • IBS (irritable bowel syndrome)    • Proctitis, chlamydial     Last Assessed: 9/29/2014    • Recurrent major depressive episodes, moderate ([facility])     Last Assessed: 9/15/2017    • Wears glasses     reading     Past Surgical History:   Procedure Laterality Date   • CIRCUMCISION     • COLONOSCOPY     • CYSTOSCOPY  2014   • LASIK     • OTHER SURGICAL HISTORY      embolisation 2018   • LA ESOPHAGOGASTRODUODENOSCOPY TRANSORAL DIAGNOSTIC N/A 03/09/2017    Procedure: ESOPHAGOGASTRODUODENOSCOPY (EGD);  Surgeon: Ella Moreno MD;  Location: [facility];  Service: Gastroenterology   • VARICOCELE EXCISION      Spermatic cord Excision - Last Assessed: 3/17/2016    • WISDOM TOOTH EXTRACTION       Family History   Problem Relation Age of Onset   • Diabetes type II Mother    • Heart attack Father    • No Known Problems Brother    • No Known Problems Brother    • Skin cancer Paternal Grandmother    • Substance Abuse Cousin       reports that he has been smoking cigarettes. He has a 12.5 pack-year smoking history. He has quit using smokeless tobacco. He reports that he does not currently use alcohol after a past usage of about 1.0 standard drink of alcohol per week. He reports that he does not use  drugs.  Current Outpatient Medications   Medication Instructions   • atorvastatin (LIPITOR) 10 mg tablet TAKE 1 TABLET BY MOUTH DAILY   • bictegravir-emtricitab-tenofovir alafenamide (Biktarvy) -25 MG tablet 1 tablet, Oral, Daily with breakfast   • dicyclomine (BENTYL) 20 mg tablet TAKE 1 TABLET BY MOUTH EVERY SIX HOURS   • FLUoxetine (PROZAC) 80 mg, Oral, Daily   • fluticasone (FLONASE) 50 mcg/act nasal spray 1 spray, Daily   • hydrOXYzine HCL (ATARAX) 25-50 mg, Oral, 2 times daily PRN   • loratadine (CLARITIN) 10 mg, Oral, Daily   • OLANZapine (ZYPREXA) 10 mg, Oral, Daily at bedtime   • omeprazole (PRILOSEC) 40 mg, Oral, Daily   • polyethylene glycol (GOLYTELY) 4000 mL solution 4,000 mL, Oral, Once   • tadalafil (CIALIS) 2.5 mg, Oral, Daily PRN   • tamsulosin (FLOMAX) 0.4 mg, Oral, Daily with dinner     Allergies   Allergen Reactions   • Tomato - Food Allergy Vomiting     Current Outpatient Medications on File Prior to Visit   Medication Sig Dispense Refill   • atorvastatin (LIPITOR) 10 mg tablet TAKE 1 TABLET BY MOUTH DAILY 90 tablet 1   • bictegravir-emtricitab-tenofovir alafenamide (Biktarvy) -25 MG tablet TAKE 1 TABLET BY MOUTH DAILY WITH BREAKFAST 90 tablet 1   • dicyclomine (BENTYL) 20 mg tablet TAKE 1 TABLET BY MOUTH EVERY SIX HOURS (Patient taking differently: Take 20 mg by mouth Takes 2 tablets in the am and takes 2 tablets in the pm) 120 tablet 0   • FLUoxetine (PROzac) 40 MG capsule Take 2 capsules (80 mg total) by mouth daily 60 capsule 2   • fluticasone (FLONASE) 50 mcg/act nasal spray USE 1 SPRAY IN EACH NOSTRIL DAILY 16 g 5   • hydrOXYzine HCL (ATARAX) 25 mg tablet Take 1-2 tablets (25-50 mg total) by mouth 2 (two) times a day as needed for anxiety or itching (Do not exceed 100mg per day) 120 tablet 2   • loratadine (CLARITIN) 10 mg tablet Take 1 tablet (10 mg total) by mouth daily 30 tablet 2   • OLANZapine (ZyPREXA) 10 mg tablet Take 1 tablet (10 mg total) by mouth daily at bedtime 30  "tablet 2   • omeprazole (PriLOSEC) 40 MG capsule Take 1 capsule (40 mg total) by mouth daily 30 capsule 5   • tadalafil (CIALIS) 2.5 MG tablet Take 1 tablet (2.5 mg total) by mouth daily as needed for erectile dysfunction 30 tablet 2   • tamsulosin (FLOMAX) 0.4 mg TAKE 1 CAPSULE BY MOUTH DAILY WITH DINNER 90 capsule 1   • [DISCONTINUED] gabapentin (NEURONTIN) 300 mg capsule        No current facility-administered medications on file prior to visit.      Social History     Tobacco Use   • Smoking status: Every Day     Current packs/day: 0.50     Average packs/day: 0.5 packs/day for 25.0 years (12.5 ttl pk-yrs)     Types: Cigarettes   • Smokeless tobacco: Former   • Tobacco comments:     3 daily   Vaping Use   • Vaping status: Never Used   Substance and Sexual Activity   • Alcohol use: Not Currently     Alcohol/week: 1.0 standard drink of alcohol     Types: 1 Cans of beer per week     Comment: socially   • Drug use: No   • Sexual activity: Not Currently     Birth control/protection: Condom     Comment: active monogamous relationship         Objective  /88 (Patient Position: Sitting, Cuff Size: Standard)   Pulse 83   Ht 5' 7\" (1.702 m)   Wt 83.7 kg (184 lb 9.6 oz)   BMI 28.91 kg/m²     Physical Exam  Constitutional:       General: He is not in acute distress.     Appearance: Normal appearance. He is not ill-appearing.   HENT:      Head: Normocephalic and atraumatic.   Eyes:      General: No scleral icterus.     Conjunctiva/sclera: Conjunctivae normal.   Cardiovascular:      Rate and Rhythm: Normal rate and regular rhythm.   Pulmonary:      Effort: Pulmonary effort is normal. No respiratory distress.      Breath sounds: Normal breath sounds.   Abdominal:      General: Bowel sounds are normal. There is no distension.      Palpations: Abdomen is soft.      Tenderness: There is no abdominal tenderness. There is no guarding.   Skin:     General: Skin is warm and dry.   Neurological:      Mental Status: He is alert " and oriented to person, place, and time.   Psychiatric:         Mood and Affect: Mood normal.         Behavior: Behavior normal.            Lab Results: I personally reviewed relevant lab results. CBC/BMP: No new results in last 24 hours. , LFTs: No new results in last 24 hours. , PTT/INR:No new results in last 24 hours.       Results for orders placed during the hospital encounter of 07/06/20    EGD    Impression  1. Possible esophagitis desiccans superficialis.  2. Mild gastritis.    RECOMMENDATION:  1. Follow-up biopsy results in 2-3 weeks.  2. Continue PPI.  3. Avoid NSAIDs.  4. Advised to avoid fatty foods, chocolates, caffeine, alcohol and any other triggering foods.  Avoid eating for at least 3 hours before going to bed.        MD Vicente Wilkins PA-C  [facility] - Gastroentrology

## 2025-03-19 NOTE — PROGRESS NOTES
Name: Portillo Pendleton      : 1970      MRN: [id number]  Encounter Provider: Vicente Hester PA-C  Encounter Date: 3/19/2025   Encounter department: [facility]  :  Assessment & Plan  Gastroesophageal reflux disease without esophagitis  -Having some breakthrough reflux symptoms.  Occasionally slow-moving food through esophagus.  No pain with swallowing.  No need to regurgitate no unintentional weight loss.  -Last EGD 2020 with some plaques in the lower esophagus and gastritis.  Esophageal biopsy negative for any eosinophilic process or Candida.  Gastric biopsy negative for H. pylori.  Duodenal biopsy negative for celiac disease.  Also recommend:  - avoid NSAIDS  - avoid eating within 3 hours of sleeping  - elevated head of bed 4-6 inches while sleeping  - avoid trigger foods  - recommend daily supply of calcium and vitamin D      Orders:  •  EGD; Future    History of colon polyps  -Last colonoscopy 2020 with adequate bowel prep was normal noting only small internal hemorrhoids.  -Recall colonoscopy 2025  Orders:  •  polyethylene glycol (GOLYTELY) 4000 mL solution; Take 4,000 mL by mouth once for 1 dose  •  Colonoscopy; Future    Esophageal dysphagia  -With some breakthrough GERD and occasional issues with food going down slowly will plan repeat EGD       Constipation, unspecified constipation type  -May be from his dicyclomine.  Will have him hold this at the moment.  -Commend good daily fluids and fiber           History of Present Illness   Portillo Pendleton is a 54 y.o. male new to me with past medical history of moderate persistent asthma, GERD, osteoarthritis of the spine, generalized anxiety, bipolar disorder type II, chronic pain, HIV, constipation, seasonal allergies and history of colon polyps who presents for follow-up to GERD and history of colon polyps.    Patient normally stable on omeprazole 40 mg daily but having some increased  breakthrough symptoms.  He he does report food moving slowly through his esophagus at times but does not need to regurgitate.  No abdominal pain.  No nausea or vomiting.  Food has never been stuck.  He also reports some issues with constipation at times.  Takes him a long time to evacuate.  He is on dicyclomine up to 80 mg daily.  Black or bloody stool.        HPI  History obtained from: patient  Review of Systems   Constitutional:  Negative for activity change, appetite change, chills, diaphoresis, fatigue and unexpected weight change.   HENT:  Positive for trouble swallowing. Negative for mouth sores, rhinorrhea and sore throat.    Eyes:  Negative for discharge, redness and visual disturbance.   Respiratory:  Negative for apnea, cough, choking, chest tightness and shortness of breath.    Cardiovascular:  Negative for chest pain, palpitations and leg swelling.   Gastrointestinal:  Positive for constipation. Negative for abdominal distention, abdominal pain, anal bleeding, blood in stool, diarrhea, nausea, rectal pain and vomiting.        GERD   Genitourinary:  Negative for difficulty urinating, dysuria, frequency and hematuria.   Musculoskeletal:  Negative for arthralgias, back pain, gait problem, joint swelling and myalgias.   Skin:  Negative for color change, pallor and rash.   Allergic/Immunologic: Negative for environmental allergies and food allergies.   Neurological:  Negative for dizziness, tremors, weakness, light-headedness, numbness and headaches.   Psychiatric/Behavioral:  Negative for agitation, behavioral problems, confusion and sleep disturbance. The patient is not nervous/anxious.     A complete review of systems is negative other than that noted above in the HPI.    Past Medical History   Past Medical History:   Diagnosis Date   • Anxiety     Last Assessed: 7/18/2016    • Dysuria     Last Assessed: 9/14/2015   • Fecal urgency     Pt has had fecal incontinence in past, has weakened sphincter. would  benefit from fiber, Needs f/u flex sig will refer for scheduling -       • GERD (gastroesophageal reflux disease)    • Hemorrhage of anus and rectum     Last Assessed: 3/5/2014    • Herpes zoster     Last Assessed: 9/26/2016   • History of chickenpox    • History of pneumonia    • HIV (human immunodeficiency virus infection) ([facility])    • IBS (irritable bowel syndrome)    • Proctitis, chlamydial     Last Assessed: 9/29/2014    • Recurrent major depressive episodes, moderate ([facility])     Last Assessed: 9/15/2017    • Wears glasses     reading     Past Surgical History:   Procedure Laterality Date   • CIRCUMCISION     • COLONOSCOPY     • CYSTOSCOPY  2014   • LASIK     • OTHER SURGICAL HISTORY      embolisation 2018   • NH ESOPHAGOGASTRODUODENOSCOPY TRANSORAL DIAGNOSTIC N/A 03/09/2017    Procedure: ESOPHAGOGASTRODUODENOSCOPY (EGD);  Surgeon: Ella Moreno MD;  Location: [facility];  Service: Gastroenterology   • VARICOCELE EXCISION      Spermatic cord Excision - Last Assessed: 3/17/2016    • WISDOM TOOTH EXTRACTION       Family History   Problem Relation Age of Onset   • Diabetes type II Mother    • Heart attack Father    • No Known Problems Brother    • No Known Problems Brother    • Skin cancer Paternal Grandmother    • Substance Abuse Cousin       reports that he has been smoking cigarettes. He has a 12.5 pack-year smoking history. He has quit using smokeless tobacco. He reports that he does not currently use alcohol after a past usage of about 1.0 standard drink of alcohol per week. He reports that he does not use drugs.  Current Outpatient Medications   Medication Instructions   • atorvastatin (LIPITOR) 10 mg tablet TAKE 1 TABLET BY MOUTH DAILY   • bictegravir-emtricitab-tenofovir alafenamide (Biktarvy) -25 MG tablet 1 tablet, Oral, Daily with breakfast   • dicyclomine (BENTYL) 20 mg tablet TAKE 1 TABLET BY MOUTH EVERY SIX HOURS   • FLUoxetine (PROZAC) 80 mg, Oral, Daily   • fluticasone (FLONASE) 50 mcg/act  nasal spray 1 spray, Daily   • hydrOXYzine HCL (ATARAX) 25-50 mg, Oral, 2 times daily PRN   • loratadine (CLARITIN) 10 mg, Oral, Daily   • OLANZapine (ZYPREXA) 10 mg, Oral, Daily at bedtime   • omeprazole (PRILOSEC) 40 mg, Oral, Daily   • polyethylene glycol (GOLYTELY) 4000 mL solution 4,000 mL, Oral, Once   • tadalafil (CIALIS) 2.5 mg, Oral, Daily PRN   • tamsulosin (FLOMAX) 0.4 mg, Oral, Daily with dinner     Allergies   Allergen Reactions   • Tomato - Food Allergy Vomiting      Current Outpatient Medications on File Prior to Visit   Medication Sig Dispense Refill   • atorvastatin (LIPITOR) 10 mg tablet TAKE 1 TABLET BY MOUTH DAILY 90 tablet 1   • bictegravir-emtricitab-tenofovir alafenamide (Biktarvy) -25 MG tablet TAKE 1 TABLET BY MOUTH DAILY WITH BREAKFAST 90 tablet 1   • dicyclomine (BENTYL) 20 mg tablet TAKE 1 TABLET BY MOUTH EVERY SIX HOURS (Patient taking differently: Take 20 mg by mouth Takes 2 tablets in the am and takes 2 tablets in the pm) 120 tablet 0   • FLUoxetine (PROzac) 40 MG capsule Take 2 capsules (80 mg total) by mouth daily 60 capsule 2   • fluticasone (FLONASE) 50 mcg/act nasal spray USE 1 SPRAY IN EACH NOSTRIL DAILY 16 g 5   • hydrOXYzine HCL (ATARAX) 25 mg tablet Take 1-2 tablets (25-50 mg total) by mouth 2 (two) times a day as needed for anxiety or itching (Do not exceed 100mg per day) 120 tablet 2   • loratadine (CLARITIN) 10 mg tablet Take 1 tablet (10 mg total) by mouth daily 30 tablet 2   • OLANZapine (ZyPREXA) 10 mg tablet Take 1 tablet (10 mg total) by mouth daily at bedtime 30 tablet 2   • omeprazole (PriLOSEC) 40 MG capsule Take 1 capsule (40 mg total) by mouth daily 30 capsule 5   • tadalafil (CIALIS) 2.5 MG tablet Take 1 tablet (2.5 mg total) by mouth daily as needed for erectile dysfunction 30 tablet 2   • tamsulosin (FLOMAX) 0.4 mg TAKE 1 CAPSULE BY MOUTH DAILY WITH DINNER 90 capsule 1   • [DISCONTINUED] gabapentin (NEURONTIN) 300 mg capsule        No current  "facility-administered medications on file prior to visit.      Social History     Tobacco Use   • Smoking status: Every Day     Current packs/day: 0.50     Average packs/day: 0.5 packs/day for 25.0 years (12.5 ttl pk-yrs)     Types: Cigarettes   • Smokeless tobacco: Former   • Tobacco comments:     3 daily   Vaping Use   • Vaping status: Never Used   Substance and Sexual Activity   • Alcohol use: Not Currently     Alcohol/week: 1.0 standard drink of alcohol     Types: 1 Cans of beer per week     Comment: socially   • Drug use: No   • Sexual activity: Not Currently     Birth control/protection: Condom     Comment: active monogamous relationship         Objective   /88 (Patient Position: Sitting, Cuff Size: Standard)   Pulse 83   Ht 5' 7\" (1.702 m)   Wt 83.7 kg (184 lb 9.6 oz)   BMI 28.91 kg/m²     Physical Exam  Constitutional:       General: He is not in acute distress.     Appearance: Normal appearance. He is not ill-appearing.   HENT:      Head: Normocephalic and atraumatic.      Mouth/Throat:      Comments: Poor dentition  Eyes:      General: No scleral icterus.     Conjunctiva/sclera: Conjunctivae normal.   Cardiovascular:      Rate and Rhythm: Normal rate and regular rhythm.   Pulmonary:      Effort: Pulmonary effort is normal. No respiratory distress.      Breath sounds: Normal breath sounds.   Abdominal:      General: Bowel sounds are normal. There is no distension.      Palpations: Abdomen is soft.      Tenderness: There is no abdominal tenderness. There is no guarding.   Skin:     General: Skin is warm and dry.   Neurological:      Mental Status: He is alert and oriented to person, place, and time.   Psychiatric:         Mood and Affect: Mood normal.         Behavior: Behavior normal.            Lab Results: I personally reviewed relevant lab results. CBC/BMP: No new results in last 24 hours. , LFTs: No new results in last 24 hours. , PTT/INR:No new results in last 24 hours.       Results for " orders placed during the hospital encounter of 07/06/20    EGD    Impression  1. Possible esophagitis desiccans superficialis.  2. Mild gastritis.    RECOMMENDATION:  1. Follow-up biopsy results in 2-3 weeks.  2. Continue PPI.  3. Avoid NSAIDs.  4. Advised to avoid fatty foods, chocolates, caffeine, alcohol and any other triggering foods.  Avoid eating for at least 3 hours before going to bed.        MD Vicente Wilkins PA-C  [facility] - Gastroentrology

## 2025-03-19 NOTE — PATIENT INSTRUCTIONS
Scheduled date of EGD/colonoscopy (as of today): 5/23/2025  Physician performing EGD/colonoscopy: Dr. Moreno  Location of EGD/colonoscopy: [facility]  Desired bowel prep reviewed with patient: Golytely  Instructions reviewed with patient by: Janet  Clearances: N/A

## 2025-03-19 NOTE — ASSESSMENT & PLAN NOTE
-With some breakthrough GERD and occasional issues with food going down slowly will plan repeat EGD

## 2025-03-19 NOTE — ASSESSMENT & PLAN NOTE
-Having some breakthrough reflux symptoms.  Occasionally slow-moving food through esophagus.  No pain with swallowing.  No need to regurgitate no unintentional weight loss.  -Last EGD July 2020 with some plaques in the lower esophagus and gastritis.  Esophageal biopsy negative for any eosinophilic process or Candida.  Gastric biopsy negative for H. pylori.  Duodenal biopsy negative for celiac disease.  Also recommend:  - avoid NSAIDS  - avoid eating within 3 hours of sleeping  - elevated head of bed 4-6 inches while sleeping  - avoid trigger foods  - recommend daily supply of calcium and vitamin D      Orders:  •  EGD; Future

## 2025-03-21 ENCOUNTER — PATIENT OUTREACH (OUTPATIENT)
Dept: SURGERY | Facility: CLINIC | Age: 55
End: 2025-03-21

## 2025-03-21 NOTE — PROGRESS NOTES
Cm called ct to inform him that his MA renewal has been approved- ct acknowledged. Ct asked about his appt next week and cm confirmed date/time.

## 2025-03-24 DIAGNOSIS — K21.00 GASTROESOPHAGEAL REFLUX DISEASE WITH ESOPHAGITIS: ICD-10-CM

## 2025-03-24 DIAGNOSIS — K58.2 IRRITABLE BOWEL SYNDROME WITH BOTH CONSTIPATION AND DIARRHEA: ICD-10-CM

## 2025-03-24 RX ORDER — OMEPRAZOLE 40 MG/1
40 CAPSULE, DELAYED RELEASE ORAL DAILY
Qty: 30 CAPSULE | Refills: 5 | Status: SHIPPED | OUTPATIENT
Start: 2025-03-24

## 2025-03-24 RX ORDER — DICYCLOMINE HCL 20 MG
20 TABLET ORAL 4 TIMES DAILY
Qty: 120 TABLET | Refills: 0 | Status: SHIPPED | OUTPATIENT
Start: 2025-03-24 | End: 2025-03-27 | Stop reason: ALTCHOICE

## 2025-03-27 ENCOUNTER — PATIENT OUTREACH (OUTPATIENT)
Dept: SURGERY | Facility: CLINIC | Age: 55
End: 2025-03-27

## 2025-03-27 NOTE — ASSESSMENT & PLAN NOTE
Continues to smoke  Discussed the importance of 100% smoking sensation  Nicotine Dependency - Primary    Counseled for greater than 15 minutes on the importance of smoking cessation.  Education was given regarding the cardiovascular effects of how nicotine affects the heart, lungs, kidneys, and peripheral vascular system.  Referred to [facility] for enrollment in smoking cessation program.

## 2025-03-27 NOTE — ASSESSMENT & PLAN NOTE
Doing well on Biktarvy with an undetectable VL.  Pt reports 100% medication compliance on HAART.  Stressed the importance of 100% medication adherence  Monitor CD4. HIV RNA, CMP, and CBCD to monitor for any developing virologic response, treatment failure, or drug toxicities  Follow up with Dr. hTakur or Dr. Ferrari   Continue Biktarvy  HIV Counseling:    Viral Load:    CD4 Count:      Denies side effects.  Stressed the importance of adherence.  Continue follow up in the ID clinic with Dr. Thakur or Dr. Ferrari.    Reviewed the most recent labs, including the CD4 and viral load.  Discussed the risks and benefits of treatment options, instructions for management, importance of treatment adherence, and reduction of risk factors.  Educated on possible medication side effects.    Counseled on routes of HIV transmission, including the risk of  infection.  Emphasized that viral suppression is the best method to prevent HIV transmission.  At this time the pt denies the need for HIV testing of anyone in their life.    Total encounter time was greater than 35 minutes.  Greater than 20 minutes were spent on counseling and patient education.  Pt voices understanding and agreement with treatment plan.      Orders:    Zoster Vaccine Recombinant IM

## 2025-03-27 NOTE — ASSESSMENT & PLAN NOTE
Continues with some mild intermittent heartburn.  Continue with omeprazole  Scheduled for future EGD/colonoscopy

## 2025-03-27 NOTE — PROGRESS NOTES
Adult Annual Physical  Name: Portillo Pendleton      : 1970      MRN: [id number]  Encounter Provider: CLAYTON Bañuelos  Encounter Date: 3/28/2025   Encounter department: [facility]    Assessment & Plan  Need for zoster vaccination    Orders:    Zoster Vaccine Recombinant IM    Human immunodeficiency virus (HIV) disease (HCC)  Doing well on Biktarvy with an undetectable VL.  Pt reports 100% medication compliance on HAART.  Stressed the importance of 100% medication adherence  Monitor CD4. HIV RNA, CMP, and CBCD to monitor for any developing virologic response, treatment failure, or drug toxicities  Follow up with Dr. Thakur or Dr. Ferrari   Continue Biktarvy  HIV Counseling:    Viral Load:    CD4 Count:      Denies side effects.  Stressed the importance of adherence.  Continue follow up in the ID clinic with Dr. Thakur or Dr. Ferrari.    Reviewed the most recent labs, including the CD4 and viral load.  Discussed the risks and benefits of treatment options, instructions for management, importance of treatment adherence, and reduction of risk factors.  Educated on possible medication side effects.    Counseled on routes of HIV transmission, including the risk of  infection.  Emphasized that viral suppression is the best method to prevent HIV transmission.  At this time the pt denies the need for HIV testing of anyone in their life.    Total encounter time was greater than 35 minutes.  Greater than 20 minutes were spent on counseling and patient education.  Pt voices understanding and agreement with treatment plan.      Orders:    Zoster Vaccine Recombinant IM    Prediabetes  A1C: 5.8.  Continue lifestyle modifications  Continue to follow with [facility] dietitian  Continue to monitor A1C       Syphilis  RPR remains serofast at 1: 4 Dils.  If he were to have a bump in Dils to 1 and a that was signify a new infection and will require treatment.  Continue to monitor RPR  Encourage patient  to use condoms during sexual encounters       Esophageal dysphagia  Continues with some mild intermittent heartburn.  Continue with omeprazole  Scheduled for future EGD/colonoscopy       Toxic effect of tobacco cigarette, intentional self-harm, subsequent encounter  Continues to smoke  Discussed the importance of 100% smoking sensation  Nicotine Dependency - Primary    Counseled for greater than 15 minutes on the importance of smoking cessation.  Education was given regarding the cardiovascular effects of how nicotine affects the heart, lungs, kidneys, and peripheral vascular system.  Referred to [facility] for enrollment in smoking cessation program.          Annual physical exam         Loose, teeth    Orders:    Ambulatory Referral to Oral Maxillofacial Surgery; Future    Gingival abscess    Orders:    Ambulatory Referral to Oral Maxillofacial Surgery; Future    Poor dentition    Orders:    Ambulatory Referral to Oral Maxillofacial Surgery; Future    Screening for prostate cancer    Orders:    PSA, Total Screen; Future    Folliculitis  Feels that the folliculitis as much his face and denies that he is not scratching and spreading it.  Will do doxycycline 100 mg twice daily x 10 days  Again patient was encouraged to return back to dermatology to complete phototherapy    Orders:    doxycycline (ADOXA) 100 MG tablet; Take 1 tablet (100 mg total) by mouth 2 (two) times a day for 10 days    Prurigo nodularis  Per patient he stopped going back to dermatology because he felt it was a waste of his time since he will only be about 5 minutes in the tanning bautista and at low doses and one of the processed to be quicker.  He is not sure if it helped or did not help.  Discussed the importance of returning back to dermatology         Generalized maculopapular rash    Orders:    loratadine (CLARITIN) 10 mg tablet; Take 1 tablet (10 mg total) by mouth daily          Immunizations:  - Immunizations due: MenQuadfi (MenACWY)          History of Present Illness   {?Quick Links Encounters * My Last Note * Last Note in Specialty * Snapshot * Since Last Visit * History :54132}  Adult Annual Physical  Review of Systems  {Select to Display PMH (Optional):37193}    Objective {?Quick Links Trend Vitals * Enter New Vitals * Results Review * Timeline (Adult) * Labs * Imaging * Cardiology * Procedures * Lung Cancer Screening * Surgical eConsent :08165}  There were no vitals taken for this visit.    Physical Exam  {Administrative / Billing Section (Optional):66701}

## 2025-03-27 NOTE — ASSESSMENT & PLAN NOTE
A1C: 5.8.  Continue lifestyle modifications  Continue to follow with [facility] dietitian  Continue to monitor A1C

## 2025-03-27 NOTE — ASSESSMENT & PLAN NOTE
RPR remains serofast at 1: 4 Dils.  If he were to have a bump in Dils to 1 and a that was signify a new infection and will require treatment.  Continue to monitor RPR  Encourage patient to use condoms during sexual encounters

## 2025-03-28 ENCOUNTER — DOCUMENTATION (OUTPATIENT)
Dept: SURGERY | Facility: CLINIC | Age: 55
End: 2025-03-28

## 2025-03-28 ENCOUNTER — OFFICE VISIT (OUTPATIENT)
Dept: SURGERY | Facility: CLINIC | Age: 55
End: 2025-03-28
Payer: COMMERCIAL

## 2025-03-28 VITALS
SYSTOLIC BLOOD PRESSURE: 141 MMHG | BODY MASS INDEX: 29.44 KG/M2 | WEIGHT: 187.6 LBS | RESPIRATION RATE: 19 BRPM | TEMPERATURE: 98.8 F | DIASTOLIC BLOOD PRESSURE: 81 MMHG | OXYGEN SATURATION: 100 % | HEIGHT: 67 IN | HEART RATE: 92 BPM

## 2025-03-28 DIAGNOSIS — R73.03 PREDIABETES: ICD-10-CM

## 2025-03-28 DIAGNOSIS — B20 HUMAN IMMUNODEFICIENCY VIRUS (HIV) DISEASE (HCC): Primary | ICD-10-CM

## 2025-03-28 DIAGNOSIS — R21 GENERALIZED MACULOPAPULAR RASH: ICD-10-CM

## 2025-03-28 DIAGNOSIS — R13.19 ESOPHAGEAL DYSPHAGIA: ICD-10-CM

## 2025-03-28 DIAGNOSIS — T65.222D TOXIC EFFECT OF TOBACCO CIGARETTE, INTENTIONAL SELF-HARM, SUBSEQUENT ENCOUNTER: ICD-10-CM

## 2025-03-28 DIAGNOSIS — L28.1 PRURIGO NODULARIS: ICD-10-CM

## 2025-03-28 DIAGNOSIS — K05.219 GINGIVAL ABSCESS: ICD-10-CM

## 2025-03-28 DIAGNOSIS — A53.9 SYPHILIS: ICD-10-CM

## 2025-03-28 DIAGNOSIS — Z00.00 ANNUAL PHYSICAL EXAM: ICD-10-CM

## 2025-03-28 DIAGNOSIS — L73.9 FOLLICULITIS: ICD-10-CM

## 2025-03-28 DIAGNOSIS — Z12.5 SCREENING FOR PROSTATE CANCER: ICD-10-CM

## 2025-03-28 DIAGNOSIS — K08.89 LOOSE, TEETH: ICD-10-CM

## 2025-03-28 DIAGNOSIS — K08.9 POOR DENTITION: ICD-10-CM

## 2025-03-28 DIAGNOSIS — Z23 NEED FOR ZOSTER VACCINATION: ICD-10-CM

## 2025-03-28 PROCEDURE — 90471 IMMUNIZATION ADMIN: CPT

## 2025-03-28 PROCEDURE — 90750 HZV VACC RECOMBINANT IM: CPT

## 2025-03-28 PROCEDURE — 99396 PREV VISIT EST AGE 40-64: CPT | Performed by: NURSE PRACTITIONER

## 2025-03-28 RX ORDER — DOXYCYCLINE 100 MG/1
100 TABLET ORAL 2 TIMES DAILY
Qty: 20 TABLET | Refills: 0 | Status: SHIPPED | OUTPATIENT
Start: 2025-03-28 | End: 2025-04-07

## 2025-03-28 RX ORDER — LORATADINE 10 MG/1
10 TABLET ORAL DAILY
Qty: 90 TABLET | Refills: 2 | Status: SHIPPED | OUTPATIENT
Start: 2025-03-28

## 2025-03-28 NOTE — ASSESSMENT & PLAN NOTE
Continues to smoke 1 to 2 cigarettes/week.  Continue stressed importance of 1 her percent smoking sensation  Nicotine Dependency - Primary    Counseled for greater than 15 minutes on the importance of smoking cessation.  Education was given regarding the cardiovascular effects of how nicotine affects the heart, lungs, kidneys, and peripheral vascular system.  Referred to [facility] for enrollment in smoking cessation program.

## 2025-03-28 NOTE — ASSESSMENT & PLAN NOTE
RPR remains serofast at 1:4 Dils.  This is not considered a new infection.  Continue to monitor RPR

## 2025-03-28 NOTE — ASSESSMENT & PLAN NOTE
New lesions noted on face.  Old scarred lesions noted generalized on body.  Orders:    doxycycline (ADOXA) 100 MG tablet; Take 1 tablet (100 mg total) by mouth 2 (two) times a day for 10 days

## 2025-03-28 NOTE — PROGRESS NOTES
Adult Annual Physical  Name: Portillo Pendleton      : 1970      MRN: [id number]  Encounter Provider: CLAYTON Bañuelos  Encounter Date: 3/28/2025   Encounter department: ASC AT [facility]    Assessment & Plan  Human immunodeficiency virus (HIV) disease (HCC)  Doing well on Biktarvy with an undetectable VL.  Pt reports 100% medication compliance on HAART.  Stressed the importance of 100% medication adherence  Monitor CD4. HIV RNA, CMP, and CBCD to monitor for any developing virologic response, treatment failure, or drug toxicities  Follow up with Dr. Thakur or Dr. Ferrari   Continue Biktarvy  HIV Counseling:    Viral Load: undetectable     CD4 Count: 991      Denies side effects.  Stressed the importance of adherence.  Continue follow up in the ID clinic with Dr. Thakur or Dr. Ferrari.    Reviewed the most recent labs, including the CD4 and viral load.  Discussed the risks and benefits of treatment options, instructions for management, importance of treatment adherence, and reduction of risk factors.  Educated on possible medication side effects.    Counseled on routes of HIV transmission, including the risk of  infection.  Emphasized that viral suppression is the best method to prevent HIV transmission.  At this time the pt denies the need for HIV testing of anyone in their life.    Total encounter time was greater than 35 minutes.  Greater than 20 minutes were spent on counseling and patient education.  Pt voices understanding and agreement with treatment plan.           Orders:    Zoster Vaccine Recombinant IM    Need for zoster vaccination    Orders:    Zoster Vaccine Recombinant IM    Prediabetes  Continue lifestyle modifications  Due for A1C       Syphilis  RPR remains serofast at 1:4 Dils.  This is not considered a new infection.  Continue to monitor RPR       Esophageal dysphagia         Toxic effect of tobacco cigarette, intentional self-harm, subsequent encounter  Continues to  smoke 1 to 2 cigarettes/week.  Continue stressed importance of 1 her percent smoking sensation  Nicotine Dependency - Primary    Counseled for greater than 15 minutes on the importance of smoking cessation.  Education was given regarding the cardiovascular effects of how nicotine affects the heart, lungs, kidneys, and peripheral vascular system.  Referred to [facility] for enrollment in smoking cessation program.          Annual physical exam         Loose, teeth    Orders:    Ambulatory Referral to Oral Maxillofacial Surgery; Future    Gingival abscess    Orders:    Ambulatory Referral to Oral Maxillofacial Surgery; Future    Poor dentition    Orders:    Ambulatory Referral to Oral Maxillofacial Surgery; Future    Screening for prostate cancer    Orders:    PSA, Total Screen; Future    Folliculitis  New lesions noted on face.  Old scarred lesions noted generalized on body.  Orders:    doxycycline (ADOXA) 100 MG tablet; Take 1 tablet (100 mg total) by mouth 2 (two) times a day for 10 days    Prurigo nodularis  Has stopped going to dermatology because he is unhappy with the treatment process.  Discussed the importance of following up with dermatology       Generalized maculopapular rash    Orders:    loratadine (CLARITIN) 10 mg tablet; Take 1 tablet (10 mg total) by mouth daily      Preventive Screenings:  - Diabetes Screening: screening up-to-date  - Cholesterol Screening: screening not indicated and has hyperlipidemia   - Hepatitis C screening: screening up-to-date   - HIV screening: screening not indicated and has history of HIV   - Colon cancer screening: screening up-to-date   - Lung cancer screening: screening not indicated     Immunizations:  - Immunizations due: MenQuadfi (MenACWY)    Counseling/Anticipatory Guidance:  - Alcohol: discussed moderation in alcohol intake and recommendations for healthy alcohol use.   - Drug use: discussed harms of illicit drug use and how it can negatively impact mental/physical  health.   - Tobacco use: discussed harms of tobacco use and management options for quitting.   - Dental health: discussed importance of regular tooth brushing, flossing, and dental visits.   - Sexual health: discussed sexually transmitted diseases, partner selection, use of condoms, avoidance of unintended pregnancy, and contraceptive alternatives.   - Diet: discussed recommendations for a healthy/well-balanced diet.   - Exercise: the importance of regular exercise/physical activity was discussed. Recommend exercise 3-5 times per week for at least 30 minutes.   - Injury prevention: discussed safety/seat belts, safety helmets, smoke detectors, carbon monoxide detectors, and smoking near bedding or upholstery.       Tobacco Cessation Counseling: The patient is sincerely urged to quit consumption of tobacco. He is not ready to quit tobacco. Medication options and side effects of medication not discussed. Patient agreed to medication.         History of Present Illness     Portillo presents for annual physical and for management of HIV.  PMH: HIV, atrial dilation, dysphagia, GERD, poor dentition, gingivitis disease, BPH, bipolar, panic attacks, smoker, OA of spine, jerky body movements, history of syphilis, HSV infection, constipation, history of colon polyps, seasonal allergies, poor dentition, and prurigo nodularis.  Portillo has been following with dermatology regarding ongoing pruritus and generalized body rash.  He was found to have Prurigo nodularis and was treated with phototherapy however he only follows a few treatments and then stopped going.  Portillo had recent follow-up visit with GI for GERD, constipation, and esophageal dysphagia.  Original esophageal dysphagia workup have been benign however GI will repeat EGD to see if there is any other ongoing issues.  He is due for his screening colonoscopy and 7/2025 and prep as history of colon polyps that were removed in the past.  Constipation was thought to be secondary  to dicyclomine and was told to stop.  Portillo reports he is upset with dental and does not ever wish to go back to them due to how she is treated he is talking about [facility], Alfonso mosley, in bed from [facility].  He was requesting referral to OM because he wants the rest of all his teeth removed he cannot live like this anymore.  He is very upset because his doctors in general do not tend to do anything.  He is upset with dermatology stopped going because they would only put him in a tanning bed for 5 minutes at a low dose and he wanted to be in longer to be at the process because you have to go forever and he was not really sure was do anything and he did not like the way he was told that this is the process.    He does plan to follow-up going to neurology at [facility] for his jerky body movements.  He still prefers to previous provider that worked here and how he is upset with her even though she has been gone for 7 years.    He does not endorse any fever, chills, nausea, vomiting, cough, SOB, diarrhea, or night sweats.      Adult Annual Physical:  Patient presents for annual physical.     Diet and Physical Activity:  - Diet/Nutrition: well balanced diet.  - Exercise: no formal exercise.    Depression Screening:  - PHQ-2 Score: 1    General Health:  - Sleep: 4-6 hours of sleep on average and sleeps well.  - Hearing: decreased hearing bilateral ears.  - Vision: most recent eye exam > 1 year ago, wears glasses and previous LASIK surgery.  - Dental: no dental visits for > 1 year. needs teeth removed     Health:  - History of STDs: yes.   - Urinary symptoms: none.     Advanced Care Planning:  - Has an advanced directive?: no    - Has a durable medical POA?: no    - ACP document given to patient?: yes      Review of Systems   Constitutional: Negative.    HENT: Negative.     Respiratory: Negative.     Cardiovascular: Negative.    Gastrointestinal: Negative.    Skin:  Positive for rash.   Neurological:   "Positive for tremors and weakness.   Psychiatric/Behavioral:  Positive for agitation.      Current Outpatient Medications on File Prior to Visit   Medication Sig Dispense Refill    bictegravir-emtricitab-tenofovir alafenamide (Biktarvy) -25 MG tablet TAKE 1 TABLET BY MOUTH DAILY WITH BREAKFAST 90 tablet 1    FLUoxetine (PROzac) 40 MG capsule Take 2 capsules (80 mg total) by mouth daily 60 capsule 2    fluticasone (FLONASE) 50 mcg/act nasal spray USE 1 SPRAY IN EACH NOSTRIL DAILY 16 g 5    hydrOXYzine HCL (ATARAX) 25 mg tablet Take 1-2 tablets (25-50 mg total) by mouth 2 (two) times a day as needed for anxiety or itching (Do not exceed 100mg per day) 120 tablet 2    OLANZapine (ZyPREXA) 10 mg tablet Take 1 tablet (10 mg total) by mouth daily at bedtime 30 tablet 2    omeprazole (PriLOSEC) 40 MG capsule TAKE 1 CAPSULE BY MOUTH DAILY 30 capsule 5    tadalafil (CIALIS) 2.5 MG tablet Take 1 tablet (2.5 mg total) by mouth daily as needed for erectile dysfunction 30 tablet 2    tamsulosin (FLOMAX) 0.4 mg TAKE 1 CAPSULE BY MOUTH DAILY WITH DINNER 90 capsule 1    [DISCONTINUED] loratadine (CLARITIN) 10 mg tablet Take 1 tablet (10 mg total) by mouth daily 30 tablet 2    atorvastatin (LIPITOR) 10 mg tablet TAKE 1 TABLET BY MOUTH DAILY (Patient not taking: Reported on 3/28/2025) 90 tablet 1    polyethylene glycol (GOLYTELY) 4000 mL solution Take 4,000 mL by mouth once for 1 dose 4000 mL 0    [DISCONTINUED] gabapentin (NEURONTIN) 300 mg capsule        No current facility-administered medications on file prior to visit.        Objective   /81 (BP Location: Right arm, Patient Position: Sitting, Cuff Size: Large)   Pulse 92   Temp 98.8 °F (37.1 °C) (Tympanic)   Resp 19   Ht 5' 7\" (1.702 m)   Wt 85.1 kg (187 lb 9.6 oz)   SpO2 100%   BMI 29.38 kg/m²     Physical Exam  Vitals and nursing note reviewed.   Constitutional:       General: He is not in acute distress.     Appearance: Normal appearance. He is not " ill-appearing.   HENT:      Head: Normocephalic.      Right Ear: Tympanic membrane normal.      Left Ear: Tympanic membrane normal.      Nose: Nose normal.      Mouth/Throat:      Mouth: Mucous membranes are moist.      Pharynx: Oropharynx is clear.   Eyes:      Extraocular Movements: Extraocular movements intact.      Pupils: Pupils are equal, round, and reactive to light.   Neck:      Thyroid: No thyroid mass, thyromegaly or thyroid tenderness.      Vascular: No carotid bruit.   Cardiovascular:      Rate and Rhythm: Normal rate and regular rhythm.      Chest Wall: PMI is not displaced.   Pulmonary:      Effort: Pulmonary effort is normal.      Breath sounds: Normal breath sounds.   Abdominal:      General: Bowel sounds are normal.      Palpations: Abdomen is soft.   Lymphadenopathy:      Cervical: No cervical adenopathy.   Skin:     Capillary Refill: Capillary refill takes less than 2 seconds.      Findings: Rash present. Rash is macular, papular and urticarial.      Comments: Generalized scabbing from scratching and picking open wounds.   Neurological:      Mental Status: He is alert and oriented to person, place, and time.   Psychiatric:         Mood and Affect: Affect is blunt and angry.         Speech: Speech is rapid and pressured.         Behavior: Behavior is agitated.         Thought Content: Thought content does not include suicidal ideation. Thought content does not include homicidal plan.           BMI Counseling: Body mass index is 29.38 kg/m². The BMI is above normal. Nutrition recommendations include reducing portion sizes, 3-5 servings of fruits/vegetables daily, reducing fast food intake, consuming healthier snacks, increasing intake of lean protein, reducing intake of saturated fat and trans fat, and reducing intake of cholesterol.

## 2025-03-28 NOTE — PROGRESS NOTES
[facility] Annual Nutrition Assessment    Portillo seen by RD in conjunction with PCP f/u     Portillo is established patient (last annual was 03/26/2024)    PMHx:  GERD, pre-DM, dysphagia, elevated Chol, bipolar 2 with depression       Clinical Data/Client History    CD4 count:  991  Viral load:  <20  ART:   Biktarvy      , Patient Navigator: Michelle OAKES  : N/A    Food assistance: SNAP    Living situation: House or apartment     Psychosocial factors: Depression  bipolar    Mobility:  self ambulates    Physical activity: walks his cat       Oral health concerns:  eats a soft diet missing teeth      Typical food/beverage intake:  Patient reports his diet is the same as last year.   Breakfast cereal (Cocoa Krispies, Apple Jacks, or Life); or leftover home fries with eggs, cheese, onion   Lunch sandwich (tuna salad, egg salad, or lunch meat), with cheesy puffs or Cheez Its  Dinner microwave meal (I.e. breaded chicken with cheese) or Hamburger Cornelia   Snacks Tasty Cakes, ice cream (vanilla or chocolate [facility])   Beverages Pepsi, water, peach or raspberry iced tea     Appetite: Unchanged from normal    OTC vitamin, mineral, herbal supplements: mvi    Oral/enteral nutrition supplements:  N/A    GI problems: denied n/v/d/c    Food allergies/intolerances:  tomato    Weight history:  Wt Readings from Last 3 Encounters:   03/28/25 85.1 kg (187 lb 9.6 oz)   03/19/25 83.7 kg (184 lb 9.6 oz)   02/26/25 82.8 kg (182 lb 9.6 oz)     -180's     Last annual weight 177(3/26/2024)  Current body weight:  187  Height:  67  BMI:  29  IBW:  148  %IBW  120%    Weight change: Gradual weight gain in the last year. Patient reports he is wearing heavy boots.       Nutrition-related labs:    Lab Results   Component Value Date    HGBA1C 5.9 (H) 08/26/2024     Lab Results   Component Value Date    CHOLESTEROL 171 02/26/2025    CHOLESTEROL 169 02/20/2024    CHOLESTEROL 201 (H) 11/12/2022     Lab Results   Component Value  Date    HDL 51 02/26/2025    HDL 59 02/20/2024    HDL 49 11/12/2022     Lab Results   Component Value Date    TRIG 87 02/26/2025    TRIG 65 02/20/2024    TRIG 251 (H) 11/12/2022     Lab Results   Component Value Date    NONHDLC 120 02/26/2025    NONHDLC 152 11/12/2022         Current medications:     Current Outpatient Medications:     atorvastatin (LIPITOR) 10 mg tablet, TAKE 1 TABLET BY MOUTH DAILY (Patient not taking: Reported on 3/28/2025), Disp: 90 tablet, Rfl: 1    bictegravir-emtricitab-tenofovir alafenamide (Biktarvy) -25 MG tablet, TAKE 1 TABLET BY MOUTH DAILY WITH BREAKFAST, Disp: 90 tablet, Rfl: 1    FLUoxetine (PROzac) 40 MG capsule, Take 2 capsules (80 mg total) by mouth daily, Disp: 60 capsule, Rfl: 2    fluticasone (FLONASE) 50 mcg/act nasal spray, USE 1 SPRAY IN EACH NOSTRIL DAILY, Disp: 16 g, Rfl: 5    hydrOXYzine HCL (ATARAX) 25 mg tablet, Take 1-2 tablets (25-50 mg total) by mouth 2 (two) times a day as needed for anxiety or itching (Do not exceed 100mg per day), Disp: 120 tablet, Rfl: 2    loratadine (CLARITIN) 10 mg tablet, Take 1 tablet (10 mg total) by mouth daily, Disp: 30 tablet, Rfl: 2    OLANZapine (ZyPREXA) 10 mg tablet, Take 1 tablet (10 mg total) by mouth daily at bedtime, Disp: 30 tablet, Rfl: 2    omeprazole (PriLOSEC) 40 MG capsule, TAKE 1 CAPSULE BY MOUTH DAILY, Disp: 30 capsule, Rfl: 5    polyethylene glycol (GOLYTELY) 4000 mL solution, Take 4,000 mL by mouth once for 1 dose, Disp: 4000 mL, Rfl: 0    tadalafil (CIALIS) 2.5 MG tablet, Take 1 tablet (2.5 mg total) by mouth daily as needed for erectile dysfunction, Disp: 30 tablet, Rfl: 2    tamsulosin (FLOMAX) 0.4 mg, TAKE 1 CAPSULE BY MOUTH DAILY WITH DINNER, Disp: 90 capsule, Rfl: 1      Physical findings/skin integrity:  Skin intact       Nutrition Diagnosis    Problem: not ready for diet/lifestyle change    Related to: denial of need for change    As Evidenced By: diet recall  patient interview       Estimated Nutritional  Needs    Uziel Raza REE: 1603 kcal     ~5928-4109 kcal (based on: REE x 1.3, -500 kcal; 26 kcal/kg BW, -250 kcal)  ~65 g protein (based on: 0.8 g/kg BW)  ~2418 ml fluid (based on: 30 ml/kg BW)        Current intake estimation: exceeds needs       Nutrition Intervention/Recommendations    Nutrition education intervention: no interest       Goals:  No SMART goals     Monitoring/Evaluation:  Willingness to change, labs, weight trend and need for nutrition education     Visit Summary  RD met with Portillo Pendleton during appt with PCP.       Annual nutrition assessment was completed, per robert requirements.  Portillo is still not willing to make any dietary changes. He is aware of his A1C. RD encouraged patient to reach out when he is ready to make changes.  -Juanita Beebe RDN, LDN

## 2025-03-28 NOTE — ASSESSMENT & PLAN NOTE
Has stopped going to dermatology because he is unhappy with the treatment process.  Discussed the importance of following up with dermatology

## 2025-03-28 NOTE — ASSESSMENT & PLAN NOTE
Per patient he stopped going back to dermatology because he felt it was a waste of his time since he will only be about 5 minutes in the tanning bautista and at low doses and one of the processed to be quicker.  He is not sure if it helped or did not help.  Discussed the importance of returning back to dermatology

## 2025-03-28 NOTE — PATIENT INSTRUCTIONS
"Patient Education     Routine physical for adults   The Basics   Written by the doctors and editors at [facility]   What is a physical? -- A physical is a routine visit, or \"check-up,\" with your doctor. You might also hear it called a \"wellness visit\" or \"preventive visit.\"  During each visit, the doctor will:   Ask about your physical and mental health   Ask about your habits, behaviors, and lifestyle   Do an exam   Give you vaccines if needed   Talk to you about any medicines you take   Give advice about your health   Answer your questions  Getting regular check-ups is an important part of taking care of your health. It can help your doctor find and treat any problems you have. But it's also important for preventing health problems.  A routine physical is different from a \"sick visit.\" A sick visit is when you see a doctor because of a health concern or problem. Since physicals are scheduled ahead of time, you can think about what you want to ask the doctor.  How often should I get a physical? -- It depends on your age and health. In general, for people age 21 years and older:   If you are younger than 50 years, you might be able to get a physical every 3 years.   If you are 50 years or older, your doctor might recommend a physical every year.  If you have an ongoing health condition, like diabetes or high blood pressure, your doctor will probably want to see you more often.  What happens during a physical? -- In general, each visit will include:   Physical exam - The doctor or nurse will check your height, weight, heart rate, and blood pressure. They will also look at your eyes and ears. They will ask about how you are feeling and whether you have any symptoms that bother you.   Medicines - It's a good idea to bring a list of all the medicines you take to each doctor visit. Your doctor will talk to you about your medicines and answer any questions. Tell them if you are having any side effects that bother you. You " "should also tell them if you are having trouble paying for any of your medicines.   Habits and behaviors - This includes:   Your diet   Your exercise habits   Whether you smoke, drink alcohol, or use drugs   Whether you are sexually active   Whether you feel safe at home  Your doctor will talk to you about things you can do to improve your health and lower your risk of health problems. They will also offer help and support. For example, if you want to quit smoking, they can give you advice and might prescribe medicines. If you want to improve your diet or get more physical activity, they can help you with this, too.   Lab tests, if needed - The tests you get will depend on your age and situation. For example, your doctor might want to check your:   Cholesterol   Blood sugar   Iron level   Vaccines - The recommended vaccines will depend on your age, health, and what vaccines you already had. Vaccines are very important because they can prevent certain serious or deadly infections.   Discussion of screening - \"Screening\" means checking for diseases or other health problems before they cause symptoms. Your doctor can recommend screening based on your age, risk, and preferences. This might include tests to check for:   Cancer, such as breast, prostate, cervical, ovarian, colorectal, prostate, lung, or skin cancer   Sexually transmitted infections, such as chlamydia and gonorrhea   Mental health conditions like depression and anxiety  Your doctor will talk to you about the different types of screening tests. They can help you decide which screenings to have. They can also explain what the results might mean.   Answering questions - The physical is a good time to ask the doctor or nurse questions about your health. If needed, they can refer you to other doctors or specialists, too.  Adults older than 65 years often need other care, too. As you get older, your doctor will talk to you about:   How to prevent falling at " home   Hearing or vision tests   Memory testing   How to take your medicines safely   Making sure that you have the help and support you need at home  All topics are updated as new evidence becomes available and our peer review process is complete.  This topic retrieved from [facility] [employer] on: May 02, 2024.  Topic 015710 Version 1.0  Release: 32.4.3 - C32.122  © 2024 UpToDate, Inc. and/or its affiliates. All rights reserved.  Consumer Information Use and Disclaimer   Disclaimer: This generalized information is a limited summary of diagnosis, treatment, and/or medication information. It is not meant to be comprehensive and should be used as a tool to help the user understand and/or assess potential diagnostic and treatment options. It does NOT include all information about conditions, treatments, medications, side effects, or risks that may apply to a specific patient. It is not intended to be medical advice or a substitute for the medical advice, diagnosis, or treatment of a health care provider based on the health care provider's examination and assessment of a patient's specific and unique circumstances. Patients must speak with a health care provider for complete information about their health, medical questions, and treatment options, including any risks or benefits regarding use of medications. This information does not endorse any treatments or medications as safe, effective, or approved for treating a specific patient. UpToDate, Inc. and its affiliates disclaim any warranty or liability relating to this information or the use thereof.The use of this information is governed by the Terms of Use, available at https://www.woltersAdhysteriauwer.com/en/know/clinical-effectiveness-terms. 2024© UpToDate, Inc. and its affiliates and/or licensors. All rights reserved.  Copyright   © 2024 UpToDate, Inc. and/or its affiliates. All rights reserved.    Patient Education     Routine physical for adults   The Basics   Written by the  "doctors and editors at [facility]   What is a physical? -- A physical is a routine visit, or \"check-up,\" with your doctor. You might also hear it called a \"wellness visit\" or \"preventive visit.\"  During each visit, the doctor will:   Ask about your physical and mental health   Ask about your habits, behaviors, and lifestyle   Do an exam   Give you vaccines if needed   Talk to you about any medicines you take   Give advice about your health   Answer your questions  Getting regular check-ups is an important part of taking care of your health. It can help your doctor find and treat any problems you have. But it's also important for preventing health problems.  A routine physical is different from a \"sick visit.\" A sick visit is when you see a doctor because of a health concern or problem. Since physicals are scheduled ahead of time, you can think about what you want to ask the doctor.  How often should I get a physical? -- It depends on your age and health. In general, for people age 21 years and older:   If you are younger than 50 years, you might be able to get a physical every 3 years.   If you are 50 years or older, your doctor might recommend a physical every year.  If you have an ongoing health condition, like diabetes or high blood pressure, your doctor will probably want to see you more often.  What happens during a physical? -- In general, each visit will include:   Physical exam - The doctor or nurse will check your height, weight, heart rate, and blood pressure. They will also look at your eyes and ears. They will ask about how you are feeling and whether you have any symptoms that bother you.   Medicines - It's a good idea to bring a list of all the medicines you take to each doctor visit. Your doctor will talk to you about your medicines and answer any questions. Tell them if you are having any side effects that bother you. You should also tell them if you are having trouble paying for any of your " "medicines.   Habits and behaviors - This includes:   Your diet   Your exercise habits   Whether you smoke, drink alcohol, or use drugs   Whether you are sexually active   Whether you feel safe at home  Your doctor will talk to you about things you can do to improve your health and lower your risk of health problems. They will also offer help and support. For example, if you want to quit smoking, they can give you advice and might prescribe medicines. If you want to improve your diet or get more physical activity, they can help you with this, too.   Lab tests, if needed - The tests you get will depend on your age and situation. For example, your doctor might want to check your:   Cholesterol   Blood sugar   Iron level   Vaccines - The recommended vaccines will depend on your age, health, and what vaccines you already had. Vaccines are very important because they can prevent certain serious or deadly infections.   Discussion of screening - \"Screening\" means checking for diseases or other health problems before they cause symptoms. Your doctor can recommend screening based on your age, risk, and preferences. This might include tests to check for:   Cancer, such as breast, prostate, cervical, ovarian, colorectal, prostate, lung, or skin cancer   Sexually transmitted infections, such as chlamydia and gonorrhea   Mental health conditions like depression and anxiety  Your doctor will talk to you about the different types of screening tests. They can help you decide which screenings to have. They can also explain what the results might mean.   Answering questions - The physical is a good time to ask the doctor or nurse questions about your health. If needed, they can refer you to other doctors or specialists, too.  Adults older than 65 years often need other care, too. As you get older, your doctor will talk to you about:   How to prevent falling at home   Hearing or vision tests   Memory testing   How to take your " medicines safely   Making sure that you have the help and support you need at home  All topics are updated as new evidence becomes available and our peer review process is complete.  This topic retrieved from [employer] on: May 02, 2024.  Topic 352077 Version 1.0  Release: 32.4.3 - C32.122  © 2024 UpToDate, Inc. and/or its affiliates. All rights reserved.  Consumer Information Use and Disclaimer   Disclaimer: This generalized information is a limited summary of diagnosis, treatment, and/or medication information. It is not meant to be comprehensive and should be used as a tool to help the user understand and/or assess potential diagnostic and treatment options. It does NOT include all information about conditions, treatments, medications, side effects, or risks that may apply to a specific patient. It is not intended to be medical advice or a substitute for the medical advice, diagnosis, or treatment of a health care provider based on the health care provider's examination and assessment of a patient's specific and unique circumstances. Patients must speak with a health care provider for complete information about their health, medical questions, and treatment options, including any risks or benefits regarding use of medications. This information does not endorse any treatments or medications as safe, effective, or approved for treating a specific patient. UpToDate, Inc. and its affiliates disclaim any warranty or liability relating to this information or the use thereof.The use of this information is governed by the Terms of Use, available at https://www.woltersMeetricsuwer.com/en/know/clinical-effectiveness-terms. 2024© UpToDate, Inc. and its affiliates and/or licensors. All rights reserved.  Copyright   © 2024 UpToDate, Inc. and/or its affiliates. All rights reserved.

## 2025-03-28 NOTE — ASSESSMENT & PLAN NOTE
Feels that the folliculitis as much his face and denies that he is not scratching and spreading it.  Will do doxycycline 100 mg twice daily x 10 days  Again patient was encouraged to return back to dermatology to complete phototherapy    Orders:    doxycycline (ADOXA) 100 MG tablet; Take 1 tablet (100 mg total) by mouth 2 (two) times a day for 10 days

## 2025-03-28 NOTE — ASSESSMENT & PLAN NOTE
Doing well on Biktarvy with an undetectable VL.  Pt reports 100% medication compliance on HAART.  Stressed the importance of 100% medication adherence  Monitor CD4. HIV RNA, CMP, and CBCD to monitor for any developing virologic response, treatment failure, or drug toxicities  Follow up with Dr. Thakur or Dr. Ferrari   Continue Biktarvy  HIV Counseling:    Viral Load: undetectable     CD4 Count: 991      Denies side effects.  Stressed the importance of adherence.  Continue follow up in the ID clinic with Dr. Thakur or Dr. Ferrari.    Reviewed the most recent labs, including the CD4 and viral load.  Discussed the risks and benefits of treatment options, instructions for management, importance of treatment adherence, and reduction of risk factors.  Educated on possible medication side effects.    Counseled on routes of HIV transmission, including the risk of  infection.  Emphasized that viral suppression is the best method to prevent HIV transmission.  At this time the pt denies the need for HIV testing of anyone in their life.    Total encounter time was greater than 35 minutes.  Greater than 20 minutes were spent on counseling and patient education.  Pt voices understanding and agreement with treatment plan.           Orders:    Zoster Vaccine Recombinant IM

## 2025-04-23 ENCOUNTER — OFFICE VISIT (OUTPATIENT)
Dept: SURGERY | Facility: CLINIC | Age: 55
End: 2025-04-23
Payer: COMMERCIAL

## 2025-04-23 VITALS
TEMPERATURE: 98.3 F | DIASTOLIC BLOOD PRESSURE: 74 MMHG | OXYGEN SATURATION: 98 % | HEART RATE: 92 BPM | WEIGHT: 201.8 LBS | SYSTOLIC BLOOD PRESSURE: 133 MMHG | RESPIRATION RATE: 19 BRPM | BODY MASS INDEX: 31.67 KG/M2 | HEIGHT: 67 IN

## 2025-04-23 DIAGNOSIS — L73.9 FOLLICULITIS: ICD-10-CM

## 2025-04-23 DIAGNOSIS — K08.9 POOR DENTITION: ICD-10-CM

## 2025-04-23 DIAGNOSIS — Z23 NEED FOR MENACTRA VACCINATION: ICD-10-CM

## 2025-04-23 DIAGNOSIS — T65.222D TOXIC EFFECT OF TOBACCO CIGARETTE, INTENTIONAL SELF-HARM, SUBSEQUENT ENCOUNTER: ICD-10-CM

## 2025-04-23 DIAGNOSIS — K42.9 UMBILICAL HERNIA WITHOUT OBSTRUCTION AND WITHOUT GANGRENE: ICD-10-CM

## 2025-04-23 DIAGNOSIS — B20 HUMAN IMMUNODEFICIENCY VIRUS (HIV) DISEASE (HCC): Primary | ICD-10-CM

## 2025-04-23 DIAGNOSIS — L28.1 PRURIGO NODULARIS: ICD-10-CM

## 2025-04-23 PROCEDURE — 90471 IMMUNIZATION ADMIN: CPT

## 2025-04-23 PROCEDURE — 99214 OFFICE O/P EST MOD 30 MIN: CPT | Performed by: NURSE PRACTITIONER

## 2025-04-23 PROCEDURE — 90619 MENACWY-TT VACCINE IM: CPT

## 2025-04-23 NOTE — ASSESSMENT & PLAN NOTE
Doing well on Biktarvy with an undetectable VL.  Pt reports 100% medication compliance on HAART.  Stressed the importance of 100% medication adherence  Monitor CD4. HIV RNA, CMP, and CBCD to monitor for any developing virologic response, treatment failure, or drug toxicities  Follow up with Dr. Thakur or Dr. Ferrari   Continue Biktarvy  HIV Counseling:     Viral Load: undetectable      CD4 Count: 991        Denies side effects.  Stressed the importance of adherence.  Continue follow up in the ID clinic with Dr. Thakur or Dr. Ferrari.     Reviewed the most recent labs, including the CD4 and viral load.  Discussed the risks and benefits of treatment options, instructions for management, importance of treatment adherence, and reduction of risk factors.  Educated on possible medication side effects.     Counseled on routes of HIV transmission, including the risk of  infection.  Emphasized that viral suppression is the best method to prevent HIV transmission.  At this time the pt denies the need for HIV testing of anyone in their life.     Total encounter time was greater than 35 minutes.  Greater than 20 minutes were spent on counseling and patient education.  Pt voices understanding and agreement with treatment plan.      Orders:    MENINGOCOCCAL ACYW-135 TT CONJUGATE

## 2025-04-23 NOTE — PROGRESS NOTES
Name: Portillo Pendleton      : 1970      MRN: [id number]  Encounter Provider: CLAYTON Bañuelos  Encounter Date: 2025   Encounter department: [facility]  :  Assessment & Plan  Human immunodeficiency virus (HIV) disease (HCC)  Doing well on Biktarvy with an undetectable VL.  Pt reports 100% medication compliance on HAART.  Stressed the importance of 100% medication adherence  Monitor CD4. HIV RNA, CMP, and CBCD to monitor for any developing virologic response, treatment failure, or drug toxicities  Follow up with Dr. Thakur or Dr. Ferrari   Continue Biktarvy  HIV Counseling:     Viral Load: undetectable      CD4 Count: 991        Denies side effects.  Stressed the importance of adherence.  Continue follow up in the ID clinic with Dr. Thakur or Dr. Ferrari.     Reviewed the most recent labs, including the CD4 and viral load.  Discussed the risks and benefits of treatment options, instructions for management, importance of treatment adherence, and reduction of risk factors.  Educated on possible medication side effects.     Counseled on routes of HIV transmission, including the risk of  infection.  Emphasized that viral suppression is the best method to prevent HIV transmission.  At this time the pt denies the need for HIV testing of anyone in their life.     Total encounter time was greater than 35 minutes.  Greater than 20 minutes were spent on counseling and patient education.  Pt voices understanding and agreement with treatment plan.      Orders:    MENINGOCOCCAL ACYW-135 TT CONJUGATE    Need for Menactra vaccination    Orders:    MENINGOCOCCAL ACYW-135 TT CONJUGATE    Prurigo nodularis  Improvement of maculopapular follicular lesions.  Discussed the importance of following through with treatment plan through dermatology even though it is a long slow process  Continue to use diluted 50% bleach bath as needed         Umbilical hernia without obstruction and without  gangrene  Incidental finding on exam.  Does not cause patient any pain or discomfort.  Orders:    Ambulatory Referral to General Surgery; Future  Discussed the importance of avoiding heavy lifting or straining  Folliculitis  Happy with improvement of lesions dissipating and pruritus resolving.  Discussed the importance of calling for new outbreaks  Discussed the importance of avoiding scratching, opening or irritating existing maculopapular lesions  Continue to monitor         Poor dentition  Follow-up with patient he plans to reach out to a mistake today to schedule visit and discuss dental options.  Continue to monitor         Toxic effect of tobacco cigarette, intentional self-harm, subsequent encounter  Continues to smoke 1 to 2 cigarettes every few days a pack, lasting 5 days or longer.  He does also report that he can go days without smoking.  Patient also reports he has never been a heavy smoker again pack a last 5 to 6 days.    Discussed the importance of complete smoking cessation as he has done this before  Does not qualify for LDCT  Nicotine Dependency - Primary    Counseled for greater than 15 minutes on the importance of smoking cessation.  Education was given regarding the cardiovascular effects of how nicotine affects the heart, lungs, kidneys, and peripheral vascular system.  Referred to [facility] for enrollment in smoking cessation program.                History of Present Illness   HPI  Portillo Pendleton is a 54 y.o. male who presents for 4 week follow up skin issues and for management of HIV.  Portillo was given doxycyline for recurrent folliculitis and also told to use chlorhexidine was BID weekly.  Discussed the importance of him following up with Dermatology for completion of treatment for Prurigo nodularis.  He was unhappy with the ongoing treatment plan and the slow progress of treating his skin disorder.      Portillo reports he is better and has been on a week.  The itching is less and maculopapul  nodules are less.      He does not endorse any fever, chills, nausea, vomiting, cough, SOB, diarrhea, or night sweats.    History obtained from: patient    Review of Systems   Constitutional: Negative.    HENT: Negative.     Respiratory: Negative.     Cardiovascular: Negative.    Gastrointestinal: Negative.    Skin:  Positive for rash.   Neurological: Negative.    Psychiatric/Behavioral: Negative.       Current Outpatient Medications on File Prior to Visit   Medication Sig Dispense Refill    atorvastatin (LIPITOR) 10 mg tablet TAKE 1 TABLET BY MOUTH DAILY 90 tablet 1    bictegravir-emtricitab-tenofovir alafenamide (Biktarvy) -25 MG tablet TAKE 1 TABLET BY MOUTH DAILY WITH BREAKFAST 90 tablet 1    FLUoxetine (PROzac) 40 MG capsule Take 2 capsules (80 mg total) by mouth daily 60 capsule 2    fluticasone (FLONASE) 50 mcg/act nasal spray USE 1 SPRAY IN EACH NOSTRIL DAILY 16 g 5    hydrOXYzine HCL (ATARAX) 25 mg tablet Take 1-2 tablets (25-50 mg total) by mouth 2 (two) times a day as needed for anxiety or itching (Do not exceed 100mg per day) 120 tablet 2    loratadine (CLARITIN) 10 mg tablet Take 1 tablet (10 mg total) by mouth daily 90 tablet 2    OLANZapine (ZyPREXA) 10 mg tablet Take 1 tablet (10 mg total) by mouth daily at bedtime 30 tablet 2    omeprazole (PriLOSEC) 40 MG capsule TAKE 1 CAPSULE BY MOUTH DAILY 30 capsule 5    tadalafil (CIALIS) 2.5 MG tablet Take 1 tablet (2.5 mg total) by mouth daily as needed for erectile dysfunction 30 tablet 2    tamsulosin (FLOMAX) 0.4 mg TAKE 1 CAPSULE BY MOUTH DAILY WITH DINNER 90 capsule 1    polyethylene glycol (GOLYTELY) 4000 mL solution Take 4,000 mL by mouth once for 1 dose 4000 mL 0    [DISCONTINUED] gabapentin (NEURONTIN) 300 mg capsule        No current facility-administered medications on file prior to visit.         Objective   /74 (BP Location: Right arm, Patient Position: Sitting, Cuff Size: Large)   Pulse 92   Temp 98.3 °F (36.8 °C) (Tympanic)    "Resp 19   Ht 5' 7\" (1.702 m)   Wt 91.5 kg (201 lb 12.8 oz)   SpO2 98%   BMI 31.61 kg/m²      Physical Exam  Vitals and nursing note reviewed.   Constitutional:       General: He is not in acute distress.     Appearance: Normal appearance. He is not ill-appearing.   Cardiovascular:      Rate and Rhythm: Normal rate and regular rhythm.      Chest Wall: PMI is not displaced.      Pulses: Normal pulses.      Heart sounds: Normal heart sounds.   Pulmonary:      Effort: Pulmonary effort is normal.      Breath sounds: Normal breath sounds.   Abdominal:      General: Bowel sounds are normal.      Palpations: Abdomen is soft.      Hernia: A hernia is present. Hernia is present in the umbilical area.   Musculoskeletal:         General: Normal range of motion.   Skin:     General: Skin is warm and dry.      Capillary Refill: Capillary refill takes less than 2 seconds.      Findings: Rash present. Rash is macular and papular. Rash is not urticarial.   Neurological:      Mental Status: He is alert and oriented to person, place, and time.   Psychiatric:         Mood and Affect: Mood normal.         Behavior: Behavior normal.         Thought Content: Thought content normal.         Judgment: Judgment normal.           "

## 2025-04-23 NOTE — ASSESSMENT & PLAN NOTE
Improvement of maculopapular follicular lesions.  Discussed the importance of following through with treatment plan through dermatology even though it is a long slow process  Continue to use diluted 50% bleach bath as needed

## 2025-04-23 NOTE — ASSESSMENT & PLAN NOTE
Follow-up with patient he plans to reach out to a mistake today to schedule visit and discuss dental options.  Continue to monitor

## 2025-04-23 NOTE — ASSESSMENT & PLAN NOTE
Continues to smoke 1 to 2 cigarettes every few days a pack, lasting 5 days or longer.  He does also report that he can go days without smoking.  Patient also reports he has never been a heavy smoker again pack a last 5 to 6 days.    Discussed the importance of complete smoking cessation as he has done this before  Does not qualify for LDCT  Nicotine Dependency - Primary    Counseled for greater than 15 minutes on the importance of smoking cessation.  Education was given regarding the cardiovascular effects of how nicotine affects the heart, lungs, kidneys, and peripheral vascular system.  Referred to [facility] for enrollment in smoking cessation program.

## 2025-04-23 NOTE — ASSESSMENT & PLAN NOTE
Happy with improvement of lesions dissipating and pruritus resolving.  Discussed the importance of calling for new outbreaks  Discussed the importance of avoiding scratching, opening or irritating existing maculopapular lesions  Continue to monitor

## 2025-04-24 DIAGNOSIS — R21 RASH: ICD-10-CM

## 2025-04-24 RX ORDER — CLINDAMYCIN PHOSPHATE 11.9 MG/ML
SOLUTION TOPICAL
Qty: 60 ML | Refills: 1 | OUTPATIENT
Start: 2025-04-24

## 2025-05-09 ENCOUNTER — ANESTHESIA (OUTPATIENT)
Dept: ANESTHESIOLOGY | Facility: HOSPITAL | Age: 55
End: 2025-05-09

## 2025-05-09 ENCOUNTER — ANESTHESIA EVENT (OUTPATIENT)
Dept: ANESTHESIOLOGY | Facility: HOSPITAL | Age: 55
End: 2025-05-09

## 2025-05-21 DIAGNOSIS — F41.1 GAD (GENERALIZED ANXIETY DISORDER): ICD-10-CM

## 2025-05-21 DIAGNOSIS — F31.81 BIPOLAR 2 DISORDER, MAJOR DEPRESSIVE EPISODE (HCC): ICD-10-CM

## 2025-05-22 RX ORDER — FLUOXETINE HYDROCHLORIDE 40 MG/1
80 CAPSULE ORAL DAILY
Qty: 60 CAPSULE | Refills: 2 | Status: SHIPPED | OUTPATIENT
Start: 2025-05-22

## 2025-05-22 RX ORDER — HYDROXYZINE HYDROCHLORIDE 25 MG/1
TABLET, FILM COATED ORAL
Qty: 120 TABLET | Refills: 0 | Status: SHIPPED | OUTPATIENT
Start: 2025-05-22

## 2025-05-23 ENCOUNTER — HOSPITAL ENCOUNTER (OUTPATIENT)
Dept: GASTROENTEROLOGY | Facility: AMBULARY SURGERY CENTER | Age: 55
Setting detail: OUTPATIENT SURGERY
End: 2025-05-23
Attending: PHYSICIAN ASSISTANT
Payer: COMMERCIAL

## 2025-05-23 ENCOUNTER — ANESTHESIA (OUTPATIENT)
Dept: GASTROENTEROLOGY | Facility: AMBULARY SURGERY CENTER | Age: 55
End: 2025-05-23
Payer: COMMERCIAL

## 2025-05-23 VITALS
BODY MASS INDEX: 30.29 KG/M2 | TEMPERATURE: 97.7 F | SYSTOLIC BLOOD PRESSURE: 121 MMHG | DIASTOLIC BLOOD PRESSURE: 76 MMHG | HEART RATE: 87 BPM | OXYGEN SATURATION: 94 % | WEIGHT: 193 LBS | HEIGHT: 67 IN | RESPIRATION RATE: 18 BRPM

## 2025-05-23 DIAGNOSIS — K21.9 GASTROESOPHAGEAL REFLUX DISEASE WITHOUT ESOPHAGITIS: ICD-10-CM

## 2025-05-23 DIAGNOSIS — Z86.0100 HISTORY OF COLON POLYPS: ICD-10-CM

## 2025-05-23 LAB — GLUCOSE SERPL-MCNC: 89 MG/DL (ref 65–140)

## 2025-05-23 PROCEDURE — 82948 REAGENT STRIP/BLOOD GLUCOSE: CPT

## 2025-05-23 PROCEDURE — 45380 COLONOSCOPY AND BIOPSY: CPT | Performed by: INTERNAL MEDICINE

## 2025-05-23 PROCEDURE — 43239 EGD BIOPSY SINGLE/MULTIPLE: CPT | Performed by: INTERNAL MEDICINE

## 2025-05-23 PROCEDURE — 88305 TISSUE EXAM BY PATHOLOGIST: CPT | Performed by: PATHOLOGY

## 2025-05-23 RX ORDER — FENTANYL CITRATE 50 UG/ML
INJECTION, SOLUTION INTRAMUSCULAR; INTRAVENOUS AS NEEDED
Status: DISCONTINUED | OUTPATIENT
Start: 2025-05-23 | End: 2025-05-23

## 2025-05-23 RX ORDER — LIDOCAINE HYDROCHLORIDE 20 MG/ML
INJECTION, SOLUTION EPIDURAL; INFILTRATION; INTRACAUDAL; PERINEURAL AS NEEDED
Status: DISCONTINUED | OUTPATIENT
Start: 2025-05-23 | End: 2025-05-23

## 2025-05-23 RX ORDER — ALBUTEROL SULFATE 90 UG/1
2 INHALANT RESPIRATORY (INHALATION) EVERY 6 HOURS PRN
COMMUNITY

## 2025-05-23 RX ORDER — OLANZAPINE 10 MG/1
10 TABLET, FILM COATED ORAL
Qty: 30 TABLET | Refills: 2 | Status: SHIPPED | OUTPATIENT
Start: 2025-05-23

## 2025-05-23 RX ORDER — PROPOFOL 10 MG/ML
INJECTION, EMULSION INTRAVENOUS AS NEEDED
Status: DISCONTINUED | OUTPATIENT
Start: 2025-05-23 | End: 2025-05-23

## 2025-05-23 RX ORDER — SODIUM CHLORIDE, SODIUM LACTATE, POTASSIUM CHLORIDE, CALCIUM CHLORIDE 600; 310; 30; 20 MG/100ML; MG/100ML; MG/100ML; MG/100ML
INJECTION, SOLUTION INTRAVENOUS CONTINUOUS PRN
Status: DISCONTINUED | OUTPATIENT
Start: 2025-05-23 | End: 2025-05-23

## 2025-05-23 RX ADMIN — FENTANYL CITRATE 50 MCG: 50 INJECTION INTRAMUSCULAR; INTRAVENOUS at 13:48

## 2025-05-23 RX ADMIN — PROPOFOL 100 MG: 10 INJECTION, EMULSION INTRAVENOUS at 13:50

## 2025-05-23 RX ADMIN — SODIUM CHLORIDE, SODIUM LACTATE, POTASSIUM CHLORIDE, AND CALCIUM CHLORIDE: .6; .31; .03; .02 INJECTION, SOLUTION INTRAVENOUS at 13:39

## 2025-05-23 RX ADMIN — FENTANYL CITRATE 50 MCG: 50 INJECTION INTRAMUSCULAR; INTRAVENOUS at 13:50

## 2025-05-23 RX ADMIN — PROPOFOL 50 MG: 10 INJECTION, EMULSION INTRAVENOUS at 14:00

## 2025-05-23 RX ADMIN — PROPOFOL 100 MG: 10 INJECTION, EMULSION INTRAVENOUS at 13:52

## 2025-05-23 RX ADMIN — PROPOFOL 100 MG: 10 INJECTION, EMULSION INTRAVENOUS at 13:53

## 2025-05-23 RX ADMIN — PROPOFOL 150 MCG/KG/MIN: 10 INJECTION, EMULSION INTRAVENOUS at 14:02

## 2025-05-23 RX ADMIN — PROPOFOL 100 MG: 10 INJECTION, EMULSION INTRAVENOUS at 13:48

## 2025-05-23 RX ADMIN — LIDOCAINE HYDROCHLORIDE 5 ML: 20 INJECTION, SOLUTION EPIDURAL; INFILTRATION; INTRACAUDAL at 13:48

## 2025-05-23 NOTE — ANESTHESIA POSTPROCEDURE EVALUATION
Post-Op Assessment Note    CV Status:  Stable  Pain Score: 0    Pain management: adequate       Mental Status:  Awake and alert   Hydration Status:  Stable   PONV Controlled:  None   Airway Patency:  Patent and adequate     Post Op Vitals Reviewed: Yes    No anethesia notable event occurred.    Staff: CRNA, Anesthesiologist           Last Filed PACU Vitals:  Vitals Value Taken Time   Temp     Pulse 91    /65    Resp 16    SpO2 99

## 2025-05-23 NOTE — ANESTHESIA PREPROCEDURE EVALUATION
Procedure:  COLONOSCOPY  EGD    Relevant Problems   CARDIO   (+) Elevated cholesterol with elevated triglycerides      GI/HEPATIC   (+) Dysphagia   (+) GERD (gastroesophageal reflux disease)      /RENAL   (+) Benign prostatic hyperplasia with incomplete bladder emptying      NEURO/PSYCH   (+) Chronic pain syndrome   (+) SOPHIA (generalized anxiety disorder)   (+) Panic attacks      PULMONARY  Smoker, no marijuana use   (+) Moderate persistent asthma without complication      Behavioral Health   (+) Bipolar 2 disorder, major depressive episode (HCC)      Other   (+) Human immunodeficiency virus (HIV) disease (HCC)   (+) Jerky body movements   (+) Prediabetes   (+) Seasonal allergies    BMI 30    Physical Exam    Airway     Mallampati score: III  TM Distance: >3 FB  Neck ROM: limited extension  Mouth opening: >= 4 cm      Cardiovascular      Dental   Comment: Few lower teeth, denies loose     Pulmonary      Neurological      Other Findings       Lab Results   Component Value Date    WBC 5.65 02/26/2025    WBC 5.5 02/26/2025    HGB 13.1 02/26/2025    HGB 12.3 (L) 02/26/2025     02/26/2025     02/26/2025     Lab Results   Component Value Date    SODIUM 135 02/26/2025    K 4.6 02/26/2025    BUN 18 02/26/2025    CREATININE 0.84 02/26/2025    EGFR 97 02/26/2025    GLUCOSE 124 12/30/2015     Lab Results   Component Value Date    HGBA1C 5.9 (H) 08/26/2024         Anesthesia Plan  ASA Score- 3     Anesthesia Type- IV sedation with anesthesia with ASA Monitors.         Additional Monitors:     Airway Plan:            Plan Factors-Exercise tolerance (METS): >4 METS.    Chart reviewed.   Existing labs reviewed. Patient summary reviewed.    Patient is a current smoker.              Induction- intravenous.    Postoperative Plan-         Informed Consent- Anesthetic plan and risks discussed with patient.  I personally reviewed this patient with the CRNA. Discussed and agreed on the Anesthesia Plan with the  CRNA..      NPO Status:  Vitals Value Taken Time   Date of last liquid 05/23/25 05/23/25 12:07   Time of last liquid 0900 05/23/25 12:07   Date of last solid 05/21/25 05/23/25 12:07   Time of last solid 1800 05/23/25 12:07

## 2025-05-23 NOTE — H&P
"History and Physical -  [facility]  Portillo Pendleton 54 y.o. male MRN: [id number]    HPI: Portillo Pendleton is a 54 y.o. year old male who presents for evaluation of GERD and history of colon polyps.      Review of Systems    Historical Information   Past Medical History[1]  Past Surgical History[2]  Social History   Social History     Substance and Sexual Activity   Alcohol Use Not Currently    Alcohol/week: 1.0 standard drink of alcohol    Types: 1 Cans of beer per week    Comment: socially     Social History     Substance and Sexual Activity   Drug Use No     Tobacco Use History[3]  Family History[4]    Meds/Allergies     Not in a hospital admission.    Allergies[5]    Objective     /66   Pulse 91   Temp 98.1 °F (36.7 °C) (Temporal)   Resp 18   Ht 5' 7\" (1.702 m)   Wt 87.5 kg (193 lb)   SpO2 98%   BMI 30.23 kg/m²       PHYSICAL EXAM    Gen: NAD  CV: RRR  CHEST: Clear  ABD: soft, NT/ND  EXT: no edema  Neuro: AAO      ASSESSMENT/PLAN:  This is a 54 y.o. year old male here for evaluation of GERD and history of colon polyps.    PLAN:   Procedure: EGD and colonoscopy.           [1]   Past Medical History:  Diagnosis Date    Anxiety     Last Assessed: 7/18/2016     Dysuria     Last Assessed: 9/14/2015    Fecal urgency     Pt has had fecal incontinence in past, has weakened sphincter. would benefit from fiber, Needs f/u flex sig will refer for scheduling -        GERD (gastroesophageal reflux disease)     Hemorrhage of anus and rectum     Last Assessed: 3/5/2014     Herpes zoster     Last Assessed: 9/26/2016    History of chickenpox     History of pneumonia     HIV (human immunodeficiency virus infection) (HCC)     IBS (irritable bowel syndrome)     Proctitis, chlamydial     Last Assessed: 9/29/2014     Recurrent major depressive episodes, moderate ([facility])     Last Assessed: 9/15/2017     Wears glasses     reading   [2]   Past Surgical History:  Procedure Laterality Date    CIRCUMCISION      " COLONOSCOPY      CYSTOSCOPY  2014    LASIK      OTHER SURGICAL HISTORY      embolisation 2018    MN ESOPHAGOGASTRODUODENOSCOPY TRANSORAL DIAGNOSTIC N/A 03/09/2017    Procedure: ESOPHAGOGASTRODUODENOSCOPY (EGD);  Surgeon: Ella Moreno MD;  Location: [facility];  Service: Gastroenterology    VARICOCELE EXCISION      Spermatic cord Excision - Last Assessed: 3/17/2016     WISDOM TOOTH EXTRACTION     [3]   Social History  Tobacco Use   Smoking Status Every Day    Current packs/day: 0.50    Average packs/day: 0.5 packs/day for 25.0 years (12.5 ttl pk-yrs)    Types: Cigarettes   Smokeless Tobacco Former   Tobacco Comments    3 daily   [4]   Family History  Problem Relation Name Age of Onset    Diabetes type II Mother      Heart attack Father      No Known Problems Brother      No Known Problems Brother      Skin cancer Paternal Grandmother      Substance Abuse Cousin     [5]   Allergies  Allergen Reactions    Tomato - Food Allergy Vomiting

## 2025-05-28 ENCOUNTER — PREP FOR PROCEDURE (OUTPATIENT)
Dept: SURGERY | Facility: CLINIC | Age: 55
End: 2025-05-28

## 2025-05-28 ENCOUNTER — CONSULT (OUTPATIENT)
Dept: SURGERY | Facility: CLINIC | Age: 55
End: 2025-05-28
Payer: COMMERCIAL

## 2025-05-28 VITALS
WEIGHT: 190 LBS | HEIGHT: 67 IN | HEART RATE: 85 BPM | DIASTOLIC BLOOD PRESSURE: 86 MMHG | BODY MASS INDEX: 29.82 KG/M2 | SYSTOLIC BLOOD PRESSURE: 134 MMHG | OXYGEN SATURATION: 98 %

## 2025-05-28 DIAGNOSIS — K42.9 UMBILICAL HERNIA WITHOUT OBSTRUCTION AND WITHOUT GANGRENE: Primary | ICD-10-CM

## 2025-05-28 DIAGNOSIS — K42.9 UMBILICAL HERNIA: Primary | ICD-10-CM

## 2025-05-28 PROCEDURE — 99204 OFFICE O/P NEW MOD 45 MIN: CPT | Performed by: SURGERY

## 2025-05-28 NOTE — PROGRESS NOTES
"Name: Portillo Pendleton      : 1970      MRN: [id number]  Encounter Provider: Ryley Ambrose DO  Encounter Date: 2025   Encounter department: [facility]  :  Assessment & Plan  Umbilical hernia without obstruction and without gangrene  Discussed risks and benefits of an umbilical hernia repair including risk of wound infection, recurrence, or pain issues and he agrees to proceed    Will need CBC, BMP, EKG preoperatively.           History of Present Illness   HPI  Portillo Pendleton is a 54 y.o. male who presents for umbilical hernia consult.  States he has had a bulge and pain with pressure at his umbilicus for 9 months.  Limits his activities.  Does feel some pressure of his mid abdomen when bending over.  Denies any abdominal surgery in the past.  His EPIC chart was reviewed regarding his HIV diagnosis.  He is compliant with his Biktarvy and following with infectious disease  History obtained from: patient    Review of Systems   Gastrointestinal:  Positive for abdominal pain.   All other systems reviewed and are negative.    Medical History Reviewed by provider this encounter:     .     Objective   /86   Pulse 85   Ht 5' 7.25\" (1.708 m)   Wt 86.2 kg (190 lb)   SpO2 98%   BMI 29.54 kg/m²      Physical Exam  Constitutional:       General: He is not in acute distress.  HENT:      Head: Atraumatic.      Mouth/Throat:      Mouth: Mucous membranes are moist.     Eyes:      Extraocular Movements: Extraocular movements intact.       Cardiovascular:      Rate and Rhythm: Normal rate.   Pulmonary:      Effort: Pulmonary effort is normal.   Abdominal:      Palpations: Abdomen is soft.      Hernia: A hernia (Soft partially reducible umbilical hernia) is present.     Musculoskeletal:      Cervical back: Normal range of motion.     Skin:     General: Skin is warm and dry.     Neurological:      Mental Status: He is alert.     Extremities: No edema      "

## 2025-05-28 NOTE — H&P (VIEW-ONLY)
"Name: Portillo Pendleton      : 1970      MRN: [id number]  Encounter Provider: Ryley Ambrose DO  Encounter Date: 2025   Encounter department: [facility]  :  Assessment & Plan  Umbilical hernia without obstruction and without gangrene  Discussed risks and benefits of an umbilical hernia repair including risk of wound infection, recurrence, or pain issues and he agrees to proceed    Will need CBC, BMP, EKG preoperatively.           History of Present Illness   HPI  Portillo Pendleton is a 54 y.o. male who presents for umbilical hernia consult.  States he has had a bulge and pain with pressure at his umbilicus for 9 months.  Limits his activities.  Does feel some pressure of his mid abdomen when bending over.  Denies any abdominal surgery in the past.  His EPIC chart was reviewed regarding his HIV diagnosis.  He is compliant with his Biktarvy and following with infectious disease  History obtained from: patient    Review of Systems   Gastrointestinal:  Positive for abdominal pain.   All other systems reviewed and are negative.    Medical History Reviewed by provider this encounter:     .     Objective   /86   Pulse 85   Ht 5' 7.25\" (1.708 m)   Wt 86.2 kg (190 lb)   SpO2 98%   BMI 29.54 kg/m²      Physical Exam  Constitutional:       General: He is not in acute distress.  HENT:      Head: Atraumatic.      Mouth/Throat:      Mouth: Mucous membranes are moist.     Eyes:      Extraocular Movements: Extraocular movements intact.       Cardiovascular:      Rate and Rhythm: Normal rate.   Pulmonary:      Effort: Pulmonary effort is normal.   Abdominal:      Palpations: Abdomen is soft.      Hernia: A hernia (Soft partially reducible umbilical hernia) is present.     Musculoskeletal:      Cervical back: Normal range of motion.     Skin:     General: Skin is warm and dry.     Neurological:      Mental Status: He is alert.     Extremities: No edema      "

## 2025-05-28 NOTE — ASSESSMENT & PLAN NOTE
Discussed risks and benefits of an umbilical hernia repair including risk of wound infection, recurrence, or pain issues and he agrees to proceed    Will need CBC, BMP, EKG preoperatively.

## 2025-05-29 PROCEDURE — 88305 TISSUE EXAM BY PATHOLOGIST: CPT | Performed by: PATHOLOGY

## 2025-05-30 ENCOUNTER — RESULTS FOLLOW-UP (OUTPATIENT)
Age: 55
End: 2025-05-30

## 2025-06-02 DIAGNOSIS — F31.81 BIPOLAR 2 DISORDER, MAJOR DEPRESSIVE EPISODE (HCC): ICD-10-CM

## 2025-06-02 DIAGNOSIS — F41.1 GAD (GENERALIZED ANXIETY DISORDER): ICD-10-CM

## 2025-06-03 RX ORDER — OLANZAPINE 10 MG/1
10 TABLET, FILM COATED ORAL
Qty: 30 TABLET | Refills: 2 | OUTPATIENT
Start: 2025-06-03

## 2025-06-03 RX ORDER — FLUOXETINE HYDROCHLORIDE 40 MG/1
80 CAPSULE ORAL DAILY
Qty: 60 CAPSULE | Refills: 2 | OUTPATIENT
Start: 2025-06-03

## 2025-06-11 ENCOUNTER — APPOINTMENT (OUTPATIENT)
Dept: LAB | Facility: CLINIC | Age: 55
End: 2025-06-11
Attending: SURGERY
Payer: COMMERCIAL

## 2025-06-11 DIAGNOSIS — K42.9 UMBILICAL HERNIA: ICD-10-CM

## 2025-06-11 DIAGNOSIS — Z12.5 SCREENING FOR PROSTATE CANCER: ICD-10-CM

## 2025-06-11 LAB
ANION GAP SERPL CALCULATED.3IONS-SCNC: 7 MMOL/L (ref 4–13)
ATRIAL RATE: 70 BPM
BUN SERPL-MCNC: 11 MG/DL (ref 5–25)
CALCIUM SERPL-MCNC: 9.1 MG/DL (ref 8.4–10.2)
CHLORIDE SERPL-SCNC: 102 MMOL/L (ref 96–108)
CO2 SERPL-SCNC: 27 MMOL/L (ref 21–32)
CREAT SERPL-MCNC: 0.84 MG/DL (ref 0.6–1.3)
ERYTHROCYTE [DISTWIDTH] IN BLOOD BY AUTOMATED COUNT: 14.9 % (ref 11.6–15.1)
GFR SERPL CREATININE-BSD FRML MDRD: 97 ML/MIN/1.73SQ M
GLUCOSE P FAST SERPL-MCNC: 101 MG/DL (ref 65–99)
HCT VFR BLD AUTO: 39.6 % (ref 36.5–49.3)
HGB BLD-MCNC: 13 G/DL (ref 12–17)
MCH RBC QN AUTO: 27.4 PG (ref 26.8–34.3)
MCHC RBC AUTO-ENTMCNC: 32.8 G/DL (ref 31.4–37.4)
MCV RBC AUTO: 84 FL (ref 82–98)
P AXIS: 17 DEGREES
PLATELET # BLD AUTO: 381 THOUSANDS/UL (ref 149–390)
PMV BLD AUTO: 7.8 FL (ref 8.9–12.7)
POTASSIUM SERPL-SCNC: 3.8 MMOL/L (ref 3.5–5.3)
PR INTERVAL: 162 MS
PSA SERPL-MCNC: 3.99 NG/ML (ref 0–4)
QRS AXIS: 32 DEGREES
QRSD INTERVAL: 100 MS
QT INTERVAL: 358 MS
QTC INTERVAL: 387 MS
RBC # BLD AUTO: 4.74 MILLION/UL (ref 3.88–5.62)
SODIUM SERPL-SCNC: 136 MMOL/L (ref 135–147)
T WAVE AXIS: 40 DEGREES
VENTRICULAR RATE: 70 BPM
WBC # BLD AUTO: 5.34 THOUSAND/UL (ref 4.31–10.16)

## 2025-06-11 PROCEDURE — 80048 BASIC METABOLIC PNL TOTAL CA: CPT

## 2025-06-11 PROCEDURE — 36415 COLL VENOUS BLD VENIPUNCTURE: CPT

## 2025-06-11 PROCEDURE — 85027 COMPLETE CBC AUTOMATED: CPT

## 2025-06-11 PROCEDURE — G0103 PSA SCREENING: HCPCS

## 2025-06-11 PROCEDURE — 93010 ELECTROCARDIOGRAM REPORT: CPT | Performed by: INTERNAL MEDICINE

## 2025-06-11 NOTE — TELEPHONE ENCOUNTER
----- Message from Ella Moreno MD sent at 6/10/2025  7:24 AM EDT -----  Repeat EGD in 1 year.  Repeat colonoscopy in 7 years.  ----- Message -----  From: Lab, Background User  Sent: 5/29/2025  10:34 AM EDT  To: Ella Moreno MD

## 2025-06-11 NOTE — TELEPHONE ENCOUNTER
Attempted to call pt regarding EGD & colonoscopy results and provider recommendations, however, I received voicemail. Advised to return call to office to discuss. 1 year EGD recall set. 7 year colon recall set.     Please, relay results if pt returns call. Thanks!

## 2025-06-12 ENCOUNTER — TELEPHONE (OUTPATIENT)
Age: 55
End: 2025-06-12

## 2025-06-12 ENCOUNTER — ANESTHESIA EVENT (OUTPATIENT)
Dept: PERIOP | Facility: AMBULARY SURGERY CENTER | Age: 55
End: 2025-06-12
Payer: COMMERCIAL

## 2025-06-12 NOTE — TELEPHONE ENCOUNTER
Portillo Pendleton  P [facility] Pod Clerical2 hours ago (10:41 AM)       Appointment canceled for Portillo Pendleton ([id number])  Visit type: OFFICE VISIT SHORT [facility]  6/17/2025 1:00 PM (30 minutes) with Geovannironald Maldonado DO in [facility]     Reason for cancellation: Canceled via [facility] [employer]        Called pt to r/s appt, pt stated that he is still waiting to see one other specialist and will call back once he's ready.

## 2025-06-13 NOTE — PRE-PROCEDURE INSTRUCTIONS
Pre-Surgery Instructions:   Medication Instructions    albuterol (PROVENTIL HFA,VENTOLIN HFA) 90 mcg/act inhaler Uses PRN- OK to take day of surgery    atorvastatin (LIPITOR) 10 mg tablet Take day of surgery.    bictegravir-emtricitab-tenofovir alafenamide (Biktarvy) -25 MG tablet Hold day of surgery.    FLUoxetine (PROzac) 40 MG capsule Take day of surgery.    fluticasone (FLONASE) 50 mcg/act nasal spray Take day of surgery.    hydrOXYzine HCL (ATARAX) 25 mg tablet Uses PRN- OK to take day of surgery    loratadine (CLARITIN) 10 mg tablet Take day of surgery.    OLANZapine (ZyPREXA) 10 mg tablet Take day of surgery.    omeprazole (PriLOSEC) 40 MG capsule Take day of surgery.    tadalafil (CIALIS) 2.5 MG tablet Stop taking 1 day prior to surgery.    tamsulosin (FLOMAX) 0.4 mg Hold day of surgery.    Medication instructions for day of surgery reviewed. Please take all instructed medications with only a sip of water. Please do not take any over the counter (non-prescribed) vitamins or supplements for one week prior to date of surgery.      You will receive a call one business day prior to surgery with an arrival time and hospital directions. If your surgery is scheduled on a Monday, the hospital will be calling you on the Friday prior to your surgery. If you have not heard from anyone by 8pm, please call the hospital supervisor through the hospital  at [phone]. ([place] [phone] or [place] [phone]).    Do not eat or drink anything after midnight the night before your surgery, including candy, mints, lifesavers, or chewing gum. Do not drink alcohol 24hrs before your surgery. Try not to smoke at least 24hrs before your surgery.       Follow the pre surgery showering instructions as listed in the “My Surgical Experience Booklet” or otherwise provided by your surgeon's office. Do not use a blade to shave the surgical area 1 week before surgery. It is okay to use a clean electric clippers up to  24 hours before surgery. Do not apply any lotions, creams, including makeup, cologne, deodorant, or perfumes after showering on the day of your surgery. Do not use dry shampoo, hair spray, hair gel, or any type of hair products.     No contact lenses, eye make-up, or artificial eyelashes. Remove nail polish, including gel polish, and any artificial, gel, or acrylic nails if possible. Remove all jewelry including rings and body piercing jewelry.     Wear causal clothing that is easy to take on and off. Consider your type of surgery.    Keep any valuables, jewelry, piercings at home. Please bring any specially ordered equipment (sling, braces) if indicated.    Arrange for a responsible person to drive you to and from the hospital on the day of your surgery. Please confirm the visitor policy for the day of your procedure when you receive your phone call with an arrival time.     Call the surgeon's office with any new illnesses, exposures, or additional questions prior to surgery.    Please reference your “My Surgical Experience Booklet” for additional information to prepare for your upcoming surgery.

## 2025-06-13 NOTE — TELEPHONE ENCOUNTER
Attempted to call pt regarding EGD & colonoscopy results and provider recommendations, however, I received voicemail. Advised to return call to office to discuss. Recalls entered. Second attempt letter sent.     Please, relay results if pt returns call. Thanks!

## 2025-06-18 DIAGNOSIS — N40.1 BENIGN PROSTATIC HYPERPLASIA WITH INCOMPLETE BLADDER EMPTYING: ICD-10-CM

## 2025-06-18 DIAGNOSIS — F41.1 GAD (GENERALIZED ANXIETY DISORDER): ICD-10-CM

## 2025-06-18 DIAGNOSIS — R39.14 BENIGN PROSTATIC HYPERPLASIA WITH INCOMPLETE BLADDER EMPTYING: ICD-10-CM

## 2025-06-18 RX ORDER — HYDROXYZINE HYDROCHLORIDE 25 MG/1
TABLET, FILM COATED ORAL
Qty: 120 TABLET | Refills: 0 | Status: SHIPPED | OUTPATIENT
Start: 2025-06-18

## 2025-06-19 DIAGNOSIS — B20 HIV DISEASE (HCC): ICD-10-CM

## 2025-06-19 RX ORDER — TAMSULOSIN HYDROCHLORIDE 0.4 MG/1
0.4 CAPSULE ORAL
Qty: 90 CAPSULE | Refills: 1 | Status: SHIPPED | OUTPATIENT
Start: 2025-06-19

## 2025-06-19 RX ORDER — BICTEGRAVIR SODIUM, EMTRICITABINE, AND TENOFOVIR ALAFENAMIDE FUMARATE 50; 200; 25 MG/1; MG/1; MG/1
1 TABLET ORAL
Qty: 90 TABLET | Refills: 1 | Status: SHIPPED | OUTPATIENT
Start: 2025-06-19

## 2025-06-24 ENCOUNTER — HOSPITAL ENCOUNTER (OUTPATIENT)
Facility: AMBULARY SURGERY CENTER | Age: 55
Setting detail: OUTPATIENT SURGERY
Discharge: HOME | End: 2025-06-24
Attending: SURGERY | Admitting: SURGERY
Payer: COMMERCIAL

## 2025-06-24 ENCOUNTER — ANESTHESIA (OUTPATIENT)
Dept: PERIOP | Facility: AMBULARY SURGERY CENTER | Age: 55
End: 2025-06-24
Payer: COMMERCIAL

## 2025-06-24 VITALS
DIASTOLIC BLOOD PRESSURE: 78 MMHG | HEIGHT: 67 IN | OXYGEN SATURATION: 96 % | SYSTOLIC BLOOD PRESSURE: 162 MMHG | BODY MASS INDEX: 31.86 KG/M2 | WEIGHT: 203 LBS | TEMPERATURE: 97.8 F | RESPIRATION RATE: 16 BRPM | HEART RATE: 76 BPM

## 2025-06-24 DIAGNOSIS — K42.9 UMBILICAL HERNIA WITHOUT OBSTRUCTION AND WITHOUT GANGRENE: Primary | ICD-10-CM

## 2025-06-24 PROCEDURE — 49591 RPR AA HRN 1ST < 3 CM RDC: CPT | Performed by: SURGERY

## 2025-06-24 PROCEDURE — C1781 MESH (IMPLANTABLE): HCPCS | Performed by: SURGERY

## 2025-06-24 DEVICE — VENTRALEX ST HERNIA PATCH, 4.3 CM (1.7"), CIRCLE
Type: IMPLANTABLE DEVICE | Site: ABDOMEN | Status: FUNCTIONAL
Brand: VENTRALEX

## 2025-06-24 RX ORDER — KETOROLAC TROMETHAMINE 30 MG/ML
INJECTION, SOLUTION INTRAMUSCULAR; INTRAVENOUS AS NEEDED
Status: DISCONTINUED | OUTPATIENT
Start: 2025-06-24 | End: 2025-06-24

## 2025-06-24 RX ORDER — OXYCODONE HYDROCHLORIDE 5 MG/1
5 TABLET ORAL EVERY 4 HOURS PRN
Qty: 10 TABLET | Refills: 0 | Status: SHIPPED | OUTPATIENT
Start: 2025-06-24 | End: 2025-07-04

## 2025-06-24 RX ORDER — ONDANSETRON 2 MG/ML
INJECTION INTRAMUSCULAR; INTRAVENOUS AS NEEDED
Status: DISCONTINUED | OUTPATIENT
Start: 2025-06-24 | End: 2025-06-24

## 2025-06-24 RX ORDER — FENTANYL CITRATE 50 UG/ML
INJECTION, SOLUTION INTRAMUSCULAR; INTRAVENOUS AS NEEDED
Status: DISCONTINUED | OUTPATIENT
Start: 2025-06-24 | End: 2025-06-24

## 2025-06-24 RX ORDER — PROPOFOL 10 MG/ML
INJECTION, EMULSION INTRAVENOUS AS NEEDED
Status: DISCONTINUED | OUTPATIENT
Start: 2025-06-24 | End: 2025-06-24

## 2025-06-24 RX ORDER — LIDOCAINE HYDROCHLORIDE 10 MG/ML
INJECTION, SOLUTION EPIDURAL; INFILTRATION; INTRACAUDAL; PERINEURAL AS NEEDED
Status: DISCONTINUED | OUTPATIENT
Start: 2025-06-24 | End: 2025-06-24

## 2025-06-24 RX ORDER — ROCURONIUM BROMIDE 10 MG/ML
INJECTION, SOLUTION INTRAVENOUS AS NEEDED
Status: DISCONTINUED | OUTPATIENT
Start: 2025-06-24 | End: 2025-06-24

## 2025-06-24 RX ORDER — DEXAMETHASONE SODIUM PHOSPHATE 10 MG/ML
INJECTION, SOLUTION INTRAMUSCULAR; INTRAVENOUS AS NEEDED
Status: DISCONTINUED | OUTPATIENT
Start: 2025-06-24 | End: 2025-06-24

## 2025-06-24 RX ORDER — HYDROMORPHONE HCL/PF 1 MG/ML
0.5 SYRINGE (ML) INJECTION
Status: DISCONTINUED | OUTPATIENT
Start: 2025-06-24 | End: 2025-06-24 | Stop reason: HOSPADM

## 2025-06-24 RX ORDER — ONDANSETRON 2 MG/ML
4 INJECTION INTRAMUSCULAR; INTRAVENOUS EVERY 4 HOURS PRN
Status: DISCONTINUED | OUTPATIENT
Start: 2025-06-24 | End: 2025-06-24 | Stop reason: HOSPADM

## 2025-06-24 RX ORDER — MIDAZOLAM HYDROCHLORIDE 2 MG/2ML
INJECTION, SOLUTION INTRAMUSCULAR; INTRAVENOUS AS NEEDED
Status: DISCONTINUED | OUTPATIENT
Start: 2025-06-24 | End: 2025-06-24

## 2025-06-24 RX ORDER — ONDANSETRON 2 MG/ML
4 INJECTION INTRAMUSCULAR; INTRAVENOUS ONCE AS NEEDED
Status: DISCONTINUED | OUTPATIENT
Start: 2025-06-24 | End: 2025-06-24 | Stop reason: HOSPADM

## 2025-06-24 RX ORDER — METOCLOPRAMIDE HYDROCHLORIDE 5 MG/ML
5 INJECTION INTRAMUSCULAR; INTRAVENOUS ONCE AS NEEDED
Status: DISCONTINUED | OUTPATIENT
Start: 2025-06-24 | End: 2025-06-24 | Stop reason: HOSPADM

## 2025-06-24 RX ORDER — SODIUM CHLORIDE, SODIUM LACTATE, POTASSIUM CHLORIDE, CALCIUM CHLORIDE 600; 310; 30; 20 MG/100ML; MG/100ML; MG/100ML; MG/100ML
125 INJECTION, SOLUTION INTRAVENOUS CONTINUOUS
Status: DISCONTINUED | OUTPATIENT
Start: 2025-06-24 | End: 2025-06-24 | Stop reason: HOSPADM

## 2025-06-24 RX ORDER — CEFAZOLIN SODIUM 2 G/50ML
2000 SOLUTION INTRAVENOUS ONCE
Status: COMPLETED | OUTPATIENT
Start: 2025-06-24 | End: 2025-06-24

## 2025-06-24 RX ORDER — OXYCODONE HYDROCHLORIDE 5 MG/1
5 TABLET ORAL EVERY 4 HOURS PRN
Refills: 0 | Status: DISCONTINUED | OUTPATIENT
Start: 2025-06-24 | End: 2025-06-24 | Stop reason: HOSPADM

## 2025-06-24 RX ORDER — BUPIVACAINE HYDROCHLORIDE 2.5 MG/ML
INJECTION, SOLUTION EPIDURAL; INFILTRATION; INTRACAUDAL; PERINEURAL AS NEEDED
Status: DISCONTINUED | OUTPATIENT
Start: 2025-06-24 | End: 2025-06-24 | Stop reason: HOSPADM

## 2025-06-24 RX ORDER — MAGNESIUM HYDROXIDE 1200 MG/15ML
LIQUID ORAL AS NEEDED
Status: DISCONTINUED | OUTPATIENT
Start: 2025-06-24 | End: 2025-06-24 | Stop reason: HOSPADM

## 2025-06-24 RX ORDER — HYDROMORPHONE HCL/PF 1 MG/ML
0.5 SYRINGE (ML) INJECTION
Refills: 0 | Status: DISCONTINUED | OUTPATIENT
Start: 2025-06-24 | End: 2025-06-24 | Stop reason: HOSPADM

## 2025-06-24 RX ORDER — SODIUM CHLORIDE, SODIUM LACTATE, POTASSIUM CHLORIDE, CALCIUM CHLORIDE 600; 310; 30; 20 MG/100ML; MG/100ML; MG/100ML; MG/100ML
INJECTION, SOLUTION INTRAVENOUS CONTINUOUS PRN
Status: DISCONTINUED | OUTPATIENT
Start: 2025-06-24 | End: 2025-06-24

## 2025-06-24 RX ORDER — FENTANYL CITRATE/PF 50 MCG/ML
50 SYRINGE (ML) INJECTION
Status: DISCONTINUED | OUTPATIENT
Start: 2025-06-24 | End: 2025-06-24 | Stop reason: HOSPADM

## 2025-06-24 RX ADMIN — FENTANYL CITRATE 50 MCG: 50 INJECTION INTRAMUSCULAR; INTRAVENOUS at 11:38

## 2025-06-24 RX ADMIN — MIDAZOLAM 2 MG: 1 INJECTION INTRAMUSCULAR; INTRAVENOUS at 11:31

## 2025-06-24 RX ADMIN — CEFAZOLIN SODIUM 2000 MG: 2 SOLUTION INTRAVENOUS at 11:35

## 2025-06-24 RX ADMIN — KETOROLAC TROMETHAMINE 30 MG: 30 INJECTION, SOLUTION INTRAMUSCULAR; INTRAVENOUS at 12:11

## 2025-06-24 RX ADMIN — FENTANYL CITRATE 50 MCG: 50 INJECTION INTRAMUSCULAR; INTRAVENOUS at 11:45

## 2025-06-24 RX ADMIN — DEXAMETHASONE SODIUM PHOSPHATE 10 MG: 10 INJECTION INTRAMUSCULAR; INTRAVENOUS at 11:44

## 2025-06-24 RX ADMIN — PROPOFOL 200 MG: 10 INJECTION, EMULSION INTRAVENOUS at 11:38

## 2025-06-24 RX ADMIN — ONDANSETRON 4 MG: 2 INJECTION INTRAMUSCULAR; INTRAVENOUS at 11:44

## 2025-06-24 RX ADMIN — SUGAMMADEX 200 MG: 100 INJECTION, SOLUTION INTRAVENOUS at 12:16

## 2025-06-24 RX ADMIN — LIDOCAINE HYDROCHLORIDE 50 MG: 10 INJECTION, SOLUTION EPIDURAL; INFILTRATION; INTRACAUDAL at 11:38

## 2025-06-24 RX ADMIN — ROCURONIUM 50 MG: 50 INJECTION, SOLUTION INTRAVENOUS at 11:38

## 2025-06-24 RX ADMIN — SODIUM CHLORIDE, SODIUM LACTATE, POTASSIUM CHLORIDE, AND CALCIUM CHLORIDE: .6; .31; .03; .02 INJECTION, SOLUTION INTRAVENOUS at 10:27

## 2025-06-24 NOTE — OP NOTE
OPERATIVE REPORT  PATIENT NAME: Portillo Pendleton    :  1970  MRN: [id number]  Pt Location: [facility] OR [facility]OM 04    SURGERY DATE: 2025    Surgeons and Role:     * Ryley Ambrose DO - Primary     * Boo Sapp MD - Assisting    Preop Diagnosis:  Umbilical hernia [K42.9]    Post-Op Diagnosis Codes:     * Umbilical hernia [K42.9]    Procedure(s):  REPAIR HERNIA UMBILICAL with 1.7 inch Ventralex patch    Specimen(s):  * No specimens in log *    Estimated Blood Loss:   Minimal    Drains:  * No LDAs found *    Anesthesia Type:   General/local    Operative Indications:  Umbilical hernia [K42.9]      Operative Findings:  Height 67 inches weight 92 kg/2 or 3 pounds.  BMI 32.  ASA 3.  Wound class I  Prior to opening the fascia, or reducing the contents of the hernia, the hernia defect was measured. The defect measured 1 cm by 1 cm. This was repaired as described in the body of the report below.       Complications:   None    Procedure and Technique:  Patient was brought the operative suite and identified by visualization, conversation, by armband.  Sequential compression pumps were placed.  He was given Ancef perioperatively.  Once under anesthesia abdomen was prepped and draped in a sterile fashion.  Timeout was performed and was assured that the prep was dry.  Local was instilled to the left side of the umbilicus.  Periumbilical midline incision was made exposing the underlying fat-containing hernia hernia sac was freed up and reduced.  Was freed up from the surrounding underlying fascial tissue.  4 x 4 sponge was utilized to expose some of the tissue underneath the fascia.  1.7 inch Ventralex patch was chosen.  This was placed under the fascial defect.  0 Vicryl was used to attach the tails to each side of the fascia 1 cm back.  Tails were trimmed.  Irrigations carried out.  0 Vicryl was used in a figure-of-eight fashion to close the fascia on top of the mesh.  3-0 Vicryl was used to tack the umbilical  skin to the fascia.  Local was instilled.  Irrigations carried out.  4-0 Monocryl was used in close skin in subcuticular fashion.  Wounds were washed and dried.  Sterile skin glue was applied.  He was awakened in the operating room returned to the recovery area in stable condition having tolerated the procedure well.   I was present for the entire procedure.    Patient Disposition:  PACU          SIGNATURE: Ryley Ambrose DO  DATE: June 24, 2025  TIME: 12:10 PM

## 2025-06-24 NOTE — ANESTHESIA POSTPROCEDURE EVALUATION
Post-Op Assessment Note    CV Status:  Stable    Pain management: adequate       Mental Status:  Alert and awake   Hydration Status:  Euvolemic   PONV Controlled:  Controlled   Airway Patency:  Patent     Post Op Vitals Reviewed: Yes    No anethesia notable event occurred.    Staff: Anesthesiologist         Last Filed PACU Vitals:  Vitals Value Taken Time   Temp 98.3 °F (36.8 °C) 06/24/25 12:53   Pulse 82 06/24/25 12:53   /75 06/24/25 12:53   Resp 20 06/24/25 12:53   SpO2 94 % 06/24/25 12:53       Modified Jorge:     Vitals Value Taken Time   Activity 2 06/24/25 12:53   Respiration 2 06/24/25 12:53   Circulation 2 06/24/25 12:53   Consciousness 2 06/24/25 12:53   Oxygen Saturation 2 06/24/25 12:53     Modified Jorge Score: 10

## 2025-06-24 NOTE — ANESTHESIA PREPROCEDURE EVALUATION
Procedure:  REPAIR HERNIA UMBILICAL (Abdomen)    Relevant Problems   ANESTHESIA (within normal limits)      CARDIO   (+) Bilateral varicoceles   (+) Elevated cholesterol with elevated triglycerides   (-) Angina at rest (HCC)   (-) Angina of effort (HCC)   (-) CENTENO (dyspnea on exertion)      ENDO (within normal limits)      GI/HEPATIC   (+) Dysphagia   (+) GERD (gastroesophageal reflux disease) (Well controlled on meds, only symptoms if not takiing med and eating; asymptomatic x 1 month)      /RENAL   (+) Benign prostatic hyperplasia with incomplete bladder emptying      HEMATOLOGY (within normal limits)      MUSCULOSKELETAL   (+) Acute right-sided low back pain with right-sided sciatica   (+) Osteoarthritis of spine with radiculopathy, cervical region      NEURO/PSYCH   (+) Chronic generalized pain disorder   (+) Chronic pain of both shoulders   (+) Chronic pain syndrome   (+) SOPHIA (generalized anxiety disorder)   (+) Panic attacks      PULMONARY   (+) Moderate persistent asthma without complication (Well controlled)   (+) SOB (shortness of breath)   (-) URI (upper respiratory infection)      2022 TTE  GERD; Asthma; Shortness of breath     Interpretation Summary  Show Result Comparison     Left Ventricle: Left ventricular cavity size is normal. Wall thickness is mildly increased. The left ventricular ejection fraction is 65%. Systolic function is normal. Wall motion is normal. Diastolic function is normal. There is mild concentric hypertrophy.    Left Atrium: The atrium is mildly dilated.    Right Atrium: The atrium is mildly dilated.  Physical Exam    Airway     Mallampati score: III  TM Distance: >3 FB  Neck ROM: limited extension      Cardiovascular  Rhythm: regular, Rate: normal    Dental    poor dentition,     Pulmonary      Neurological      Other Findings        Anesthesia Plan  ASA Score- 3     Anesthesia Type- general with ASA Monitors.         Additional Monitors:     Airway Plan: Oral ETT.           Plan  Factors-Exercise tolerance (METS): >4 METS.    Chart reviewed.    Patient summary reviewed.    Patient is not a current smoker.      Obstructive sleep apnea risk education given perioperatively.        Induction- intravenous.    Postoperative Plan- Plan for postoperative opioid use.   Monitoring Plan - Monitoring plan - standard ASA monitoring  Post Operative Pain Plan - plan for postoperative opioid use and multimodal analgesia    Perioperative Resuscitation Plan - Level 1 - Full Code.       Informed Consent- Anesthetic plan and risks discussed with patient.  I personally reviewed this patient with the CRNA. Discussed and agreed on the Anesthesia Plan with the CRNA..      NPO Status:  No vitals data found for the desired time range.

## 2025-06-24 NOTE — INTERVAL H&P NOTE
H&P reviewed. After examining the patient I find no changes in the patients condition since the H&P had been written.    Vitals:    06/24/25 1058   BP: 164/86   Pulse: 82   Resp: 18   Temp: 98 °F (36.7 °C)   SpO2: 95%

## 2025-06-24 NOTE — ANESTHESIA POSTPROCEDURE EVALUATION
----- Message from Pallavi Augustin NP sent at 6/28/2017 12:57 PM CDT -----  Please contact patient to schedule EGD.    Post-Op Assessment Note    CV Status:  Stable  Pain Score: 0    Pain management: adequate       Mental Status:  Sleepy   Hydration Status:  Euvolemic   PONV Controlled:  Controlled   Airway Patency:  Patent     Post Op Vitals Reviewed: Yes    No anethesia notable event occurred.    Staff: CRNA   Comments: vss sv nonobstructed uneventful          Last Filed PACU Vitals:  Vitals Value Taken Time   Temp 98.3 °F (36.8 °C) 06/24/25 12:23   Pulse 69 06/24/25 12:24   /84 06/24/25 12:24   Resp 7 06/24/25 12:24   SpO2 95 % 06/24/25 12:24   Vitals shown include unfiled device data.    Modified Jorge:     Vitals Value Taken Time   Activity 2 06/24/25 12:23   Respiration 2 06/24/25 12:23   Circulation 2 06/24/25 12:23   Consciousness 1 06/24/25 12:23   Oxygen Saturation 1 06/24/25 12:23     Modified Jorge Score: 8

## 2025-06-24 NOTE — DISCHARGE INSTR - AVS FIRST PAGE
Please call the office when you leave to schedule an appointment to be seen in 2-3 weeks    Activity:    Do not lift more than 25 pounds for 4 weeks post-operatively                 Walking is encouraged  Normal daily activities including climbing steps are okay  Do not engage in strenuous activity or contact sports for 4 weeks post-operatively    Return to work:   Return to work to be discussed at first post-operative visit    Diet:   You may return to your normal heart healthy diet    Wound Care:  Your wound is closed with skin glue  It is okay to shower. Wash incision gently with soap and water and pat dry. Do not soak incisions in bath water or swim for two weeks. Do not apply any creams or ointments.  Apply ice as needed to the wound.  Recommend ice continuously for the next 3 to 4 days    Pain Medication:   Please take as directed  No driving while taking narcotic pain medications    Other:  If you have questions after discharge please call the office.    If you have increased pain, fever >101.5, increased drainage, redness or a bad smell at your surgery site, please call us immediately or come directly to the Emergency Room

## 2025-07-16 ENCOUNTER — PATIENT OUTREACH (OUTPATIENT)
Dept: SURGERY | Facility: CLINIC | Age: 55
End: 2025-07-16

## 2025-07-16 NOTE — PROGRESS NOTES
Cm called ct to do 6 month assessment. Ct is living with his mom and states that housing is stable. Ct reports to have consistent and reliable access to transportation but has requested bus passes today- cm mailed it out to ct through certified mail. Ct is still insured through [employer]. Ct states that he is independent and can follow up on his own referrals. Ct states that he needs occasional help accessing assistance. Ct reports no subastance use and no mental health concerns. Ct receives no income at this time. Ct reports dental discomfort but declined a dental referral today.  Cm and ct worked on Acuity Scale and ct's score is a 14.

## 2025-07-17 ENCOUNTER — TELEPHONE (OUTPATIENT)
Dept: SURGERY | Facility: CLINIC | Age: 55
End: 2025-07-17

## 2025-07-17 NOTE — TELEPHONE ENCOUNTER
Left message on Polly's voicemail.  Portillo missed his post op appt with Dr Ambrose on 7/16.  Asked her to let us know how Portillo is doing and to reschedule his post op appt.  Can transfer the call to the office or reschedule the appt.    Occupational Therapy    Patient not seen in therapy.     Unavailable due to request no therapy today.      Re-attempt plan: per established plan of care    Patient reportedly did a lot of walking yesterday and would like to \"take it easy today\".                                 Therapy procedure time and total treatment time can be found documented on the Time Entry flowsheet

## 2025-07-18 DIAGNOSIS — F41.1 GAD (GENERALIZED ANXIETY DISORDER): ICD-10-CM

## 2025-07-21 RX ORDER — HYDROXYZINE HYDROCHLORIDE 25 MG/1
TABLET, FILM COATED ORAL
Qty: 120 TABLET | Refills: 2 | Status: SHIPPED | OUTPATIENT
Start: 2025-07-21

## 2025-07-23 ENCOUNTER — OFFICE VISIT (OUTPATIENT)
Dept: SURGERY | Facility: CLINIC | Age: 55
End: 2025-07-23
Payer: COMMERCIAL

## 2025-07-23 DIAGNOSIS — K42.9 UMBILICAL HERNIA WITHOUT OBSTRUCTION AND WITHOUT GANGRENE: Primary | ICD-10-CM

## 2025-07-23 PROCEDURE — 99212 OFFICE O/P EST SF 10 MIN: CPT | Performed by: SURGERY

## 2025-07-23 NOTE — ASSESSMENT & PLAN NOTE
Status post umbilical hernia repair with mesh.  Looks quite good.  May resume diet and activity as tolerated without restrictions

## 2025-07-23 NOTE — PROGRESS NOTES
Name: Portillo Pendleton      : 1970      MRN: [id number]  Encounter Provider: Ryley Ambrose DO  Encounter Date: 2025   Encounter department: [facility]  :  Assessment & Plan  Umbilical hernia without obstruction and without gangrene  Status post umbilical hernia repair with mesh.  Looks quite good.  May resume diet and activity as tolerated without restrictions           History of Present Illness   HPI  Portillo Pendleton is a 54 y.o. male who presents post operatively.  Umbilical hernia repair 2025 with mesh..  Patient was concerned because he thought it looked a little red.  Denies any drainage.  History obtained from: patient    Review of Systems   All other systems reviewed and are negative.    Medical History Reviewed by provider this encounter:  Tobacco  Allergies  Meds  Problems  Med Hx  Surg Hx  Fam Hx     .     Objective   There were no vitals taken for this visit.     Physical Exam  Abdominal:      Palpations: Abdomen is soft.      Comments: Healed periumbilical scar.  No signs of infection nor abscess.  Healing ridge as expected

## 2025-07-28 ENCOUNTER — OFFICE VISIT (OUTPATIENT)
Dept: PSYCHIATRY | Facility: CLINIC | Age: 55
End: 2025-07-28
Payer: COMMERCIAL

## 2025-07-28 VITALS — WEIGHT: 211.5 LBS | BODY MASS INDEX: 33.13 KG/M2

## 2025-07-28 DIAGNOSIS — F31.81 BIPOLAR 2 DISORDER, MAJOR DEPRESSIVE EPISODE (HCC): ICD-10-CM

## 2025-07-28 DIAGNOSIS — F41.1 GAD (GENERALIZED ANXIETY DISORDER): ICD-10-CM

## 2025-07-28 PROCEDURE — 99214 OFFICE O/P EST MOD 30 MIN: CPT | Performed by: PSYCHIATRY & NEUROLOGY

## 2025-07-28 RX ORDER — OLANZAPINE 10 MG/1
10 TABLET, FILM COATED ORAL
Qty: 30 TABLET | Refills: 2 | Status: SHIPPED | OUTPATIENT
Start: 2025-07-28

## 2025-07-28 RX ORDER — HYDROXYZINE HYDROCHLORIDE 25 MG/1
25-50 TABLET, FILM COATED ORAL 2 TIMES DAILY PRN
Qty: 120 TABLET | Refills: 2 | Status: SHIPPED | OUTPATIENT
Start: 2025-07-28

## 2025-07-28 RX ORDER — FLUOXETINE HYDROCHLORIDE 40 MG/1
80 CAPSULE ORAL DAILY
Qty: 60 CAPSULE | Refills: 2 | Status: SHIPPED | OUTPATIENT
Start: 2025-07-28

## 2025-08-20 ENCOUNTER — TELEPHONE (OUTPATIENT)
Dept: SURGERY | Facility: CLINIC | Age: 55
End: 2025-08-20

## (undated) DEVICE — GLOVE SRG BIOGEL ORTHOPEDIC 8

## (undated) DEVICE — TOWEL SURG XR DETECT GREEN STRL RFD

## (undated) DEVICE — SUT MONOCRYL 4-0 PS-2 27 IN Y426H

## (undated) DEVICE — EXOFIN PRECISION PEN HIGH VISCOSITY TOPICAL SKIN ADHESIVE: Brand: EXOFIN PRECISION PEN, 1G

## (undated) DEVICE — DECANTER: Brand: UNBRANDED

## (undated) DEVICE — NEEDLE 23G X 1 1/2 SAFETY-GLIDE THIN WALL

## (undated) DEVICE — SUT VICRYL 0 UR-6 27 IN J603H

## (undated) DEVICE — NEPTUNE E-SEP SMOKE EVACUATION PENCIL, COATED, 70MM BLADE, PUSH BUTTON SWITCH: Brand: NEPTUNE E-SEP

## (undated) DEVICE — BETHLEHEM UNIVERSAL MINOR GEN: Brand: CARDINAL HEALTH